# Patient Record
Sex: MALE | Race: WHITE | Employment: OTHER | URBAN - METROPOLITAN AREA
[De-identification: names, ages, dates, MRNs, and addresses within clinical notes are randomized per-mention and may not be internally consistent; named-entity substitution may affect disease eponyms.]

---

## 2019-03-27 ENCOUNTER — TELEPHONE (OUTPATIENT)
Dept: UROLOGY | Facility: AMBULATORY SURGERY CENTER | Age: 79
End: 2019-03-27

## 2019-03-27 NOTE — TELEPHONE ENCOUNTER
Reason for appointment/Complaint/Diagnosis : prostate exam    Insurance: Medicare/BC    History of Cancer? Yes                       If yes, what kind? Skin 30yrs ago    Previous urologist?     None                  Records requested/where? No    Outside testing/where? No    Location Preference for office visit?  [place]

## 2020-07-09 ENCOUNTER — OFFICE VISIT (OUTPATIENT)
Dept: PULMONOLOGY | Facility: CLINIC | Age: 80
End: 2020-07-09
Payer: MEDICARE

## 2020-07-09 VITALS
SYSTOLIC BLOOD PRESSURE: 126 MMHG | OXYGEN SATURATION: 95 % | BODY MASS INDEX: 37.67 KG/M2 | WEIGHT: 240 LBS | RESPIRATION RATE: 14 BRPM | DIASTOLIC BLOOD PRESSURE: 70 MMHG | HEART RATE: 65 BPM | TEMPERATURE: 98 F | HEIGHT: 67 IN

## 2020-07-09 DIAGNOSIS — R06.02 SOB (SHORTNESS OF BREATH): Primary | ICD-10-CM

## 2020-07-09 DIAGNOSIS — I10 ESSENTIAL HYPERTENSION: ICD-10-CM

## 2020-07-09 DIAGNOSIS — G47.33 OSA (OBSTRUCTIVE SLEEP APNEA): ICD-10-CM

## 2020-07-09 PROCEDURE — 99214 OFFICE O/P EST MOD 30 MIN: CPT | Performed by: INTERNAL MEDICINE

## 2020-07-09 RX ORDER — HYDROCHLOROTHIAZIDE 12.5 MG/1
12.5 TABLET ORAL DAILY
COMMUNITY
Start: 2020-05-27

## 2020-07-09 RX ORDER — OMEPRAZOLE 40 MG/1
CAPSULE, DELAYED RELEASE ORAL
COMMUNITY
Start: 2020-07-06

## 2020-07-09 RX ORDER — RANOLAZINE 500 MG/1
500 TABLET, EXTENDED RELEASE ORAL 2 TIMES DAILY
COMMUNITY
Start: 2020-05-27 | End: 2022-08-05 | Stop reason: SDUPTHER

## 2020-07-09 RX ORDER — PRAVASTATIN SODIUM 20 MG
TABLET ORAL
COMMUNITY
Start: 2020-07-06 | End: 2020-11-09

## 2020-07-09 RX ORDER — EMPAGLIFLOZIN 25 MG/1
25 TABLET, FILM COATED ORAL DAILY
COMMUNITY
Start: 2020-06-09

## 2020-07-09 RX ORDER — TAMSULOSIN HYDROCHLORIDE 0.4 MG/1
CAPSULE ORAL
COMMUNITY
Start: 2020-07-06

## 2020-07-09 RX ORDER — CLOPIDOGREL BISULFATE 75 MG/1
75 TABLET ORAL DAILY
COMMUNITY
End: 2022-08-05 | Stop reason: SDUPTHER

## 2020-07-09 RX ORDER — GLIPIZIDE 10 MG/1
TABLET ORAL
COMMUNITY
Start: 2020-07-06

## 2020-07-09 RX ORDER — FAMOTIDINE 40 MG/1
TABLET, FILM COATED ORAL
COMMUNITY
Start: 2020-07-06

## 2020-07-09 RX ORDER — TADALAFIL 20 MG/1
20 TABLET ORAL
COMMUNITY
End: 2020-11-09

## 2020-07-09 RX ORDER — DULAGLUTIDE 0.75 MG/.5ML
INJECTION, SOLUTION SUBCUTANEOUS
COMMUNITY
Start: 2020-07-06 | End: 2020-11-09

## 2020-07-09 RX ORDER — CLONIDINE HYDROCHLORIDE 0.1 MG/1
0.1 TABLET ORAL 3 TIMES DAILY
COMMUNITY

## 2020-07-09 RX ORDER — LISINOPRIL 40 MG/1
TABLET ORAL
COMMUNITY
Start: 2020-07-06

## 2020-07-09 RX ORDER — METOPROLOL SUCCINATE 25 MG/1
TABLET, EXTENDED RELEASE ORAL
COMMUNITY
Start: 2020-07-06 | End: 2022-08-05 | Stop reason: SDUPTHER

## 2020-07-09 NOTE — ASSESSMENT & PLAN NOTE
He has shortness of breath on exertion and his current exercise tolerance is about 2 blocks  His activities are restricted due to ambulatory dysfunction  Denies any significant cough or phlegm or wheeze or chest pain  He admits to being a smoker for about 15 years 2 packs per day  He has no fever or chills  Currently he is not on any oxygen or inhaler  We will do a full PFT and 6 minutes walk test to assess his shortness of breath further  I had a long discussion with him and answered all his questions

## 2020-07-09 NOTE — PROGRESS NOTES
Assessment/Plan:    PAULA (obstructive sleep apnea)  Mr Darshan Vizcarra was diagnosed with sleep apnea of severe degree consisting of both obstructive and central events and associated with significant oxygen desaturation or an diagnosed ago overnight sleep study done in July 2018  During the same night it was found that his sleep apnea can be corrected with CPAP 10 cm of water using nasal pillows  He has been subsequently started on CPAP therapy and has been using the CPAP regularly  He CPAP compliance is overall satisfactory though his 4 hour compliance is borderline, and his residual AHI is only 4 4  I have advised him to use the CPAP regularly and adequately  There long discussion with him and answered all his questions  SOB (shortness of breath)  He has shortness of breath on exertion and his current exercise tolerance is about 2 blocks  His activities are restricted due to ambulatory dysfunction  Denies any significant cough or phlegm or wheeze or chest pain  He admits to being a smoker for about 15 years 2 packs per day  He has no fever or chills  Currently he is not on any oxygen or inhaler  We will do a full PFT and 6 minutes walk test to assess his shortness of breath further  I had a long discussion with him and answered all his questions  Diagnoses and all orders for this visit:    SOB (shortness of breath)  -     Complete PFT with post Bronchodilator and Six Minute walk; Future  -     XR chest pa & lateral; Future    PAULA (obstructive sleep apnea)    Essential hypertension    Other orders  -     ASPIRIN 81 PO; Take 81 mg by mouth daily  -     Multiple Vitamins-Minerals (MULTIVITAMIN ADULT PO); Take by mouth daily  -     tadalafil (CIALIS) 20 MG tablet; Take 20 mg by mouth  -     cloNIDine (CATAPRES) 0 1 mg tablet; Take 0 1 mg by mouth Three times a day  -     clopidogrel (PLAVIX) 75 mg tablet;  Take 75 mg by mouth daily  -     TRULICITY 6 74 HA/2 0MA SOPN  -     JARDIANCE 25 MG TABS; Take 25 mg by mouth daily  -     famotidine (PEPCID) 40 MG tablet  -     glipiZIDE (GLUCOTROL) 10 mg tablet  -     hydrochlorothiazide (HYDRODIURIL) 12 5 mg tablet; Take 12 5 mg by mouth daily  -     lisinopril (ZESTRIL) 40 mg tablet  -     metFORMIN (GLUCOPHAGE) 1000 MG tablet  -     metoprolol succinate (TOPROL-XL) 25 mg 24 hr tablet  -     omeprazole (PriLOSEC) 40 MG capsule  -     pravastatin (PRAVACHOL) 20 mg tablet  -     ranolazine (RANEXA) 500 mg 12 hr tablet; Take 500 mg by mouth 2 (two) times a day  -     tamsulosin (FLOMAX) 0 4 mg          Subjective:      Patient ID: Mihir Hernandez is a 70 y o  male  Mr Ross Lamas was diagnosed with sleep apnea of severe degree consisting of both obstructive and central events and associated with significant oxygen desaturation or an diagnosed ago overnight sleep study done in July 2018  During the same night it was found that his sleep apnea can be corrected with CPAP 10 cm of water using nasal pillows  He has been subsequently started on CPAP therapy and has been using the CPAP regularly  Currently doc is overall compliance is good, his 4 over compliance is only 53%  His residual AHI however is low at 4 4  He is very motivated to continue on CPAP therapy  He does not have any significant daytime sleepiness or morning headache  His sleep is still interrupted  He is comfortable with the mask and pressure  He has some shortness of breath on exertion but is exercise tolerance is at least 2 blocks  He attributed mainly to his knee problems which makes it difficult for ambulation  He denies any significant cough or phlegm or wheeze or chest pain  He was a smoker in the past smoked for about 15 years 2 packs per day          The following portions of the patient's history were reviewed and updated as appropriate: allergies, current medications, past family history, past medical history, past social history, past surgical history and problem list     Review of Systems   Constitutional: Negative for appetite change and fever  HENT: Negative for congestion, rhinorrhea and trouble swallowing  Eyes: Negative for discharge  Respiratory: Positive for shortness of breath  Negative for cough, chest tightness and wheezing  Gastrointestinal: Negative for abdominal distention, constipation, diarrhea and nausea  Genitourinary: Negative for difficulty urinating, dysuria and urgency  Musculoskeletal: Positive for arthralgias  Negative for joint swelling and neck stiffness  Bilateral knee pain; ambulatory dysfunction   Skin: Negative for pallor  Allergic/Immunologic: Negative for environmental allergies  Neurological: Negative for dizziness and syncope  Psychiatric/Behavioral: Positive for sleep disturbance  Negative for agitation  The patient is not nervous/anxious  Objective:      /70 (BP Location: Left arm, Patient Position: Sitting, Cuff Size: Adult)   Pulse 65   Temp 98 °F (36 7 °C) (Tympanic)   Resp 14   Ht 5' 7" (1 702 m)   Wt 109 kg (240 lb)   SpO2 95%   BMI 37 59 kg/m²          Physical Exam   Constitutional: He is oriented to person, place, and time  He appears well-developed and well-nourished  HENT:   Head: Normocephalic  Neck: No JVD present  No thyromegaly present  Cardiovascular: Normal rate and normal heart sounds  Pulmonary/Chest: Breath sounds normal  No respiratory distress  He has no wheezes  He has no rales  He exhibits no tenderness  Abdominal: Soft  Bowel sounds are normal  He exhibits no distension  Musculoskeletal: He exhibits no deformity  Lymphadenopathy:     He has no cervical adenopathy  Neurological: He is alert and oriented to person, place, and time  Skin: Skin is warm  Psychiatric: He has a normal mood and affect

## 2020-07-09 NOTE — ASSESSMENT & PLAN NOTE
Mr Jose Luis Payne was diagnosed with sleep apnea of severe degree consisting of both obstructive and central events and associated with significant oxygen desaturation or an diagnosed ago overnight sleep study done in July 2018  During the same night it was found that his sleep apnea can be corrected with CPAP 10 cm of water using nasal pillows  He has been subsequently started on CPAP therapy and has been using the CPAP regularly  He CPAP compliance is overall satisfactory though his 4 hour compliance is borderline, and his residual AHI is only 4 4  I have advised him to use the CPAP regularly and adequately  There long discussion with him and answered all his questions

## 2020-07-13 ENCOUNTER — TRANSCRIBE ORDERS (OUTPATIENT)
Dept: ADMINISTRATIVE | Facility: HOSPITAL | Age: 80
End: 2020-07-13

## 2020-07-13 ENCOUNTER — APPOINTMENT (OUTPATIENT)
Dept: LAB | Facility: HOSPITAL | Age: 80
End: 2020-07-13
Payer: MEDICARE

## 2020-07-13 DIAGNOSIS — Z79.899 ENCOUNTER FOR LONG-TERM (CURRENT) USE OF OTHER MEDICATIONS: Primary | ICD-10-CM

## 2020-07-13 DIAGNOSIS — Z79.899 ENCOUNTER FOR LONG-TERM (CURRENT) USE OF OTHER MEDICATIONS: ICD-10-CM

## 2020-07-13 LAB
ANION GAP SERPL CALCULATED.3IONS-SCNC: 7 MMOL/L (ref 4–13)
BUN SERPL-MCNC: 25 MG/DL (ref 6–20)
CALCIUM SERPL-MCNC: 9.1 MG/DL (ref 8.4–10.2)
CHLORIDE SERPL-SCNC: 105 MMOL/L (ref 96–108)
CO2 SERPL-SCNC: 30 MMOL/L (ref 22–33)
CREAT SERPL-MCNC: 1.66 MG/DL (ref 0.5–1.2)
GFR SERPL CREATININE-BSD FRML MDRD: 39 ML/MIN/1.73SQ M
GLUCOSE P FAST SERPL-MCNC: 145 MG/DL (ref 65–99)
POTASSIUM SERPL-SCNC: 4 MMOL/L (ref 3.5–5)
SODIUM SERPL-SCNC: 142 MMOL/L (ref 133–145)

## 2020-07-13 PROCEDURE — 80048 BASIC METABOLIC PNL TOTAL CA: CPT

## 2020-07-13 PROCEDURE — 36415 COLL VENOUS BLD VENIPUNCTURE: CPT

## 2020-07-23 ENCOUNTER — HOSPITAL ENCOUNTER (OUTPATIENT)
Dept: RADIOLOGY | Facility: HOSPITAL | Age: 80
Discharge: HOME | End: 2020-07-23
Attending: INTERNAL MEDICINE
Payer: MEDICARE

## 2020-07-23 ENCOUNTER — HOSPITAL ENCOUNTER (OUTPATIENT)
Dept: PULMONOLOGY | Facility: HOSPITAL | Age: 80
Discharge: HOME | End: 2020-07-23
Attending: INTERNAL MEDICINE
Payer: MEDICARE

## 2020-07-23 DIAGNOSIS — R06.02 SOB (SHORTNESS OF BREATH): ICD-10-CM

## 2020-07-23 PROCEDURE — 94618 PULMONARY STRESS TESTING: CPT | Performed by: INTERNAL MEDICINE

## 2020-07-23 PROCEDURE — 94729 DIFFUSING CAPACITY: CPT | Performed by: INTERNAL MEDICINE

## 2020-07-23 PROCEDURE — 94760 N-INVAS EAR/PLS OXIMETRY 1: CPT

## 2020-07-23 PROCEDURE — 94726 PLETHYSMOGRAPHY LUNG VOLUMES: CPT

## 2020-07-23 PROCEDURE — 94761 N-INVAS EAR/PLS OXIMETRY MLT: CPT

## 2020-07-23 PROCEDURE — 94726 PLETHYSMOGRAPHY LUNG VOLUMES: CPT | Performed by: INTERNAL MEDICINE

## 2020-07-23 PROCEDURE — 94010 BREATHING CAPACITY TEST: CPT

## 2020-07-23 PROCEDURE — 94729 DIFFUSING CAPACITY: CPT

## 2020-07-23 PROCEDURE — 71046 X-RAY EXAM CHEST 2 VIEWS: CPT

## 2020-07-23 PROCEDURE — 94010 BREATHING CAPACITY TEST: CPT | Performed by: INTERNAL MEDICINE

## 2020-11-09 ENCOUNTER — OFFICE VISIT (OUTPATIENT)
Dept: PULMONOLOGY | Facility: CLINIC | Age: 80
End: 2020-11-09
Payer: MEDICARE

## 2020-11-09 VITALS
HEART RATE: 69 BPM | WEIGHT: 243 LBS | SYSTOLIC BLOOD PRESSURE: 130 MMHG | HEIGHT: 67 IN | BODY MASS INDEX: 38.14 KG/M2 | OXYGEN SATURATION: 91 % | TEMPERATURE: 97.9 F | DIASTOLIC BLOOD PRESSURE: 78 MMHG

## 2020-11-09 DIAGNOSIS — E66.09 CLASS 2 OBESITY DUE TO EXCESS CALORIES WITHOUT SERIOUS COMORBIDITY WITH BODY MASS INDEX (BMI) OF 37.0 TO 37.9 IN ADULT: ICD-10-CM

## 2020-11-09 DIAGNOSIS — I10 ESSENTIAL HYPERTENSION: ICD-10-CM

## 2020-11-09 DIAGNOSIS — R06.02 SOB (SHORTNESS OF BREATH): ICD-10-CM

## 2020-11-09 DIAGNOSIS — G47.33 OSA (OBSTRUCTIVE SLEEP APNEA): Primary | ICD-10-CM

## 2020-11-09 PROBLEM — E66.9 OBESITY: Status: ACTIVE | Noted: 2020-11-09

## 2020-11-09 PROCEDURE — 99213 OFFICE O/P EST LOW 20 MIN: CPT | Performed by: INTERNAL MEDICINE

## 2020-11-09 RX ORDER — MULTIVITAMIN WITH IRON
100 TABLET ORAL DAILY
COMMUNITY

## 2020-11-09 RX ORDER — TADALAFIL 5 MG/1
5 TABLET ORAL DAILY PRN
COMMUNITY
Start: 2020-11-09 | End: 2020-11-09 | Stop reason: ALTCHOICE

## 2020-11-09 RX ORDER — AMILORIDE HCL 5 MG
10 TABLET ORAL EVERY 4 HOURS PRN
COMMUNITY

## 2020-11-09 RX ORDER — SILDENAFIL 100 MG/1
100 TABLET, FILM COATED ORAL DAILY
COMMUNITY
Start: 2020-10-26 | End: 2022-05-17

## 2020-11-09 RX ORDER — INSULIN DETEMIR 100 [IU]/ML
15 INJECTION, SOLUTION SUBCUTANEOUS
COMMUNITY
Start: 2020-08-27

## 2020-11-09 RX ORDER — DULAGLUTIDE 1.5 MG/.5ML
INJECTION, SOLUTION SUBCUTANEOUS
COMMUNITY
Start: 2020-09-08

## 2020-11-09 RX ORDER — LANCETS 30 GAUGE
EACH MISCELLANEOUS
COMMUNITY
Start: 2020-08-28

## 2020-11-09 RX ORDER — MELATONIN
2000 DAILY
COMMUNITY

## 2020-11-09 RX ORDER — PRAVASTATIN SODIUM 40 MG
40 TABLET ORAL DAILY
COMMUNITY
Start: 2020-08-27 | End: 2022-07-11

## 2020-11-09 RX ORDER — CLOTRIMAZOLE 1 %
CREAM (GRAM) TOPICAL AS NEEDED
COMMUNITY

## 2020-11-09 RX ORDER — CALCIUM CITRATE/VITAMIN D3 200MG-6.25
TABLET ORAL
COMMUNITY
Start: 2020-09-19

## 2020-11-09 RX ORDER — PEN NEEDLE, DIABETIC 32GX 5/32"
NEEDLE, DISPOSABLE MISCELLANEOUS
COMMUNITY
Start: 2020-08-27

## 2020-11-09 RX ORDER — LORATADINE 10 MG/1
TABLET ORAL
COMMUNITY

## 2020-11-09 RX ORDER — GUAIFENESIN 600 MG
1200 TABLET, EXTENDED RELEASE 12 HR ORAL AS NEEDED
COMMUNITY

## 2021-01-18 ENCOUNTER — TELEPHONE (OUTPATIENT)
Dept: PULMONOLOGY | Facility: CLINIC | Age: 81
End: 2021-01-18

## 2021-01-18 NOTE — TELEPHONE ENCOUNTER
Note to chart:  Returned pt's call  Message he left said he was calling regarding an appt but didn't say anything additional regarding the appt      LM for pt TCB

## 2021-01-20 DIAGNOSIS — Z23 ENCOUNTER FOR IMMUNIZATION: ICD-10-CM

## 2021-05-06 ENCOUNTER — OFFICE VISIT (OUTPATIENT)
Dept: PULMONOLOGY | Facility: CLINIC | Age: 81
End: 2021-05-06
Payer: MEDICARE

## 2021-05-06 VITALS
HEIGHT: 67 IN | OXYGEN SATURATION: 95 % | TEMPERATURE: 97.6 F | BODY MASS INDEX: 39.24 KG/M2 | SYSTOLIC BLOOD PRESSURE: 140 MMHG | RESPIRATION RATE: 16 BRPM | HEART RATE: 62 BPM | WEIGHT: 250 LBS | DIASTOLIC BLOOD PRESSURE: 72 MMHG

## 2021-05-06 DIAGNOSIS — I10 ESSENTIAL HYPERTENSION: ICD-10-CM

## 2021-05-06 DIAGNOSIS — E66.09 CLASS 2 OBESITY DUE TO EXCESS CALORIES WITHOUT SERIOUS COMORBIDITY WITH BODY MASS INDEX (BMI) OF 37.0 TO 37.9 IN ADULT: ICD-10-CM

## 2021-05-06 DIAGNOSIS — G47.33 OSA (OBSTRUCTIVE SLEEP APNEA): Primary | ICD-10-CM

## 2021-05-06 PROCEDURE — 99213 OFFICE O/P EST LOW 20 MIN: CPT | Performed by: INTERNAL MEDICINE

## 2021-05-06 NOTE — ASSESSMENT & PLAN NOTE
He has shortness of breath on exertion and his current exercise tolerance is at least 1 block   His activities are restricted due to ambulatory dysfunction   Denies any significant  phlegm or wheeze or chest pain   He admits to being a smoker for about 15 years 2 packs per day    Currently he is not on any oxygen or inhaler    his pulmonary function tests were within normal limits except for moderate reduction in diffusion capacity to about 60% of the predicted  His chest x-ray was unremarkable  On chest auscultation he had occasional crackles at both lung bases  We will observe him at this time   Magalie Arnold had a long discussion with him and answered all his questions

## 2021-05-06 NOTE — PROGRESS NOTES
Assessment/Plan:    PAULA (obstructive sleep apnea)  Mr Ashley Thomas was diagnosed with sleep apnea of severe degree consisting of both obstructive and central events and associated with significant oxygen desaturation or an diagnosed ago overnight sleep study done in July 2018  During the same night it was found that his sleep apnea can be corrected with CPAP 10 cm of water using nasal pillows   He has been subsequently started on CPAP therapy and has been using the CPAP regularly   His  CPAP compliance is now overall satisfactory, and his residual AHI is only 3 9  He is getting clinical benefit from CPAP therapy    There long discussion with him and answered all his questions        SOB (shortness of breath)  He has shortness of breath on exertion and his current exercise tolerance is at least 1 block   His activities are restricted due to ambulatory dysfunction   Denies any significant  phlegm or wheeze or chest pain   He admits to being a smoker for about 15 years 2 packs per day    Currently he is not on any oxygen or inhaler    his pulmonary function tests were within normal limits except for moderate reduction in diffusion capacity to about 60% of the predicted  His chest x-ray was unremarkable  On chest auscultation he had occasional crackles at both lung bases  We will observe him at this time   Giles Balderas had a long discussion with him and answered all his questions  Obesity  He is obese and understands the need for weight reduction  He understands that the weight reduction can improve sleep apnea  HTN (hypertension)  He has history of hypertension and currently his blood pressure is controlled with lisinopril clonidine and hydrochlorothiazide         Diagnoses and all orders for this visit:    PAULA (obstructive sleep apnea)    Essential hypertension    Class 2 obesity due to excess calories without serious comorbidity with body mass index (BMI) of 37 0 to 37 9 in adult          Subjective:      Patient ID: Yaron Coombs is a 68 y o  male  Mr Robina De Paz has obstructive sleep apnea and has been on CPAP therapy  Currently he is using the CPAP regularly and is comfortable with the mask and pressure  Has no significant daytime sleepiness or morning headache  His sleep is not disturbed  I reviewed his CPAP compliance records and they are excellent  His residual AHI is low  He is very motivated to continue on CPAP therapy  He has been getting regular supplies from his DME  He feels that the CPAP therapy is helping him  He is obese and understands the need for weight reduction  He has gained more weight recently  He has shortness of breath on exertion and his exercise tolerance is about a block  He states that his exercise capacity is more restricted by his knee problems  He has no significant cough or phlegm or wheeze or chest pain  He has sinus issues  His previous chest x-ray was unremarkable  His PFT showed decreased diffusion capacity  There was no clear obstruction  He has history of hypertension and is on treatment with lisinopril clonidine and hydrochlorothiazide  The following portions of the patient's history were reviewed and updated as appropriate: allergies, current medications, past family history, past medical history, past social history, past surgical history and problem list     Review of Systems   Constitutional: Negative for activity change, chills, fever and unexpected weight change  HENT: Positive for rhinorrhea and sinus pressure  Negative for hearing loss, sneezing, sore throat, trouble swallowing and voice change  Eyes: Negative for visual disturbance  Respiratory: Positive for shortness of breath  Negative for cough, chest tightness, wheezing and stridor  Gastrointestinal: Negative for abdominal pain, constipation, diarrhea, nausea and vomiting  Endocrine: Negative for polyuria  Genitourinary: Negative for dysuria, frequency and urgency  Musculoskeletal: Positive for arthralgias  Skin: Negative for rash  Allergic/Immunologic: Positive for environmental allergies  Neurological: Negative for dizziness, syncope, light-headedness and headaches  Psychiatric/Behavioral: Negative for agitation and sleep disturbance  The patient is not nervous/anxious  Objective:      /72 (BP Location: Left arm, Patient Position: Sitting, Cuff Size: Adult)   Pulse 62   Temp 97 6 °F (36 4 °C) (Tympanic)   Resp 16   Ht 5' 7" (1 702 m)   Wt 113 kg (250 lb)   SpO2 95%   BMI 39 16 kg/m²          Physical Exam  Vitals signs reviewed  Constitutional:       General: He is not in acute distress  Appearance: He is obese  He is not ill-appearing, toxic-appearing or diaphoretic  HENT:      Head: Normocephalic  Eyes:      General: No scleral icterus  Conjunctiva/sclera: Conjunctivae normal    Neck:      Musculoskeletal: No neck rigidity or muscular tenderness  Cardiovascular:      Rate and Rhythm: Normal rate and regular rhythm  Heart sounds: Normal heart sounds  No murmur (Ejection systolic murmur aortic area)  Pulmonary:      Effort: Pulmonary effort is normal  No respiratory distress  Breath sounds: No stridor  Rales (Occasional crackles at both lung bases) present  No wheezing or rhonchi  Abdominal:      General: There is distension  Tenderness: There is no abdominal tenderness  Musculoskeletal:      Right lower leg: No edema  Left lower leg: No edema  Lymphadenopathy:      Cervical: No cervical adenopathy  Skin:     General: Skin is warm  Coloration: Skin is not jaundiced or pale  Neurological:      Mental Status: He is alert and oriented to person, place, and time  Gait: Gait normal    Psychiatric:         Mood and Affect: Mood normal          Behavior: Behavior normal          Thought Content:  Thought content normal          Judgment: Judgment normal

## 2021-05-06 NOTE — ASSESSMENT & PLAN NOTE
He has history of hypertension and currently his blood pressure is controlled with lisinopril clonidine and hydrochlorothiazide

## 2021-05-06 NOTE — ASSESSMENT & PLAN NOTE
He is obese and understands the need for weight reduction  He understands that the weight reduction can improve sleep apnea

## 2021-05-06 NOTE — ASSESSMENT & PLAN NOTE
Mr Victoria Briones was diagnosed with sleep apnea of severe degree consisting of both obstructive and central events and associated with significant oxygen desaturation or an diagnosed ago overnight sleep study done in July 2018  During the same night it was found that his sleep apnea can be corrected with CPAP 10 cm of water using nasal pillows   He has been subsequently started on CPAP therapy and has been using the CPAP regularly   His  CPAP compliance is now overall satisfactory, and his residual AHI is only 3 9    He is getting clinical benefit from CPAP therapy    There long discussion with him and answered all his questions

## 2021-12-28 ENCOUNTER — TELEMEDICINE (OUTPATIENT)
Dept: PULMONOLOGY | Facility: CLINIC | Age: 81
End: 2021-12-28
Payer: MEDICARE

## 2021-12-28 VITALS — WEIGHT: 250 LBS | BODY MASS INDEX: 39.24 KG/M2 | HEIGHT: 67 IN

## 2021-12-28 DIAGNOSIS — I10 PRIMARY HYPERTENSION: ICD-10-CM

## 2021-12-28 DIAGNOSIS — E66.09 CLASS 2 OBESITY DUE TO EXCESS CALORIES WITHOUT SERIOUS COMORBIDITY WITH BODY MASS INDEX (BMI) OF 39.0 TO 39.9 IN ADULT: ICD-10-CM

## 2021-12-28 DIAGNOSIS — R06.02 SOB (SHORTNESS OF BREATH): ICD-10-CM

## 2021-12-28 DIAGNOSIS — G47.33 OSA (OBSTRUCTIVE SLEEP APNEA): Primary | ICD-10-CM

## 2021-12-28 PROCEDURE — 99442 PR PHYS/QHP TELEPHONE EVALUATION 11-20 MIN: CPT | Performed by: INTERNAL MEDICINE

## 2021-12-28 RX ORDER — PHENOL 1.4 %
600 AEROSOL, SPRAY (ML) MUCOUS MEMBRANE 2 TIMES DAILY WITH MEALS
COMMUNITY

## 2021-12-28 RX ORDER — INSULIN ASPART 100 [IU]/ML
INJECTION, SUSPENSION SUBCUTANEOUS
COMMUNITY

## 2021-12-28 RX ORDER — FLUTICASONE PROPIONATE 50 MCG
SPRAY, SUSPENSION (ML) NASAL
COMMUNITY
Start: 2021-11-30 | End: 2022-08-09 | Stop reason: SDUPTHER

## 2022-01-25 ENCOUNTER — TELEPHONE (OUTPATIENT)
Dept: GASTROENTEROLOGY | Facility: CLINIC | Age: 82
End: 2022-01-25

## 2022-01-25 NOTE — TELEPHONE ENCOUNTER
Recall call went out via [employer] in 2020 with no return calls from pt to schedule  Pt is due for a 1yr f/u appt to GERD with Dr Rosaura West   I called and spoke to pt whom informed that he was driving and he asked me to call him back in 15 mins    Will try to do so per his request

## 2022-01-27 ENCOUNTER — OFFICE VISIT (OUTPATIENT)
Dept: GASTROENTEROLOGY | Facility: CLINIC | Age: 82
End: 2022-01-27
Payer: MEDICARE

## 2022-01-27 VITALS
DIASTOLIC BLOOD PRESSURE: 86 MMHG | HEIGHT: 67 IN | BODY MASS INDEX: 41.62 KG/M2 | SYSTOLIC BLOOD PRESSURE: 155 MMHG | HEART RATE: 71 BPM | WEIGHT: 265.2 LBS

## 2022-01-27 DIAGNOSIS — K22.70 BARRETT'S ESOPHAGUS WITHOUT DYSPLASIA: ICD-10-CM

## 2022-01-27 DIAGNOSIS — K21.9 GASTROESOPHAGEAL REFLUX DISEASE WITHOUT ESOPHAGITIS: Primary | ICD-10-CM

## 2022-01-27 DIAGNOSIS — Z86.010 HISTORY OF COLON POLYPS: ICD-10-CM

## 2022-01-27 PROCEDURE — 99214 OFFICE O/P EST MOD 30 MIN: CPT | Performed by: INTERNAL MEDICINE

## 2022-01-27 NOTE — PROGRESS NOTES
[facility] - Outpatient Follow-up Note  Mihir Hernandez 80 y o  male MRN: [id number]  Encounter: [id number]          ASSESSMENT AND PLAN:      1  Gastroesophageal reflux disease without esophagitis    2  Shane's esophagus without dysplasia    3  History of colon polyps    History of acid reflux possible Shane's GERD hiatal hernia, prior EGD colonoscopy biopsy results noted, anti-reflux measures reviewed diet discussed, continue PPI for now, side effects of long-term PPI use reviewed  Current him to watch his diet and lose some weight  History of colon polyp, last EGD colonoscopy in September of 2020, will be due for a follow-up in September of 2023  He had some hemorrhoidal issues taken care of recently  He will follow-up with me as needed    ______________________________________________________________________    SUBJECTIVE:   [facility] came for follow-up evaluation of his history of heartburn acid reflux indigestion, doing fair on current regimen, takes acid reducer, denies dysphagia, denies any hematemesis melena hematochezia, sometimes has little sore throat or reflux symptoms  Denies any melena hematochezia GI bleeding bowel sometimes irregular  Denies chest pain shortness of breath, has gained more than 30 lb over the last year, staying at home and eating more than usual   Diet medications more than 10 pertinent systems reviewed  REVIEW OF SYSTEMS IS OTHERWISE NEGATIVE        Historical Information   Past Medical History:   Diagnosis Date    Chronic kidney disease (CKD) stage G3a/A1, moderately decreased glomerular filtration rate (GFR) between 45-59 mL/min/1 73 square meter and albuminuria creatinine ratio less than 30 mg/g ([facility])     Diabetes mellitus ([address] )     History of echocardiogram     Hypertension     Sleep apnea, obstructive      Past Surgical History:   Procedure Laterality Date    CARDIAC CATHETERIZATION      JOINT REPLACEMENT  2017    knee    OTHER SURGICAL HISTORY      Stent      Social History   Social History     Substance and Sexual Activity   Alcohol Use Yes    Comment: Occasional      Social History     Substance and Sexual Activity   Drug Use Never     Social History     Tobacco Use   Smoking Status Former Smoker    Packs/day: 1 00    Years: 15 00    Pack years: 15 00    Types: Cigarettes    Quit date: 12    Years since quittin 0   Smokeless Tobacco Never Used     Family History   Problem Relation Age of Onset    Heart attack Mother         46s    Cancer Mother     Coronary artery disease Mother     Cancer Brother        Meds/Allergies       Current Outpatient Medications:     ASPIRIN 81 PO    BD Pen Needle Pascale U/F 32G X 4 MM MISC    calcium carbonate (OS-WAYLON) 600 MG tablet    cholecalciferol (VITAMIN D3) 1,000 units tablet    cloNIDine (CATAPRES) 0 1 mg tablet    clopidogrel (PLAVIX) 75 mg tablet    famotidine (PEPCID) 40 MG tablet    fluticasone (FLONASE) 50 mcg/act nasal spray    glipiZIDE (GLUCOTROL) 10 mg tablet    guaiFENesin (MUCINEX) 600 mg 12 hr tablet    hydrochlorothiazide (HYDRODIURIL) 12 5 mg tablet    insulin aspart protamine-insulin aspart (NovoLOG 70/30) 100 units/mL injection    JARDIANCE 25 MG TABS    Lancets (OneTouch Delica Plus [id number]) MISC    Levemir FlexTouch 100 units/mL injection pen    lisinopril (ZESTRIL) 40 mg tablet    loratadine (CLARITIN) 10 mg tablet    metoprolol succinate (TOPROL-XL) 25 mg 24 hr tablet    Multiple Vitamins-Minerals (MULTIVITAMIN ADULT PO)    omeprazole (PriLOSEC) 40 MG capsule    phenylephrine (SUDAFED PE) 10 MG TABS    pravastatin (PRAVACHOL) 40 mg tablet    Probiotic Product (PROBIOTIC DAILY PO)    pyridoxine (VITAMIN B6) 100 mg tablet    tamsulosin (FLOMAX) 0 4 mg    True Metrix Blood Glucose Test test strip    Trulicity 1 5 DW/2 3HQ SOPN    clotrimazole (LOTRIMIN) 1 % cream    metFORMIN (GLUCOPHAGE) 1000 MG tablet    ranolazine (RANEXA) 500 mg 12 hr tablet    sildenafil (VIAGRA) 100 mg tablet    Allergies   Allergen Reactions    Iodinated Diagnostic Agents     Simvastatin Myalgia           Objective     Blood pressure 155/86, pulse 71, height 5' 7" (1 702 m), weight 120 kg (265 lb 3 2 oz)  Body mass index is 41 54 kg/m²  PHYSICAL EXAM:      General Appearance:   Alert, cooperative, no distress   HEENT:   Normocephalic, atraumatic, anicteric  Neck:  Supple, symmetrical, trachea midline   Lungs:   Clear to auscultation bilaterally; no rales, rhonchi or wheezing; respirations unlabored    Heart[de-identified]   Regular rate and rhythm; no murmur  Abdomen:   Soft, non-tender, non-distended; normal bowel sounds; no masses, no organomegaly    Genitalia:   Deferred    Rectal:   Deferred    Extremities:  No cyanosis, clubbing or edema    Skin:  No jaundice, rashes, or lesions    Lymph nodes:  No palpable cervical lymphadenopathy        Lab Results:   No visits with results within 1 Day(s) from this visit  Latest known visit with results is:   Appointment on 07/13/2020   Component Date Value    Sodium 07/13/2020 142     Potassium 07/13/2020 4 0     Chloride 07/13/2020 105     CO2 07/13/2020 30     ANION GAP 07/13/2020 7     BUN 07/13/2020 25*    Creatinine 07/13/2020 1 66*    Glucose, Fasting 07/13/2020 145*    Calcium 07/13/2020 9 1     eGFR 07/13/2020 39          Radiology Results:   No results found

## 2022-05-17 ENCOUNTER — OFFICE VISIT (OUTPATIENT)
Dept: PULMONOLOGY | Facility: CLINIC | Age: 82
End: 2022-05-17
Payer: MEDICARE

## 2022-05-17 VITALS
DIASTOLIC BLOOD PRESSURE: 77 MMHG | HEART RATE: 59 BPM | BODY MASS INDEX: 43.47 KG/M2 | OXYGEN SATURATION: 98 % | TEMPERATURE: 98 F | WEIGHT: 277 LBS | SYSTOLIC BLOOD PRESSURE: 145 MMHG | HEIGHT: 67 IN

## 2022-05-17 DIAGNOSIS — G47.33 OSA (OBSTRUCTIVE SLEEP APNEA): Primary | ICD-10-CM

## 2022-05-17 PROCEDURE — 99212 OFFICE O/P EST SF 10 MIN: CPT | Performed by: INTERNAL MEDICINE

## 2022-05-17 RX ORDER — DEXTROMETHORPHAN HYDROBROMIDE AND PROMETHAZINE HYDROCHLORIDE 15; 6.25 MG/5ML; MG/5ML
SYRUP ORAL AS NEEDED
COMMUNITY
Start: 2022-04-15

## 2022-05-17 NOTE — ASSESSMENT & PLAN NOTE
On CPAP therapy since 2018  Patient senses the he feels well during the daytime with the constant use of CPAP  Measured compliance is excellent  Patient has never been notified of recall of his CPAP machine  I gave him a literature about this  I am a little concerned that this patient recently had a sinus infection which could be a symptom related to the contamination of the air stream from the foam breakdown  Will continue to monitor this  Call if problems or questions  I would recommend that he continue with the CPAP device as is helping him until he gets a replacement

## 2022-05-17 NOTE — PROGRESS NOTES
Assessment/Plan:    PAULA (obstructive sleep apnea)  On CPAP therapy since 2018  Patient senses the he feels well during the daytime with the constant use of CPAP  Measured compliance is excellent  Patient has never been notified of recall of his CPAP machine  I gave him a literature about this  I am a little concerned that this patient recently had a sinus infection which could be a symptom related to the contamination of the air stream from the foam breakdown  Will continue to monitor this  Call if problems or questions  I would recommend that he continue with the CPAP device as is helping him until he gets a replacement  There are no diagnoses linked to this encounter  Subjective:      Patient ID: Rosendo Duval is a 80 y o  male  This patient returns for obstructive sleep apnea  He was never notified that his the CPAP machine was recalled  I gave him some advice about this but recommend that he continue with this device as he is not sensing any problems  Measured compliance is excellent  Use of the device more than 9 hours per night on the average  Residual AHI 4 2  Patient does not sense daytime sleepiness and nocturia has resolved  No headache  Long discussion about recall of the CPAP machine  Patient is annoyed that he never did receive a notification from the company  Advised that it may take as long as year to replace his machine  In that time frame he may be close to his 5 year anniversary where he can have his machine replaced anyway  15 minute visit all with discussion  The following portions of the patient's history were reviewed and updated as appropriate: allergies, current medications, past family history, past medical history, past social history, past surgical history and problem list     Review of Systems   Constitutional: Negative for activity change and fever  Respiratory: Positive for apnea  Genitourinary: Negative for enuresis     Neurological: Negative for headaches  Objective:      /77 (BP Location: Left arm, Patient Position: Sitting, Cuff Size: Adult)   Pulse 59   Temp 98 °F (36 7 °C) (Tympanic)   Ht 5' 7" (1 702 m)   Wt 126 kg (277 lb)   SpO2 98%   BMI 43 38 kg/m²          Physical Exam  Vitals reviewed  Constitutional:       Appearance: He is obese  He is not ill-appearing  Neurological:      Mental Status: He is alert and oriented to person, place, and time     Psychiatric:         Mood and Affect: Mood normal          Behavior: Behavior normal

## 2022-07-11 RX ORDER — ROSUVASTATIN CALCIUM 20 MG/1
1 TABLET, COATED ORAL DAILY
COMMUNITY
Start: 2022-07-01 | End: 2023-07-01

## 2022-07-12 ENCOUNTER — OFFICE VISIT (OUTPATIENT)
Dept: FAMILY MEDICINE CLINIC | Facility: CLINIC | Age: 82
End: 2022-07-12
Payer: MEDICARE

## 2022-07-12 VITALS
BODY MASS INDEX: 40.39 KG/M2 | OXYGEN SATURATION: 98 % | SYSTOLIC BLOOD PRESSURE: 151 MMHG | HEIGHT: 68 IN | RESPIRATION RATE: 16 BRPM | HEART RATE: 70 BPM | DIASTOLIC BLOOD PRESSURE: 67 MMHG | WEIGHT: 266.5 LBS | TEMPERATURE: 97.3 F

## 2022-07-12 DIAGNOSIS — I10 PRIMARY HYPERTENSION: ICD-10-CM

## 2022-07-12 DIAGNOSIS — E11.22 TYPE 2 DIABETES MELLITUS WITH STAGE 3A CHRONIC KIDNEY DISEASE, WITH LONG-TERM CURRENT USE OF INSULIN (HCC): Primary | ICD-10-CM

## 2022-07-12 DIAGNOSIS — E78.2 MIXED HYPERLIPIDEMIA: ICD-10-CM

## 2022-07-12 DIAGNOSIS — Z79.4 TYPE 2 DIABETES MELLITUS WITH STAGE 3A CHRONIC KIDNEY DISEASE, WITH LONG-TERM CURRENT USE OF INSULIN (HCC): Primary | ICD-10-CM

## 2022-07-12 DIAGNOSIS — Z00.00 MEDICARE ANNUAL WELLNESS VISIT, SUBSEQUENT: ICD-10-CM

## 2022-07-12 DIAGNOSIS — N18.31 TYPE 2 DIABETES MELLITUS WITH STAGE 3A CHRONIC KIDNEY DISEASE, WITH LONG-TERM CURRENT USE OF INSULIN (HCC): Primary | ICD-10-CM

## 2022-07-12 DIAGNOSIS — I25.10 CORONARY ARTERY DISEASE INVOLVING NATIVE CORONARY ARTERY OF NATIVE HEART WITHOUT ANGINA PECTORIS: ICD-10-CM

## 2022-07-12 PROBLEM — N52.9 ED (ERECTILE DYSFUNCTION): Status: ACTIVE | Noted: 2022-07-12

## 2022-07-12 PROBLEM — E11.9 TYPE 2 DIABETES MELLITUS WITHOUT COMPLICATION, WITH LONG-TERM CURRENT USE OF INSULIN (HCC): Status: ACTIVE | Noted: 2022-07-12

## 2022-07-12 PROBLEM — E11.29 TYPE 2 DIABETES MELLITUS WITH KIDNEY COMPLICATION, WITH LONG-TERM CURRENT USE OF INSULIN (HCC): Status: ACTIVE | Noted: 2022-07-12

## 2022-07-12 PROBLEM — E11.9 TYPE 2 DIABETES MELLITUS WITHOUT COMPLICATION, WITH LONG-TERM CURRENT USE OF INSULIN (HCC): Status: RESOLVED | Noted: 2022-07-12 | Resolved: 2022-07-12

## 2022-07-12 PROBLEM — K21.9 GERD WITHOUT ESOPHAGITIS: Status: ACTIVE | Noted: 2022-07-12

## 2022-07-12 PROCEDURE — G0439 PPPS, SUBSEQ VISIT: HCPCS

## 2022-07-12 PROCEDURE — 1123F ACP DISCUSS/DSCN MKR DOCD: CPT

## 2022-07-12 PROCEDURE — 99213 OFFICE O/P EST LOW 20 MIN: CPT

## 2022-07-12 NOTE — PROGRESS NOTES
Assessment/Plan:     Type 2 diabetes mellitus with kidney complication, with long-term current use of insulin ([facility])    Lab Results   Component Value Date    HGBA1C 6 5 (H) 06/30/2022   Under control  Continue current medication      HTN (hypertension)  Under control  Continue current medication      Coronary artery disease involving native coronary artery without angina pectoris  Under control  Continue current medication      Mixed hyperlipidemia  Under control  Continue current medication         Diagnoses and all orders for this visit:    Type 2 diabetes mellitus with stage 3a chronic kidney disease, with long-term current use of insulin ([facility] )    Primary hypertension    Coronary artery disease involving native coronary artery of native heart without angina pectoris    Mixed hyperlipidemia    Medicare annual wellness visit, subsequent    Other orders  -     rosuvastatin (CRESTOR) 20 MG tablet; Take 1 tablet by mouth daily          Subjective:      Patient ID: Dariana Gordon is a 80 y o  male  Hypertension F/U  Reported by patient  Associated Symptoms: no dizziness; no lightheadedness; no chest pain; no shortness of breath; no palpitations; no edema; no calf pain with exertion  Lifestyle: regular exercise; limiting/avoiding salt  Medications: taking medications as directed; no side effects from medication  Coronary Artery Disease F/U  Reported by patient  Severity: symptoms are improving; no chest discomfort with daily activities; has not needed to use Nitroglycerin  Context: non-smoker  Associated Symptoms: no chest pain; no neck pain; no left arm pain; no dyspnea with exertion; no sweating; no nausea; no stress  Hyperlipidemia*  Reported by patient    Control: usually well controlled; improving; at goal  Compliance: compliant; compliant with diet; exercises  Complications: no coronary artery disease; no peripheral artery disease; no cardiovascular disease  Medicare Annual Wellness Visit  Reported by patient    Diet and Nutrition: healthy diet; discussed portion control; discussed diet improvement  Fracture Risk: no history of fractures; no recent explained fracture; no sudden unexplained fractures; no previous musculoskeletal injuries  Physical Activity: exercises on a regular basis; recent increase in physical activity; good physical condition; discussed weightbearing activities; discussed exercise habits  Depression Risk: never feels sad, empty, or tearful; no loss of interest in activities; no significant changes in weight; no sleep disturbances or insomnia; no agitation; no loss of energy; no feelings of worthlessness or guilt; no thoughts of suicide; no history of depression; no history of mood disorders  Orientation: no disorientation to time; no disorientation to date; no disorientation to place  Concentration and Memory: no decreased concentrating ability; no memory lapses or loss; does not forget words  Speech/Motor difficulties: no speech difficulties; no difficulty expressing formulated concepts; no difficulty with fine manipulative tasks; no difficulty writing/copying; no slowed reaction time; does not knock things over when trying to pick them up  Hearing: no loss of hearing  Vision: no vision problems  Activities of Daily Living: able to bathe with limited or no assistance; able to contol urination and bowels; able to dress with limited or no assistance; able to feed self with limited or no assistance; able to get out of chair or bed with limited or no assistance; able to groom with limited or no assistance; able to toilet with limited or no assistance  Instrumental Activities of Daily Living: able to do house work with limited or no assistance; able to grocery shop with limited or no assistance; able to manage medications with limited or no assistance; able to manage money with limited or no assistance; able to prepare meals with limited or no assistance; able to use the phone with limited or no assistance  Falls Risk Assessment: no frequent falls while walking; no fall in the past year; no fall since last visit; no dizziness/vertigo  Home Safety: no unsafe mercedes hazzards; no unsafe stairs; no unsafe gas appliances; working 7-[employer]; wears protective head gear for biking/high velocity; use of seatbelts; practicing 'safer sex'; no vision or hearing loss while driving; no fire arms; has hand bars in the bathroom/shower; good lighting in the home      The following portions of the patient's history were reviewed and updated as appropriate: allergies, current medications, past family history, past medical history, past social history, past surgical history and problem list     Review of Systems   Constitutional: Negative for chills and fever  HENT: Negative for congestion, ear pain and sore throat  Eyes: Negative for pain and visual disturbance  Respiratory: Negative for cough and shortness of breath  Cardiovascular: Negative for chest pain and palpitations  Gastrointestinal: Negative for abdominal pain and vomiting  Genitourinary: Negative for difficulty urinating, dysuria, frequency and hematuria  Musculoskeletal: Negative for arthralgias and back pain  Skin: Negative for color change and rash  Neurological: Negative for dizziness, seizures, syncope, light-headedness and headaches  Psychiatric/Behavioral: Negative for agitation and behavioral problems  All other systems reviewed and are negative  Objective:      /67 (BP Location: Left arm, Patient Position: Sitting)   Pulse 70   Temp (!) 97 3 °F (36 3 °C)   Resp 16   Ht 5' 8 4" (1 737 m)   Wt 121 kg (266 lb 8 oz)   SpO2 98%   BMI 40 05 kg/m²          Physical Exam  Vitals reviewed  Constitutional:       Appearance: Normal appearance  He is obese  HENT:      Head: Normocephalic and atraumatic        Right Ear: Tympanic membrane normal       Left Ear: Tympanic membrane normal       Nose: Nose normal  Mouth/Throat:      Mouth: Mucous membranes are moist    Eyes:      Conjunctiva/sclera: Conjunctivae normal       Pupils: Pupils are equal, round, and reactive to light  Cardiovascular:      Rate and Rhythm: Normal rate and regular rhythm  Heart sounds: Normal heart sounds  Pulmonary:      Effort: Pulmonary effort is normal       Breath sounds: Normal breath sounds  Abdominal:      General: Abdomen is flat  Bowel sounds are normal       Palpations: Abdomen is soft  Musculoskeletal:         General: Normal range of motion  Cervical back: Normal range of motion and neck supple  Skin:     General: Skin is warm and dry  Capillary Refill: Capillary refill takes 2 to 3 seconds  Neurological:      General: No focal deficit present  Mental Status: He is alert and oriented to person, place, and time     Psychiatric:         Mood and Affect: Mood normal

## 2022-07-12 NOTE — PROGRESS NOTES
Assessment and Plan:     Problem List Items Addressed This Visit    None          Preventive health issues were discussed with patient, and age appropriate screening tests were ordered as noted in patient's After Visit Summary  Personalized health advice and appropriate referrals for health education or preventive services given if needed, as noted in patient's After Visit Summary       History of Present Illness:     Patient presents for a Medicare Wellness Visit    HPI   Patient Care Team:  Pia James MD as PCP - General (Internal Medicine)     Review of Systems:     Review of Systems     Problem List:     Patient Active Problem List   Diagnosis    PAULA (obstructive sleep apnea)    HTN (hypertension)    SOB (shortness of breath)    Obesity      Past Medical and Surgical History:     Past Medical History:   Diagnosis Date    Chronic kidney disease (CKD) stage G3a/A1, moderately decreased glomerular filtration rate (GFR) between 45-59 mL/min/1 73 square meter and albuminuria creatinine ratio less than 30 mg/g (HCC)     Diabetes mellitus (HCC)     GERD (gastroesophageal reflux disease)     History of echocardiogram     Hypertension     Sleep apnea, obstructive      Past Surgical History:   Procedure Laterality Date    CARDIAC CATHETERIZATION      COLONOSCOPY  03/09/2018    JOINT REPLACEMENT  2017    knee    OTHER SURGICAL HISTORY      Stent       Family History:     Family History   Problem Relation Age of Onset    Heart disease Mother     Heart attack Mother         46s    Cancer Mother     Coronary artery disease Mother     Cancer Brother         Malignant tumor of lung      Social History:     Social History     Socioeconomic History    Marital status: /Civil Union     Spouse name: Not on file    Number of children: Not on file    Years of education: Not on file    Highest education level: Not on file   Occupational History    Not on file   Tobacco Use    Smoking status: Former Smoker     Packs/day: 1 00     Years: 15 00     Pack years: 15 00     Types: Cigarettes, Cigarettes     Quit date: 1992     Years since quittin 5    Smokeless tobacco: Never Used   Vaping Use    Vaping Use: Never used   Substance and Sexual Activity    Alcohol use:  Yes     Alcohol/week: 5 0 standard drinks     Types: 5 Glasses of wine per week     Comment: Occasional     Drug use: Never    Sexual activity: Yes     Partners: Female   Other Topics Concern    Not on file   Social History Narrative    Pet- dog     Social Determinants of Health     Financial Resource Strain: Not on file   Food Insecurity: Not on file   Transportation Needs: Not on file   Physical Activity: Not on file   Stress: Not on file   Social Connections: Not on file   Intimate Partner Violence: Not on file   Housing Stability: Not on file      Medications and Allergies:     Current Outpatient Medications   Medication Sig Dispense Refill    rosuvastatin (CRESTOR) 20 MG tablet Take 1 tablet by mouth daily      ASPIRIN 81 PO Take 81 mg by mouth daily (Patient not taking: Reported on 2022)      BD Pen Needle Pascale U/F 32G X 4 MM MISC USE AS INSTRUCTED WITH INSULIN PEN      calcium carbonate (OS-WAYLON) 600 MG tablet Take 600 mg by mouth 2 (two) times a day with meals      cholecalciferol (VITAMIN D3) 1,000 units tablet Take 2,000 Units by mouth daily      cloNIDine (CATAPRES) 0 1 mg tablet Take 0 1 mg by mouth Three times a day      clopidogrel (PLAVIX) 75 mg tablet Take 75 mg by mouth daily      clotrimazole (LOTRIMIN) 1 % cream Apply topically as needed   (Patient not taking: No sig reported)      famotidine (PEPCID) 40 MG tablet       fluticasone (FLONASE) 50 mcg/act nasal spray       glipiZIDE (GLUCOTROL) 10 mg tablet       guaiFENesin (MUCINEX) 600 mg 12 hr tablet Take 1,200 mg by mouth as needed        hydrochlorothiazide (HYDRODIURIL) 12 5 mg tablet Take 12 5 mg by mouth daily      insulin aspart protamine-insulin aspart (NovoLOG 70/30) 100 units/mL injection Inject under the skin 3 (three) times a day before meals Units depend on level      JARDIANCE 25 MG TABS Take 25 mg by mouth daily      Lancets (OneTouch Delica Plus [id number]) MISC TEST GLUCOSE 3 4 TIMES/DAY      Levemir FlexTouch 100 units/mL injection pen 15 Units        lisinopril (ZESTRIL) 40 mg tablet       loratadine (CLARITIN) 10 mg tablet       metoprolol succinate (TOPROL-XL) 25 mg 24 hr tablet       Multiple Vitamins-Minerals (MULTIVITAMIN ADULT PO) Take by mouth daily      omeprazole (PriLOSEC) 40 MG capsule       phenylephrine (SUDAFED PE) 10 MG TABS Take 10 mg by mouth every 4 (four) hours as needed for congestion      Probiotic Product (PROBIOTIC DAILY PO) Take by mouth      promethazine-dextromethorphan (PHENERGAN-DM) 6 25-15 mg/5 mL oral syrup if needed      pyridoxine (VITAMIN B6) 100 mg tablet Take 100 mg by mouth daily      ranolazine (RANEXA) 500 mg 12 hr tablet Take 500 mg by mouth 2 (two) times a day      tamsulosin (FLOMAX) 0 4 mg       True Metrix Blood Glucose Test test strip 1 STRIP BY MISCELLANEOUS ROUTE 3 (THREE) TIMES A DAY  RAGHAV METRIX BRAND PER PATIENT      Trulicity 1 5 BT/9 6CL SOPN INJECT 0 5 ML EVERY WEEK BY SUBCUTANEOUS ROUTE  No current facility-administered medications for this visit       Allergies   Allergen Reactions    Januvia [Sitagliptin] Other (See Comments)     pancreatitis    Iodinated Diagnostic Agents     Simvastatin Myalgia      Immunizations:     Immunization History   Administered Date(s) Administered    COVID-19 MODERNA VACC 0 5 ML IM 03/02/2021, 03/30/2021, 01/31/2022    INFLUENZA 11/01/2005, 11/22/2005, 12/05/2006, 10/04/2007, 09/30/2008, 10/07/2009    Pneumococcal 01/01/2004      Health Maintenance:         Topic Date Due    Hepatitis C Screening  Never done         Topic Date Due    Pneumococcal Vaccine: 65+ Years (1 - PCV) 09/02/1946    COVID-19 Vaccine (4 - Booster for Achilles Jana series) 05/31/2022    Influenza Vaccine (1) 09/01/2022      Medicare Screening Tests and Risk Assessments:     Kate Rushing is here for his Subsequent Wellness visit  Health Risk Assessment:   Patient rates overall health as fair  Patient feels that their physical health rating is slightly worse  Patient is satisfied with their life  Eyesight was rated as same  Hearing was rated as slightly worse  Patient feels that their emotional and mental health rating is same  Patients states they are sometimes angry  Patient states they are sometimes unusually tired/fatigued  Pain experienced in the last 7 days has been some  Patient's pain rating has been 2/10  Patient states that he has experienced no weight loss or gain in last 6 months  Fall Risk Screening: In the past year, patient has experienced: no history of falling in past year      Home Safety:  Patient does not have trouble with stairs inside or outside of their home  Patient has working smoke alarms and has working carbon monoxide detector  Home safety hazards include: none  Nutrition:   Current diet is Regular, Low Saturated Fat and No Added Salt  Medications:   Patient is currently taking over-the-counter supplements  OTC medications include: Too many for here  Patient is able to manage medications  Activities of Daily Living (ADLs)/Instrumental Activities of Daily Living (IADLs):   Walk and transfer into and out of bed and chair?: Yes  Dress and groom yourself?: Yes    Bathe or shower yourself?: Yes    Feed yourself? Yes  Do your laundry/housekeeping?: Yes  Manage your money, pay your bills and track your expenses?: Yes  Make your own meals?: Yes    Do your own shopping?: Yes    Durable Medical Equipment Suppliers  Cpap    Previous Hospitalizations:   Any hospitalizations or ED visits within the last 12 months?: No      Advance Care Planning:   Living will: Yes    Durable POA for healthcare:  Yes    Advanced directive: Yes      PREVENTIVE SCREENINGS      Cardiovascular Screening:    General: Screening Current      Diabetes Screening:     General: Screening Current      Prostate Cancer Screening:    General: Screening Not Indicated      Abdominal Aortic Aneurysm (AAA) Screening:    Risk factors include: tobacco use        Lung Cancer Screening:     General: Screening Not Indicated    Screening, Brief Intervention, and Referral to Treatment (SBIRT)    Screening  Typical number of drinks in a day: 1  Typical number of drinks in a week: 5  Interpretation: Low risk drinking behavior  AUDIT-C Screenin) How often did you have a drink containing alcohol in the past year? 4 or more times a week  2) How many drinks did you have on a typical day when you were drinking in the past year? 1 to 2  3) How often did you have 6 or more drinks on one occasion in the past year? never    AUDIT-C Score: 4  Interpretation: Score 4-12 (male): POSITIVE screen for alcohol misuse    AUDIT Screenin) How often during the last year have you found that you were not able to stop drinking once you had started? 0 - never  5) How often during the last year have you failed to do what was normally expected from you because of drinking? 0 - never  6) How often during the last year have you needed a first drink in the morning to get yourself going after a heavy drinking session?  0 - never  7) How often during the last year have you had a feeling of guilt or remorse after drinking? 0 - never  8) How often during the last year have you been unable to remember what happened the night before because you had been drinking? 0 - never  9) Have you or someone else been injured as a result of your drinking? 0 - no  10) Has a relative or friend or a doctor or another health worker been concerned about your drinking or suggested you cut down? 0 - no    AUDIT Score: 4  Interpretation: Low risk alcohol consumption    Single Item Drug Screening:  How often have you used an illegal drug (including marijuana) or a prescription medication for non-medical reasons in the past year? never    Single Item Drug Screen Score: 0  Interpretation: Negative screen for possible drug use disorder    No exam data present     Physical Exam:     There were no vitals taken for this visit      Physical Exam     Deanna Gonzalez MD

## 2022-07-12 NOTE — PATIENT INSTRUCTIONS
Medicare Preventive Visit Patient Instructions  Thank you for completing your Welcome to Medicare Visit or Medicare Annual Wellness Visit today  Your next wellness visit will be due in one year (7/13/2023)  The screening/preventive services that you may require over the next 5-10 years are detailed below  Some tests may not apply to you based off risk factors and/or age  Screening tests ordered at today's visit but not completed yet may show as past due  Also, please note that scanned in results may not display below  Preventive Screenings:  Service Recommendations Previous Testing/Comments   Colorectal Cancer Screening  · Colonoscopy    · Fecal Occult Blood Test (FOBT)/Fecal Immunochemical Test (FIT)  · Fecal DNA/Cologuard Test  · Flexible Sigmoidoscopy Age: 54-65 years old   Colonoscopy: every 10 years (May be performed more frequently if at higher risk)  OR  FOBT/FIT: every 1 year  OR  Cologuard: every 3 years  OR  Sigmoidoscopy: every 5 years  Screening may be recommended earlier than age 48 if at higher risk for colorectal cancer  Also, an individualized decision between you and your healthcare provider will decide whether screening between the ages of 74-80 would be appropriate   Colonoscopy: Not on file  FOBT/FIT: Not on file  Cologuard: Not on file  Sigmoidoscopy: Not on file          Prostate Cancer Screening Individualized decision between patient and health care provider in men between ages of 53-78   Medicare will cover every 12 months beginning on the day after your 50th birthday PSA: No results in last 5 years     Screening Not Indicated     Hepatitis C Screening Once for adults born between [facility]  More frequently in patients at high risk for Hepatitis C Hep C Antibody: Not on file        Diabetes Screening 1-2 times per year if you're at risk for diabetes or have pre-diabetes Fasting glucose: 145 mg/dL   A1C: 6 5 %    Screening Current   Cholesterol Screening Once every 5 years if you don't have a lipid disorder  May order more often based on risk factors  Lipid panel: 06/30/2022    Screening Current      Other Preventive Screenings Covered by Medicare:  1  Abdominal Aortic Aneurysm (AAA) Screening: covered once if your at risk  You're considered to be at risk if you have a family history of AAA or a male between the age of 73-68 who smoking at least 100 cigarettes in your lifetime  2  Lung Cancer Screening: covers low dose CT scan once per year if you meet all of the following conditions: (1) Age 50-69; (2) No signs or symptoms of lung cancer; (3) Current smoker or have quit smoking within the last 15 years; (4) You have a tobacco smoking history of at least 30 pack years (packs per day x number of years you smoked); (5) You get a written order from a healthcare provider  3  Glaucoma Screening: covered annually if you're considered high risk: (1) You have diabetes OR (2) Family history of glaucoma OR (3)  aged 48 and older OR (3)  American aged 72 and older  3  Osteoporosis Screening: covered every 2 years if you meet one of the following conditions: (1) Have a vertebral abnormality; (2) On glucocorticoid therapy for more than 3 months; (3) Have primary hyperparathyroidism; (4) On osteoporosis medications and need to assess response to drug therapy  5  HIV Screening: covered annually if you're between the age of 12-76  Also covered annually if you are younger than 13 and older than 72 with risk factors for HIV infection  For pregnant patients, it is covered up to 3 times per pregnancy      Immunizations:  Immunization Recommendations   Influenza Vaccine Annual influenza vaccination during flu season is recommended for all persons aged >= 6 months who do not have contraindications   Pneumococcal Vaccine (Prevnar and Pneumovax)  * Prevnar = PCV13  * Pneumovax = PPSV23 Adults 25-60 years old: 1-3 doses may be recommended based on certain risk factors  Adults 72 years old: Prevnar (PCV13) vaccine recommended followed by Pneumovax (PPSV23) vaccine  If already received PPSV23 since turning 65, then PCV13 recommended at least one year after PPSV23 dose  Hepatitis B Vaccine 3 dose series if at intermediate or high risk (ex: diabetes, end stage renal disease, liver disease)   Tetanus (Td) Vaccine - COST NOT COVERED BY MEDICARE PART B Following completion of primary series, a booster dose should be given every 10 years to maintain immunity against tetanus  Td may also be given as tetanus wound prophylaxis  Tdap Vaccine - COST NOT COVERED BY MEDICARE PART B Recommended at least once for all adults  For pregnant patients, recommended with each pregnancy  Shingles Vaccine (Shingrix) - COST NOT COVERED BY MEDICARE PART B  2 shot series recommended in those aged 48 and above     Health Maintenance Due:      Topic Date Due    Hepatitis C Screening  Never done     Immunizations Due:      Topic Date Due    Pneumococcal Vaccine: 65+ Years (1 - PCV) 09/02/1946    COVID-19 Vaccine (4 - Booster for Moderna series) 05/31/2022    Influenza Vaccine (1) 09/01/2022     Advance Directives   What are advance directives? Advance directives are legal documents that state your wishes and plans for medical care  These plans are made ahead of time in case you lose your ability to make decisions for yourself  Advance directives can apply to any medical decision, such as the treatments you want, and if you want to donate organs  What are the types of advance directives? There are many types of advance directives, and each state has rules about how to use them  You may choose a combination of any of the following:  · Living will: This is a written record of the treatment you want  You can also choose which treatments you do not want, which to limit, and which to stop at a certain time  This includes surgery, medicine, IV fluid, and tube feedings     · Durable power of  for healthcare [facility]): This is a written record that states who you want to make healthcare choices for you when you are unable to make them for yourself  This person, called a proxy, is usually a family member or a friend  You may choose more than 1 proxy  · Do not resuscitate (DNR) order:  A DNR order is used in case your heart stops beating or you stop breathing  It is a request not to have certain forms of treatment, such as CPR  A DNR order may be included in other types of advance directives  · Medical directive: This covers the care that you want if you are in a coma, near death, or unable to make decisions for yourself  You can list the treatments you want for each condition  Treatment may include pain medicine, surgery, blood transfusions, dialysis, IV or tube feedings, and a ventilator (breathing machine)  · Values history: This document has questions about your views, beliefs, and how you feel and think about life  This information can help others choose the care that you would choose  Why are advance directives important? An advance directive helps you control your care  Although spoken wishes may be used, it is better to have your wishes written down  Spoken wishes can be misunderstood, or not followed  Treatments may be given even if you do not want them  An advance directive may make it easier for your family to make difficult choices about your care  Weight Management   Why it is important to manage your weight:  Being overweight increases your risk of health conditions such as heart disease, high blood pressure, type 2 diabetes, and certain types of cancer  It can also increase your risk for osteoarthritis, sleep apnea, and other respiratory problems  Aim for a slow, steady weight loss  Even a small amount of weight loss can lower your risk of health problems  How to lose weight safely:  A safe and healthy way to lose weight is to eat fewer calories and get regular exercise   You can lose up about 1 pound a week by decreasing the number of calories you eat by 500 calories each day  Healthy meal plan for weight management:  A healthy meal plan includes a variety of foods, contains fewer calories, and helps you stay healthy  A healthy meal plan includes the following:  · Eat whole-grain foods more often  A healthy meal plan should contain fiber  Fiber is the part of grains, fruits, and vegetables that is not broken down by your body  Whole-grain foods are healthy and provide extra fiber in your diet  Some examples of whole-grain foods are whole-wheat breads and pastas, oatmeal, brown rice, and bulgur  · Eat a variety of vegetables every day  Include dark, leafy greens such as spinach, kale, florentino greens, and mustard greens  Eat yellow and orange vegetables such as carrots, sweet potatoes, and winter squash  · Eat a variety of fruits every day  Choose fresh or canned fruit (canned in its own juice or light syrup) instead of juice  Fruit juice has very little or no fiber  · Eat low-fat dairy foods  Drink fat-free (skim) milk or 1% milk  Eat fat-free yogurt and low-fat cottage cheese  Try low-fat cheeses such as mozzarella and other reduced-fat cheeses  · Choose meat and other protein foods that are low in fat  Choose beans or other legumes such as split peas or lentils  Choose fish, skinless poultry (chicken or turkey), or lean cuts of red meat (beef or pork)  Before you cook meat or poultry, cut off any visible fat  · Use less fat and oil  Try baking foods instead of frying them  Add less fat, such as margarine, sour cream, regular salad dressing and mayonnaise to foods  Eat fewer high-fat foods  Some examples of high-fat foods include french fries, doughnuts, ice cream, and cakes  · Eat fewer sweets  Limit foods and drinks that are high in sugar  This includes candy, cookies, regular soda, and sweetened drinks  Exercise:  Exercise at least 30 minutes per day on most days of the week   Some examples of exercise include walking, biking, dancing, and swimming  You can also fit in more physical activity by taking the stairs instead of the elevator or parking farther away from stores  Ask your healthcare provider about the best exercise plan for you  Alcohol Use and Your Health    Drinking too much can harm your health  Excessive alcohol use leads to about 88,000 death in the [place] each year, and shortens the life of those who diet by almost 30 years  Further, excessive drinking cost the economy $249 billion in 2010  Most excessive drinkers are not alcohol dependent  Excessive alcohol use has immediate effects that increase the risk of many harmful health conditions  These are most often the result of binge drinking  Over time, excessive alcohol use can lead to the development of chronic diseases and other series health problems  What is considered a "drink"? Excessive alcohol use includes:  · Binge Drinking: For women, 4 or more drinks consumed on one occasion  For men, 5 or more drinks consumed on one occasion  · Heavy Drinking: For women, 8 or more drinks per week  For men, 15 or more drinks per week  · Any alcohol used by pregnant women  · Any alcohol used by those under the age of 21 years    If you choose to drink, do so in moderation:  · Do not drink at all if you are under the age of 24, or if you are or may be pregnant, or have health problems that could be made worse by drinking    · For women, up to 1 drink per day  · For men, up to 2 drinks a day    No one should begin drinking or drink more frequently based on potential health benefits    Short-Term Health Risks:  · Injuries: motor vehicle crashes, falls, drownings, burns  · Violence: homicide, suicide, sexual assault, intimate partner violence  · Alcohol poisoning  · Reproductive health: risky sexual behaviors, unintended prengnacy, sexually transmitted diseases, miscarriage, stillbirth, fetal alcohol syndrome    Long-Term Health Risks:  · Chronic diseases: high blood pressure, heart disease, stroke, liver disease, digestive problems  · Cancers: breast, mouth and throat, liver, colon  · Learning and memory problems: dementia, poor school performance  · Mental health: depression, anxiety, insomnia  · Social problems: lost productivity, family problems, unemployment  · Alcohol dependence    For support and more information:  · Substance Abuse and [facility] [address] [place]  Web Address: https://GoodChime!/    · Alcoholics Anonymous        Web Address: http://www saez info/    https://www cdc gov/alcohol/fact-sheets/alcohol-use htm     © [address] 2018 Information is for End User's use only and may not be sold, redistributed or otherwise used for commercial purposes   All illustrations and images included in CareNotes® are the copyrighted property of A D A M , Inc  or [address] [place] [address] [employer]

## 2022-07-12 NOTE — ASSESSMENT & PLAN NOTE
Lab Results   Component Value Date    HGBA1C 6 5 (H) 06/30/2022   Under control  Continue current medication

## 2022-08-05 DIAGNOSIS — I25.10 CORONARY ARTERY DISEASE INVOLVING NATIVE CORONARY ARTERY OF NATIVE HEART WITHOUT ANGINA PECTORIS: Primary | ICD-10-CM

## 2022-08-06 RX ORDER — RANOLAZINE 500 MG/1
500 TABLET, EXTENDED RELEASE ORAL 2 TIMES DAILY
Qty: 180 TABLET | Refills: 0 | Status: SHIPPED | OUTPATIENT
Start: 2022-08-06 | End: 2022-09-13

## 2022-08-06 RX ORDER — CLOPIDOGREL BISULFATE 75 MG/1
75 TABLET ORAL DAILY
Qty: 90 TABLET | Refills: 0 | Status: SHIPPED | OUTPATIENT
Start: 2022-08-06 | End: 2022-09-13

## 2022-08-06 RX ORDER — METOPROLOL SUCCINATE 25 MG/1
25 TABLET, EXTENDED RELEASE ORAL DAILY
Qty: 90 TABLET | Refills: 0 | Status: SHIPPED | OUTPATIENT
Start: 2022-08-06

## 2022-08-09 DIAGNOSIS — J30.1 SEASONAL ALLERGIC RHINITIS DUE TO POLLEN: Primary | ICD-10-CM

## 2022-08-09 RX ORDER — FLUTICASONE PROPIONATE 50 MCG
2 SPRAY, SUSPENSION (ML) NASAL
Qty: 1 G | Refills: 3 | Status: SHIPPED | OUTPATIENT
Start: 2022-08-09 | End: 2022-08-16 | Stop reason: SDUPTHER

## 2022-08-10 ENCOUNTER — TELEPHONE (OUTPATIENT)
Dept: PULMONOLOGY | Facility: CLINIC | Age: 82
End: 2022-08-10

## 2022-08-12 DIAGNOSIS — N40.0 BENIGN PROSTATIC HYPERPLASIA WITHOUT LOWER URINARY TRACT SYMPTOMS: ICD-10-CM

## 2022-08-12 DIAGNOSIS — K21.9 GASTRO-ESOPHAGEAL REFLUX DISEASE WITHOUT ESOPHAGITIS: ICD-10-CM

## 2022-08-12 DIAGNOSIS — I10 ESSENTIAL (PRIMARY) HYPERTENSION: ICD-10-CM

## 2022-08-12 RX ORDER — CLONIDINE HYDROCHLORIDE 0.1 MG/1
TABLET ORAL
Qty: 270 TABLET | Refills: 1 | Status: SHIPPED | OUTPATIENT
Start: 2022-08-12 | End: 2022-09-27 | Stop reason: SDUPTHER

## 2022-08-12 RX ORDER — FAMOTIDINE 40 MG/1
TABLET, FILM COATED ORAL
Qty: 90 TABLET | Refills: 1 | Status: SHIPPED | OUTPATIENT
Start: 2022-08-12

## 2022-08-12 RX ORDER — LISINOPRIL 40 MG/1
TABLET ORAL
Qty: 90 TABLET | Refills: 1 | Status: SHIPPED | OUTPATIENT
Start: 2022-08-12 | End: 2022-09-27 | Stop reason: SDUPTHER

## 2022-08-12 RX ORDER — TAMSULOSIN HYDROCHLORIDE 0.4 MG/1
CAPSULE ORAL
Qty: 180 CAPSULE | Refills: 1 | Status: SHIPPED | OUTPATIENT
Start: 2022-08-12 | End: 2022-09-27 | Stop reason: SDUPTHER

## 2022-08-12 RX ORDER — HYDROCHLOROTHIAZIDE 12.5 MG/1
TABLET ORAL
Qty: 90 TABLET | Refills: 1 | Status: SHIPPED | OUTPATIENT
Start: 2022-08-12 | End: 2022-09-27 | Stop reason: SDUPTHER

## 2022-08-16 DIAGNOSIS — J30.1 SEASONAL ALLERGIC RHINITIS DUE TO POLLEN: ICD-10-CM

## 2022-08-16 RX ORDER — FLUTICASONE PROPIONATE 50 MCG
2 SPRAY, SUSPENSION (ML) NASAL
Qty: 1 G | Refills: 5 | Status: SHIPPED | OUTPATIENT
Start: 2022-08-16

## 2022-08-16 NOTE — TELEPHONE ENCOUNTER
Requested medication(s) are due for refill today: Yes  Patient has already received a courtesy refill: No  Other reason request has been forwarded to provider: passed protocol

## 2022-08-26 ENCOUNTER — TELEPHONE (OUTPATIENT)
Dept: FAMILY MEDICINE CLINIC | Facility: CLINIC | Age: 82
End: 2022-08-26

## 2022-08-26 NOTE — TELEPHONE ENCOUNTER
Patient left a msg on our clinical line stating he has a "leaky eye"  We need more information on this  Is he having allergies? Or is this possible conjunctivitis(pink eye)  If his concern is pink eye he will need an appt for evaluation   LMOM for a call back   [place], [place]

## 2022-09-12 DIAGNOSIS — I25.10 CORONARY ARTERY DISEASE INVOLVING NATIVE CORONARY ARTERY OF NATIVE HEART WITHOUT ANGINA PECTORIS: ICD-10-CM

## 2022-09-13 RX ORDER — RANOLAZINE 500 MG/1
TABLET, EXTENDED RELEASE ORAL
Qty: 180 TABLET | Refills: 0 | Status: SHIPPED | OUTPATIENT
Start: 2022-09-13

## 2022-09-13 RX ORDER — CLOPIDOGREL BISULFATE 75 MG/1
TABLET ORAL
Qty: 90 TABLET | Refills: 0 | Status: SHIPPED | OUTPATIENT
Start: 2022-09-13

## 2022-09-15 ENCOUNTER — NEW PATIENT (OUTPATIENT)
Dept: URBAN - METROPOLITAN AREA CLINIC 6 | Facility: CLINIC | Age: 82
End: 2022-09-15

## 2022-09-15 DIAGNOSIS — Z79.4: ICD-10-CM

## 2022-09-15 DIAGNOSIS — H04.123: ICD-10-CM

## 2022-09-15 DIAGNOSIS — H35.363: ICD-10-CM

## 2022-09-15 DIAGNOSIS — H43.811: ICD-10-CM

## 2022-09-15 DIAGNOSIS — E11.3293: ICD-10-CM

## 2022-09-15 DIAGNOSIS — Z96.1: ICD-10-CM

## 2022-09-15 LAB
LEFT EYE DIABETIC RETINOPATHY: NORMAL
RIGHT EYE DIABETIC RETINOPATHY: NORMAL

## 2022-09-15 PROCEDURE — 99204 OFFICE O/P NEW MOD 45 MIN: CPT

## 2022-09-15 ASSESSMENT — VISUAL ACUITY
OD_SC: 20/60
OS_GLARE: 20/60
OS_SC: 20/50-2
OD_PH: 20/40
OD_GLARE: 20/60

## 2022-09-15 ASSESSMENT — TONOMETRY
OD_IOP_MMHG: 10
OS_IOP_MMHG: 10

## 2022-09-27 ENCOUNTER — PATIENT MESSAGE (OUTPATIENT)
Dept: FAMILY MEDICINE CLINIC | Facility: CLINIC | Age: 82
End: 2022-09-27

## 2022-09-27 DIAGNOSIS — N40.0 BENIGN PROSTATIC HYPERPLASIA WITHOUT LOWER URINARY TRACT SYMPTOMS: ICD-10-CM

## 2022-09-27 DIAGNOSIS — I10 ESSENTIAL (PRIMARY) HYPERTENSION: ICD-10-CM

## 2022-09-27 RX ORDER — TAMSULOSIN HYDROCHLORIDE 0.4 MG/1
0.8 CAPSULE ORAL DAILY
Qty: 180 CAPSULE | Refills: 1 | Status: SHIPPED | OUTPATIENT
Start: 2022-09-27 | End: 2022-09-27 | Stop reason: SDUPTHER

## 2022-09-27 RX ORDER — CLONIDINE HYDROCHLORIDE 0.1 MG/1
0.1 TABLET ORAL 3 TIMES DAILY
Qty: 270 TABLET | Refills: 1 | Status: SHIPPED | OUTPATIENT
Start: 2022-09-27 | End: 2022-09-27 | Stop reason: SDUPTHER

## 2022-09-27 RX ORDER — LISINOPRIL 40 MG/1
40 TABLET ORAL DAILY
Qty: 90 TABLET | Refills: 1 | Status: SHIPPED | OUTPATIENT
Start: 2022-09-27 | End: 2022-09-27 | Stop reason: SDUPTHER

## 2022-09-27 RX ORDER — CLONIDINE HYDROCHLORIDE 0.1 MG/1
0.1 TABLET ORAL 3 TIMES DAILY
Qty: 270 TABLET | Refills: 1 | Status: SHIPPED | OUTPATIENT
Start: 2022-09-27 | End: 2023-04-03 | Stop reason: SDUPTHER

## 2022-09-27 RX ORDER — LISINOPRIL 40 MG/1
40 TABLET ORAL DAILY
Qty: 90 TABLET | Refills: 1 | Status: SHIPPED | OUTPATIENT
Start: 2022-09-27 | End: 2023-04-03 | Stop reason: SDUPTHER

## 2022-09-27 RX ORDER — HYDROCHLOROTHIAZIDE 12.5 MG/1
12.5 TABLET ORAL DAILY
Qty: 90 TABLET | Refills: 1 | Status: SHIPPED | OUTPATIENT
Start: 2022-09-27 | End: 2022-09-27 | Stop reason: SDUPTHER

## 2022-09-27 RX ORDER — TAMSULOSIN HYDROCHLORIDE 0.4 MG/1
0.8 CAPSULE ORAL DAILY
Qty: 180 CAPSULE | Refills: 1 | Status: SHIPPED | OUTPATIENT
Start: 2022-09-27 | End: 2023-04-03 | Stop reason: SDUPTHER

## 2022-09-27 RX ORDER — HYDROCHLOROTHIAZIDE 12.5 MG/1
12.5 TABLET ORAL DAILY
Qty: 90 TABLET | Refills: 1 | Status: SHIPPED | OUTPATIENT
Start: 2022-09-27 | End: 2023-04-03 | Stop reason: SDUPTHER

## 2022-09-27 NOTE — TELEPHONE ENCOUNTER
From: Catie Mustafa  To: Komal Leggett MD  Sent: 9/27/2022 1:49 PM EDT  Subject: Rx refills    Please refill these Rx's for 90 days and e few refills    Clonidine 0 1 Mg  Lisinopril 40 Mg  Tamsulosin 0 4 Mg (2/day)  Hydrochlorothiazide 12 5 Mg  Thank you  Marleni Benavides

## 2022-09-28 ENCOUNTER — CONSULT (OUTPATIENT)
Dept: VASCULAR SURGERY | Facility: CLINIC | Age: 82
End: 2022-09-28
Payer: MEDICARE

## 2022-09-28 VITALS
BODY MASS INDEX: 40.92 KG/M2 | DIASTOLIC BLOOD PRESSURE: 74 MMHG | HEIGHT: 68 IN | SYSTOLIC BLOOD PRESSURE: 130 MMHG | HEART RATE: 70 BPM | WEIGHT: 270 LBS

## 2022-09-28 DIAGNOSIS — I87.2 VENOUS STASIS DERMATITIS OF BOTH LOWER EXTREMITIES: ICD-10-CM

## 2022-09-28 DIAGNOSIS — L97.909 VENOUS ULCER (HCC): Primary | ICD-10-CM

## 2022-09-28 DIAGNOSIS — I83.009 VENOUS ULCER (HCC): Primary | ICD-10-CM

## 2022-09-28 DIAGNOSIS — E66.01 OBESITY, MORBID (HCC): ICD-10-CM

## 2022-09-28 PROCEDURE — 99202 OFFICE O/P NEW SF 15 MIN: CPT | Performed by: SURGERY

## 2022-09-28 RX ORDER — TRIAMCINOLONE ACETONIDE 5 MG/G
CREAM TOPICAL 2 TIMES DAILY
Qty: 30 G | Refills: 1 | Status: SHIPPED | OUTPATIENT
Start: 2022-09-28

## 2022-09-28 NOTE — ASSESSMENT & PLAN NOTE
We discussed the importance of weight loss  Set realistic goal of losing  0 5 lb per week for about 2-3 lb per month, 25 lb or so per year  Regular exercise and diet modification is important

## 2022-09-28 NOTE — PATIENT INSTRUCTIONS
Venous Insufficiency   AMBULATORY CARE:   Venous insufficiency  is a condition that prevents blood from flowing out of your legs and back to your heart  Veins contain valves that help blood flow in one direction  Venous insufficiency means the valves do not close correctly or fully  Blood flows back and pools in your leg  This can cause problems such as varicose veins  Venous insufficiency may also be called chronic venous insufficiency or venous stasis  Common signs and symptoms:   Visible veins on your legs that may be small and red or large, thick, and blue         Swelling in your ankles or calves         Changes in skin color, such as dark or purple skin    An ulcer (open sore) on your leg    Leg pain that is worse when you are menstruating (women) or when you stand, and better when you elevate your legs    Burning or itching    Cramps that happen at night    Thick, hard skin on your legs and ankles    Feeling of heaviness in your legs    Seek care immediately if:   You have a wound that does not heal or is infected  You have an injury that has broken your skin and caused your varicose veins to bleed  Your leg is swollen and hard  You have pain in your leg that does not go away or gets worse  Your legs or feet are turning blue or black  Your leg feels warm, tender, and painful  It may look swollen and red  Call your doctor if:   You have a fever  You have varicose veins and they are painful  You have new or worsening leg pain, swelling, or redness  You have new or worsening ulcers or other sores on your leg  You have questions or concerns about your condition or care  Treatment  may include any of the following:  Medicine  may be given to improve blood flow  The medicines may thin your blood or reduce swelling to help blood flow  You may also need medicine to treat a bacterial infection  Ablation  is a procedure used to close varicose veins   A catheter is guided until it is near the vein  A device will then be guided to the area  The device may produce energy through radiofrequency or a laser  The energy creates heat that will close the blood vessel  Sclerotherapy  is a procedure used to fade visible veins  Your healthcare provider will inject a liquid into a spider vein or varicose vein  The liquid causes irritation in the vein  The vein swells and sticks together  Your body will then absorb the vein  Surgery  may be needed if other treatments do not work  Surgery may be used to repair a leg vein valve or to clip or tie off a vein so blood cannot flow through it  You may need to have a veins removed during surgery called stripping  Surgery may be used to bypass (go around) the damaged vein  Blood will flow through a vein transplanted from another part of your body  Manage your symptoms:   Wear pressure stockings as directed  Pressure stockings help keep blood from pooling in your leg veins  Your healthcare provider can prescribe stockings that are right for you  Do not buy over-the-counter pressure stockings unless your healthcare provider says it is okay  They may not fit correctly or may have elastic that cuts off your circulation  Ask your healthcare provider when to start wearing pressure stockings and how long to wear them each day  Do not sit or stand for long periods of time  If you have to sit for a long time, flex and extend your legs, feet, and ankles  Do this about 10 times every 30 minutes to help keep blood flowing  If you have to stand for a long time, take breaks and sit with your legs elevated  Elevate your legs  Elevate your legs above the level of your heart to reduce swelling  Your healthcare provider may recommend that you keep your legs elevated for 30 minutes at a time  You may need to do this 3 to 4 times per day, or more if your healthcare provider recommends  Do not smoke    Nicotine and other chemicals in cigarettes and cigars can cause blood vessel damage  Ask your healthcare provider for information if you currently smoke and need help to quit  E-cigarettes or smokeless tobacco still contain nicotine  Talk to your healthcare provider before you use these products  Reach or maintain a healthy weight  Extra weight can make venous insufficiency worse  Ask your healthcare provider what a healthy weight is for you  He or she can help you create a weight loss plan if you need to lose weight  Exercise as directed  Walking can help increase blood flow in your calves  Ask your healthcare provider how much exercise you need each day and which exercises are best for you  Care for your skin  Keep your skin clean  Do not use any soaps or lotions that may dry your skin  For example, do not use products that contain fragrance or alcohol  If you have a skin ulcer, your healthcare provider may recommend a wet-to-dry bandage  To do this, apply a wet bandage to your wound and allow it to dry  This will help remove drainage from your wound each time you change the bandage  Your healthcare provider will tell you how often to change your bandage and which kind of bandage to use  Check your wound for signs of infection, such as swelling or pus  Go to physical therapy (PT) as directed  A physical therapist can help you increase movement and range of motion in your legs  Follow up with your doctor as directed:  Write down your questions so you remember to ask them during your visits  © Copyright [employer] 2022 Information is for End User's use only and may not be sold, redistributed or otherwise used for commercial purposes  All illustrations and images included in CareNotes® are the copyrighted property of A D A M , Inc  or [facility] Mabel Fox   The above information is an  only  It is not intended as medical advice for individual conditions or treatments   Talk to your doctor, nurse or pharmacist before following any medical regimen to see if it is safe and effective for you

## 2022-09-28 NOTE — PROGRESS NOTES
Assessment/Plan:    Venous stasis dermatitis of both lower extremities  Changes of bilateral lower extremity venous stasis  There are small healed venous ulcer with surrounding dermatitis  I recommend to start triamcinolone cream in these areas  Continue to use a good moisturizer and compression stockings  Will get venous Doppler with reflux to further evaluate  Obesity, morbid ([address] )  We discussed the importance of weight loss  Set realistic goal of losing  0 5 lb per week for about 2-3 lb per month, 25 lb or so per year  Regular exercise and diet modification is important  Diagnoses and all orders for this visit:    Venous ulcer ([address] )  -     triamcinolone (KENALOG) 0 5 % cream; Apply topically 2 (two) times a day  -     VAS reflux lower limb venous duplex study with reflux assessment, complete bilateral; Future    Venous stasis dermatitis of both lower extremities  -     triamcinolone (KENALOG) 0 5 % cream; Apply topically 2 (two) times a day  -     VAS reflux lower limb venous duplex study with reflux assessment, complete bilateral; Future    Obesity, morbid ([facility])        Subjective:      Patient ID: Digna Rodriguez is a [de-identified] y o  male  Pt is new and is a self ref for ulcer on LL  HPI  Patient has small ulcerations on both outer legs that have now healed  There was some mild drainage that has resolved  There is some surrounding redness in the skin  Patient has put on over 50 lb since COVID  He uses compression stockings on a daily basis  The following portions of the patient's history were reviewed and updated as appropriate: allergies, current medications, past family history, past medical history, past social history, past surgical history and problem list S the the     Review of Systems   Constitutional: Negative  HENT: Negative  Eyes: Negative  Respiratory: Negative  Cardiovascular: Negative  Gastrointestinal: Negative  Endocrine: Negative      Genitourinary: Negative  Musculoskeletal: Negative  Skin: Negative  Allergic/Immunologic: Negative  Neurological: Negative  Hematological: Negative  Psychiatric/Behavioral: Negative  I have reviewed the review of systems as entered and made appropriate changes as necessary    Objective:      /74 (BP Location: Right arm, Patient Position: Sitting, Cuff Size: Large)   Pulse 70   Ht 5' 8 4" (1 737 m)   Wt 122 kg (270 lb)   BMI 40 57 kg/m²          Physical Exam  Vitals and nursing note reviewed  Constitutional:       Appearance: Normal appearance  Cardiovascular:      Rate and Rhythm: Normal rate and regular rhythm  Musculoskeletal:      Right lower leg: Edema present  Left lower leg: Edema present  Skin:     General: Skin is warm and dry  Comments: Small areas of irregularity in the bilateral lower extremity outer aspect  Surrounding venous dermatitis mild  Neurological:      General: No focal deficit present  Mental Status: He is alert     Psychiatric:         Mood and Affect: Mood normal          Behavior: Behavior normal

## 2022-09-28 NOTE — ASSESSMENT & PLAN NOTE
Changes of bilateral lower extremity venous stasis  There are small healed venous ulcer with surrounding dermatitis  I recommend to start triamcinolone cream in these areas  Continue to use a good moisturizer and compression stockings  Will get venous Doppler with reflux to further evaluate

## 2022-10-11 ENCOUNTER — OFFICE VISIT (OUTPATIENT)
Dept: FAMILY MEDICINE CLINIC | Facility: CLINIC | Age: 82
End: 2022-10-11
Payer: COMMERCIAL

## 2022-10-11 VITALS
HEART RATE: 60 BPM | OXYGEN SATURATION: 98 % | RESPIRATION RATE: 16 BRPM | WEIGHT: 264 LBS | SYSTOLIC BLOOD PRESSURE: 130 MMHG | TEMPERATURE: 97.8 F | DIASTOLIC BLOOD PRESSURE: 70 MMHG | BODY MASS INDEX: 40.01 KG/M2 | HEIGHT: 68 IN

## 2022-10-11 DIAGNOSIS — Z79.4 TYPE 2 DIABETES MELLITUS WITH STAGE 2 CHRONIC KIDNEY DISEASE, WITH LONG-TERM CURRENT USE OF INSULIN (HCC): ICD-10-CM

## 2022-10-11 DIAGNOSIS — N18.31 STAGE 3A CHRONIC KIDNEY DISEASE (HCC): ICD-10-CM

## 2022-10-11 DIAGNOSIS — I25.10 CORONARY ARTERY DISEASE INVOLVING NATIVE CORONARY ARTERY OF NATIVE HEART WITHOUT ANGINA PECTORIS: ICD-10-CM

## 2022-10-11 DIAGNOSIS — Z23 ENCOUNTER FOR IMMUNIZATION: Primary | ICD-10-CM

## 2022-10-11 DIAGNOSIS — I10 HTN (HYPERTENSION), BENIGN: ICD-10-CM

## 2022-10-11 DIAGNOSIS — E78.2 MIXED HYPERLIPIDEMIA: ICD-10-CM

## 2022-10-11 DIAGNOSIS — N18.2 TYPE 2 DIABETES MELLITUS WITH STAGE 2 CHRONIC KIDNEY DISEASE, WITH LONG-TERM CURRENT USE OF INSULIN (HCC): ICD-10-CM

## 2022-10-11 DIAGNOSIS — E11.22 TYPE 2 DIABETES MELLITUS WITH STAGE 2 CHRONIC KIDNEY DISEASE, WITH LONG-TERM CURRENT USE OF INSULIN (HCC): ICD-10-CM

## 2022-10-11 PROCEDURE — 90471 IMMUNIZATION ADMIN: CPT

## 2022-10-11 PROCEDURE — 90662 IIV NO PRSV INCREASED AG IM: CPT

## 2022-10-11 PROCEDURE — 99213 OFFICE O/P EST LOW 20 MIN: CPT

## 2022-10-11 NOTE — ASSESSMENT & PLAN NOTE
Lab Results   Component Value Date    HGBA1C 6 5 (H) 06/30/2022   Under control  Continue current medication  We will re-evaluate at next office visit

## 2022-10-11 NOTE — ASSESSMENT & PLAN NOTE
Under control  Continue current medication  We will re-evaluate at next office visit    Has been followed by a cardiologist

## 2022-10-11 NOTE — PROGRESS NOTES
Assessment/Plan:         Problem List Items Addressed This Visit        Endocrine    Type 2 diabetes mellitus with kidney complication, with long-term current use of insulin ([facility] )       Lab Results   Component Value Date    HGBA1C 6 5 (H) 06/30/2022   Under control  Continue current medication  We will re-evaluate at next office visit  Cardiovascular and Mediastinum    HTN (hypertension), benign     Under control  Continue current medication  We will re-evaluate at next office visit  Coronary artery disease involving native coronary artery without angina pectoris     Under control  Continue current medication  We will re-evaluate at next office visit  Has been followed by a cardiologist            Genitourinary    Stage 3a chronic kidney disease ([facility] )     Lab Results   Component Value Date    EGFR 39 07/13/2020    CREATININE 1 66 (H) 07/13/2020   creatinine and GFR stable we will reevaluate at next office visit  Has been followed by a nephrologist              Other    Mixed hyperlipidemia     Under control  Continue current medication  We will re-evaluate at next office visit  Other Visit Diagnoses     Encounter for immunization    -  Primary    Relevant Orders    influenza vaccine, high-dose, PF 0 7 mL (FLUZONE HIGH-DOSE) (Completed)    BMI 39 0-39 9,adult                Subjective:      Patient ID: Supriya Ojeda is a [de-identified] y o  male  Patient here for review of chronic medical problems and review of the labs and imaging if it is applicable  Currently has no specific complaints other than mentioned in the review of systems  Denies chest pain, SOB, cough, abdominal pain, nausea, vomiting, fever, chills, lightheadedness, dizziness,headache, tingling or numbness  No bowel or bladder problem          The following portions of the patient's history were reviewed and updated as appropriate:   Past Medical History:  He has a past medical history of Chronic kidney disease (CKD) stage G3a/A1, moderately decreased glomerular filtration rate (GFR) between 45-59 mL/min/1 73 square meter and albuminuria creatinine ratio less than 30 mg/g (HCC), Diabetes mellitus ([address] ), GERD (gastroesophageal reflux disease), History of echocardiogram, Hypertension, and Sleep apnea, obstructive  ,  _______________________________________________________________________  Medical Problems:  does not have any pertinent problems on file ,  _______________________________________________________________________  Past Surgical History:   has a past surgical history that includes Cardiac catheterization; Other surgical history; Joint replacement (2017); and Colonoscopy (03/09/2018)  ,  _______________________________________________________________________  Family History:  family history includes Cancer in his brother and mother; Coronary artery disease in his mother; Heart attack in his mother; Heart disease in his mother ,  _______________________________________________________________________  Social History:   reports that he quit smoking about 30 years ago  His smoking use included cigarettes and cigarettes  He has a 15 00 pack-year smoking history  He has never used smokeless tobacco  He reports current alcohol use of about 5 0 standard drinks of alcohol per week  He reports that he does not use drugs  ,  _______________________________________________________________________  Allergies:  is allergic to [place] [sitagliptin], iodinated diagnostic agents, simvastatin, and wound dressing adhesive     _______________________________________________________________________  Current Outpatient Medications   Medication Sig Dispense Refill   • calcium carbonate (OS-WAYLON) 600 MG tablet Take 600 mg by mouth daily     • cholecalciferol (VITAMIN D3) 1,000 units tablet Take 2,000 Units by mouth daily     • cloNIDine (CATAPRES) 0 1 mg tablet Take 1 tablet (0 1 mg total) by mouth 3 (three) times a day 270 tablet 1   • clopidogrel (PLAVIX) 75 mg tablet TAKE 1 TABLET BY MOUTH EVERY DAY 90 tablet 0   • famotidine (PEPCID) 40 MG tablet TAKE 1 TABLET BY MOUTH EVERYDAY AT BEDTIME 90 tablet 1   • fluticasone (FLONASE) 50 mcg/act nasal spray 2 sprays into each nostril daily in the early morning 1 g 5   • glipiZIDE (GLUCOTROL) 10 mg tablet      • hydrochlorothiazide (HYDRODIURIL) 12 5 mg tablet Take 1 tablet (12 5 mg total) by mouth daily 90 tablet 1   • insulin aspart protamine-insulin aspart (NovoLOG 70/30) 100 units/mL injection Inject under the skin 3 (three) times a day before meals Units depend on level     • JARDIANCE 25 MG TABS Take 25 mg by mouth daily     • Lancets (OneTouch Delica Plus [id number]) MISC TEST GLUCOSE 3 4 TIMES/DAY     • Levemir FlexTouch 100 units/mL injection pen 20 Units daily     • lisinopril (ZESTRIL) 40 mg tablet Take 1 tablet (40 mg total) by mouth daily 90 tablet 1   • metoprolol succinate (TOPROL-XL) 25 mg 24 hr tablet Take 1 tablet (25 mg total) by mouth daily 90 tablet 0   • Multiple Vitamins-Minerals (MULTIVITAMIN ADULT PO) Take by mouth daily     • pyridoxine (VITAMIN B6) 100 mg tablet Take 100 mg by mouth daily     • ranolazine (RANEXA) 500 mg 12 hr tablet TAKE 1 TABLET BY MOUTH TWICE A  tablet 0   • rosuvastatin (CRESTOR) 20 MG tablet Take 1 tablet by mouth daily     • tamsulosin (FLOMAX) 0 4 mg Take 2 capsules (0 8 mg total) by mouth daily 180 capsule 1   • triamcinolone (KENALOG) 0 5 % cream Apply topically 2 (two) times a day 30 g 1   • True Metrix Blood Glucose Test test strip 1 STRIP BY MISCELLANEOUS ROUTE 3 (THREE) TIMES A DAY   RAGHAV METRIX BRAND PER PATIENT     • Trulicity 1 5 QG/3 8OT SOPN INJECT 0 5 ML EVERY WEEK BY SUBCUTANEOUS ROUTE      • BD Pen Needle Pascale U/F 32G X 4 MM MISC USE AS INSTRUCTED WITH INSULIN PEN       No current facility-administered medications for this visit      _______________________________________________________________________  Review of Systems Constitutional: Negative for chills, fatigue and fever  HENT: Negative for congestion, ear pain, rhinorrhea, sneezing and sore throat  Eyes: Negative for pain, discharge, redness, itching and visual disturbance  Respiratory: Negative for cough, chest tightness and shortness of breath  Cardiovascular: Negative for chest pain, palpitations and leg swelling  Gastrointestinal: Negative for abdominal pain, blood in stool, diarrhea, nausea and vomiting  Endocrine: Negative for cold intolerance and heat intolerance  Genitourinary: Negative for dysuria, frequency, hematuria and urgency  Musculoskeletal: Positive for arthralgias  Negative for back pain and myalgias  Skin: Negative for color change and rash  Neurological: Negative for dizziness, weakness, light-headedness, numbness and headaches  Hematological: Does not bruise/bleed easily  Psychiatric/Behavioral: Negative for agitation, behavioral problems and confusion  Objective:  Vitals:    10/11/22 1140   BP: 130/70   BP Location: Left arm   Patient Position: Sitting   Cuff Size: Large   Pulse: 60   Resp: 16   Temp: 97 8 °F (36 6 °C)   TempSrc: Temporal   SpO2: 98%   Weight: 120 kg (264 lb)   Height: 5' 8 4" (1 737 m)     Body mass index is 39 67 kg/m²  Physical Exam  Vitals and nursing note reviewed  Constitutional:       General: He is not in acute distress  Appearance: Normal appearance  He is obese  He is not ill-appearing, toxic-appearing or diaphoretic  HENT:      Head: Normocephalic and atraumatic  Right Ear: Tympanic membrane normal       Left Ear: Tympanic membrane normal       Nose: Nose normal  No congestion  Mouth/Throat:      Mouth: Mucous membranes are moist    Eyes:      General: No scleral icterus  Right eye: No discharge  Left eye: No discharge  Extraocular Movements: Extraocular movements intact        Conjunctiva/sclera: Conjunctivae normal       Pupils: Pupils are equal, round, and reactive to light  Cardiovascular:      Rate and Rhythm: Normal rate and regular rhythm  Pulses: Normal pulses  no weak pulses          Dorsalis pedis pulses are 2+ on the right side and 2+ on the left side  Posterior tibial pulses are 2+ on the right side and 2+ on the left side  Heart sounds: Normal heart sounds  No murmur heard  No gallop  Pulmonary:      Effort: Pulmonary effort is normal  No respiratory distress  Breath sounds: Normal breath sounds  No wheezing, rhonchi or rales  Abdominal:      General: Abdomen is flat  Bowel sounds are normal  There is no distension  Palpations: Abdomen is soft  Tenderness: There is no abdominal tenderness  There is no guarding  Musculoskeletal:         General: No swelling or tenderness  Normal range of motion  Cervical back: Normal range of motion and neck supple  No rigidity  Feet:      Right foot:      Skin integrity: Callus and dry skin present  No ulcer, skin breakdown, erythema or warmth  Left foot:      Skin integrity: Callus and dry skin present  No ulcer, skin breakdown, erythema or warmth  Lymphadenopathy:      Cervical: No cervical adenopathy  Skin:     General: Skin is warm  Capillary Refill: Capillary refill takes 2 to 3 seconds  Coloration: Skin is not jaundiced  Findings: No bruising or rash  Neurological:      General: No focal deficit present  Mental Status: He is alert and oriented to person, place, and time  Mental status is at baseline  Gait: Gait normal    Psychiatric:         Mood and Affect: Mood normal        BMI Counseling: Body mass index is 39 67 kg/m²  The BMI is above normal  Nutrition recommendations include decreasing portion sizes, decreasing fast food intake, limiting drinks that contain sugar, moderation in carbohydrate intake, increasing intake of lean protein and reducing intake of saturated and trans fat   Exercise recommendations include vigorous physical activity 75 minutes/week  No pharmacotherapy was ordered  Rationale for BMI follow-up plan is due to patient being overweight or obese  Diabetic Foot Exam    Patient's shoes and socks removed  Right Foot/Ankle   Right Foot Inspection  Skin Exam: skin normal, skin intact, dry skin, callus and callus  No warmth, no erythema, no maceration, no abnormal color, no pre-ulcer and no ulcer  Toe Exam: ROM and strength within normal limits  No swelling and erythema    Sensory   Vibration: intact  Proprioception: intact  Monofilament testing: intact    Vascular  Capillary refills: < 3 seconds  The right DP pulse is 2+  The right PT pulse is 2+  Left Foot/Ankle  Left Foot Inspection  Skin Exam: skin normal, skin intact, dry skin and callus  No warmth, no erythema, no maceration, normal color, no pre-ulcer and no ulcer  Toe Exam: ROM and strength within normal limits  No swelling and no erythema  Sensory   Vibration: intact  Proprioception: intact  Monofilament testing: intact    Vascular  Capillary refills: < 3 seconds  The left DP pulse is 2+  The left PT pulse is 2+       Assign Risk Category  No deformity present  No loss of protective sensation  No weak pulses  Risk: 0

## 2022-10-11 NOTE — ASSESSMENT & PLAN NOTE
Lab Results   Component Value Date    EGFR 39 07/13/2020    CREATININE 1 66 (H) 07/13/2020   creatinine and GFR stable we will reevaluate at next office visit  Has been followed by a nephrologist

## 2022-10-24 ENCOUNTER — OFFICE VISIT (OUTPATIENT)
Dept: FAMILY MEDICINE CLINIC | Facility: CLINIC | Age: 82
End: 2022-10-24
Payer: MEDICARE

## 2022-10-24 ENCOUNTER — TELEPHONE (OUTPATIENT)
Dept: ADMINISTRATIVE | Facility: OTHER | Age: 82
End: 2022-10-24

## 2022-10-24 VITALS
HEIGHT: 65 IN | TEMPERATURE: 97.9 F | RESPIRATION RATE: 18 BRPM | SYSTOLIC BLOOD PRESSURE: 130 MMHG | OXYGEN SATURATION: 95 % | HEART RATE: 68 BPM | WEIGHT: 261 LBS | BODY MASS INDEX: 43.49 KG/M2 | DIASTOLIC BLOOD PRESSURE: 60 MMHG

## 2022-10-24 DIAGNOSIS — K12.0 APHTHOUS ULCER OF TONGUE: Primary | ICD-10-CM

## 2022-10-24 DIAGNOSIS — Z79.4 TYPE 2 DIABETES MELLITUS WITH STAGE 3 CHRONIC KIDNEY DISEASE, WITH LONG-TERM CURRENT USE OF INSULIN, UNSPECIFIED WHETHER STAGE 3A OR 3B CKD (HCC): ICD-10-CM

## 2022-10-24 DIAGNOSIS — N18.30 TYPE 2 DIABETES MELLITUS WITH STAGE 3 CHRONIC KIDNEY DISEASE, WITH LONG-TERM CURRENT USE OF INSULIN, UNSPECIFIED WHETHER STAGE 3A OR 3B CKD (HCC): ICD-10-CM

## 2022-10-24 DIAGNOSIS — E11.22 TYPE 2 DIABETES MELLITUS WITH STAGE 3 CHRONIC KIDNEY DISEASE, WITH LONG-TERM CURRENT USE OF INSULIN, UNSPECIFIED WHETHER STAGE 3A OR 3B CKD (HCC): ICD-10-CM

## 2022-10-24 DIAGNOSIS — I10 HTN (HYPERTENSION), BENIGN: ICD-10-CM

## 2022-10-24 PROCEDURE — 99213 OFFICE O/P EST LOW 20 MIN: CPT

## 2022-10-24 NOTE — TELEPHONE ENCOUNTER
----- Message from Ny Castillo sent at 10/24/2022  9:56 AM EDT -----  Please Locate Last Diabetic Eye Exam From Dr Penaloza Loge from last month      Thank you!!

## 2022-10-24 NOTE — TELEPHONE ENCOUNTER
Upon review of the In Basket request and the patient's chart, initial outreach has been made via fax to facility  , please see Contacts section for details       Thank you  Ellie Dixon

## 2022-10-24 NOTE — ASSESSMENT & PLAN NOTE
Under control  Continue current medication  We will re-evaluate at next office visit      Lab Results   Component Value Date    HGBA1C 6 5 (H) 06/30/2022

## 2022-10-24 NOTE — PROGRESS NOTES
Assessment/Plan:         Problem List Items Addressed This Visit        Endocrine    Type 2 diabetes mellitus with kidney complication, with long-term current use of insulin ([facility] )     Under control  Continue current medication  We will re-evaluate at next office visit  Lab Results   Component Value Date    HGBA1C 6 5 (H) 06/30/2022               Cardiovascular and Mediastinum    HTN (hypertension), benign     Under control  Continue current medication  We will re-evaluate at next office visit  Other Visit Diagnoses     Aphthous ulcer of tongue    -  Primary            Subjective:      Patient ID: Ke Rodriguez is a [de-identified] y o  male  [facility] for apathous ulcer on the right side of the tongue and right-sided tonsillar lymphadenopathy patient was prescribed amoxicillin which he completed he does not have any symptom of tongue or lymph gland at this time  Wanted to have recheck  No other new problem  The following portions of the patient's history were reviewed and updated as appropriate:   Past Medical History:  He has a past medical history of Chronic kidney disease (CKD) stage G3a/A1, moderately decreased glomerular filtration rate (GFR) between 45-59 mL/min/1 73 square meter and albuminuria creatinine ratio less than 30 mg/g ([facility] ), Diabetes mellitus ([facility] ), GERD (gastroesophageal reflux disease), History of echocardiogram, Hypertension, and Sleep apnea, obstructive  ,  _______________________________________________________________________  Medical Problems:  does not have any pertinent problems on file ,  _______________________________________________________________________  Past Surgical History:   has a past surgical history that includes Cardiac catheterization; Other surgical history; Joint replacement (2017); and Colonoscopy (03/09/2018)  ,  _______________________________________________________________________  [facility] History:  family history includes Cancer in his brother and mother; Coronary artery disease in his mother; Heart attack in his mother; Heart disease in his mother ,  _______________________________________________________________________  Social History:   reports that he quit smoking about 30 years ago  His smoking use included cigarettes and cigarettes  He has a 15 00 pack-year smoking history  He has never used smokeless tobacco  He reports current alcohol use of about 5 0 standard drinks of alcohol per week  He reports that he does not use drugs  ,  _______________________________________________________________________  Allergies:  is allergic to [place] [sitagliptin], iodinated diagnostic agents, simvastatin, and wound dressing adhesive     _______________________________________________________________________  Current Outpatient Medications   Medication Sig Dispense Refill   • BD Pen Needle Pascale U/F 32G X 4 MM MISC USE AS INSTRUCTED WITH INSULIN PEN     • calcium carbonate (OS-WAYLON) 600 MG tablet Take 600 mg by mouth daily     • cholecalciferol (VITAMIN D3) 1,000 units tablet Take 2,000 Units by mouth daily     • cloNIDine (CATAPRES) 0 1 mg tablet Take 1 tablet (0 1 mg total) by mouth 3 (three) times a day 270 tablet 1   • clopidogrel (PLAVIX) 75 mg tablet TAKE 1 TABLET BY MOUTH EVERY DAY 90 tablet 0   • famotidine (PEPCID) 40 MG tablet TAKE 1 TABLET BY MOUTH EVERYDAY AT BEDTIME 90 tablet 1   • fluticasone (FLONASE) 50 mcg/act nasal spray 2 sprays into each nostril daily in the early morning 1 g 5   • glipiZIDE (GLUCOTROL) 10 mg tablet      • hydrochlorothiazide (HYDRODIURIL) 12 5 mg tablet Take 1 tablet (12 5 mg total) by mouth daily 90 tablet 1   • insulin aspart protamine-insulin aspart (NovoLOG 70/30) 100 units/mL injection Inject under the skin 3 (three) times a day before meals Units depend on level     • JARDIANCE 25 MG TABS Take 25 mg by mouth daily     • Lancets (OneTouch Delica Plus [id number]) MISC TEST GLUCOSE 3 4 TIMES/DAY     • Levemir FlexTouch 100 units/mL injection pen 20 Units daily     • lisinopril (ZESTRIL) 40 mg tablet Take 1 tablet (40 mg total) by mouth daily 90 tablet 1   • metoprolol succinate (TOPROL-XL) 25 mg 24 hr tablet Take 1 tablet (25 mg total) by mouth daily 90 tablet 0   • Multiple Vitamins-Minerals (MULTIVITAMIN ADULT PO) Take by mouth daily     • pyridoxine (VITAMIN B6) 100 mg tablet Take 100 mg by mouth daily     • ranolazine (RANEXA) 500 mg 12 hr tablet TAKE 1 TABLET BY MOUTH TWICE A  tablet 0   • rosuvastatin (CRESTOR) 20 MG tablet Take 1 tablet by mouth daily     • tamsulosin (FLOMAX) 0 4 mg Take 2 capsules (0 8 mg total) by mouth daily 180 capsule 1   • triamcinolone (KENALOG) 0 5 % cream Apply topically 2 (two) times a day 30 g 1   • True Metrix Blood Glucose Test test strip 1 STRIP BY MISCELLANEOUS ROUTE 3 (THREE) TIMES A DAY  RAGHAV METRIX BRAND PER PATIENT     • Trulicity 1 5 MS/8 [facility] SOPN INJECT 0 5 ML EVERY WEEK BY SUBCUTANEOUS ROUTE  No current facility-administered medications for this visit      _______________________________________________________________________  Review of Systems   Constitutional: Negative for chills, fatigue and fever  HENT: Negative for congestion, ear pain, rhinorrhea, sneezing and sore throat  Eyes: Negative for redness, itching and visual disturbance  Respiratory: Negative for cough, chest tightness and shortness of breath  Cardiovascular: Negative for chest pain, palpitations and leg swelling  Gastrointestinal: Negative for abdominal pain, blood in stool, diarrhea, nausea and vomiting  Endocrine: Negative for cold intolerance and heat intolerance  Genitourinary: Negative for dysuria, frequency and urgency  Musculoskeletal: Negative for arthralgias, back pain and myalgias  Skin: Negative for color change and rash  Neurological: Negative for dizziness, weakness, light-headedness, numbness and headaches  Hematological: Does not bruise/bleed easily  Psychiatric/Behavioral: Negative for agitation, behavioral problems and confusion  Objective:  Vitals:    10/24/22 0931   BP: 130/60   BP Location: Left arm   Patient Position: Sitting   Cuff Size: Standard   Pulse: 68   Resp: 18   Temp: 97 9 °F (36 6 °C)   TempSrc: Tympanic   SpO2: 95%   Weight: 118 kg (261 lb)   Height: 5' 5" (1 651 m)     Body mass index is 43 43 kg/m²  Physical Exam  Vitals and nursing note reviewed  Constitutional:       General: He is not in acute distress  Appearance: Normal appearance  He is obese  He is not ill-appearing, toxic-appearing or diaphoretic  HENT:      Head: Normocephalic and atraumatic  Right Ear: Tympanic membrane normal       Left Ear: Tympanic membrane normal       Nose: Nose normal  No congestion  Mouth/Throat:      Mouth: Mucous membranes are moist    Eyes:      General: No scleral icterus  Right eye: No discharge  Left eye: No discharge  Extraocular Movements: Extraocular movements intact  Conjunctiva/sclera: Conjunctivae normal       Pupils: Pupils are equal, round, and reactive to light  Cardiovascular:      Rate and Rhythm: Normal rate and regular rhythm  Pulses: Normal pulses  Heart sounds: Normal heart sounds  No murmur heard  No gallop  Pulmonary:      Effort: Pulmonary effort is normal  No respiratory distress  Breath sounds: Normal breath sounds  No wheezing, rhonchi or rales  Abdominal:      General: Abdomen is flat  Bowel sounds are normal  There is no distension  Palpations: Abdomen is soft  Tenderness: There is no abdominal tenderness  There is no guarding  Musculoskeletal:         General: No swelling or tenderness  Normal range of motion  Cervical back: Normal range of motion and neck supple  No rigidity  Lymphadenopathy:      Cervical: No cervical adenopathy  Skin:     General: Skin is warm        Capillary Refill: Capillary refill takes 2 to 3 seconds  Coloration: Skin is not jaundiced  Findings: No bruising or rash  Neurological:      General: No focal deficit present  Mental Status: He is alert and oriented to person, place, and time  Mental status is at baseline        Gait: Gait normal    Psychiatric:         Mood and Affect: Mood normal

## 2022-10-25 NOTE — TELEPHONE ENCOUNTER
Upon review of the In Basket request we were able to locate, review, and update the patient chart as requested for Diabetic Eye Exam     Any additional questions or concerns should be emailed to the Practice Liaisons via the appropriate education email address, please do not reply via In Basket      Thank you  Brenna Doll

## 2022-11-03 ENCOUNTER — HOSPITAL ENCOUNTER (OUTPATIENT)
Dept: NON INVASIVE DIAGNOSTICS | Facility: CLINIC | Age: 82
Discharge: HOME | End: 2022-11-03

## 2022-11-03 DIAGNOSIS — I83.009 VENOUS ULCER (HCC): ICD-10-CM

## 2022-11-03 DIAGNOSIS — I87.2 VENOUS STASIS DERMATITIS OF BOTH LOWER EXTREMITIES: ICD-10-CM

## 2022-11-03 DIAGNOSIS — L97.909 VENOUS ULCER (HCC): ICD-10-CM

## 2022-11-07 ENCOUNTER — TELEPHONE (OUTPATIENT)
Dept: FAMILY MEDICINE CLINIC | Facility: CLINIC | Age: 82
End: 2022-11-07

## 2022-11-08 ENCOUNTER — OFFICE VISIT (OUTPATIENT)
Dept: URGENT CARE | Facility: CLINIC | Age: 82
End: 2022-11-08

## 2022-11-08 ENCOUNTER — TELEPHONE (OUTPATIENT)
Dept: FAMILY MEDICINE CLINIC | Facility: CLINIC | Age: 82
End: 2022-11-08

## 2022-11-08 VITALS
WEIGHT: 261 LBS | HEIGHT: 65 IN | OXYGEN SATURATION: 92 % | BODY MASS INDEX: 43.49 KG/M2 | TEMPERATURE: 97.4 F | HEART RATE: 68 BPM | RESPIRATION RATE: 16 BRPM

## 2022-11-08 DIAGNOSIS — R05.8 OTHER COUGH: ICD-10-CM

## 2022-11-08 DIAGNOSIS — R05.1 ACUTE COUGH: Primary | ICD-10-CM

## 2022-11-08 DIAGNOSIS — J02.9 SORE THROAT: ICD-10-CM

## 2022-11-08 NOTE — TELEPHONE ENCOUNTER
GABRIELLE  Pt called saying he had reached out to DR a few days ago as he didn't feel well  Called this afternoon to say he feels worse-sore throat,headache,body ache   and nasal pressure but no fever  Referred pt to [facility] as they can test him for flu and strep and get him started on an antibiotic if needed

## 2022-11-09 LAB
FLUAV RNA RESP QL NAA+PROBE: NEGATIVE
FLUBV RNA RESP QL NAA+PROBE: NEGATIVE
SARS-COV-2 RNA RESP QL NAA+PROBE: NEGATIVE

## 2022-11-09 NOTE — PROGRESS NOTES
[facility] Now        NAME: Jose Bergeron is a [de-identified] y o  male  : 1940    MRN: [id number]  DATE: 2022  TIME: 8:12 PM    Assessment and Plan   Acute cough [R05 1]  1  Acute cough  Cov/Flu-Collected at [facility] Now   2  Other cough       Probably viral      Patient Instructions     Await flu/COVId results  Fluids, Tylenol as needed  Follow up with PCP in 3-5 days  Proceed to  ER if symptoms worsen  Chief Complaint     Chief Complaint   Patient presents with   • Cold Like Symptoms     Pt is reporting that he has a sore throat but it is not sore  He also reports lethargy, body aches, chills  Felling feverish with no fever  Pt reports that he has been feeling like this for about 3-4 days  History of Present Illness       Patient complains of 3-4 day history of sore throat, clear nasal congestion, slight cough and achiness  No fever or chillls  Taking Tylenol  He was treated with Amoxicillin several weeks ago  Did a COVID test yesterday which was negative  Review of Systems   Review of Systems   Constitutional: Negative for appetite change, chills and fever  HENT: Negative for ear pain  Neurological: Negative for headaches           Current Medications       Current Outpatient Medications:   •  BD Pen Needle Pascale U/F 32G X 4 MM MISC, USE AS INSTRUCTED WITH INSULIN PEN, Disp: , Rfl:   •  calcium carbonate (OS-WAYLON) 600 MG tablet, Take 600 mg by mouth daily, Disp: , Rfl:   •  cholecalciferol (VITAMIN D3) 1,000 units tablet, Take 2,000 Units by mouth daily, Disp: , Rfl:   •  cloNIDine (CATAPRES) 0 1 mg tablet, Take 1 tablet (0 1 mg total) by mouth 3 (three) times a day, Disp: 270 tablet, Rfl: 1  •  clopidogrel (PLAVIX) 75 mg tablet, TAKE 1 TABLET BY MOUTH EVERY DAY, Disp: 90 tablet, Rfl: 0  •  famotidine (PEPCID) 40 MG tablet, TAKE 1 TABLET BY MOUTH EVERYDAY AT BEDTIME, Disp: 90 tablet, Rfl: 1  •  fluticasone (FLONASE) 50 mcg/act nasal spray, 2 sprays into each nostril daily in the early morning, Disp: 1 g, Rfl: 5  •  glipiZIDE (GLUCOTROL) 10 mg tablet, , Disp: , Rfl:   •  hydrochlorothiazide (HYDRODIURIL) 12 5 mg tablet, Take 1 tablet (12 5 mg total) by mouth daily, Disp: 90 tablet, Rfl: 1  •  insulin aspart protamine-insulin aspart (NovoLOG 70/30) 100 units/mL injection, Inject under the skin 3 (three) times a day before meals Units depend on level, Disp: , Rfl:   •  JARDIANCE 25 MG TABS, Take 25 mg by mouth daily, Disp: , Rfl:   •  Lancets (OneTouch Delica Plus [id number]) MISC, TEST GLUCOSE 3 4 TIMES/DAY, Disp: , Rfl:   •  Levemir FlexTouch 100 units/mL injection pen, 20 Units daily, Disp: , Rfl:   •  lisinopril (ZESTRIL) 40 mg tablet, Take 1 tablet (40 mg total) by mouth daily, Disp: 90 tablet, Rfl: 1  •  metoprolol succinate (TOPROL-XL) 25 mg 24 hr tablet, Take 1 tablet (25 mg total) by mouth daily, Disp: 90 tablet, Rfl: 0  •  Multiple Vitamins-Minerals (MULTIVITAMIN ADULT PO), Take by mouth daily, Disp: , Rfl:   •  pyridoxine (VITAMIN B6) 100 mg tablet, Take 100 mg by mouth daily, Disp: , Rfl:   •  ranolazine (RANEXA) 500 mg 12 hr tablet, TAKE 1 TABLET BY MOUTH TWICE A DAY, Disp: 180 tablet, Rfl: 0  •  rosuvastatin (CRESTOR) 20 MG tablet, Take 1 tablet by mouth daily, Disp: , Rfl:   •  tamsulosin (FLOMAX) 0 4 mg, Take 2 capsules (0 8 mg total) by mouth daily, Disp: 180 capsule, Rfl: 1  •  triamcinolone (KENALOG) 0 5 % cream, Apply topically 2 (two) times a day, Disp: 30 g, Rfl: 1  •  True Metrix Blood Glucose Test test strip, 1 STRIP BY MISCELLANEOUS ROUTE 3 (THREE) TIMES A DAY   RAGHAV METRIX BRAND PER PATIENT, Disp: , Rfl:   •  Trulicity 1 5 TK/2 7HS SOPN, INJECT 0 5 ML EVERY WEEK BY SUBCUTANEOUS ROUTE , Disp: , Rfl:     Current Allergies     Allergies as of 11/08/2022 - Reviewed 11/08/2022   Allergen Reaction Noted   • Januvia [sitagliptin] Other (See Comments) 05/17/2022   • Iodinated diagnostic agents  10/23/2019   • Simvastatin Myalgia 10/23/2019   • Wound dressing adhesive Other (See Comments) 07/12/2022            The following portions of the patient's history were reviewed and updated as appropriate: allergies, current medications, past family history, past medical history, past social history, past surgical history and problem list      Past Medical History:   Diagnosis Date   • Chronic kidney disease (CKD) stage G3a/A1, moderately decreased glomerular filtration rate (GFR) between 45-59 mL/min/1 73 square meter and albuminuria creatinine ratio less than 30 mg/g (HCC)    • Diabetes mellitus ([address] )    • GERD (gastroesophageal reflux disease)    • History of echocardiogram    • Hypertension    • Sleep apnea, obstructive        Past Surgical History:   Procedure Laterality Date   • CARDIAC CATHETERIZATION     • COLONOSCOPY  03/09/2018   • JOINT REPLACEMENT  2017    knee   • OTHER SURGICAL HISTORY      Stent        Family History   Problem Relation Age of Onset   • Heart disease Mother    • Heart attack Mother         46s   • Cancer Mother    • Coronary artery disease Mother    • Cancer Brother         Malignant tumor of lung         Medications have been verified  Objective   Pulse 68   Temp (!) 97 4 °F (36 3 °C)   Resp 16   Ht 5' 5" (1 651 m)   Wt 118 kg (261 lb)   SpO2 92%   BMI 43 43 kg/m²        Physical Exam     Physical Exam  Constitutional:       Appearance: Normal appearance  He is not ill-appearing  HENT:      Right Ear: Tympanic membrane and ear canal normal       Left Ear: Tympanic membrane and ear canal normal       Nose: Congestion present  Mouth/Throat:      Pharynx: Posterior oropharyngeal erythema present  No oropharyngeal exudate  Eyes:      Conjunctiva/sclera: Conjunctivae normal    Cardiovascular:      Heart sounds: Normal heart sounds  Pulmonary:      Effort: Pulmonary effort is normal       Breath sounds: Normal breath sounds  Lymphadenopathy:      Cervical: Cervical adenopathy present     Neurological:      Mental Status: He is alert         Rapid Strep negative

## 2022-11-16 ENCOUNTER — OFFICE VISIT (OUTPATIENT)
Dept: VASCULAR SURGERY | Facility: CLINIC | Age: 82
End: 2022-11-16

## 2022-11-16 VITALS
WEIGHT: 260 LBS | HEART RATE: 60 BPM | HEIGHT: 65 IN | BODY MASS INDEX: 43.32 KG/M2 | DIASTOLIC BLOOD PRESSURE: 72 MMHG | SYSTOLIC BLOOD PRESSURE: 126 MMHG

## 2022-11-16 DIAGNOSIS — I87.2 VENOUS STASIS DERMATITIS OF BOTH LOWER EXTREMITIES: Primary | ICD-10-CM

## 2022-11-16 DIAGNOSIS — E66.01 OBESITY, MORBID (HCC): ICD-10-CM

## 2022-11-16 NOTE — PATIENT INSTRUCTIONS
Venous stasis dermatitis of both lower extremities  Improved after kenalog cream   Reflux studies don't show any signifcant reflux  He is using compression stockings regularly  Obesity, morbid (HCC)  Lost 15 lbs over past 1 5 months  He is ongoing with efforts to loose weight

## 2022-11-16 NOTE — ASSESSMENT & PLAN NOTE
Improved after kenalog cream   Reflux studies don't show any signifcant reflux  He is using compression stockings regularly

## 2022-11-16 NOTE — PROGRESS NOTES
Assessment/Plan:    Venous stasis dermatitis of both lower extremities  Improved after kenalog cream   Reflux studies don't show any signifcant reflux  He is using compression stockings regularly  Obesity, morbid (HCC)  Lost 15 lbs over past 1 5 months  He is ongoing with efforts to loose weight  Diagnoses and all orders for this visit:    Venous stasis dermatitis of both lower extremities    Obesity, morbid (HCC)        Subjective:      Patient ID: Telma Hines is a [de-identified] y o  male  Pt is here to rev LEVDR 11/3/22  Pt denies claudication symptoms  Pt is taking Rosuvastatin and Plavix  Pt is a former smoker  HPI  Pt is losing weight with diet control, has lost 15 lbs  Feels better  The following portions of the patient's history were reviewed and updated as appropriate: allergies, current medications, past family history, past medical history, past social history, past surgical history and problem list     Review of Systems   Constitutional: Negative  HENT: Negative  Eyes: Negative  Respiratory: Negative  Cardiovascular: Negative  Gastrointestinal: Negative  Endocrine: Negative  Genitourinary: Negative  Musculoskeletal: Negative  Skin: Negative  Allergic/Immunologic: Negative  Neurological: Negative  Hematological: Negative  Psychiatric/Behavioral: Negative  I have reviewed the ROS as entered and made changes as necessary  Objective:      /72 (BP Location: Right arm, Patient Position: Sitting, Cuff Size: Large)   Pulse 60   Ht 5' 5" (1 651 m)   Wt 118 kg (260 lb)   BMI 43 27 kg/m²          Physical Exam  Vitals and nursing note reviewed  Constitutional:       Appearance: He is obese  Cardiovascular:      Rate and Rhythm: Normal rate and regular rhythm  Musculoskeletal:      Right lower leg: Edema present  Left lower leg: Edema present  Skin:     Capillary Refill: Capillary refill takes less than 2 seconds

## 2022-11-22 DIAGNOSIS — I25.10 CORONARY ARTERY DISEASE INVOLVING NATIVE CORONARY ARTERY OF NATIVE HEART WITHOUT ANGINA PECTORIS: ICD-10-CM

## 2022-11-22 RX ORDER — METOPROLOL SUCCINATE 25 MG/1
25 TABLET, EXTENDED RELEASE ORAL DAILY
Qty: 90 TABLET | Refills: 0 | Status: SHIPPED | OUTPATIENT
Start: 2022-11-22

## 2022-12-03 DIAGNOSIS — J30.1 SEASONAL ALLERGIC RHINITIS DUE TO POLLEN: ICD-10-CM

## 2022-12-05 RX ORDER — FLUTICASONE PROPIONATE 50 MCG
SPRAY, SUSPENSION (ML) NASAL
Qty: 48 ML | Refills: 0 | Status: SHIPPED | OUTPATIENT
Start: 2022-12-05

## 2022-12-09 DIAGNOSIS — I25.10 CORONARY ARTERY DISEASE INVOLVING NATIVE CORONARY ARTERY OF NATIVE HEART WITHOUT ANGINA PECTORIS: ICD-10-CM

## 2022-12-09 DIAGNOSIS — K21.9 GASTRO-ESOPHAGEAL REFLUX DISEASE WITHOUT ESOPHAGITIS: ICD-10-CM

## 2022-12-09 RX ORDER — RANOLAZINE 500 MG/1
500 TABLET, EXTENDED RELEASE ORAL 2 TIMES DAILY
Qty: 180 TABLET | Refills: 0 | Status: SHIPPED | OUTPATIENT
Start: 2022-12-09

## 2022-12-09 RX ORDER — CLOPIDOGREL BISULFATE 75 MG/1
75 TABLET ORAL DAILY
Qty: 90 TABLET | Refills: 0 | Status: SHIPPED | OUTPATIENT
Start: 2022-12-09

## 2022-12-09 RX ORDER — FAMOTIDINE 40 MG/1
40 TABLET, FILM COATED ORAL
Qty: 90 TABLET | Refills: 1 | Status: SHIPPED | OUTPATIENT
Start: 2022-12-09

## 2022-12-09 NOTE — TELEPHONE ENCOUNTER
Refilled scripts for clopidogrel, ranolazine and famotidine 12/9  Pt  Requested refill on  Omeprazole which is not on MAR and pt  Currently taking famotidine 40 mg  Was not filled    Scripts sent to Leydi Paulino RN

## 2023-01-03 ENCOUNTER — TELEMEDICINE (OUTPATIENT)
Dept: FAMILY MEDICINE CLINIC | Facility: CLINIC | Age: 83
End: 2023-01-03

## 2023-01-03 DIAGNOSIS — J01.00 ACUTE NON-RECURRENT MAXILLARY SINUSITIS: Primary | ICD-10-CM

## 2023-01-03 RX ORDER — CEFUROXIME AXETIL 250 MG/1
250 TABLET ORAL EVERY 12 HOURS SCHEDULED
Qty: 20 TABLET | Refills: 0 | Status: SHIPPED | OUTPATIENT
Start: 2023-01-03 | End: 2023-01-13

## 2023-01-03 RX ORDER — DEXTROMETHORPHAN HYDROBROMIDE AND PROMETHAZINE HYDROCHLORIDE 15; 6.25 MG/5ML; MG/5ML
5 SOLUTION ORAL 4 TIMES DAILY PRN
Qty: 120 ML | Refills: 0 | Status: SHIPPED | OUTPATIENT
Start: 2023-01-03

## 2023-01-03 NOTE — PROGRESS NOTES
Virtual Regular Visit    Verification of patient location:    Patient is located in the following state in which I hold an active license [place]      Assessment/Plan:    Problem List Items Addressed This Visit    None  Visit Diagnoses     Acute non-recurrent maxillary sinusitis    -  Primary    Relevant Medications    cefuroxime (CEFTIN) 250 mg tablet    Promethazine-DM (PHENERGAN-DM) 6 25-15 mg/5 mL oral syrup               Reason for visit is   Chief Complaint   Patient presents with   • Virtual Regular Visit        Encounter provider Ca Gusman MD    Provider located at [address] [place] [address]  [address] [facility], [address]  [facility] [address] [facility] [place] [address]  [phone]      Recent Visits  No visits were found meeting these conditions  Showing recent visits within past 7 days and meeting all other requirements  Today's Visits  Date Type Provider Dept   01/03/23 Telemedicine [facility]   Showing today's visits and meeting all other requirements  Future Appointments  No visits were found meeting these conditions  Showing future appointments within next 150 days and meeting all other requirements       The patient was identified by name and date of birth  Isai Camargo was informed that this is a telemedicine visit and that the visit is being conducted through the [address] [place] [address] Now platform  He agrees to proceed     My office door was closed  No one else was in the room  He acknowledged consent and understanding of privacy and security of the video platform  The patient has agreed to participate and understands they can discontinue the visit at any time  Patient is aware this is a billable service  Subjective  Isai Camargo is a [de-identified] y o  male          Patient requested virtual visit as he is feeling sick has symptoms as described in review of system for last 3 days he took a COVID test which was negative       Past Medical History: Diagnosis Date   • Chronic kidney disease (CKD) stage G3a/A1, moderately decreased glomerular filtration rate (GFR) between 45-59 mL/min/1 73 square meter and albuminuria creatinine ratio less than 30 mg/g (HCC)    • Diabetes mellitus (HCC)    • GERD (gastroesophageal reflux disease)    • History of echocardiogram    • Hypertension    • Sleep apnea, obstructive        Past Surgical History:   Procedure Laterality Date   • CARDIAC CATHETERIZATION     • COLONOSCOPY  03/09/2018   • JOINT REPLACEMENT  2017    knee   • OTHER SURGICAL HISTORY      Stent        Current Outpatient Medications   Medication Sig Dispense Refill   • cefuroxime (CEFTIN) 250 mg tablet Take 1 tablet (250 mg total) by mouth every 12 (twelve) hours for 10 days Please deliver 20 tablet 0   • Promethazine-DM (PHENERGAN-DM) 6 25-15 mg/5 mL oral syrup Take 5 mL by mouth 4 (four) times a day as needed for cough Please deliver 120 mL 0   • BD Pen Needle Pascale U/F 32G X 4 MM MISC USE AS INSTRUCTED WITH INSULIN PEN     • calcium carbonate (OS-WAYLON) 600 MG tablet Take 600 mg by mouth daily     • cholecalciferol (VITAMIN D3) 1,000 units tablet Take 2,000 Units by mouth daily     • cloNIDine (CATAPRES) 0 1 mg tablet Take 1 tablet (0 1 mg total) by mouth 3 (three) times a day 270 tablet 1   • clopidogrel (PLAVIX) 75 mg tablet Take 1 tablet (75 mg total) by mouth daily 90 tablet 0   • famotidine (PEPCID) 40 MG tablet Take 1 tablet (40 mg total) by mouth daily at bedtime 90 tablet 1   • fluticasone (FLONASE) 50 mcg/act nasal spray USE 2 SPRAYS IN THE MORNING DAILY 48 mL 0   • glipiZIDE (GLUCOTROL) 10 mg tablet      • hydrochlorothiazide (HYDRODIURIL) 12 5 mg tablet Take 1 tablet (12 5 mg total) by mouth daily 90 tablet 1   • insulin aspart protamine-insulin aspart (NovoLOG 70/30) 100 units/mL injection Inject under the skin 3 (three) times a day before meals Units depend on level     • JARDIANCE 25 MG TABS Take 25 mg by mouth daily     • Lancets (OneTouch [employer] Plus [id number]) MISC TEST GLUCOSE 3 4 TIMES/DAY     • Levemir FlexTouch 100 units/mL injection pen 20 Units daily     • lisinopril (ZESTRIL) 40 mg tablet Take 1 tablet (40 mg total) by mouth daily 90 tablet 1   • metoprolol succinate (TOPROL-XL) 25 mg 24 hr tablet TAKE 1 TABLET (25 MG TOTAL) BY MOUTH DAILY  90 tablet 0   • Multiple Vitamins-Minerals (MULTIVITAMIN ADULT PO) Take by mouth daily     • pyridoxine (VITAMIN B6) 100 mg tablet Take 100 mg by mouth daily     • ranolazine (RANEXA) 500 mg 12 hr tablet Take 1 tablet (500 mg total) by mouth 2 (two) times a day 180 tablet 0   • rosuvastatin (CRESTOR) 20 MG tablet Take 1 tablet by mouth daily     • tamsulosin (FLOMAX) 0 4 mg Take 2 capsules (0 8 mg total) by mouth daily 180 capsule 1   • True Metrix Blood Glucose Test test strip 1 STRIP BY MISCELLANEOUS ROUTE 3 (THREE) TIMES A DAY  RAGHAV METRIX BRAND PER PATIENT     • Trulicity 1 5 ZQ/7 9DA SOPN INJECT 0 5 ML EVERY WEEK BY SUBCUTANEOUS ROUTE  No current facility-administered medications for this visit  Allergies   Allergen Reactions   • Januvia [Sitagliptin] Other (See Comments)     pancreatitis   • Iodinated Contrast Media    • Simvastatin Myalgia   • Wound Dressing Adhesive Other (See Comments)     Burning sensation       Review of Systems   Constitutional: Positive for chills and fever  HENT: Positive for congestion, sinus pressure and sore throat  Negative for ear pain  Eyes: Negative for pain and visual disturbance  Respiratory: Positive for cough (With yellow expectoration) and wheezing  Negative for shortness of breath  Cardiovascular: Negative for chest pain and palpitations  Gastrointestinal: Negative for abdominal pain and vomiting  Genitourinary: Negative for difficulty urinating, dysuria, frequency and hematuria  Musculoskeletal: Negative for arthralgias and back pain  Skin: Negative for color change and rash     Neurological: Negative for dizziness, seizures, syncope, light-headedness and headaches  Psychiatric/Behavioral: Negative for agitation and behavioral problems  All other systems reviewed and are negative  Video Exam    There were no vitals filed for this visit  Physical Exam  Constitutional:       General: He is not in acute distress  Appearance: Normal appearance  He is not ill-appearing, toxic-appearing or diaphoretic  HENT:      Head: Normocephalic and atraumatic  Pulmonary:      Effort: Pulmonary effort is normal    Neurological:      Mental Status: He is alert     Psychiatric:         Mood and Affect: Mood normal          Behavior: Behavior normal           I spent 20 minutes directly with the patient during this visit

## 2023-01-11 ENCOUNTER — HOSPITAL ENCOUNTER (OUTPATIENT)
Dept: RADIOLOGY | Facility: HOSPITAL | Age: 83
Discharge: HOME | End: 2023-01-11

## 2023-01-11 ENCOUNTER — OFFICE VISIT (OUTPATIENT)
Dept: FAMILY MEDICINE CLINIC | Facility: CLINIC | Age: 83
End: 2023-01-11

## 2023-01-11 VITALS
BODY MASS INDEX: 40.82 KG/M2 | HEIGHT: 65 IN | WEIGHT: 245 LBS | TEMPERATURE: 97.5 F | SYSTOLIC BLOOD PRESSURE: 134 MMHG | DIASTOLIC BLOOD PRESSURE: 66 MMHG | OXYGEN SATURATION: 94 % | RESPIRATION RATE: 18 BRPM | HEART RATE: 72 BPM

## 2023-01-11 DIAGNOSIS — E78.2 MIXED HYPERLIPIDEMIA: ICD-10-CM

## 2023-01-11 DIAGNOSIS — R05.1 ACUTE COUGH: ICD-10-CM

## 2023-01-11 DIAGNOSIS — E66.01 OBESITY, MORBID (HCC): ICD-10-CM

## 2023-01-11 DIAGNOSIS — E11.22 TYPE 2 DIABETES MELLITUS WITH STAGE 3B CHRONIC KIDNEY DISEASE, WITH LONG-TERM CURRENT USE OF INSULIN (HCC): Primary | ICD-10-CM

## 2023-01-11 DIAGNOSIS — R06.2 WHEEZING: ICD-10-CM

## 2023-01-11 DIAGNOSIS — Z79.4 TYPE 2 DIABETES MELLITUS WITH STAGE 3B CHRONIC KIDNEY DISEASE, WITH LONG-TERM CURRENT USE OF INSULIN (HCC): Primary | ICD-10-CM

## 2023-01-11 DIAGNOSIS — N18.32 TYPE 2 DIABETES MELLITUS WITH STAGE 3B CHRONIC KIDNEY DISEASE, WITH LONG-TERM CURRENT USE OF INSULIN (HCC): Primary | ICD-10-CM

## 2023-01-11 DIAGNOSIS — N18.32 STAGE 3B CHRONIC KIDNEY DISEASE (CKD) (HCC): ICD-10-CM

## 2023-01-11 DIAGNOSIS — G47.33 OSA (OBSTRUCTIVE SLEEP APNEA): ICD-10-CM

## 2023-01-11 DIAGNOSIS — I10 HTN (HYPERTENSION), BENIGN: ICD-10-CM

## 2023-01-11 PROBLEM — N18.9 CKD (CHRONIC KIDNEY DISEASE): Status: ACTIVE | Noted: 2023-01-11

## 2023-01-11 PROBLEM — N18.31 STAGE 3A CHRONIC KIDNEY DISEASE (HCC): Status: RESOLVED | Noted: 2022-07-12 | Resolved: 2023-01-11

## 2023-01-11 PROBLEM — R06.02 SOB (SHORTNESS OF BREATH): Status: RESOLVED | Noted: 2020-07-09 | Resolved: 2023-01-11

## 2023-01-11 PROBLEM — I83.009 VENOUS ULCER (HCC): Status: RESOLVED | Noted: 2022-09-28 | Resolved: 2023-01-11

## 2023-01-11 PROBLEM — N18.9 CKD (CHRONIC KIDNEY DISEASE): Status: RESOLVED | Noted: 2023-01-11 | Resolved: 2023-01-11

## 2023-01-11 PROBLEM — L97.909 VENOUS ULCER (HCC): Status: RESOLVED | Noted: 2022-09-28 | Resolved: 2023-01-11

## 2023-01-11 NOTE — ASSESSMENT & PLAN NOTE
Lab Results   Component Value Date    EGFR 39 07/13/2020    CREATININE 1 66 (H) 07/13/2020   creatinine and GFR stable we will reevaluate at next office visit

## 2023-01-11 NOTE — ASSESSMENT & PLAN NOTE
Lab Results   Component Value Date    HGBA1C 6 2 (H) 10/14/2022   Under control  Continue current medication  We will re-evaluate at next office visit

## 2023-01-11 NOTE — PROGRESS NOTES
Assessment/Plan:         Problem List Items Addressed This Visit        Endocrine    Type 2 diabetes mellitus with kidney complication, with long-term current use of insulin ([facility] ) - Primary       Lab Results   Component Value Date    HGBA1C 6 2 (H) 10/14/2022   Under control  Continue current medication  We will re-evaluate at next office visit  Respiratory    PAULA (obstructive sleep apnea)     Under control with CPAP which patient uses every night and patient wakes up refreshed  Patient does not feel daytime sleepiness or fatigue  He will continue evaluate every office visit  Cardiovascular and Mediastinum    HTN (hypertension), benign     Under control  Continue current medication  We will re-evaluate at next office visit  Genitourinary    Stage 3b chronic kidney disease (CKD) ([facility] )     Lab Results   Component Value Date    EGFR 39 07/13/2020    CREATININE 1 66 (H) 07/13/2020   creatinine and GFR stable we will reevaluate at next office visit              Other    Obesity, morbid ([facility] )    Mixed hyperlipidemia     Under control  Continue current medication  We will re-evaluate at next office visit  Other Visit Diagnoses     Acute cough        Relevant Orders    XR chest pa & lateral    Wheezing        Relevant Orders    XR chest pa & lateral            Subjective:      Patient ID: Svitlana Hearn is a [de-identified] y o  male  Patient here for review of chronic medical problems and review of the labs and imaging if it is applicable  Currently has no specific complaints other than mentioned in the review of systems  Patient had a telemedicine visit on January 3, 2023 and was prescribed antibiotic and a cough medicine but patient still have some upper respiratory symptoms    Home COVID test was negative        The following portions of the patient's history were reviewed and updated as appropriate:   Past Medical History:  He has a past medical history of Chronic kidney disease (CKD) stage G3a/A1, moderately decreased glomerular filtration rate (GFR) between 45-59 mL/min/1 73 square meter and albuminuria creatinine ratio less than 30 mg/g (HCC), Diabetes mellitus ([address] ), GERD (gastroesophageal reflux disease), History of echocardiogram, Hypertension, and Sleep apnea, obstructive  ,  _______________________________________________________________________  Medical Problems:  does not have any pertinent problems on file ,  _______________________________________________________________________  Past Surgical History:   has a past surgical history that includes Cardiac catheterization; Other surgical history; Joint replacement (2017); and Colonoscopy (03/09/2018)  ,  _______________________________________________________________________  Family History:  family history includes Cancer in his brother and mother; Coronary artery disease in his mother; Heart attack in his mother; Heart disease in his mother ,  _______________________________________________________________________  Social History:   reports that he quit smoking about 31 years ago  His smoking use included cigarettes  He has a 15 00 pack-year smoking history  He has been exposed to tobacco smoke  He has never used smokeless tobacco  He reports current alcohol use of about 5 0 standard drinks per week  He reports that he does not use drugs  ,  _______________________________________________________________________  Allergies:  is allergic to [place] [employer] [place] [sitagliptin], iodinated contrast media, simvastatin, and wound dressing adhesive     _______________________________________________________________________  Current Outpatient Medications   Medication Sig Dispense Refill   • BD Pen Needle Pascale U/F 32G X 4 MM MISC USE AS INSTRUCTED WITH INSULIN PEN     • calcium carbonate (OS-WAYLON) 600 MG tablet Take 600 mg by mouth daily     • cefuroxime (CEFTIN) 250 mg tablet Take 1 tablet (250 mg total) by mouth every 12 (twelve) hours for 10 days Please deliver 20 tablet 0   • cholecalciferol (VITAMIN D3) 1,000 units tablet Take 2,000 Units by mouth daily     • cloNIDine (CATAPRES) 0 1 mg tablet Take 1 tablet (0 1 mg total) by mouth 3 (three) times a day 270 tablet 1   • clopidogrel (PLAVIX) 75 mg tablet Take 1 tablet (75 mg total) by mouth daily 90 tablet 0   • famotidine (PEPCID) 40 MG tablet Take 1 tablet (40 mg total) by mouth daily at bedtime 90 tablet 1   • fluticasone (FLONASE) 50 mcg/act nasal spray USE 2 SPRAYS IN THE MORNING DAILY 48 mL 0   • glipiZIDE (GLUCOTROL) 10 mg tablet      • hydrochlorothiazide (HYDRODIURIL) 12 5 mg tablet Take 1 tablet (12 5 mg total) by mouth daily 90 tablet 1   • insulin aspart protamine-insulin aspart (NovoLOG 70/30) 100 units/mL injection Inject under the skin 3 (three) times a day before meals Units depend on level     • JARDIANCE 25 MG TABS Take 25 mg by mouth daily     • Lancets (OneTouch Delica Plus [id number]) MISC TEST GLUCOSE 3 4 TIMES/DAY     • Levemir FlexTouch 100 units/mL injection pen 20 Units daily     • lisinopril (ZESTRIL) 40 mg tablet Take 1 tablet (40 mg total) by mouth daily 90 tablet 1   • metoprolol succinate (TOPROL-XL) 25 mg 24 hr tablet TAKE 1 TABLET (25 MG TOTAL) BY MOUTH DAILY  90 tablet 0   • Multiple Vitamins-Minerals (MULTIVITAMIN ADULT PO) Take by mouth daily     • Promethazine-DM (PHENERGAN-DM) 6 25-15 mg/5 mL oral syrup Take 5 mL by mouth 4 (four) times a day as needed for cough Please deliver 120 mL 0   • pyridoxine (VITAMIN B6) 100 mg tablet Take 100 mg by mouth daily     • ranolazine (RANEXA) 500 mg 12 hr tablet Take 1 tablet (500 mg total) by mouth 2 (two) times a day 180 tablet 0   • rosuvastatin (CRESTOR) 20 MG tablet Take 1 tablet by mouth daily     • tamsulosin (FLOMAX) 0 4 mg Take 2 capsules (0 8 mg total) by mouth daily 180 capsule 1   • True Metrix Blood Glucose Test test strip 1 STRIP BY MISCELLANEOUS ROUTE 3 (THREE) TIMES A DAY   RAGHAV METRIX BRAND PER PATIENT • Trulicity 1 5 [place]/2 5AO SOPN INJECT 0 5 ML EVERY WEEK BY SUBCUTANEOUS ROUTE  No current facility-administered medications for this visit      _______________________________________________________________________  Review of Systems   Constitutional: Negative for chills and fever  HENT: Positive for congestion, postnasal drip and sinus pressure  Negative for ear pain and sore throat  Eyes: Negative for pain and visual disturbance  Respiratory: Positive for cough and wheezing  Negative for shortness of breath  Cardiovascular: Negative for chest pain and palpitations  Gastrointestinal: Negative for abdominal pain and vomiting  Genitourinary: Negative for difficulty urinating, dysuria, frequency and hematuria  Musculoskeletal: Negative for arthralgias and back pain  Skin: Negative for color change and rash  Neurological: Negative for dizziness, seizures, syncope, light-headedness and headaches  Psychiatric/Behavioral: Negative for agitation and behavioral problems  All other systems reviewed and are negative  Objective:  Vitals:    01/11/23 1133   BP: 134/66   BP Location: Right arm   Patient Position: Sitting   Cuff Size: Standard   Pulse: 72   Resp: 18   Temp: 97 5 °F (36 4 °C)   TempSrc: Tympanic   SpO2: 94%   Weight: 111 kg (245 lb)   Height: 5' 5" (1 651 m)     Body mass index is 40 77 kg/m²  Physical Exam  Vitals reviewed  Constitutional:       Appearance: Normal appearance  He is obese  HENT:      Head: Normocephalic and atraumatic  Right Ear: Tympanic membrane normal       Left Ear: Tympanic membrane normal       Nose: Nose normal       Mouth/Throat:      Mouth: Mucous membranes are moist    Eyes:      Conjunctiva/sclera: Conjunctivae normal       Pupils: Pupils are equal, round, and reactive to light  Cardiovascular:      Rate and Rhythm: Normal rate and regular rhythm  Heart sounds: Normal heart sounds     Pulmonary:      Effort: Pulmonary effort is normal       Breath sounds: Normal breath sounds  Abdominal:      General: Abdomen is flat  Bowel sounds are normal       Palpations: Abdomen is soft  Musculoskeletal:         General: Normal range of motion  Cervical back: Normal range of motion and neck supple  Skin:     General: Skin is warm and dry  Capillary Refill: Capillary refill takes 2 to 3 seconds  Neurological:      General: No focal deficit present  Mental Status: He is alert and oriented to person, place, and time     Psychiatric:         Mood and Affect: Mood normal

## 2023-01-11 NOTE — PATIENT INSTRUCTIONS
Frequent home monitor of blood pressure  Diet high in fruit and vegetables and low in salt and cholesterol  Regular cardiovascular exercise  Take all medications regularly as prescribed  Loose weight   Potential consequences of obesity explained to patient  Plenty of fluid orally  Tylenol 650 mg po q 6 hours as needed for pain or fever  If not improving or get worse then get back to us or go to ER

## 2023-01-16 ENCOUNTER — TELEPHONE (OUTPATIENT)
Dept: FAMILY MEDICINE CLINIC | Facility: CLINIC | Age: 83
End: 2023-01-16

## 2023-01-25 DIAGNOSIS — K21.9 GERD WITHOUT ESOPHAGITIS: Primary | ICD-10-CM

## 2023-01-25 RX ORDER — OMEPRAZOLE 40 MG/1
40 CAPSULE, DELAYED RELEASE ORAL DAILY
Qty: 90 CAPSULE | Refills: 0 | Status: SHIPPED | OUTPATIENT
Start: 2023-01-25

## 2023-01-25 RX ORDER — OMEPRAZOLE 40 MG/1
40 CAPSULE, DELAYED RELEASE ORAL DAILY
COMMUNITY
End: 2023-01-25 | Stop reason: SDUPTHER

## 2023-01-30 ENCOUNTER — OFFICE VISIT (OUTPATIENT)
Dept: PULMONOLOGY | Facility: CLINIC | Age: 83
End: 2023-01-30

## 2023-01-30 VITALS
TEMPERATURE: 97.3 F | WEIGHT: 246 LBS | HEART RATE: 63 BPM | DIASTOLIC BLOOD PRESSURE: 70 MMHG | HEIGHT: 65 IN | OXYGEN SATURATION: 94 % | BODY MASS INDEX: 40.98 KG/M2 | SYSTOLIC BLOOD PRESSURE: 142 MMHG

## 2023-01-30 DIAGNOSIS — J01.01 ACUTE RECURRENT MAXILLARY SINUSITIS: Primary | ICD-10-CM

## 2023-01-30 RX ORDER — AMOXICILLIN AND CLAVULANATE POTASSIUM 875; 125 MG/1; MG/1
1 TABLET, FILM COATED ORAL EVERY 12 HOURS SCHEDULED
Qty: 14 TABLET | Refills: 0 | Status: SHIPPED | OUTPATIENT
Start: 2023-01-30 | End: 2023-02-06

## 2023-01-30 NOTE — ASSESSMENT & PLAN NOTE
Current symptoms involving nose and sinuses as well as chest   Patient did not clearly improve taking cefuroxime  On examination he has purulent secretion in both nasal cavities as well as in his pharynx rating from his nasopharynx  Trial of therapy with Augmentin  Continue with nasal fluticasone and consider saline irrigation  If not clearly better or recurs he will need ENT evaluation  I do not sense that there is any element of acute bronchitis here today

## 2023-01-30 NOTE — PROGRESS NOTES
Answers for HPI/ROS submitted by the patient on 1/26/2023  Do you have chest tightness?: Yes  Do you experience frequent throat clearing?: Yes  When did you first notice your symptoms?: 1 to 4 weeks ago  How often do your symptoms occur?: daily  Since you first noticed this problem, how has it changed?: unchanged  Have you had a change in appetite?: No  Do you have chest pain?: No  Do you have shortness of breath that occurs with effort or exertion?: Yes  Do you have ear congestion?: Yes  Do you have ear pain?: No  Do you have a fever?: No  Do you have headaches?: No  Do you have heartburn?: No  Do you have fatigue?: No  Do you have muscle pain?: No  Do you have nasal congestion?: Yes  Do you have shortness of breath when lying flat?: No  Do you have shortness of breath when you wake up?: No  Do you have post-nasal drip?: Yes  Do you have a runny nose?: Yes  Do you have sneezing?: Yes  Do you have a sore throat?: No  Do you have sweats?: No  Do you have trouble swallowing?: No  Have you experienced weight loss?: No  Which of the following makes your symptoms worse?: change in weather, climbing stairs, exercise, URI  Which of the following makes your symptoms better?: prescription cough suppressant    Assessment/Plan:    Acute recurrent maxillary sinusitis  Current symptoms involving nose and sinuses as well as chest   Patient did not clearly improve taking cefuroxime  On examination he has purulent secretion in both nasal cavities as well as in his pharynx rating from his nasopharynx  Trial of therapy with Augmentin  Continue with nasal fluticasone and consider saline irrigation  If not clearly better or recurs he will need ENT evaluation  I do not sense that there is any element of acute bronchitis here today  Diagnoses and all orders for this visit:    Acute recurrent maxillary sinusitis  -     amoxicillin-clavulanate (AUGMENTIN) 875-125 mg per tablet;  Take 1 tablet by mouth every 12 (twelve) hours for 7 days          Subjective:      Patient ID: Gilberto Lucas is a [de-identified] y o  male  This patient is seen for an acute illness  He has been ill off and on since the first week of January  At first he seemed to have an upper respiratory infection as well as chest congestion and sputum production  He still has some purulent sputum  Blows some purulent mucus out of his nose  Was given antibiotic when he spoke to his physician over the Internet but did not sense that he improved much with this  See review of systems regarding other symptoms  primary symptoms  Associated symptoms include coughing  Pertinent negatives include no abdominal pain, arthralgias, chest pain, fever, headaches, myalgias, nausea or sore throat  The following portions of the patient's history were reviewed and updated as appropriate: allergies, current medications, past family history, past medical history, past social history, past surgical history and problem list     Review of Systems   Constitutional: Negative for activity change, appetite change, fever and unexpected weight change  HENT: Positive for postnasal drip, rhinorrhea and sneezing  Negative for ear pain, sore throat, trouble swallowing and voice change  Eyes: Negative for pain and redness  Respiratory: Positive for cough and shortness of breath  Negative for wheezing  Cardiovascular: Negative for chest pain, palpitations and leg swelling  Gastrointestinal: Negative for abdominal pain and nausea  Genitourinary: Negative for enuresis  Musculoskeletal: Negative for arthralgias and myalgias  Skin: Negative  Neurological: Negative for headaches  Hematological: Negative for adenopathy  Objective:      /70   Pulse 63   Temp (!) 97 3 °F (36 3 °C)   Ht 5' 5" (1 651 m)   Wt 112 kg (246 lb)   SpO2 94%   BMI 40 94 kg/m²          Physical Exam  Vitals reviewed  Constitutional:       Appearance: He is obese  He is not ill-appearing     HENT: Nose:      Comments: Purulent mucus under both middle turbinates     Mouth/Throat:      Mouth: Mucous membranes are moist       Pharynx: Oropharynx is clear  Comments: Purulent mucus along the posterior pharyngeal wall  Eyes:      General: No scleral icterus  Conjunctiva/sclera: Conjunctivae normal    Cardiovascular:      Rate and Rhythm: Normal rate and regular rhythm  Pulses:           Radial pulses are 3+ on the right side and 3+ on the left side  Heart sounds: Normal heart sounds  Pulmonary:      Effort: Pulmonary effort is normal       Breath sounds: Normal breath sounds  No wheezing or rhonchi  Musculoskeletal:         General: No swelling  Cervical back: No rigidity or tenderness  Right lower leg: No edema  Left lower leg: No edema  Lymphadenopathy:      Cervical: No cervical adenopathy  Skin:     General: Skin is warm and dry  Neurological:      Mental Status: He is alert and oriented to person, place, and time     Psychiatric:         Mood and Affect: Mood normal          Behavior: Behavior normal

## 2023-02-28 DIAGNOSIS — K21.9 GERD WITHOUT ESOPHAGITIS: ICD-10-CM

## 2023-02-28 RX ORDER — OMEPRAZOLE 40 MG/1
40 CAPSULE, DELAYED RELEASE ORAL DAILY
Qty: 90 CAPSULE | Refills: 0 | Status: SHIPPED | OUTPATIENT
Start: 2023-02-28

## 2023-03-01 DIAGNOSIS — I25.10 CORONARY ARTERY DISEASE INVOLVING NATIVE CORONARY ARTERY OF NATIVE HEART WITHOUT ANGINA PECTORIS: ICD-10-CM

## 2023-03-01 DIAGNOSIS — J30.1 SEASONAL ALLERGIC RHINITIS DUE TO POLLEN: ICD-10-CM

## 2023-03-01 RX ORDER — METOPROLOL SUCCINATE 25 MG/1
25 TABLET, EXTENDED RELEASE ORAL DAILY
Qty: 90 TABLET | Refills: 0 | Status: SHIPPED | OUTPATIENT
Start: 2023-03-01

## 2023-03-01 RX ORDER — FLUTICASONE PROPIONATE 50 MCG
2 SPRAY, SUSPENSION (ML) NASAL DAILY
Qty: 48 ML | Refills: 0 | Status: SHIPPED | OUTPATIENT
Start: 2023-03-01

## 2023-03-31 DIAGNOSIS — I25.10 CORONARY ARTERY DISEASE INVOLVING NATIVE CORONARY ARTERY OF NATIVE HEART WITHOUT ANGINA PECTORIS: ICD-10-CM

## 2023-03-31 PROBLEM — J01.01 ACUTE RECURRENT MAXILLARY SINUSITIS: Status: RESOLVED | Noted: 2023-01-30 | Resolved: 2023-03-31

## 2023-04-03 DIAGNOSIS — I10 ESSENTIAL (PRIMARY) HYPERTENSION: ICD-10-CM

## 2023-04-03 DIAGNOSIS — N40.0 BENIGN PROSTATIC HYPERPLASIA WITHOUT LOWER URINARY TRACT SYMPTOMS: ICD-10-CM

## 2023-04-03 RX ORDER — LISINOPRIL 40 MG/1
40 TABLET ORAL DAILY
Qty: 90 TABLET | Refills: 1 | Status: SHIPPED | OUTPATIENT
Start: 2023-04-03

## 2023-04-03 RX ORDER — CLONIDINE HYDROCHLORIDE 0.1 MG/1
0.1 TABLET ORAL 3 TIMES DAILY
Qty: 270 TABLET | Refills: 1 | Status: SHIPPED | OUTPATIENT
Start: 2023-04-03

## 2023-04-03 RX ORDER — TAMSULOSIN HYDROCHLORIDE 0.4 MG/1
0.8 CAPSULE ORAL DAILY
Qty: 180 CAPSULE | Refills: 1 | Status: SHIPPED | OUTPATIENT
Start: 2023-04-03

## 2023-04-03 RX ORDER — HYDROCHLOROTHIAZIDE 12.5 MG/1
12.5 TABLET ORAL DAILY
Qty: 90 TABLET | Refills: 1 | Status: SHIPPED | OUTPATIENT
Start: 2023-04-03

## 2023-04-03 RX ORDER — RANOLAZINE 500 MG/1
TABLET, EXTENDED RELEASE ORAL
Qty: 180 TABLET | Refills: 0 | Status: SHIPPED | OUTPATIENT
Start: 2023-04-03

## 2023-04-05 ENCOUNTER — RA CDI HCC (OUTPATIENT)
Dept: OTHER | Facility: HOSPITAL | Age: 83
End: 2023-04-05

## 2023-04-05 NOTE — PROGRESS NOTES
[address]  coding opportunities       Chart reviewed, no opportunity found: CHART REVIEWED, NO OPPORTUNITY FOUND        Patients Insurance     Medicare Insurance: Medicare

## 2023-05-15 ENCOUNTER — OFFICE VISIT (OUTPATIENT)
Dept: FAMILY MEDICINE CLINIC | Facility: CLINIC | Age: 83
End: 2023-05-15

## 2023-05-15 VITALS
TEMPERATURE: 98.7 F | WEIGHT: 249 LBS | HEART RATE: 67 BPM | OXYGEN SATURATION: 94 % | DIASTOLIC BLOOD PRESSURE: 60 MMHG | SYSTOLIC BLOOD PRESSURE: 134 MMHG | BODY MASS INDEX: 41.48 KG/M2 | HEIGHT: 65 IN

## 2023-05-15 DIAGNOSIS — R10.13 EPIGASTRIC PAIN: Primary | ICD-10-CM

## 2023-05-15 DIAGNOSIS — K21.9 GERD WITHOUT ESOPHAGITIS: ICD-10-CM

## 2023-05-15 RX ORDER — OMEPRAZOLE 40 MG/1
40 CAPSULE, DELAYED RELEASE ORAL DAILY
Qty: 180 CAPSULE | Refills: 0 | Status: SHIPPED | OUTPATIENT
Start: 2023-05-15

## 2023-05-15 NOTE — PROGRESS NOTES
Assessment/Plan:       Problem List Items Addressed This Visit        Digestive    GERD without esophagitis    Relevant Medications    omeprazole (PriLOSEC) 40 MG capsule       Other    Epigastric pain - Primary     No red flags at this time  Reproducible  No cardiac signs or symptoms  Could be GERD vs gastritis vs PUD vs MSK  We can check a lipase since on Trulicity  Will have him stop Pepcid in AM and take Prilosec 40 mg BID instead (currently taking 40 mg Pepcid in Am and 40 mg omeprazole at night)  Encouraged to call his GI doc for follow up if symptoms persist  Discussed return/Er precautions          Relevant Orders    Lipase    Comprehensive metabolic panel         Subjective:     Chief Complaint   Patient presents with   • Abdominal Pain          Patient ID: Anibal Guaman is a [de-identified] y o  male who presents with epigastric pain  It has been going on for about a week now  It is a dull pain that slightly fluctuates in severity  Not necessarily related to foods that he has noticed  No fever, chills, weight loss, nausea, vomiting, appetite changes  Not related to activity  No associated chest pain or shortness of breath  He has not tried anything for the pain  It is not waking him up from sleep  He feels it right under his sternum when he pushes in the area  Denies gas/excessive belching  No changes in bowel movements  Patient's past medical history, surgical history, family history, medications, allergies and social history reviewed and updated    Review of Systems   Constitutional: Negative for chills and fever  HENT: Negative for congestion and sore throat  Respiratory: Negative for cough and shortness of breath  Cardiovascular: Negative for chest pain and leg swelling  Gastrointestinal: Positive for abdominal pain  Negative for blood in stool, constipation, diarrhea, nausea and vomiting  Genitourinary: Negative for dysuria and frequency  Neurological: Negative for headaches           All other ROS negative  Objective:    Vitals:    05/15/23 1505   BP: 134/60   Pulse: 67   Temp: 98 7 °F (37 1 °C)   SpO2: 94%          Physical Exam  Vitals reviewed  Constitutional:       General: He is not in acute distress  HENT:      Right Ear: External ear normal       Left Ear: External ear normal       Nose: Nose normal       Mouth/Throat:      Mouth: Mucous membranes are moist    Eyes:      Conjunctiva/sclera: Conjunctivae normal    Cardiovascular:      Rate and Rhythm: Normal rate and regular rhythm  Heart sounds: No murmur heard  Pulmonary:      Effort: Pulmonary effort is normal  No respiratory distress  Breath sounds: No wheezing  Abdominal:      General: There is no distension  Palpations: Abdomen is soft  Tenderness: There is abdominal tenderness ( mild, epigastric)  Musculoskeletal:      Right lower leg: No edema  Left lower leg: No edema  Lymphadenopathy:      Cervical: No cervical adenopathy  Neurological:      Mental Status: He is alert and oriented to person, place, and time     Psychiatric:         Mood and Affect: Mood normal              Israel Loco MD  Internal Medicine and Pediatrics

## 2023-05-16 ENCOUNTER — APPOINTMENT (EMERGENCY)
Dept: ULTRASOUND IMAGING | Facility: HOSPITAL | Age: 83
End: 2023-05-16

## 2023-05-16 ENCOUNTER — HOSPITAL ENCOUNTER (EMERGENCY)
Facility: HOSPITAL | Age: 83
Discharge: HOME | End: 2023-05-16
Attending: EMERGENCY MEDICINE

## 2023-05-16 VITALS
BODY MASS INDEX: 38.89 KG/M2 | RESPIRATION RATE: 20 BRPM | SYSTOLIC BLOOD PRESSURE: 139 MMHG | TEMPERATURE: 98 F | HEART RATE: 72 BPM | WEIGHT: 242 LBS | HEIGHT: 66 IN | OXYGEN SATURATION: 96 % | DIASTOLIC BLOOD PRESSURE: 56 MMHG

## 2023-05-16 DIAGNOSIS — K85.90 PANCREATITIS: Primary | ICD-10-CM

## 2023-05-16 DIAGNOSIS — I25.10 CORONARY ARTERY DISEASE INVOLVING NATIVE CORONARY ARTERY OF NATIVE HEART WITHOUT ANGINA PECTORIS: ICD-10-CM

## 2023-05-16 PROBLEM — R10.13 EPIGASTRIC PAIN: Status: ACTIVE | Noted: 2023-05-16

## 2023-05-16 LAB
ALBUMIN SERPL BCP-MCNC: 3.7 G/DL (ref 3.5–5)
ALP SERPL-CCNC: 50 U/L (ref 34–104)
ALT SERPL W P-5'-P-CCNC: 12 U/L (ref 7–52)
ANION GAP SERPL CALCULATED.3IONS-SCNC: 7 MMOL/L (ref 4–13)
AST SERPL W P-5'-P-CCNC: 15 U/L (ref 13–39)
BASOPHILS # BLD AUTO: 0.02 THOUSANDS/ÂΜL (ref 0–0.1)
BASOPHILS NFR BLD AUTO: 0 % (ref 0–1)
BILIRUB DIRECT SERPL-MCNC: 0.07 MG/DL (ref 0–0.2)
BILIRUB SERPL-MCNC: 0.52 MG/DL (ref 0.2–1)
BUN SERPL-MCNC: 31 MG/DL (ref 5–25)
CALCIUM SERPL-MCNC: 9.2 MG/DL (ref 8.4–10.2)
CHLORIDE SERPL-SCNC: 101 MMOL/L (ref 96–108)
CO2 SERPL-SCNC: 32 MMOL/L (ref 21–32)
CREAT SERPL-MCNC: 1.84 MG/DL (ref 0.6–1.3)
EOSINOPHIL # BLD AUTO: 0.1 THOUSAND/ÂΜL (ref 0–0.61)
EOSINOPHIL NFR BLD AUTO: 1 % (ref 0–6)
ERYTHROCYTE [DISTWIDTH] IN BLOOD BY AUTOMATED COUNT: 13.3 % (ref 11.6–15.1)
GFR SERPL CREATININE-BSD FRML MDRD: 33 ML/MIN/1.73SQ M
GLUCOSE SERPL-MCNC: 159 MG/DL (ref 65–140)
HCT VFR BLD AUTO: 42.1 % (ref 36.5–49.3)
HGB BLD-MCNC: 14.1 G/DL (ref 12–17)
IMM GRANULOCYTES # BLD AUTO: 0.02 THOUSAND/UL (ref 0–0.2)
IMM GRANULOCYTES NFR BLD AUTO: 0 % (ref 0–2)
LIPASE SERPL-CCNC: 177 U/L (ref 11–82)
LYMPHOCYTES # BLD AUTO: 1.69 THOUSANDS/ÂΜL (ref 0.6–4.47)
LYMPHOCYTES NFR BLD AUTO: 18 % (ref 14–44)
MCH RBC QN AUTO: 32.2 PG (ref 26.8–34.3)
MCHC RBC AUTO-ENTMCNC: 33.5 G/DL (ref 31.4–37.4)
MCV RBC AUTO: 96 FL (ref 82–98)
MONOCYTES # BLD AUTO: 0.97 THOUSAND/ÂΜL (ref 0.17–1.22)
MONOCYTES NFR BLD AUTO: 11 % (ref 4–12)
NEUTROPHILS # BLD AUTO: 6.45 THOUSANDS/ÂΜL (ref 1.85–7.62)
NEUTS SEG NFR BLD AUTO: 70 % (ref 43–75)
NRBC BLD AUTO-RTO: 0 /100 WBCS
PLATELET # BLD AUTO: 188 THOUSANDS/UL (ref 149–390)
PMV BLD AUTO: 9.8 FL (ref 8.9–12.7)
POTASSIUM SERPL-SCNC: 3.9 MMOL/L (ref 3.5–5.3)
PROT SERPL-MCNC: 6.9 G/DL (ref 6.4–8.4)
RBC # BLD AUTO: 4.38 MILLION/UL (ref 3.88–5.62)
SODIUM SERPL-SCNC: 140 MMOL/L (ref 135–147)
WBC # BLD AUTO: 9.25 THOUSAND/UL (ref 4.31–10.16)

## 2023-05-16 RX ORDER — METOPROLOL SUCCINATE 25 MG/1
25 TABLET, EXTENDED RELEASE ORAL DAILY
Qty: 90 TABLET | Refills: 0 | Status: SHIPPED | OUTPATIENT
Start: 2023-05-16

## 2023-05-16 NOTE — ASSESSMENT & PLAN NOTE
No red flags at this time  Reproducible  No cardiac signs or symptoms  Could be GERD vs gastritis vs PUD vs MSK  We can check a lipase since on Trulicity  Will have him stop Pepcid in AM and take Prilosec 40 mg BID instead (currently taking 40 mg Pepcid in Am and 40 mg omeprazole at night)  Encouraged to call his GI doc for follow up if symptoms persist  Discussed return/Er precautions

## 2023-05-16 NOTE — ED PROVIDER NOTES
History  Chief Complaint   Patient presents with   • Fever - 9 weeks to 74 years     Pt reports new epigastric pain and fever that started yesterday;      Patient is an [de-identified]year old male with past medical history of CKD, DM type 2, HTN, CAD, hx of pancreatitis presenting today with epigastric abdominal pain, subjective fever, malaise, and abnormal lipase of 433  Patient was seen by his PCP yesterday and received lab work given his abdominal pain and subjective fever  It showed the elevated lipase and patient was told to come to the ED for evaluation  Previous episode of pancreatitis appears to be attributed to Januvia use  He denies nausea or vomiting and has been able to eat and drink normally  He reports minimal alcohol use, approx 1 small glass of wine every 3 days  He did recently have increase in his semaglutide dosage last week  History provided by:  Patient   used: No    Fever - 9 weeks to 74 years  Temp source:  Subjective  Associated symptoms: no chest pain, no chills, no congestion, no cough, no headaches and no sore throat        Prior to Admission Medications   Prescriptions Last Dose Informant Patient Reported? Taking? BD Pen Needle Pascale U/F 32G X 4 MM MISC   Yes No   Sig: USE AS INSTRUCTED WITH INSULIN PEN   JARDIANCE 25 MG TABS   Yes No   Sig: Take 25 mg by mouth daily   Lancets (OneTouch Delica Plus [id number]) MISC   Yes No   Sig: TEST GLUCOSE 3 4 TIMES/DAY   Levemir FlexTouch 100 units/mL injection pen   Yes No   Si Units daily   Multiple Vitamins-Minerals (MULTIVITAMIN ADULT PO)   Yes No   Sig: Take by mouth daily   True Metrix Blood Glucose Test test strip   Yes No   Si STRIP BY MISCELLANEOUS ROUTE 3 (THREE) TIMES A DAY   RAGHAV METRIX BRAND PER PATIENT   Trulicity 1 5 FB/2 6KP SOPN   Yes No   Sig: INJECT 0 5 ML EVERY WEEK BY SUBCUTANEOUS ROUTE    calcium carbonate (OS-WAYLON) 600 MG tablet   Yes No   Sig: Take 600 mg by mouth daily   cholecalciferol (VITAMIN D3) 1,000 units tablet   Yes No   Sig: Take 2,000 Units by mouth daily   cloNIDine (CATAPRES) 0 1 mg tablet   No No   Sig: Take 1 tablet (0 1 mg total) by mouth 3 (three) times a day   clopidogrel (PLAVIX) 75 mg tablet   No No   Sig: Take 1 tablet (75 mg total) by mouth daily   fluticasone (FLONASE) 50 mcg/act nasal spray   No No   Si sprays into each nostril daily   glipiZIDE (GLUCOTROL) 10 mg tablet   Yes No   hydrochlorothiazide (HYDRODIURIL) 12 5 mg tablet   No No   Sig: Take 1 tablet (12 5 mg total) by mouth daily   insulin aspart protamine-insulin aspart (NovoLOG 70/30) 100 units/mL injection   Yes No   Sig: Inject under the skin 3 (three) times a day before meals Units depend on level   lisinopril (ZESTRIL) 40 mg tablet   No No   Sig: Take 1 tablet (40 mg total) by mouth daily   metoprolol succinate (TOPROL-XL) 25 mg 24 hr tablet   No No   Sig: Take 1 tablet (25 mg total) by mouth daily   omeprazole (PriLOSEC) 40 MG capsule   No No   Sig: Take 1 capsule (40 mg total) by mouth daily   pyridoxine (VITAMIN B6) 100 mg tablet   Yes No   Sig: Take 100 mg by mouth daily   ranolazine (RANEXA) 500 mg 12 hr tablet   No No   Sig: TAKE 1 TABLET BY MOUTH TWICE A DAY   rosuvastatin (CRESTOR) 20 MG tablet   Yes No   Sig: Take 1 tablet by mouth daily   tamsulosin (FLOMAX) 0 4 mg   No No   Sig: Take 2 capsules (0 8 mg total) by mouth daily      Facility-Administered Medications: None       Past Medical History:   Diagnosis Date   • Allergic    • Arthritis    • Chronic kidney disease    • Chronic kidney disease (CKD) stage G3a/A1, moderately decreased glomerular filtration rate (GFR) between 45-59 mL/min/1 73 square meter and albuminuria creatinine ratio less than 30 mg/g (HCC)    • Diabetes mellitus (HCC)    • GERD (gastroesophageal reflux disease)    • Heart murmur    • History of echocardiogram    • HL (hearing loss)    • Hypertension    • Obesity    • Sleep apnea, obstructive        Past Surgical History:   Procedure Laterality Date   • CARDIAC CATHETERIZATION     • COLONOSCOPY  2018   • EYE SURGERY     • JOINT REPLACEMENT  2017    knee   • OTHER SURGICAL HISTORY      Stent        Family History   Problem Relation Age of Onset   • Heart disease Mother    • Heart attack Mother         46s   • Cancer Mother    • Coronary artery disease Mother    • Cancer Brother         Malignant tumor of lung     I have reviewed and agree with the history as documented  E-Cigarette/Vaping   • E-Cigarette Use Never User      E-Cigarette/Vaping Substances   • Nicotine No    • THC No    • CBD No    • Flavoring No    • Other No    • Unknown No      Social History     Tobacco Use   • Smoking status: Former     Packs/day:      Years: 15 00     Pack years: 15 00     Types: Cigarettes     Quit date: 1972     Years since quittin 5     Passive exposure: Past   • Smokeless tobacco: Never   Vaping Use   • Vaping Use: Never used   Substance Use Topics   • Alcohol use: Yes     Alcohol/week: 5 0 standard drinks     Types: 5 Glasses of wine per week     Comment: Occasional    • Drug use: Never        Review of Systems   Constitutional: Positive for fatigue and fever  Negative for appetite change and chills  HENT: Negative for congestion and sore throat  Eyes: Negative for visual disturbance  Respiratory: Negative for cough  Cardiovascular: Negative for chest pain  Gastrointestinal: Positive for abdominal pain (epigastric)  Endocrine: Negative  Genitourinary: Negative  Musculoskeletal: Negative  Skin: Negative  Neurological: Negative for headaches  Hematological: Negative  Psychiatric/Behavioral: Negative          Physical Exam  ED Triage Vitals   Temperature Pulse Respirations Blood Pressure SpO2   23 1434 23 1433 23 1433 23 1433 23 1433   98 °F (36 7 °C) 72 20 139/56 96 %      Temp Source Heart Rate Source Patient Position - Orthostatic VS BP Location FiO2 (%)   23 1434 05/16/23 1433 05/16/23 1433 05/16/23 1433 --   Oral Monitor Sitting Right arm       Pain Score       --                    Orthostatic Vital Signs  Vitals:    05/16/23 1433   BP: 139/56   Pulse: 72   Patient Position - Orthostatic VS: Sitting       Physical Exam  Vitals reviewed  Constitutional:       Appearance: Normal appearance  HENT:      Head: Normocephalic and atraumatic  Nose: Nose normal       Mouth/Throat:      Mouth: Mucous membranes are moist    Eyes:      General: No scleral icterus  Right eye: No discharge  Left eye: No discharge  Cardiovascular:      Rate and Rhythm: Normal rate and regular rhythm  Pulses: Normal pulses  Heart sounds: Murmur heard  Pulmonary:      Effort: Pulmonary effort is normal       Breath sounds: Normal breath sounds  No wheezing, rhonchi or rales  Abdominal:      Palpations: Abdomen is soft  Tenderness: There is no abdominal tenderness  Musculoskeletal:         General: Normal range of motion  Cervical back: Normal range of motion  Skin:     General: Skin is warm and dry  Capillary Refill: Capillary refill takes less than 2 seconds  Neurological:      General: No focal deficit present  Mental Status: He is alert     Psychiatric:         Mood and Affect: Mood normal          Behavior: Behavior normal          ED Medications  Medications - No data to display    Diagnostic Studies  Results Reviewed     Procedure Component Value Units Date/Time    Basic metabolic panel [[id number]]  (Abnormal) Collected: 05/16/23 1516    Lab Status: Final result Specimen: Blood from Arm, Right Updated: 05/16/23 1542     Sodium 140 mmol/L      Potassium 3 9 mmol/L      Chloride 101 mmol/L      CO2 32 mmol/L      ANION GAP 7 mmol/L      BUN 31 mg/dL      Creatinine 1 84 mg/dL      Glucose 159 mg/dL      Calcium 9 2 mg/dL      eGFR 33 ml/min/1 73sq m     Narrative:      [place] guidelines for Chronic Kidney Disease (CKD):   •  Stage 1 with normal or high GFR (GFR > 90 mL/min/1 73 square meters)  •  Stage 2 Mild CKD (GFR = 60-89 mL/min/1 73 square meters)  •  Stage 3A Moderate CKD (GFR = 45-59 mL/min/1 73 square meters)  •  Stage 3B Moderate CKD (GFR = 30-44 mL/min/1 73 square meters)  •  Stage 4 Severe CKD (GFR = 15-29 mL/min/1 73 square meters)  •  Stage 5 End Stage CKD (GFR <15 mL/min/1 73 square meters)  Note: GFR calculation is accurate only with a steady state creatinine    Hepatic function panel [[id number]]  (Normal) Collected: 05/16/23 1516    Lab Status: Final result Specimen: Blood from Arm, Right Updated: 05/16/23 1542     Total Bilirubin 0 52 mg/dL      Bilirubin, Direct 0 07 mg/dL      Alkaline Phosphatase 50 U/L      AST 15 U/L      ALT 12 U/L      Total Protein 6 9 g/dL      Albumin 3 7 g/dL     Lipase [[id number]]  (Abnormal) Collected: 05/16/23 1516    Lab Status: Final result Specimen: Blood from Arm, Right Updated: 05/16/23 1542     Lipase 177 u/L     CBC and differential [[id number]] Collected: 05/16/23 1516    Lab Status: Final result Specimen: Blood from Arm, Right Updated: 05/16/23 1524     WBC 9 25 Thousand/uL      RBC 4 38 Million/uL      Hemoglobin 14 1 g/dL      Hematocrit 42 1 %      MCV 96 fL      MCH 32 2 pg      MCHC 33 5 g/dL      RDW 13 3 %      MPV 9 8 fL      Platelets 635 Thousands/uL      nRBC 0 /100 WBCs      Neutrophils Relative 70 %      Immat GRANS % 0 %      Lymphocytes Relative 18 %      Monocytes Relative 11 %      Eosinophils Relative 1 %      Basophils Relative 0 %      Neutrophils Absolute 6 45 Thousands/µL      Immature Grans Absolute 0 02 Thousand/uL      Lymphocytes Absolute 1 69 Thousands/µL      Monocytes Absolute 0 97 Thousand/µL      Eosinophils Absolute 0 10 Thousand/µL      Basophils Absolute 0 02 Thousands/µL                   right upper quadrant   Final Result by Tiana Moreira MD (05/16 5921)      Significantly limited acoustic windows        1   Wall thickening though likely related to partial gallbladder contraction as there is no pericholecystic fluid or Hinds sign  Questionable echogenic foci in the region of the gallbladder are not mobile and possibly stones though not visualized on all    cine series and may be artifactual  Consider follow-up following longer period of fasting for better evaluation of the gallbladder and/or HIDA scan to exclude cholecystitis  2   Linear hyperechogenicity in the right lobe of uncertain etiology seen on cine imaging and likely artifactual  Intrahepatic gas is, however not excluded, and further evaluation with CT is recommended  The study was marked in [facility] for immediate notification  Workstation performed: [id number]               Procedures  Procedures      ED Course                                       Medical Decision Making  Patient is an [de-identified]year old male with previous history of pancreatitis presenting today with abnormal outpatient lab work and epigastric pain  Outpatient lab work showed lipase of 433 from 5/15/2023  Patient's only other complaint generalized fatigue, subjective fever, and malaise but denied any nausea, vomiting, or chills  Patient has been tolerating p o  intake and was able to tolerate while here in the ED  Per chart review, previous episode of pancreatitis was thought to be medication induced  Patient had no abdominal tenderness on exam  CBC, BMP, hepatic function panel, lipase were obtained  Lipase has decreased from 433 yesterday to 177 today  Patient has had minimal alcohol intake, denies any abdominal trauma  He recently had increase in his semaglutide dosage but otherwise has no risk factors for pancreatitis  Obtained right upper quadrant ultrasound to assess for any obstructing biliary/pancreatic duct stones  This showed wall thickening likely related to gallbladder wall contraction as there was no pericholecystic fluid or sonographic Hinds sign  No stone noted  Linear hyperechogenicity in the right lobe of uncertain etiology seen on imaging and likely artifactual  CT follow up was recommended  Patient was informed of these finding and told to follow up with his PCP  Patient likely had pancreatitis given his abdominal pain and elevated lipase  He was discharged home as he was able to tolerate PO intake and he had no infectious process  He was also told to return for evaluation if he starts to experience worsening abdominal pain, nausea, vomiting, fever, chills, or if he is unable to take food/drink PO  Amount and/or Complexity of Data Reviewed  Labs: ordered  Radiology: ordered  Disposition  Final diagnoses:   Pancreatitis     Time reflects when diagnosis was documented in both MDM as applicable and the Disposition within this note     Time User Action Codes Description Comment    5/16/2023  5:01 PM Luis Welsh Add [N32 [id number]] Pancreatitis       ED Disposition     ED Disposition   Discharge    Condition   Stable    Date/Time   Tue May 16, 2023  5:01 PM    [address] [facility]/self care                 Follow-up Information     Follow up With Specialties Details Why Contact Info    Rj Hess MD Internal Medicine   [address] [place], [place] [phone]            Discharge Medication List as of 5/16/2023  5:05 PM      CONTINUE these medications which have NOT CHANGED    Details   BD Pen Needle Pascale U/F 32G X 4 MM MISC USE AS INSTRUCTED WITH INSULIN PEN, Historical Med      calcium carbonate (OS-WAYLON) 600 MG tablet Take 600 mg by mouth daily, Historical Med      cholecalciferol (VITAMIN D3) 1,000 units tablet Take 2,000 Units by mouth daily, Historical Med      cloNIDine (CATAPRES) 0 1 mg tablet Take 1 tablet (0 1 mg total) by mouth 3 (three) times a day, Starting Mon 4/3/2023, Normal      clopidogrel (PLAVIX) 75 mg tablet Take 1 tablet (75 mg total) by mouth daily, Starting Fri 12/9/2022, Normal fluticasone (FLONASE) 50 mcg/act nasal spray 2 sprays into each nostril daily, Starting Wed 3/1/2023, Normal      glipiZIDE (GLUCOTROL) 10 mg tablet Starting Mon 7/6/2020, Historical Med      hydrochlorothiazide (HYDRODIURIL) 12 5 mg tablet Take 1 tablet (12 5 mg total) by mouth daily, Starting Mon 4/3/2023, Normal      insulin aspart protamine-insulin aspart (NovoLOG 70/30) 100 units/mL injection Inject under the skin 3 (three) times a day before meals Units depend on level, Historical Med      JARDIANCE 25 MG TABS Take 25 mg by mouth daily, Starting Tue 6/9/2020, Historical Med      Lancets (OneTouch Delica Plus [id number]) MISC TEST GLUCOSE 3 4 TIMES/DAY, Historical Med      Levemir FlexTouch 100 units/mL injection pen 20 Units daily, Starting Thu 8/27/2020, Historical Med      lisinopril (ZESTRIL) 40 mg tablet Take 1 tablet (40 mg total) by mouth daily, Starting Mon 4/3/2023, Normal      metoprolol succinate (TOPROL-XL) 25 mg 24 hr tablet Take 1 tablet (25 mg total) by mouth daily, Starting Tue 5/16/2023, Normal      Multiple Vitamins-Minerals (MULTIVITAMIN ADULT PO) Take by mouth daily, Historical Med      omeprazole (PriLOSEC) 40 MG capsule Take 1 capsule (40 mg total) by mouth daily, Starting Mon 5/15/2023, Normal      pyridoxine (VITAMIN B6) 100 mg tablet Take 100 mg by mouth daily, Historical Med      ranolazine (RANEXA) 500 mg 12 hr tablet TAKE 1 TABLET BY MOUTH TWICE A DAY, Normal      rosuvastatin (CRESTOR) 20 MG tablet Take 1 tablet by mouth daily, Starting Fri 7/1/2022, Until Sat 7/1/2023, Historical Med      tamsulosin (FLOMAX) 0 4 mg Take 2 capsules (0 8 mg total) by mouth daily, Starting Mon 4/3/2023, Normal      True Metrix Blood Glucose Test test strip 1 STRIP BY MISCELLANEOUS ROUTE 3 (THREE) TIMES A DAY  RAGHAV METRIX BRAND PER PATIENT, Historical Med      Trulicity 1 5 LI/8 5RP SOPN INJECT 0 5 ML EVERY WEEK BY SUBCUTANEOUS ROUTE , Historical Med           No discharge procedures on file      PDMP Review     None           ED Provider  Attending physically available and evaluated Kusum Rivas  JA managed the patient along with the ED Attending      Electronically Signed by Laurent Larsen MD  05/16/23 [id number]

## 2023-05-16 NOTE — DISCHARGE INSTRUCTIONS
You likely have pancreatitis given your abdominal pain and elevated lipase  Please continue to eat and drink as tolerated  If you experience worsening abdominal pain, nausea, vomiting, fever, or chills please return to for evaluation  Abstain from alcohol  Please follow-up with your PCP regarding this and ultrasound findings below  US right upper quadrant: Significantly limited acoustic windows , 1   Wall thickening though likely related to partial gallbladder contraction as there is no pericholecystic fluid or Hinds sign  Questionable echogenic foci in the region of the gallbladder are not mobile and possibly stones though not visualized on all,  cine series and may be artifactual  Consider follow-up following longer period of fasting for better evaluation of the gallbladder and/or HIDA scan to exclude cholecystitis  2   Linear hyperechogenicity in the right lobe of uncertain etiology seen on cine imaging and likely artifactual  Intrahepatic gas is, however not excluded, and further evaluation with CT is recommended

## 2023-05-23 ENCOUNTER — TELEPHONE (OUTPATIENT)
Dept: OTHER | Facility: OTHER | Age: 83
End: 2023-05-23

## 2023-05-23 DIAGNOSIS — J30.1 SEASONAL ALLERGIC RHINITIS DUE TO POLLEN: ICD-10-CM

## 2023-05-23 RX ORDER — FLUTICASONE PROPIONATE 50 MCG
2 SPRAY, SUSPENSION (ML) NASAL DAILY
Qty: 48 ML | Refills: 0 | Status: SHIPPED | OUTPATIENT
Start: 2023-05-23

## 2023-05-23 NOTE — TELEPHONE ENCOUNTER
Patient seen in [facility] ER on 5/16/23 and would like provider [facility]) to follow up with him for his diagnosis of pancreatitis and imaging results  Please follow up with patient for fpossile OV to discuss and/or additional tests  Last OV 1/27/22  Patient reports he is feeling better today

## 2023-05-24 ENCOUNTER — OFFICE VISIT (OUTPATIENT)
Dept: GASTROENTEROLOGY | Facility: CLINIC | Age: 83
End: 2023-05-24

## 2023-05-24 VITALS
HEIGHT: 66 IN | WEIGHT: 244 LBS | DIASTOLIC BLOOD PRESSURE: 68 MMHG | BODY MASS INDEX: 39.21 KG/M2 | HEART RATE: 66 BPM | SYSTOLIC BLOOD PRESSURE: 124 MMHG

## 2023-05-24 DIAGNOSIS — Z86.010 HISTORY OF COLON POLYPS: ICD-10-CM

## 2023-05-24 DIAGNOSIS — K80.20 CALCULUS OF GALLBLADDER WITHOUT CHOLECYSTITIS WITHOUT OBSTRUCTION: ICD-10-CM

## 2023-05-24 DIAGNOSIS — K21.9 GERD WITHOUT ESOPHAGITIS: ICD-10-CM

## 2023-05-24 DIAGNOSIS — Z86.010 PERSONAL HISTORY OF COLONIC POLYPS: ICD-10-CM

## 2023-05-24 DIAGNOSIS — K22.70 BARRETT'S ESOPHAGUS WITHOUT DYSPLASIA: ICD-10-CM

## 2023-05-24 DIAGNOSIS — K85.90 ACUTE PANCREATITIS WITHOUT INFECTION OR NECROSIS, UNSPECIFIED PANCREATITIS TYPE: Primary | ICD-10-CM

## 2023-05-24 DIAGNOSIS — K57.90 DIVERTICULAR DISEASE: ICD-10-CM

## 2023-05-24 RX ORDER — ALLOPURINOL 100 MG/1
100 TABLET ORAL DAILY
COMMUNITY
Start: 2023-05-20

## 2023-05-24 NOTE — PROGRESS NOTES
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    5  Diverticular disease    6  Personal history of colonic polyps    7  Calculus of gallbladder without cholecystitis without obstruction  -     US abdomen limited; Future; Expected date: 05/24/2023      Recent episode of abdominal pain, pancreatitis, elevated lipase, still somewhat elevated, LFTs were normal   Ultrasound not sure of gallstones  Most likely pancreatitis from Rybelsus  Clinically no evidence of acute abdomen is obstruction  Discussed with patient, repeat lipase next week  He is asymptomatic at this time  We will check ultrasound for better evaluation of the gallbladder  If need be further imaging of the gallbladder and pancreas  He will call us if any recurrent symptoms  ______________________________________________________________________    SUBJECTIVE:     Patient came in for evaluation of his recent episode of pancreatitis, he developed upper abdominal pain went to the ED elevated lipase, LFTs normal, ultrasound suboptimal study not sure of the gallstones  She was taking Rybelsus, switch to Trulicity  Denies any excessive alcohol, had a history of pancreatitis many years ago at the time was thought to be from [place]  Patient denies any nausea vomiting dysphagia chest pain shortness of breath bowels regular, no apparent blood  No dark urine light stools  Diet medications more than 10 pertinent systems reviewed  REVIEW OF SYSTEMS IS OTHERWISE NEGATIVE        Historical Information   Past Medical History:   Diagnosis Date   • Allergic    • Arthritis    • Chronic kidney disease    • Chronic kidney disease (CKD) stage G3a/A1, moderately decreased glomerular filtration rate (GFR) between 45-59 mL/min/1 73 square meter and albuminuria creatinine ratio less than 30 mg/g (HCC)    • Colon polyp    • Diabetes mellitus (HCC)    • GERD (gastroesophageal reflux disease)    • Heart murmur    • History of echocardiogram    • HL (hearing loss)    • Hyperlipidemia    • Hypertension    • Obesity    • Sleep apnea, obstructive      Past Surgical History:   Procedure Laterality Date   • CARDIAC CATHETERIZATION     • COLONOSCOPY  2018   • COLONOSCOPY     • EYE SURGERY     • HEMORRHOID SURGERY     • JOINT REPLACEMENT  2017    knee   • OTHER SURGICAL HISTORY      Stent    • UPPER GASTROINTESTINAL ENDOSCOPY       Social History   Social History     Substance and Sexual Activity   Alcohol Use Yes   • Alcohol/week: 5 0 standard drinks of alcohol   • Types: 5 Glasses of wine per week    Comment: Occasional      Social History     Substance and Sexual Activity   Drug Use Never     Social History     Tobacco Use   Smoking Status Former   • Packs/day: 1 00   • Years: 15 00   • Total pack years: 15 00   • Types: Cigarettes   • Quit date: 1972   • Years since quittin 5   • Passive exposure: Past   Smokeless Tobacco Never     Family History   Problem Relation Age of Onset   • Heart disease Mother    • Heart attack Mother         46s   • Cancer Mother    • Coronary artery disease Mother    • Cancer Brother         Malignant tumor of lung       Meds/Allergies       Current Outpatient Medications:   •  allopurinol (ZYLOPRIM) 100 mg tablet  •  BD Pen Needle Pascale U/F 32G X 4 MM MISC  •  calcium carbonate (OS-WAYLON) 600 MG tablet  •  cholecalciferol (VITAMIN D3) 1,000 units tablet  •  cloNIDine (CATAPRES) 0 1 mg tablet  •  clopidogrel (PLAVIX) 75 mg tablet  •  fluticasone (FLONASE) 50 mcg/act nasal spray  •  glipiZIDE (GLUCOTROL) 10 mg tablet  •  hydrochlorothiazide "(HYDRODIURIL) 12 5 mg tablet  •  insulin aspart protamine-insulin aspart (NovoLOG 70/30) 100 units/mL injection  •  JARDIANCE 25 MG TABS  •  Lancets (OneTouch Delica Plus [id number]) MISC  •  Levemir FlexTouch 100 units/mL injection pen  •  lisinopril (ZESTRIL) 40 mg tablet  •  metoprolol succinate (TOPROL-XL) 25 mg 24 hr tablet  •  Multiple Vitamins-Minerals (MULTIVITAMIN ADULT PO)  •  omeprazole (PriLOSEC) 40 MG capsule  •  pyridoxine (VITAMIN B6) 100 mg tablet  •  ranolazine (RANEXA) 500 mg 12 hr tablet  •  rosuvastatin (CRESTOR) 20 MG tablet  •  tamsulosin (FLOMAX) 0 4 mg  •  True Metrix Blood Glucose Test test strip  •  Trulicity 1 5 KA/0 4KM SOPN    Allergies   Allergen Reactions   • Januvia [Sitagliptin] Other (See Comments)     pancreatitis   • Iodinated Contrast Media Chest Pain   • Simvastatin Myalgia   • Wound Dressing Adhesive Other (See Comments)     Burning sensation           Objective     Blood pressure 124/68, pulse 66, height 5' 6\" (1 676 m), weight 111 kg (244 lb)  Body mass index is 39 38 kg/m²  PHYSICAL EXAM:      General Appearance:   Alert, cooperative, no distress   HEENT:   Normocephalic, atraumatic, anicteric  Neck:  Supple, symmetrical, trachea midline   Lungs:   Clear to auscultation bilaterally; no rales, rhonchi or wheezing; respirations unlabored    Heart[de-identified]   Regular rate and rhythm; no murmur  Abdomen:   Soft, non-tender, non-distended; normal bowel sounds; no masses, no organomegaly    Genitalia:   Deferred    Rectal:   Deferred    Extremities:  No cyanosis, clubbing or edema    Skin:  No jaundice, rashes, or lesions    Lymph nodes:  No palpable cervical lymphadenopathy        Lab Results:   No visits with results within 1 Day(s) from this visit     Latest known visit with results is:   Admission on 05/16/2023, Discharged on 05/16/2023   Component Date Value   • WBC 05/16/2023 9 25    • RBC 05/16/2023 4 38    • Hemoglobin 05/16/2023 14 1    • Hematocrit 05/16/2023 42 1    • " MCV 05/16/2023 96    • MCH 05/16/2023 32 2    • MCHC 05/16/2023 33 5    • RDW 05/16/2023 13 3    • MPV 05/16/2023 9 8    • Platelets 87/77/1193 188    • nRBC 05/16/2023 0    • Neutrophils Relative 05/16/2023 70    • Immat GRANS % 05/16/2023 0    • Lymphocytes Relative 05/16/2023 18    • Monocytes Relative 05/16/2023 11    • Eosinophils Relative 05/16/2023 1    • Basophils Relative 05/16/2023 0    • Neutrophils Absolute 05/16/2023 6 45    • Immature Grans Absolute 05/16/2023 0 02    • Lymphocytes Absolute 05/16/2023 1 69    • Monocytes Absolute 05/16/2023 0 97    • Eosinophils Absolute 05/16/2023 0 10    • Basophils Absolute 05/16/2023 0 02    • Sodium 05/16/2023 140    • Potassium 05/16/2023 3 9    • Chloride 05/16/2023 101    • CO2 05/16/2023 32    • ANION GAP 05/16/2023 7    • BUN 05/16/2023 31 (H)    • Creatinine 05/16/2023 1 84 (H)    • Glucose 05/16/2023 159 (H)    • Calcium 05/16/2023 9 2    • eGFR 05/16/2023 33    • Total Bilirubin 05/16/2023 0 52    • Bilirubin, Direct 05/16/2023 0 07    • Alkaline Phosphatase 05/16/2023 50    • AST 05/16/2023 15    • ALT 05/16/2023 12    • Total Protein 05/16/2023 6 9    • Albumin 05/16/2023 3 7    • Lipase 05/16/2023 177 (H)          Radiology Results:   US right upper quadrant    Result Date: 5/16/2023  Narrative: RIGHT UPPER QUADRANT ULTRASOUND INDICATION:     epigastic abdominal pain, elevated lipase of 433, and subjective fever  Prior history of pancreatitis    COMPARISON: 8/20/2018 TECHNIQUE:   Real-time ultrasound of the right upper quadrant was performed with a curvilinear transducer with both volumetric sweeps and still imaging techniques  FINDINGS: Significantly limited acoustic windows  PANCREAS: Portions of the pancreas are obscured by bowel gas  Visualized portions of the pancreas are unremarkable  AORTA AND IVC:  Visualized portions are normal for patient age  LIVER: Size:  Within normal range  The liver measures 15 2 cm in the midclavicular line   Contour: Surface contour is smooth  Parenchyma: There appears to be linear echogenicity in the right lobe on cine series though likely artifactual  As it appears mobile, gas is not excluded  No liver mass identified  Limited imaging of the main portal vein shows it to be patent and hepatopetal  BILIARY: The gallbladder is partially contracted  Mild wall thickening without pericholecystic fluid  There is echogenicity in the region of the contracted gallbladder which may represent stones though these were nonmobile and not visualized on every cine series  No sonographic Hinds sign  No intrahepatic biliary dilatation  CBD measures 5 0 mm  No choledocholithiasis  KIDNEY: Right kidney measures 12 1 x 7 3 x 7 5 cm  Volume 345 8 mL Kidney within normal limits  ASCITES:   None  Impression: Significantly limited acoustic windows  1   Wall thickening though likely related to partial gallbladder contraction as there is no pericholecystic fluid or Hinds sign  Questionable echogenic foci in the region of the gallbladder are not mobile and possibly stones though not visualized on all  cine series and may be artifactual  Consider follow-up following longer period of fasting for better evaluation of the gallbladder and/or HIDA scan to exclude cholecystitis  2   Linear hyperechogenicity in the right lobe of uncertain etiology seen on cine imaging and likely artifactual  Intrahepatic gas is, however not excluded, and further evaluation with CT is recommended  The study was marked in [facility] for immediate notification   Workstation performed: [id number]

## 2023-06-04 ENCOUNTER — HOSPITAL ENCOUNTER (OUTPATIENT)
Dept: ULTRASOUND IMAGING | Facility: HOSPITAL | Age: 83
Discharge: HOME | End: 2023-06-04
Attending: INTERNAL MEDICINE
Payer: MEDICARE

## 2023-06-04 DIAGNOSIS — K85.90 ACUTE PANCREATITIS WITHOUT INFECTION OR NECROSIS, UNSPECIFIED PANCREATITIS TYPE: ICD-10-CM

## 2023-06-04 DIAGNOSIS — K80.20 CALCULUS OF GALLBLADDER WITHOUT CHOLECYSTITIS WITHOUT OBSTRUCTION: ICD-10-CM

## 2023-06-04 PROCEDURE — 76705 ECHO EXAM OF ABDOMEN: CPT

## 2023-06-06 ENCOUNTER — TELEPHONE (OUTPATIENT)
Dept: FAMILY MEDICINE CLINIC | Facility: CLINIC | Age: 83
End: 2023-06-06

## 2023-06-06 ENCOUNTER — NURSE TRIAGE (OUTPATIENT)
Dept: OTHER | Facility: OTHER | Age: 83
End: 2023-06-06

## 2023-06-06 DIAGNOSIS — I25.10 CORONARY ARTERY DISEASE INVOLVING NATIVE CORONARY ARTERY OF NATIVE HEART WITHOUT ANGINA PECTORIS: ICD-10-CM

## 2023-06-06 RX ORDER — RANOLAZINE 500 MG/1
500 TABLET, EXTENDED RELEASE ORAL 2 TIMES DAILY
Qty: 180 TABLET | Refills: 0 | Status: SHIPPED | OUTPATIENT
Start: 2023-06-06

## 2023-06-06 NOTE — TELEPHONE ENCOUNTER
"  Reason for Disposition  • [1] Caller requesting NON-URGENT health information AND [2] PCP's office is the best resource    Answer Assessment - Initial Assessment Questions  1  REASON FOR CALL or QUESTION: \"What is your reason for calling today? \" or \"How can I best help you? \" or \"What question do you have that I can help answer? \"      Looking for results of ultrasound from 6/4    Protocols used: INFORMATION ONLY CALL - NO TRIAGE-ADULT-    Pt is calling regarding ultrasound results from 6/4  Please follow up with him tomorrow regarding this     "

## 2023-06-06 NOTE — TELEPHONE ENCOUNTER
Fax received from pharmacy for refill on Ranolazine  mg #180 to [facility] on [facility] [facility], sent and done

## 2023-06-06 NOTE — TELEPHONE ENCOUNTER
Regarding: Test results  ----- Message from Merline Briar, RN sent at 6/6/2023  3:54 PM EDT -----  Test results

## 2023-06-07 DIAGNOSIS — K85.90 ACUTE PANCREATITIS WITHOUT INFECTION OR NECROSIS, UNSPECIFIED PANCREATITIS TYPE: Primary | ICD-10-CM

## 2023-06-08 NOTE — TELEPHONE ENCOUNTER
Patient called that his results for the US right upper quadrant and the Lipase are back  Please review and give patient a call back to advise

## 2023-06-08 NOTE — TELEPHONE ENCOUNTER
I called and spoke to patient regarding bw results  He verbalized understanding  Please see US results and advise   Thank you

## 2023-06-09 ENCOUNTER — HOSPITAL ENCOUNTER (INPATIENT)
Facility: HOSPITAL | Age: 83
LOS: 2 days | Discharge: HOME | DRG: 439 | End: 2023-06-12
Attending: EMERGENCY MEDICINE | Admitting: INTERNAL MEDICINE
Payer: MEDICARE

## 2023-06-09 ENCOUNTER — APPOINTMENT (EMERGENCY)
Dept: CT IMAGING | Facility: HOSPITAL | Age: 83
DRG: 439 | End: 2023-06-09
Payer: MEDICARE

## 2023-06-09 DIAGNOSIS — K82.9 GALLBLADDER DISEASE: ICD-10-CM

## 2023-06-09 DIAGNOSIS — K85.90 ACUTE PANCREATITIS WITHOUT INFECTION OR NECROSIS, UNSPECIFIED PANCREATITIS TYPE: ICD-10-CM

## 2023-06-09 DIAGNOSIS — N18.30 TYPE 2 DIABETES MELLITUS WITH STAGE 3 CHRONIC KIDNEY DISEASE, WITH LONG-TERM CURRENT USE OF INSULIN, UNSPECIFIED WHETHER STAGE 3A OR 3B CKD (HCC): ICD-10-CM

## 2023-06-09 DIAGNOSIS — K85.30 DRUG-INDUCED ACUTE PANCREATITIS WITHOUT INFECTION OR NECROSIS: Primary | ICD-10-CM

## 2023-06-09 DIAGNOSIS — E11.22 TYPE 2 DIABETES MELLITUS WITH STAGE 3 CHRONIC KIDNEY DISEASE, WITH LONG-TERM CURRENT USE OF INSULIN, UNSPECIFIED WHETHER STAGE 3A OR 3B CKD (HCC): ICD-10-CM

## 2023-06-09 DIAGNOSIS — Z79.4 TYPE 2 DIABETES MELLITUS WITH STAGE 3 CHRONIC KIDNEY DISEASE, WITH LONG-TERM CURRENT USE OF INSULIN, UNSPECIFIED WHETHER STAGE 3A OR 3B CKD (HCC): ICD-10-CM

## 2023-06-09 LAB
2HR DELTA HS TROPONIN: -2 NG/L
4HR DELTA HS TROPONIN: -4 NG/L
ALBUMIN SERPL BCP-MCNC: 4 G/DL (ref 3.5–5)
ALP SERPL-CCNC: 53 U/L (ref 34–104)
ALT SERPL W P-5'-P-CCNC: 19 U/L (ref 7–52)
ANION GAP SERPL CALCULATED.3IONS-SCNC: 8 MMOL/L (ref 4–13)
AST SERPL W P-5'-P-CCNC: 22 U/L (ref 13–39)
BASOPHILS # BLD AUTO: 0.02 THOUSANDS/ÂΜL (ref 0–0.1)
BASOPHILS NFR BLD AUTO: 0 % (ref 0–1)
BILIRUB SERPL-MCNC: 0.38 MG/DL (ref 0.2–1)
BUN SERPL-MCNC: 27 MG/DL (ref 5–25)
CALCIUM SERPL-MCNC: 9.2 MG/DL (ref 8.4–10.2)
CARDIAC TROPONIN I PNL SERPL HS: 27 NG/L
CARDIAC TROPONIN I PNL SERPL HS: 29 NG/L
CARDIAC TROPONIN I PNL SERPL HS: 31 NG/L
CHLORIDE SERPL-SCNC: 104 MMOL/L (ref 96–108)
CO2 SERPL-SCNC: 32 MMOL/L (ref 21–32)
CREAT SERPL-MCNC: 1.91 MG/DL (ref 0.6–1.3)
EOSINOPHIL # BLD AUTO: 0.16 THOUSAND/ÂΜL (ref 0–0.61)
EOSINOPHIL NFR BLD AUTO: 2 % (ref 0–6)
ERYTHROCYTE [DISTWIDTH] IN BLOOD BY AUTOMATED COUNT: 13.6 % (ref 11.6–15.1)
GFR SERPL CREATININE-BSD FRML MDRD: 32 ML/MIN/1.73SQ M
GLUCOSE SERPL-MCNC: 107 MG/DL (ref 65–140)
GLUCOSE SERPL-MCNC: 86 MG/DL (ref 65–140)
HCT VFR BLD AUTO: 42.7 % (ref 36.5–49.3)
HGB BLD-MCNC: 14.2 G/DL (ref 12–17)
IMM GRANULOCYTES # BLD AUTO: 0.05 THOUSAND/UL (ref 0–0.2)
IMM GRANULOCYTES NFR BLD AUTO: 1 % (ref 0–2)
LIPASE SERPL-CCNC: ABNORMAL U/L (ref 11–82)
LYMPHOCYTES # BLD AUTO: 1.38 THOUSANDS/ÂΜL (ref 0.6–4.47)
LYMPHOCYTES NFR BLD AUTO: 15 % (ref 14–44)
MCH RBC QN AUTO: 32.1 PG (ref 26.8–34.3)
MCHC RBC AUTO-ENTMCNC: 33.3 G/DL (ref 31.4–37.4)
MCV RBC AUTO: 96 FL (ref 82–98)
MONOCYTES # BLD AUTO: 0.41 THOUSAND/ÂΜL (ref 0.17–1.22)
MONOCYTES NFR BLD AUTO: 4 % (ref 4–12)
NEUTROPHILS # BLD AUTO: 7.23 THOUSANDS/ÂΜL (ref 1.85–7.62)
NEUTS SEG NFR BLD AUTO: 78 % (ref 43–75)
NRBC BLD AUTO-RTO: 0 /100 WBCS
PLATELET # BLD AUTO: 160 THOUSANDS/UL (ref 149–390)
PLATELET # BLD AUTO: 196 THOUSANDS/UL (ref 149–390)
PMV BLD AUTO: 10 FL (ref 8.9–12.7)
PMV BLD AUTO: 10.3 FL (ref 8.9–12.7)
POTASSIUM SERPL-SCNC: 3.9 MMOL/L (ref 3.5–5.3)
PROT SERPL-MCNC: 7.1 G/DL (ref 6.4–8.4)
RBC # BLD AUTO: 4.43 MILLION/UL (ref 3.88–5.62)
SODIUM SERPL-SCNC: 144 MMOL/L (ref 135–147)
WBC # BLD AUTO: 9.25 THOUSAND/UL (ref 4.31–10.16)

## 2023-06-09 PROCEDURE — 99285 EMERGENCY DEPT VISIT HI MDM: CPT

## 2023-06-09 PROCEDURE — 85025 COMPLETE CBC W/AUTO DIFF WBC: CPT | Performed by: PHYSICIAN ASSISTANT

## 2023-06-09 PROCEDURE — 80053 COMPREHEN METABOLIC PANEL: CPT | Performed by: PHYSICIAN ASSISTANT

## 2023-06-09 PROCEDURE — 74176 CT ABD & PELVIS W/O CONTRAST: CPT

## 2023-06-09 PROCEDURE — 84484 ASSAY OF TROPONIN QUANT: CPT | Performed by: PHYSICIAN ASSISTANT

## 2023-06-09 PROCEDURE — 96374 THER/PROPH/DIAG INJ IV PUSH: CPT

## 2023-06-09 PROCEDURE — 85049 AUTOMATED PLATELET COUNT: CPT | Performed by: INTERNAL MEDICINE

## 2023-06-09 PROCEDURE — G1004 CDSM NDSC: HCPCS

## 2023-06-09 PROCEDURE — 83690 ASSAY OF LIPASE: CPT | Performed by: PHYSICIAN ASSISTANT

## 2023-06-09 PROCEDURE — 99223 1ST HOSP IP/OBS HIGH 75: CPT | Performed by: INTERNAL MEDICINE

## 2023-06-09 PROCEDURE — 82948 REAGENT STRIP/BLOOD GLUCOSE: CPT

## 2023-06-09 PROCEDURE — 84484 ASSAY OF TROPONIN QUANT: CPT | Performed by: INTERNAL MEDICINE

## 2023-06-09 PROCEDURE — 36415 COLL VENOUS BLD VENIPUNCTURE: CPT | Performed by: PHYSICIAN ASSISTANT

## 2023-06-09 PROCEDURE — 93005 ELECTROCARDIOGRAM TRACING: CPT

## 2023-06-09 PROCEDURE — 94660 CPAP INITIATION&MGMT: CPT

## 2023-06-09 RX ORDER — FLUTICASONE PROPIONATE 50 MCG
2 SPRAY, SUSPENSION (ML) NASAL DAILY
Status: DISCONTINUED | OUTPATIENT
Start: 2023-06-10 | End: 2023-06-12 | Stop reason: HOSPADM

## 2023-06-09 RX ORDER — TAMSULOSIN HYDROCHLORIDE 0.4 MG/1
0.8 CAPSULE ORAL DAILY
Status: DISCONTINUED | OUTPATIENT
Start: 2023-06-10 | End: 2023-06-12 | Stop reason: HOSPADM

## 2023-06-09 RX ORDER — ATORVASTATIN CALCIUM 40 MG/1
40 TABLET, FILM COATED ORAL
Status: DISCONTINUED | OUTPATIENT
Start: 2023-06-09 | End: 2023-06-12 | Stop reason: HOSPADM

## 2023-06-09 RX ORDER — OXYCODONE HYDROCHLORIDE 5 MG/1
5 TABLET ORAL EVERY 6 HOURS PRN
Status: DISCONTINUED | OUTPATIENT
Start: 2023-06-09 | End: 2023-06-12 | Stop reason: HOSPADM

## 2023-06-09 RX ORDER — CLOPIDOGREL BISULFATE 75 MG/1
75 TABLET ORAL DAILY
Status: DISCONTINUED | OUTPATIENT
Start: 2023-06-10 | End: 2023-06-12 | Stop reason: HOSPADM

## 2023-06-09 RX ORDER — ACETAMINOPHEN 325 MG/1
650 TABLET ORAL EVERY 4 HOURS PRN
Status: DISCONTINUED | OUTPATIENT
Start: 2023-06-09 | End: 2023-06-12 | Stop reason: HOSPADM

## 2023-06-09 RX ORDER — PANTOPRAZOLE SODIUM 40 MG/1
40 TABLET, DELAYED RELEASE ORAL
Status: DISCONTINUED | OUTPATIENT
Start: 2023-06-10 | End: 2023-06-12 | Stop reason: HOSPADM

## 2023-06-09 RX ORDER — INSULIN LISPRO 100 [IU]/ML
2-12 INJECTION, SOLUTION INTRAVENOUS; SUBCUTANEOUS
Status: DISCONTINUED | OUTPATIENT
Start: 2023-06-09 | End: 2023-06-12 | Stop reason: HOSPADM

## 2023-06-09 RX ORDER — HYDROMORPHONE HCL/PF 1 MG/ML
0.5 SYRINGE (ML) INJECTION
Status: DISCONTINUED | OUTPATIENT
Start: 2023-06-09 | End: 2023-06-12 | Stop reason: HOSPADM

## 2023-06-09 RX ORDER — RANOLAZINE 500 MG/1
500 TABLET, EXTENDED RELEASE ORAL 2 TIMES DAILY
Status: DISCONTINUED | OUTPATIENT
Start: 2023-06-09 | End: 2023-06-12 | Stop reason: HOSPADM

## 2023-06-09 RX ORDER — METOPROLOL SUCCINATE 25 MG/1
25 TABLET, EXTENDED RELEASE ORAL DAILY
Status: DISCONTINUED | OUTPATIENT
Start: 2023-06-10 | End: 2023-06-12 | Stop reason: HOSPADM

## 2023-06-09 RX ORDER — HEPARIN SODIUM 5000 [USP'U]/ML
5000 INJECTION, SOLUTION INTRAVENOUS; SUBCUTANEOUS EVERY 8 HOURS SCHEDULED
Status: DISCONTINUED | OUTPATIENT
Start: 2023-06-09 | End: 2023-06-12 | Stop reason: HOSPADM

## 2023-06-09 RX ORDER — INSULIN GLARGINE 100 [IU]/ML
25 INJECTION, SOLUTION SUBCUTANEOUS
Status: DISCONTINUED | OUTPATIENT
Start: 2023-06-09 | End: 2023-06-10

## 2023-06-09 RX ORDER — ALLOPURINOL 100 MG/1
100 TABLET ORAL DAILY
Status: DISCONTINUED | OUTPATIENT
Start: 2023-06-10 | End: 2023-06-12 | Stop reason: HOSPADM

## 2023-06-09 RX ORDER — INSULIN LISPRO 100 [IU]/ML
2-12 INJECTION, SOLUTION INTRAVENOUS; SUBCUTANEOUS
Status: DISCONTINUED | OUTPATIENT
Start: 2023-06-10 | End: 2023-06-12 | Stop reason: HOSPADM

## 2023-06-09 RX ORDER — HYDROMORPHONE HCL/PF 1 MG/ML
1 SYRINGE (ML) INJECTION
Status: DISCONTINUED | OUTPATIENT
Start: 2023-06-09 | End: 2023-06-12 | Stop reason: HOSPADM

## 2023-06-09 RX ORDER — CLONIDINE HYDROCHLORIDE 0.1 MG/1
0.1 TABLET ORAL 3 TIMES DAILY
Status: DISCONTINUED | OUTPATIENT
Start: 2023-06-09 | End: 2023-06-12 | Stop reason: HOSPADM

## 2023-06-09 RX ORDER — SODIUM CHLORIDE 9 MG/ML
100 INJECTION, SOLUTION INTRAVENOUS CONTINUOUS
Status: DISCONTINUED | OUTPATIENT
Start: 2023-06-09 | End: 2023-06-11

## 2023-06-09 RX ORDER — MORPHINE SULFATE 4 MG/ML
4 INJECTION, SOLUTION INTRAMUSCULAR; INTRAVENOUS ONCE
Status: COMPLETED | OUTPATIENT
Start: 2023-06-09 | End: 2023-06-09

## 2023-06-09 RX ORDER — ONDANSETRON 2 MG/ML
4 INJECTION INTRAMUSCULAR; INTRAVENOUS EVERY 6 HOURS PRN
Status: DISCONTINUED | OUTPATIENT
Start: 2023-06-09 | End: 2023-06-12 | Stop reason: HOSPADM

## 2023-06-09 RX ADMIN — INSULIN GLARGINE 25 UNITS: 100 INJECTION, SOLUTION SUBCUTANEOUS at 22:13

## 2023-06-09 RX ADMIN — SODIUM CHLORIDE 125 ML/HR: 0.9 INJECTION, SOLUTION INTRAVENOUS at 21:08

## 2023-06-09 RX ADMIN — RANOLAZINE 500 MG: 500 TABLET, FILM COATED, EXTENDED RELEASE ORAL at 21:07

## 2023-06-09 RX ADMIN — MORPHINE SULFATE 4 MG: 4 INJECTION INTRAVENOUS at 18:58

## 2023-06-09 RX ADMIN — CLONIDINE HYDROCHLORIDE 0.1 MG: 0.1 TABLET ORAL at 21:07

## 2023-06-09 RX ADMIN — SODIUM CHLORIDE 1000 ML: 0.9 INJECTION, SOLUTION INTRAVENOUS at 18:48

## 2023-06-09 RX ADMIN — ATORVASTATIN CALCIUM 40 MG: 40 TABLET, FILM COATED ORAL at 21:07

## 2023-06-09 RX ADMIN — OXYCODONE HYDROCHLORIDE 5 MG: 5 TABLET ORAL at 21:07

## 2023-06-09 NOTE — ASSESSMENT & PLAN NOTE
· Blood pressure stable  · Continue home clonidine and metoprolol  · Hold home HCTZ and lisinopril in the setting of acute kidney injury

## 2023-06-09 NOTE — ASSESSMENT & PLAN NOTE
Lab Results   Component Value Date    CREATININE 1 91 (H) 06/09/2023    CREATININE 1 84 (H) 05/16/2023    CREATININE 1 66 (H) 07/13/2020    EGFR 32 06/09/2023    EGFR 33 05/16/2023    EGFR 39 07/13/2020   · Slight acute on chronic kidney injury likely secondary to decreased oral intake  · IV fluid hydration with normal saline solution at 125 cc/h  · Trend BMP  · Avoid nephrotoxic agents  · Strict intake and output  · Daily standing weights

## 2023-06-09 NOTE — ASSESSMENT & PLAN NOTE
"Lab Results   Component Value Date    HGBA1C 6 7 (H) 05/10/2023       No results for input(s): \"POCGLU\" in the last 72 hours      Blood Sugar Average: Last 72 hrs:     · Sliding scale insulin while inpatient with Accu-Cheks  · NPO for now in the setting of pancreatitis  "

## 2023-06-09 NOTE — ED PROVIDER NOTES
History  Chief Complaint   Patient presents with   • Abdominal Pain     Epigastric pain, history of pancreatitis, worsening today     This is an 82-year-old male with past medical history significant for chronic kidney disease, type 2 diabetes mellitus, hypertension, and hyperlipidemia presenting to the emergency department today for epigastric pain  The patient notes he has a history of pancreatitis which they believe is from use of Januvia  He notes epigastric pain that is nonradiating  It feels the same as his prior episodes of pancreatitis  He has no chest pain or shortness of breath  He has no dizziness, lightheadedness, or visual disturbances  He has no nausea, vomiting, diarrhea, or constipation  He has no flank pain or back pain  He has no fevers or chills  He has no urinary symptoms  He notes alcohol use once every three or so days  He has no other complaints at this time  History provided by:  Patient   used: No    Abdominal Pain  Pain location:  Epigastric  Pain quality: stabbing    Pain radiates to:  Does not radiate  Pain severity:  Moderate  Onset quality:  Gradual  Timing:  Constant  Progression:  Worsening  Chronicity:  New  Context: not alcohol use    Relieved by:  Nothing  Worsened by:  Nothing  Ineffective treatments:  None tried  Associated symptoms: no anorexia, no belching, no chest pain, no chills, no constipation, no cough, no diarrhea, no dysuria, no fatigue, no fever, no flatus, no hematuria, no melena, no nausea, no shortness of breath, no sore throat and no vomiting        Prior to Admission Medications   Prescriptions Last Dose Informant Patient Reported? Taking?    BD Pen Needle Pascale U/F 32G X 4 MM MISC  Self Yes No   Sig: USE AS INSTRUCTED WITH INSULIN PEN   JARDIANCE 25 MG TABS  Self Yes No   Sig: Take 25 mg by mouth daily   Lancets (OneTouch Delica Plus [id number]) MISC  Self Yes No   Sig: TEST GLUCOSE 3 4 TIMES/DAY   Levemir FlexTouch 100 units/mL injection pen  Self Yes No   Si Units daily   Multiple Vitamins-Minerals (MULTIVITAMIN ADULT PO)  Self Yes No   Sig: Take by mouth daily   True Metrix Blood Glucose Test test strip  Self Yes No   Si STRIP BY MISCELLANEOUS ROUTE 3 (THREE) TIMES A DAY  RAGHAV METRIX BRAND PER PATIENT   Trulicity 1 5 CH/6 8ML SOPN  Self Yes No   Sig: INJECT 0 5 ML EVERY WEEK BY SUBCUTANEOUS ROUTE     Patient not taking: Reported on 2023   allopurinol (ZYLOPRIM) 100 mg tablet  Self Yes No   Sig: Take 100 mg by mouth daily   calcium carbonate (OS-WAYLON) 600 MG tablet  Self Yes No   Sig: Take 600 mg by mouth daily   cholecalciferol (VITAMIN D3) 1,000 units tablet  Self Yes No   Sig: Take 2,000 Units by mouth daily   cloNIDine (CATAPRES) 0 1 mg tablet  Self No No   Sig: Take 1 tablet (0 1 mg total) by mouth 3 (three) times a day   clopidogrel (PLAVIX) 75 mg tablet  Self No No   Sig: Take 1 tablet (75 mg total) by mouth daily   fluticasone (FLONASE) 50 mcg/act nasal spray  Self No No   Si sprays into each nostril daily   glipiZIDE (GLUCOTROL) 10 mg tablet  Self Yes No   hydrochlorothiazide (HYDRODIURIL) 12 5 mg tablet  Self No No   Sig: Take 1 tablet (12 5 mg total) by mouth daily   insulin aspart protamine-insulin aspart (NovoLOG 70/30) 100 units/mL injection  Self Yes No   Sig: Inject under the skin 3 (three) times a day before meals Units depend on level   lisinopril (ZESTRIL) 40 mg tablet  Self No No   Sig: Take 1 tablet (40 mg total) by mouth daily   metoprolol succinate (TOPROL-XL) 25 mg 24 hr tablet  Self No No   Sig: Take 1 tablet (25 mg total) by mouth daily   omeprazole (PriLOSEC) 40 MG capsule  Self No No   Sig: Take 1 capsule (40 mg total) by mouth daily   Patient taking differently: Take 40 mg by mouth daily before breakfast   pyridoxine (VITAMIN B6) 100 mg tablet  Self Yes No   Sig: Take 100 mg by mouth daily   ranolazine (RANEXA) 500 mg 12 hr tablet   No No   Sig: Take 1 tablet (500 mg total) by mouth 2 (two) times a day   rosuvastatin (CRESTOR) 20 MG tablet  Self Yes No   Sig: Take 1 tablet by mouth daily   tamsulosin (FLOMAX) 0 4 mg  Self No No   Sig: Take 2 capsules (0 8 mg total) by mouth daily      Facility-Administered Medications: None       Past Medical History:   Diagnosis Date   • Allergic    • Arthritis    • Chronic kidney disease    • Chronic kidney disease (CKD) stage G3a/A1, moderately decreased glomerular filtration rate (GFR) between 45-59 mL/min/1 73 square meter and albuminuria creatinine ratio less than 30 mg/g (HCC)    • Colon polyp    • Diabetes mellitus (HCC)    • GERD (gastroesophageal reflux disease)    • Heart murmur    • History of echocardiogram    • HL (hearing loss)    • Hyperlipidemia    • Hypertension    • Obesity    • Sleep apnea, obstructive        Past Surgical History:   Procedure Laterality Date   • CARDIAC CATHETERIZATION     • COLONOSCOPY  2018   • COLONOSCOPY     • EYE SURGERY     • HEMORRHOID SURGERY     • JOINT REPLACEMENT      knee   • OTHER SURGICAL HISTORY      Stent    • UPPER GASTROINTESTINAL ENDOSCOPY         Family History   Problem Relation Age of Onset   • Heart disease Mother    • Heart attack Mother         46s   • Cancer Mother    • Coronary artery disease Mother    • Cancer Brother         Malignant tumor of lung     I have reviewed and agree with the history as documented  E-Cigarette/Vaping   • E-Cigarette Use Never User      E-Cigarette/Vaping Substances   • Nicotine No    • THC No    • CBD No    • Flavoring No    • Other No    • Unknown No      Social History     Tobacco Use   • Smoking status: Former     Packs/day: 1 00     Years: 15 00     Total pack years: 15      Types: Cigarettes     Quit date: 1972     Years since quittin 6     Passive exposure: Past   • Smokeless tobacco: Never   Vaping Use   • Vaping Use: Never used   Substance Use Topics   • Alcohol use:  Yes     Alcohol/week: 5 0 standard drinks of alcohol     Types: 5 Glasses of wine per week     Comment: Occasional    • Drug use: Never       Review of Systems   Constitutional: Negative for appetite change, chills, diaphoresis, fatigue and fever  HENT: Negative for sore throat  Eyes: Negative for visual disturbance  Respiratory: Negative for cough, chest tightness, shortness of breath and wheezing  Cardiovascular: Negative for chest pain  Gastrointestinal: Positive for abdominal pain  Negative for anorexia, constipation, diarrhea, flatus, melena, nausea and vomiting  Genitourinary: Negative for dysuria and hematuria  Musculoskeletal: Negative for neck pain and neck stiffness  Skin: Negative for rash and wound  Neurological: Negative for dizziness, seizures, syncope, weakness, light-headedness, numbness and headaches  Psychiatric/Behavioral: Negative for confusion  All other systems reviewed and are negative  Physical Exam  Physical Exam  Vitals and nursing note reviewed  Constitutional:       General: He is not in acute distress  Appearance: Normal appearance  He is obese  He is not ill-appearing, toxic-appearing or diaphoretic  HENT:      Head: Normocephalic and atraumatic  Nose: Nose normal  No congestion or rhinorrhea  Mouth/Throat:      Mouth: Mucous membranes are moist       Pharynx: No oropharyngeal exudate or posterior oropharyngeal erythema  Eyes:      General: No scleral icterus  Right eye: No discharge  Left eye: No discharge  Conjunctiva/sclera: Conjunctivae normal    Cardiovascular:      Rate and Rhythm: Normal rate and regular rhythm  Pulses: Normal pulses  Heart sounds: Normal heart sounds  No murmur heard  No friction rub  No gallop  Pulmonary:      Effort: Pulmonary effort is normal  No respiratory distress  Breath sounds: Normal breath sounds  No stridor  No wheezing, rhonchi or rales  Chest:      Chest wall: No tenderness  Abdominal:      General: Abdomen is flat   There is no distension  Palpations: Abdomen is soft  Tenderness: There is abdominal tenderness  There is no right CVA tenderness, left CVA tenderness, guarding or rebound  Comments: The patient has tenderness to palpation throughout his entire epigastrium but has a negative Hinds sign; the patient is nonperitoneal   Musculoskeletal:         General: Normal range of motion  Cervical back: Normal range of motion  No rigidity  Right lower leg: No edema  Left lower leg: No edema  Skin:     General: Skin is warm and dry  Capillary Refill: Capillary refill takes less than 2 seconds  Coloration: Skin is not jaundiced or pale  Neurological:      General: No focal deficit present  Mental Status: He is alert and oriented to person, place, and time  Mental status is at baseline     Psychiatric:         Mood and Affect: Mood normal          Behavior: Behavior normal          Vital Signs  ED Triage Vitals   Temperature Pulse Respirations Blood Pressure SpO2   06/09/23 1716 06/09/23 1716 06/09/23 1716 06/09/23 1734 06/09/23 1716   97 8 °F (36 6 °C) (!) 121 20 152/81 98 %      Temp Source Heart Rate Source Patient Position - Orthostatic VS BP Location FiO2 (%)   06/09/23 1716 06/09/23 1716 06/09/23 1906 06/09/23 1906 --   Oral Monitor Lying Left arm       Pain Score       06/09/23 1717       6           Vitals:    06/09/23 1716 06/09/23 1734 06/09/23 1845 06/09/23 1906   BP:  152/81 (!) 155/48 141/66   Pulse: (!) 121  67 74   Patient Position - Orthostatic VS:    Lying         Visual Acuity      ED Medications  Medications   allopurinol (ZYLOPRIM) tablet 100 mg (has no administration in time range)   cloNIDine (CATAPRES) tablet 0 1 mg (has no administration in time range)   clopidogrel (PLAVIX) tablet 75 mg (has no administration in time range)   fluticasone (FLONASE) 50 mcg/act nasal spray 2 spray (has no administration in time range)   metoprolol succinate (TOPROL-XL) 24 hr tablet 25 mg (has no administration in time range)   pantoprazole (PROTONIX) EC tablet 40 mg (has no administration in time range)   ranolazine (RANEXA) 12 hr tablet 500 mg (has no administration in time range)   atorvastatin (LIPITOR) tablet 40 mg (has no administration in time range)   tamsulosin (FLOMAX) capsule 0 8 mg (has no administration in time range)   acetaminophen (TYLENOL) tablet 650 mg (has no administration in time range)   ondansetron (ZOFRAN) injection 4 mg (has no administration in time range)   sodium chloride 0 9 % infusion (has no administration in time range)   heparin (porcine) subcutaneous injection 5,000 Units (has no administration in time range)   oxyCODONE (ROXICODONE) IR tablet 5 mg (has no administration in time range)   HYDROmorphone (DILAUDID) injection 0 5 mg (has no administration in time range)   HYDROmorphone (DILAUDID) injection 1 mg (has no administration in time range)   insulin lispro (HumaLOG) 100 units/mL subcutaneous injection 2-12 Units (has no administration in time range)   insulin lispro (HumaLOG) 100 units/mL subcutaneous injection 2-12 Units (has no administration in time range)   insulin glargine (LANTUS) subcutaneous injection 25 Units 0 25 mL (has no administration in time range)   morphine injection 4 mg (4 mg Intravenous Given 6/9/23 1858)   sodium chloride 0 9 % bolus 1,000 mL (1,000 mL Intravenous New Bag 6/9/23 1848)       Diagnostic Studies  Results Reviewed     Procedure Component Value Units Date/Time    HS Troponin I 4hr [[id number]]     Lab Status: No result Specimen: Blood     Urinalysis with microscopic [[id number]]     Lab Status: No result Specimen: Urine, Clean Catch     Platelet count [[id number]]     Lab Status: No result Specimen: Blood     Comprehensive metabolic panel [[id number]]  (Abnormal) Collected: 06/09/23 1737    Lab Status: Final result Specimen: Blood from Arm, Right Updated: 06/09/23 1905     Sodium 144 mmol/L      Potassium 3 9 mmol/L      Chloride 104 mmol/L      CO2 32 mmol/L      ANION GAP 8 mmol/L      BUN 27 mg/dL      Creatinine 1 91 mg/dL      Glucose 86 mg/dL      Calcium 9 2 mg/dL      AST 22 U/L      ALT 19 U/L      Alkaline Phosphatase 53 U/L      Total Protein 7 1 g/dL      Albumin 4 0 g/dL      Total Bilirubin 0 38 mg/dL      eGFR 32 ml/min/1 73sq m     Narrative:      [employer] guidelines for Chronic Kidney Disease (CKD):   •  Stage 1 with normal or high GFR (GFR > 90 mL/min/1 73 square meters)  •  Stage 2 Mild CKD (GFR = 60-89 mL/min/1 73 square meters)  •  Stage 3A Moderate CKD (GFR = 45-59 mL/min/1 73 square meters)  •  Stage 3B Moderate CKD (GFR = 30-44 mL/min/1 73 square meters)  •  Stage 4 Severe CKD (GFR = 15-29 mL/min/1 73 square meters)  •  Stage 5 End Stage CKD (GFR <15 mL/min/1 73 square meters)  Note: GFR calculation is accurate only with a steady state creatinine    Lipase [[id number]]  (Abnormal) Collected: 06/09/23 1737    Lab Status: Final result Specimen: Blood from Arm, Right Updated: 06/09/23 1823     Lipase >12,000 u/L     HS Troponin I 2hr [[id number]]     Lab Status: No result Specimen: Blood     HS Troponin 0hr (reflex protocol) [[id number]]  (Normal) Collected: 06/09/23 1737    Lab Status: Final result Specimen: Blood from Arm, Right Updated: 06/09/23 1813     hs TnI 0hr 31 ng/L     CBC and differential [[id number]]  (Abnormal) Collected: 06/09/23 1737    Lab Status: Final result Specimen: Blood from Arm, Right Updated: 06/09/23 1749     WBC 9 25 Thousand/uL      RBC 4 43 Million/uL      Hemoglobin 14 2 g/dL      Hematocrit 42 7 %      MCV 96 fL      MCH 32 1 pg      MCHC 33 3 g/dL      RDW 13 6 %      MPV 10 0 fL      Platelets 036 Thousands/uL      nRBC 0 /100 WBCs      Neutrophils Relative 78 %      Immat GRANS % 1 %      Lymphocytes Relative 15 %      Monocytes Relative 4 %      Eosinophils Relative 2 %      Basophils Relative 0 %      Neutrophils Absolute 7 23 Thousands/µL      Immature Grans Absolute 0 05 Thousand/uL      Lymphocytes Absolute 1 38 Thousands/µL      Monocytes Absolute 0 41 Thousand/µL      Eosinophils Absolute 0 16 Thousand/µL      Basophils Absolute 0 02 Thousands/µL                  CT abdomen pelvis wo contrast   Final Result by Lisa Avendano MD (06/09 1900)   Peripancreatic inflammatory change consistent with acute uncomplicated pancreatitis in this unenhanced study  Workstation performed: [id number]         MRI inpatient order    (Results Pending)              Procedures  ECG 12 Lead Documentation Only    Date/Time: 6/9/2023 5:23 PM    Performed by: Kera Meraz PA-C  Authorized by: Kera Meraz PA-C    Indications / Diagnosis:  Epigastric Pain  Previous ECG:     Previous ECG:  Unavailable  Interpretation:     Interpretation: non-specific    Rate:     ECG rate:  75    ECG rate assessment: normal    Rhythm:     Rhythm: sinus rhythm    Ectopy:     Ectopy: PAC    QRS:     QRS axis:  Left  Conduction:     Conduction: normal    ST segments:     ST segments:  Normal  T waves:     T waves: normal    Comments:      Normal sinus rhythm with a rate of 75 without any ST segment changes or signs of ischemia  Branch block  Left anterior fascicular block  PACs noted  No STEMI  ED Course  ED Course as of 06/09/23 1949   Dennis Joe Jun 09, 2023 1827 Lipase(!): >12,000                                             Medical Decision Making  This is an 82-year-old male presenting to the emergency department today for epigastric pain  He has a history of pancreatitis and notes this feels similar  Vital signs are stable  On physical examination, the patient has epigastric tenderness to palpation with a negative Hinds sign  Lipase is greater than 12,000  Patient's creatinine is greater than baseline  EKG shows normal sinus rhythm with a rate of 75 with a right bundle branch block, left anterior fascicular block, and premature atrial contraction  "The patient was dosed with intravenous fluids and morphine while here in the emergency department  CT abdomen and pelvis shows acute uncomplicated pancreatitis  Based upon pancreatitis, will plan for admission  The patient was accepted by Dr Dagmar Dean for observation  Strict return precautions were given  Recommend PCP follow-up as soon as possible  The patient and/or patient's proxy verify their understanding and agree to the plan at this time  All questions answered to the patient and/or their proxy's satisfaction  All labs reviewed and utilized in the medical decision making process (if labs were ordered)  Portions of the record may have been created with voice recognition software   Occasional wrong word or \"sound a like\" substitutions may have occurred due to the inherent limitations of voice recognition software   Read the chart carefully and recognize, using context, where substitutions have occurred  Acute pancreatitis without infection or necrosis, unspecified pancreatitis type: complicated acute illness or injury  Amount and/or Complexity of Data Reviewed  Labs: ordered  Decision-making details documented in ED Course  Radiology: ordered  Decision-making details documented in ED Course  ECG/medicine tests: ordered  Decision-making details documented in ED Course  Discussion of management or test interpretation with external provider(s): Dr John No drug management  Decision regarding hospitalization            Disposition  Final diagnoses:   Acute pancreatitis without infection or necrosis, unspecified pancreatitis type     Time reflects when diagnosis was documented in both MDM as applicable and the Disposition within this note     Time User Action Codes Description Comment    6/9/2023  7:12 PM Zara Moss [[id number]] Acute pancreatitis without infection or necrosis, unspecified pancreatitis type     6/9/2023  7:17 PM Citlaly Luke [[id number]] " Drug-induced acute pancreatitis without infection or necrosis       ED Disposition     ED Disposition   Admit    Condition   Stable    Date/Time   Fri Jun 9, 2023  7:11 PM    Comment   Case was discussed with Dr Jenn Harris and the patient's admission status was agreed to be Admission Status: observation status to the service of Dr Jenn Harris  Follow-up Information    None         Patient's Medications   Discharge Prescriptions    No medications on file       No discharge procedures on file      PDMP Review     None          ED Provider  Electronically Signed by           Jocelyn Nova PA-C  06/09/23 0422

## 2023-06-09 NOTE — H&P
"[facility]  68   H&P  Name: Alta Bernstein [de-identified] y o  male I MRN: [id number]  Unit/Bed#: MS Colin I Date of Admission: 6/9/2023   Date of Service: 6/9/2023 I Hospital Day: 0      Assessment/Plan   * Pancreatitis  Assessment & Plan  · Presents with severe abdominal pain  · Chronic medication induced pancreatitis however patient has 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 medications for quite some time  · Right upper quadrant abdominal ultrasound did not show sludge and possible stones  · Lipase significantly elevated greater than 12,000  · Obtain MRCP to rule out choledocholithiasis however liver enzymes within normal limits  · IV fluid hydration  · Pain regimen as ordered  · Trend lipase  · NPO for now ; advance diet as tolerated    HTN (hypertension), benign  Assessment & Plan  · Blood pressure stable  · Continue home clonidine and metoprolol  · Hold home HCTZ and lisinopril in the setting of acute kidney injury    Type 2 diabetes mellitus with kidney complication, with long-term current use of insulin ([facility])  Assessment & Plan  Lab Results   Component Value Date    HGBA1C 6 7 (H) 05/10/2023       No results for input(s): \"POCGLU\" in the last 72 hours      Blood Sugar Average: Last 72 hrs:     · Decrease home Lantus to 25 units daily at bedtime while NPO  · Sliding scale insulin while inpatient with Accu-Cheks  · NPO for now in the setting of pancreatitis ; advance to diabetic diet when tolerating    GERD without esophagitis  Assessment & Plan  · Continue home PPI    Mixed hyperlipidemia  Assessment & Plan  · Continue home statin therapy    PAULA (obstructive sleep apnea)  Assessment & Plan  · Continue home CPAP  · Respiratory protocol    Stage 3b chronic [facility] disease ([facility]  Assessment & Plan  Lab Results   Component Value Date    CREATININE 1 91 (H) 06/09/2023    CREATININE 1 84 (H) 05/16/2023    CREATININE 1 66 (H) 07/13/2020    EGFR 32 06/09/2023    EGFR 33 05/16/2023    EGFR 39 07/13/2020   · Slight acute " on chronic kidney injury likely secondary to decreased oral intake  · IV fluid hydration with normal saline solution at 125 cc/h  · Trend BMP  · Avoid nephrotoxic agents  · Strict intake and output  · Daily standing weights    Venous stasis dermatitis of both lower extremities  Assessment & Plan  · With peripheral artery disease  · Continue home Plavix and Ranexa       VTE Prophylaxis: Heparin  Code Status: Level 1 full code    Anticipated Length of Stay:  Patient will be admitted on an Observation basis with an anticipated length of stay of less than 2 midnights  Justification for Hospital Stay: Pancreatitis    Total Time for Visit, including Counseling / Coordination of Care: 60 minutes  Greater than 50% of this total time spent on direct patient counseling and coordination of care  Chief Complaint:   Abdominal pain    History of Present Illness:    Brian Fitzpatrick is a [de-identified] y o  male with a past medical history significant for chronic pancreatitis, hypertension, type 2 diabetes mellitus, GERD, peripheral artery disease, PAULA on CPAP, stage IIIb CKD who presents with complaints of abdominal pain  The patient has a well-known history of what appears to be medication induced pancreatitis  The patient did just have a right upper quadrant abdominal ultrasound which showed gallbladder sludge and possible stones  On presentation, lipase elevated to greater than 12,000  Hemodynamically stable and saturating well on room air  Slight acute kidney injury likely secondary to decreased oral intake  The patient was assigned observation in the setting of acute on chronic pancreatitis and to rule out choledocholithiasis  Review of Systems:    Review of Systems   Constitutional: Negative for chills and fever  HENT: Negative for ear pain and sore throat  Eyes: Negative for pain and visual disturbance  Respiratory: Negative for cough and shortness of breath      Cardiovascular: Negative for chest pain and palpitations  Gastrointestinal: Positive for abdominal pain  Negative for vomiting  Genitourinary: Negative for dysuria and hematuria  Musculoskeletal: Negative for arthralgias and back pain  Skin: Negative for color change and rash  Neurological: Negative for seizures and syncope  All other systems reviewed and are negative  Past Medical and Surgical History:     Past Medical History:   Diagnosis Date   • Allergic    • Arthritis    • Chronic kidney disease 2021   • Chronic kidney disease (CKD) stage G3a/A1, moderately decreased glomerular filtration rate (GFR) between 45-59 mL/min/1 73 square meter and albuminuria creatinine ratio less than 30 mg/g (HCC)    • Colon polyp    • Diabetes mellitus (HCC)    • GERD (gastroesophageal reflux disease)    • Heart murmur    • History of echocardiogram    • HL (hearing loss)    • Hyperlipidemia    • Hypertension    • Obesity    • Sleep apnea, obstructive        Past Surgical History:   Procedure Laterality Date   • CARDIAC CATHETERIZATION     • COLONOSCOPY  03/09/2018   • COLONOSCOPY     • EYE SURGERY     • HEMORRHOID SURGERY     • JOINT REPLACEMENT  2017    knee   • OTHER SURGICAL HISTORY      Stent    • UPPER GASTROINTESTINAL ENDOSCOPY         Meds/Allergies:    Prior to Admission medications    Medication Sig Start Date End Date Taking?  Authorizing Provider   allopurinol (ZYLOPRIM) 100 mg tablet Take 100 mg by mouth daily 5/20/23   Historical Provider, MD   BD Pen Needle Pascale U/F 32G X 4 MM MISC USE AS INSTRUCTED WITH INSULIN PEN 8/27/20   Historical Provider, MD   calcium carbonate (OS-WAYLON) 600 MG tablet Take 600 mg by mouth daily    Historical Provider, MD   cholecalciferol (VITAMIN D3) 1,000 units tablet Take 2,000 Units by mouth daily    Historical Provider, MD   cloNIDine (CATAPRES) 0 1 mg tablet Take 1 tablet (0 1 mg total) by mouth 3 (three) times a day 4/3/23   Chriss Diez MD   clopidogrel (PLAVIX) 75 mg tablet Take 1 tablet (75 mg total) by mouth daily 12/9/22   Leopold Moss, MD   fluticasone [facility]) 50 mcg/act nasal spray 2 sprays into each nostril daily 5/23/23   Leopold Moss, MD   glipiZIDE (GLUCOTROL) 10 mg tablet  7/6/20   Historical Provider, MD   hydrochlorothiazide (HYDRODIURIL) 12 5 mg tablet Take 1 tablet (12 5 mg total) by mouth daily 4/3/23   Leopold Moss, MD   insulin aspart protamine-insulin aspart (NovoLOG 70/30) 100 units/mL injection Inject under the skin 3 (three) times a day before meals Units depend on level    Historical Provider, MD   JARDIANCE 25 MG TABS Take 25 mg by mouth daily 6/9/20   Historical Provider, MD   Lancets (OneTouch Delica Plus [id number]) MISC TEST GLUCOSE 3 4 TIMES/DAY 8/28/20   Historical Provider, MD   Levemir FlexTouch 100 units/mL injection pen 20 Units daily 8/27/20   Historical Provider, MD   lisinopril (ZESTRIL) 40 mg tablet Take 1 tablet (40 mg total) by mouth daily 4/3/23   Leopold Moss, MD   metoprolol succinate (TOPROL-XL) 25 mg 24 hr tablet Take 1 tablet (25 mg total) by mouth daily 5/16/23   Leopold Moss, MD   Multiple Vitamins-Minerals (MULTIVITAMIN ADULT PO) Take by mouth daily    Historical Provider, MD   omeprazole (PriLOSEC) 40 MG capsule Take 1 capsule (40 mg total) by mouth daily  Patient taking differently: Take 40 mg by mouth daily before breakfast 5/15/23   Gelacio Benites MD   pyridoxine (VITAMIN B6) 100 mg tablet Take 100 mg by mouth daily    Historical Provider, MD   ranolazine (RANEXA) 500 mg 12 hr tablet Take 1 tablet (500 mg total) by mouth 2 (two) times a day 6/6/23   Leopold Moss, MD   rosuvastatin (CRESTOR) 20 MG tablet Take 1 tablet by mouth daily 7/1/22 7/1/23  Historical Provider, MD   tamsulosin (FLOMAX) 0 4 mg Take 2 capsules (0 8 mg total) by mouth daily 4/3/23   Leopold Moss, MD   True Metrix Blood Glucose Test test strip 1 STRIP BY MISCELLANEOUS ROUTE 3 (THREE) TIMES A DAY   RAGHAV METRIX BRAND PER PATIENT 9/19/20   Historical Provider, MD   Trulicity 1 5 VN/7 0NE "SOPN INJECT 0 5 ML EVERY WEEK BY SUBCUTANEOUS ROUTE  Patient not taking: Reported on 2023   Historical Provider, MD       Allergies: Allergies   Allergen Reactions   • Januvia [Sitagliptin] Other (See Comments)     pancreatitis   • Iodinated Contrast Media Chest Pain   • Simvastatin Myalgia   • Wound Dressing Adhesive Other (See Comments)     Burning sensation       Social History:     Marital Status: /Civil Union   Substance Use History:   Social History     Substance and Sexual Activity   Alcohol Use Yes   • Alcohol/week: 5 0 standard drinks of alcohol   • Types: 5 Glasses of wine per week    Comment: Occasional      Social History     Tobacco Use   Smoking Status Former   • Packs/day: 1 00   • Years: 15    • Total pack years: 15    • Types: Cigarettes   • Quit date: 1972   • Years since quittin 6   • Passive exposure: Past   Smokeless Tobacco Never     Social History     Substance and Sexual Activity   Drug Use Never       Family History:    Pertinent family history reviewed    Physical Exam:     Vitals:   Blood Pressure: 141/66 (23 190)  Pulse: 74 (23)  Temperature: 97 8 °F (36 6 °C) (23)  Temp Source: Oral (23)  Respirations: 15 (23)  Height: 5' 6\" (167 6 cm) (23)  Weight - Scale: 114 kg (250 lb 7 1 oz) (23)  SpO2: 99 % (23)    Physical Exam  Vitals and nursing note reviewed  Constitutional:       General: He is in acute distress  Appearance: He is well-developed  HENT:      Head: Normocephalic and atraumatic  Eyes:      Conjunctiva/sclera: Conjunctivae normal    Cardiovascular:      Rate and Rhythm: Normal rate and regular rhythm  Heart sounds: No murmur heard  Pulmonary:      Effort: Pulmonary effort is normal  No respiratory distress  Breath sounds: Normal breath sounds  Abdominal:      Palpations: Abdomen is soft  Tenderness: There is no abdominal tenderness   " Musculoskeletal:         General: No swelling  Cervical back: Neck supple  Skin:     General: Skin is warm and dry  Capillary Refill: Capillary refill takes less than 2 seconds  Neurological:      Mental Status: He is alert  Psychiatric:         Mood and Affect: Mood normal           Additional Data:     Lab Results: I have reviewed pertinent results     Results from last 7 days   Lab Units 06/09/23  1737   EOS PCT % 2   HEMATOCRIT % 42 7   HEMOGLOBIN g/dL 14 2   LYMPHS PCT % 15   MONOS PCT % 4   NEUTROS PCT % 78*   PLATELETS Thousands/uL 196   WBC Thousand/uL 9 25     Results from last 7 days   Lab Units 06/09/23  1737   ANION GAP mmol/L 8   ALBUMIN g/dL 4 0   ALK PHOS U/L 53   ALT U/L 19   AST U/L 22   BUN mg/dL 27*   CALCIUM mg/dL 9 2   CHLORIDE mmol/L 104   CO2 mmol/L 32   CREATININE mg/dL 1 91*   GLUCOSE RANDOM mg/dL 86   POTASSIUM mmol/L 3 9   SODIUM mmol/L 144   TOTAL BILIRUBIN mg/dL 0 38                       Imaging: I have reviewed pertinent imaging     CT abdomen pelvis wo contrast   Final Result by Sherice Hudson MD (06/09 1900)   Peripancreatic inflammatory change consistent with acute uncomplicated pancreatitis in this unenhanced study  Workstation performed: [id number]         MRI inpatient order    (Results Pending)       EKG, Pathology, and Other Studies Reviewed on Admission:   · EKG: Reviewed     Allscripts / Epic Records Reviewed    ** Please Note: This note has been constructed using a voice recognition system   **

## 2023-06-09 NOTE — ASSESSMENT & PLAN NOTE
· Presents with severe abdominal pain  · Chronic medication induced pancreatitis  · Right upper quadrant abdominal ultrasound did not show sludge and possible stones  · Lipase significantly elevated greater than 12,000  · Obtain MRCP to rule out choledocholithiasis however liver enzymes within normal limits  · IV fluid hydration  · Pain regimen as ordered  · Trend lipase  · NPO for now ; advance diet as tolerated

## 2023-06-10 ENCOUNTER — APPOINTMENT (OUTPATIENT)
Dept: MRI IMAGING | Facility: HOSPITAL | Age: 83
DRG: 439 | End: 2023-06-10
Payer: MEDICARE

## 2023-06-10 LAB
ALBUMIN SERPL BCP-MCNC: 3.1 G/DL (ref 3.5–5)
ALP SERPL-CCNC: 38 U/L (ref 34–104)
ALT SERPL W P-5'-P-CCNC: 12 U/L (ref 7–52)
ANION GAP SERPL CALCULATED.3IONS-SCNC: 7 MMOL/L (ref 4–13)
AST SERPL W P-5'-P-CCNC: 14 U/L (ref 13–39)
BASOPHILS # BLD AUTO: 0.03 THOUSANDS/ÂΜL (ref 0–0.1)
BASOPHILS NFR BLD AUTO: 0 % (ref 0–1)
BILIRUB SERPL-MCNC: 0.46 MG/DL (ref 0.2–1)
BUN SERPL-MCNC: 25 MG/DL (ref 5–25)
CALCIUM ALBUM COR SERPL-MCNC: 8.7 MG/DL (ref 8.3–10.1)
CALCIUM SERPL-MCNC: 8 MG/DL (ref 8.4–10.2)
CHLORIDE SERPL-SCNC: 108 MMOL/L (ref 96–108)
CO2 SERPL-SCNC: 29 MMOL/L (ref 21–32)
CREAT SERPL-MCNC: 1.73 MG/DL (ref 0.6–1.3)
EOSINOPHIL # BLD AUTO: 0.12 THOUSAND/ÂΜL (ref 0–0.61)
EOSINOPHIL NFR BLD AUTO: 2 % (ref 0–6)
ERYTHROCYTE [DISTWIDTH] IN BLOOD BY AUTOMATED COUNT: 13.9 % (ref 11.6–15.1)
GFR SERPL CREATININE-BSD FRML MDRD: 36 ML/MIN/1.73SQ M
GLUCOSE P FAST SERPL-MCNC: 111 MG/DL (ref 65–99)
GLUCOSE SERPL-MCNC: 111 MG/DL (ref 65–140)
GLUCOSE SERPL-MCNC: 118 MG/DL (ref 65–140)
GLUCOSE SERPL-MCNC: 137 MG/DL (ref 65–140)
GLUCOSE SERPL-MCNC: 157 MG/DL (ref 65–140)
GLUCOSE SERPL-MCNC: 194 MG/DL (ref 65–140)
HCT VFR BLD AUTO: 35.2 % (ref 36.5–49.3)
HGB BLD-MCNC: 11.2 G/DL (ref 12–17)
IMM GRANULOCYTES # BLD AUTO: 0.03 THOUSAND/UL (ref 0–0.2)
IMM GRANULOCYTES NFR BLD AUTO: 0 % (ref 0–2)
LIPASE SERPL-CCNC: 1943 U/L (ref 11–82)
LYMPHOCYTES # BLD AUTO: 1.8 THOUSANDS/ÂΜL (ref 0.6–4.47)
LYMPHOCYTES NFR BLD AUTO: 25 % (ref 14–44)
MCH RBC QN AUTO: 31.7 PG (ref 26.8–34.3)
MCHC RBC AUTO-ENTMCNC: 31.8 G/DL (ref 31.4–37.4)
MCV RBC AUTO: 100 FL (ref 82–98)
MONOCYTES # BLD AUTO: 0.85 THOUSAND/ÂΜL (ref 0.17–1.22)
MONOCYTES NFR BLD AUTO: 12 % (ref 4–12)
NEUTROPHILS # BLD AUTO: 4.33 THOUSANDS/ÂΜL (ref 1.85–7.62)
NEUTS SEG NFR BLD AUTO: 61 % (ref 43–75)
NRBC BLD AUTO-RTO: 0 /100 WBCS
PLATELET # BLD AUTO: 149 THOUSANDS/UL (ref 149–390)
PMV BLD AUTO: 10.3 FL (ref 8.9–12.7)
POTASSIUM SERPL-SCNC: 3.8 MMOL/L (ref 3.5–5.3)
PROT SERPL-MCNC: 5.5 G/DL (ref 6.4–8.4)
RBC # BLD AUTO: 3.53 MILLION/UL (ref 3.88–5.62)
SODIUM SERPL-SCNC: 144 MMOL/L (ref 135–147)
WBC # BLD AUTO: 7.16 THOUSAND/UL (ref 4.31–10.16)

## 2023-06-10 PROCEDURE — 83690 ASSAY OF LIPASE: CPT | Performed by: INTERNAL MEDICINE

## 2023-06-10 PROCEDURE — 74183 MRI ABD W/O CNTR FLWD CNTR: CPT

## 2023-06-10 PROCEDURE — A9585 GADOBUTROL INJECTION: HCPCS | Performed by: INTERNAL MEDICINE

## 2023-06-10 PROCEDURE — 80053 COMPREHEN METABOLIC PANEL: CPT | Performed by: INTERNAL MEDICINE

## 2023-06-10 PROCEDURE — 99233 SBSQ HOSP IP/OBS HIGH 50: CPT | Performed by: INTERNAL MEDICINE

## 2023-06-10 PROCEDURE — 85025 COMPLETE CBC W/AUTO DIFF WBC: CPT | Performed by: INTERNAL MEDICINE

## 2023-06-10 PROCEDURE — 82948 REAGENT STRIP/BLOOD GLUCOSE: CPT

## 2023-06-10 RX ORDER — GUAIFENESIN 600 MG/1
600 TABLET, EXTENDED RELEASE ORAL EVERY 12 HOURS SCHEDULED
Status: DISCONTINUED | OUTPATIENT
Start: 2023-06-10 | End: 2023-06-12 | Stop reason: HOSPADM

## 2023-06-10 RX ORDER — INSULIN GLARGINE 100 [IU]/ML
20 INJECTION, SOLUTION SUBCUTANEOUS
Status: DISCONTINUED | OUTPATIENT
Start: 2023-06-10 | End: 2023-06-12 | Stop reason: HOSPADM

## 2023-06-10 RX ORDER — INSULIN GLARGINE 100 [IU]/ML
20 INJECTION, SOLUTION SUBCUTANEOUS
Status: DISCONTINUED | OUTPATIENT
Start: 2023-06-11 | End: 2023-06-10

## 2023-06-10 RX ADMIN — INSULIN GLARGINE 20 UNITS: 100 INJECTION, SOLUTION SUBCUTANEOUS at 22:53

## 2023-06-10 RX ADMIN — HEPARIN SODIUM 5000 UNITS: 5000 INJECTION INTRAVENOUS; SUBCUTANEOUS at 21:53

## 2023-06-10 RX ADMIN — GUAIFENESIN 600 MG: 600 TABLET ORAL at 21:53

## 2023-06-10 RX ADMIN — PANTOPRAZOLE SODIUM 40 MG: 40 TABLET, DELAYED RELEASE ORAL at 05:29

## 2023-06-10 RX ADMIN — CLONIDINE HYDROCHLORIDE 0.1 MG: 0.1 TABLET ORAL at 15:13

## 2023-06-10 RX ADMIN — OXYCODONE HYDROCHLORIDE 5 MG: 5 TABLET ORAL at 09:32

## 2023-06-10 RX ADMIN — CLONIDINE HYDROCHLORIDE 0.1 MG: 0.1 TABLET ORAL at 21:53

## 2023-06-10 RX ADMIN — METOPROLOL SUCCINATE 25 MG: 25 TABLET, FILM COATED, EXTENDED RELEASE ORAL at 09:23

## 2023-06-10 RX ADMIN — CLOPIDOGREL BISULFATE 75 MG: 75 TABLET ORAL at 09:22

## 2023-06-10 RX ADMIN — INSULIN LISPRO 2 UNITS: 100 INJECTION, SOLUTION INTRAVENOUS; SUBCUTANEOUS at 18:05

## 2023-06-10 RX ADMIN — RANOLAZINE 500 MG: 500 TABLET, FILM COATED, EXTENDED RELEASE ORAL at 09:22

## 2023-06-10 RX ADMIN — HYDROMORPHONE HYDROCHLORIDE 0.5 MG: 1 INJECTION, SOLUTION INTRAMUSCULAR; INTRAVENOUS; SUBCUTANEOUS at 15:13

## 2023-06-10 RX ADMIN — CLONIDINE HYDROCHLORIDE 0.1 MG: 0.1 TABLET ORAL at 09:23

## 2023-06-10 RX ADMIN — GUAIFENESIN 600 MG: 600 TABLET ORAL at 10:59

## 2023-06-10 RX ADMIN — RANOLAZINE 500 MG: 500 TABLET, FILM COATED, EXTENDED RELEASE ORAL at 18:03

## 2023-06-10 RX ADMIN — HYDROMORPHONE HYDROCHLORIDE 0.5 MG: 1 INJECTION, SOLUTION INTRAMUSCULAR; INTRAVENOUS; SUBCUTANEOUS at 18:13

## 2023-06-10 RX ADMIN — SODIUM CHLORIDE 125 ML/HR: 0.9 INJECTION, SOLUTION INTRAVENOUS at 13:33

## 2023-06-10 RX ADMIN — FLUTICASONE PROPIONATE 2 SPRAY: 50 SPRAY, METERED NASAL at 09:23

## 2023-06-10 RX ADMIN — TAMSULOSIN HYDROCHLORIDE 0.8 MG: 0.4 CAPSULE ORAL at 09:22

## 2023-06-10 RX ADMIN — HYDROMORPHONE HYDROCHLORIDE 0.5 MG: 1 INJECTION, SOLUTION INTRAMUSCULAR; INTRAVENOUS; SUBCUTANEOUS at 22:10

## 2023-06-10 RX ADMIN — GADOBUTROL 11 ML: 604.72 INJECTION INTRAVENOUS at 12:03

## 2023-06-10 RX ADMIN — HEPARIN SODIUM 5000 UNITS: 5000 INJECTION INTRAVENOUS; SUBCUTANEOUS at 13:33

## 2023-06-10 RX ADMIN — SODIUM CHLORIDE 125 ML/HR: 0.9 INJECTION, SOLUTION INTRAVENOUS at 05:31

## 2023-06-10 RX ADMIN — ALLOPURINOL 100 MG: 100 TABLET ORAL at 09:23

## 2023-06-10 RX ADMIN — ATORVASTATIN CALCIUM 40 MG: 40 TABLET, FILM COATED ORAL at 18:03

## 2023-06-10 NOTE — ASSESSMENT & PLAN NOTE
"Lab Results   Component Value Date    HGBA1C 6 7 (H) 05/10/2023       No results for input(s): \"POCGLU\" in the last 72 hours      Blood Sugar Average: Last 72 hrs:     · Decrease home Lantus to 25 units daily at bedtime while NPO  · Sliding scale insulin while inpatient with Accu-Cheks  · NPO for now in the setting of pancreatitis ; advance to diabetic diet when tolerating  · Blood sugar fairly controlled  "

## 2023-06-10 NOTE — CONSULTS
Consultation - [facility]  Clair Archuleta [de-identified] y o  male MRN: [id number]  Unit/Bed#: -01 Encounter: [id number]        Inpatient consult to gastroenterology  Consult performed by: Jean Carter MD  Consult ordered by: Jocelyn Enriquez MD          Reason for Consult / Principal Problem:     Principal Problem:    Pancreatitis  Active Problems:    PAULA (obstructive sleep apnea)    HTN (hypertension), benign    Type 2 diabetes mellitus with kidney complication, with long-term current use of insulin (HCC)    GERD without esophagitis    Mixed hyperlipidemia    Venous stasis dermatitis of both lower extremities    Stage 3b chronic kidney disease (CKD) ([facility] )          ASSESSMENT AND PLAN:      Acute interstitial pancreatitis, uncomplicated  Epigastric abdominal pain  Epigastric pain, elevated lipase, CT diagnostic imaging for pancreatitis  Unclear etiology, although concern for medication induced vs microlithiasis vs alcohol  Liver enzymes are normal  Does have gallbladder sludge on RUQ us, would consider surgical evaluation for cholecystectomy to prevent microlithiasis given recurrent pancreatitis  Counseled on alcohol cessation   Both Januvia and hydrochlorothiazide are possible etiologic agents - would recommend hold and consider alternatives of these agents  Given his age, recommend MRI/MRCP to r/o malignancy and evaluate pancreatic parenchyma, r/o choledocholithiasis- f/u read  - continue supportive care- would continue clear liquids and advance diet following pain improvement, possibly tomorrow pending clinical course  - continue gentle IV fluids while monitoring respiratory status  - pain control, add bowel regimen    Anemia  -Hemoglobin was 14 yesterday (baseline), and is 11 2 today  -Given lack of GI bleeding, suspect this is dilutional  -Continue to monitor hemoglobin, stool output  -Last colonoscopy in 2022    ______________________________________________________________________    HPI:      Patient is an 63-year-old male with HTN on hydrochlorothiazide, T2DM with last HbA1c 6 7% on insulin (previously on januvia), GERD on PPI, HLD, PAULA on home CPAP, CKD stage III, bilateral lower extremity venous stasis with peripheral arterial disease on Plavix admitted through the ER with acute onset of epigastric pain  He is noted to have elevated lipase, with CT imaging with peripancreatic inflammation change consistent with acute uncomplicated pancreatitis, although the CT imaging was limited by lack of enteric contrast  Pain is similar to what he experienced 2 weeks ago and also 6 months prior to that  He drinks 3-5 glasses of wine/week  Denies recent tobacco use (does have remote history > 30 years ago)  Denies unintentional weight loss, steatorrhea, jaundice  He continues to have 8/10 pain, this is only mildly improved since admission  He denies nausea/vomiting  His last colonoscopy was in May 2022 at [facility] in [place] was noted to have diverticular disease, anal scar, 2 subcentimeter transverse colonic polyps  REVIEW OF SYSTEMS:    CONSTITUTIONAL: Denies any fever, chills, rigors, and weight loss  HEENT: No earache or tinnitus  Denies hearing loss or visual disturbances  CARDIOVASCULAR: No chest pain or palpitations  RESPIRATORY: Denies any cough, hemoptysis, shortness of breath or dyspnea on exertion  GASTROINTESTINAL: As noted in the History of Present Illness  GENITOURINARY: No problems with urination  Denies any hematuria or dysuria  NEUROLOGIC: No dizziness or vertigo, denies headaches  MUSCULOSKELETAL: Denies any muscle or joint pain  SKIN: Denies skin rashes or itching  ENDOCRINE: Denies excessive thirst  Denies intolerance to heat or cold  PSYCHOSOCIAL: Denies depression or anxiety  Denies any recent memory loss         Historical Information   Past Medical History: Diagnosis Date   • Allergic    • Arthritis    • Chronic kidney disease    • Chronic kidney disease (CKD) stage G3a/A1, moderately decreased glomerular filtration rate (GFR) between 45-59 mL/min/1 73 square meter and albuminuria creatinine ratio less than 30 mg/g (HCC)    • Colon polyp    • Diabetes mellitus (HCC)    • GERD (gastroesophageal reflux disease)    • Heart murmur    • History of echocardiogram    • HL (hearing loss)    • Hyperlipidemia    • Hypertension    • Obesity    • Sleep apnea, obstructive      Past Surgical History:   Procedure Laterality Date   • CARDIAC CATHETERIZATION     • COLONOSCOPY  2018   • COLONOSCOPY     • EYE SURGERY     • HEMORRHOID SURGERY     • JOINT REPLACEMENT  2017    knee   • OTHER SURGICAL HISTORY      Stent    • UPPER GASTROINTESTINAL ENDOSCOPY       Social History   Social History     Substance and Sexual Activity   Alcohol Use Yes   • Alcohol/week: 5 0 standard drinks of alcohol   • Types: 5 Glasses of wine per week    Comment: Occasional      Social History     Substance and Sexual Activity   Drug Use Never     Social History     Tobacco Use   Smoking Status Former   • Packs/day:  00   • Years: 15    • Total pack years: 15    • Types: Cigarettes   • Quit date: 1972   • Years since quittin 6   • Passive exposure: Past   Smokeless Tobacco Never     Family History   Problem Relation Age of Onset   • Heart disease Mother    • Heart attack Mother         46s   • Cancer Mother    • Coronary artery disease Mother    • Cancer Brother         Malignant tumor of lung       Meds/Allergies     Medications Prior to Admission   Medication   • allopurinol (ZYLOPRIM) 100 mg tablet   • BD Pen Needle Pascale U/F 32G X 4 MM MISC   • calcium carbonate (OS-WAYLON) 600 MG tablet   • cholecalciferol (VITAMIN D3) 1,000 units tablet   • cloNIDine (CATAPRES) 0 1 mg tablet   • clopidogrel (PLAVIX) 75 mg tablet   • fluticasone (FLONASE) 50 mcg/act nasal spray   • glipiZIDE (GLUCOTROL) 10 mg tablet   • hydrochlorothiazide (HYDRODIURIL) 12 5 mg tablet   • insulin aspart protamine-insulin aspart (NovoLOG 70/30) 100 units/mL injection   • JARDIANCE 25 MG TABS   • Lancets (OneTouch Delica Plus [id number]) MISC   • Levemir FlexTouch 100 units/mL injection pen   • lisinopril (ZESTRIL) 40 mg tablet   • metoprolol succinate (TOPROL-XL) 25 mg 24 hr tablet   • Multiple Vitamins-Minerals (MULTIVITAMIN ADULT PO)   • omeprazole (PriLOSEC) 40 MG capsule   • pyridoxine (VITAMIN B6) 100 mg tablet   • ranolazine (RANEXA) 500 mg 12 hr tablet   • rosuvastatin (CRESTOR) 20 MG tablet   • tamsulosin (FLOMAX) 0 4 mg   • True Metrix Blood Glucose Test test strip   • Trulicity 1 5 PS/4 2WE SOPN     Current Facility-Administered Medications   Medication Dose Route Frequency   • acetaminophen (TYLENOL) tablet 650 mg  650 mg Oral Q4H PRN   • allopurinol (ZYLOPRIM) tablet 100 mg  100 mg Oral Daily   • atorvastatin (LIPITOR) tablet 40 mg  40 mg Oral Daily With Dinner   • cloNIDine (CATAPRES) tablet 0 1 mg  0 1 mg Oral TID   • clopidogrel (PLAVIX) tablet 75 mg  75 mg Oral Daily   • fluticasone (FLONASE) 50 mcg/act nasal spray 2 spray  2 spray Nasal Daily   • guaiFENesin (MUCINEX) 12 hr tablet 600 mg  600 mg Oral Q12H LESLIE   • heparin (porcine) subcutaneous injection 5,000 Units  5,000 Units Subcutaneous Q8H [facility]   • HYDROmorphone (DILAUDID) injection 0 5 mg  0 5 mg Intravenous Q3H PRN   • HYDROmorphone (DILAUDID) injection 1 mg  1 mg Intravenous Q3H PRN   • insulin glargine (LANTUS) subcutaneous injection 25 Units 0 25 mL  25 Units Subcutaneous HS   • insulin lispro (HumaLOG) 100 units/mL subcutaneous injection 2-12 Units  2-12 Units Subcutaneous TID AC   • insulin lispro (HumaLOG) 100 units/mL subcutaneous injection 2-12 Units  2-12 Units Subcutaneous HS   • metoprolol succinate (TOPROL-XL) 24 hr tablet 25 mg  25 mg Oral Daily   • ondansetron (ZOFRAN) injection 4 mg  4 mg Intravenous Q6H PRN   • oxyCODONE (ROXICODONE) "IR tablet 5 mg  5 mg Oral Q6H PRN   • pantoprazole (PROTONIX) EC tablet 40 mg  40 mg Oral Early Morning   • ranolazine (RANEXA) 12 hr tablet 500 mg  500 mg Oral BID   • sodium chloride 0 9 % infusion  125 mL/hr Intravenous Continuous   • tamsulosin (FLOMAX) capsule 0 8 mg  0 8 mg Oral Daily       Allergies   Allergen Reactions   • Januvia [Sitagliptin] Other (See Comments)     pancreatitis   • Iodinated Contrast Media Chest Pain   • Simvastatin Myalgia   • Wound Dressing Adhesive Other (See Comments)     Burning sensation           Objective     Blood pressure 130/59, pulse 56, temperature 98 °F (36 7 °C), temperature source Oral, resp  rate 20, height 5' 6\" (1 676 m), weight 115 kg (252 lb 10 4 oz), SpO2 96 %  Body mass index is 40 78 kg/m²  Intake/Output Summary (Last 24 hours) at 6/10/2023 1441  Last data filed at 6/10/2023 0844  Gross per 24 hour   Intake --   Output 950 ml   Net -950 ml         PHYSICAL EXAM:      General Appearance:   Alert, cooperative, no distress   HEENT:   Normocephalic, atraumatic, anicteric      Neck:  Supple, symmetrical, trachea midline   Lungs:   Clear to auscultation bilaterally; no rales, rhonchi or wheezing; respirations unlabored    Heart[de-identified]   Regular rate and rhythm; no murmur, rub, or gallop     Abdomen:   Soft, +epigastric pain to palpation, non-distended; normal bowel sounds; no masses, no organomegaly    Genitalia:   Deferred    Rectal:   Deferred    Extremities:  No cyanosis, clubbing or edema    Pulses:  2+ and symmetric all extremities    Skin:  No jaundice, rashes, or lesions    Lymph nodes:  No palpable cervical lymphadenopathy        Lab Results:     Results from last 7 days   Lab Units 06/10/23  0528 06/09/23  2218 06/09/23  1737   HEMATOCRIT % 35 2*  --  42 7   HEMOGLOBIN g/dL 11 2*  --  14 2   PLATELETS Thousands/uL 149 160 196   WBC Thousand/uL 7 16  --  9 25     Results from last 7 days   Lab Units 06/10/23  0528 06/09/23  1737   ALK PHOS U/L 38 53   ALT U/L 12 " 19   AST U/L 14 22   BUN mg/dL 25 27*   CALCIUM mg/dL 8 0* 9 2   CHLORIDE mmol/L 108 104   CO2 mmol/L 29 32   CREATININE mg/dL 1 73* 1 91*   POTASSIUM mmol/L 3 8 3 9   SODIUM mmol/L 144 144         Imaging Studies: I have personally reviewed pertinent imaging studies

## 2023-06-10 NOTE — ASSESSMENT & PLAN NOTE
· Presents with severe abdominal pain  · Chronic medication induced pancreatitis however patient has 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 medications for quite some time  · Right upper quadrant abdominal ultrasound did not show sludge and possible stones  · Lipase significantly elevated greater than 12,000, currently lipase level of 1900, will trend lipase level  · Consider starting clear liquids after MRI of the abdomen  · We will follow-up MRI/MRCP which will be done today    · IV fluid hydration  · Pain regimen as ordered  · Trend lipase  · NPO for now ; advance diet as tolerated  · Consult gastroenterology

## 2023-06-10 NOTE — PLAN OF CARE
Problem: PAIN - ADULT  Goal: Verbalizes/displays adequate comfort level or baseline comfort level  Description: Interventions:  - Encourage patient to monitor pain and request assistance  - Assess pain using appropriate pain scale  - Administer analgesics based on type and severity of pain and evaluate response  - Implement non-pharmacological measures as appropriate and evaluate response  - Consider cultural and social influences on pain and pain management  - Notify physician/advanced practitioner if interventions unsuccessful or patient reports new pain  Outcome: Progressing     Problem: INFECTION - ADULT  Goal: Absence or prevention of progression during hospitalization  Description: INTERVENTIONS:  - Assess and monitor for signs and symptoms of infection  - Monitor lab/diagnostic results  - Administer medications as ordered  - Instruct and encourage patient and family to use good hand hygiene technique  Outcome: Progressing  Goal: Absence of fever/infection during neutropenic period  Description: INTERVENTIONS:  - Monitor WBC    Outcome: Progressing     Problem: SAFETY ADULT  Goal: Patient will remain free of falls  Description: INTERVENTIONS:  - Educate patient/family on patient safety including physical limitations  - Instruct patient to call for assistance with activity   - Consult OT/PT to assist with strengthening/mobility   - Keep Call bell within reach  - Keep bed low and locked with side rails adjusted as appropriate  - Keep care items and personal belongings within reach  - Initiate and maintain comfort rounds  - Make Fall Risk Sign visible to staff  - Offer Toileting every 2  Hours, in advance of need  - Initiate/Maintain  bed and chair alarm  - Apply yellow socks and bracelet for high fall risk patients  - Consider moving patient to room near nurses station  Outcome: Progressing  Goal: Maintain or return to baseline ADL function  Description: INTERVENTIONS:  -  Assess patient's ability to carry out ADLs; assess patient's baseline for ADL function and identify physical deficits which impact ability to perform ADLs (bathing, care of mouth/teeth, toileting, grooming, dressing, etc )  - Assess/evaluate cause of self-care deficits   - Assess range of motion  - Assess patient's mobility; develop plan if impaired  - Assess patient's need for assistive devices and provide as appropriate  - Encourage maximum independence but intervene and supervise when necessary  - Involve family in performance of ADLs  - Assess for home care needs following discharge   - Consider OT consult to assist with ADL evaluation and planning for discharge  - Provide patient education as appropriate  Outcome: Progressing  Goal: Maintains/Returns to pre admission functional level  Description: INTERVENTIONS:  - Perform BMAT or MOVE assessment daily    - Set and communicate daily mobility goal to care team and patient/family/caregiver  - Collaborate with rehabilitation services on mobility goals if consulted  - Perform Range of Motion  3 times a day    - Dangle patient 3  times a day  - Stand patient  3 times a day  - Ambulate patient 3 times a day  - Out of bed to chair  3 times a day   - Out of bed for meals 3  times a day  - Out of bed for toileting  - Record patient progress and toleration of activity level   Outcome: Progressing     Problem: DISCHARGE PLANNING  Goal: Discharge to home or other facility with appropriate resources  Description: INTERVENTIONS:  - Identify barriers to discharge w/patient and caregiver  - Arrange for needed discharge resources and transportation as appropriate  - Identify discharge learning needs (meds, wound care, etc )  - Arrange for interpretive services to assist at discharge as needed  - Refer to Case Management Department for coordinating discharge planning if the patient needs post-hospital services based on physician/advanced practitioner order or complex needs related to functional status, cognitive ability, or social support system  Outcome: Progressing     Problem: Knowledge Deficit  Goal: Patient/family/caregiver demonstrates understanding of disease process, treatment plan, medications, and discharge instructions  Description: Complete learning assessment and assess knowledge base    Interventions:  - Provide teaching at level of understanding  - Provide teaching via preferred learning methods  Outcome: Progressing

## 2023-06-10 NOTE — PROGRESS NOTES
"[facility]  68   Progress Note  Name: Jasvir Benz  MRN: [id number]  Unit/Bed#: -01 I Date of Admission: 6/9/2023   Date of Service: 6/10/2023 I Hospital Day: 0    Assessment/Plan   Stage 3b chronic kidney disease (CKD) [facility])  Assessment & Plan  Lab Results   Component Value Date    CREATININE 1 91 (H) 06/09/2023    CREATININE 1 84 (H) 05/16/2023    CREATININE 1 66 (H) 07/13/2020    EGFR 32 06/09/2023    EGFR 33 05/16/2023    EGFR 39 07/13/2020   · Slight acute on chronic kidney injury likely secondary to decreased oral intake  · IV fluid hydration with normal saline solution at 125 cc/h  · Trend BMP  · Avoid nephrotoxic agents  · Strict intake and output  · Daily standing weights  · Monitor BUNs/creatinine    Venous stasis dermatitis of both lower extremities  Assessment & Plan  · With peripheral artery disease  · Continue home Plavix and Ranexa    Mixed hyperlipidemia  Assessment & Plan  · Continue statin at Lipitor 40 daily    GERD without esophagitis  Assessment & Plan  · Continue home PPI    Type 2 diabetes mellitus with kidney complication, with long-term current use of insulin ([facility])  Assessment & Plan  Lab Results   Component Value Date    HGBA1C 6 7 (H) 05/10/2023       No results for input(s): \"POCGLU\" in the last 72 hours      Blood Sugar Average: Last 72 hrs:     · Decrease home Lantus to 25 units daily at bedtime while NPO  · Sliding scale insulin while inpatient with Accu-Cheks  · NPO for now in the setting of pancreatitis ; advance to diabetic diet when tolerating  · Blood sugar fairly controlled    HTN (hypertension), benign  Assessment & Plan  · Blood pressure stable  · Continue home clonidine and metoprolol  · We will continue to hold hydrochlorothiazide and lisinopril due to mild acute kidney injury,    PAULA (obstructive sleep apnea)  Assessment & Plan  · Continue CPAP at nighttime    * Pancreatitis  Assessment & Plan  · Presents with severe abdominal pain  · Chronic " medication induced pancreatitis however patient has 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 medications for quite some time  · Right upper quadrant abdominal ultrasound did not show sludge and possible stones  · Lipase significantly elevated greater than 12,000, currently lipase level of 1900, will trend lipase level  · Consider starting clear liquids after MRI of the abdomen  · We will follow-up MRI/MRCP which will be done today  · IV fluid hydration  · Pain regimen as ordered  · Trend lipase  · NPO for now ; advance diet as tolerated  · Consult gastroenterology           VTE Pharmacologic Prophylaxis: VTE Score: 5 Moderate Risk (Score 3-4) - Pharmacological DVT Prophylaxis Ordered: heparin  Patient Centered Rounds: I performed bedside rounds with nursing staff today  Discussions with Specialists or Other Care Team Provider: Plan of care was discussed with GI team,    Education and Discussions with Family / Patient: Updated  (wife) via phone  Total Time Spent on Date of Encounter in care of patient: 55 minutes This time was spent on one or more of the following: performing physical exam; counseling and coordination of care; obtaining or reviewing history; documenting in the medical record; reviewing/ordering tests, medications or procedures; communicating with other healthcare professionals and discussing with patient's family/caregivers  Current Length of Stay: 0 day(s)  Current Patient Status: Observation   Certification Statement: The patient will continue to require additional inpatient hospital stay due to Acute pancreatitis   Discharge Plan: Anticipate discharge in 48 hrs to home  Code Status: Level 1 - Full Code    Subjective:   Pt feels better , has abdominal pain , has no nausea or vomiting ,pt has no fever or chills       Objective:     Vitals:   Temp (24hrs), Av 7 °F (37 1 °C), Min:97 8 °F (36 6 °C), Max:99 5 °F (37 5 °C)    Temp:  [97 8 °F (36 6 °C)-99 5 °F (37 5 °C)] 98 8 °F (37 1 °C)  HR: [] 70  Resp:  [14-20] 18  BP: (114-155)/(48-81) 141/70  SpO2:  [94 %-99 %] 94 %  Body mass index is 40 78 kg/m²  Input and Output Summary (last 24 hours): Intake/Output Summary (Last 24 hours) at 6/10/2023 1209  Last data filed at 6/10/2023 0844  Gross per 24 hour   Intake --   Output 950 ml   Net -950 ml       Physical Exam:   Physical Exam   HEENT-PERRLA, moist oral mucosa  Neck-supple, no JVD elevation   Respiratory-equal air entry bilaterally, no rales or rhonchi  Cardiovascular system-S1, S2 heard, systolic murmur heard   Abdomen-soft, epigastric tenderness, no guarding or rigidity, bowel sounds heard  Extremities-no pedal edema  Peripheral pulses palpable  Musculoskeletal-no contractures  Central nervous system-no acute focal neurological deficit ,no sensory or motor deficit noted    Skin-no rash noted        Additional Data:     Labs:  Results from last 7 days   Lab Units 06/10/23  0528   EOS PCT % 2   HEMATOCRIT % 35 2*   HEMOGLOBIN g/dL 11 2*   LYMPHS PCT % 25   MONOS PCT % 12   NEUTROS PCT % 61   PLATELETS Thousands/uL 149   WBC Thousand/uL 7 16     Results from last 7 days   Lab Units 06/10/23  0528   ANION GAP mmol/L 7   ALBUMIN g/dL 3 1*   ALK PHOS U/L 38   ALT U/L 12   AST U/L 14   BUN mg/dL 25   CALCIUM mg/dL 8 0*   CHLORIDE mmol/L 108   CO2 mmol/L 29   CREATININE mg/dL 1 73*   GLUCOSE RANDOM mg/dL 111   POTASSIUM mmol/L 3 8   SODIUM mmol/L 144   TOTAL BILIRUBIN mg/dL 0 46         Results from last 7 days   Lab Units 06/10/23  1040 06/10/23  0706 06/09/23  2205   POC GLUCOSE mg/dl 157* 137 107               Lines/Drains:  Invasive Devices     Peripheral Intravenous Line  Duration           Peripheral IV 05/16/23 Proximal;Right;Ventral (anterior) Forearm 25 days    Peripheral IV 06/09/23 Right;Upper;Ventral (anterior) Arm <1 day                      Imaging: Personally reviewed the following imaging: abdominal/pelvic CT    Recent Cultures (last 7 days):         Last 24 Hours Medication List:   Current Facility-Administered Medications   Medication Dose Route Frequency Provider Last Rate   • acetaminophen  650 mg Oral Q4H PRN Randol Feeler, DO     • allopurinol  100 mg Oral Daily Randol Feeler, DO     • atorvastatin  40 mg Oral Daily With Dinner Randol Feeler, DO     • cloNIDine  0 1 mg Oral TID Randol Feeler, DO     • clopidogrel  75 mg Oral Daily Randol Feeler, DO     • fluticasone  2 spray Nasal Daily Randol Feeler, DO     • guaiFENesin  600 mg Oral Q12H [address] Rosibel Whitley MD     • heparin (porcine)  5,000 Units Subcutaneous [facility] Randol Feeler, DO     • HYDROmorphone  0 5 mg Intravenous Q3H PRN Randol Feeler, DO     • HYDROmorphone  1 mg Intravenous Q3H PRN Randol Feeler, DO     • insulin glargine  25 Units Subcutaneous HS Randol Feeler, DO     • insulin lispro  2-12 Units Subcutaneous TID AC Randol Feeler, DO     • insulin lispro  2-12 Units Subcutaneous HS Randol Feeler, DO     • metoprolol succinate  25 mg Oral Daily Randol Feeler, DO     • ondansetron  4 mg Intravenous Q6H PRN Randol Feeler, DO     • oxyCODONE  5 mg Oral Q6H PRN Randol Feeler, DO     • pantoprazole  40 mg Oral Early Morning Randol Feeler, DO     • ranolazine  500 mg Oral BID Randol Feeler, DO     • sodium chloride  125 mL/hr Intravenous Continuous Randol Feeler,  mL/hr (06/10/23 0531)   • tamsulosin  0 8 mg Oral Daily Randol Feeler, DO          Today, Patient Was Seen By: Ashia Orantes MD    **Please Note: This note may have been constructed using a voice recognition system  **

## 2023-06-10 NOTE — ASSESSMENT & PLAN NOTE
· Blood pressure stable  · Continue home clonidine and metoprolol  · We will continue to hold hydrochlorothiazide and lisinopril due to mild acute kidney injury,

## 2023-06-10 NOTE — ASSESSMENT & PLAN NOTE
Lab Results   Component Value Date    CREATININE 1 91 (H) 06/09/2023    CREATININE 1 84 (H) 05/16/2023    CREATININE 1 66 (H) 07/13/2020    EGFR 32 06/09/2023    EGFR 33 05/16/2023    EGFR 39 07/13/2020   · Slight acute on chronic kidney injury likely secondary to decreased oral intake  · IV fluid hydration with normal saline solution at 125 cc/h  · Trend BMP  · Avoid nephrotoxic agents  · Strict intake and output  · Daily standing weights  · Monitor BUNs/creatinine

## 2023-06-11 PROBLEM — M79.89 LEFT ARM SWELLING: Status: ACTIVE | Noted: 2023-06-11

## 2023-06-11 LAB
ANION GAP SERPL CALCULATED.3IONS-SCNC: 10 MMOL/L (ref 4–13)
BUN SERPL-MCNC: 19 MG/DL (ref 5–25)
CALCIUM SERPL-MCNC: 8.4 MG/DL (ref 8.4–10.2)
CHLORIDE SERPL-SCNC: 106 MMOL/L (ref 96–108)
CHOLEST SERPL-MCNC: 113 MG/DL
CO2 SERPL-SCNC: 25 MMOL/L (ref 21–32)
CREAT SERPL-MCNC: 1.53 MG/DL (ref 0.6–1.3)
GFR SERPL CREATININE-BSD FRML MDRD: 42 ML/MIN/1.73SQ M
GLUCOSE SERPL-MCNC: 111 MG/DL (ref 65–140)
GLUCOSE SERPL-MCNC: 118 MG/DL (ref 65–140)
GLUCOSE SERPL-MCNC: 126 MG/DL (ref 65–140)
GLUCOSE SERPL-MCNC: 130 MG/DL (ref 65–140)
GLUCOSE SERPL-MCNC: 172 MG/DL (ref 65–140)
HDLC SERPL-MCNC: 32 MG/DL
LDLC SERPL CALC-MCNC: 52 MG/DL (ref 0–100)
LIPASE SERPL-CCNC: 923 U/L (ref 11–82)
NONHDLC SERPL-MCNC: 81 MG/DL
POTASSIUM SERPL-SCNC: 4.4 MMOL/L (ref 3.5–5.3)
SODIUM SERPL-SCNC: 141 MMOL/L (ref 135–147)
TRIGL SERPL-MCNC: 143 MG/DL

## 2023-06-11 PROCEDURE — 82948 REAGENT STRIP/BLOOD GLUCOSE: CPT

## 2023-06-11 PROCEDURE — 99222 1ST HOSP IP/OBS MODERATE 55: CPT | Performed by: PHYSICIAN ASSISTANT

## 2023-06-11 PROCEDURE — 80048 BASIC METABOLIC PNL TOTAL CA: CPT | Performed by: INTERNAL MEDICINE

## 2023-06-11 PROCEDURE — 99233 SBSQ HOSP IP/OBS HIGH 50: CPT | Performed by: INTERNAL MEDICINE

## 2023-06-11 PROCEDURE — 80061 LIPID PANEL: CPT | Performed by: INTERNAL MEDICINE

## 2023-06-11 PROCEDURE — 83690 ASSAY OF LIPASE: CPT | Performed by: INTERNAL MEDICINE

## 2023-06-11 PROCEDURE — 94660 CPAP INITIATION&MGMT: CPT

## 2023-06-11 RX ADMIN — HEPARIN SODIUM 5000 UNITS: 5000 INJECTION INTRAVENOUS; SUBCUTANEOUS at 21:09

## 2023-06-11 RX ADMIN — CLOPIDOGREL BISULFATE 75 MG: 75 TABLET ORAL at 09:39

## 2023-06-11 RX ADMIN — GUAIFENESIN 600 MG: 600 TABLET ORAL at 09:39

## 2023-06-11 RX ADMIN — RANOLAZINE 500 MG: 500 TABLET, FILM COATED, EXTENDED RELEASE ORAL at 09:39

## 2023-06-11 RX ADMIN — ATORVASTATIN CALCIUM 40 MG: 40 TABLET, FILM COATED ORAL at 17:34

## 2023-06-11 RX ADMIN — RANOLAZINE 500 MG: 500 TABLET, FILM COATED, EXTENDED RELEASE ORAL at 17:34

## 2023-06-11 RX ADMIN — PANTOPRAZOLE SODIUM 40 MG: 40 TABLET, DELAYED RELEASE ORAL at 05:46

## 2023-06-11 RX ADMIN — CLONIDINE HYDROCHLORIDE 0.1 MG: 0.1 TABLET ORAL at 09:39

## 2023-06-11 RX ADMIN — INSULIN GLARGINE 20 UNITS: 100 INJECTION, SOLUTION SUBCUTANEOUS at 21:09

## 2023-06-11 RX ADMIN — CLONIDINE HYDROCHLORIDE 0.1 MG: 0.1 TABLET ORAL at 21:09

## 2023-06-11 RX ADMIN — HEPARIN SODIUM 5000 UNITS: 5000 INJECTION INTRAVENOUS; SUBCUTANEOUS at 05:46

## 2023-06-11 RX ADMIN — GUAIFENESIN 600 MG: 600 TABLET ORAL at 21:09

## 2023-06-11 RX ADMIN — HEPARIN SODIUM 5000 UNITS: 5000 INJECTION INTRAVENOUS; SUBCUTANEOUS at 13:39

## 2023-06-11 RX ADMIN — METOPROLOL SUCCINATE 25 MG: 25 TABLET, FILM COATED, EXTENDED RELEASE ORAL at 09:40

## 2023-06-11 RX ADMIN — FLUTICASONE PROPIONATE 2 SPRAY: 50 SPRAY, METERED NASAL at 09:43

## 2023-06-11 RX ADMIN — TAMSULOSIN HYDROCHLORIDE 0.8 MG: 0.4 CAPSULE ORAL at 09:39

## 2023-06-11 RX ADMIN — CLONIDINE HYDROCHLORIDE 0.1 MG: 0.1 TABLET ORAL at 17:38

## 2023-06-11 RX ADMIN — INSULIN LISPRO 2 UNITS: 100 INJECTION, SOLUTION INTRAVENOUS; SUBCUTANEOUS at 17:34

## 2023-06-11 RX ADMIN — SODIUM CHLORIDE 125 ML/HR: 0.9 INJECTION, SOLUTION INTRAVENOUS at 01:08

## 2023-06-11 RX ADMIN — ALLOPURINOL 100 MG: 100 TABLET ORAL at 09:39

## 2023-06-11 NOTE — ASSESSMENT & PLAN NOTE
Lab Results   Component Value Date    HGBA1C 6 7 (H) 05/10/2023       Recent Labs     06/10/23  1040 06/10/23  1558 06/10/23  2132 06/11/23  0703   POCGLU 157* 194* 118 130       Blood Sugar Average: Last 72 hrs:     · Decrease home Lantus to 25 units daily at bedtime    · Sliding scale insulin while inpatient with Accu-Cheks  · NPO for now in the setting of pancreatitis ; advance to diabetic diet when tolerating  · Blood sugar fairly controlled currently on clear liquid diet,

## 2023-06-11 NOTE — ASSESSMENT & PLAN NOTE
Left arm swelling noted, with tight ring finger around the finger however no evidence of neurovascular compromise noted, vital stop IV fluid elevate the upper extremity,

## 2023-06-11 NOTE — ASSESSMENT & PLAN NOTE
· Presents with severe abdominal pain  · Chronic medication induced pancreatitis however patient has 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 medications for quite some time  · Right upper quadrant abdominal ultrasound did not show sludge and possible stones  · Lipase significantly elevated greater than 12,000, currently lipase level of 1900, will trend lipase level  · IV fluid hydration  · Pain regimen as ordered  · Consult gastroenterology-GI input highly appreciated, currently on clear liquid diet, clinically improving, awaiting MRI report, will advance diet as tolerated, will discontinue hydrochlorothiazide and Januvia which could be contributing to pancreatitis, will get surgical input for recurrent pancreatitis for Possible gallbladder surgery

## 2023-06-11 NOTE — PLAN OF CARE
Problem: PAIN - ADULT  Goal: Verbalizes/displays adequate comfort level or baseline comfort level  Description: Interventions:  - Encourage patient to monitor pain and request assistance  - Assess pain using appropriate pain scale  - Administer analgesics based on type and severity of pain and evaluate response  - Implement non-pharmacological measures as appropriate and evaluate response  - Consider cultural and social influences on pain and pain management  - Notify physician/advanced practitioner if interventions unsuccessful or patient reports new pain  Outcome: Progressing     Problem: INFECTION - ADULT  Goal: Absence or prevention of progression during hospitalization  Description: INTERVENTIONS:  - Assess and monitor for signs and symptoms of infection  - Monitor lab/diagnostic results  - Monitor all insertion sites, i e  indwelling lines, tubes, and drains  - Monitor endotracheal if appropriate and nasal secretions for changes in amount and color  - [place] appropriate cooling/warming therapies per order  - Administer medications as ordered  - Instruct and encourage patient and family to use good hand hygiene technique  - Identify and instruct in appropriate isolation precautions for identified infection/condition  Outcome: Progressing  Goal: Absence of fever/infection during neutropenic period  Description: INTERVENTIONS:  - Monitor WBC    Outcome: Progressing

## 2023-06-11 NOTE — PROGRESS NOTES
[facility]  68   Progress Note  Name: Jovanny Murphy  MRN: [id number]  Unit/Bed#: -01 I Date of Admission: 6/9/2023   Date of Service: 6/11/2023 I Hospital Day: 1    Assessment/Plan   Left arm swelling  Assessment & Plan  Left arm swelling noted, with tight ring finger around the finger however no evidence of neurovascular compromise noted, vital stop IV fluid elevate the upper extremity,    Stage 3b chronic kidney disease (CKD) [facility])  Assessment & Plan  Lab Results   Component Value Date    CREATININE 1 73 (H) 06/10/2023    CREATININE 1 91 (H) 06/09/2023    CREATININE 1 84 (H) 05/16/2023    EGFR 36 06/10/2023    EGFR 32 06/09/2023    EGFR 33 05/16/2023   · Slight acute on chronic kidney injury likely secondary to decreased oral intake  · IV fluid hydration with normal saline solution at 125 cc/h  · Trend BMP  · Avoid nephrotoxic agents  · Strict intake and output  · Daily standing weights  · Monitor BUNs/creatinine    Venous stasis dermatitis of both lower extremities  Assessment & Plan  · With peripheral artery disease  · Continue home Plavix and Ranexa    Mixed hyperlipidemia  Assessment & Plan  · Lipitor 40mg daily    GERD without esophagitis  Assessment & Plan  · Continue home PPI    Type 2 diabetes mellitus with kidney complication, with long-term current use of insulin [facility])  Assessment & Plan  Lab Results   Component Value Date    HGBA1C 6 7 (H) 05/10/2023       Recent Labs     06/10/23  1040 06/10/23  1558 06/10/23  2132 06/11/23  0703   POCGLU 157* 194* 118 130       Blood Sugar Average: Last 72 hrs:     · Decrease home Lantus to 25 units daily at bedtime    · Sliding scale insulin while inpatient with Accu-Cheks  · NPO for now in the setting of pancreatitis ; advance to diabetic diet when tolerating  · Blood sugar fairly controlled currently on clear liquid diet,    HTN (hypertension), benign  Assessment & Plan  · Blood pressure stable  · Continue home clonidine and metoprolol  · We will continue to hold hydrochlorothiazide and lisinopril due to mild acute kidney injury,  · Discontinue hydrochlorothiazide due to pancreatitis  PAULA (obstructive sleep apnea)  Assessment & Plan  · Continue CPAP nightly    * Pancreatitis  Assessment & Plan  · Presents with severe abdominal pain  · Chronic medication induced pancreatitis however patient has 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 medications for quite some time  · Right upper quadrant abdominal ultrasound did not show sludge and possible stones  · Lipase significantly elevated greater than 12,000, currently lipase level of 1900, will trend lipase level  · IV fluid hydration  · Pain regimen as ordered  · Consult gastroenterology-GI input highly appreciated, currently on clear liquid diet, clinically improving, awaiting MRI report, will advance diet as tolerated, will discontinue hydrochlorothiazide and Januvia which could be contributing to pancreatitis, will get surgical input for recurrent pancreatitis for Possible gallbladder surgery  VTE Pharmacologic Prophylaxis: VTE Score: 5 Moderate Risk (Score 3-4) - Pharmacological DVT Prophylaxis Ordered: heparin  Patient Centered Rounds: I performed bedside rounds with nursing staff today  Discussions with Specialists or Other Care Team Provider: Discussed with GI staff, will follow-up on MRI of the abdomen, discussed with surgical staff and will need gallbladder surgery but will need to time it according to GI input    Education and Discussions with Family / Patient: Updated  (wife) at bedside      Total Time Spent on Date of Encounter in care of patient: 45 minutes This time was spent on one or more of the following: performing physical exam; counseling and coordination of care; obtaining or reviewing history; documenting in the medical record; reviewing/ordering tests, medications or procedures; communicating with other healthcare professionals and discussing with patient's family/caregivers  Current Length of Stay: 1 day(s)  Current Patient Status: Inpatient   Certification Statement: The patient will continue to require additional inpatient hospital stay due to Acute pancreatitis,  Discharge Plan: Anticipate discharge in 48 hrs to home  Code Status: Level 1 - Full Code    Subjective:   Patient is feeling better  Complains of epigastric pain which is improving, tolerating clear liquid diet, no acute overnight events noted,    Objective:     Vitals:   Temp (24hrs), Av 7 °F (37 1 °C), Min:98 °F (36 7 °C), Max:99 3 °F (37 4 °C)    Temp:  [98 °F (36 7 °C)-99 3 °F (37 4 °C)] 98 9 °F (37 2 °C)  HR:  [56-62] 62  Resp:  [18-20] 18  BP: (125-133)/(59-66) 133/65  SpO2:  [94 %-97 %] 97 %  Body mass index is 41 06 kg/m²  Input and Output Summary (last 24 hours): Intake/Output Summary (Last 24 hours) at 2023 1227  Last data filed at 2023 1130  Gross per 24 hour   Intake 520 ml   Output 1200 ml   Net -680 ml       Physical Exam:   Physical Exam   HEENT-PERRLA, moist oral mucosa  Neck-supple, no JVD elevation   Respiratory-equal air entry bilaterally, no rales or rhonchi  Cardiovascular system-S1, S2 heard, systolic murmur heard   abdomen-soft, epigastric tenderness present no guarding or rigidity, bowel sounds heard  Extremities-no pedal edema  Peripheral pulses palpable  Musculoskeletal-no contractures  Central nervous system-no acute focal neurological deficit ,no sensory or motor deficit noted    Skin-no rash noted        Additional Data:     Labs:  Results from last 7 days   Lab Units 06/10/23  0528   EOS PCT % 2   HEMATOCRIT % 35 2*   HEMOGLOBIN g/dL 11 2*   LYMPHS PCT % 25   MONOS PCT % 12   NEUTROS PCT % 61   PLATELETS Thousands/uL 149   WBC Thousand/uL 7 16     Results from last 7 days   Lab Units 23  0547 06/10/23  0528   ANION GAP mmol/L 10 7   ALBUMIN g/dL  --  3 1*   ALK PHOS U/L  --  38   ALT U/L  --  12   AST U/L  --  14   BUN mg/dL 19 25   CALCIUM mg/dL 8 4 8 0*   CHLORIDE mmol/L 106 108   CO2 mmol/L 25 29   CREATININE mg/dL 1 53* 1 73*   GLUCOSE RANDOM mg/dL 111 111   POTASSIUM mmol/L 4 4 3 8   SODIUM mmol/L 141 144   TOTAL BILIRUBIN mg/dL  --  0 46         Results from last 7 days   Lab Units 06/11/23  1111 06/11/23  0703 06/10/23  2132 06/10/23  1558 06/10/23  1040 06/10/23  0706 06/09/23  2205   POC GLUCOSE mg/dl 118 130 118 194* 157* 137 107               Lines/Drains:  Invasive Devices     Peripheral Intravenous Line  Duration           Peripheral IV 06/09/23 Right;Upper;Ventral (anterior) Arm 1 day                      Imaging: Personally reviewed the following imaging: abdominal/pelvic CT    Recent Cultures (last 7 days):         Last 24 Hours Medication List:   Current Facility-Administered Medications   Medication Dose Route Frequency Provider Last Rate   • acetaminophen  650 mg Oral Q4H PRN Virlinda Rosalba, DO     • allopurinol  100 mg Oral Daily Virmarisada Rosalba, DO     • atorvastatin  40 mg Oral Daily With Dinner Virmarisada Rosalba, DO     • cloNIDine  0 1 mg Oral TID Virlinda Rosalba, DO     • clopidogrel  75 mg Oral Daily Virlinda Rosalba, DO     • fluticasone  2 spray Nasal Daily Virmarisada Rosalba, DO     • guaiFENesin  600 mg Oral Q12H [address] Rosibel Holman MD     • heparin (porcine)  5,000 Units Subcutaneous [facility] Virlinda Rosalba, DO     • HYDROmorphone  0 5 mg Intravenous Q3H PRN Virmarisada Rosalba, DO     • HYDROmorphone  1 mg Intravenous Q3H PRN Virlinda Rosalba, DO     • insulin glargine  20 Units Subcutaneous HS Keaton Jensen, DO     • insulin lispro  2-12 Units Subcutaneous TID AC Zackda Rosalba, DO     • insulin lispro  2-12 Units Subcutaneous HS Virmarisada Rosalba, DO     • metoprolol succinate  25 mg Oral Daily Virlinda Rosalba, DO     • ondansetron  4 mg Intravenous Q6H PRN Virlinda Rosalba, DO     • oxyCODONE  5 mg Oral Q6H PRN Virmarisada Rosalba, DO     • pantoprazole  40 mg Oral Early Morning Virmarisada Rosalba, DO     • ranolazine  500 mg Oral BID Jeremías Holly DO     • tamsulosin  0 8 mg Oral Daily Jeremías Holly DO          Today, Patient Was Seen By: Laurent Malin MD    **Please Note: This note may have been constructed using a voice recognition system  **

## 2023-06-11 NOTE — CONSULTS
Consultation - General Surgery  Kate Leary [de-identified] y o  male MRN: [id number]  Unit/Bed#: -01 Encounter: [id number]    Reason for Consult: pancreatitis, gallstones      Assessment/Plan:  [de-identified] y/o male with chronic pancreatitis, HTN, DM2, GERD, PAD, PAULA on CPAP, CKD3b, p/w epigastric abdominal pain on 6/9 and found to have acute pancreatitis  General surgery consulted d/t gallbladder sludge and possible gallstones on RUQ US  Abdominal pain now resolved  On exam, abdomen protuberant but not abnormally distended per pt, non-tender  Pt has had imaging studies with possible gallstones in the past, but has never been offered/recommended cholecystectomy  AFVSS on RA  No leukocytosis  Lipase was elevated >12,000, now improved to 923  Pt was initially NPO, diet is being advanced per medicine team now FLD  MRCP read pending    Pt may benefit from non-urgent cholecystectomy - could be done this hospitalization if OR schedule allows, otherwise if pt is to be discharged soon he can follow up with general surgery as an outpatient to schedule elective cholecystectomy  F/U MRCP result  Continue dietary advancement per primary team for now  Remainder of care per primary team      HPI:    Kate Leary is a [de-identified] y o  male with chronic pancreatitis, HTN, DM2, GERD, PAD, PAULA on CPAP, CKD3b, p/w epigastric abdominal pain on 6/9 and found to have acute pancreatitis  General surgery consulted d/t gallbladder sludge and possible gallstones on RUQ US  Pt is currently asymptomatic and denies history of biliary colic or RUQ pain (his pain is always more epigastric)  He has never had abdominal surgery  Review of Systems:  Review of Systems   Constitutional: Negative for appetite change, chills and fever  Respiratory: Negative for chest tightness and shortness of breath  Cardiovascular: Negative for chest pain  Gastrointestinal: Negative for abdominal distention, abdominal pain, nausea and vomiting     Genitourinary: Negative for difficulty urinating and dysuria          Historical Information   Past Medical History:   Diagnosis Date   • Allergic    • Arthritis    • Chronic kidney disease    • Chronic kidney disease (CKD) stage G3a/A1, moderately decreased glomerular filtration rate (GFR) between 45-59 mL/min/1 73 square meter and albuminuria creatinine ratio less than 30 mg/g (HCC)    • Colon polyp    • Diabetes mellitus (HCC)    • GERD (gastroesophageal reflux disease)    • Heart murmur    • History of echocardiogram    • HL (hearing loss)    • Hyperlipidemia    • Hypertension    • Obesity    • Sleep apnea, obstructive      Past Surgical History:   Procedure Laterality Date   • CARDIAC CATHETERIZATION     • COLONOSCOPY  2018   • COLONOSCOPY     • EYE SURGERY     • HEMORRHOID SURGERY     • JOINT REPLACEMENT  2017    knee   • OTHER SURGICAL HISTORY      Stent    • UPPER GASTROINTESTINAL ENDOSCOPY       Social History   Social History     Substance and Sexual Activity   Alcohol Use Yes   • Alcohol/week: 5 0 standard drinks of alcohol   • Types: 5 Glasses of wine per week    Comment: Occasional      Social History     Substance and Sexual Activity   Drug Use Never     Social History     Tobacco Use   Smoking Status Former   • Packs/day: 1 00   • Years: 15 00   • Total pack years: 15    • Types: Cigarettes   • Quit date: 1972   • Years since quittin 6   • Passive exposure: Past   Smokeless Tobacco Never     Family History   Problem Relation Age of Onset   • Heart disease Mother    • Heart attack Mother         46s   • Cancer Mother    • Coronary artery disease Mother    • Cancer Brother         Malignant tumor of lung       Medications:  Current Facility-Administered Medications   Medication Dose Route Frequency   • acetaminophen (TYLENOL) tablet 650 mg  650 mg Oral Q4H PRN   • allopurinol (ZYLOPRIM) tablet 100 mg  100 mg Oral Daily   • atorvastatin (LIPITOR) tablet 40 mg  40 mg Oral Daily With Dinner   • cloNIDine (CATAPRES) tablet 0 1 mg  0 1 mg Oral TID   • clopidogrel (PLAVIX) tablet 75 mg  75 mg Oral Daily   • fluticasone (FLONASE) 50 mcg/act nasal spray 2 spray  2 spray Nasal Daily   • guaiFENesin (MUCINEX) 12 hr tablet 600 mg  600 mg Oral Q12H [address]   • heparin (porcine) subcutaneous injection 5,000 Units  5,000 Units Subcutaneous Q8H [address]   • HYDROmorphone (DILAUDID) injection 0 5 mg  0 5 mg Intravenous Q3H PRN   • HYDROmorphone (DILAUDID) injection 1 mg  1 mg Intravenous Q3H PRN   • insulin glargine (LANTUS) subcutaneous injection 20 Units 0 2 mL  20 Units Subcutaneous HS   • insulin lispro (HumaLOG) 100 units/mL subcutaneous injection 2-12 Units  2-12 Units Subcutaneous TID AC   • insulin lispro (HumaLOG) 100 units/mL subcutaneous injection 2-12 Units  2-12 Units Subcutaneous HS   • metoprolol succinate (TOPROL-XL) 24 hr tablet 25 mg  25 mg Oral Daily   • ondansetron (ZOFRAN) injection 4 mg  4 mg Intravenous Q6H PRN   • oxyCODONE (ROXICODONE) IR tablet 5 mg  5 mg Oral Q6H PRN   • pantoprazole (PROTONIX) EC tablet 40 mg  40 mg Oral Early Morning   • ranolazine (RANEXA) 12 hr tablet 500 mg  500 mg Oral BID   • tamsulosin (FLOMAX) capsule 0 8 mg  0 8 mg Oral Daily       Allergies   Allergen Reactions   • Januvia [Sitagliptin] Other (See Comments)     pancreatitis   • Iodinated Contrast Media Chest Pain   • Simvastatin Myalgia   • Wound Dressing Adhesive Other (See Comments)     Burning sensation       Physical Examination:  Vitals:   Vitals:    06/11/23 0400 06/11/23 0549 06/11/23 0724 06/11/23 0742   BP:   133/65    BP Location:   Left arm    Pulse:   62    Resp:   18    Temp:   98 9 °F (37 2 °C)    TempSrc:   Oral    SpO2: 97%  97% 97%   Weight:  115 kg (254 lb 6 6 oz)     Height:         Temp  Min: 97 8 °F (36 6 °C)  Max: 99 5 °F (37 5 °C)  IBW (Ideal Body Weight): 63 8 kg    Physical Exam  Vitals and nursing note reviewed  Constitutional:       General: He is not in acute distress  Appearance: He is obese   He is "not toxic-appearing  HENT:      Head: Normocephalic and atraumatic  Eyes:      Extraocular Movements: Extraocular movements intact  Cardiovascular:      Rate and Rhythm: Normal rate and regular rhythm  Pulmonary:      Effort: Pulmonary effort is normal  No respiratory distress  Abdominal:      General: Abdomen is protuberant  Bowel sounds are normal       Palpations: Abdomen is soft  Tenderness: There is no abdominal tenderness  There is no guarding  Musculoskeletal:         General: Normal range of motion  Cervical back: Normal range of motion  Skin:     General: Skin is warm and dry  Neurological:      Mental Status: He is alert and oriented to person, place, and time  Psychiatric:         Mood and Affect: Mood normal          Behavior: Behavior normal          Thought Content: Thought content normal           Diagnostic Data:  Lab:   I have personally reviewed pertinent lab results  , CBC: No results found for: \"ADJUSTEDWBC\", \"HCT\", \"HGB\", \"MCH\", \"MCHC\", \"MCV\", \"MPV\", \"NRBC\", \"PLT\", \"RBC\", \"RDW\", \"WBC\", CMP:   Lab Results   Component Value Date    BUN 19 06/11/2023    CALCIUM 8 4 06/11/2023     06/11/2023    CO2 25 06/11/2023    CREATININE 1 53 (H) 06/11/2023    EGFR 42 06/11/2023    K 4 4 06/11/2023    SODIUM 141 06/11/2023     Results from last 7 days   Lab Units 06/10/23  0528   HEMATOCRIT % 35 2*   HEMOGLOBIN g/dL 11 2*   PLATELETS Thousands/uL 149   WBC Thousand/uL 7 16     Results from last 7 days   Lab Units 06/11/23  0547 06/10/23  0528   ALK PHOS U/L  --  38   ALT U/L  --  12   AST U/L  --  14   BUN mg/dL 19 25   CALCIUM mg/dL 8 4 8 0*   CHLORIDE mmol/L 106 108   CO2 mmol/L 25 29   CREATININE mg/dL 1 53* 1 73*   POTASSIUM mmol/L 4 4 3 8       Imaging: I have personally reviewed the pertinent imaging studies on the PACS system  Procedure: CT abdomen pelvis wo contrast    Result Date: 6/9/2023  Narrative: CT ABDOMEN AND PELVIS WITHOUT IV CONTRAST INDICATION:   Epigastric " pain Epigastric Pain  COMPARISON: 8/20/2018 TECHNIQUE:  CT examination of the abdomen and pelvis was performed without intravenous contrast  Multiplanar 2D reformatted images were created from the source data  This examination, like all CT scans performed in the [facility], was performed utilizing techniques to minimize radiation dose exposure, including the use of iterative reconstruction and automated exposure control  Radiation dose length product (DLP) for this visit:  1349 mGy-cm Enteric contrast was administered  FINDINGS: ABDOMEN LOWER CHEST:  No clinically significant abnormality identified in the visualized lower chest  LIVER/BILIARY TREE:  Unremarkable  GALLBLADDER: There are gallstone(s) within the gallbladder, without pericholecystic inflammatory changes  SPLEEN:  Unremarkable  PANCREAS: Extensive peripancreatic inflammatory change consistent with acute pancreatitis  No abscess or other complication in the [place] enhanced study  ADRENAL GLANDS:  Unremarkable  KIDNEYS/URETERS: Left greater than right renal cysts as before  STOMACH AND BOWEL: Small sliding-type hiatal hernia noted  There is also diverticular disease without diverticulitis  APPENDIX: A normal appendix was visualized  ABDOMINOPELVIC CAVITY:  No ascites  No pneumoperitoneum  No lymphadenopathy  VESSELS:  Unremarkable for patient's age  PELVIS REPRODUCTIVE ORGANS: The prostate is enlarged  URINARY BLADDER:  Unremarkable  ABDOMINAL WALL/INGUINAL REGIONS: Bilateral fat-containing inguinal hernias  OSSEOUS STRUCTURES:  No acute fracture or destructive osseous lesion  Impression: Peripancreatic inflammatory change consistent with acute uncomplicated pancreatitis in this unenhanced study  Workstation performed: [id number]       Active medications:   The patients active medications were reviewed and modified as appropriate  VTE Prophylaxis: Sequential compression device (Venodyne)  and Heparin      Code Status: Level 1 - Full Code    Bonifacio Sue PA-C  6/11/2023

## 2023-06-11 NOTE — PLAN OF CARE
Problem: PAIN - ADULT  Goal: Verbalizes/displays adequate comfort level or baseline comfort level  Description: Interventions:  - Encourage patient to monitor pain and request assistance  - Assess pain using appropriate pain scale  - Administer analgesics based on type and severity of pain and evaluate response  - Implement non-pharmacological measures as appropriate and evaluate response  - Consider cultural and social influences on pain and pain management  - Notify physician/advanced practitioner if interventions unsuccessful or patient reports new pain  Outcome: Progressing     Problem: INFECTION - ADULT  Goal: Absence or prevention of progression during hospitalization  Description: INTERVENTIONS:  - Assess and monitor for signs and symptoms of infection  - Monitor lab/diagnostic results  - Monitor all insertion sites, i e  indwelling lines, tubes, and drains  - Monitor endotracheal if appropriate and nasal secretions for changes in amount and color  - [place] appropriate cooling/warming therapies per order  - Administer medications as ordered  - Instruct and encourage patient and family to use good hand hygiene technique  - Identify and instruct in appropriate isolation precautions for identified infection/condition  Outcome: Progressing  Goal: Absence of fever/infection during neutropenic period  Description: INTERVENTIONS:  - Monitor WBC    Outcome: Progressing     Problem: SAFETY ADULT  Goal: Patient will remain free of falls  Description: INTERVENTIONS:  - Educate patient/family on patient safety including physical limitations  - Instruct patient to call for assistance with activity   - Consult OT/PT to assist with strengthening/mobility   - Keep Call bell within reach  - Keep bed low and locked with side rails adjusted as appropriate  - Keep care items and personal belongings within reach  - Initiate and maintain comfort rounds  - Make Fall Risk Sign visible to staff  - Offer Toileting every 2 Hours, in advance of need  - Initiate/Maintain bed alarm  - Obtain necessary fall risk management equipment:   - Apply yellow socks and bracelet for high fall risk patients  - Consider moving patient to room near nurses station  Outcome: Progressing  Goal: Maintain or return to baseline ADL function  Description: INTERVENTIONS:  -  Assess patient's ability to carry out ADLs; assess patient's baseline for ADL function and identify physical deficits which impact ability to perform ADLs (bathing, care of mouth/teeth, toileting, grooming, dressing, etc )  - Assess/evaluate cause of self-care deficits   - Assess range of motion  - Assess patient's mobility; develop plan if impaired  - Assess patient's need for assistive devices and provide as appropriate  - Encourage maximum independence but intervene and supervise when necessary  - Involve family in performance of ADLs  - Assess for home care needs following discharge   - Consider OT consult to assist with ADL evaluation and planning for discharge  - Provide patient education as appropriate  Outcome: Progressing  Goal: Maintains/Returns to pre admission functional level  Description: INTERVENTIONS:  - Perform BMAT or MOVE assessment daily    - Set and communicate daily mobility goal to care team and patient/family/caregiver  - Collaborate with rehabilitation services on mobility goals if consulted  - Perform Range of Motion 3 times a day  - Reposition patient every 2 hours    - Dangle patient 3 times a day  - Stand patient 3 times a day  - Ambulate patient 3 times a day  - Out of bed to chair 3 times a day   - Out of bed for meals 3 times a day  - Out of bed for toileting  - Record patient progress and toleration of activity level   Outcome: Progressing     Problem: DISCHARGE PLANNING  Goal: Discharge to home or other facility with appropriate resources  Description: INTERVENTIONS:  - Identify barriers to discharge w/patient and caregiver  - Arrange for needed discharge resources and transportation as appropriate  - Identify discharge learning needs (meds, wound care, etc )  - Arrange for interpretive services to assist at discharge as needed  - Refer to Case Management Department for coordinating discharge planning if the patient needs post-hospital services based on physician/advanced practitioner order or complex needs related to functional status, cognitive ability, or social support system  Outcome: Progressing     Problem: Knowledge Deficit  Goal: Patient/family/caregiver demonstrates understanding of disease process, treatment plan, medications, and discharge instructions  Description: Complete learning assessment and assess knowledge base    Interventions:  - Provide teaching at level of understanding  - Provide teaching via preferred learning methods  Outcome: Progressing

## 2023-06-11 NOTE — ASSESSMENT & PLAN NOTE
Lab Results   Component Value Date    CREATININE 1 73 (H) 06/10/2023    CREATININE 1 91 (H) 06/09/2023    CREATININE 1 84 (H) 05/16/2023    EGFR 36 06/10/2023    EGFR 32 06/09/2023    EGFR 33 05/16/2023   · Slight acute on chronic kidney injury likely secondary to decreased oral intake  · IV fluid hydration with normal saline solution at 125 cc/h  · Trend BMP  · Avoid nephrotoxic agents  · Strict intake and output  · Daily standing weights  · Monitor BUNs/creatinine

## 2023-06-12 VITALS
WEIGHT: 247.11 LBS | TEMPERATURE: 98.1 F | HEIGHT: 66 IN | SYSTOLIC BLOOD PRESSURE: 157 MMHG | RESPIRATION RATE: 17 BRPM | BODY MASS INDEX: 39.71 KG/M2 | DIASTOLIC BLOOD PRESSURE: 65 MMHG | HEART RATE: 61 BPM | OXYGEN SATURATION: 96 %

## 2023-06-12 DIAGNOSIS — K86.89 PANCREATIC DUCT STRICTURE: Primary | ICD-10-CM

## 2023-06-12 LAB
ALBUMIN SERPL BCP-MCNC: 3.4 G/DL (ref 3.5–5)
ALP SERPL-CCNC: 40 U/L (ref 34–104)
ALT SERPL W P-5'-P-CCNC: 13 U/L (ref 7–52)
ANION GAP SERPL CALCULATED.3IONS-SCNC: 10 MMOL/L (ref 4–13)
AST SERPL W P-5'-P-CCNC: 14 U/L (ref 13–39)
BILIRUB SERPL-MCNC: 0.62 MG/DL (ref 0.2–1)
BUN SERPL-MCNC: 17 MG/DL (ref 5–25)
CALCIUM ALBUM COR SERPL-MCNC: 9.4 MG/DL (ref 8.3–10.1)
CALCIUM SERPL-MCNC: 8.9 MG/DL (ref 8.4–10.2)
CHLORIDE SERPL-SCNC: 106 MMOL/L (ref 96–108)
CO2 SERPL-SCNC: 23 MMOL/L (ref 21–32)
CREAT SERPL-MCNC: 1.41 MG/DL (ref 0.6–1.3)
ERYTHROCYTE [DISTWIDTH] IN BLOOD BY AUTOMATED COUNT: 13.4 % (ref 11.6–15.1)
GFR SERPL CREATININE-BSD FRML MDRD: 46 ML/MIN/1.73SQ M
GLUCOSE SERPL-MCNC: 153 MG/DL (ref 65–140)
GLUCOSE SERPL-MCNC: 187 MG/DL (ref 65–140)
GLUCOSE SERPL-MCNC: 78 MG/DL (ref 65–140)
GLUCOSE SERPL-MCNC: 91 MG/DL (ref 65–140)
HCT VFR BLD AUTO: 38.2 % (ref 36.5–49.3)
HGB BLD-MCNC: 13.1 G/DL (ref 12–17)
LIPASE SERPL-CCNC: 907 U/L (ref 11–82)
MCH RBC QN AUTO: 32.6 PG (ref 26.8–34.3)
MCHC RBC AUTO-ENTMCNC: 34.3 G/DL (ref 31.4–37.4)
MCV RBC AUTO: 95 FL (ref 82–98)
PLATELET # BLD AUTO: 147 THOUSANDS/UL (ref 149–390)
PMV BLD AUTO: 10.4 FL (ref 8.9–12.7)
POTASSIUM SERPL-SCNC: 4.1 MMOL/L (ref 3.5–5.3)
PROT SERPL-MCNC: 6.3 G/DL (ref 6.4–8.4)
RBC # BLD AUTO: 4.02 MILLION/UL (ref 3.88–5.62)
SODIUM SERPL-SCNC: 139 MMOL/L (ref 135–147)
WBC # BLD AUTO: 6.07 THOUSAND/UL (ref 4.31–10.16)

## 2023-06-12 PROCEDURE — 80053 COMPREHEN METABOLIC PANEL: CPT | Performed by: INTERNAL MEDICINE

## 2023-06-12 PROCEDURE — 83690 ASSAY OF LIPASE: CPT | Performed by: INTERNAL MEDICINE

## 2023-06-12 PROCEDURE — 99231 SBSQ HOSP IP/OBS SF/LOW 25: CPT | Performed by: PHYSICIAN ASSISTANT

## 2023-06-12 PROCEDURE — 99239 HOSP IP/OBS DSCHRG MGMT >30: CPT | Performed by: INTERNAL MEDICINE

## 2023-06-12 PROCEDURE — 99232 SBSQ HOSP IP/OBS MODERATE 35: CPT | Performed by: INTERNAL MEDICINE

## 2023-06-12 PROCEDURE — 82948 REAGENT STRIP/BLOOD GLUCOSE: CPT

## 2023-06-12 PROCEDURE — 94760 N-INVAS EAR/PLS OXIMETRY 1: CPT

## 2023-06-12 PROCEDURE — 85027 COMPLETE CBC AUTOMATED: CPT | Performed by: INTERNAL MEDICINE

## 2023-06-12 RX ORDER — OXYCODONE HYDROCHLORIDE 5 MG/1
5 TABLET ORAL EVERY 8 HOURS PRN
Qty: 12 TABLET | Refills: 0 | Status: SHIPPED | OUTPATIENT
Start: 2023-06-12 | End: 2023-06-22

## 2023-06-12 RX ADMIN — INSULIN LISPRO 2 UNITS: 100 INJECTION, SOLUTION INTRAVENOUS; SUBCUTANEOUS at 12:29

## 2023-06-12 RX ADMIN — GUAIFENESIN 600 MG: 600 TABLET ORAL at 08:53

## 2023-06-12 RX ADMIN — METOPROLOL SUCCINATE 25 MG: 25 TABLET, FILM COATED, EXTENDED RELEASE ORAL at 08:53

## 2023-06-12 RX ADMIN — RANOLAZINE 500 MG: 500 TABLET, FILM COATED, EXTENDED RELEASE ORAL at 08:53

## 2023-06-12 RX ADMIN — CLONIDINE HYDROCHLORIDE 0.1 MG: 0.1 TABLET ORAL at 08:53

## 2023-06-12 RX ADMIN — HEPARIN SODIUM 5000 UNITS: 5000 INJECTION INTRAVENOUS; SUBCUTANEOUS at 06:23

## 2023-06-12 RX ADMIN — PANTOPRAZOLE SODIUM 40 MG: 40 TABLET, DELAYED RELEASE ORAL at 06:23

## 2023-06-12 RX ADMIN — FLUTICASONE PROPIONATE 2 SPRAY: 50 SPRAY, METERED NASAL at 09:01

## 2023-06-12 RX ADMIN — ALLOPURINOL 100 MG: 100 TABLET ORAL at 08:53

## 2023-06-12 RX ADMIN — TAMSULOSIN HYDROCHLORIDE 0.8 MG: 0.4 CAPSULE ORAL at 08:53

## 2023-06-12 RX ADMIN — CLOPIDOGREL BISULFATE 75 MG: 75 TABLET ORAL at 08:53

## 2023-06-12 NOTE — ASSESSMENT & PLAN NOTE
Lab Results   Component Value Date    CREATININE 1 41 (H) 06/12/2023    CREATININE 1 53 (H) 06/11/2023    CREATININE 1 73 (H) 06/10/2023    EGFR 46 06/12/2023    EGFR 42 06/11/2023    EGFR 36 06/10/2023   · Slight acute on chronic kidney injury likely secondary to decreased oral intake  · IV fluid hydration with normal saline solution at 125 cc/h  · Trend BMP  · Avoid nephrotoxic agents  · Strict intake and output  · Daily standing weights  · Monitor BUNs/creatinine

## 2023-06-12 NOTE — PLAN OF CARE
Problem: PAIN - ADULT  Goal: Verbalizes/displays adequate comfort level or baseline comfort level  Description: Interventions:  - Encourage patient to monitor pain and request assistance  - Assess pain using appropriate pain scale  - Administer analgesics based on type and severity of pain and evaluate response  - Implement non-pharmacological measures as appropriate and evaluate response  - Consider cultural and social influences on pain and pain management  - Notify physician/advanced practitioner if interventions unsuccessful or patient reports new pain  Outcome: Progressing     Problem: INFECTION - ADULT  Goal: Absence or prevention of progression during hospitalization  Description: INTERVENTIONS:  - Assess and monitor for signs and symptoms of infection  - Monitor lab/diagnostic results  - Monitor all insertion sites, i e  indwelling lines, tubes, and drains  - Monitor endotracheal if appropriate and nasal secretions for changes in amount and color  - [place] appropriate cooling/warming therapies per order  - Administer medications as ordered  - Instruct and encourage patient and family to use good hand hygiene technique  - Identify and instruct in appropriate isolation precautions for identified infection/condition  Outcome: Progressing  Goal: Absence of fever/infection during neutropenic period  Description: INTERVENTIONS:  - Monitor WBC    Outcome: Progressing     Problem: SAFETY ADULT  Goal: Patient will remain free of falls  Description: INTERVENTIONS:  - Educate patient/family on patient safety including physical limitations  - Instruct patient to call for assistance with activity   - Consult OT/PT to assist with strengthening/mobility   - Keep Call bell within reach  - Keep bed low and locked with side rails adjusted as appropriate  - Keep care items and personal belongings within reach  - Initiate and maintain comfort rounds  - Make Fall Risk Sign visible to staff  - Offer Toileting every 2 Hours, in advance of need  - Initiate/Maintain bed alarm  - Obtain necessary fall risk management equipment:   - Apply yellow socks and bracelet for high fall risk patients  - Consider moving patient to room near nurses station  Outcome: Progressing  Goal: Maintain or return to baseline ADL function  Description: INTERVENTIONS:  -  Assess patient's ability to carry out ADLs; assess patient's baseline for ADL function and identify physical deficits which impact ability to perform ADLs (bathing, care of mouth/teeth, toileting, grooming, dressing, etc )  - Assess/evaluate cause of self-care deficits   - Assess range of motion  - Assess patient's mobility; develop plan if impaired  - Assess patient's need for assistive devices and provide as appropriate  - Encourage maximum independence but intervene and supervise when necessary  - Involve family in performance of ADLs  - Assess for home care needs following discharge   - Consider OT consult to assist with ADL evaluation and planning for discharge  - Provide patient education as appropriate  Outcome: Progressing  Goal: Maintains/Returns to pre admission functional level  Description: INTERVENTIONS:  - Perform BMAT or MOVE assessment daily    - Set and communicate daily mobility goal to care team and patient/family/caregiver  - Collaborate with rehabilitation services on mobility goals if consulted  - Perform Range of Motion 3 times a day  - Reposition patient every 2 hours    - Dangle patient 3 times a day  - Stand patient 3 times a day  - Ambulate patient 3 times a day  - Out of bed to chair 3 times a day   - Out of bed for meals 3 times a day  - Out of bed for toileting  - Record patient progress and toleration of activity level   Outcome: Progressing     Problem: DISCHARGE PLANNING  Goal: Discharge to home or other facility with appropriate resources  Description: INTERVENTIONS:  - Identify barriers to discharge w/patient and caregiver  - Arrange for needed discharge resources and transportation as appropriate  - Identify discharge learning needs (meds, wound care, etc )  - Arrange for interpretive services to assist at discharge as needed  - Refer to Case Management Department for coordinating discharge planning if the patient needs post-hospital services based on physician/advanced practitioner order or complex needs related to functional status, cognitive ability, or social support system  Outcome: Progressing     Problem: Knowledge Deficit  Goal: Patient/family/caregiver demonstrates understanding of disease process, treatment plan, medications, and discharge instructions  Description: Complete learning assessment and assess knowledge base    Interventions:  - Provide teaching at level of understanding  - Provide teaching via preferred learning methods  Outcome: Progressing

## 2023-06-12 NOTE — PROGRESS NOTES
"Progress Note - General Surgery   Ralph Like [de-identified] y o  male MRN: [id number]  Unit/Bed#: -01 Encounter: [id number]    Assessment/Plan    [de-identified] y/o male with chronic pancreatitis, HTN, DM2, GERD, PAD, PAULA on CPAP, CKD3b, p/w epigastric abdominal pain on 6/9 and found to have acute pancreatitis  General surgery consulted d/t gallbladder sludge and possible gallstones on RUQ US  Pt feeling well this morning, no abdominal pain, N/V  Abdomen soft, non-tender, normal BS  AFVSS  No new labs today  Tolerating diabetic diet w/o N/V (had eggs, cereal, muffin for breakfast)  MRCP result pending    Pt appropriate for discharge from surgical perspective  Recommend outpatient f/u with general surgery to discuss elective cholecystectomy  Gen surg will sign off, re-consult as needed      /65 (BP Location: Left arm)   Pulse 61   Temp 97 8 °F (36 6 °C) (Oral)   Resp 17   Ht 5' 6\" (1 676 m)   Wt 112 kg (247 lb 1 8 oz)   SpO2 96%   BMI 39 89 kg/m²     Labs in chart were reviewed  Intake/Output Summary (Last 24 hours) at 6/12/2023 0838  Last data filed at 6/12/2023 0513  Gross per 24 hour   Intake 1480 ml   Output 1350 ml   Net 130 ml           Subjective/Objective     Subjective: Pt seen and examined  No acute overnight events  Pt is feeling well with no complaints  He had eggs, a muffin, and cereal for breakfast this morning  No return of abdominal pain  Review of Systems   Constitutional: Negative for activity change, appetite change, chills and fever  Respiratory: Negative for shortness of breath  Cardiovascular: Negative for chest pain  Gastrointestinal: Negative for abdominal pain, constipation, diarrhea, nausea and vomiting  Genitourinary: Negative for difficulty urinating and dysuria  Objective:     Physical Exam  Vitals and nursing note reviewed  Constitutional:       General: He is not in acute distress  Appearance: Normal appearance  He is not toxic-appearing     HENT:      " Head: Normocephalic and atraumatic  Eyes:      Extraocular Movements: Extraocular movements intact  Pulmonary:      Effort: Pulmonary effort is normal  No respiratory distress  Abdominal:      General: Bowel sounds are normal  There is no distension  Palpations: Abdomen is soft  Tenderness: There is no abdominal tenderness  There is no guarding  Musculoskeletal:         General: Normal range of motion  Cervical back: Normal range of motion  Skin:     General: Skin is warm and dry  Neurological:      Mental Status: He is alert and oriented to person, place, and time  Psychiatric:         Mood and Affect: Mood normal          Behavior: Behavior normal          Thought Content:  Thought content normal             Maddy Gamble PA-C  6/12/2023

## 2023-06-12 NOTE — ASSESSMENT & PLAN NOTE
· Blood pressure stable  · Continue home clonidine and metoprolol  · We will continue to hold hydrochlorothiazide and lisinopril due to mild acute kidney injury,  · Discontinue hydrochlorothiazide due to pancreatitis

## 2023-06-12 NOTE — ASSESSMENT & PLAN NOTE
Lab Results   Component Value Date    HGBA1C 6 7 (H) 05/10/2023       Recent Labs     06/11/23 2031 06/12/23  0512 06/12/23  0738 06/12/23  1110   POCGLU 126 78 91 187*       Blood Sugar Average: Last 72 hrs:     · Decrease home Lantus to 25 units daily at bedtime    · Sliding scale insulin while inpatient with Accu-Cheks  · NPO for now in the setting of pancreatitis ; advance to diabetic diet when tolerating  · Blood sugar fairly controlled currently on clear liquid diet,

## 2023-06-12 NOTE — TELEPHONE ENCOUNTER
Patient had CT scan and MRI done  Do you still want CT or MRI in 2 months? Please advise   Thank you

## 2023-06-12 NOTE — PROGRESS NOTES
Patient discharged to home wit spouse  VSS  Seen by [facility]IM prior  Discharge medications reviewed by [facility]   Patient and spouse verbalized understanding of discharge instructions including medications, diagnosis and follow-up

## 2023-06-12 NOTE — PROGRESS NOTES
Progress Note - Marcie Castro [de-identified] y o  male MRN: [id number]    Unit/Bed#: -01 Encounter: [id number]        Assessment/Plan:  Acute interstitial pancreatitis, uncomplicated  Epigastric abdominal pain  Epigastric pain, elevated lipase, CT diagnostic imaging for pancreatitis  Unclear etiology, although concern for medication induced vs microlithiasis vs alcohol (although patient admits to only minimal alcohol consumption)  Liver enzymes remain normal  Does have gallbladder sludge/possible nonshadowing stones on RUQ us, surgery is on case and is cleared from their standpoint from surgical perspective and plan for outpatient follow-up to discuss elective cholecystectomy  Both Januvia and hydrochlorothiazide are possible etiologic agents - would recommend hold and consider alternatives of these agents  Given his age, recommended MRI/MRCP to r/o malignancy and evaluate pancreatic parenchyma  -MRI MRCP is as follows-Probable gallstone/sludge without biliary ductal dilatation or evidence of choledocholithiasis  Cystic areas with reticular bands of lower signal near the gallbladder fundus suggests adenomyomatosis correlating with the ultrasound  No obvious polyp is identified  Continued follow-up is advised    Evidence of peripancreatic edema suggests pancreatitis  There is normal pancreatic enhancement and no evidence of collection    The pancreatic duct demonstrates some focal narrowing at the junction of the head and neck region which may be related to artifact and/or patient's pancreatitis  There is no evidence of dilatation proximally or obvious lesion  A small stricture or ductal   nodule is difficult to exclude however   Follow-up MRI/MRCP is advised in 4 to 6 weeks time for reevaluation of the pancreas/pancreatic duct  -We will place order for MRI MRCP follow-up in 6 weeks and if persistent abnormality may need endoscopic ultrasound  -diet was advanced to carb consistent   -IgG4 was ordered for evaluation of "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       Subjective:   Patient is lying in bed  Reports that he tolerated diet  States that pain is a 0-1 out of 10  Patient went for MRI on Saturday and currently this is being read when I talked to the radiology reading room  Reports last BM on Friday which is a typical bowel pattern for him      Objective:     Vitals: /65 (BP Location: Left arm)   Pulse 61   Temp 97 8 °F (36 6 °C) (Oral)   Resp 17   Ht 5' 6\" (1 676 m)   Wt 112 kg (247 lb 1 8 oz)   SpO2 96%   BMI 39 89 kg/m²       Physical Exam:  Gen-alert, nad   Abd-+bs, protuberant, minimal epigastric tenderness to palpation, nondistended, no rebound rigidity guarding       Lab, Imaging and other studies:   Recent Results (from the past 72 hour(s))   CBC and differential    Collection Time: 06/09/23  5:37 PM   Result Value Ref Range    WBC 9 25 4 31 - 10 16 Thousand/uL    RBC 4 43 3 88 - 5 62 Million/uL    Hemoglobin 14 2 12 0 - 17 0 g/dL    Hematocrit 42 7 36 5 - 49 3 %    MCV 96 82 - 98 fL    MCH 32 1 26 8 - 34 3 pg    MCHC 33 3 31 4 - 37 4 g/dL    RDW 13 6 11 6 - 15 1 %    MPV 10 0 8 9 - 12 7 fL    Platelets 655 207 - 027 Thousands/uL    nRBC 0 /100 WBCs    Neutrophils Relative 78 (H) 43 - 75 %    Immat GRANS % 1 0 - 2 %    Lymphocytes Relative 15 14 - 44 %    Monocytes Relative 4 4 - 12 %    Eosinophils Relative 2 0 - 6 %    Basophils Relative 0 0 - 1 %    Neutrophils Absolute 7 23 1 85 - 7 62 Thousands/µL    Immature Grans Absolute 0 05 0 00 - 0 20 Thousand/uL    Lymphocytes Absolute 1 38 0 60 - 4 47 Thousands/µL    Monocytes Absolute 0 41 0 17 - 1 22 Thousand/µL    Eosinophils Absolute 0 16 0 00 - 0 61 Thousand/µL    Basophils Absolute 0 02 0 00 - 0 10 Thousands/µL   Comprehensive metabolic panel    Collection Time: 06/09/23  5:37 PM   Result Value Ref Range    Sodium 144 135 - 147 mmol/L    Potassium 3 9 3 5 - 5 3 mmol/L    Chloride 104 96 - 108 mmol/L    CO2 32 21 - 32 mmol/L    ANION GAP 8 4 " "- 13 mmol/L    BUN 27 (H) 5 - 25 mg/dL    Creatinine 1 91 (H) 0 60 - 1 30 mg/dL    Glucose 86 65 - 140 mg/dL    Calcium 9 2 8 4 - 10 2 mg/dL    AST 22 13 - 39 U/L    ALT 19 7 - 52 U/L    Alkaline Phosphatase 53 34 - 104 U/L    Total Protein 7 1 6 4 - 8 4 g/dL    Albumin 4 0 3 5 - 5 0 g/dL    Total Bilirubin 0 38 0 20 - 1 00 mg/dL    eGFR 32 ml/min/1 73sq m   Lipase    Collection Time: 06/09/23  5:37 PM   Result Value Ref Range    Lipase >12,000 (H) 11 - 82 u/L   HS Troponin 0hr (reflex protocol)    Collection Time: 06/09/23  5:37 PM   Result Value Ref Range    hs TnI 0hr 31 \"Refer to ACS Flowchart\"- see link ng/L   HS Troponin I 2hr    Collection Time: 06/09/23  8:25 PM   Result Value Ref Range    hs TnI 2hr 29 \"Refer to ACS Flowchart\"- see link ng/L    Delta 2hr hsTnI -2 <20 ng/L   Fingerstick Glucose (POCT)    Collection Time: 06/09/23 10:05 PM   Result Value Ref Range    POC Glucose 107 65 - 140 mg/dl   Platelet count    Collection Time: 06/09/23 10:18 PM   Result Value Ref Range    Platelets 144 445 - 147 Thousands/uL    MPV 10 3 8 9 - 12 7 fL   HS Troponin I 4hr    Collection Time: 06/09/23 10:18 PM   Result Value Ref Range    hs TnI 4hr 27 \"Refer to ACS Flowchart\"- see link ng/L    Delta 4hr hsTnI -4 <20 ng/L   CBC and differential    Collection Time: 06/10/23  5:28 AM   Result Value Ref Range    WBC 7 16 4 31 - 10 16 Thousand/uL    RBC 3 53 (L) 3 88 - 5 62 Million/uL    Hemoglobin 11 2 (L) 12 0 - 17 0 g/dL    Hematocrit 35 2 (L) 36 5 - 49 3 %     (H) 82 - 98 fL    MCH 31 7 26 8 - 34 3 pg    MCHC 31 8 31 4 - 37 4 g/dL    RDW 13 9 11 6 - 15 1 %    MPV 10 3 8 9 - 12 7 fL    Platelets 922 571 - 052 Thousands/uL    nRBC 0 /100 WBCs    Neutrophils Relative 61 43 - 75 %    Immat GRANS % 0 0 - 2 %    Lymphocytes Relative 25 14 - 44 %    Monocytes Relative 12 4 - 12 %    Eosinophils Relative 2 0 - 6 %    Basophils Relative 0 0 - 1 %    Neutrophils Absolute 4 33 1 85 - 7 62 Thousands/µL    Immature Grans " Absolute 0 03 0 00 - 0 20 Thousand/uL    Lymphocytes Absolute 1 80 0 60 - 4 47 Thousands/µL    Monocytes Absolute 0 85 0 17 - 1 22 Thousand/µL    Eosinophils Absolute 0 12 0 00 - 0 61 Thousand/µL    Basophils Absolute 0 03 0 00 - 0 10 Thousands/µL   Comprehensive metabolic panel    Collection Time: 06/10/23  5:28 AM   Result Value Ref Range    Sodium 144 135 - 147 mmol/L    Potassium 3 8 3 5 - 5 3 mmol/L    Chloride 108 96 - 108 mmol/L    CO2 29 21 - 32 mmol/L    ANION GAP 7 4 - 13 mmol/L    BUN 25 5 - 25 mg/dL    Creatinine 1 73 (H) 0 60 - 1 30 mg/dL    Glucose 111 65 - 140 mg/dL    Glucose, Fasting 111 (H) 65 - 99 mg/dL    Calcium 8 0 (L) 8 4 - 10 2 mg/dL    Corrected Calcium 8 7 8 3 - 10 1 mg/dL    AST 14 13 - 39 U/L    ALT 12 7 - 52 U/L    Alkaline Phosphatase 38 34 - 104 U/L    Total Protein 5 5 (L) 6 4 - 8 4 g/dL    Albumin 3 1 (L) 3 5 - 5 0 g/dL    Total Bilirubin 0 46 0 20 - 1 00 mg/dL    eGFR 36 ml/min/1 73sq m   Lipase    Collection Time: 06/10/23  5:28 AM   Result Value Ref Range    Lipase 1,943 (H) 11 - 82 u/L   Fingerstick Glucose (POCT)    Collection Time: 06/10/23  7:06 AM   Result Value Ref Range    POC Glucose 137 65 - 140 mg/dl   Fingerstick Glucose (POCT)    Collection Time: 06/10/23 10:40 AM   Result Value Ref Range    POC Glucose 157 (H) 65 - 140 mg/dl   Fingerstick Glucose (POCT)    Collection Time: 06/10/23  3:58 PM   Result Value Ref Range    POC Glucose 194 (H) 65 - 140 mg/dl   Fingerstick Glucose (POCT)    Collection Time: 06/10/23  9:32 PM   Result Value Ref Range    POC Glucose 118 65 - 140 mg/dl   Lipid panel    Collection Time: 06/11/23  5:47 AM   Result Value Ref Range    Cholesterol 113 See Comment mg/dL    Triglycerides 143 See Comment mg/dL    HDL, Direct 32 (L) >=40 mg/dL    LDL Calculated 52 0 - 100 mg/dL    Non-HDL-Chol (CHOL-HDL) 81 mg/dl   Basic metabolic panel    Collection Time: 06/11/23  5:47 AM   Result Value Ref Range    Sodium 141 135 - 147 mmol/L    Potassium 4 4 3 5 - 5 3 mmol/L    Chloride 106 96 - 108 mmol/L    CO2 25 21 - 32 mmol/L    ANION GAP 10 4 - 13 mmol/L    BUN 19 5 - 25 mg/dL    Creatinine 1 53 (H) 0 60 - 1 30 mg/dL    Glucose 111 65 - 140 mg/dL    Calcium 8 4 8 4 - 10 2 mg/dL    eGFR 42 ml/min/1 73sq m   Lipase    Collection Time: 06/11/23  5:47 AM   Result Value Ref Range    Lipase 923 (H) 11 - 82 u/L   Fingerstick Glucose (POCT)    Collection Time: 06/11/23  7:03 AM   Result Value Ref Range    POC Glucose 130 65 - 140 mg/dl   Fingerstick Glucose (POCT)    Collection Time: 06/11/23 11:11 AM   Result Value Ref Range    POC Glucose 118 65 - 140 mg/dl   Fingerstick Glucose (POCT)    Collection Time: 06/11/23  4:00 PM   Result Value Ref Range    POC Glucose 172 (H) 65 - 140 mg/dl   Fingerstick Glucose (POCT)    Collection Time: 06/11/23  8:31 PM   Result Value Ref Range    POC Glucose 126 65 - 140 mg/dl   Fingerstick Glucose (POCT)    Collection Time: 06/12/23  5:12 AM   Result Value Ref Range    POC Glucose 78 65 - 140 mg/dl   Fingerstick Glucose (POCT)    Collection Time: 06/12/23  7:38 AM   Result Value Ref Range    POC Glucose 91 65 - 140 mg/dl

## 2023-06-12 NOTE — DISCHARGE INSTR - AVS FIRST PAGE
Advised to stop Jardiance and hydrochlorothiazide  Follow-up with general surgery Dr Trina Sevilla for gallbladder surgery,

## 2023-06-12 NOTE — PLAN OF CARE
Problem: PAIN - ADULT  Goal: Verbalizes/displays adequate comfort level or baseline comfort level  Description: Interventions:  - Encourage patient to monitor pain and request assistance  - Assess pain using appropriate pain scale  - Administer analgesics based on type and severity of pain and evaluate response  - Implement non-pharmacological measures as appropriate and evaluate response  - Consider cultural and social influences on pain and pain management  - Notify physician/advanced practitioner if interventions unsuccessful or patient reports new pain  Outcome: Progressing     Problem: INFECTION - ADULT  Goal: Absence or prevention of progression during hospitalization  Description: INTERVENTIONS:  - Assess and monitor for signs and symptoms of infection  - Monitor lab/diagnostic results  - Monitor all insertion sites, i e  indwelling lines, tubes, and drains  - Monitor endotracheal if appropriate and nasal secretions for changes in amount and color  - [place] appropriate cooling/warming therapies per order  - Administer medications as ordered  - Instruct and encourage patient and family to use good hand hygiene technique  - Identify and instruct in appropriate isolation precautions for identified infection/condition  Outcome: Progressing  Goal: Absence of fever/infection during neutropenic period  Description: INTERVENTIONS:  - Monitor WBC    Outcome: Progressing     Problem: DISCHARGE PLANNING  Goal: Discharge to home or other facility with appropriate resources  Description: INTERVENTIONS:  - Identify barriers to discharge w/patient and caregiver  - Arrange for needed discharge resources and transportation as appropriate  - Identify discharge learning needs (meds, wound care, etc )  - Arrange for interpretive services to assist at discharge as needed  - Refer to Case Management Department for coordinating discharge planning if the patient needs post-hospital services based on physician/advanced practitioner order or complex needs related to functional status, cognitive ability, or social support system  Outcome: Progressing     Problem: Knowledge Deficit  Goal: Patient/family/caregiver demonstrates understanding of disease process, treatment plan, medications, and discharge instructions  Description: Complete learning assessment and assess knowledge base    Interventions:  - Provide teaching at level of understanding  - Provide teaching via preferred learning methods  Outcome: Progressing

## 2023-06-13 ENCOUNTER — TRANSITIONAL CARE MANAGEMENT (OUTPATIENT)
Dept: FAMILY MEDICINE CLINIC | Facility: CLINIC | Age: 83
End: 2023-06-13

## 2023-06-13 ENCOUNTER — PATIENT MESSAGE (OUTPATIENT)
Dept: FAMILY MEDICINE CLINIC | Facility: CLINIC | Age: 83
End: 2023-06-13

## 2023-06-13 NOTE — TELEPHONE ENCOUNTER
I called and spoke to patient  He is aware to get MRI done in 2 months  Per patients request I sent my chart message with the number for central scheduling

## 2023-06-13 NOTE — DISCHARGE SUMMARY
[address]  Discharge- Dana Soliman 1940, [de-identified] y o  male MRN: [id number]  Unit/Bed#: -Marissa Encounter: [id number]  Primary Care Provider: Abdirahman Levine MD   Date and time admitted to hospital: 6/9/2023  5:10 PM    Left arm swelling  Assessment & Plan  Left arm swelling noted, with tight ring finger around the finger however no evidence of neurovascular compromise noted, vital stop IV fluid elevate the upper extremity,    Stage 3b chronic kidney disease (CKD) [facility])  Assessment & Plan  Lab Results   Component Value Date    CREATININE 1 41 (H) 06/12/2023    CREATININE 1 53 (H) 06/11/2023    CREATININE 1 73 (H) 06/10/2023    EGFR 46 06/12/2023    EGFR 42 06/11/2023    EGFR 36 06/10/2023   · Slight acute on chronic kidney injury likely secondary to decreased oral intake  · IV fluid hydration with normal saline solution at 125 cc/h  · Trend BMP  · Avoid nephrotoxic agents  · Strict intake and output  · Daily standing weights  · Monitor BUNs/creatinine    Venous stasis dermatitis of both lower extremities  Assessment & Plan  · With peripheral artery disease  · Continue home Plavix and Ranexa    Mixed hyperlipidemia  Assessment & Plan  · Lipitor 40mg daily    GERD without esophagitis  Assessment & Plan  · Continue home PPI    Type 2 diabetes mellitus with kidney complication, with long-term current use of insulin [facility])  Assessment & Plan  Lab Results   Component Value Date    HGBA1C 6 7 (H) 05/10/2023       Recent Labs     06/11/23  2031 06/12/23  0512 06/12/23  0738 06/12/23  1110   POCGLU 126 78 91 187*       Blood Sugar Average: Last 72 hrs:     · Decrease home Lantus to 25 units daily at bedtime    · Sliding scale insulin while inpatient with Accu-Cheks  · NPO for now in the setting of pancreatitis ; advance to diabetic diet when tolerating  · Blood sugar fairly controlled currently on clear liquid diet,    HTN (hypertension), benign  Assessment & Plan  · Blood pressure stable  · Continue home clonidine and metoprolol  · We will continue to hold hydrochlorothiazide and lisinopril due to mild acute kidney injury,  · Discontinue hydrochlorothiazide due to pancreatitis  PAULA (obstructive sleep apnea)  Assessment & Plan  · Continue CPAP nightly    * Pancreatitis  Assessment & Plan  · Presents with severe abdominal pain  · Chronic medication induced pancreatitis however patient has 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 medications for quite some time  · Right upper quadrant abdominal ultrasound did not show sludge and possible stones  · Lipase significantly elevated greater than 12,000, currently lipase level of 1900, will trend lipase level  · IV fluid hydration  · Pain regimen as ordered  · Consult gastroenterology-GI input highly appreciated, currently on clear liquid diet, clinically improving, awaiting MRI report, will advance diet as tolerated, will discontinue hydrochlorothiazide and Januvia which could be contributing to pancreatitis, will get surgical input for recurrent pancreatitis for Possible gallbladder surgery  Medical Problems     Resolved Problems  Date Reviewed: 6/12/2023   None         Admission Date:   Admission Orders (From admission, onward)     Ordered        06/10/23 1250  Inpatient Admission  Once            06/09/23 1912  Place in Observation  Once                        Admitting Diagnosis: Abdominal pain [R10 9]  Drug-induced acute pancreatitis without infection or necrosis [K85 30]  Acute pancreatitis without infection or necrosis, unspecified pancreatitis type [K85 90]    HPI:   Thaddeus Hughes is a [de-identified] y o  male with a past medical history significant for chronic pancreatitis, hypertension, type 2 diabetes mellitus, GERD, peripheral artery disease, PAULA on CPAP, stage IIIb CKD who presents with complaints of abdominal pain  The patient has a well-known history of what appears to be medication induced pancreatitis    The patient did just have a right upper quadrant abdominal ultrasound which showed gallbladder sludge and possible stones  On presentation, lipase elevated to greater than 12,000  Hemodynamically stable and saturating well on room air  Slight acute kidney injury likely secondary to decreased oral intake  The patient was assigned observation in the setting of acute on chronic pancreatitis and to rule out choledocholithiasis  Procedures Performed:   Orders Placed This Encounter   Procedures   • ED ECG Documentation Only       Summary of Hospital Course:     Patient was admitted with severe abdominal pain noted to have acute pancreatitis with elevated lipase level of more than 12,000  CT of the abdomen was reviewed which showed peripancreatic inflammatory change consistent with acute uncomplicated pancreatitis  Patient was kept n p o  and given IV fluid hydration aggressive, and also patient is on hydrochlorothiazide and was on Jardiance which was stopped 2 and half weeks ago as per the PCP  Patient does happen to have gallbladder sludge or possible nonshadowing stones from right upper quadrant ultrasound  Surgery was consulted and plan was to get cholecystectomy as an outpatient  MRI/MRCP was also done shows probable gallstone and sludge without biliary ductal dilation or evidence of choledocholithiasis  Evidence of peripancreatic edema suggesting pancreatitis noted, lipase level has been trending down clinically patient states he is feeling better abdominal pain has resolved tolerating p o  diet  Plan of care was discussed with GI team and recommended to stop trending the lipase level, and stable for discharge from GI standpoint  Patient needs follow-up with GI and general surgery for gallbladder surgery as an outpatient  Advised to be consistent with low-carb diet and continue to hold hydrochlorothiazide and Jardiance  Patient is not on Januvia anymore    Education reconciliation was discussed in length with the patient and the family at bedside  HEENT-PERRLA, moist oral mucosa  Neck-supple, no JVD elevation   Respiratory-equal air entry bilaterally, no rales or rhonchi  Cardiovascular system-S1, S2 heard, no murmur or gallops or rubs  Abdomen-soft, nontender, no guarding or rigidity, bowel sounds heard  Extremities-no pedal edema  Peripheral pulses palpable  Musculoskeletal-no contractures  Central nervous system-no acute focal neurological deficit ,no sensory or motor deficit noted  Skin-no rash noted        Significant Findings, Care, Treatment and Services Provided: GI input, MRI/MRCP of abdomen,    Complications: nil    Condition at Discharge: good         Discharge instructions/Information to patient and family:   See after visit summary for information provided to patient and family  Provisions for Follow-Up Care:  See after visit summary for information related to follow-up care and any pertinent home health orders  PCP: Bernabe Ceja MD    Disposition: Home    Planned Readmission: No    Discharge Statement   I spent 50 minutes discharging the patient  This time was spent on the day of discharge  I had direct contact with the patient on the day of discharge  Additional documentation is required if more than 30 minutes were spent on discharge  Discharge Medications:  See after visit summary for reconciled discharge medications provided to patient and family

## 2023-06-14 LAB
ATRIAL RATE: 72 BPM
ATRIAL RATE: 75 BPM
P AXIS: 48 DEGREES
P AXIS: 51 DEGREES
PR INTERVAL: 234 MS
PR INTERVAL: 236 MS
QRS AXIS: -68 DEGREES
QRS AXIS: -69 DEGREES
QRSD INTERVAL: 150 MS
QRSD INTERVAL: 154 MS
QT INTERVAL: 426 MS
QT INTERVAL: 432 MS
QTC INTERVAL: 466 MS
QTC INTERVAL: 482 MS
T WAVE AXIS: 65 DEGREES
T WAVE AXIS: 67 DEGREES
VENTRICULAR RATE: 72 BPM
VENTRICULAR RATE: 75 BPM

## 2023-06-14 PROCEDURE — 93010 ELECTROCARDIOGRAM REPORT: CPT | Performed by: INTERNAL MEDICINE

## 2023-06-16 ENCOUNTER — TRANSITIONAL CARE MANAGEMENT (OUTPATIENT)
Dept: FAMILY MEDICINE CLINIC | Facility: CLINIC | Age: 83
End: 2023-06-16

## 2023-06-16 ENCOUNTER — OFFICE VISIT (OUTPATIENT)
Dept: FAMILY MEDICINE CLINIC | Facility: CLINIC | Age: 83
End: 2023-06-16
Payer: MEDICARE

## 2023-06-16 VITALS
WEIGHT: 248.6 LBS | BODY MASS INDEX: 39.95 KG/M2 | SYSTOLIC BLOOD PRESSURE: 148 MMHG | HEART RATE: 54 BPM | HEIGHT: 66 IN | OXYGEN SATURATION: 94 % | DIASTOLIC BLOOD PRESSURE: 72 MMHG | RESPIRATION RATE: 16 BRPM | TEMPERATURE: 97.6 F

## 2023-06-16 DIAGNOSIS — D69.6 PLATELETS DECREASED (HCC): ICD-10-CM

## 2023-06-16 DIAGNOSIS — E11.21 TYPE 2 DIABETES MELLITUS WITH DIABETIC NEPHROPATHY, WITH LONG-TERM CURRENT USE OF INSULIN (HCC): Primary | ICD-10-CM

## 2023-06-16 DIAGNOSIS — N18.32 STAGE 3B CHRONIC KIDNEY DISEASE (CKD) (HCC): ICD-10-CM

## 2023-06-16 DIAGNOSIS — E78.2 MIXED HYPERLIPIDEMIA: ICD-10-CM

## 2023-06-16 DIAGNOSIS — Z79.4 TYPE 2 DIABETES MELLITUS WITH DIABETIC NEPHROPATHY, WITH LONG-TERM CURRENT USE OF INSULIN (HCC): Primary | ICD-10-CM

## 2023-06-16 DIAGNOSIS — I25.10 CORONARY ARTERY DISEASE INVOLVING NATIVE CORONARY ARTERY OF NATIVE HEART WITHOUT ANGINA PECTORIS: ICD-10-CM

## 2023-06-16 DIAGNOSIS — I10 HTN (HYPERTENSION), BENIGN: ICD-10-CM

## 2023-06-16 DIAGNOSIS — K85.10 ACUTE BILIARY PANCREATITIS WITHOUT INFECTION OR NECROSIS: ICD-10-CM

## 2023-06-16 DIAGNOSIS — E66.01 OBESITY, MORBID (HCC): ICD-10-CM

## 2023-06-16 PROCEDURE — 99496 TRANSJ CARE MGMT HIGH F2F 7D: CPT

## 2023-06-16 NOTE — ASSESSMENT & PLAN NOTE
Lab Results   Component Value Date    HGBA1C 6 7 (H) 05/10/2023   Probably not a good control at present time but endocrinologist adjusting the insulin

## 2023-06-16 NOTE — PROGRESS NOTES
Assessment & Plan     1  Type 2 diabetes mellitus with diabetic nephropathy, with long-term current use of insulin (HCC)  Assessment & Plan:    Lab Results   Component Value Date    HGBA1C 6 7 (H) 05/10/2023   Probably not a good control at present time but endocrinologist adjusting the insulin  2  Stage 3b chronic kidney disease (CKD) [facility])  Assessment & Plan:  Lab Results   Component Value Date    EGFR 46 06/12/2023    EGFR 42 06/11/2023    EGFR 36 06/10/2023    CREATININE 1 41 (H) 06/12/2023    CREATININE 1 53 (H) 06/11/2023    CREATININE 1 73 (H) 06/10/2023   creatinine and GFR stable   Will need periodic BMP  Avoid NSAIDs like ibuprofen Aleve Advil etc   Avoid high potassium diet  We will continue to monitor           3  Acute biliary pancreatitis without infection or necrosis  Assessment & Plan:  Pancreatitis resolved  Patient has follow-up appointment with gastroenterologist and a surgeon for possible cholecystectomy      4  HTN (hypertension), benign  Assessment & Plan:  Under control  Continue current medication  We will re-evaluate at next office visit  5  Coronary artery disease involving native coronary artery of native heart without angina pectoris  Assessment & Plan:  Under control  Continue current medication  We will re-evaluate at next office visit  6  Mixed hyperlipidemia  Assessment & Plan:  Under control  Continue current medication  We will re-evaluate at next office visit  7  Obesity, morbid ([address] )  Assessment & Plan:  Patient was advised to lose weight  Potential consequences of obesity discussed with patient  Advised to have portion control no more than 60% of meal or may consider intermittent fasting  We will continue to monitor        8  Platelets decreased [facility])         Subjective     Transitional Care Management Review:   Maxwell Larry is a [de-identified] y o  male here for TCM follow up       During the TCM phone call patient stated:  TCM Call "Date and time call was made  6/13/2023  8:47 AM    Hospital care reviewed  Records reviewed    Patient was hospitialized at  [address]    Date of Admission  06/09/23    Date of discharge  06/12/23    Diagnosis  Pancreatitis    Disposition  Home      TCM Call     Scheduled for follow up?   Yes    I have advised the patient to call PCP with any new or worsening symptoms  Ananda Yin MA        HPI  Review of Systems    Objective     /72 (BP Location: Right arm, Patient Position: Sitting, Cuff Size: Standard)   Pulse (!) 54   Temp 97 6 °F (36 4 °C) (Tympanic)   Resp 16   Ht 5' 6\" (1 676 m)   Wt 113 kg (248 lb 9 6 oz)   SpO2 94%   BMI 40 13 kg/m²      Physical Exam  Medications have been reviewed by provider in current encounter    Gauri Calix MD         "

## 2023-06-16 NOTE — ASSESSMENT & PLAN NOTE
Lab Results   Component Value Date    EGFR 46 06/12/2023    EGFR 42 06/11/2023    EGFR 36 06/10/2023    CREATININE 1 41 (H) 06/12/2023    CREATININE 1 53 (H) 06/11/2023    CREATININE 1 73 (H) 06/10/2023   creatinine and GFR stable   Will need periodic BMP  Avoid NSAIDs like ibuprofen Aleve Advil etc   Avoid high potassium diet    We will continue to monitor

## 2023-06-16 NOTE — PROGRESS NOTES
Assessment/Plan:         Problem List Items Addressed This Visit        Digestive    Pancreatitis     Pancreatitis resolved  Patient has follow-up appointment with gastroenterologist and a surgeon for possible cholecystectomy            Endocrine    Type 2 diabetes mellitus with kidney complication, with long-term current use of insulin ([facility] ) - Primary       Lab Results   Component Value Date    HGBA1C 6 7 (H) 05/10/2023   Probably not a good control at present time but endocrinologist adjusting the insulin  Cardiovascular and Mediastinum    HTN (hypertension), benign     Under control  Continue current medication  We will re-evaluate at next office visit  Coronary artery disease involving native coronary artery without angina pectoris     Under control  Continue current medication  We will re-evaluate at next office visit  Genitourinary    Stage 3b chronic kidney disease (CKD) ([place] [id number] )     Lab Results   Component Value Date    EGFR 46 06/12/2023    EGFR 42 06/11/2023    EGFR 36 06/10/2023    CREATININE 1 41 (H) 06/12/2023    CREATININE 1 53 (H) 06/11/2023    CREATININE 1 73 (H) 06/10/2023   creatinine and GFR stable   Will need periodic BMP  Avoid NSAIDs like ibuprofen Aleve Advil etc   Avoid high potassium diet  We will continue to monitor                 Other    Obesity, morbid ([facility] )     Patient was advised to lose weight  Potential consequences of obesity discussed with patient  Advised to have portion control no more than 60% of meal or may consider intermittent fasting  We will continue to monitor           Mixed hyperlipidemia     Under control  Continue current medication  We will re-evaluate at next office visit  Platelets decreased (HCC)         Subjective:      Patient ID: Elie Lea is a [de-identified] y o  male  Patient is here for transitional care management    Patient was hospitalized with acute pancreatitis which was thought to be secondary to GLP-1 and or hydrochlorothiazide  Those medication has been discontinued also Glucotrol has been discontinued patient had a follow-up with his endocrinologist since he got discharged and they are trying to adjust his insulin so the sugar remains under control  Currently otherwise feeling okay  Patient denies any abdominal pain nausea or vomiting  No diarrhea or constipation  No bladder problem  Denies any chest pain or shortness of breath  Denies headache, tingling numbness or weakness  No other specific complaint  The following portions of the patient's history were reviewed and updated as appropriate:   Past Medical History:  He has a past medical history of Allergic, Arthritis, Chronic kidney disease (2021), Chronic kidney disease (CKD) stage G3a/A1, moderately decreased glomerular filtration rate (GFR) between 45-59 mL/min/1 73 square meter and albuminuria creatinine ratio less than 30 mg/g ([facility] ), Colon polyp, Diabetes mellitus ([facility] ), GERD (gastroesophageal reflux disease), Heart murmur, History of echocardiogram, HL (hearing loss), Hyperlipidemia, Hypertension, Obesity, and Sleep apnea, obstructive  ,  _______________________________________________________________________  Medical Problems:  does not have any pertinent problems on file ,  _______________________________________________________________________  Past Surgical History:   has a past surgical history that includes Cardiac catheterization; Other surgical history; Joint replacement (2017); Colonoscopy (03/09/2018); Eye surgery; Colonoscopy; Upper gastrointestinal endoscopy; and Hemorrhoid surgery  ,  _______________________________________________________________________  Family History:  family history includes Cancer in his brother and mother; Coronary artery disease in his mother; Heart attack in his mother; Heart disease in his mother ,  _______________________________________________________________________  Social History: reports that he quit smoking about 50 years ago  His smoking use included cigarettes  He has a 15 00 pack-year smoking history  He has been exposed to tobacco smoke  He has never used smokeless tobacco  He reports current alcohol use of about 5 0 standard drinks of alcohol per week  He reports that he does not use drugs  ,  _______________________________________________________________________  Allergies:  is allergic to [place] and [place] [sitagliptin], iodinated contrast media, simvastatin, and wound dressing adhesive     _______________________________________________________________________  Current Outpatient Medications   Medication Sig Dispense Refill   • allopurinol (ZYLOPRIM) 100 mg tablet Take 100 mg by mouth daily     • BD Pen Needle Pascale U/F 32G X 4 MM MISC USE AS INSTRUCTED WITH INSULIN PEN     • calcium carbonate (OS-WAYLON) 600 MG tablet Take 600 mg by mouth daily     • cholecalciferol (VITAMIN D3) 1,000 units tablet Take 2,000 Units by mouth daily     • cloNIDine (CATAPRES) 0 1 mg tablet Take 1 tablet (0 1 mg total) by mouth 3 (three) times a day (Patient taking differently: Take 0 1 mg by mouth every 12 (twelve) hours) 270 tablet 1   • clopidogrel (PLAVIX) 75 mg tablet Take 1 tablet (75 mg total) by mouth daily 90 tablet 0   • fluticasone (FLONASE) 50 mcg/act nasal spray 2 sprays into each nostril daily 48 mL 0   • insulin aspart protamine-insulin aspart (NovoLOG 70/30) 100 units/mL injection Inject under the skin 3 (three) times a day before meals Units depend on level     • JARDIANCE 25 MG TABS Take 25 mg by mouth daily     • Lancets (OneTouch Delica Plus [id number]) MISC TEST GLUCOSE 3 4 TIMES/DAY     • Levemir FlexTouch 100 units/mL injection pen 20 Units daily     • lisinopril (ZESTRIL) 40 mg tablet Take 1 tablet (40 mg total) by mouth daily 90 tablet 1   • metoprolol succinate (TOPROL-XL) 25 mg 24 hr tablet Take 1 tablet (25 mg total) by mouth daily 90 tablet 0   • Multiple Vitamins-Minerals (MULTIVITAMIN ADULT PO) Take by mouth daily     • omeprazole (PriLOSEC) 40 MG capsule Take 1 capsule (40 mg total) by mouth daily (Patient taking differently: Take 40 mg by mouth daily before breakfast) 180 capsule 0   • oxyCODONE (ROXICODONE) 5 immediate release tablet Take 1 tablet (5 mg total) by mouth every 8 (eight) hours as needed for moderate pain for up to 10 days Max Daily Amount: 15 mg 12 tablet 0   • pyridoxine (VITAMIN B6) 100 mg tablet Take 100 mg by mouth daily     • ranolazine (RANEXA) 500 mg 12 hr tablet Take 1 tablet (500 mg total) by mouth 2 (two) times a day 180 tablet 0   • rosuvastatin (CRESTOR) 20 MG tablet Take 1 tablet by mouth daily     • tamsulosin (FLOMAX) 0 4 mg Take 2 capsules (0 8 mg total) by mouth daily 180 capsule 1     No current facility-administered medications for this visit      _______________________________________________________________________  Review of Systems   Constitutional: Negative for chills and fever  HENT: Negative for congestion, ear pain and sore throat  Eyes: Negative for pain and visual disturbance  Respiratory: Negative for cough and shortness of breath  Cardiovascular: Negative for chest pain and palpitations  Gastrointestinal: Negative for abdominal pain and vomiting  Endocrine: Negative for cold intolerance, heat intolerance, polydipsia, polyphagia and polyuria  Genitourinary: Negative for difficulty urinating, dysuria, frequency and hematuria  Musculoskeletal: Negative for arthralgias and back pain  Skin: Negative for color change and rash  Neurological: Negative for dizziness, seizures, syncope, light-headedness and headaches  Psychiatric/Behavioral: Negative for agitation and behavioral problems  All other systems reviewed and are negative          Objective:  Vitals:    06/16/23 1020   BP: 148/72   BP Location: Right arm   Patient Position: Sitting   Cuff Size: Standard   Pulse: (!) 54   Resp: 16   Temp: 97 6 °F (36 4 °C)   TempSrc: "Tympanic   SpO2: 94%   Weight: 113 kg (248 lb 9 6 oz)   Height: 5' 6\" (1 676 m)     Body mass index is 40 13 kg/m²  Physical Exam  Vitals and nursing note reviewed  Constitutional:       General: He is not in acute distress  Appearance: Normal appearance  He is obese  He is not ill-appearing, toxic-appearing or diaphoretic  HENT:      Head: Normocephalic and atraumatic  Right Ear: Tympanic membrane and ear canal normal       Left Ear: Tympanic membrane and ear canal normal       Nose: Nose normal  No congestion  Mouth/Throat:      Mouth: Mucous membranes are moist    Eyes:      General: No scleral icterus  Right eye: No discharge  Left eye: No discharge  Extraocular Movements: Extraocular movements intact  Conjunctiva/sclera: Conjunctivae normal       Pupils: Pupils are equal, round, and reactive to light  Cardiovascular:      Rate and Rhythm: Normal rate and regular rhythm  Pulses: Normal pulses  Heart sounds: Normal heart sounds  No murmur heard  No gallop  Pulmonary:      Effort: Pulmonary effort is normal  No respiratory distress  Breath sounds: Normal breath sounds  No wheezing, rhonchi or rales  Abdominal:      General: Abdomen is flat  Bowel sounds are normal  There is no distension  Palpations: Abdomen is soft  Tenderness: There is no abdominal tenderness  There is no right CVA tenderness, left CVA tenderness or guarding  Musculoskeletal:         General: No swelling or tenderness  Normal range of motion  Cervical back: Normal range of motion and neck supple  No rigidity  Right lower leg: No edema  Left lower leg: No edema  Comments: Small hematoma left lateral thigh   Lymphadenopathy:      Cervical: No cervical adenopathy  Skin:     General: Skin is warm and dry  Capillary Refill: Capillary refill takes 2 to 3 seconds  Coloration: Skin is not jaundiced  Findings: No bruising or rash   " Neurological:      General: No focal deficit present  Mental Status: He is alert and oriented to person, place, and time  Mental status is at baseline  Motor: No weakness  Gait: Gait normal    Psychiatric:         Mood and Affect: Mood normal          Behavior: Behavior normal          TCM Call     Date and time call was made  6/13/2023  8:47 AM    Hospital care reviewed  Records reviewed    Patient was hospitialized at  [address]    Date of Admission  06/09/23    Date of discharge  06/12/23    Diagnosis  Pancreatitis    Disposition  Home      TCM Call     Scheduled for follow up?   Yes    I have advised the patient to call PCP with any new or worsening symptoms  Francesco Barber MA

## 2023-06-16 NOTE — ASSESSMENT & PLAN NOTE
Pancreatitis resolved    Patient has follow-up appointment with gastroenterologist and a surgeon for possible cholecystectomy

## 2023-06-21 ENCOUNTER — APPOINTMENT (OUTPATIENT)
Dept: LAB | Facility: HOSPITAL | Age: 83
DRG: 444 | End: 2023-06-21
Payer: MEDICARE

## 2023-06-21 ENCOUNTER — HOSPITAL ENCOUNTER (INPATIENT)
Facility: HOSPITAL | Age: 83
LOS: 1 days | Discharge: HOME | DRG: 444 | End: 2023-06-22
Attending: EMERGENCY MEDICINE | Admitting: INTERNAL MEDICINE
Payer: MEDICARE

## 2023-06-21 DIAGNOSIS — K85.90 ACUTE RECURRENT PANCREATITIS: Primary | ICD-10-CM

## 2023-06-21 DIAGNOSIS — K85.90 PANCREATITIS: Primary | ICD-10-CM

## 2023-06-21 DIAGNOSIS — I10 ESSENTIAL (PRIMARY) HYPERTENSION: ICD-10-CM

## 2023-06-21 DIAGNOSIS — K21.9 GERD WITHOUT ESOPHAGITIS: ICD-10-CM

## 2023-06-21 DIAGNOSIS — R10.13 EPIGASTRIC PAIN: ICD-10-CM

## 2023-06-21 LAB
ALBUMIN SERPL BCP-MCNC: 3.8 G/DL (ref 3.5–5)
ALBUMIN SERPL BCP-MCNC: 4 G/DL (ref 3.5–5)
ALP SERPL-CCNC: 51 U/L (ref 34–104)
ALP SERPL-CCNC: 51 U/L (ref 34–104)
ALT SERPL W P-5'-P-CCNC: 16 U/L (ref 7–52)
ALT SERPL W P-5'-P-CCNC: 17 U/L (ref 7–52)
ANION GAP SERPL CALCULATED.3IONS-SCNC: 7 MMOL/L
ANION GAP SERPL CALCULATED.3IONS-SCNC: 9 MMOL/L
AST SERPL W P-5'-P-CCNC: 18 U/L (ref 13–39)
AST SERPL W P-5'-P-CCNC: 20 U/L (ref 13–39)
BASOPHILS # BLD AUTO: 0.03 THOUSANDS/ÂΜL (ref 0–0.1)
BASOPHILS NFR BLD AUTO: 0 % (ref 0–1)
BILIRUB SERPL-MCNC: 0.37 MG/DL (ref 0.2–1)
BILIRUB SERPL-MCNC: 0.41 MG/DL (ref 0.2–1)
BUN SERPL-MCNC: 26 MG/DL (ref 5–25)
BUN SERPL-MCNC: 26 MG/DL (ref 5–25)
CALCIUM SERPL-MCNC: 8.7 MG/DL (ref 8.4–10.2)
CALCIUM SERPL-MCNC: 9 MG/DL (ref 8.4–10.2)
CHLORIDE SERPL-SCNC: 102 MMOL/L (ref 96–108)
CHLORIDE SERPL-SCNC: 103 MMOL/L (ref 96–108)
CO2 SERPL-SCNC: 28 MMOL/L (ref 21–32)
CO2 SERPL-SCNC: 29 MMOL/L (ref 21–32)
CREAT SERPL-MCNC: 1.68 MG/DL (ref 0.6–1.3)
CREAT SERPL-MCNC: 1.73 MG/DL (ref 0.6–1.3)
EOSINOPHIL # BLD AUTO: 0.17 THOUSAND/ÂΜL (ref 0–0.61)
EOSINOPHIL NFR BLD AUTO: 2 % (ref 0–6)
ERYTHROCYTE [DISTWIDTH] IN BLOOD BY AUTOMATED COUNT: 13.9 % (ref 11.6–15.1)
ERYTHROCYTE [DISTWIDTH] IN BLOOD BY AUTOMATED COUNT: 14 % (ref 11.6–15.1)
GFR SERPL CREATININE-BSD FRML MDRD: 36 ML/MIN/1.73SQ M
GFR SERPL CREATININE-BSD FRML MDRD: 37 ML/MIN/1.73SQ M
GLUCOSE SERPL-MCNC: 107 MG/DL (ref 65–140)
GLUCOSE SERPL-MCNC: 134 MG/DL (ref 65–140)
GLUCOSE SERPL-MCNC: 152 MG/DL (ref 65–140)
GLUCOSE SERPL-MCNC: 97 MG/DL (ref 65–140)
HCT VFR BLD AUTO: 39.7 % (ref 36.5–49.3)
HCT VFR BLD AUTO: 40.2 % (ref 36.5–49.3)
HGB BLD-MCNC: 13.2 G/DL (ref 12–17)
HGB BLD-MCNC: 13.3 G/DL (ref 12–17)
IMM GRANULOCYTES # BLD AUTO: 0.02 THOUSAND/UL (ref 0–0.2)
IMM GRANULOCYTES NFR BLD AUTO: 0 % (ref 0–2)
LIPASE SERPL-CCNC: 3787 U/L (ref 11–82)
LIPASE SERPL-CCNC: 6378 U/L (ref 11–82)
LYMPHOCYTES # BLD AUTO: 1.53 THOUSANDS/ÂΜL (ref 0.6–4.47)
LYMPHOCYTES NFR BLD AUTO: 20 % (ref 14–44)
MCH RBC QN AUTO: 31.8 PG (ref 26.8–34.3)
MCH RBC QN AUTO: 32 PG (ref 26.8–34.3)
MCHC RBC AUTO-ENTMCNC: 33.1 G/DL (ref 31.4–37.4)
MCHC RBC AUTO-ENTMCNC: 33.2 G/DL (ref 31.4–37.4)
MCV RBC AUTO: 96 FL (ref 82–98)
MCV RBC AUTO: 97 FL (ref 82–98)
MONOCYTES # BLD AUTO: 0.69 THOUSAND/ÂΜL (ref 0.17–1.22)
MONOCYTES NFR BLD AUTO: 9 % (ref 4–12)
NEUTROPHILS # BLD AUTO: 5.37 THOUSANDS/ÂΜL (ref 1.85–7.62)
NEUTS SEG NFR BLD AUTO: 69 % (ref 43–75)
NRBC BLD AUTO-RTO: 0 /100 WBCS
PLATELET # BLD AUTO: 199 THOUSANDS/UL (ref 149–390)
PLATELET # BLD AUTO: 207 THOUSANDS/UL (ref 149–390)
PMV BLD AUTO: 10 FL (ref 8.9–12.7)
PMV BLD AUTO: 10.2 FL (ref 8.9–12.7)
POTASSIUM SERPL-SCNC: 4.1 MMOL/L (ref 3.5–5.3)
POTASSIUM SERPL-SCNC: 4.2 MMOL/L (ref 3.5–5.3)
PROT SERPL-MCNC: 6.8 G/DL (ref 6.4–8.4)
PROT SERPL-MCNC: 7.1 G/DL (ref 6.4–8.4)
RBC # BLD AUTO: 4.15 MILLION/UL (ref 3.88–5.62)
RBC # BLD AUTO: 4.16 MILLION/UL (ref 3.88–5.62)
SODIUM SERPL-SCNC: 139 MMOL/L (ref 135–147)
SODIUM SERPL-SCNC: 139 MMOL/L (ref 135–147)
WBC # BLD AUTO: 7.81 THOUSAND/UL (ref 4.31–10.16)
WBC # BLD AUTO: 8.33 THOUSAND/UL (ref 4.31–10.16)

## 2023-06-21 PROCEDURE — 85025 COMPLETE CBC W/AUTO DIFF WBC: CPT | Performed by: PHYSICIAN ASSISTANT

## 2023-06-21 PROCEDURE — 82948 REAGENT STRIP/BLOOD GLUCOSE: CPT

## 2023-06-21 PROCEDURE — 36415 COLL VENOUS BLD VENIPUNCTURE: CPT

## 2023-06-21 PROCEDURE — 99285 EMERGENCY DEPT VISIT HI MDM: CPT

## 2023-06-21 PROCEDURE — 99223 1ST HOSP IP/OBS HIGH 75: CPT | Performed by: INTERNAL MEDICINE

## 2023-06-21 PROCEDURE — 83690 ASSAY OF LIPASE: CPT

## 2023-06-21 PROCEDURE — 80053 COMPREHEN METABOLIC PANEL: CPT | Performed by: PHYSICIAN ASSISTANT

## 2023-06-21 PROCEDURE — 99285 EMERGENCY DEPT VISIT HI MDM: CPT | Performed by: PHYSICIAN ASSISTANT

## 2023-06-21 PROCEDURE — 80053 COMPREHEN METABOLIC PANEL: CPT

## 2023-06-21 PROCEDURE — 99222 1ST HOSP IP/OBS MODERATE 55: CPT | Performed by: SURGERY

## 2023-06-21 PROCEDURE — 96360 HYDRATION IV INFUSION INIT: CPT

## 2023-06-21 PROCEDURE — 85027 COMPLETE CBC AUTOMATED: CPT

## 2023-06-21 PROCEDURE — 83690 ASSAY OF LIPASE: CPT | Performed by: PHYSICIAN ASSISTANT

## 2023-06-21 RX ORDER — ATORVASTATIN CALCIUM 40 MG/1
40 TABLET, FILM COATED ORAL
Status: DISCONTINUED | OUTPATIENT
Start: 2023-06-21 | End: 2023-06-22 | Stop reason: HOSPADM

## 2023-06-21 RX ORDER — CLONIDINE HYDROCHLORIDE 0.1 MG/1
0.1 TABLET ORAL EVERY 12 HOURS SCHEDULED
Status: DISCONTINUED | OUTPATIENT
Start: 2023-06-21 | End: 2023-06-22 | Stop reason: HOSPADM

## 2023-06-21 RX ORDER — MAGNESIUM HYDROXIDE/ALUMINUM HYDROXICE/SIMETHICONE 120; 1200; 1200 MG/30ML; MG/30ML; MG/30ML
30 SUSPENSION ORAL EVERY 6 HOURS PRN
Status: DISCONTINUED | OUTPATIENT
Start: 2023-06-21 | End: 2023-06-22 | Stop reason: HOSPADM

## 2023-06-21 RX ORDER — FLUTICASONE PROPIONATE 50 MCG
2 SPRAY, SUSPENSION (ML) NASAL DAILY
Status: DISCONTINUED | OUTPATIENT
Start: 2023-06-22 | End: 2023-06-22 | Stop reason: HOSPADM

## 2023-06-21 RX ORDER — INSULIN LISPRO 100 [IU]/ML
1-5 INJECTION, SOLUTION INTRAVENOUS; SUBCUTANEOUS
Status: DISCONTINUED | OUTPATIENT
Start: 2023-06-21 | End: 2023-06-22 | Stop reason: HOSPADM

## 2023-06-21 RX ORDER — INSULIN GLARGINE 100 [IU]/ML
25 INJECTION, SOLUTION SUBCUTANEOUS
Status: DISCONTINUED | OUTPATIENT
Start: 2023-06-21 | End: 2023-06-22 | Stop reason: HOSPADM

## 2023-06-21 RX ORDER — TAMSULOSIN HYDROCHLORIDE 0.4 MG/1
0.8 CAPSULE ORAL DAILY
Status: DISCONTINUED | OUTPATIENT
Start: 2023-06-22 | End: 2023-06-22 | Stop reason: HOSPADM

## 2023-06-21 RX ORDER — LISINOPRIL 20 MG/1
40 TABLET ORAL DAILY
Status: DISCONTINUED | OUTPATIENT
Start: 2023-06-22 | End: 2023-06-22 | Stop reason: HOSPADM

## 2023-06-21 RX ORDER — ALLOPURINOL 100 MG/1
100 TABLET ORAL DAILY
Status: DISCONTINUED | OUTPATIENT
Start: 2023-06-22 | End: 2023-06-22 | Stop reason: HOSPADM

## 2023-06-21 RX ORDER — RANOLAZINE 500 MG/1
500 TABLET, EXTENDED RELEASE ORAL 2 TIMES DAILY
Status: DISCONTINUED | OUTPATIENT
Start: 2023-06-21 | End: 2023-06-22 | Stop reason: HOSPADM

## 2023-06-21 RX ORDER — ONDANSETRON 2 MG/ML
4 INJECTION INTRAMUSCULAR; INTRAVENOUS EVERY 6 HOURS PRN
Status: DISCONTINUED | OUTPATIENT
Start: 2023-06-21 | End: 2023-06-22 | Stop reason: HOSPADM

## 2023-06-21 RX ORDER — OXYCODONE HYDROCHLORIDE 5 MG/1
5 TABLET ORAL EVERY 6 HOURS PRN
Status: DISCONTINUED | OUTPATIENT
Start: 2023-06-21 | End: 2023-06-22 | Stop reason: HOSPADM

## 2023-06-21 RX ORDER — HEPARIN SODIUM 5000 [USP'U]/ML
5000 INJECTION, SOLUTION INTRAVENOUS; SUBCUTANEOUS EVERY 8 HOURS SCHEDULED
Status: DISCONTINUED | OUTPATIENT
Start: 2023-06-21 | End: 2023-06-22 | Stop reason: HOSPADM

## 2023-06-21 RX ORDER — SODIUM CHLORIDE, SODIUM LACTATE, POTASSIUM CHLORIDE, CALCIUM CHLORIDE 600; 310; 30; 20 MG/100ML; MG/100ML; MG/100ML; MG/100ML
150 INJECTION, SOLUTION INTRAVENOUS CONTINUOUS
Status: DISCONTINUED | OUTPATIENT
Start: 2023-06-21 | End: 2023-06-22

## 2023-06-21 RX ORDER — PANTOPRAZOLE SODIUM 40 MG/10ML
40 INJECTION, POWDER, LYOPHILIZED, FOR SOLUTION INTRAVENOUS EVERY 12 HOURS SCHEDULED
Status: DISCONTINUED | OUTPATIENT
Start: 2023-06-21 | End: 2023-06-22 | Stop reason: HOSPADM

## 2023-06-21 RX ORDER — PANTOPRAZOLE SODIUM 40 MG/1
40 TABLET, DELAYED RELEASE ORAL
Status: DISCONTINUED | OUTPATIENT
Start: 2023-06-22 | End: 2023-06-21

## 2023-06-21 RX ORDER — METOPROLOL SUCCINATE 25 MG/1
25 TABLET, EXTENDED RELEASE ORAL DAILY
Status: DISCONTINUED | OUTPATIENT
Start: 2023-06-22 | End: 2023-06-22 | Stop reason: HOSPADM

## 2023-06-21 RX ORDER — ACETAMINOPHEN 325 MG/1
650 TABLET ORAL EVERY 6 HOURS PRN
Status: DISCONTINUED | OUTPATIENT
Start: 2023-06-21 | End: 2023-06-22 | Stop reason: HOSPADM

## 2023-06-21 RX ADMIN — MORPHINE SULFATE 2 MG: 2 INJECTION, SOLUTION INTRAMUSCULAR; INTRAVENOUS at 21:31

## 2023-06-21 RX ADMIN — SODIUM CHLORIDE, SODIUM LACTATE, POTASSIUM CHLORIDE, AND CALCIUM CHLORIDE 125 ML/HR: .6; .31; .03; .02 INJECTION, SOLUTION INTRAVENOUS at 20:01

## 2023-06-21 RX ADMIN — INSULIN LISPRO 1 UNITS: 100 INJECTION, SOLUTION INTRAVENOUS; SUBCUTANEOUS at 21:37

## 2023-06-21 RX ADMIN — INSULIN GLARGINE 25 UNITS: 100 INJECTION, SOLUTION SUBCUTANEOUS at 21:33

## 2023-06-21 RX ADMIN — SODIUM CHLORIDE 1000 ML: 0.9 INJECTION, SOLUTION INTRAVENOUS at 16:37

## 2023-06-21 RX ADMIN — CLONIDINE HYDROCHLORIDE 0.1 MG: 0.1 TABLET ORAL at 21:32

## 2023-06-21 RX ADMIN — ALUMINUM HYDROXIDE, MAGNESIUM HYDROXIDE, AND DIMETHICONE 30 ML: 200; 20; 200 SUSPENSION ORAL at 23:20

## 2023-06-21 RX ADMIN — RANOLAZINE 500 MG: 500 TABLET, FILM COATED, EXTENDED RELEASE ORAL at 20:00

## 2023-06-21 RX ADMIN — HEPARIN SODIUM 5000 UNITS: 5000 INJECTION INTRAVENOUS; SUBCUTANEOUS at 21:36

## 2023-06-21 NOTE — ED PROVIDER NOTES
History  Chief Complaint   Patient presents with   • Abnormal Lab     Abnormal labs seen by GI      Patient is an 27-year-old white male with history of CKD, diabetes, recent pancreatitis, hypertension, hyperlipidemia who reports onset of epigastric pain 1 hour after eating egg breakfast this morning  No fever or chills  No nausea or vomiting  No radiation of pain to his back  States he drinks 3 glasses of wine weekly  Normal bowel movement earlier today  No diarrhea  No melena or hematochezia  No urinary symptoms  Patient had outpatient blood work today with an elevated lipase over 6000  Patient referred to the hospital for admission  Patient was recently admitted to this hospital for pancreatitis  He has had ultrasound, CT scan, MRI earlier this month  Results of this showed gallstones and sludge  He also had his hydrochlorothiazide and Januvia discontinued  Prior to Admission Medications   Prescriptions Last Dose Informant Patient Reported? Taking?    BD Pen Needle Pascale U/F 32G X 4 MM MISC  Self Yes No   Sig: USE AS INSTRUCTED WITH INSULIN PEN   JARDIANCE 25 MG TABS  Self Yes No   Sig: Take 25 mg by mouth daily   Lancets (OneTouch Delica Plus [id number]) MISC  Self Yes No   Sig: TEST GLUCOSE 3 4 TIMES/DAY   Levemir FlexTouch 100 units/mL injection pen  Self Yes No   Si Units daily   Multiple Vitamins-Minerals (MULTIVITAMIN ADULT PO)  Self Yes No   Sig: Take by mouth daily   allopurinol (ZYLOPRIM) 100 mg tablet  Self Yes No   Sig: Take 100 mg by mouth daily   calcium carbonate (OS-WAYLON) 600 MG tablet  Self Yes No   Sig: Take 600 mg by mouth daily   cholecalciferol (VITAMIN D3) 1,000 units tablet  Self Yes No   Sig: Take 2,000 Units by mouth daily   cloNIDine (CATAPRES) 0 1 mg tablet  Self No No   Sig: Take 1 tablet (0 1 mg total) by mouth 3 (three) times a day   Patient taking differently: Take 0 1 mg by mouth every 12 (twelve) hours   clopidogrel (PLAVIX) 75 mg tablet  Self No No   Sig: Take 1 tablet (75 mg total) by mouth daily   fluticasone (FLONASE) 50 mcg/act nasal spray  Self No No   Si sprays into each nostril daily   insulin aspart protamine-insulin aspart (NovoLOG 70/30) 100 units/mL injection  Self Yes No   Sig: Inject under the skin 3 (three) times a day before meals Units depend on level   lisinopril (ZESTRIL) 40 mg tablet  Self No No   Sig: Take 1 tablet (40 mg total) by mouth daily   metoprolol succinate (TOPROL-XL) 25 mg 24 hr tablet  Self No No   Sig: Take 1 tablet (25 mg total) by mouth daily   omeprazole (PriLOSEC) 40 MG capsule  Self No No   Sig: Take 1 capsule (40 mg total) by mouth daily   Patient taking differently: Take 40 mg by mouth daily before breakfast   oxyCODONE (ROXICODONE) 5 immediate release tablet   No No   Sig: Take 1 tablet (5 mg total) by mouth every 8 (eight) hours as needed for moderate pain for up to 10 days Max Daily Amount: 15 mg   pyridoxine (VITAMIN B6) 100 mg tablet  Self Yes No   Sig: Take 100 mg by mouth daily   ranolazine (RANEXA) 500 mg 12 hr tablet   No No   Sig: Take 1 tablet (500 mg total) by mouth 2 (two) times a day   rosuvastatin (CRESTOR) 20 MG tablet  Self Yes No   Sig: Take 1 tablet by mouth daily   tamsulosin (FLOMAX) 0 4 mg  Self No No   Sig: Take 2 capsules (0 8 mg total) by mouth daily      Facility-Administered Medications: None       Past Medical History:   Diagnosis Date   • Allergic    • Arthritis    • Chronic kidney disease    • Chronic kidney disease (CKD) stage G3a/A1, moderately decreased glomerular filtration rate (GFR) between 45-59 mL/min/1 73 square meter and albuminuria creatinine ratio less than 30 mg/g (HCC)    • Colon polyp    • Diabetes mellitus (HCC)    • GERD (gastroesophageal reflux disease)    • Heart murmur    • History of echocardiogram    • HL (hearing loss)    • Hyperlipidemia    • Hypertension    • Obesity    • Sleep apnea, obstructive        Past Surgical History:   Procedure Laterality Date   • CARDIAC CATHETERIZATION     • COLONOSCOPY  2018   • COLONOSCOPY     • EYE SURGERY     • HEMORRHOID SURGERY     • JOINT REPLACEMENT  2017    knee   • OTHER SURGICAL HISTORY      Stent    • UPPER GASTROINTESTINAL ENDOSCOPY         Family History   Problem Relation Age of Onset   • Heart disease Mother    • Heart attack Mother         46s   • Cancer Mother    • Coronary artery disease Mother    • Cancer Brother         Malignant tumor of lung     I have reviewed and agree with the history as documented  E-Cigarette/Vaping   • E-Cigarette Use Never User      E-Cigarette/Vaping Substances   • Nicotine No    • THC No    • CBD No    • Flavoring No    • Other No    • Unknown No      Social History     Tobacco Use   • Smoking status: Former     Packs/day:      Years: 15 00     Total pack years: 15      Types: Cigarettes     Quit date: 1972     Years since quittin 6     Passive exposure: Past   • Smokeless tobacco: Never   Vaping Use   • Vaping Use: Never used   Substance Use Topics   • Alcohol use: Not Currently     Alcohol/week: 5 0 standard drinks of alcohol     Types: 5 Glasses of wine per week     Comment: Occasional    • Drug use: Never       Review of Systems   Constitutional: Negative for chills and fever  HENT: Negative for ear pain and sore throat  Respiratory: Negative for cough and shortness of breath  Cardiovascular: Negative for chest pain and palpitations  Gastrointestinal: Positive for abdominal pain  Negative for nausea and vomiting  Genitourinary: Negative for dysuria and hematuria  Musculoskeletal: Negative for back pain  Skin: Negative for color change and rash  Neurological: Negative for syncope  All other systems reviewed and are negative  Physical Exam  Physical Exam  Vitals and nursing note reviewed  Constitutional:       General: He is not in acute distress  Appearance: Normal appearance  He is not ill-appearing, toxic-appearing or diaphoretic     HENT: Head: Normocephalic and atraumatic  Nose: Nose normal       Mouth/Throat:      Mouth: Mucous membranes are moist       Pharynx: Oropharynx is clear  Eyes:      Extraocular Movements: Extraocular movements intact  Conjunctiva/sclera: Conjunctivae normal       Pupils: Pupils are equal, round, and reactive to light  Cardiovascular:      Rate and Rhythm: Normal rate and regular rhythm  Pulses: Normal pulses  Heart sounds: Normal heart sounds  Pulmonary:      Effort: Pulmonary effort is normal       Breath sounds: Normal breath sounds  Abdominal:      General: Abdomen is flat  Palpations: Abdomen is soft  Tenderness: There is no right CVA tenderness, left CVA tenderness, guarding or rebound  Comments: No right upper quadrant tenderness  There is tenderness to deep palpation epigastrium to left upper quadrant  Musculoskeletal:         General: Normal range of motion  Skin:     General: Skin is warm and dry  Capillary Refill: Capillary refill takes less than 2 seconds  Neurological:      General: No focal deficit present  Mental Status: He is alert and oriented to person, place, and time  Mental status is at baseline           Vital Signs  ED Triage Vitals   Temperature Pulse Respirations Blood Pressure SpO2   06/21/23 1616 06/21/23 1616 06/21/23 1616 06/21/23 1616 06/21/23 1616   98 °F (36 7 °C) 68 16 (!) 186/74 100 %      Temp Source Heart Rate Source Patient Position - Orthostatic VS BP Location FiO2 (%)   06/21/23 1616 -- 06/21/23 1616 06/21/23 1616 --   Oral  Sitting Left arm       Pain Score       06/21/23 1819       2           Vitals:    06/21/23 1616 06/21/23 1759   BP: (!) 186/74 154/69   Pulse: 68 (!) 53   Patient Position - Orthostatic VS: Sitting Lying         Visual Acuity      ED Medications  Medications   allopurinol (ZYLOPRIM) tablet 100 mg (has no administration in time range)   cloNIDine (CATAPRES) tablet 0 1 mg (has no administration in time range)   fluticasone (FLONASE) 50 mcg/act nasal spray 2 spray (has no administration in time range)   lisinopril (ZESTRIL) tablet 40 mg (has no administration in time range)   metoprolol succinate (TOPROL-XL) 24 hr tablet 25 mg (has no administration in time range)   oxyCODONE (ROXICODONE) IR tablet 5 mg (has no administration in time range)   ranolazine (RANEXA) 12 hr tablet 500 mg (has no administration in time range)   atorvastatin (LIPITOR) tablet 40 mg (has no administration in time range)   tamsulosin (FLOMAX) capsule 0 8 mg (has no administration in time range)   lactated ringers infusion (has no administration in time range)   acetaminophen (TYLENOL) tablet 650 mg (has no administration in time range)   magnesium hydroxide (MILK OF MAGNESIA) oral suspension 30 mL (has no administration in time range)   ondansetron (ZOFRAN) injection 4 mg (has no administration in time range)   aluminum-magnesium hydroxide-simethicone (MYLANTA) oral suspension 30 mL (has no administration in time range)   heparin (porcine) subcutaneous injection 5,000 Units (has no administration in time range)   pantoprazole (PROTONIX) injection 40 mg (has no administration in time range)   morphine injection 2 mg (has no administration in time range)   insulin lispro (HumaLOG) 100 units/mL subcutaneous injection 1-5 Units (has no administration in time range)   insulin lispro (HumaLOG) 100 units/mL subcutaneous injection 1-5 Units (has no administration in time range)   insulin glargine (LANTUS) subcutaneous injection 25 Units 0 25 mL (has no administration in time range)   sodium chloride 0 9 % bolus 1,000 mL (1,000 mL Intravenous New Bag 6/21/23 1637)       Diagnostic Studies  Results Reviewed     Procedure Component Value Units Date/Time    Comprehensive metabolic panel [[id number]]  (Abnormal) Collected: 06/21/23 1636    Lab Status: Final result Specimen: Blood from Arm, Left Updated: 06/21/23 7319     Sodium 139 mmol/L Potassium 4 1 mmol/L      Chloride 102 mmol/L      CO2 28 mmol/L      ANION GAP 9 mmol/L      BUN 26 mg/dL      Creatinine 1 68 mg/dL      Glucose 107 mg/dL      Calcium 9 0 mg/dL      AST 18 U/L      ALT 17 U/L      Alkaline Phosphatase 51 U/L      Total Protein 7 1 g/dL      Albumin 4 0 g/dL      Total Bilirubin 0 41 mg/dL      eGFR 37 ml/min/1 73sq m     Narrative:      [employer] guidelines for Chronic Kidney Disease (CKD):   •  Stage 1 with normal or high GFR (GFR > 90 mL/min/1 73 square meters)  •  Stage 2 Mild CKD (GFR = 60-89 mL/min/1 73 square meters)  •  Stage 3A Moderate CKD (GFR = 45-59 mL/min/1 73 square meters)  •  Stage 3B Moderate CKD (GFR = 30-44 mL/min/1 73 square meters)  •  Stage 4 Severe CKD (GFR = 15-29 mL/min/1 73 square meters)  •  Stage 5 End Stage CKD (GFR <15 mL/min/1 73 square meters)  Note: GFR calculation is accurate only with a steady state creatinine    Lipase [[id number]]  (Abnormal) Collected: 06/21/23 1636    Lab Status: Final result Specimen: Blood from Arm, Left Updated: 06/21/23 1739     Lipase 3,787 u/L     Platelet count [[id number]]     Lab Status: No result Specimen: Blood     CBC and differential [[id number]] Collected: 06/21/23 1636    Lab Status: Final result Specimen: Blood from Arm, Left Updated: 06/21/23 1643     WBC 7 81 Thousand/uL      RBC 4 15 Million/uL      Hemoglobin 13 2 g/dL      Hematocrit 39 7 %      MCV 96 fL      MCH 31 8 pg      MCHC 33 2 g/dL      RDW 13 9 %      MPV 10 0 fL      Platelets 003 Thousands/uL      nRBC 0 /100 WBCs      Neutrophils Relative 69 %      Immat GRANS % 0 %      Lymphocytes Relative 20 %      Monocytes Relative 9 %      Eosinophils Relative 2 %      Basophils Relative 0 %      Neutrophils Absolute 5 37 Thousands/µL      Immature Grans Absolute 0 02 Thousand/uL      Lymphocytes Absolute 1 53 Thousands/µL      Monocytes Absolute 0 69 Thousand/µL      Eosinophils Absolute 0 17 Thousand/µL      Basophils Absolute 0 03 Thousands/µL                  No orders to display              Procedures  Procedures         ED Course                               SBIRT 22yo+    Flowsheet Row Most Recent Value   Initial Alcohol Screen: US AUDIT-C     1  How often do you have a drink containing alcohol? 0 Filed at: 06/21/2023 1616   2  How many drinks containing alcohol do you have on a typical day you are drinking? 0 Filed at: 06/21/2023 1616   3a  Male UNDER 65: How often do you have five or more drinks on one occasion? 0 Filed at: 06/21/2023 1616   3b  FEMALE Any Age, or MALE 65+: How often do you have 4 or more drinks on one occassion? 0 Filed at: 06/21/2023 1616   Audit-C Score 0 Filed at: 06/21/2023 1616   ALEA: How many times in the past year have you    Used an illegal drug or used a prescription medication for non-medical reasons? Never Filed at: 06/21/2023 1616                    Medical Decision Making  Labs reviewed  Normal white count  Lipase elevated at 3787  Given recent imaging including ultrasound, CT scan, MRI in recent weeks, will hold on imaging at this time  Tiger texted hospitalist Dr Cat Novoa for admission who requested GI and surgery be consulted  Alejandro Gomez and general surgeon Dr Stevens [place] via tiger text  Patient declined offer of analgesia  IV fluid hydration given  Amount and/or Complexity of Data Reviewed  Labs: ordered  Risk  Decision regarding hospitalization            Disposition  Final diagnoses:   Pancreatitis     Time reflects when diagnosis was documented in both MDM as applicable and the Disposition within this note     Time User Action Codes Description Comment    6/21/2023  5:13 PM Therisa Bodily Add [K85 90] Pancreatitis     6/21/2023  5:21 PM [facility] [place] Add [K21 9] GERD without esophagitis       ED Disposition     ED Disposition   Admit    Condition   Stable    Date/Time   Wed Jun 21, 2023  5:13 PM    Comment   Case was discussed with  Lynn Gama and the patient's admission status was agreed to be Admission Status: inpatient status to the service of Dr Lynn Gama              Follow-up Information    None         Current Discharge Medication List      CONTINUE these medications which have NOT CHANGED    Details   allopurinol (ZYLOPRIM) 100 mg tablet Take 100 mg by mouth daily      BD Pen Needle Pascale U/F 32G X 4 MM MISC USE AS INSTRUCTED WITH INSULIN PEN      calcium carbonate (OS-WAYLON) 600 MG tablet Take 600 mg by mouth daily      cholecalciferol (VITAMIN D3) 1,000 units tablet Take 2,000 Units by mouth daily      cloNIDine (CATAPRES) 0 1 mg tablet Take 1 tablet (0 1 mg total) by mouth 3 (three) times a day  Qty: 270 tablet, Refills: 1    Associated Diagnoses: Essential (primary) hypertension      clopidogrel (PLAVIX) 75 mg tablet Take 1 tablet (75 mg total) by mouth daily  Qty: 90 tablet, Refills: 0    Associated Diagnoses: Coronary artery disease involving native coronary artery of native heart without angina pectoris      fluticasone (FLONASE) 50 mcg/act nasal spray 2 sprays into each nostril daily  Qty: 48 mL, Refills: 0    Associated Diagnoses: Seasonal allergic rhinitis due to pollen      insulin aspart protamine-insulin aspart (NovoLOG 70/30) 100 units/mL injection Inject under the skin 3 (three) times a day before meals Units depend on level      JARDIANCE 25 MG TABS Take 25 mg by mouth daily      Lancets (OneTouch Delica Plus [id number]) MISC TEST GLUCOSE 3 4 TIMES/DAY      Levemir FlexTouch 100 units/mL injection pen 20 Units daily      lisinopril (ZESTRIL) 40 mg tablet Take 1 tablet (40 mg total) by mouth daily  Qty: 90 tablet, Refills: 1    Associated Diagnoses: Essential (primary) hypertension      metoprolol succinate (TOPROL-XL) 25 mg 24 hr tablet Take 1 tablet (25 mg total) by mouth daily  Qty: 90 tablet, Refills: 0    Associated Diagnoses: Coronary artery disease involving native coronary artery of native heart without angina pectoris      Multiple Vitamins-Minerals (MULTIVITAMIN ADULT PO) Take by mouth daily      omeprazole (PriLOSEC) 40 MG capsule Take 1 capsule (40 mg total) by mouth daily  Qty: 180 capsule, Refills: 0    Associated Diagnoses: GERD without esophagitis      oxyCODONE (ROXICODONE) 5 immediate release tablet Take 1 tablet (5 mg total) by mouth every 8 (eight) hours as needed for moderate pain for up to 10 days Max Daily Amount: 15 mg  Qty: 12 tablet, Refills: 0    Associated Diagnoses: Drug-induced acute pancreatitis without infection or necrosis      pyridoxine (VITAMIN B6) 100 mg tablet Take 100 mg by mouth daily      ranolazine (RANEXA) 500 mg 12 hr tablet Take 1 tablet (500 mg total) by mouth 2 (two) times a day  Qty: 180 tablet, Refills: 0    Associated Diagnoses: Coronary artery disease involving native coronary artery of native heart without angina pectoris      rosuvastatin (CRESTOR) 20 MG tablet Take 1 tablet by mouth daily      tamsulosin (FLOMAX) 0 4 mg Take 2 capsules (0 8 mg total) by mouth daily  Qty: 180 capsule, Refills: 1    Associated Diagnoses: Benign prostatic hyperplasia without lower urinary tract symptoms             No discharge procedures on file      PDMP Review       Value Time User    PDMP Reviewed  Yes 6/21/2023  5:20 PM Rizwana Restrepo MD          ED Provider  Electronically Signed by           Blanquita Armando PA-C  06/21/23 [address], PA-C  06/21/23 2869

## 2023-06-21 NOTE — H&P
Ramandeep U  68   H&P  Name: Monisha Cedillo [de-identified] y o  male I MRN: [id number]  Unit/Bed#: MS 330[facility]le I Date of Admission: 6/21/2023   Date of Service: 6/21/2023 I Hospital Day: 0      Assessment/Plan   * Acute pancreatitis  Assessment & Plan  • Presents with severe abdominal pain  • Patient has recurrent pancreatitis  • Previously it was thought that the patient might have chronic medication induced pancreatitis however patient has 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 medications for quite some time  • Concern for gallstone pancreatitis  • Lipase significantly elevated  • Will give IV fluid hydration  • Pain regimen as ordered    Stage 3b chronic kidney disease (CKD) [facility])  Assessment & Plan  Lab Results   Component Value Date    EGFR 37 06/21/2023    EGFR 36 06/21/2023    EGFR 46 06/12/2023    CREATININE 1 68 (H) 06/21/2023    CREATININE 1 73 (H) 06/21/2023    CREATININE 1 41 (H) 06/12/2023     • Mild increase in creatinine compared to baseline of around 1 4   • IV fluid hydration  • Trend BMP  • Avoid nephrotoxic agents  • Strict intake and output  • Daily standing weights  • Monitor BUNs/creatinine    Mixed hyperlipidemia  Assessment & Plan  · Atorvastatin 40 mg    GERD without esophagitis  Assessment & Plan  · Will give Protonix 40 mg IV twice daily  · Zofran as needed    Type 2 diabetes mellitus with kidney complication, with long-term current use of insulin ([facility])  Assessment & Plan  Lab Results   Component Value Date    HGBA1C 6 7 (H) 05/10/2023       Recent Labs     06/21/23  1804   POCGLU 97       Blood Sugar Average: Last 72 hrs:  (P) 97     • Decrease home Lantus to 25 units daily at bedtime     • Sliding scale insulin while inpatient with Accu-Cheks  • Clear liquid diet for now in the setting of pancreatitis ; advance to diabetic diet when tolerating  • Blood sugar fairly controlled currently on clear liquid diet,    HTN (hypertension), benign  Assessment & Plan  · Continue metoprolol, lisinopril and clonidine with holding parameters    PAULA (obstructive sleep apnea)  Assessment & Plan  CPAP nightly       I personally discussed the case with both gastroenterology and surgery on-call    VTE Prophylaxis: Heparin  Code Status: Level 1 - Full Code  Anticipated Length of Stay:  Patient will be admitted on an Inpatient basis with an anticipated length of stay of more than 2 midnights  Justification for Hospital Stay: Acute pancreatitis    Chief Complaint:     Chief Complaint   Patient presents with   • Abnormal Lab     Abnormal labs seen by GI      History of Present Illness:    Gena Villa is a [de-identified] y o  male who presents with significant abdominal pain  Patient has past medical history of chronic pancreatitis which is thought to be medication induced pancreatitis  Patient also has hypertension, type 2 diabetes, GERD, peripheral artery disease and PAULA on CPAP  Patient also has CKD  Patient had a right upper quadrant ultrasound during the last admission which showed gallbladder sludge and possible stones  Lipase was significantly elevated, however patient was hemodynamically stable  Patient did have slight acute kidney injury  At that point of time it was thought not to do a cholecystectomy  However the patient did have abdominal pain and seems to have pancreatitis again  Patient states that he would like to have a cholecystectomy done during this hospitalization    No fever or chills at this point of time    Review of Systems:  Negative except as above  History obtained from the patient  General ROS: positive for  - fatigue  Psychological ROS: negative  Ophthalmic ROS: negative  Respiratory ROS: no cough, shortness of breath, or wheezing  Cardiovascular ROS: no chest pain or dyspnea on exertion  Gastrointestinal ROS: positive for - abdominal pain  Genito-Urinary ROS: no dysuria, trouble voiding, or hematuria  Musculoskeletal ROS: negative  Neurological ROS: no TIA or stroke symptoms  Hematological ROS- No active bleeding  Otherwise, all other 12 point review of systems normal         Past Medical and Surgical History:   Past Medical History:   Diagnosis Date   • Allergic    • Arthritis    • Chronic kidney disease    • Chronic kidney disease (CKD) stage G3a/A1, moderately decreased glomerular filtration rate (GFR) between 45-59 mL/min/1 73 square meter and albuminuria creatinine ratio less than 30 mg/g (HCC)    • Colon polyp    • Diabetes mellitus (HCC)    • GERD (gastroesophageal reflux disease)    • Heart murmur    • History of echocardiogram    • HL (hearing loss)    • Hyperlipidemia    • Hypertension    • Obesity    • Sleep apnea, obstructive      Past Surgical History:   Procedure Laterality Date   • CARDIAC CATHETERIZATION     • COLONOSCOPY  2018   • COLONOSCOPY     • EYE SURGERY     • HEMORRHOID SURGERY     • JOINT REPLACEMENT      knee   • OTHER SURGICAL HISTORY      Stent    • UPPER GASTROINTESTINAL ENDOSCOPY       Meds/Allergies: Allergies: Allergies   Allergen Reactions   • Januvia [Sitagliptin] Other (See Comments)     pancreatitis   • Iodinated Contrast Media Chest Pain   • Simvastatin Myalgia   • Trulicity [Dulaglutide] Other (See Comments)     Pancreatitis   • Wound Dressing Adhesive Other (See Comments)     Burning sensation     Prior to Admission Medications   Prescriptions Last Dose Informant Patient Reported? Taking?    BD Pen Needle Pascale U/F 32G X 4 MM MISC  Self Yes No   Sig: USE AS INSTRUCTED WITH INSULIN PEN   JARDIANCE 25 MG TABS  Self Yes No   Sig: Take 25 mg by mouth daily   Lancets (OneTouch Delica Plus JTBHDI22T) MISC  Self Yes No   Sig: TEST GLUCOSE 3 4 TIMES/DAY   Levemir FlexTouch 100 units/mL injection pen  Self Yes No   Si Units daily   Multiple Vitamins-Minerals (MULTIVITAMIN ADULT PO)  Self Yes No   Sig: Take by mouth daily   allopurinol (ZYLOPRIM) 100 mg tablet  Self Yes No   Sig: Take 100 mg by mouth daily   calcium carbonate (OS-WAYLON) 600 MG tablet  Self Yes No   Sig: Take 600 mg by mouth daily   cholecalciferol (VITAMIN D3) 1,000 units tablet  Self Yes No   Sig: Take 2,000 Units by mouth daily   cloNIDine (CATAPRES) 0 1 mg tablet  Self No No   Sig: Take 1 tablet (0 1 mg total) by mouth 3 (three) times a day   Patient taking differently: Take 0 1 mg by mouth every 12 (twelve) hours   clopidogrel (PLAVIX) 75 mg tablet  Self No No   Sig: Take 1 tablet (75 mg total) by mouth daily   fluticasone (FLONASE) 50 mcg/act nasal spray  Self No No   Si sprays into each nostril daily   insulin aspart protamine-insulin aspart (NovoLOG 70/30) 100 units/mL injection  Self Yes No   Sig: Inject under the skin 3 (three) times a day before meals Units depend on level   lisinopril (ZESTRIL) 40 mg tablet  Self No No   Sig: Take 1 tablet (40 mg total) by mouth daily   metoprolol succinate (TOPROL-XL) 25 mg 24 hr tablet  Self No No   Sig: Take 1 tablet (25 mg total) by mouth daily   omeprazole (PriLOSEC) 40 MG capsule  Self No No   Sig: Take 1 capsule (40 mg total) by mouth daily   Patient taking differently: Take 40 mg by mouth daily before breakfast   oxyCODONE (ROXICODONE) 5 immediate release tablet   No No   Sig: Take 1 tablet (5 mg total) by mouth every 8 (eight) hours as needed for moderate pain for up to 10 days Max Daily Amount: 15 mg   pyridoxine (VITAMIN B6) 100 mg tablet  Self Yes No   Sig: Take 100 mg by mouth daily   ranolazine (RANEXA) 500 mg 12 hr tablet   No No   Sig: Take 1 tablet (500 mg total) by mouth 2 (two) times a day   rosuvastatin (CRESTOR) 20 MG tablet  Self Yes No   Sig: Take 1 tablet by mouth daily   tamsulosin (FLOMAX) 0 4 mg  Self No No   Sig: Take 2 capsules (0 8 mg total) by mouth daily      Facility-Administered Medications: None     Social History:     Social History     Socioeconomic History   • Marital status: /Civil Union     Spouse name: Not on file   • Number of children: Not on file   • Years of education: Not on file   • Highest education "level: Not on file   Occupational History   • Not on file   Tobacco Use   • Smoking status: Former     Packs/day: 1 00     Years: 15 00     Total pack years: 15 00     Types: Cigarettes     Quit date: 1972     Years since quittin 6     Passive exposure: Past   • Smokeless tobacco: Never   Vaping Use   • Vaping Use: Never used   Substance and Sexual Activity   • Alcohol use: Not Currently     Alcohol/week: 5 0 standard drinks of alcohol     Types: 5 Glasses of wine per week     Comment: Occasional    • Drug use: Never   • Sexual activity: Yes     Partners: Female   Other Topics Concern   • Not on file   Social History Narrative    Pet- dog     Social Determinants of Health     Financial Resource Strain: Not on file   Food Insecurity: Not on file   Transportation Needs: Not on file   Physical Activity: Not on file   Stress: Not on file   Social Connections: Not on file   Intimate Partner Violence: Not on file   Housing Stability: Not on file     Patient Pre-hospital Living Situation: Lives at home  Patient Pre-hospital Level of Mobility: Fairly independent and mobile  Patient Pre-hospital Diet Restrictions: No diet restrictions    Family History:  Family History   Problem Relation Age of Onset   • Heart disease Mother    • Heart attack Mother         46s   • Cancer Mother    • Coronary artery disease Mother    • Cancer Brother         Malignant tumor of lung     Physical Exam:   Vitals:   Blood Pressure: 154/69 (23)  Pulse: (!) 53 (23)  Temperature: 97 7 °F (36 5 °C) (23)  Temp Source: Tympanic (23)  Respirations: 16 (23)  Height: 5' 6\" (167 6 cm) (23)  Weight - Scale: 112 kg (246 lb 0 2 oz) (23)  SpO2: 100 % (23)    General appearance: alert, appears stated age and cooperative  Constitutional- looks a little weak  HEENT - atraumatic and normocephalic  Neck- supple  Skin - no fresh rash  Extremities no fresh focal " deformities  Cardiovascular- S1-S2 heard  Respiratory- bilateral air entry present, no crackles or rhonchi  Skin - no fresh rash  Abdomen - normal bowel sounds present, no rebound tenderness  CNS- No fresh focal deficits  Psych- no acute psychosis     Lab Results: I have personally reviewed pertinent reports  Results from last 7 days   Lab Units 06/21/23  1636   WBC Thousand/uL 7 81   HEMOGLOBIN g/dL 13 2   HEMATOCRIT % 39 7   PLATELETS Thousands/uL 199   NEUTROS PCT % 69   LYMPHS PCT % 20   MONOS PCT % 9   EOS PCT % 2     Results from last 7 days   Lab Units 06/21/23  1636 06/21/23  1429   SODIUM mmol/L 139 139   POTASSIUM mmol/L 4 1 4 2   CHLORIDE mmol/L 102 103   CO2 mmol/L 28 29   ANION GAP mmol/L 9 7   BUN mg/dL 26* 26*   CREATININE mg/dL 1 68* 1 73*   CALCIUM mg/dL 9 0 8 7   ALBUMIN g/dL 4 0 3 8   TOTAL BILIRUBIN mg/dL 0 41 0 37   ALK PHOS U/L 51 51   ALT U/L 17 16   AST U/L 18 20   EGFR ml/min/1 73sq m 37 36   GLUCOSE RANDOM mg/dL 107 134                                        Imaging: I have personally reviewed pertinent reports  No results found  Prior imaging reviewed by me and concern for gallstone pancreatitis    EKG, Pathology, and Other Studies Reviewed on Admission:   EKG  Result Date: 06/21/23  Personally reviewed strips with impression of: No acute ST or T wave changes  I reviewed the strips personally    Epic Records Reviewed: Yes    MD Shadia Briseno 73 Internal Medicine    ** Please Note: This note has been constructed using a voice recognition system   **

## 2023-06-21 NOTE — H&P (VIEW-ONLY)
Consultation - General Surgery   Lane Schilling [de-identified] y o  male MRN: [id number]  Unit/Bed#: -01 Encounter: [id number]    Assessment/Plan     Assessment:  Acute gallstone pancreatitis, recurrent    Plan:  Laparoscopic cholecystectomy once pancreatitis has resolved  Patient needs to be off Plavix for 5 days before surgery can be done  History of Present Illness     HPI:  Lane Schilling is a [de-identified] y o  male who presents with epigastric abdominal pain  Patient was admitted from the emergency room with acute onset of epigastric pain which started this morning  Patient graded the pain as 4 out of 10 but now it is 2 out of 10  He had no associated nausea or vomiting  He was hospitalized about 10 days ago with acute pancreatitis which on work-up proved to be due to gallstones  He had not sought any surgical opinion but remained well in the interim  He has no prior history of abdominal surgeries  He drinks small amount of wine 3 times a week  Consults   For management of acute pancreatitis    Review of Systems   Insulin-dependent diabetes mellitus  Chronic renal failure  Creatinine is 1 6  Aortic stenosis murmur  History of coronary artery disease  Patient had a stent placed 8 years ago  He has been well since with no history of chest pain or dyspnea      Historical Information   Past Medical History:   Diagnosis Date   • Allergic    • Arthritis    • Chronic kidney disease 2021   • Chronic kidney disease (CKD) stage G3a/A1, moderately decreased glomerular filtration rate (GFR) between 45-59 mL/min/1 73 square meter and albuminuria creatinine ratio less than 30 mg/g (HCC)    • Colon polyp    • Diabetes mellitus (HCC)    • GERD (gastroesophageal reflux disease)    • Heart murmur    • History of echocardiogram    • HL (hearing loss)    • Hyperlipidemia    • Hypertension    • Obesity    • Sleep apnea, obstructive      Past Surgical History:   Procedure Laterality Date   • CARDIAC CATHETERIZATION     • "COLONOSCOPY  2018   • COLONOSCOPY     • EYE SURGERY     • HEMORRHOID SURGERY     • JOINT REPLACEMENT  2017    knee   • OTHER SURGICAL HISTORY      Stent    • UPPER GASTROINTESTINAL ENDOSCOPY       Social History   Social History     Substance and Sexual Activity   Alcohol Use Not Currently   • Alcohol/week: 5 0 standard drinks of alcohol   • Types: 5 Glasses of wine per week    Comment: Occasional      Social History     Substance and Sexual Activity   Drug Use Never     Social History     Tobacco Use   Smoking Status Former   • Packs/day: 1 00   • Years: 15 00   • Total pack years: 15 00   • Types: Cigarettes   • Quit date: 1972   • Years since quittin 6   • Passive exposure: Past   Smokeless Tobacco Never     Family History: non-contributory    Meds/Allergies   all current active meds have been reviewed  Allergies   Allergen Reactions   • Januvia [Sitagliptin] Other (See Comments)     pancreatitis   • Iodinated Contrast Media Chest Pain   • Simvastatin Myalgia   • Trulicity [Dulaglutide] Other (See Comments)     Pancreatitis   • Wound Dressing Adhesive Other (See Comments)     Burning sensation       Objective   First Vitals:   Blood Pressure: (!) 186/74 (23)  Pulse: 68 (23)  Temperature: 98 °F (36 7 °C) (23)  Temp Source: Oral (23)  Respirations: 16 (23)  Height: 5' 6\" (167 6 cm) (23)  Weight - Scale: 112 kg (246 lb 0 2 oz) (23)  SpO2: 100 % (23)    Current Vitals:   Blood Pressure: 154/69 (23)  Pulse: (!) 53 (23)  Temperature: 97 7 °F (36 5 °C) (23)  Temp Source: Tympanic (23)  Respirations: 16 (23)  Height: 5' 6\" (167 6 cm) (23)  Weight - Scale: 112 kg (246 lb 0 2 oz) (23)  SpO2: 100 % (23)    No intake or output data in the 24 hours ending 23    Invasive Devices     Peripheral Intravenous Line  Duration        " Peripheral IV 06/21/23 Left Forearm <1 day                Physical Exam   Patient is comfortable at rest  Vital signs are stable    Heart sounds are normal   Patient has a grade 2/6 aortic stenosis murmur  Chest is clear to auscultation  Abdominal exam reveals minimal epigastric tenderness  Extremity exam is normal    Lab Results: I have personally reviewed pertinent lab results  Imaging: I have personally reviewed pertinent films in PACS  EKG, Pathology, and Other Studies: I have personally reviewed pertinent reports  Counseling / Coordination of Care  Total floor / unit time spent today 45 minutes  Greater than 50% of total time was spent with the patient and / or family counseling and / or coordination of care  A description of the counseling / coordination of care: Management of acute pancreatitis

## 2023-06-21 NOTE — CONSULTS
Consultation - General Surgery   John Roman [de-identified] y o  male MRN: [id number]  Unit/Bed#: -01 Encounter: [id number]    Assessment/Plan     Assessment:  Acute gallstone pancreatitis, recurrent    Plan:  Laparoscopic cholecystectomy once pancreatitis has resolved  Patient needs to be off Plavix for 5 days before surgery can be done  History of Present Illness     HPI:  John Roman is a [de-identified] y o  male who presents with epigastric abdominal pain  Patient was admitted from the emergency room with acute onset of epigastric pain which started this morning  Patient graded the pain as 4 out of 10 but now it is 2 out of 10  He had no associated nausea or vomiting  He was hospitalized about 10 days ago with acute pancreatitis which on work-up proved to be due to gallstones  He had not sought any surgical opinion but remained well in the interim  He has no prior history of abdominal surgeries  He drinks small amount of wine 3 times a week  Consults   For management of acute pancreatitis    Review of Systems   Insulin-dependent diabetes mellitus  Chronic renal failure  Creatinine is 1 6  Aortic stenosis murmur  History of coronary artery disease  Patient had a stent placed 8 years ago  He has been well since with no history of chest pain or dyspnea      Historical Information   Past Medical History:   Diagnosis Date   • Allergic    • Arthritis    • Chronic kidney disease 2021   • Chronic kidney disease (CKD) stage G3a/A1, moderately decreased glomerular filtration rate (GFR) between 45-59 mL/min/1 73 square meter and albuminuria creatinine ratio less than 30 mg/g (HCC)    • Colon polyp    • Diabetes mellitus (HCC)    • GERD (gastroesophageal reflux disease)    • Heart murmur    • History of echocardiogram    • HL (hearing loss)    • Hyperlipidemia    • Hypertension    • Obesity    • Sleep apnea, obstructive      Past Surgical History:   Procedure Laterality Date   • CARDIAC CATHETERIZATION     • "COLONOSCOPY  2018   • COLONOSCOPY     • EYE SURGERY     • HEMORRHOID SURGERY     • JOINT REPLACEMENT  2017    knee   • OTHER SURGICAL HISTORY      Stent    • UPPER GASTROINTESTINAL ENDOSCOPY       Social History   Social History     Substance and Sexual Activity   Alcohol Use Not Currently   • Alcohol/week: 5 0 standard drinks of alcohol   • Types: 5 Glasses of wine per week    Comment: Occasional      Social History     Substance and Sexual Activity   Drug Use Never     Social History     Tobacco Use   Smoking Status Former   • Packs/day: 1 00   • Years: 15 00   • Total pack years: 15 00   • Types: Cigarettes   • Quit date: 1972   • Years since quittin 6   • Passive exposure: Past   Smokeless Tobacco Never     Family History: non-contributory    Meds/Allergies   all current active meds have been reviewed  Allergies   Allergen Reactions   • Januvia [Sitagliptin] Other (See Comments)     pancreatitis   • Iodinated Contrast Media Chest Pain   • Simvastatin Myalgia   • Trulicity [Dulaglutide] Other (See Comments)     Pancreatitis   • Wound Dressing Adhesive Other (See Comments)     Burning sensation       Objective   First Vitals:   Blood Pressure: (!) 186/74 (23)  Pulse: 68 (23)  Temperature: 98 °F (36 7 °C) (23)  Temp Source: Oral (23)  Respirations: 16 (23)  Height: 5' 6\" (167 6 cm) (23)  Weight - Scale: 112 kg (246 lb 0 2 oz) (23)  SpO2: 100 % (23)    Current Vitals:   Blood Pressure: 154/69 (23)  Pulse: (!) 53 (23)  Temperature: 97 7 °F (36 5 °C) (23)  Temp Source: Tympanic (23)  Respirations: 16 (23)  Height: 5' 6\" (167 6 cm) (23)  Weight - Scale: 112 kg (246 lb 0 2 oz) (23)  SpO2: 100 % (23)    No intake or output data in the 24 hours ending 23    Invasive Devices     Peripheral Intravenous Line  Duration        " Peripheral IV 06/21/23 Left Forearm <1 day                Physical Exam   Patient is comfortable at rest  Vital signs are stable    Heart sounds are normal   Patient has a grade 2/6 aortic stenosis murmur  Chest is clear to auscultation  Abdominal exam reveals minimal epigastric tenderness  Extremity exam is normal    Lab Results: I have personally reviewed pertinent lab results  Imaging: I have personally reviewed pertinent films in PACS  EKG, Pathology, and Other Studies: I have personally reviewed pertinent reports  Counseling / Coordination of Care  Total floor / unit time spent today 45 minutes  Greater than 50% of total time was spent with the patient and / or family counseling and / or coordination of care  A description of the counseling / coordination of care: Management of acute pancreatitis

## 2023-06-21 NOTE — ASSESSMENT & PLAN NOTE
Lab Results   Component Value Date    EGFR 37 06/21/2023    EGFR 36 06/21/2023    EGFR 46 06/12/2023    CREATININE 1 68 (H) 06/21/2023    CREATININE 1 73 (H) 06/21/2023    CREATININE 1 41 (H) 06/12/2023     • Mild increase in creatinine compared to baseline of around 1 4   • IV fluid hydration  • Trend BMP  • Avoid nephrotoxic agents  • Strict intake and output  • Daily standing weights  • Monitor BUNs/creatinine

## 2023-06-21 NOTE — ASSESSMENT & PLAN NOTE
Lab Results   Component Value Date    HGBA1C 6 7 (H) 05/10/2023       Recent Labs     06/21/23  1804   POCGLU 97       Blood Sugar Average: Last 72 hrs:  (P) 97     • Decrease home Lantus to 25 units daily at bedtime     • Sliding scale insulin while inpatient with Accu-Cheks  • Clear liquid diet for now in the setting of pancreatitis ; advance to diabetic diet when tolerating  • Blood sugar fairly controlled currently on clear liquid diet,

## 2023-06-21 NOTE — ASSESSMENT & PLAN NOTE
• Presents with severe abdominal pain  • Patient has recurrent pancreatitis  • Previously it was thought that the patient might have chronic medication induced pancreatitis however patient has 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 medications for quite some time  • Concern for gallstone pancreatitis  • Lipase significantly elevated  • Will give IV fluid hydration  • Pain regimen as ordered

## 2023-06-22 ENCOUNTER — PREP FOR PROCEDURE (OUTPATIENT)
Dept: SURGERY | Facility: CLINIC | Age: 83
End: 2023-06-22

## 2023-06-22 VITALS
WEIGHT: 246.01 LBS | HEIGHT: 66 IN | HEART RATE: 55 BPM | DIASTOLIC BLOOD PRESSURE: 65 MMHG | RESPIRATION RATE: 16 BRPM | SYSTOLIC BLOOD PRESSURE: 149 MMHG | OXYGEN SATURATION: 95 % | TEMPERATURE: 98.4 F | BODY MASS INDEX: 39.54 KG/M2

## 2023-06-22 DIAGNOSIS — R10.11 RIGHT UPPER QUADRANT ABDOMINAL PAIN: Primary | ICD-10-CM

## 2023-06-22 LAB
ALBUMIN SERPL BCP-MCNC: 3.2 G/DL (ref 3.5–5)
ALP SERPL-CCNC: 37 U/L (ref 34–104)
ALT SERPL W P-5'-P-CCNC: 12 U/L (ref 7–52)
ANION GAP SERPL CALCULATED.3IONS-SCNC: 6 MMOL/L
AST SERPL W P-5'-P-CCNC: 16 U/L (ref 13–39)
BASOPHILS # BLD AUTO: 0.03 THOUSANDS/ÂΜL (ref 0–0.1)
BASOPHILS NFR BLD AUTO: 1 % (ref 0–1)
BILIRUB SERPL-MCNC: 0.46 MG/DL (ref 0.2–1)
BUN SERPL-MCNC: 21 MG/DL (ref 5–25)
CALCIUM ALBUM COR SERPL-MCNC: 8.7 MG/DL (ref 8.3–10.1)
CALCIUM SERPL-MCNC: 8.1 MG/DL (ref 8.4–10.2)
CHLORIDE SERPL-SCNC: 107 MMOL/L (ref 96–108)
CO2 SERPL-SCNC: 27 MMOL/L (ref 21–32)
CREAT SERPL-MCNC: 1.52 MG/DL (ref 0.6–1.3)
EOSINOPHIL # BLD AUTO: 0.21 THOUSAND/ÂΜL (ref 0–0.61)
EOSINOPHIL NFR BLD AUTO: 4 % (ref 0–6)
ERYTHROCYTE [DISTWIDTH] IN BLOOD BY AUTOMATED COUNT: 14 % (ref 11.6–15.1)
GFR SERPL CREATININE-BSD FRML MDRD: 42 ML/MIN/1.73SQ M
GLUCOSE SERPL-MCNC: 119 MG/DL (ref 65–140)
GLUCOSE SERPL-MCNC: 139 MG/DL (ref 65–140)
GLUCOSE SERPL-MCNC: 209 MG/DL (ref 65–140)
HCT VFR BLD AUTO: 34.8 % (ref 36.5–49.3)
HGB BLD-MCNC: 11.6 G/DL (ref 12–17)
IMM GRANULOCYTES # BLD AUTO: 0.02 THOUSAND/UL (ref 0–0.2)
IMM GRANULOCYTES NFR BLD AUTO: 0 % (ref 0–2)
LYMPHOCYTES # BLD AUTO: 1.74 THOUSANDS/ÂΜL (ref 0.6–4.47)
LYMPHOCYTES NFR BLD AUTO: 33 % (ref 14–44)
MCH RBC QN AUTO: 32.1 PG (ref 26.8–34.3)
MCHC RBC AUTO-ENTMCNC: 33.3 G/DL (ref 31.4–37.4)
MCV RBC AUTO: 96 FL (ref 82–98)
MONOCYTES # BLD AUTO: 0.59 THOUSAND/ÂΜL (ref 0.17–1.22)
MONOCYTES NFR BLD AUTO: 11 % (ref 4–12)
NEUTROPHILS # BLD AUTO: 2.71 THOUSANDS/ÂΜL (ref 1.85–7.62)
NEUTS SEG NFR BLD AUTO: 51 % (ref 43–75)
NRBC BLD AUTO-RTO: 0 /100 WBCS
PLATELET # BLD AUTO: 182 THOUSANDS/UL (ref 149–390)
PMV BLD AUTO: 10.3 FL (ref 8.9–12.7)
POTASSIUM SERPL-SCNC: 4 MMOL/L (ref 3.5–5.3)
PROT SERPL-MCNC: 5.6 G/DL (ref 6.4–8.4)
RBC # BLD AUTO: 3.61 MILLION/UL (ref 3.88–5.62)
SODIUM SERPL-SCNC: 140 MMOL/L (ref 135–147)
WBC # BLD AUTO: 5.3 THOUSAND/UL (ref 4.31–10.16)

## 2023-06-22 PROCEDURE — 82787 IGG 1 2 3 OR 4 EACH: CPT | Performed by: NURSE PRACTITIONER

## 2023-06-22 PROCEDURE — 80053 COMPREHEN METABOLIC PANEL: CPT | Performed by: INTERNAL MEDICINE

## 2023-06-22 PROCEDURE — 99239 HOSP IP/OBS DSCHRG MGMT >30: CPT | Performed by: INTERNAL MEDICINE

## 2023-06-22 PROCEDURE — 99222 1ST HOSP IP/OBS MODERATE 55: CPT | Performed by: INTERNAL MEDICINE

## 2023-06-22 PROCEDURE — 99232 SBSQ HOSP IP/OBS MODERATE 35: CPT | Performed by: SURGERY

## 2023-06-22 PROCEDURE — 82948 REAGENT STRIP/BLOOD GLUCOSE: CPT

## 2023-06-22 PROCEDURE — C9113 INJ PANTOPRAZOLE SODIUM, VIA: HCPCS | Performed by: INTERNAL MEDICINE

## 2023-06-22 PROCEDURE — 82784 ASSAY IGA/IGD/IGG/IGM EACH: CPT | Performed by: NURSE PRACTITIONER

## 2023-06-22 PROCEDURE — 85025 COMPLETE CBC W/AUTO DIFF WBC: CPT | Performed by: INTERNAL MEDICINE

## 2023-06-22 RX ORDER — ACETAMINOPHEN 325 MG/1
650 TABLET ORAL EVERY 6 HOURS PRN
Refills: 0
Start: 2023-06-22

## 2023-06-22 RX ORDER — CLONIDINE HYDROCHLORIDE 0.1 MG/1
0.1 TABLET ORAL EVERY 12 HOURS SCHEDULED
Start: 2023-06-22

## 2023-06-22 RX ADMIN — SODIUM CHLORIDE, SODIUM LACTATE, POTASSIUM CHLORIDE, AND CALCIUM CHLORIDE 125 ML/HR: .6; .31; .03; .02 INJECTION, SOLUTION INTRAVENOUS at 04:20

## 2023-06-22 RX ADMIN — LISINOPRIL 40 MG: 20 TABLET ORAL at 09:07

## 2023-06-22 RX ADMIN — HEPARIN SODIUM 5000 UNITS: 5000 INJECTION INTRAVENOUS; SUBCUTANEOUS at 05:09

## 2023-06-22 RX ADMIN — TAMSULOSIN HYDROCHLORIDE 0.8 MG: 0.4 CAPSULE ORAL at 09:06

## 2023-06-22 RX ADMIN — FLUTICASONE PROPIONATE 2 SPRAY: 50 SPRAY, METERED NASAL at 09:29

## 2023-06-22 RX ADMIN — PANTOPRAZOLE SODIUM 40 MG: 40 INJECTION, POWDER, FOR SOLUTION INTRAVENOUS at 09:06

## 2023-06-22 RX ADMIN — METOPROLOL SUCCINATE 25 MG: 25 TABLET, FILM COATED, EXTENDED RELEASE ORAL at 09:07

## 2023-06-22 RX ADMIN — RANOLAZINE 500 MG: 500 TABLET, FILM COATED, EXTENDED RELEASE ORAL at 09:07

## 2023-06-22 RX ADMIN — CLONIDINE HYDROCHLORIDE 0.1 MG: 0.1 TABLET ORAL at 09:07

## 2023-06-22 RX ADMIN — ALLOPURINOL 100 MG: 100 TABLET ORAL at 09:07

## 2023-06-22 RX ADMIN — INSULIN LISPRO 1 UNITS: 100 INJECTION, SOLUTION INTRAVENOUS; SUBCUTANEOUS at 12:03

## 2023-06-22 NOTE — PLAN OF CARE
Problem: PAIN - ADULT  Goal: Verbalizes/displays adequate comfort level or baseline comfort level  Description: Interventions:  - Encourage patient to monitor pain and request assistance  - Assess pain using appropriate pain scale  - Administer analgesics based on type and severity of pain and evaluate response  - Implement non-pharmacological measures as appropriate and evaluate response  - Consider cultural and social influences on pain and pain management  - Notify physician/advanced practitioner if interventions unsuccessful or patient reports new pain  Outcome: Progressing     Problem: INFECTION - ADULT  Goal: Absence or prevention of progression during hospitalization  Description: INTERVENTIONS:  - Assess and monitor for signs and symptoms of infection  - Monitor lab/diagnostic results  - Monitor all insertion sites, i e  indwelling lines, tubes, and drains  - Monitor endotracheal if appropriate and nasal secretions for changes in amount and color  - [place] appropriate cooling/warming therapies per order  - Administer medications as ordered  - Instruct and encourage patient and family to use good hand hygiene technique  - Identify and instruct in appropriate isolation precautions for identified infection/condition  Outcome: Progressing  Goal: Absence of fever/infection during neutropenic period  Description: INTERVENTIONS:  - Monitor WBC    Outcome: Progressing     Problem: SAFETY ADULT  Goal: Patient will remain free of falls  Description: INTERVENTIONS:  - Educate patient/family on patient safety including physical limitations  - Instruct patient to call for assistance with activity   - Consult OT/PT to assist with strengthening/mobility   - Keep Call bell within reach  - Keep bed low and locked with side rails adjusted as appropriate  - Keep care items and personal belongings within reach  - Initiate and maintain comfort rounds  - Make Fall Risk Sign visible to staff  - Apply yellow socks and bracelet for high fall risk patients  - Consider moving patient to room near nurses station  Outcome: Progressing  Goal: Maintain or return to baseline ADL function  Description: INTERVENTIONS:  -  Assess patient's ability to carry out ADLs; assess patient's baseline for ADL function and identify physical deficits which impact ability to perform ADLs (bathing, care of mouth/teeth, toileting, grooming, dressing, etc )  - Assess/evaluate cause of self-care deficits   - Assess range of motion  - Assess patient's mobility; develop plan if impaired  - Assess patient's need for assistive devices and provide as appropriate  - Encourage maximum independence but intervene and supervise when necessary  - Involve family in performance of ADLs  - Assess for home care needs following discharge   - Consider OT consult to assist with ADL evaluation and planning for discharge  - Provide patient education as appropriate  Outcome: Progressing  Goal: Maintains/Returns to pre admission functional level  Description: INTERVENTIONS:  - Perform BMAT or MOVE assessment daily    - Set and communicate daily mobility goal to care team and patient/family/caregiver     - Collaborate with rehabilitation services on mobility goals if consulted  - Out of bed for toileting  - Record patient progress and toleration of activity level   Outcome: Progressing     Problem: DISCHARGE PLANNING  Goal: Discharge to home or other facility with appropriate resources  Description: INTERVENTIONS:  - Identify barriers to discharge w/patient and caregiver  - Arrange for needed discharge resources and transportation as appropriate  - Identify discharge learning needs (meds, wound care, etc )  - Arrange for interpretive services to assist at discharge as needed  - Refer to Case Management Department for coordinating discharge planning if the patient needs post-hospital services based on physician/advanced practitioner order or complex needs related to functional status, cognitive ability, or social support system  Outcome: Progressing     Problem: Knowledge Deficit  Goal: Patient/family/caregiver demonstrates understanding of disease process, treatment plan, medications, and discharge instructions  Description: Complete learning assessment and assess knowledge base    Interventions:  - Provide teaching at level of understanding  - Provide teaching via preferred learning methods  Outcome: Progressing     Problem: Prexisting or High Potential for Compromised Skin Integrity  Goal: Skin integrity is maintained or improved  Description: INTERVENTIONS:  - Identify patients at risk for skin breakdown  - Assess and monitor skin integrity  - Assess and monitor nutrition and hydration status  - Monitor labs   - Assess for incontinence   - Turn and reposition patient  - Assist with mobility/ambulation  - Relieve pressure over bony prominences  - Avoid friction and shearing  - Provide appropriate hygiene as needed including keeping skin clean and dry  - Evaluate need for skin moisturizer/barrier cream  - Collaborate with interdisciplinary team   - Patient/family teaching  - Consider wound care consult   Outcome: Progressing

## 2023-06-22 NOTE — OCCUPATIONAL THERAPY NOTE
Occupational Therapy Screen Note     Patient Name: Quique DINERO'Y Date: 6/22/2023 06/22/23 1105   OT Last Visit   OT Visit Date 06/22/23   Note Type   Note type Screen   Additional Comments OT consult received and chart reviewd  Spoke with RN Anastasiya Dixon who reports pt to be independent with functional mobility and participation in ADLs t/o current hospitalization  No skilled OT needs, will d/c pt from [facility]  Please reconsult if new acute needs arise         Giovanni Heredia Trevon 89 OTR/L   [place] Licensure# [id number]

## 2023-06-22 NOTE — PHYSICAL THERAPY NOTE
Physical Therapy Screen    Patient Name: Isha Calderon  ZSCNELUCIAN Date: 6/22/2023  Problem List  Principal Problem:    Acute pancreatitis  Active Problems:    PAULA (obstructive sleep apnea)    HTN (hypertension), benign    Type 2 diabetes mellitus with kidney complication, with long-term current use of insulin (HCC)    GERD without esophagitis    Mixed hyperlipidemia    Stage 3b chronic [facility] disease ([facility]        06/22/23 1108   PT Last Visit   PT Visit Date 06/22/23   Note Type   Note type Screen   Additional Comments PT consult received and chart reviewed  Spoke with RN Sawyer Burnett whom reports pt has been independently mobilizing t/o his hospitalization, no skilled PT needs  Currently has d/c order in  Will d/c from PT caseload, please reconsult if pt's medical or functional status significantly changes         Chaya Campos, PT, DPT   Available via [employer]  NPI # [id number]  [place] License - [id number]  9/35/[id number]

## 2023-06-22 NOTE — ASSESSMENT & PLAN NOTE
Lab Results   Component Value Date    HGBA1C 6 7 (H) 05/10/2023       Recent Labs     06/21/23  1804 06/21/23  2046 06/22/23  0711   POCGLU 97 152* 139       Blood Sugar Average: Last 72 hrs:  (P) 633 [id number]     • Discharged on home regimen

## 2023-06-22 NOTE — PLAN OF CARE
Problem: PAIN - ADULT  Goal: Verbalizes/displays adequate comfort level or baseline comfort level  Description: Interventions:  - Encourage patient to monitor pain and request assistance  - Assess pain using appropriate pain scale  - Administer analgesics based on type and severity of pain and evaluate response  - Implement non-pharmacological measures as appropriate and evaluate response  - Consider cultural and social influences on pain and pain management  - Notify physician/advanced practitioner if interventions unsuccessful or patient reports new pain  6/22/2023 0133 by Lizbeth Leal RN  Outcome: Progressing  6/22/2023 0130 by Lizbeth Leal RN  Outcome: Progressing  6/22/2023 0122 by Lizbeth Leal RN  Outcome: Progressing     Problem: INFECTION - ADULT  Goal: Absence or prevention of progression during hospitalization  Description: INTERVENTIONS:  - Assess and monitor for signs and symptoms of infection  - Monitor lab/diagnostic results  - Monitor all insertion sites, i e  indwelling lines, tubes, and drains  - Monitor endotracheal if appropriate and nasal secretions for changes in amount and color  - [place] appropriate cooling/warming therapies per order  - Administer medications as ordered  - Instruct and encourage patient and family to use good hand hygiene technique  - Identify and instruct in appropriate isolation precautions for identified infection/condition  6/22/2023 0133 by Lizbeth Leal RN  Outcome: Progressing  6/22/2023 0130 by Lizbeth Leal RN  Outcome: Progressing  6/22/2023 0122 by Lizbeth Leal RN  Outcome: Progressing  Goal: Absence of fever/infection during neutropenic period  Description: INTERVENTIONS:  - Monitor WBC    6/22/2023 0133 by Lizbeth Leal RN  Outcome: Progressing  6/22/2023 0130 by Lizbeth Leal RN  Outcome: Progressing  6/22/2023 0122 by Lizbeth Leal RN  Outcome: Progressing     Problem: SAFETY ADULT  Goal: Patient will remain free of falls  Description: INTERVENTIONS:  - Educate patient/family on patient safety including physical limitations  - Instruct patient to call for assistance with activity   - Consult OT/PT to assist with strengthening/mobility   - Keep Call bell within reach  - Keep bed low and locked with side rails adjusted as appropriate  - Keep care items and personal belongings within reach  - Initiate and maintain comfort rounds  - Make Fall Risk Sign visible to staff  - Offer Toileting every 2 Hours, in advance of need  - Initiate/Maintain bed alarm  - Obtain necessary fall risk management equipment:   - Apply yellow socks and bracelet for high fall risk patients  - Consider moving patient to room near nurses station  6/22/2023 0133 by Milan Montenegro RN  Outcome: Progressing  6/22/2023 0130 by Milan Montenegro RN  Outcome: Progressing  6/22/2023 0122 by Milan Montenegro RN  Outcome: Progressing  Goal: Maintain or return to baseline ADL function  Description: INTERVENTIONS:  -  Assess patient's ability to carry out ADLs; assess patient's baseline for ADL function and identify physical deficits which impact ability to perform ADLs (bathing, care of mouth/teeth, toileting, grooming, dressing, etc )  - Assess/evaluate cause of self-care deficits   - Assess range of motion  - Assess patient's mobility; develop plan if impaired  - Assess patient's need for assistive devices and provide as appropriate  - Encourage maximum independence but intervene and supervise when necessary  - Involve family in performance of ADLs  - Assess for home care needs following discharge   - Consider OT consult to assist with ADL evaluation and planning for discharge  - Provide patient education as appropriate  6/22/2023 0133 by Milan Montenegro RN  Outcome: Progressing  6/22/2023 0130 by Milan Montenegro RN  Outcome: Progressing  6/22/2023 0122 by Milan Montenegro RN  Outcome: Progressing  Goal: Maintains/Returns to pre admission functional level  Description: INTERVENTIONS:  - Perform BMAT or MOVE assessment daily    - Set and communicate daily mobility goal to care team and patient/family/caregiver  - Collaborate with rehabilitation services on mobility goals if consulted  - Perform Range of Motion 3 times a day  - Reposition patient every 2 hours  - Dangle patient 3 times a day  - Stand patient 3 times a day  - Ambulate patient 3 times a day  - Out of bed to chair 3 times a day   - Out of bed for meals 3 times a day  - Out of bed for toileting  - Record patient progress and toleration of activity level   6/22/2023 0133 by Haley Paul RN  Outcome: Progressing  6/22/2023 0130 by Haley Paul RN  Outcome: Progressing  6/22/2023 0122 by Haley Paul RN  Outcome: Progressing     Problem: DISCHARGE PLANNING  Goal: Discharge to home or other facility with appropriate resources  Description: INTERVENTIONS:  - Identify barriers to discharge w/patient and caregiver  - Arrange for needed discharge resources and transportation as appropriate  - Identify discharge learning needs (meds, wound care, etc )  - Arrange for interpretive services to assist at discharge as needed  - Refer to Case Management Department for coordinating discharge planning if the patient needs post-hospital services based on physician/advanced practitioner order or complex needs related to functional status, cognitive ability, or social support system  6/22/2023 0133 by Haley Paul RN  Outcome: Progressing  6/22/2023 0130 by Haley Paul RN  Outcome: Progressing  6/22/2023 0122 by Haley Paul RN  Outcome: Progressing     Problem: Knowledge Deficit  Goal: Patient/family/caregiver demonstrates understanding of disease process, treatment plan, medications, and discharge instructions  Description: Complete learning assessment and assess knowledge base    Interventions:  - Provide teaching at level of understanding  - Provide teaching via preferred learning methods  6/22/2023 0133 by Bakari Diaz RN  Outcome: Progressing  6/22/2023 0130 by Bakari Diaz RN  Outcome: Progressing  6/22/2023 0122 by Bakari Diaz RN  Outcome: Progressing

## 2023-06-22 NOTE — PLAN OF CARE
Problem: PAIN - ADULT  Goal: Verbalizes/displays adequate comfort level or baseline comfort level  Description: Interventions:  - Encourage patient to monitor pain and request assistance  - Assess pain using appropriate pain scale  - Administer analgesics based on type and severity of pain and evaluate response  - Implement non-pharmacological measures as appropriate and evaluate response  - Consider cultural and social influences on pain and pain management  - Notify physician/advanced practitioner if interventions unsuccessful or patient reports new pain  6/22/2023 0130 by Nai Coello RN  Outcome: Progressing  6/22/2023 0122 by Nai Coello RN  Outcome: Progressing     Problem: INFECTION - ADULT  Goal: Absence or prevention of progression during hospitalization  Description: INTERVENTIONS:  - Assess and monitor for signs and symptoms of infection  - Monitor lab/diagnostic results  - Monitor all insertion sites, i e  indwelling lines, tubes, and drains  - Monitor endotracheal if appropriate and nasal secretions for changes in amount and color  - [place] appropriate cooling/warming therapies per order  - Administer medications as ordered  - Instruct and encourage patient and family to use good hand hygiene technique  - Identify and instruct in appropriate isolation precautions for identified infection/condition  6/22/2023 0130 by Nai Coello RN  Outcome: Progressing  6/22/2023 0122 by Nai Coello RN  Outcome: Progressing  Goal: Absence of fever/infection during neutropenic period  Description: INTERVENTIONS:  - Monitor WBC    6/22/2023 0130 by Nai Coello RN  Outcome: Progressing  6/22/2023 0122 by Nai Coello RN  Outcome: Progressing     Problem: SAFETY ADULT  Goal: Patient will remain free of falls  Description: INTERVENTIONS:  - Educate patient/family on patient safety including physical limitations  - Instruct patient to call for assistance with activity   - Consult OT/PT to assist with strengthening/mobility   - Keep Call bell within reach  - Keep bed low and locked with side rails adjusted as appropriate  - Keep care items and personal belongings within reach  - Initiate and maintain comfort rounds  - Make Fall Risk Sign visible to staff  - Offer Toileting every 2 Hours, in advance of need  - Initiate/Maintain bed alarm  - Obtain necessary fall risk management equipment:   - Apply yellow socks and bracelet for high fall risk patients  - Consider moving patient to room near nurses station  6/22/2023 0130 by Raina Loredo RN  Outcome: Progressing  6/22/2023 0122 by Raina Loredo RN  Outcome: Progressing  Goal: Maintain or return to baseline ADL function  Description: INTERVENTIONS:  -  Assess patient's ability to carry out ADLs; assess patient's baseline for ADL function and identify physical deficits which impact ability to perform ADLs (bathing, care of mouth/teeth, toileting, grooming, dressing, etc )  - Assess/evaluate cause of self-care deficits   - Assess range of motion  - Assess patient's mobility; develop plan if impaired  - Assess patient's need for assistive devices and provide as appropriate  - Encourage maximum independence but intervene and supervise when necessary  - Involve family in performance of ADLs  - Assess for home care needs following discharge   - Consider OT consult to assist with ADL evaluation and planning for discharge  - Provide patient education as appropriate  6/22/2023 0130 by Raina Loredo RN  Outcome: Progressing  6/22/2023 0122 by Raina Loredo RN  Outcome: Progressing  Goal: Maintains/Returns to pre admission functional level  Description: INTERVENTIONS:  - Perform BMAT or MOVE assessment daily    - Set and communicate daily mobility goal to care team and patient/family/caregiver  - Collaborate with rehabilitation services on mobility goals if consulted  - Perform Range of Motion 3 times a day  - Reposition patient every 2 hours    - Dangle patient 3 times a day  - Stand patient 3 times a day  - Ambulate patient 3 times a day  - Out of bed to chair 3 times a day   - Out of bed for meals 3 times a day  - Out of bed for toileting  - Record patient progress and toleration of activity level   6/22/2023 0130 by Dinorah Stark RN  Outcome: Progressing  6/22/2023 0122 by Dinorah Stark RN  Outcome: Progressing     Problem: DISCHARGE PLANNING  Goal: Discharge to home or other facility with appropriate resources  Description: INTERVENTIONS:  - Identify barriers to discharge w/patient and caregiver  - Arrange for needed discharge resources and transportation as appropriate  - Identify discharge learning needs (meds, wound care, etc )  - Arrange for interpretive services to assist at discharge as needed  - Refer to Case Management Department for coordinating discharge planning if the patient needs post-hospital services based on physician/advanced practitioner order or complex needs related to functional status, cognitive ability, or social support system  6/22/2023 0130 by Dinorah Stark RN  Outcome: Progressing  6/22/2023 0122 by Dinorah Stark RN  Outcome: Progressing     Problem: Knowledge Deficit  Goal: Patient/family/caregiver demonstrates understanding of disease process, treatment plan, medications, and discharge instructions  Description: Complete learning assessment and assess knowledge base    Interventions:  - Provide teaching at level of understanding  - Provide teaching via preferred learning methods  6/22/2023 0130 by Dinorah Stark RN  Outcome: Progressing  6/22/2023 0122 by Dinorah Stark RN  Outcome: Progressing

## 2023-06-22 NOTE — CONSULTS
Consultation - [facility] Gastroenterology   Gena Villa [de-identified] y o  male MRN: [id number]  Unit/Bed#: -01 Encounter: [id number]        Inpatient consult to gastroenterology  Consult performed by: GERSON Daniel  Consult ordered by: Remedios Daniel          Reason for Consult / Principal Problem:     Pancreatitis, GERD      ASSESSMENT AND PLAN:      1  Recurrent pancreatitis: This is an 37-year-old male with history of recurrent pancreatitis who presents due to epigastric pain consistent with prior episodes of pancreatitis and lipase elevation of 6,378 on outpatient labs as well as increased kidney function  He was recently admitted at the beginning of this month with acute pancreatitis and had CT/MRI/MRCP at that time showing probable gallstone/sludge without ductal dilatation or evidence of choledocholithiasis  He also had some findings suggestive of adenomyomatosis of the gallbladder and narrowing of the pancreatic duct with no evidence of proximal dilation or obvious lesions and a small stricture or ductal nodule which repeat MRI/MRCP was advised for reevaluation  He continues to have episodes of pancreatitis despite stopping Rybelsus, hydrochlorothiazide, Januvia  Denies any heavy alcohol use, drinks a small glass of wine 3 times a week, denies any tobacco use  Denies any unintended weight loss or family history of pancreatic malignancy  Triglycerides, calcium WNL  This morning, abdominal pain has completely resolved and patient is anxious for discharge  -Advance to low-fat diet, avoid alcohol  -Continue IV hydration  -Continue supportive care  -We will add IgG4 due to recurrent pancreatitis to evaluate for autoimmune cause, although more likely biliary  -Repeat MRI scheduled 7/26 to evaluate for follow-up to abnormalities on prior MRI    May need EUS pending results  -Surgery evaluation appreciated and they are planning for laparoscopic cholecystectomy once pancreatitis improves and Plavix is held for 5 days  -If patient tolerates diet advancement then may be able to be discharged later today vs nevin    Thank you for the consultation  Case will be discussed with Dr Jessica Catherine    ______________________________________________________________________    HPI:  Elie Lea is a [de-identified] y o  male with history of diabetes, CKD 3, HLD, HTN, CAD (last stent placed 2001) on Plavix, and recurrent pancreatitis who presented due to severe abdominal pain  He has a history of pancreatitis in the past thought to be secondary to his Rybelsus  More recently he was admitted for pancreatitis in the beginning of the month and he had MRI/MRCP at that time showing probable gallstone/sludge without evidence of choledocholithiasis and cystic areas with reticular bands of lower signal near the gallbladder fundus suggestive of adenomyomatosis correlating with prior ultrasound  There was some focal narrowing of the pancreatic duct noted at the junction of the head and neck secondary to artifact or pancreatitis without obvious lesion and a small stricture or ductal nodule and follow-up MRI advised in 4 to 6 weeks for reevaluation  His Januvia/HCTZ was discontinued and he was also seen by surgery and plan for outpatient follow-up to discuss elective cholecystectomy  Patient messaged the office yesterday due to development of pain similar to prior episodes of pancreatitis and at that time he deferred going back to the hospital and instead was agreeable to having stat labs done  Lipase came back 6,378, BUN 26/creatinine 1 68 and he was advised to present to the ER for admission and treatment  In the ER, he was given morphine, mylanta, 1L fluid bolus and started on LR at 125ml/hr  this morning, he remains afebrile with no leukocytosis  LFTs normal   Renal function is improving, BUN 21, creat 1 52  Lipase 3,787  Hemoglobin trending down to 11 6 likely secondary to hemodilution    He reports abdominal pain is completely resolved today  He was seen by surgery and they are planning laparoscopic cholecystectomy when patient is off Plavix for 5 days and pancreatitis is improved likely Tuesday/Wednesday next week  He takes Prilosec 40 mg daily for history of GERD which controls his symptoms  Denies any dysphagia, nausea/vomiting, diarrhea, constipation, black or bloody stool, unintended weight loss, family history of pancreatic or colonic malignancy  He drinks 1 glass of wine 3 days a week  Denies any tobacco use  He reports last EGD was several years ago  Last colonoscopy performed by Dr Macho Torrez in May 2022 showing polyps, diverticulosis, and an anal scar with evidence of partial anal incontinence  REVIEW OF SYSTEMS:    CONSTITUTIONAL: Denies any fever, chills, rigors, and weight loss  HEENT: No earache or tinnitus  Denies hearing loss or visual disturbances  CARDIOVASCULAR: No chest pain or palpitations  RESPIRATORY: Denies any cough, hemoptysis, shortness of breath or dyspnea on exertion  GASTROINTESTINAL: As noted in the History of Present Illness  GENITOURINARY: No problems with urination  Denies any hematuria or dysuria  NEUROLOGIC: No dizziness or vertigo, denies headaches  MUSCULOSKELETAL: Denies any muscle or joint pain  SKIN: Denies skin rashes or itching  ENDOCRINE: Denies excessive thirst  Denies intolerance to heat or cold  PSYCHOSOCIAL: Denies depression or anxiety  Denies any recent memory loss         Historical Information   Past Medical History:   Diagnosis Date   • Allergic    • Arthritis    • Chronic kidney disease 2021   • Chronic kidney disease (CKD) stage G3a/A1, moderately decreased glomerular filtration rate (GFR) between 45-59 mL/min/1 73 square meter and albuminuria creatinine ratio less than 30 mg/g (HCC)    • Colon polyp    • Diabetes mellitus (HCC)    • GERD (gastroesophageal reflux disease)    • Heart murmur    • History of echocardiogram    • HL (hearing loss)    • Hyperlipidemia    • Hypertension    • Obesity    • Sleep apnea, obstructive      Past Surgical History:   Procedure Laterality Date   • CARDIAC CATHETERIZATION     • COLONOSCOPY  2018   • COLONOSCOPY     • EYE SURGERY     • HEMORRHOID SURGERY     • JOINT REPLACEMENT  2017    knee   • OTHER SURGICAL HISTORY      Stent    • UPPER GASTROINTESTINAL ENDOSCOPY       Social History   Social History     Substance and Sexual Activity   Alcohol Use Not Currently   • Alcohol/week: 5 0 standard drinks of alcohol   • Types: 5 Glasses of wine per week    Comment: Occasional      Social History     Substance and Sexual Activity   Drug Use Never     Social History     Tobacco Use   Smoking Status Former   • Packs/day:    • Years: 15 00   • Total pack years: 15 00   • Types: Cigarettes   • Quit date: 1972   • Years since quittin 6   • Passive exposure: Past   Smokeless Tobacco Never     Family History   Problem Relation Age of Onset   • Heart disease Mother    • Heart attack Mother         46s   • Cancer Mother    • Coronary artery disease Mother    • Cancer Brother         Malignant tumor of lung       Meds/Allergies     Medications Prior to Admission   Medication   • allopurinol (ZYLOPRIM) 100 mg tablet   • calcium carbonate (OS-WAYLON) 600 MG tablet   • cholecalciferol (VITAMIN D3) 1,000 units tablet   • cloNIDine (CATAPRES) 0 1 mg tablet   • clopidogrel (PLAVIX) 75 mg tablet   • fluticasone (FLONASE) 50 mcg/act nasal spray   • JARDIANCE 25 MG TABS   • Levemir FlexTouch 100 units/mL injection pen   • lisinopril (ZESTRIL) 40 mg tablet   • metoprolol succinate (TOPROL-XL) 25 mg 24 hr tablet   • Multiple Vitamins-Minerals (MULTIVITAMIN ADULT PO)   • pyridoxine (VITAMIN B6) 100 mg tablet   • ranolazine (RANEXA) 500 mg 12 hr tablet   • rosuvastatin (CRESTOR) 20 MG tablet   • tamsulosin (FLOMAX) 0 4 mg   • BD Pen Needle Pascale U/F 32G X 4 MM MISC   • insulin aspart protamine-insulin aspart (NovoLOG 70/30) 100 units/mL injection   • Lancets (OneTouch Delica Plus [id number]) MISC   • omeprazole (PriLOSEC) 40 MG capsule   • oxyCODONE (ROXICODONE) 5 immediate release tablet     Current Facility-Administered Medications   Medication Dose Route Frequency   • acetaminophen (TYLENOL) tablet 650 mg  650 mg Oral Q6H PRN   • allopurinol (ZYLOPRIM) tablet 100 mg  100 mg Oral Daily   • aluminum-magnesium hydroxide-simethicone (MYLANTA) oral suspension 30 mL  30 mL Oral Q6H PRN   • atorvastatin (LIPITOR) tablet 40 mg  40 mg Oral Daily With Dinner   • cloNIDine (CATAPRES) tablet 0 1 mg  0 1 mg Oral Q12H [facility]   • fluticasone (FLONASE) 50 mcg/act nasal spray 2 spray  2 spray Nasal Daily   • heparin (porcine) subcutaneous injection 5,000 Units  5,000 Units Subcutaneous Q8H [facility]   • insulin glargine (LANTUS) subcutaneous injection 25 Units 0 25 mL  25 Units Subcutaneous HS   • insulin lispro (HumaLOG) 100 units/mL subcutaneous injection 1-5 Units  1-5 Units Subcutaneous TID AC   • insulin lispro (HumaLOG) 100 units/mL subcutaneous injection 1-5 Units  1-5 Units Subcutaneous HS   • lactated ringers infusion  125 mL/hr Intravenous Continuous   • lisinopril (ZESTRIL) tablet 40 mg  40 mg Oral Daily   • magnesium hydroxide (MILK OF MAGNESIA) oral suspension 30 mL  30 mL Oral Daily PRN   • metoprolol succinate (TOPROL-XL) 24 hr tablet 25 mg  25 mg Oral Daily   • morphine injection 2 mg  2 mg Intravenous Q4H PRN   • ondansetron (ZOFRAN) injection 4 mg  4 mg Intravenous Q6H PRN   • oxyCODONE (ROXICODONE) IR tablet 5 mg  5 mg Oral Q6H PRN   • pantoprazole (PROTONIX) injection 40 mg  40 mg Intravenous Q12H LESLIE   • ranolazine (RANEXA) 12 hr tablet 500 mg  500 mg Oral BID   • tamsulosin (FLOMAX) capsule 0 8 mg  0 8 mg Oral Daily       Allergies   Allergen Reactions   • Januvia [Sitagliptin] Other (See Comments)     pancreatitis   • Iodinated Contrast Media Chest Pain   • Simvastatin Myalgia   • Trulicity [Dulaglutide] Other (See Comments) "Pancreatitis   • Wound Dressing Adhesive Other (See Comments)     Burning sensation           Objective     Blood pressure 149/65, pulse 55, temperature 98 4 °F (36 9 °C), temperature source Oral, resp  rate 16, height 5' 6\" (1 676 m), weight 112 kg (246 lb 0 2 oz), SpO2 95 %  Body mass index is 39 71 kg/m²  Intake/Output Summary (Last 24 hours) at 6/22/2023 0912  Last data filed at 6/22/2023 0601  Gross per 24 hour   Intake 641 ml   Output 1600 ml   Net -959 ml         PHYSICAL EXAM:      General Appearance:   Alert, cooperative, no distress   HEENT:   Normocephalic, atraumatic, anicteric  Neck:  Supple, symmetrical, trachea midline   Lungs:   Clear to auscultation bilaterally; no rales, rhonchi or wheezing; respirations unlabored    Heart[de-identified]   Regular rate and rhythm; no murmur, rub, or gallop     Abdomen:   Soft, non-tender, non-distended; normal bowel sounds; no masses, no organomegaly    Genitalia:   Deferred    Rectal:   Deferred    Extremities:  No cyanosis, clubbing; +1 lower extremity edema   Pulses:  2+ and symmetric all extremities    Skin:  No jaundice, rashes, or lesions    Lymph nodes:  No palpable cervical lymphadenopathy        Lab Results:   Admission on 06/21/2023   Component Date Value   • WBC 06/21/2023 7 81    • RBC 06/21/2023 4 15    • Hemoglobin 06/21/2023 13 2    • Hematocrit 06/21/2023 39 7    • MCV 06/21/2023 96    • MCH 06/21/2023 31 8    • MCHC 06/21/2023 33 2    • RDW 06/21/2023 13 9    • MPV 06/21/2023 10 0    • Platelets 43/11/0394 199    • nRBC 06/21/2023 0    • Neutrophils Relative 06/21/2023 69    • Immat GRANS % 06/21/2023 0    • Lymphocytes Relative 06/21/2023 20    • Monocytes Relative 06/21/2023 9    • Eosinophils Relative 06/21/2023 2    • Basophils Relative 06/21/2023 0    • Neutrophils Absolute 06/21/2023 5 37    • Immature Grans Absolute 06/21/2023 0 02    • Lymphocytes Absolute 06/21/2023 1 53    • Monocytes Absolute 06/21/2023 0 69    • Eosinophils Absolute " 06/21/2023 0 17    • Basophils Absolute 06/21/2023 0 03    • Sodium 06/21/2023 139    • Potassium 06/21/2023 4 1    • Chloride 06/21/2023 102    • CO2 06/21/2023 28    • ANION GAP 06/21/2023 9    • BUN 06/21/2023 26 (H)    • Creatinine 06/21/2023 1 68 (H)    • Glucose 06/21/2023 107    • Calcium 06/21/2023 9 0    • AST 06/21/2023 18    • ALT 06/21/2023 17    • Alkaline Phosphatase 06/21/2023 51    • Total Protein 06/21/2023 7 1    • Albumin 06/21/2023 4 0    • Total Bilirubin 06/21/2023 0 41    • eGFR 06/21/2023 37    • Lipase 06/21/2023 3,787 (H)    • POC Glucose 06/21/2023 97    • POC Glucose 06/21/2023 152 (H)    • Sodium 06/22/2023 140    • Potassium 06/22/2023 4 0    • Chloride 06/22/2023 107    • CO2 06/22/2023 27    • ANION GAP 06/22/2023 6    • BUN 06/22/2023 21    • Creatinine 06/22/2023 1 52 (H)    • Glucose 06/22/2023 119    • Calcium 06/22/2023 8 1 (L)    • Corrected Calcium 06/22/2023 8 7    • AST 06/22/2023 16    • ALT 06/22/2023 12    • Alkaline Phosphatase 06/22/2023 37    • Total Protein 06/22/2023 5 6 (L)    • Albumin 06/22/2023 3 2 (L)    • Total Bilirubin 06/22/2023 0 46    • eGFR 06/22/2023 42    • WBC 06/22/2023 5 30    • RBC 06/22/2023 3 61 (L)    • Hemoglobin 06/22/2023 11 6 (L)    • Hematocrit 06/22/2023 34 8 (L)    • MCV 06/22/2023 96    • MCH 06/22/2023 32 1    • MCHC 06/22/2023 33 3    • RDW 06/22/2023 14 0    • MPV 06/22/2023 10 3    • Platelets 47/97/2894 182    • nRBC 06/22/2023 0    • Neutrophils Relative 06/22/2023 51    • Immat GRANS % 06/22/2023 0    • Lymphocytes Relative 06/22/2023 33    • Monocytes Relative 06/22/2023 11    • Eosinophils Relative 06/22/2023 4    • Basophils Relative 06/22/2023 1    • Neutrophils Absolute 06/22/2023 2 71    • Immature Grans Absolute 06/22/2023 0 02    • Lymphocytes Absolute 06/22/2023 1 74    • Monocytes Absolute 06/22/2023 0 59    • Eosinophils Absolute 06/22/2023 0 21    • Basophils Absolute 06/22/2023 0 03    • POC Glucose 06/22/2023 139 Imaging Studies: I have personally reviewed pertinent imaging studies

## 2023-06-22 NOTE — ASSESSMENT & PLAN NOTE
Acute gallstone pancreatitis, recurrent  Plan:  Laparoscopic cholecystectomy once pancreatitis has resolved  Patient needs to be off Plavix for 5 days before surgery can be done

## 2023-06-22 NOTE — PLAN OF CARE
Problem: PAIN - ADULT  Goal: Verbalizes/displays adequate comfort level or baseline comfort level  Description: Interventions:  - Encourage patient to monitor pain and request assistance  - Assess pain using appropriate pain scale  - Administer analgesics based on type and severity of pain and evaluate response  - Implement non-pharmacological measures as appropriate and evaluate response  - Consider cultural and social influences on pain and pain management  - Notify physician/advanced practitioner if interventions unsuccessful or patient reports new pain  Outcome: Progressing     Problem: INFECTION - ADULT  Goal: Absence or prevention of progression during hospitalization  Description: INTERVENTIONS:  - Assess and monitor for signs and symptoms of infection  - Monitor lab/diagnostic results  - Monitor all insertion sites, i e  indwelling lines, tubes, and drains  - Monitor endotracheal if appropriate and nasal secretions for changes in amount and color  - Crowley appropriate cooling/warming therapies per order  - Administer medications as ordered  - Instruct and encourage patient and family to use good hand hygiene technique  - Identify and instruct in appropriate isolation precautions for identified infection/condition  Outcome: Progressing  Goal: Absence of fever/infection during neutropenic period  Description: INTERVENTIONS:  - Monitor WBC    Outcome: Progressing     Problem: SAFETY ADULT  Goal: Patient will remain free of falls  Description: INTERVENTIONS:  - Educate patient/family on patient safety including physical limitations  - Instruct patient to call for assistance with activity   - Consult OT/PT to assist with strengthening/mobility   - Keep Call bell within reach  - Keep bed low and locked with side rails adjusted as appropriate  - Keep care items and personal belongings within reach  - Initiate and maintain comfort rounds  - Make Fall Risk Sign visible to staff  - Offer Toileting every 2 Hours, in advance of need  - Initiate/Maintain bed alarm  - Obtain necessary fall risk management equipment:   - Apply yellow socks and bracelet for high fall risk patients  - Consider moving patient to room near nurses station  Outcome: Progressing  Goal: Maintain or return to baseline ADL function  Description: INTERVENTIONS:  -  Assess patient's ability to carry out ADLs; assess patient's baseline for ADL function and identify physical deficits which impact ability to perform ADLs (bathing, care of mouth/teeth, toileting, grooming, dressing, etc )  - Assess/evaluate cause of self-care deficits   - Assess range of motion  - Assess patient's mobility; develop plan if impaired  - Assess patient's need for assistive devices and provide as appropriate  - Encourage maximum independence but intervene and supervise when necessary  - Involve family in performance of ADLs  - Assess for home care needs following discharge   - Consider OT consult to assist with ADL evaluation and planning for discharge  - Provide patient education as appropriate  Outcome: Progressing  Goal: Maintains/Returns to pre admission functional level  Description: INTERVENTIONS:  - Perform BMAT or MOVE assessment daily    - Set and communicate daily mobility goal to care team and patient/family/caregiver  - Collaborate with rehabilitation services on mobility goals if consulted  - Perform Range of Motion 3 times a day  - Reposition patient every 2 hours    - Dangle patient 3 times a day  - Stand patient 3 times a day  - Ambulate patient 3 times a day  - Out of bed to chair 3 times a day   - Out of bed for meals 3 times a day  - Out of bed for toileting  - Record patient progress and toleration of activity level   Outcome: Progressing     Problem: DISCHARGE PLANNING  Goal: Discharge to home or other facility with appropriate resources  Description: INTERVENTIONS:  - Identify barriers to discharge w/patient and caregiver  - Arrange for needed discharge resources and transportation as appropriate  - Identify discharge learning needs (meds, wound care, etc )  - Arrange for interpretive services to assist at discharge as needed  - Refer to Case Management Department for coordinating discharge planning if the patient needs post-hospital services based on physician/advanced practitioner order or complex needs related to functional status, cognitive ability, or social support system  Outcome: Progressing     Problem: Knowledge Deficit  Goal: Patient/family/caregiver demonstrates understanding of disease process, treatment plan, medications, and discharge instructions  Description: Complete learning assessment and assess knowledge base    Interventions:  - Provide teaching at level of understanding  - Provide teaching via preferred learning methods  Outcome: Progressing

## 2023-06-22 NOTE — DISCHARGE SUMMARY
[address]  Discharge- Allyssa Hernandez 1940, [de-identified] y o  male MRN: [id number]  Unit/Bed#: [facility] Encounter: [id number]  Primary Care Provider: Abida Freed MD   Date and time admitted to hospital: 6/21/2023  4:11 PM    Admitting Provider:  Romana Gant MD  Discharge Provider:  Romana Gant MD  Admission Date: 6/21/2023       Discharge Date: 06/22/23   LOS: 1  Primary Care Physician at Discharge: Abida Freed, [address] COURSE:  Allyssa Hernandez is a [de-identified] y o  male who presented with abdominal pain nausea and vomiting secondary to recurrent pancreatitis  It was thought that the recurrent pancreatitis is at least related to acute gallstone pancreatitis  Surgery was consulted and because the patient was on Plavix surgery had to be postponed  Patient to be 5 days of Plavix prior to surgery  Please see problem list listed below  The patient, initially admitted to the hospital as inpatient, was discharged earlier than expected given the following: Patient was supposed to get his gallbladder surgery done  But he was on Plavix and therefore surgery had to be postponed  Patient may do better than expected recovery and is being discharged home  REASON FOR ADMISSION/ ADMISSION DIAGNOSES    Recurrent acute pancreatitis likely related to gallstones    DISCHARGE DIAGNOSES  * Acute pancreatitis  Assessment & Plan  Acute gallstone pancreatitis, recurrent  Plan:  Laparoscopic cholecystectomy once pancreatitis has resolved  Patient needs to be off Plavix for 5 days before surgery can be done      Stage 3b chronic kidney disease (CKD) [facility])  Assessment & Plan  Lab Results   Component Value Date    EGFR 42 06/22/2023    EGFR 37 06/21/2023    EGFR 36 06/21/2023    CREATININE 1 52 (H) 06/22/2023    CREATININE 1 68 (H) 06/21/2023    CREATININE 1 73 (H) 06/21/2023     • Creatinine close to baseline    Mixed hyperlipidemia  Assessment & Plan  · Atorvastatin 40 "mg    GERD without esophagitis  Assessment & Plan  · Continue PPI    Type 2 diabetes mellitus with kidney complication, with long-term current use of insulin [facility])  Assessment & Plan  Lab Results   Component Value Date    HGBA1C 6 7 (H) 05/10/2023       Recent Labs     06/21/23  1804 06/21/23 2046 06/22/23  0711   POCGLU 97 152* 139       Blood Sugar Average: Last 72 hrs:  (P) 045 [id number]     • Discharged on home regimen    HTN (hypertension), benign  Assessment & Plan  · Continue metoprolol, lisinopril and clonidine at home dose      CONSULTING PROVIDERS   IP CONSULT TO ACUTE CARE SURGERY  IP CONSULT TO GASTROENTEROLOGY    PROCEDURES PERFORMED  * No surgery found *    RADIOLOGY RESULTS  No results found      LABS  Results from last 7 days   Lab Units 06/22/23  0511 06/21/23  1636 06/21/23  1429   WBC Thousand/uL 5 30 7 81 8 33   HEMOGLOBIN g/dL 11 6* 13 2 13 3   HEMATOCRIT % 34 8* 39 7 40 2   MCV fL 96 96 97   PLATELETS Thousands/uL 182 199 207     Results from last 7 days   Lab Units 06/22/23  0511 06/21/23  1636 06/21/23  1429   SODIUM mmol/L 140 139 139   POTASSIUM mmol/L 4 0 4 1 4 2   CHLORIDE mmol/L 107 102 103   CO2 mmol/L 27 28 29   BUN mg/dL 21 26* 26*   CREATININE mg/dL 1 52* 1 68* 1 73*   CALCIUM mg/dL 8 1* 9 0 8 7   ALBUMIN g/dL 3 2* 4 0 3 8   TOTAL BILIRUBIN mg/dL 0 46 0 41 0 37   ALK PHOS U/L 37 51 51   ALT U/L 12 17 16   AST U/L 16 18 20   EGFR ml/min/1 73sq m 42 37 36   GLUCOSE RANDOM mg/dL 119 107 134                  Results from last 7 days   Lab Units 06/22/23  0711 06/21/23 2046 06/21/23  1804   POC GLUCOSE mg/dl 139 152* 97                       Cultures:         Invalid input(s): \"URIBILINOGEN\"              PHYSICAL EXAM:  Vitals:   Blood Pressure: 149/65 (06/22/23 0725)  Pulse: 55 (06/22/23 0725)  Temperature: 98 4 °F (36 9 °C) (06/22/23 0725)  Temp Source: Oral (06/22/23 0725)  Respirations: 16 (06/22/23 0725)  Height: 5' 6\" (167 6 cm) (06/21/23 8809)  Weight - Scale: 112 kg (246 " lb 0 2 oz) (06/21/23 1759)  SpO2: 95 % (06/22/23 0725)    General appearance: alert, appears stated age and cooperative  HEENT - atraumatic and normocephalic  Neck- supple  Skin - no fresh rash  Extremities no fresh focal deformities  Cardiovascular- S1-S2 heard  Respiratory- bilateral air entry present, no crackles or rhonchi  Skin - no fresh rash  Abdomen - normal bowel sounds present, no rebound tenderness  CNS- No fresh focal deficits  Psych- no acute psychosis     Planned Re-admission: Yes for cholecystectomy  Discharge Disposition: Home/Self Care    Test Results Pending at Discharge:   Incidental findings: None    Medications   · Summary of Medication Adjustments made as a result of this hospitalization: No changes  · Medication Dosing Tapers - Please refer to Discharge Medication List for details on any medication dosing tapers (if applicable to patient)  · Discharge Medication List: See after visit summary for reconciled discharge medications  Diet restrictions: Soft diet       Diet Orders   (From admission, onward)             Start     Ordered    06/22/23 1044  Diet GI; Lo Fat  Diet effective now        References:    Adult Nutrition Support Algorithm    RD Therapeutic Diet Order Protocol   Question Answer Comment   Diet Type GI    GI Lo Fat    RD to adjust diet per protocol? Yes        06/22/23 1044              Activity restrictions: No strenuous activity  Discharge Condition: stable    Outpatient Follow-Up and Discharge Instructions  See after visit summary section titled Discharge Instructions for information provided to patient and family  Code Status: Level 1 - Full Code  Discharge Statement   I spent 35 minutes discharging the patient  This time was spent on the day of discharge  Greater than 50% of total time was spent with the patient and / or family counseling and / or coordination of care      Siobhan Boateng MD  [place] [address] Internal Medicine    ** Please Note: This note has been constructed using a voice recognition system   **

## 2023-06-22 NOTE — PROGRESS NOTES
"Progress Note - General Surgery   Theo Jean-Baptiste [de-identified] y o  male MRN: [id number]  Unit/Bed#: -[facility] Encounter: [id number]    Assessment/Plan    [de-identified] y/o male with HTN, DM2, GERD, PAD on Plavix, PAULA on CPAP, CKD, now p/w recurrent acute gallstone pancreatitis  Abdominal pain has resolved  Abdomen protuberant, soft, non-tender  Pt tolerating CLD w/o N/V  AFVSS on RA  BUN 21 (26)  Cr 1 52 (1 68)  WBC 5 30 (7 81)  LFTs WNL  UO 1 6L    Planning for lap dionne next week after resolution of acute pancreatitis  Plavix must be discontinued for 5 days prior to surgery - this is day 1 off of Plavix  Pt may be discharged from surgical perspective with plan for elective lap dionne next Tues/Wed  Advance to low fat diet as tolerated  PRN analgesics/antiemetics  SQH  D/W attending      /65 (BP Location: Right arm)   Pulse 55   Temp 98 4 °F (36 9 °C) (Oral)   Resp 16   Ht 5' 6\" (1 676 m)   Wt 112 kg (246 lb 0 2 oz)   SpO2 95%   BMI 39 71 kg/m²     Labs in chart were reviewed  Results from last 7 days   Lab Units 06/22/23  0511   WBC Thousand/uL 5 30   HEMOGLOBIN g/dL 11 6*   HEMATOCRIT % 34 8*   PLATELETS Thousands/uL 182     Results from last 7 days   Lab Units 06/22/23  0511   POTASSIUM mmol/L 4 0   CHLORIDE mmol/L 107   CO2 mmol/L 27   BUN mg/dL 21   CREATININE mg/dL 1 52*     Results from last 7 days   Lab Units 06/22/23  0511   AST U/L 16   ALT U/L 12   ALK PHOS U/L 37   TOTAL BILIRUBIN mg/dL 0 46   ALBUMIN g/dL 3 2*           Intake/Output Summary (Last 24 hours) at 6/22/2023 0748  Last data filed at 6/22/2023 0601  Gross per 24 hour   Intake 641 ml   Output 1600 ml   Net -959 ml           Subjective/Objective     Subjective: Pt seen and examined  No acute complaints this morning; abdominal pain has resolved  Tolerating CLD  Review of Systems   Constitutional: Negative for chills and fever  Respiratory: Negative for shortness of breath  Cardiovascular: Negative for chest pain     Gastrointestinal: Negative " for abdominal pain, diarrhea, nausea and vomiting  Genitourinary: Negative for difficulty urinating and dysuria  Objective:     Physical Exam  Vitals and nursing note reviewed  Constitutional:       General: He is not in acute distress  Appearance: He is obese  He is not toxic-appearing  HENT:      Head: Normocephalic and atraumatic  Eyes:      Extraocular Movements: Extraocular movements intact  Cardiovascular:      Rate and Rhythm: Normal rate and regular rhythm  Heart sounds: Murmur heard  Pulmonary:      Effort: Pulmonary effort is normal  No respiratory distress  Abdominal:      General: Abdomen is protuberant  Bowel sounds are normal       Palpations: Abdomen is soft  Tenderness: There is no abdominal tenderness  There is no guarding  Musculoskeletal:         General: Normal range of motion  Cervical back: Normal range of motion  Skin:     General: Skin is warm and dry  Neurological:      Mental Status: He is alert and oriented to person, place, and time  Psychiatric:         Mood and Affect: Mood normal          Behavior: Behavior normal          Thought Content:  Thought content normal             Tabitha Neff PA-C  6/22/2023

## 2023-06-22 NOTE — ASSESSMENT & PLAN NOTE
Lab Results   Component Value Date    EGFR 42 06/22/2023    EGFR 37 06/21/2023    EGFR 36 06/21/2023    CREATININE 1 52 (H) 06/22/2023    CREATININE 1 68 (H) 06/21/2023    CREATININE 1 73 (H) 06/21/2023     • Creatinine close to baseline

## 2023-06-23 ENCOUNTER — TRANSITIONAL CARE MANAGEMENT (OUTPATIENT)
Dept: FAMILY MEDICINE CLINIC | Facility: CLINIC | Age: 83
End: 2023-06-23

## 2023-06-23 RX ORDER — INSULIN ASPART 100 [IU]/ML
INJECTION, SOLUTION INTRAVENOUS; SUBCUTANEOUS
COMMUNITY

## 2023-06-23 RX ORDER — CLOPIDOGREL BISULFATE 75 MG/1
75 TABLET ORAL DAILY
Status: ON HOLD | COMMUNITY
End: 2023-06-28 | Stop reason: SDUPTHER

## 2023-06-23 NOTE — PRE-PROCEDURE INSTRUCTIONS
Pre-Surgery Instructions:   Medication Instructions   • acetaminophen (TYLENOL) 325 mg tablet Uses PRN- OK to take day of surgery   • allopurinol (ZYLOPRIM) 100 mg tablet Take day of surgery  • cholecalciferol (VITAMIN D3) 1,000 units tablet Stop taking prior to surgey-last dose 6/23/23   • cloNIDine (CATAPRES) 0 1 mg tablet Take day of surgery  • clopidogrel (PLAVIX) 75 mg tablet Instructions provided by MD-Last dose 6/21/23   • fluticasone (FLONASE) 50 mcg/act nasal spray Hold day of surgery  • insulin aspart (NovoLOG FlexPen) 100 UNIT/ML injection pen Hold day of surgery  • JARDIANCE 25 MG TABS Stop taking 4 days prior to surgery  Last dose 6/23/23   • Levemir FlexTouch 100 units/mL injection pen Take day of surgery  • lisinopril (ZESTRIL) 40 mg tablet Hold day of surgery  • metoprolol succinate (TOPROL-XL) 25 mg 24 hr tablet Take day of surgery  • Multiple Vitamins-Minerals (MULTIVITAMIN ADULT PO) Stop taking prior to surgey-last dose 6/23/23   • omeprazole (PriLOSEC) 40 MG capsule Take day of surgery  • pyridoxine (VITAMIN B6) 100 mg tablet Stop taking prior to surgey-last dose 6/23/23   • ranolazine (RANEXA) 500 mg 12 hr tablet Take day of surgery  • rosuvastatin (CRESTOR) 20 MG tablet Take day of surgery  • tamsulosin (FLOMAX) 0 4 mg Take day of surgery  Covid screening negative as per patient  Reviewed with patient via phone all medication and showering instructions  Advised not to take any NSAID's, Vitamins or Herbal products prior to the DOS  Acetaminophen products are ok to take  Instructed about NPO after midnight the night before DOS, except sips of water with allowed medications on the morning of the DOS  Informed about call from [facility] with the time to arrive for the scheduled surgery  Patient verbalized understanding

## 2023-06-25 LAB
IGG SERPL-MCNC: 858 MG/DL (ref 603–1613)
IGG1 SER-MCNC: 411 MG/DL (ref 248–810)
IGG2 SER-MCNC: 325 MG/DL (ref 130–555)
IGG3 SER-MCNC: 71 MG/DL (ref 15–102)
IGG4 SER-MCNC: 61 MG/DL (ref 2–96)

## 2023-06-26 ENCOUNTER — OFFICE VISIT (OUTPATIENT)
Dept: FAMILY MEDICINE CLINIC | Facility: CLINIC | Age: 83
End: 2023-06-26
Payer: MEDICARE

## 2023-06-26 VITALS
OXYGEN SATURATION: 98 % | SYSTOLIC BLOOD PRESSURE: 146 MMHG | WEIGHT: 245 LBS | TEMPERATURE: 97.7 F | BODY MASS INDEX: 39.37 KG/M2 | RESPIRATION RATE: 18 BRPM | HEIGHT: 66 IN | DIASTOLIC BLOOD PRESSURE: 72 MMHG | HEART RATE: 47 BPM

## 2023-06-26 DIAGNOSIS — K85.10 ACUTE BILIARY PANCREATITIS WITHOUT INFECTION OR NECROSIS: Primary | ICD-10-CM

## 2023-06-26 DIAGNOSIS — N18.32 TYPE 2 DIABETES MELLITUS WITH STAGE 3B CHRONIC KIDNEY DISEASE, WITH LONG-TERM CURRENT USE OF INSULIN (HCC): ICD-10-CM

## 2023-06-26 DIAGNOSIS — E11.22 TYPE 2 DIABETES MELLITUS WITH STAGE 3B CHRONIC KIDNEY DISEASE, WITH LONG-TERM CURRENT USE OF INSULIN (HCC): ICD-10-CM

## 2023-06-26 DIAGNOSIS — Z79.4 TYPE 2 DIABETES MELLITUS WITH STAGE 3B CHRONIC KIDNEY DISEASE, WITH LONG-TERM CURRENT USE OF INSULIN (HCC): ICD-10-CM

## 2023-06-26 DIAGNOSIS — E66.01 OBESITY, MORBID (HCC): ICD-10-CM

## 2023-06-26 DIAGNOSIS — E78.2 MIXED HYPERLIPIDEMIA: ICD-10-CM

## 2023-06-26 DIAGNOSIS — I10 HTN (HYPERTENSION), BENIGN: ICD-10-CM

## 2023-06-26 PROCEDURE — 99214 OFFICE O/P EST MOD 30 MIN: CPT

## 2023-06-26 NOTE — ASSESSMENT & PLAN NOTE
Resolved    Patient is scheduled for elective cholecystectomy for June 28, 2023 meanwhile patient was advised any symptoms call the office first

## 2023-06-26 NOTE — PROGRESS NOTES
Assessment & Plan     1  Acute biliary pancreatitis without infection or necrosis  Assessment & Plan:  Resolved  Patient is scheduled for elective cholecystectomy for June 28, 2023 meanwhile patient was advised any symptoms call the office first      2  Type 2 diabetes mellitus with stage 3b chronic kidney disease, with long-term current use of insulin (HCC)  Assessment & Plan:    Lab Results   Component Value Date    HGBA1C 6 7 (H) 05/10/2023   Under control  Continue current medication  We will re-evaluate at next office visit  3  HTN (hypertension), benign  Assessment & Plan:  Under control  Continue current medication  We will re-evaluate at next office visit  4  Mixed hyperlipidemia  Assessment & Plan:  Under control  Continue current medication  We will re-evaluate at next office visit  5  Obesity, morbid ([address] )  Assessment & Plan:  Patient was advised to lose weight  Potential consequences of obesity discussed with patient  Advised to have portion control no more than 60% of meal or may consider intermittent fasting  We will continue to monitor          BMI Counseling: There is no height or weight on file to calculate BMI  The BMI is above normal  Nutrition recommendations include decreasing portion sizes, decreasing fast food intake, limiting drinks that contain sugar, moderation in carbohydrate intake, increasing intake of lean protein and reducing intake of saturated and trans fat  Exercise recommendations include vigorous physical activity 75 minutes/week  No pharmacotherapy was ordered  Rationale for BMI follow-up plan is due to patient being overweight or obese  Subjective     Transitional Care Management Review:   Roscoe Leiva is a [de-identified] y o  male here for TCM follow up       During the TCM phone call patient stated:  TCM Call     Date and time call was made  6/23/2023 11:03 AM    Hospital care reviewed  Records reviewed    Patient was hospitialized at [address]    Date of Admission  06/21/23    Date of discharge  06/22/23    Diagnosis  Acute pancreatitis    Disposition  Home    Current Symptoms  None      TCM Call     Post hospital issues  None    Scheduled for follow up? Yes    I have advised the patient to call PCP with any new or worsening symptoms  Ananda Yin MA        Patient is here for transitional care management as he was hospitalized for overnight for 22 hours on June 21, 2023 and discharged on June 22, 2023 as he had acute recurrent gallstone pancreatitis which he recovered well patient currently feeling okay denies any chest pain, shortness of breath, abdominal pain, nausea, vomiting, fever or chills  No blood in stool or black stool no bowel or bladder issues  No lightheadedness or dizziness  No tingling numbness or weakness  No other new problem patient has been scheduled for outpatient cholecystectomy on June 28, 2023  All the records from the hospital reviewed    Review of Systems   Constitutional: Negative for chills and fever  HENT: Negative for congestion, ear pain and sore throat  Eyes: Negative for pain and visual disturbance  Respiratory: Negative for cough and shortness of breath  Cardiovascular: Negative for chest pain and palpitations  Gastrointestinal: Negative for abdominal pain and vomiting  Endocrine: Negative for cold intolerance, heat intolerance, polydipsia, polyphagia and polyuria  Genitourinary: Negative for difficulty urinating, dysuria, frequency and hematuria  Musculoskeletal: Negative for arthralgias and back pain  Skin: Negative for color change and rash  Neurological: Negative for dizziness, seizures, syncope, light-headedness and headaches  Psychiatric/Behavioral: Negative for agitation and behavioral problems  All other systems reviewed and are negative        Objective     /72 (BP Location: Right arm, Patient Position: Sitting, Cuff Size: Standard)   Pulse (!) 47   Temp "97 7 °F (36 5 °C) (Tympanic)   Resp 18   Ht 5' 6\" (1 676 m)   Wt 111 kg (245 lb)   SpO2 98%   BMI 39 54 kg/m²      Physical Exam  Vitals and nursing note reviewed  Constitutional:       General: He is not in acute distress  Appearance: Normal appearance  He is well-developed  He is obese  He is not ill-appearing, toxic-appearing or diaphoretic  HENT:      Head: Normocephalic and atraumatic  Right Ear: Tympanic membrane normal  There is no impacted cerumen  Left Ear: Tympanic membrane normal  There is no impacted cerumen  Nose: Nose normal  No congestion or rhinorrhea  Mouth/Throat:      Mouth: Mucous membranes are moist       Pharynx: Oropharynx is clear  Eyes:      General: No scleral icterus  Right eye: No discharge  Left eye: No discharge  Extraocular Movements: Extraocular movements intact  Conjunctiva/sclera: Conjunctivae normal       Pupils: Pupils are equal, round, and reactive to light  Cardiovascular:      Rate and Rhythm: Normal rate and regular rhythm  Pulses: Normal pulses  Heart sounds: Normal heart sounds  No murmur heard  Pulmonary:      Effort: Pulmonary effort is normal  No respiratory distress  Breath sounds: Normal breath sounds  No wheezing  Abdominal:      General: Abdomen is flat  Bowel sounds are normal  There is no distension  Palpations: Abdomen is soft  Tenderness: There is no abdominal tenderness  There is no right CVA tenderness or left CVA tenderness  Musculoskeletal:         General: Normal range of motion  Cervical back: Normal range of motion and neck supple  Right lower leg: No edema  Left lower leg: No edema  Skin:     General: Skin is warm and dry  Capillary Refill: Capillary refill takes 2 to 3 seconds  Coloration: Skin is not jaundiced  Neurological:      General: No focal deficit present  Mental Status: He is alert and oriented to person, place, and time   " Mental status is at baseline  Motor: No weakness     Psychiatric:         Mood and Affect: Mood normal          Behavior: Behavior normal        Medications have been reviewed by provider in current encounter    Lorena Erickson MD

## 2023-06-26 NOTE — ASSESSMENT & PLAN NOTE
Lab Results   Component Value Date    HGBA1C 6 7 (H) 05/10/2023   Under control  Continue current medication  We will re-evaluate at next office visit

## 2023-06-27 ENCOUNTER — ANESTHESIA EVENT (OUTPATIENT)
Dept: PERIOP | Facility: HOSPITAL | Age: 83
End: 2023-06-27
Payer: MEDICARE

## 2023-06-28 ENCOUNTER — ANESTHESIA (OUTPATIENT)
Dept: PERIOP | Facility: HOSPITAL | Age: 83
End: 2023-06-28
Payer: MEDICARE

## 2023-06-28 ENCOUNTER — HOSPITAL ENCOUNTER (OUTPATIENT)
Facility: HOSPITAL | Age: 83
Setting detail: OUTPATIENT SURGERY
Discharge: HOME | End: 2023-06-28
Attending: SURGERY | Admitting: SURGERY
Payer: MEDICARE

## 2023-06-28 VITALS
WEIGHT: 246.4 LBS | HEIGHT: 66 IN | TEMPERATURE: 97.7 F | SYSTOLIC BLOOD PRESSURE: 176 MMHG | OXYGEN SATURATION: 92 % | DIASTOLIC BLOOD PRESSURE: 80 MMHG | HEART RATE: 66 BPM | RESPIRATION RATE: 18 BRPM | BODY MASS INDEX: 39.6 KG/M2

## 2023-06-28 DIAGNOSIS — R10.11 RIGHT UPPER QUADRANT ABDOMINAL PAIN: ICD-10-CM

## 2023-06-28 DIAGNOSIS — I25.10 CORONARY ARTERY DISEASE WITHOUT ANGINA PECTORIS, UNSPECIFIED VESSEL OR LESION TYPE, UNSPECIFIED WHETHER NATIVE OR TRANSPLANTED HEART: ICD-10-CM

## 2023-06-28 DIAGNOSIS — Z90.49 S/P LAPAROSCOPIC CHOLECYSTECTOMY: Primary | ICD-10-CM

## 2023-06-28 LAB
GLUCOSE SERPL-MCNC: 149 MG/DL (ref 65–140)
GLUCOSE SERPL-MCNC: 209 MG/DL (ref 65–140)

## 2023-06-28 PROCEDURE — 82948 REAGENT STRIP/BLOOD GLUCOSE: CPT

## 2023-06-28 PROCEDURE — 88304 TISSUE EXAM BY PATHOLOGIST: CPT | Performed by: PATHOLOGY

## 2023-06-28 PROCEDURE — 47562 LAPAROSCOPIC CHOLECYSTECTOMY: CPT | Performed by: SURGERY

## 2023-06-28 RX ORDER — SODIUM CHLORIDE, SODIUM LACTATE, POTASSIUM CHLORIDE, CALCIUM CHLORIDE 600; 310; 30; 20 MG/100ML; MG/100ML; MG/100ML; MG/100ML
100 INJECTION, SOLUTION INTRAVENOUS CONTINUOUS
Status: DISCONTINUED | OUTPATIENT
Start: 2023-06-28 | End: 2023-06-28 | Stop reason: HOSPADM

## 2023-06-28 RX ORDER — MAGNESIUM HYDROXIDE 1200 MG/15ML
LIQUID ORAL AS NEEDED
Status: DISCONTINUED | OUTPATIENT
Start: 2023-06-28 | End: 2023-06-28 | Stop reason: HOSPADM

## 2023-06-28 RX ORDER — SODIUM CHLORIDE, SODIUM LACTATE, POTASSIUM CHLORIDE, CALCIUM CHLORIDE 600; 310; 30; 20 MG/100ML; MG/100ML; MG/100ML; MG/100ML
INJECTION, SOLUTION INTRAVENOUS CONTINUOUS PRN
Status: DISCONTINUED | OUTPATIENT
Start: 2023-06-28 | End: 2023-06-28

## 2023-06-28 RX ORDER — METOPROLOL TARTRATE 5 MG/5ML
INJECTION INTRAVENOUS AS NEEDED
Status: DISCONTINUED | OUTPATIENT
Start: 2023-06-28 | End: 2023-06-28

## 2023-06-28 RX ORDER — HYDROMORPHONE HYDROCHLORIDE 1 MG/ML
INJECTION, SOLUTION INTRAMUSCULAR; INTRAVENOUS; SUBCUTANEOUS AS NEEDED
Status: DISCONTINUED | OUTPATIENT
Start: 2023-06-28 | End: 2023-06-28

## 2023-06-28 RX ORDER — PROPOFOL 10 MG/ML
INJECTION, EMULSION INTRAVENOUS AS NEEDED
Status: DISCONTINUED | OUTPATIENT
Start: 2023-06-28 | End: 2023-06-28

## 2023-06-28 RX ORDER — OXYCODONE HYDROCHLORIDE AND ACETAMINOPHEN 5; 325 MG/1; MG/1
1 TABLET ORAL EVERY 4 HOURS PRN
Qty: 10 TABLET | Refills: 0 | Status: SHIPPED | OUTPATIENT
Start: 2023-06-28 | End: 2023-07-14 | Stop reason: ALTCHOICE

## 2023-06-28 RX ORDER — BUPIVACAINE HYDROCHLORIDE AND EPINEPHRINE 5; 5 MG/ML; UG/ML
INJECTION, SOLUTION EPIDURAL; INTRACAUDAL; PERINEURAL AS NEEDED
Status: DISCONTINUED | OUTPATIENT
Start: 2023-06-28 | End: 2023-06-28 | Stop reason: HOSPADM

## 2023-06-28 RX ORDER — PROMETHAZINE HYDROCHLORIDE 25 MG/ML
25 INJECTION, SOLUTION INTRAMUSCULAR; INTRAVENOUS ONCE AS NEEDED
Status: DISCONTINUED | OUTPATIENT
Start: 2023-06-28 | End: 2023-06-28 | Stop reason: HOSPADM

## 2023-06-28 RX ORDER — HYDROMORPHONE HCL/PF 1 MG/ML
0.5 SYRINGE (ML) INJECTION
Status: DISCONTINUED | OUTPATIENT
Start: 2023-06-28 | End: 2023-06-28 | Stop reason: HOSPADM

## 2023-06-28 RX ORDER — CEFAZOLIN SODIUM 2 G/50ML
2000 SOLUTION INTRAVENOUS ONCE
Status: COMPLETED | OUTPATIENT
Start: 2023-06-28 | End: 2023-06-28

## 2023-06-28 RX ORDER — METOCLOPRAMIDE HYDROCHLORIDE 5 MG/ML
10 INJECTION INTRAMUSCULAR; INTRAVENOUS ONCE AS NEEDED
Status: DISCONTINUED | OUTPATIENT
Start: 2023-06-28 | End: 2023-06-28 | Stop reason: HOSPADM

## 2023-06-28 RX ORDER — LIDOCAINE HYDROCHLORIDE 10 MG/ML
INJECTION, SOLUTION EPIDURAL; INFILTRATION; INTRACAUDAL; PERINEURAL AS NEEDED
Status: DISCONTINUED | OUTPATIENT
Start: 2023-06-28 | End: 2023-06-28

## 2023-06-28 RX ORDER — FENTANYL CITRATE/PF 50 MCG/ML
25 SYRINGE (ML) INJECTION
Status: DISCONTINUED | OUTPATIENT
Start: 2023-06-28 | End: 2023-06-28 | Stop reason: HOSPADM

## 2023-06-28 RX ORDER — ROCURONIUM BROMIDE 10 MG/ML
INJECTION, SOLUTION INTRAVENOUS AS NEEDED
Status: DISCONTINUED | OUTPATIENT
Start: 2023-06-28 | End: 2023-06-28

## 2023-06-28 RX ORDER — DEXAMETHASONE SODIUM PHOSPHATE 10 MG/ML
INJECTION, SOLUTION INTRAMUSCULAR; INTRAVENOUS AS NEEDED
Status: DISCONTINUED | OUTPATIENT
Start: 2023-06-28 | End: 2023-06-28

## 2023-06-28 RX ORDER — HEPARIN SODIUM 5000 [USP'U]/ML
5000 INJECTION, SOLUTION INTRAVENOUS; SUBCUTANEOUS ONCE
Status: COMPLETED | OUTPATIENT
Start: 2023-06-28 | End: 2023-06-28

## 2023-06-28 RX ORDER — ONDANSETRON 2 MG/ML
INJECTION INTRAMUSCULAR; INTRAVENOUS AS NEEDED
Status: DISCONTINUED | OUTPATIENT
Start: 2023-06-28 | End: 2023-06-28

## 2023-06-28 RX ORDER — FENTANYL CITRATE 50 UG/ML
INJECTION, SOLUTION INTRAMUSCULAR; INTRAVENOUS AS NEEDED
Status: DISCONTINUED | OUTPATIENT
Start: 2023-06-28 | End: 2023-06-28

## 2023-06-28 RX ORDER — CLOPIDOGREL BISULFATE 75 MG/1
75 TABLET ORAL DAILY
Refills: 0
Start: 2023-06-29

## 2023-06-28 RX ORDER — GLYCOPYRROLATE 0.2 MG/ML
INJECTION INTRAMUSCULAR; INTRAVENOUS AS NEEDED
Status: DISCONTINUED | OUTPATIENT
Start: 2023-06-28 | End: 2023-06-28

## 2023-06-28 RX ORDER — KETOROLAC TROMETHAMINE 30 MG/ML
INJECTION, SOLUTION INTRAMUSCULAR; INTRAVENOUS AS NEEDED
Status: DISCONTINUED | OUTPATIENT
Start: 2023-06-28 | End: 2023-06-28

## 2023-06-28 RX ORDER — ACETAMINOPHEN 325 MG/1
975 TABLET ORAL ONCE
Status: COMPLETED | OUTPATIENT
Start: 2023-06-28 | End: 2023-06-28

## 2023-06-28 RX ADMIN — DEXAMETHASONE SODIUM PHOSPHATE 10 MG: 10 INJECTION, SOLUTION INTRAMUSCULAR; INTRAVENOUS at 07:35

## 2023-06-28 RX ADMIN — SUGAMMADEX 200 MG: 100 INJECTION, SOLUTION INTRAVENOUS at 08:25

## 2023-06-28 RX ADMIN — KETOROLAC TROMETHAMINE 15 MG: 30 INJECTION, SOLUTION INTRAMUSCULAR at 08:20

## 2023-06-28 RX ADMIN — FENTANYL CITRATE 50 MCG: 50 INJECTION, SOLUTION INTRAMUSCULAR; INTRAVENOUS at 07:35

## 2023-06-28 RX ADMIN — CEFAZOLIN SODIUM 2000 MG: 2 SOLUTION INTRAVENOUS at 07:40

## 2023-06-28 RX ADMIN — HYDROMORPHONE HYDROCHLORIDE 0.5 MG: 1 INJECTION, SOLUTION INTRAMUSCULAR; INTRAVENOUS; SUBCUTANEOUS at 08:32

## 2023-06-28 RX ADMIN — HYDROMORPHONE HYDROCHLORIDE 0.5 MG: 1 INJECTION, SOLUTION INTRAMUSCULAR; INTRAVENOUS; SUBCUTANEOUS at 07:59

## 2023-06-28 RX ADMIN — ACETAMINOPHEN 975 MG: 325 TABLET, FILM COATED ORAL at 07:10

## 2023-06-28 RX ADMIN — METOPROLOL TARTRATE 2.5 MG: 5 INJECTION INTRAVENOUS at 08:20

## 2023-06-28 RX ADMIN — SODIUM CHLORIDE, SODIUM LACTATE, POTASSIUM CHLORIDE, AND CALCIUM CHLORIDE: .6; .31; .03; .02 INJECTION, SOLUTION INTRAVENOUS at 07:35

## 2023-06-28 RX ADMIN — ONDANSETRON 4 MG: 2 INJECTION INTRAMUSCULAR; INTRAVENOUS at 08:20

## 2023-06-28 RX ADMIN — HEPARIN SODIUM 5000 UNITS: 5000 INJECTION INTRAVENOUS; SUBCUTANEOUS at 07:10

## 2023-06-28 RX ADMIN — GLYCOPYRROLATE 0.2 MG: 0.2 INJECTION, SOLUTION INTRAMUSCULAR; INTRAVENOUS at 08:02

## 2023-06-28 RX ADMIN — LIDOCAINE HYDROCHLORIDE 50 MG: 10 INJECTION, SOLUTION EPIDURAL; INFILTRATION; INTRACAUDAL at 07:35

## 2023-06-28 RX ADMIN — METOPROLOL TARTRATE 2.5 MG: 5 INJECTION INTRAVENOUS at 08:12

## 2023-06-28 RX ADMIN — PROPOFOL 140 MG: 10 INJECTION, EMULSION INTRAVENOUS at 07:35

## 2023-06-28 RX ADMIN — ROCURONIUM BROMIDE 50 MG: 10 INJECTION, SOLUTION INTRAVENOUS at 07:35

## 2023-06-28 RX ADMIN — SODIUM CHLORIDE, SODIUM LACTATE, POTASSIUM CHLORIDE, AND CALCIUM CHLORIDE 100 ML/HR: .6; .31; .03; .02 INJECTION, SOLUTION INTRAVENOUS at 07:10

## 2023-06-28 RX ADMIN — INSULIN HUMAN 5 UNITS: 100 INJECTION, SOLUTION PARENTERAL at 07:45

## 2023-06-28 NOTE — ANESTHESIA POSTPROCEDURE EVALUATION
Post-Op Assessment Note    CV Status:  Stable  Pain Score: 0    Pain management: adequate     Mental Status:  Alert and awake   Hydration Status:  Euvolemic   PONV Controlled:  Controlled   Airway Patency:  Patent      Post Op Vitals Reviewed: Yes      Staff: Anesthesiologist, CRNA         There were no known notable events for this encounter      BP  171/75   Temp   97 6   Pulse  81   Resp   15   SpO2   98

## 2023-06-28 NOTE — OP NOTE
OPERATIVE REPORT  PATIENT NAME: Caesar Manzano    :  1940  MRN: [id number]  Pt Location: [facility] OR ROOM 02    SURGERY DATE: 2023    Surgeon(s) and Role:     * Connor Rowan MD - Primary     * Betty Levine PA-C - Assisting    Preop Diagnosis:  Recurrent gallstone pancreatitis    Postop diagnosis:  Recurrent gallstone pancreatitis    Procedure(s):  CHOLECYSTECTOMY LAPAROSCOPIC/possible open    Specimen(s):  ID Type Source Tests Collected by Time Destination   1 :  Tissue Gallbladder TISSUE EXAM Connor Rowan MD 2023 [id number]        Estimated Blood Loss:   Less than 10 mm    Drains:  * No LDAs found *    Anesthesia Type:   General endotracheal anesthesia and local anesthesia uses 30 mils of 0 5% Marcaine with 1 in 200,000 epinephrine    Operative Indications: This is an 61-year-old male who is an insulin-dependent diabetic  He has had at least 3 episodes of pancreatitis  He was hospitalized few weeks ago with gallstone pancreatitis and after recovering was discharged to make a surgical appointment for cholecystectomy  However before this appointment he got readmitted  This episode also promptly resolved  Patient was on Plavix hence we had to discharge him and wait for 5 days before bringing him in for surgery  Operative Findings:  Patient had a thin-walled gallbladder  Cystic duct was moderately dilated  No cholangiogram was done  Rest of the laparoscopy was unremarkable  Complications:   None    Procedure and Technique:  Patient was brought to the operating room suite  He was identified by me  He was laid supine on the operating table  General endotracheal anesthesia was given  Orogastric tube was placed  Patient was given preoperative doses of cefazolin and subcutaneous heparin  Abdominal wall was cleaned with ChloraPrep and patient was draped  Local infiltration anesthesia was given at all the intended port sites    The first incision was made in the inferior umbilical crease dividing the skin and the subcutaneous tissues  The umbilical fascia was grasped with a Kocher clamp and a Veress needle was used  After confirming intraperitoneal location of the Veress needle with aspiration and saline drop, a pneumoperitoneum was induced using carbon dioxide to a pressure of 15 mmHg  The Veress needle was then removed  11 mm port was placed and a laparoscope was introduced  A laparoscopic exam was then performed  Patient was then placed in a reverse Trendelenburg position with left side down  5 mm port was placed in the right subcostal area and a 5 mm port was placed in the midline in the epigastric area  The gallbladder was grasped at the neck through the lateral port and the Calot's triangle was exposed  Cystic duct and cystic artery were dissected out  Critical view of safety was obtained  Cystic duct and cystic artery were divided between clips  The gallbladder was removed from the liver bed with the help of EndoShears and cautery  We copiously irrigated the right upper quadrant and ensured that hemostasis was adequate  After removing the gallbladder it was placed in an Endobag and removed through the umbilical port site after stretching the fascial incision  The patient was then desufflated and all the ports were removed  The fascial incision in the umbilicus was closed with interrupted 0 Vicryl sutures  All skin incisions were closed with subcuticular 4-0 Monocryl  Surgical glue was applied  Patient tolerated the procedure well  I was present for the entire procedure  No qualified surgical resident was available  The services of a physician assistant were required for holding the camera and the instruments      Patient Disposition:  PACU         SIGNATURE:  Mannie Martins MD  DATE: June 28, 2023  TIME: 8:28 AM

## 2023-06-28 NOTE — ANESTHESIA PREPROCEDURE EVALUATION
"Procedure:  CHOLECYSTECTOMY LAPAROSCOPIC/possible open (Abdomen)    Relevant Problems   CARDIO   (+) Coronary artery disease involving native coronary artery without angina pectoris   (+) HTN (hypertension), benign   (+) Mixed hyperlipidemia      ENDO   (+) Type 2 diabetes mellitus with kidney complication, with long-term current use of insulin (HCC)      GI/HEPATIC   (+) Acute pancreatitis   (+) GERD without esophagitis      /RENAL   (+) Stage 3b chronic kidney disease (CKD) (HCC)      PULMONARY   (+) PAULA (obstructive sleep apnea)      Echo 4/6/2023:   •  Left Ventricle: There is mild concentric hypertrophy  Systolic function   is normal with an ejection fraction of 60-65%  •  Left Atrium: Left atrium cavity is moderately dilated  •  Aortic Valve: The aortic valve is trileaflet  The leaflets are   calcified  There is mild regurgitation  There is moderate stenosis  •  Tricuspid Valve: There is trace regurgitation  •  Pulmonic Valve: There is mild regurgitation  •  Ascending Aorta: The aortic root is mildly dilated  Lab Results   Component Value Date    WBC 5 30 06/22/2023    HGB 11 6 (L) 06/22/2023    HCT 34 8 (L) 06/22/2023    MCV 96 06/22/2023     06/22/2023     Lab Results   Component Value Date    SODIUM 140 06/22/2023    K 4 0 06/22/2023     06/22/2023    CO2 27 06/22/2023    BUN 21 06/22/2023    CREATININE 1 52 (H) 06/22/2023    GLUC 119 06/22/2023    CALCIUM 8 1 (L) 06/22/2023     No results found for: \"INR\", \"PROTIME\"  Lab Results   Component Value Date    HGBA1C 6 7 (H) 05/10/2023               Anesthesia Plan  ASA Score- 3     Anesthesia Type- general with ASA Monitors  Additional Monitors:   Airway Plan: ETT            Plan Factors-    Induction-     Postoperative Plan-     Informed Consent-             "

## 2023-06-28 NOTE — INTERVAL H&P NOTE
H&P reviewed  After examining the patient I find no changes in the patients condition since the H&P had been written      Vitals:    06/28/23 0705   BP: (!) 178/76   Pulse: 55   Resp: 14   Temp: 97 7 °F (36 5 °C)   SpO2: 98%

## 2023-06-29 ENCOUNTER — NURSE TRIAGE (OUTPATIENT)
Dept: OTHER | Facility: OTHER | Age: 83
End: 2023-06-29

## 2023-06-29 NOTE — TELEPHONE ENCOUNTER
Patient will follow care advise and also put some slight pressure on top of gauze over belly button  He will wait to hear back from the office this morning  He states that he stopped his blood thinner 5 days prior to surgery and he was to restart it today  He wants to hold off because of this slight oozing from his belly button

## 2023-06-29 NOTE — TELEPHONE ENCOUNTER
"Regarding: a little bleeding from surgery site  ----- Message from [employer] sent at 6/29/2023  7:35 AM EDT -----  \"I had my gallbladder removed yesterday  I think the incision at the belly button is seeping a little blood  I dont know what to do about it  \"    "

## 2023-06-29 NOTE — TELEPHONE ENCOUNTER
"  Reason for Disposition  • [1] Clear or blood-tinged fluid draining from wound AND [2] no fever    Answer Assessment - Initial Assessment Questions  1  SYMPTOM: \"What's the main symptom you're concerned about? \" (e g , redness, pain, drainage)      Small oozing of blood at his belly button  2  ONSET: \"When did bleeding   start? \"      This morning  3  SURGERY: \"What surgery was performed? \"      Laparoscopic removal of his gall bladder  4  DATE of SURGERY: \"When was surgery performed? \"       yesterday  5  INCISION SITE: \"Where is the incision located? \"       Belly button  6  REDNESS: \"Is there any redness at the incision site? \" If yes, ask: \"How wide across is the redness? \" (Inches, centimeters)       No redness  7  PAIN: \"Is there any pain? \" If Yes, ask: \"How bad is it? \"  (Scale 1-10; or mild, moderate, severe)      Only some stinging type pain, on and off  8  BLEEDING: \"Is there any bleeding? \" If Yes, ask: \"How much? \" and \"Where? \"      Very small amount under glue -slight bubble formed  9  DRAINAGE: \"Is there any drainage from the incision site? \" If yes, ask: \"What color and how much? \" (e g , red, cloudy, pus; drops, teaspoon)      No purulent drainage  10  FEVER: \"Do you have a fever? \" If Yes, ask: \"What is your temperature, how was it measured, and when did it start? \"        None  11  OTHER SYMPTOMS: \"Do you have any other symptoms? \" (e g , shaking chills, weakness, rash elsewhere on body)  None    Protocols used: POST-OP INCISION SYMPTOMS AND QUESTIONS-ADULT-    "

## 2023-07-01 ENCOUNTER — NURSE TRIAGE (OUTPATIENT)
Dept: OTHER | Facility: OTHER | Age: 83
End: 2023-07-01

## 2023-07-01 NOTE — TELEPHONE ENCOUNTER
"  Reason for Disposition  • [1] Small red area or streak AND [2] no fever  • Saline dressing changes, questions about  • Other post-op symptom or question    Answer Assessment - Initial Assessment Questions  1  SYMPTOM: \"What's the main symptom you're concerned about? \" (e g , redness, pain, drainage)      Increased redness  2  ONSET: \"When did it  start? \"      7/1  3  SURGERY: \"What surgery was performed? \"     CHOLECYSTECTOMY LAPAROSCOPIC (Abdomen)   4  DATE of SURGERY: \"When was surgery performed?\"    6/28  5  INCISION SITE: \"Where is the incision located? \"      Incision at belly button   6  REDNESS: \"Is there any redness at the incision site? \" If yes, ask: \"How wide across is the redness? \" (Inches, centimeters)     Moderate redness with some blotches, 0 5-1 inch  7  PAIN: \"Is there any pain? \" If Yes, ask: \"How bad is it? \"  (Scale 1-10; or mild, moderate, severe)   tender   8  BLEEDING: \"Is there any bleeding? \" If Yes, ask: \"How much? \" and \"Where? \"    Denies   9  DRAINAGE: \"Is there any drainage from the incision site? \" If yes, ask: \"What color and how much? \" (e g , red, cloudy, pus; drops, teaspoon)     Denies   10  FEVER: \"Do you have a fever? \" If Yes, ask: \"What is your temperature, how was it measured, and when did it start? \"       Denies   11  OTHER SYMPTOMS: \"Do you have any other symptoms? \" (e g , shaking chills, weakness, rash elsewhere on body)     Chills, mild swelling, and tender to touch, warm to the touch, generally don't feel well  Back of neck feels warm   Sore throat (mild)    Protocols used: POST-OP INCISION SYMPTOMS AND QUESTIONS-ADULT-AH, POST-OP SYMPTOMS AND QUESTIONS-ADULT-AH    "

## 2023-07-01 NOTE — TELEPHONE ENCOUNTER
S/P CHOLECYSTECTOMY LAPAROSCOPIC (Abdomen) on 6/28/23 with Kiah Douglas MD  C/o mild-moderate redness ( about 0 5-1 inch) around naval incision, with warmth, and mild edema  Denies drainage, foul odor, or severe redness/streaks  Afebrile  C/o chills, tenderness to incision (mild), sore throat, and back of neck feels warm  No additional symptoms reported  Care advice given  Informed to call back if worsening/developing symptoms  Verbalized understanding  Agreeable with disposition  No further questions

## 2023-07-01 NOTE — TELEPHONE ENCOUNTER
"Regarding: post-op/ incision site by belly button/ warm at t he touch/ discomfort/ swelling at the area  ----- Message from Hurtis Apgar sent at 2023  7:41 PM EDT -----  Pt called, \" I had my gallbladder removed  The incision site by the belly button looks swollen, is red, looks irritated, is warm by the touch, and I feel feverish  \"    "

## 2023-07-05 PROCEDURE — 88304 TISSUE EXAM BY PATHOLOGIST: CPT | Performed by: PATHOLOGY

## 2023-07-05 NOTE — TELEPHONE ENCOUNTER
I spoke with him. Explained to him some oozing is normal.  Watch for color change and if gets hot to the touch call the office right away.

## 2023-07-07 ENCOUNTER — APPOINTMENT (EMERGENCY)
Dept: CT IMAGING | Facility: HOSPITAL | Age: 83
End: 2023-07-07
Payer: MEDICARE

## 2023-07-07 ENCOUNTER — HOSPITAL ENCOUNTER (INPATIENT)
Facility: HOSPITAL | Age: 83
LOS: 3 days | Discharge: HOME | End: 2023-07-10
Attending: EMERGENCY MEDICINE | Admitting: INTERNAL MEDICINE
Payer: MEDICARE

## 2023-07-07 DIAGNOSIS — R19.7 DIARRHEA DUE TO MALABSORPTION: ICD-10-CM

## 2023-07-07 DIAGNOSIS — K90.9 DIARRHEA DUE TO MALABSORPTION: ICD-10-CM

## 2023-07-07 DIAGNOSIS — K85.90 ACUTE PANCREATITIS: Primary | ICD-10-CM

## 2023-07-07 PROBLEM — Z92.89 HISTORY OF RECENT HOSPITALIZATION: Status: ACTIVE | Noted: 2022-07-12

## 2023-07-07 PROBLEM — I50.30 (HFPEF) HEART FAILURE WITH PRESERVED EJECTION FRACTION (HCC): Status: RESOLVED | Noted: 2020-07-09 | Resolved: 2023-01-11

## 2023-07-07 PROBLEM — I50.30 (HFPEF) HEART FAILURE WITH PRESERVED EJECTION FRACTION (HCC): Status: ACTIVE | Noted: 2020-07-09

## 2023-07-07 PROBLEM — Z92.89 HISTORY OF RECENT HOSPITALIZATION: Status: RESOLVED | Noted: 2022-07-12 | Resolved: 2023-01-11

## 2023-07-07 PROBLEM — I35.0 MODERATE AORTIC STENOSIS: Status: RESOLVED | Noted: 2023-01-11 | Resolved: 2023-01-11

## 2023-07-07 PROBLEM — I35.0 MODERATE AORTIC STENOSIS: Status: ACTIVE | Noted: 2023-01-11

## 2023-07-07 LAB
ALBUMIN SERPL BCP-MCNC: 3.7 G/DL (ref 3.5–5)
ALP SERPL-CCNC: 60 U/L (ref 34–104)
ALT SERPL W P-5'-P-CCNC: 14 U/L (ref 7–52)
ANION GAP SERPL CALCULATED.3IONS-SCNC: 7 MMOL/L
AST SERPL W P-5'-P-CCNC: 18 U/L (ref 13–39)
BASOPHILS # BLD AUTO: 0.03 THOUSANDS/ÂΜL (ref 0–0.1)
BASOPHILS NFR BLD AUTO: 0 % (ref 0–1)
BILIRUB SERPL-MCNC: 0.39 MG/DL (ref 0.2–1)
BUN SERPL-MCNC: 25 MG/DL (ref 5–25)
CALCIUM SERPL-MCNC: 8.6 MG/DL (ref 8.4–10.2)
CHLORIDE SERPL-SCNC: 105 MMOL/L (ref 96–108)
CHOLEST SERPL-MCNC: 124 MG/DL
CO2 SERPL-SCNC: 29 MMOL/L (ref 21–32)
CREAT SERPL-MCNC: 1.73 MG/DL (ref 0.6–1.3)
EOSINOPHIL # BLD AUTO: 0.24 THOUSAND/ÂΜL (ref 0–0.61)
EOSINOPHIL NFR BLD AUTO: 3 % (ref 0–6)
ERYTHROCYTE [DISTWIDTH] IN BLOOD BY AUTOMATED COUNT: 14.3 % (ref 11.6–15.1)
GFR SERPL CREATININE-BSD FRML MDRD: 36 ML/MIN/1.73SQ M
GLUCOSE SERPL-MCNC: 128 MG/DL (ref 65–140)
GLUCOSE SERPL-MCNC: 134 MG/DL (ref 65–140)
GLUCOSE SERPL-MCNC: 140 MG/DL (ref 65–140)
GLUCOSE SERPL-MCNC: 155 MG/DL (ref 65–140)
HCT VFR BLD AUTO: 39.8 % (ref 36.5–49.3)
HDLC SERPL-MCNC: 53 MG/DL
HGB BLD-MCNC: 12.9 G/DL (ref 12–17)
IMM GRANULOCYTES # BLD AUTO: 0.01 THOUSAND/UL (ref 0–0.2)
IMM GRANULOCYTES NFR BLD AUTO: 0 % (ref 0–2)
LACTATE SERPL-SCNC: 0.8 MMOL/L (ref 0.5–2)
LDLC SERPL CALC-MCNC: 47 MG/DL (ref 0–100)
LIPASE SERPL-CCNC: 8111 U/L (ref 11–82)
LYMPHOCYTES # BLD AUTO: 1.67 THOUSANDS/ÂΜL (ref 0.6–4.47)
LYMPHOCYTES NFR BLD AUTO: 21 % (ref 14–44)
MAGNESIUM SERPL-MCNC: 1.9 MG/DL (ref 1.9–2.7)
MCH RBC QN AUTO: 31.5 PG (ref 26.8–34.3)
MCHC RBC AUTO-ENTMCNC: 32.4 G/DL (ref 31.4–37.4)
MCV RBC AUTO: 97 FL (ref 82–98)
MONOCYTES # BLD AUTO: 0.49 THOUSAND/ÂΜL (ref 0.17–1.22)
MONOCYTES NFR BLD AUTO: 6 % (ref 4–12)
NEUTROPHILS # BLD AUTO: 5.64 THOUSANDS/ÂΜL (ref 1.85–7.62)
NEUTS SEG NFR BLD AUTO: 70 % (ref 43–75)
NONHDLC SERPL-MCNC: 71 MG/DL
NRBC BLD AUTO-RTO: 0 /100 WBCS
PLATELET # BLD AUTO: 191 THOUSANDS/UL (ref 149–390)
PMV BLD AUTO: 10 FL (ref 8.9–12.7)
POTASSIUM SERPL-SCNC: 3.8 MMOL/L (ref 3.5–5.3)
PROT SERPL-MCNC: 6.5 G/DL (ref 6.4–8.4)
RBC # BLD AUTO: 4.1 MILLION/UL (ref 3.88–5.62)
SODIUM SERPL-SCNC: 141 MMOL/L (ref 135–147)
TRIGL SERPL-MCNC: 118 MG/DL
WBC # BLD AUTO: 8.08 THOUSAND/UL (ref 4.31–10.16)

## 2023-07-07 PROCEDURE — 85025 COMPLETE CBC W/AUTO DIFF WBC: CPT | Performed by: EMERGENCY MEDICINE

## 2023-07-07 PROCEDURE — 83735 ASSAY OF MAGNESIUM: CPT | Performed by: EMERGENCY MEDICINE

## 2023-07-07 PROCEDURE — 82948 REAGENT STRIP/BLOOD GLUCOSE: CPT

## 2023-07-07 PROCEDURE — 99285 EMERGENCY DEPT VISIT HI MDM: CPT | Performed by: EMERGENCY MEDICINE

## 2023-07-07 PROCEDURE — 74176 CT ABD & PELVIS W/O CONTRAST: CPT

## 2023-07-07 PROCEDURE — 99222 1ST HOSP IP/OBS MODERATE 55: CPT | Performed by: INTERNAL MEDICINE

## 2023-07-07 PROCEDURE — 96361 HYDRATE IV INFUSION ADD-ON: CPT

## 2023-07-07 PROCEDURE — 96374 THER/PROPH/DIAG INJ IV PUSH: CPT

## 2023-07-07 PROCEDURE — 83605 ASSAY OF LACTIC ACID: CPT | Performed by: EMERGENCY MEDICINE

## 2023-07-07 PROCEDURE — 96376 TX/PRO/DX INJ SAME DRUG ADON: CPT

## 2023-07-07 PROCEDURE — 80061 LIPID PANEL: CPT | Performed by: PHYSICIAN ASSISTANT

## 2023-07-07 PROCEDURE — 36415 COLL VENOUS BLD VENIPUNCTURE: CPT | Performed by: EMERGENCY MEDICINE

## 2023-07-07 PROCEDURE — 99285 EMERGENCY DEPT VISIT HI MDM: CPT

## 2023-07-07 PROCEDURE — 99024 POSTOP FOLLOW-UP VISIT: CPT | Performed by: SURGERY

## 2023-07-07 PROCEDURE — 83690 ASSAY OF LIPASE: CPT | Performed by: EMERGENCY MEDICINE

## 2023-07-07 PROCEDURE — 80053 COMPREHEN METABOLIC PANEL: CPT | Performed by: EMERGENCY MEDICINE

## 2023-07-07 PROCEDURE — 99223 1ST HOSP IP/OBS HIGH 75: CPT | Performed by: INTERNAL MEDICINE

## 2023-07-07 RX ORDER — INSULIN LISPRO 100 [IU]/ML
2-12 INJECTION, SOLUTION INTRAVENOUS; SUBCUTANEOUS
Status: DISCONTINUED | OUTPATIENT
Start: 2023-07-07 | End: 2023-07-10 | Stop reason: HOSPADM

## 2023-07-07 RX ORDER — METOPROLOL SUCCINATE 25 MG/1
25 TABLET, EXTENDED RELEASE ORAL DAILY
Status: DISCONTINUED | OUTPATIENT
Start: 2023-07-07 | End: 2023-07-10 | Stop reason: HOSPADM

## 2023-07-07 RX ORDER — LISINOPRIL 20 MG/1
40 TABLET ORAL DAILY
Status: DISCONTINUED | OUTPATIENT
Start: 2023-07-07 | End: 2023-07-10 | Stop reason: HOSPADM

## 2023-07-07 RX ORDER — HYDROMORPHONE HCL/PF 1 MG/ML
0.5 SYRINGE (ML) INJECTION ONCE
Status: COMPLETED | OUTPATIENT
Start: 2023-07-07 | End: 2023-07-07

## 2023-07-07 RX ORDER — CLOPIDOGREL BISULFATE 75 MG/1
75 TABLET ORAL DAILY
Status: DISCONTINUED | OUTPATIENT
Start: 2023-07-07 | End: 2023-07-10 | Stop reason: HOSPADM

## 2023-07-07 RX ORDER — RANOLAZINE 500 MG/1
500 TABLET, EXTENDED RELEASE ORAL 2 TIMES DAILY
Status: DISCONTINUED | OUTPATIENT
Start: 2023-07-07 | End: 2023-07-10 | Stop reason: HOSPADM

## 2023-07-07 RX ORDER — OXYCODONE HYDROCHLORIDE 5 MG/1
5 TABLET ORAL EVERY 4 HOURS PRN
Status: DISCONTINUED | OUTPATIENT
Start: 2023-07-07 | End: 2023-07-10 | Stop reason: HOSPADM

## 2023-07-07 RX ORDER — ONDANSETRON 2 MG/ML
4 INJECTION INTRAMUSCULAR; INTRAVENOUS EVERY 6 HOURS PRN
Status: DISCONTINUED | OUTPATIENT
Start: 2023-07-07 | End: 2023-07-10 | Stop reason: HOSPADM

## 2023-07-07 RX ORDER — HEPARIN SODIUM 5000 [USP'U]/ML
5000 INJECTION, SOLUTION INTRAVENOUS; SUBCUTANEOUS EVERY 8 HOURS SCHEDULED
Status: DISCONTINUED | OUTPATIENT
Start: 2023-07-07 | End: 2023-07-10 | Stop reason: HOSPADM

## 2023-07-07 RX ORDER — TAMSULOSIN HYDROCHLORIDE 0.4 MG/1
0.8 CAPSULE ORAL
Status: DISCONTINUED | OUTPATIENT
Start: 2023-07-07 | End: 2023-07-10 | Stop reason: HOSPADM

## 2023-07-07 RX ORDER — SODIUM CHLORIDE, SODIUM LACTATE, POTASSIUM CHLORIDE, CALCIUM CHLORIDE 600; 310; 30; 20 MG/100ML; MG/100ML; MG/100ML; MG/100ML
75 INJECTION, SOLUTION INTRAVENOUS CONTINUOUS
Status: DISCONTINUED | OUTPATIENT
Start: 2023-07-07 | End: 2023-07-09

## 2023-07-07 RX ORDER — ATORVASTATIN CALCIUM 40 MG/1
40 TABLET, FILM COATED ORAL
Status: DISCONTINUED | OUTPATIENT
Start: 2023-07-07 | End: 2023-07-10 | Stop reason: HOSPADM

## 2023-07-07 RX ORDER — HYDROMORPHONE HCL IN WATER/PF 6 MG/30 ML
0.2 PATIENT CONTROLLED ANALGESIA SYRINGE INTRAVENOUS EVERY 4 HOURS PRN
Status: DISCONTINUED | OUTPATIENT
Start: 2023-07-07 | End: 2023-07-10 | Stop reason: HOSPADM

## 2023-07-07 RX ORDER — INSULIN LISPRO 100 [IU]/ML
1-5 INJECTION, SOLUTION INTRAVENOUS; SUBCUTANEOUS
Status: DISCONTINUED | OUTPATIENT
Start: 2023-07-07 | End: 2023-07-07

## 2023-07-07 RX ORDER — INSULIN LISPRO 100 [IU]/ML
1-5 INJECTION, SOLUTION INTRAVENOUS; SUBCUTANEOUS EVERY 6 HOURS SCHEDULED
Status: DISCONTINUED | OUTPATIENT
Start: 2023-07-07 | End: 2023-07-07

## 2023-07-07 RX ORDER — OXYCODONE HYDROCHLORIDE 5 MG/1
2.5 TABLET ORAL EVERY 4 HOURS PRN
Status: DISCONTINUED | OUTPATIENT
Start: 2023-07-07 | End: 2023-07-10 | Stop reason: HOSPADM

## 2023-07-07 RX ORDER — CLONIDINE HYDROCHLORIDE 0.1 MG/1
0.1 TABLET ORAL EVERY 12 HOURS SCHEDULED
Status: DISCONTINUED | OUTPATIENT
Start: 2023-07-07 | End: 2023-07-10 | Stop reason: HOSPADM

## 2023-07-07 RX ORDER — ALLOPURINOL 100 MG/1
100 TABLET ORAL DAILY
Status: DISCONTINUED | OUTPATIENT
Start: 2023-07-07 | End: 2023-07-10 | Stop reason: HOSPADM

## 2023-07-07 RX ORDER — ACETAMINOPHEN 325 MG/1
650 TABLET ORAL EVERY 6 HOURS PRN
Status: DISCONTINUED | OUTPATIENT
Start: 2023-07-07 | End: 2023-07-10 | Stop reason: HOSPADM

## 2023-07-07 RX ADMIN — METOPROLOL SUCCINATE 25 MG: 25 TABLET, FILM COATED, EXTENDED RELEASE ORAL at 09:49

## 2023-07-07 RX ADMIN — SODIUM CHLORIDE, SODIUM LACTATE, POTASSIUM CHLORIDE, AND CALCIUM CHLORIDE 200 ML/HR: .6; .31; .03; .02 INJECTION, SOLUTION INTRAVENOUS at 04:24

## 2023-07-07 RX ADMIN — HYDROMORPHONE HYDROCHLORIDE 0.5 MG: 1 INJECTION, SOLUTION INTRAMUSCULAR; INTRAVENOUS; SUBCUTANEOUS at 01:51

## 2023-07-07 RX ADMIN — OXYCODONE HYDROCHLORIDE 2.5 MG: 5 TABLET ORAL at 09:46

## 2023-07-07 RX ADMIN — SODIUM CHLORIDE 1000 ML: 0.9 INJECTION, SOLUTION INTRAVENOUS at 00:42

## 2023-07-07 RX ADMIN — RANOLAZINE 500 MG: 500 TABLET, FILM COATED, EXTENDED RELEASE ORAL at 09:48

## 2023-07-07 RX ADMIN — INSULIN LISPRO 1 UNITS: 100 INJECTION, SOLUTION INTRAVENOUS; SUBCUTANEOUS at 11:54

## 2023-07-07 RX ADMIN — RANOLAZINE 500 MG: 500 TABLET, FILM COATED, EXTENDED RELEASE ORAL at 17:00

## 2023-07-07 RX ADMIN — ALLOPURINOL 100 MG: 100 TABLET ORAL at 09:49

## 2023-07-07 RX ADMIN — HEPARIN SODIUM 5000 UNITS: 5000 INJECTION INTRAVENOUS; SUBCUTANEOUS at 14:45

## 2023-07-07 RX ADMIN — OXYCODONE HYDROCHLORIDE 5 MG: 5 TABLET ORAL at 21:29

## 2023-07-07 RX ADMIN — CLONIDINE HYDROCHLORIDE 0.1 MG: 0.1 TABLET ORAL at 09:50

## 2023-07-07 RX ADMIN — HYDROMORPHONE HYDROCHLORIDE 0.5 MG: 1 INJECTION, SOLUTION INTRAMUSCULAR; INTRAVENOUS; SUBCUTANEOUS at 00:41

## 2023-07-07 RX ADMIN — SODIUM CHLORIDE, SODIUM LACTATE, POTASSIUM CHLORIDE, AND CALCIUM CHLORIDE 125 ML/HR: .6; .31; .03; .02 INJECTION, SOLUTION INTRAVENOUS at 17:52

## 2023-07-07 RX ADMIN — ATORVASTATIN CALCIUM 40 MG: 40 TABLET, FILM COATED ORAL at 17:00

## 2023-07-07 RX ADMIN — OXYCODONE HYDROCHLORIDE 5 MG: 5 TABLET ORAL at 14:45

## 2023-07-07 RX ADMIN — HEPARIN SODIUM 5000 UNITS: 5000 INJECTION INTRAVENOUS; SUBCUTANEOUS at 21:29

## 2023-07-07 RX ADMIN — HYDROMORPHONE HYDROCHLORIDE 0.2 MG: 0.2 INJECTION, SOLUTION INTRAMUSCULAR; INTRAVENOUS; SUBCUTANEOUS at 05:41

## 2023-07-07 RX ADMIN — HEPARIN SODIUM 5000 UNITS: 5000 INJECTION INTRAVENOUS; SUBCUTANEOUS at 05:42

## 2023-07-07 RX ADMIN — CLOPIDOGREL BISULFATE 75 MG: 75 TABLET ORAL at 09:48

## 2023-07-07 RX ADMIN — LISINOPRIL 40 MG: 20 TABLET ORAL at 09:50

## 2023-07-07 RX ADMIN — TAMSULOSIN HYDROCHLORIDE 0.8 MG: 0.4 CAPSULE ORAL at 17:00

## 2023-07-07 RX ADMIN — CLONIDINE HYDROCHLORIDE 0.1 MG: 0.1 TABLET ORAL at 21:29

## 2023-07-07 NOTE — PROGRESS NOTES
Progress Note - General Surgery   Prateek Avina 82 y.o. male MRN: [id number]  Unit/Bed#: -01 Encounter: [id number]    Assessment:  Recurrent acute pancreatitis. Etiology unknown at present time. Patient is 9 days status post laparoscopic cholecystectomy. Pathology report shows chronic cholecystitis with cholelithiasis. Patient does not drink any alcohol. Patient has no incriminating medications for pancreatitis. Patient's biochemistry is normal i.e. calcium levels and triglyceride levels. Plan:  Patient is n.p.o. at present time. Will start on diet once this episode resolves. MRCP is pending. Subjective/Objective   Chief Complaint: Epigastric pain    Subjective: Patient started with severe epigastric pain last night. He had a normal meal with no alcohol. Patient was hospitalized for this reason. Patient is 9 days status post laparoscopic cholecystectomy and had been undergoing an uncomplicated recovery. Objective: Patient is in mild pain    Blood pressure 159/60, pulse 62, temperature 98.9 °F (37.2 °C), temperature source Oral, resp. rate 17, height 5' 6" (1.676 m), weight 113 kg (249 lb), SpO2 94 %. ,Body mass index is 40.19 kg/m². No intake or output data in the 24 hours ending 07/07/23 1421    Invasive Devices     Peripheral Intravenous Line  Duration           Peripheral IV 07/07/23 Right Antecubital <1 day                Physical Exam: Laparoscopy incisions are healing satisfactorily    Lab, Imaging and other studies:I have personally reviewed pertinent lab results.     VTE Pharmacologic Prophylaxis: Heparin  VTE Mechanical Prophylaxis: sequential compression device

## 2023-07-07 NOTE — ED PROVIDER NOTES
History  Chief Complaint   Patient presents with   • Abdominal Pain     Pt c/o upper abdominal pain. Pain started about an hr ago. No medications PTA. 8:10 pain     Patient is an 22-year-old male seen in the emergency department with concern for strong epigastric pain which began approximately 1 hour prior to evaluation. Patient notes no nausea, vomiting, diarrhea, fever, chest pain, shortness of breath. Patient states that he did not take any medication for symptom control this evening prior to evaluation in the emergency department. Review of records shows that patient had cholecystectomy on 2023, apparently for recurrent gallstone pancreatitis. Patient notes no radiation of the pain. Patient notes pain similar to previous episodes of pancreatitis. Pain is worse with palpation, but patient notes no other definite clear exacerbating or alleviating factors for the pain. Prior to Admission Medications   Prescriptions Last Dose Informant Patient Reported? Taking?    BD Pen Needle Pascale U/F 32G X 4 MM MISC  Self Yes No   Sig: USE AS INSTRUCTED WITH INSULIN PEN   Fexofenadine HCl (MUCINEX ALLERGY PO)   Yes No   Sig: Take by mouth   JARDIANCE 25 MG TABS  Self Yes No   Sig: Take 25 mg by mouth daily   Lancets (OneTouch Delica Plus [id number]) MISC  Self Yes No   Sig: TEST GLUCOSE 3 4 TIMES/DAY   Levemir FlexTouch 100 units/mL injection pen  Self Yes No   Si Units daily In AM   Loratadine (CLARITIN PO)   Yes No   Sig: Take by mouth   Multiple Vitamins-Minerals (MULTIVITAMIN ADULT PO)  Self Yes No   Sig: Take by mouth daily   Pseudoephedrine HCl (SUDAFED PO)   Yes No   Sig: Take by mouth   acetaminophen (TYLENOL) 325 mg tablet   No No   Sig: Take 2 tablets (650 mg total) by mouth every 6 (six) hours as needed for mild pain   allopurinol (ZYLOPRIM) 100 mg tablet  Self Yes No   Sig: Take 100 mg by mouth daily   calcium carbonate (OS-WAYLON) 600 MG tablet  Self Yes No   Sig: Take 600 mg by mouth daily cholecalciferol (VITAMIN D3) 1,000 units tablet  Self Yes No   Sig: Take 2,000 Units by mouth daily   cloNIDine (CATAPRES) 0.1 mg tablet   No No   Sig: Take 1 tablet (0.1 mg total) by mouth every 12 (twelve) hours   clopidogrel (PLAVIX) 75 mg tablet   No No   Sig: Take 1 tablet (75 mg total) by mouth daily Instructed to stop prior to Surgery-Last dose 23 Do not start before 2023.    fluticasone (FLONASE) 50 mcg/act nasal spray  Self No No   Si sprays into each nostril daily   insulin aspart (NovoLOG FlexPen) 100 UNIT/ML injection pen   Yes No   Sig: Inject under the skin 3 (three) times a day with meals Sliding scale as ordered TID with meals   lisinopril (ZESTRIL) 40 mg tablet  Self No No   Sig: Take 1 tablet (40 mg total) by mouth daily   metoprolol succinate (TOPROL-XL) 25 mg 24 hr tablet  Self No No   Sig: Take 1 tablet (25 mg total) by mouth daily   omeprazole (PriLOSEC) 40 MG capsule  Self No No   Sig: Take 1 capsule (40 mg total) by mouth daily   Patient taking differently: Take 40 mg by mouth daily before breakfast   oxyCODONE-acetaminophen (Percocet) 5-325 mg per tablet   No No   Sig: Take 1 tablet by mouth every 4 (four) hours as needed for severe pain for up to 10 doses Max Daily Amount: 6 tablets   pyridoxine (VITAMIN B6) 100 mg tablet  Self Yes No   Sig: Take 100 mg by mouth daily   ranolazine (RANEXA) 500 mg 12 hr tablet   No No   Sig: Take 1 tablet (500 mg total) by mouth 2 (two) times a day   rosuvastatin (CRESTOR) 20 MG tablet  Self Yes No   Sig: Take 1 tablet by mouth daily   tamsulosin (FLOMAX) 0.4 mg  Self No No   Sig: Take 2 capsules (0.8 mg total) by mouth daily      Facility-Administered Medications: None       Past Medical History:   Diagnosis Date   • Allergic    • Arthritis    • Chronic kidney disease    • Chronic kidney disease (CKD) stage G3a/A1, moderately decreased glomerular filtration rate (GFR) between 45-59 mL/min/1.73 square meter and albuminuria creatinine ratio less than 30 mg/g (HCC)    • Colon polyp    • Diabetes mellitus (HCC)    • GERD (gastroesophageal reflux disease)    • Heart murmur    • History of echocardiogram    • HL (hearing loss)    • Hyperlipidemia    • Hypertension    • Obesity    • Sleep apnea, obstructive        Past Surgical History:   Procedure Laterality Date   • CARDIAC CATHETERIZATION     • COLONOSCOPY  2018   • COLONOSCOPY     • EYE SURGERY     • HEMORRHOID SURGERY     • JOINT REPLACEMENT  2017    knee   • OTHER SURGICAL HISTORY      Stent    • [place] LAPAROSCOPY SURG CHOLECYSTECTOMY N/A 2023    Procedure: CHOLECYSTECTOMY LAPAROSCOPIC;  Surgeon: Charlene Benites MD;  Location:  MAIN OR;  Service: General   • UPPER GASTROINTESTINAL ENDOSCOPY         Family History   Problem Relation Age of Onset   • Heart disease Mother    • Heart attack Mother         46s   • Cancer Mother    • Coronary artery disease Mother    • Cancer Brother         Malignant tumor of lung     I have reviewed and agree with the history as documented. E-Cigarette/Vaping   • E-Cigarette Use Never User      E-Cigarette/Vaping Substances   • Nicotine No    • THC No    • CBD No    • Flavoring No    • Other No    • Unknown No      Social History     Tobacco Use   • Smoking status: Former     Packs/day: 1.00     Years: 15.00     Total pack years: 15.00     Types: Cigarettes     Quit date: 1972     Years since quittin.7     Passive exposure: Past   • Smokeless tobacco: Never   Vaping Use   • Vaping Use: Never used   Substance Use Topics   • Alcohol use: Not Currently     Alcohol/week: 5.0 standard drinks of alcohol     Types: 5 Glasses of wine per week     Comment: Occasional    • Drug use: Never       Review of Systems   Constitutional: Negative for chills and fever. HENT: Negative for ear pain and sore throat. Eyes: Negative for pain and visual disturbance. Respiratory: Negative for cough and shortness of breath.     Cardiovascular: Negative for chest pain and palpitations. Gastrointestinal: Positive for abdominal pain. Negative for diarrhea, nausea and vomiting. Genitourinary: Negative for decreased urine volume and difficulty urinating. Musculoskeletal: Negative for gait problem and neck stiffness. Skin: Negative for color change and rash. Neurological: Negative for seizures and syncope. Psychiatric/Behavioral: Negative for agitation and confusion. All other systems reviewed and are negative. Physical Exam  Physical Exam  Vitals and nursing note reviewed. Constitutional:       General: He is not in acute distress. Appearance: He is well-developed. HENT:      Head: Normocephalic and atraumatic. Right Ear: External ear normal.      Left Ear: External ear normal.      Nose: Nose normal.      Mouth/Throat:      Pharynx: Oropharynx is clear. Eyes:      General: No scleral icterus. Conjunctiva/sclera: Conjunctivae normal.   Cardiovascular:      Rate and Rhythm: Normal rate and regular rhythm. Heart sounds: No murmur heard. Pulmonary:      Effort: Pulmonary effort is normal. No respiratory distress. Breath sounds: Normal breath sounds. Abdominal:      Palpations: Abdomen is soft. Tenderness: There is abdominal tenderness. Comments: multiple incisions which are clean/dry/intact; moderate epigastric tenderness   Musculoskeletal:         General: No deformity or signs of injury. Cervical back: Normal range of motion and neck supple. Skin:     General: Skin is warm and dry. Neurological:      General: No focal deficit present. Mental Status: He is alert. Cranial Nerves: No cranial nerve deficit. Sensory: No sensory deficit. Psychiatric:         Mood and Affect: Mood normal.         Thought Content:  Thought content normal.         Vital Signs  ED Triage Vitals   Temperature Pulse Respirations Blood Pressure SpO2   07/07/23 0020 07/07/23 0020 07/07/23 0020 07/07/23 0020 07/07/23 0020   98 °F (36.7 °C) 64 20 (!) 181/48 99 %      Temp Source Heart Rate Source Patient Position - Orthostatic VS BP Location FiO2 (%)   07/07/23 0020 07/07/23 0020 07/07/23 0020 07/07/23 0020 --   Oral Monitor Lying Right arm       Pain Score       07/07/23 0041       8           Vitals:    07/07/23 0020 07/07/23 0311   BP: (!) 181/48 155/50   Pulse: 64 66   Patient Position - Orthostatic VS: Lying Lying         Visual Acuity      ED Medications  Medications   cloNIDine (CATAPRES) tablet 0.1 mg (has no administration in time range)   clopidogrel (PLAVIX) tablet 75 mg (has no administration in time range)   allopurinol (ZYLOPRIM) tablet 100 mg (has no administration in time range)   acetaminophen (TYLENOL) tablet 650 mg (has no administration in time range)   lisinopril (ZESTRIL) tablet 40 mg (has no administration in time range)   metoprolol succinate (TOPROL-XL) 24 hr tablet 25 mg (has no administration in time range)   ranolazine (RANEXA) 12 hr tablet 500 mg (has no administration in time range)   atorvastatin (LIPITOR) tablet 40 mg (has no administration in time range)   tamsulosin (FLOMAX) capsule 0.8 mg (has no administration in time range)   lactated ringers infusion (200 mL/hr Intravenous New Bag 7/7/23 4154)   ondansetron (ZOFRAN) injection 4 mg (has no administration in time range)   heparin (porcine) subcutaneous injection 5,000 Units (has no administration in time range)   oxyCODONE (ROXICODONE) IR tablet 2.5 mg (has no administration in time range)   oxyCODONE (ROXICODONE) IR tablet 5 mg (has no administration in time range)   HYDROmorphone HCl (DILAUDID) injection 0.2 mg (has no administration in time range)   naloxone (NARCAN) 0.04 mg/mL syringe 0.04 mg (has no administration in time range)   insulin lispro (HumaLOG) 100 units/mL subcutaneous injection 1-5 Units (has no administration in time range)   sodium chloride 0.9 % bolus 1,000 mL (1,000 mL Intravenous New Bag 7/7/23 0042)   HYDROmorphone (DILAUDID) injection 0.5 mg (0.5 mg Intravenous Given 7/7/23 0041)   HYDROmorphone (DILAUDID) injection 0.5 mg (0.5 mg Intravenous Given 7/7/23 0151)       Diagnostic Studies  Results Reviewed     Procedure Component Value Units Date/Time    Fingerstick Glucose (POCT) [[id number]]  (Normal) Collected: 07/07/23 0427    Lab Status: Final result Updated: 07/07/23 0428     POC Glucose 140 mg/dl     Comprehensive metabolic panel [[id number]]  (Abnormal) Collected: 07/07/23 0042    Lab Status: Final result Specimen: Blood from Arm, Right Updated: 07/07/23 0123     Sodium 141 mmol/L      Potassium 3.8 mmol/L      Chloride 105 mmol/L      CO2 29 mmol/L      ANION GAP 7 mmol/L      BUN 25 mg/dL      Creatinine 1.73 mg/dL      Glucose 128 mg/dL      Calcium 8.6 mg/dL      AST 18 U/L      ALT 14 U/L      Alkaline Phosphatase 60 U/L      Total Protein 6.5 g/dL      Albumin 3.7 g/dL      Total Bilirubin 0.39 mg/dL      eGFR 36 ml/min/1.73sq m     Narrative:      [place] guidelines for Chronic Kidney Disease (CKD):   •  Stage 1 with normal or high GFR (GFR > 90 mL/min/1.73 square meters)  •  Stage 2 Mild CKD (GFR = 60-89 mL/min/1.73 square meters)  •  Stage 3A Moderate CKD (GFR = 45-59 mL/min/1.73 square meters)  •  Stage 3B Moderate CKD (GFR = 30-44 mL/min/1.73 square meters)  •  Stage 4 Severe CKD (GFR = 15-29 mL/min/1.73 square meters)  •  Stage 5 End Stage CKD (GFR <15 mL/min/1.73 square meters)  Note: GFR calculation is accurate only with a steady state creatinine    Magnesium [[id number]]  (Normal) Collected: 07/07/23 0042    Lab Status: Final result Specimen: Blood from Arm, Right Updated: 07/07/23 0123     Magnesium 1.9 mg/dL     Lipase [[id number]]  (Abnormal) Collected: 07/07/23 0042    Lab Status: Final result Specimen: Blood from Arm, Right Updated: 07/07/23 0123     Lipase 8,111 u/L     Lactic acid, plasma (w/reflex if result > 2.0) [[id number]]  (Normal) Collected: 07/07/23 0042    Lab Status: Final result Specimen: Blood from Arm, Right Updated: 07/07/23 0104     LACTIC ACID 0.8 mmol/L     Narrative:      Result may be elevated if tourniquet was used during collection. CBC and differential [[id number]] Collected: 07/07/23 0042    Lab Status: Final result Specimen: Blood from Arm, Right Updated: 07/07/23 0049     WBC 8.08 Thousand/uL      RBC 4.10 Million/uL      Hemoglobin 12.9 g/dL      Hematocrit 39.8 %      MCV 97 fL      MCH 31.5 pg      MCHC 32.4 g/dL      RDW 14.3 %      MPV 10.0 fL      Platelets 914 Thousands/uL      nRBC 0 /100 WBCs      Neutrophils Relative 70 %      Immat GRANS % 0 %      Lymphocytes Relative 21 %      Monocytes Relative 6 %      Eosinophils Relative 3 %      Basophils Relative 0 %      Neutrophils Absolute 5.64 Thousands/µL      Immature Grans Absolute 0.01 Thousand/uL      Lymphocytes Absolute 1.67 Thousands/µL      Monocytes Absolute 0.49 Thousand/µL      Eosinophils Absolute 0.24 Thousand/µL      Basophils Absolute 0.03 Thousands/µL                  CT abdomen pelvis wo contrast   Final Result by Jenn Hernández MD (07/07 8903)      Mild to moderate diffuse peripancreatic inflammatory stranding most compatible with acute pancreatitis. Correlation with serum lipase levels is recommended. There is no focal fluid collection to suggest an abscess or pseudocyst.      No free air or free fluid. Scattered colonic diverticulosis with no inflammatory changes present to suggest acute diverticulitis. Workstation performed: [id number]                    Procedures  Procedures         ED Course  ED Course as of 07/07/23 0457   Fri Jul 07, 2023 0208 CT abdomen/pelvis-    IMPRESSION:     Mild to moderate diffuse peripancreatic inflammatory stranding most compatible with acute pancreatitis. Correlation with serum lipase levels is recommended.  There is no focal fluid collection to suggest an abscess or pseudocyst.     No free air or free fluid.     Scattered colonic diverticulosis with no inflammatory changes present to suggest acute diverticulitis.                                  SBIRT 22yo+    Flowsheet Row Most Recent Value   Initial Alcohol Screen: US AUDIT-C     1. How often do you have a drink containing alcohol? 0 Filed at: 07/07/2023 0011   2. How many drinks containing alcohol do you have on a typical day you are drinking? 0 Filed at: 07/07/2023 0011   3a. Male UNDER 65: How often do you have five or more drinks on one occasion? 0 Filed at: 07/07/2023 0011   3b. FEMALE Any Age, or MALE 65+: How often do you have 4 or more drinks on one occassion? 0 Filed at: 07/07/2023 0011   Audit-C Score 0 Filed at: 07/07/2023 0011   ALEA: How many times in the past year have you. .. Used an illegal drug or used a prescription medication for non-medical reasons? Never Filed at: 07/07/2023 0011                    Medical Decision Making  Patient is an 82-year-old male seen in the emergency department with concern for upper abdominal pain. Differential diagnosis includes acute pancreatitis, abscess, biliary injury, bowel obstruction. Patient was treated with medication for symptom control. Laboratory evaluation remarkable for elevated creatinine of 1.73, elevated lipase of 8111. CT abdomen/pelvis was obtained to evaluate for acute intra-abdominal abnormality. CT imaging showed findings consistent with acute pancreatitis. Evaluation is consistent with acute pancreatitis. Plan to admit patient for further evaluation and treatment. Case was reviewed with surgery team(Dr. Jaci Martínez), with recommendation for admission to medicine team.  Case was reviewed with medicine team.  Plan of care was reviewed with patient. Acute pancreatitis: acute illness or injury  Amount and/or Complexity of Data Reviewed  Labs: ordered. Decision-making details documented in ED Course. Radiology: ordered. Decision-making details documented in ED Course.       Risk  Prescription drug management. Decision regarding hospitalization. Disposition  Final diagnoses:   Acute pancreatitis     Time reflects when diagnosis was documented in both MDM as applicable and the Disposition within this note     Time User Action Codes Description Comment    7/7/2023  1:40 AM Lazaro Lane Add [K85.90] Acute pancreatitis       ED Disposition     ED Disposition   Admit    Condition   Stable    Date/Time   Fri Jul 7, 2023  3:11 AM    Comment   Case was reviewed with medicine team and the patient's admission status was agreed to be Admission Status: inpatient status to the service of Dr. Sveta Levine. Follow-up Information    None         Patient's Medications   Discharge Prescriptions    No medications on file       No discharge procedures on file.     PDMP Review       Value Time User    PDMP Reviewed  Yes 6/22/2023 10:47 Arturo Cooks, MD          ED Provider  Electronically Signed by           Rowena Gerber MD  07/07/23 9733

## 2023-07-07 NOTE — ASSESSMENT & PLAN NOTE
· IVF, n.p.o., analgesia. Diet advancement as tolerated  · Repeat MRI scheduled 7/26 to evaluate for follow-up to abnormalities on prior MRI. May need EUS pending results. Discuss with GI whether this will need to be as an inpatient or can still happen as an outpatient. They are consulted  · LFTs within normal limits  · Lipase 8000  · 7/7/2023 CT AP shows acute pancreatitis without abscess or pseudocyst on the pancreas.

## 2023-07-07 NOTE — CONSULTS
Consultation -[facility]   Argenis Bone 82 y.o. male MRN: [id number]    Unit/Bed#: -01 Encounter: [id number]\      Physician Requesting Consult:  Dr Jesusita Melo    Reason for Consult / Principal Problem: acute pancreatitis     HPI:   This is a 82 y.o. male with a PMH of pancreatitis, gallstone s/p lap dionne June 28, 2023, obesity, dm on insulin, ckd, gerd, as, hfpef, blanca who presents with epigastric pain x 1 hour. Reports sharp pain in the epigastrium, similar to previous pancreatitis episodes. Started approximately 1 hour prior to admission, he did not try any pain medications prior to admission. He has not drank alcohol in about 2 months. Patient was seen by us on prior admission, and pancreatitis etiology was unclear and it could have been related to medication versus microlithiasis versus alcohol the patient only had admitted to minimal consumption and he did have sludge and possible nonshadowing stones on imaging at that time. MRI was ordered on prior admission to evaluate for malignancy. Patient was noted at that time to have evidence of peripancreatic edema and the pancreatic duct demonstrated some focal narrowing at the junction of the head and neck region which could be related to artifact or the pancreatitis so plan was for MRCP in 4 to 6 weeks and he is actually scheduled for repeat MRI 7/26. IgG4 was also within normal limits on prior admission. Patient currently reports that he has not taken his glipizide or hydrochlorothiazide since last admission nor has he drank alcohol. Reports pain is a 5 out of 10. Feels very bloated and has not moved his bowels and did tolerate liquids but feels very full but denies any nausea or vomiting. Allergies:    Allergies   Allergen Reactions   • Januvia [Sitagliptin] Other (See Comments)     pancreatitis   • Iodinated Contrast Media Other (See Comments)     Per pt, "got real hot feeling"   • Semaglutide Other (See Comments)     pancreatits   • Simvastatin Myalgia   • Trulicity [Dulaglutide] Other (See Comments)     Pancreatitis   • Wound Dressing Adhesive Other (See Comments)     Burning sensation       Medications:  Current Facility-Administered Medications:   •  acetaminophen (TYLENOL) tablet 650 mg, 650 mg, Oral, Q6H PRN, Veda Garcia PA-C  •  allopurinol (ZYLOPRIM) tablet 100 mg, 100 mg, Oral, Daily, Veda Garcia PA-C, 100 mg at 07/07/23 7172  •  atorvastatin (LIPITOR) tablet 40 mg, 40 mg, Oral, Daily With Dinner, Aman Gaytan PA-C  •  cloNIDine (CATAPRES) tablet 0.1 mg, 0.1 mg, Oral, Q12H [address], Veda Garcia PA-C, 0.1 mg at 07/07/23 0950  •  clopidogrel (PLAVIX) tablet 75 mg, 75 mg, Oral, Daily, Veda Garcia PA-C, 75 mg at 07/07/23 0948  •  heparin (porcine) subcutaneous injection 5,000 Units, 5,000 Units, Subcutaneous, Q8H [address], Veda Garcia PA-C, 5,000 Units at 07/07/23 0542  •  HYDROmorphone HCl (DILAUDID) injection 0.2 mg, 0.2 mg, Intravenous, Q4H PRN, Veda Garcia PA-C, 0.2 mg at 07/07/23 0541  •  insulin lispro (HumaLOG) 100 units/mL subcutaneous injection 1-5 Units, 1-5 Units, Subcutaneous, Q6H [address] **AND** Fingerstick Glucose (POCT), , , 4x Daily AC and at bedtime, Gevena Kanner, MD  •  lactated ringers infusion, 125 mL/hr, Intravenous, Continuous, Gevena Kanner, MD, Last Rate: 125 mL/hr at 07/07/23 0952, 125 mL/hr at 07/07/23 0952  •  lisinopril (ZESTRIL) tablet 40 mg, 40 mg, Oral, Daily, Veda Garcia PA-C, 40 mg at 07/07/23 0950  •  metoprolol succinate (TOPROL-XL) 24 hr tablet 25 mg, 25 mg, Oral, Daily, Veda Garcia PA-C, 25 mg at 07/07/23 0949  •  naloxone (NARCAN) 0.04 mg/mL syringe 0.04 mg, 0.04 mg, Intravenous, Q1MIN PRN, Veda Garcia PA-C  •  ondansetron (ZOFRAN) injection 4 mg, 4 mg, Intravenous, Q6H PRN, Veda Garcia PA-C  •  oxyCODONE (ROXICODONE) IR tablet 2.5 mg, 2.5 mg, Oral, Q4H PRN, Veda Garcia PA-C, 2.5 mg at 07/07/23 0946  •  oxyCODONE (ROXICODONE) IR tablet 5 mg, 5 mg, Oral, Q4H PRN, Aman Gaytan PA-C  • ranolazine (RANEXA) 12 hr tablet 500 mg, 500 mg, Oral, BID, Veda Garcia PA-C, 500 mg at 07/07/23 5328  •  tamsulosin (FLOMAX) capsule 0.8 mg, 0.8 mg, Oral, Daily With Tracy Li PA-C    Past Medical history:  Past Medical History:   Diagnosis Date   • Allergic    • Arthritis    • Chronic kidney disease 2021   • Chronic kidney disease (CKD) stage G3a/A1, moderately decreased glomerular filtration rate (GFR) between 45-59 mL/min/1.73 square meter and albuminuria creatinine ratio less than 30 mg/g (HCC)    • Colon polyp    • Diabetes mellitus (HCC)    • GERD (gastroesophageal reflux disease)    • Heart murmur    • History of echocardiogram    • HL (hearing loss)    • Hyperlipidemia    • Hypertension    • Obesity    • Sleep apnea, obstructive        Past Surgical History:   Past Surgical History:   Procedure Laterality Date   • CARDIAC CATHETERIZATION     • COLONOSCOPY  03/09/2018   • COLONOSCOPY     • EYE SURGERY     • HEMORRHOID SURGERY     • JOINT REPLACEMENT  2017    knee   • OTHER SURGICAL HISTORY      Stent    • VT LAPAROSCOPY SURG CHOLECYSTECTOMY N/A 6/28/2023    Procedure: CHOLECYSTECTOMY LAPAROSCOPIC;  Surgeon: Katy Penaloza MD;  Location: [facility];  Service: General   • UPPER GASTROINTESTINAL ENDOSCOPY         Family History:   Family History   Problem Relation Age of Onset   • Heart disease Mother    • Heart attack Mother         46s   • Cancer Mother    • Coronary artery disease Mother    • Cancer Brother         Malignant tumor of lung       Social history:   Social History     Socioeconomic History   • Marital status: /Civil Union     Spouse name: Not on file   • Number of children: Not on file   • Years of education: Not on file   • Highest education level: Not on file   Occupational History   • Not on file   Tobacco Use   • Smoking status: Former     Packs/day: 1.00     Years: 15.00     Total pack years: 15.00     Types: Cigarettes     Quit date: 11/1/1972     Years since quittin.7     Passive exposure: Past   • Smokeless tobacco: Never   Vaping Use   • Vaping Use: Never used   Substance and Sexual Activity   • Alcohol use: Not Currently     Alcohol/week: 5.0 standard drinks of alcohol     Types: 5 Glasses of wine per week     Comment: Occasional    • Drug use: Never   • Sexual activity: Yes     Partners: Female   Other Topics Concern   • Not on file   Social History Narrative    Pet- dog     Social Determinants of Health     Financial Resource Strain: Not on file   Food Insecurity: Not on file   Transportation Needs: Not on file   Physical Activity: Not on file   Stress: Not on file   Social Connections: Not on file   Intimate Partner Violence: Not on file   Housing Stability: Not on file       Review of Systems: All other systems were reviewed and were negative, otherwise please refer to HPI    Physical Exam: /60 (BP Location: Left arm)   Pulse 62   Temp 98.9 °F (37.2 °C) (Oral)   Resp 17   Ht 5' 6" (1.676 m)   Wt 113 kg (249 lb)   SpO2 94%   BMI 40.19 kg/m²     General Appearance:    Alert, cooperative, no distress, appears stated age   Head:    Normocephalic, without obvious abnormality, atraumatic   Eyes:    No scleral icterus           Mouth:  Mucosa moist   Neck:   Supple, symmetrical, trachea midline, no thyromegaly       Lungs:     Clear to auscultation bilaterally, respirations unlabored       Heart:    Regular rate and rhythm, S1 and S2 normal, no murmur, rub   or gallop     Abdomen:    Distended, positive tenderness palpation of epigastric area as well as left upper quadrant, hypoactive bowel sounds, no rebound rigidity guarding   Genitalia:   deferred   Rectal:   deferred   Extremities:   Extremities normal,no cyanosis or edema       Skin:   Skin color, texture, turgor normal, no rashes or lesions       Neurologic:   Grossly intact, no focal deficit           Lab Results:   Recent Results (from the past 24 hour(s))   CBC and differential    Collection Time: 07/07/23 12:42 AM   Result Value Ref Range    WBC 8.08 4.31 - 10.16 Thousand/uL    RBC 4.10 3.88 - 5.62 Million/uL    Hemoglobin 12.9 12.0 - 17.0 g/dL    Hematocrit 39.8 36.5 - 49.3 %    MCV 97 82 - 98 fL    MCH 31.5 26.8 - 34.3 pg    MCHC 32.4 31.4 - 37.4 g/dL    RDW 14.3 11.6 - 15.1 %    MPV 10.0 8.9 - 12.7 fL    Platelets 431 705 - 934 Thousands/uL    nRBC 0 /100 WBCs    Neutrophils Relative 70 43 - 75 %    Immat GRANS % 0 0 - 2 %    Lymphocytes Relative 21 14 - 44 %    Monocytes Relative 6 4 - 12 %    Eosinophils Relative 3 0 - 6 %    Basophils Relative 0 0 - 1 %    Neutrophils Absolute 5.64 1.85 - 7.62 Thousands/µL    Immature Grans Absolute 0.01 0.00 - 0.20 Thousand/uL    Lymphocytes Absolute 1.67 0.60 - 4.47 Thousands/µL    Monocytes Absolute 0.49 0.17 - 1.22 Thousand/µL    Eosinophils Absolute 0.24 0.00 - 0.61 Thousand/µL    Basophils Absolute 0.03 0.00 - 0.10 Thousands/µL   Comprehensive metabolic panel    Collection Time: 07/07/23 12:42 AM   Result Value Ref Range    Sodium 141 135 - 147 mmol/L    Potassium 3.8 3.5 - 5.3 mmol/L    Chloride 105 96 - 108 mmol/L    CO2 29 21 - 32 mmol/L    ANION GAP 7 mmol/L    BUN 25 5 - 25 mg/dL    Creatinine 1.73 (H) 0.60 - 1.30 mg/dL    Glucose 128 65 - 140 mg/dL    Calcium 8.6 8.4 - 10.2 mg/dL    AST 18 13 - 39 U/L    ALT 14 7 - 52 U/L    Alkaline Phosphatase 60 34 - 104 U/L    Total Protein 6.5 6.4 - 8.4 g/dL    Albumin 3.7 3.5 - 5.0 g/dL    Total Bilirubin 0.39 0.20 - 1.00 mg/dL    eGFR 36 ml/min/1.73sq m   Magnesium    Collection Time: 07/07/23 12:42 AM   Result Value Ref Range    Magnesium 1.9 1.9 - 2.7 mg/dL   Lipase    Collection Time: 07/07/23 12:42 AM   Result Value Ref Range    Lipase 8,111 (H) 11 - 82 u/L   Lactic acid, plasma (w/reflex if result > 2.0)    Collection Time: 07/07/23 12:42 AM   Result Value Ref Range    LACTIC ACID 0.8 0.5 - 2.0 mmol/L   Fingerstick Glucose (POCT)    Collection Time: 07/07/23  4:27 AM   Result Value Ref Range    POC Glucose 140 65 - 140 mg/dl       Imaging Studies: CT abdomen pelvis wo contrast    Result Date: 7/7/2023  Narrative: CT ABDOMEN AND PELVIS WITHOUT IV CONTRAST INDICATION:   epigastric pain, history of pancreatitis. COMPARISON: CT abdomen pelvis dated June 9, 2023. TECHNIQUE:  CT examination of the abdomen and pelvis was performed without intravenous contrast. Multiplanar 2D reformatted images were created from the source data. This examination, like all CT scans performed in the [facility], was performed utilizing techniques to minimize radiation dose exposure, including the use of iterative reconstruction and automated exposure control. Radiation dose length product (DLP) for this visit:  1083.4 mGy-cm Enteric contrast was not administered. FINDINGS: ABDOMEN LOWER CHEST: Atelectatic changes are noted at the lung bases. LIVER/BILIARY TREE:  Unremarkable. GALLBLADDER: Gallbladder is surgically absent. No evidence of complication. SPLEEN:  Unremarkable. PANCREAS: There is mild to moderate peripancreatic inflammatory stranding suspicious for acute pancreatitis. However, there is no focal fluid collection to suggest a pseudocyst or abscess. Evaluation of the pancreatic parenchyma was limited by the lack  of intravenous contrast. ADRENAL GLANDS:  Unremarkable. KIDNEYS/URETERS: No hydronephrosis. Bilateral renal cysts are again noted. STOMACH AND BOWEL:  There is colonic diverticulosis without evidence of acute diverticulitis. APPENDIX:  A normal appendix was visualized. ABDOMINOPELVIC CAVITY:  No ascites. No pneumoperitoneum. No lymphadenopathy. VESSELS: Atherosclerotic changes are present. No evidence of aneurysm. PELVIS REPRODUCTIVE ORGANS: The prostate is enlarged. URINARY BLADDER:  Unremarkable. ABDOMINAL WALL/INGUINAL REGIONS: There is a small fat-containing umbilical hernia with mild inflammatory stranding which may be postoperative in nature. Bilateral fat-containing renal hernias are again noted. OSSEOUS STRUCTURES:  No acute fracture or destructive osseous lesion. Impression: Mild to moderate diffuse peripancreatic inflammatory stranding most compatible with acute pancreatitis. Correlation with serum lipase levels is recommended. There is no focal fluid collection to suggest an abscess or pseudocyst. No free air or free fluid. Scattered colonic diverticulosis with no inflammatory changes present to suggest acute diverticulitis. Workstation performed: [id number]     MRI abdomen w wo contrast and mrcp    Result Date: 6/12/2023  Narrative: MRI OF THE ABDOMEN WITH AND WITHOUT CONTRAST WITH MRCP INDICATION: 82 years / Male  MRI with MRCP to rule out choledocholithiasis. COMPARISON: 6/9/2023 CT; ultrasound from 6/4/2023; 8/20/2018 TECHNIQUE:  Multiplanar/multisequence MRI of the abdomen with 3D MRCP was performed before and after administration of contrast. IV Contrast:  11 mL of Gadobutrol injection (SINGLE-DOSE) FINDINGS: LOWER CHEST:   Trace right pleural effusion. Ector Piety LIVER: Normal in size and configuration. No suspicious mass. The hepatic veins and portal veins are patent. BILE DUCTS:  No intrahepatic or extrahepatic bile duct dilation. Common bile duct is normal in caliber. No choledocholithiasis, biliary stricture or suspicious mass. GALLBLADDER: Moderately distended some layering low signal gallstones/sludge are seen close to the gallbladder neck. Opaque stones were noted on the prior CT. Small cystic areas near the gallbladder fundus may suggest a small area of adenomyomatosis. No obvious nodularity mass is identified. Some ringdown artifact was noted on recent ultrasound. Coronal postcontrast images show this area to be some somewhat linear/reticular in configuration. PANCREAS: Peripancreatic edema is seen without evidence of fluid collection. The pancreatic duct measures up to 3.  At the junction of the pancreatic head and neck there appears to be focal narrowing of the pancreatic duct although the more proximal duct does not appear dilated in the pancreatic body. This could be related to artifact. This is noted on image 10, series 1250. The pancreatic duct elsewhere appears normal in caliber. The pancreas still maintains relatively high T1-weighted signal. Normal enhancement is seen including in the area of the junction of the pancreatic head and neck. There is mild diminished diffusion restriction seen diffusely in the pancreas and probably related to pancreatitis. ADRENAL GLANDS:  Normal. SPLEEN:  Normal. KIDNEYS/PROXIMAL URETERS:  No hydroureteronephrosis. No suspicious renal mass. Multiple renal cysts are identified. The largest is on the left measuring up to 5 cm size. BOWEL:   No dilated loops of bowel. The stomach is not well distended. PERITONEUM/RETROPERITONEUM: Trace perisplenic ascites is identified. Trace fluid/edema in the right paracolic gutter is seen. There is also edema involving Gerota's fascia and the perinephric space well as the anterior pararenal space adjacent to the pancreas extending towards the spleen. This correlates with the infiltration of the peripancreatic fat on CT suggesting pancreatitis. LYMPH NODES:  No abdominal lymphadenopathy. VASCULAR STRUCTURES:  No aneurysm. ABDOMINAL WALL: Some edema appears to be present within the oblique musculature in both flank regions. OSSEOUS STRUCTURES:  No suspicious osseous lesion. Impression: Probable gallstone/sludge without biliary ductal dilatation or evidence of choledocholithiasis. Cystic areas with reticular bands of lower signal near the gallbladder fundus suggests adenomyomatosis correlating with the ultrasound. No obvious polyp is identified. Continued follow-up is advised. Evidence of peripancreatic edema suggests pancreatitis. There is normal pancreatic enhancement and no evidence of collection.  The pancreatic duct demonstrates some focal narrowing at the junction of the head and neck region which may be related to artifact and/or patient's pancreatitis. There is no evidence of dilatation proximally or obvious lesion. A small stricture or ductal  nodule is difficult to exclude however. Follow-up MRI/MRCP is advised in 4 to 6 weeks time for reevaluation of the pancreas/pancreatic duct. The study was marked in [facility] for immediate notification. Workstation performed: [id number]     CT abdomen pelvis wo contrast    Result Date: 6/9/2023  Narrative: CT ABDOMEN AND PELVIS WITHOUT IV CONTRAST INDICATION:   Epigastric pain Epigastric Pain. COMPARISON: 8/20/2018 TECHNIQUE:  CT examination of the abdomen and pelvis was performed without intravenous contrast. Multiplanar 2D reformatted images were created from the source data. This examination, like all CT scans performed in the [facility], was performed utilizing techniques to minimize radiation dose exposure, including the use of iterative reconstruction and automated exposure control. Radiation dose length product (DLP) for this visit:  1349 mGy-cm Enteric contrast was administered. FINDINGS: ABDOMEN LOWER CHEST:  No clinically significant abnormality identified in the visualized lower chest. LIVER/BILIARY TREE:  Unremarkable. GALLBLADDER: There are gallstone(s) within the gallbladder, without pericholecystic inflammatory changes. SPLEEN:  Unremarkable. PANCREAS: Extensive peripancreatic inflammatory change consistent with acute pancreatitis. No abscess or other complication in the [place] enhanced study. ADRENAL GLANDS:  Unremarkable. KIDNEYS/URETERS: Left greater than right renal cysts as before. STOMACH AND BOWEL: Small sliding-type hiatal hernia noted. There is also diverticular disease without diverticulitis. APPENDIX: A normal appendix was visualized. ABDOMINOPELVIC CAVITY:  No ascites. No pneumoperitoneum. No lymphadenopathy. VESSELS:  Unremarkable for patient's age. PELVIS REPRODUCTIVE ORGANS: The prostate is enlarged. URINARY BLADDER:  Unremarkable.  ABDOMINAL WALL/INGUINAL REGIONS: Bilateral fat-containing inguinal hernias. OSSEOUS STRUCTURES:  No acute fracture or destructive osseous lesion. Impression: Peripancreatic inflammatory change consistent with acute uncomplicated pancreatitis in this unenhanced study. Workstation performed: [id number]       Assessment/Plan:   Acute pancreatitis  Assessment & Plan  • IVF, n.p.o., analgesia. Diet advancement as tolerated  • Repeat MRI scheduled 7/26 to evaluate for follow-up to abnormalities on prior MRI.  May need EUS pending results. Would not recommend MRI MRCP at this time due to active inflammation which will limit findings again  • LFTs within normal limits  • Lipase 8000 on admission  • Triglycerides remain within normal limits  • 7/7/2023 CT AP shows acute pancreatitis without abscess or pseudocyst on the pancreas. • Continue IV hydration but it was lowered due to signs of fluid overload with lower extremity edema  • Patient was started on clear liquid diet but he reports feeling significantly full so would recommend to downgrade to n.p.o. for now  • Follow daily labs  • Explained to patient that this could be idiopathic, medications have been discontinued as well as he has undergone cholecystectomy, and denies any alcohol consumption, no evidence of autoimmune pancreatitis  • Must rule out any ductal nodule or small stricture within the pancreatic duct with MRI as above once acute pancreatitis improves  • Patient was concerned about elevated glucose and spoke with hospitalist regarding case    Thank you for the consultation. Case will be discussed with Dr. Parviz Brian. No

## 2023-07-07 NOTE — ASSESSMENT & PLAN NOTE
· Hospitalized for acute pancreatitis 6/9 - 6/12 and again on 6/21 - 6/22   · He has been off of Rybelsus, hydrochlorothiazide, Januvia for months  · Thought to be from recurring gallstones given imaging findings from the early June hospitalization: CT/MRI/MRCP showed probable gallstone/sludge without ductal dilatation or evidence of choledocholithiasis. He also had some findings suggestive of adenomyomatosis of the gallbladder and narrowing of the pancreatic duct with no evidence of proximal dilation or obvious lesions and a small stricture or ductal nodule. · GI and general surgery were consulted and it was determined the patient would undergo lap dionne which happened on 5/34/3138 without complications thus far.  Fu as OP next week at Post- op check

## 2023-07-07 NOTE — QUICK NOTE
Contacted by ED re: this 83 y/o M p/w concern for smoldering vs recurrent acute pancreatitis. Recently admitted for presumed biliary pancreatitis, s/p laparoscopic cholecystectomy on 6/28. VS: Afebrile, HR60s, normotensive, 95% on room air    Abdomen reportedly tender in epigastrium w/o peritonitis per discussion with examining emergency physician     Cr 1.73  Tbili 0.39  Remainder of CMP WNL    Lipase 8,111  Lactic acid 0.8  WBC 8.08, normal differential     CT abdomen/pelvis reviewed: mild peripancreatic inflammation, no discreet collections, no gas,, scattered diverticulosis    Assessment: 82 M with mild pancreatitis s/p recent admission for same with cholecystectomy.  LFTs WNL, no evidence of necrosis on scan    PLAN  -no acute surgical intervention  -medical admission, IV fluid resuscitation  -no role for antibiotics  -monitor exam, urine output, labs  -does not need serial lipase levels  -trial of diet  -DVT PPX

## 2023-07-07 NOTE — H&P
[facility]  H&P  Name: Lauren Branch 82 y.o. male I MRN: [id number]  Unit/Bed#: ED 05 I Date of Admission: 7/7/2023   Date of Service: 7/7/2023 I Hospital Day: 0      Assessment/Plan   * Acute pancreatitis  Assessment & Plan  · IVF, n.p.o., analgesia. Diet advancement as tolerated  · Repeat MRI scheduled 7/26 to evaluate for follow-up to abnormalities on prior MRI. May need EUS pending results. Discuss with GI whether this will need to be as an inpatient or can still happen as an outpatient. They are consulted  · LFTs within normal limits  · Lipase 8000  · 7/7/2023 CT AP shows acute pancreatitis without abscess or pseudocyst on the pancreas. Moderate aortic stenosis  Assessment & Plan  · Noted on April 2023 echo    History of recent hospitalization  Assessment & Plan  · Hospitalized for acute pancreatitis 6/9 - 6/12 and again on 6/21 - 6/22   · He has been off of Rybelsus, hydrochlorothiazide, Januvia for months  · Thought to be from recurring gallstones given imaging findings from the early June hospitalization: CT/MRI/MRCP showed probable gallstone/sludge without ductal dilatation or evidence of choledocholithiasis. He also had some findings suggestive of adenomyomatosis of the gallbladder and narrowing of the pancreatic duct with no evidence of proximal dilation or obvious lesions and a small stricture or ductal nodule. · GI and general surgery were consulted and it was determined the patient would undergo lap dionne which happened on 3/45/6341 without complications thus far.  Fu as OP next week at [address] [place] [address]- op check    Type 2 diabetes mellitus with kidney complication, with long-term current use of insulin [facility])  Assessment & Plan    Lab Results   Component Value Date    HGBA1C 6.7 (H) 05/10/2023     · Home regimen: Levemir 36 units every morning, NovoLog sliding scale 3 times daily AC, Jardiance  · Inpatient regimen: Lispro SSI every 6 hours given n.p.o. status    BUN   Date Value Ref Range Status   07/07/2023 25 5 - 25 mg/dL Final   06/22/2023 21 5 - 25 mg/dL Final   06/21/2023 26 (H) 5 - 25 mg/dL Final     Creatinine   Date Value Ref Range Status   07/07/2023 1.73 (H) 0.60 - 1.30 mg/dL Final     Comment:     Standardized to IDMS reference method   06/22/2023 1.52 (H) 0.60 - 1.30 mg/dL Final     Comment:     Standardized to IDMS reference method   06/21/2023 1.68 (H) 0.60 - 1.30 mg/dL Final     Comment:     Standardized to IDMS reference method     · Chronic and stable    (HFpEF) heart failure with preserved ejection fraction [facility])  Assessment & Plan  Wt Readings from Last 3 Encounters:   06/28/23 112 kg (246 lb 6.4 oz)   06/26/23 111 kg (245 lb)   06/21/23 112 kg (246 lb 0.2 oz)       · Grade 1 diastolic dysfunction according to April 2023 echocardiogram.  He is compensated  · Noted, continue ACE-I, beta-blocker. Not on diuretic      HTN (hypertension), benign  Assessment & Plan  · Continue home medications Brogle, lisinopril, Catapres        VTE Pharmacologic Prophylaxis: VTE Score: 5 High Risk (Score >/= 5) - Pharmacological DVT Prophylaxis Ordered: heparin. Sequential Compression Devices Ordered. Code Status: Level 1 - Full Code   Discussion with family: Patient declined call to . says he will call    Anticipated Length of Stay: Patient will be admitted on an inpatient basis with an anticipated length of stay of greater than 2 midnights secondary to ivf, analgesia, gi consult. Total Time Spent on Date of Encounter in care of patient: 55 minutes This time was spent on one or more of the following: performing physical exam; counseling and coordination of care; obtaining or reviewing history; documenting in the medical record; reviewing/ordering tests, medications or procedures; communicating with other healthcare professionals and discussing with patient's family/caregivers.     Chief Complaint: epigastric pain x 1 hour    History of Present Illness:  Lauren So is a 82 y.o. male with a PMH of pancreatitis, gall stone s/p lap dionne June 28, 2023, obesity, dm on insulin, ckd, gerd, as, hfpef, blanca who presents with epigastric pain x 1 hour. Reports sharp pain in the epigastrium, similar to previous pancreatitis episodes. Started approximately 1 hour PTA, he did not try any pain medications prior to admission. It is mildly improved with IV Dilaudid in the emergency room but still comes and goes, worse with laughing. No vomiting, no diarrhea. He is due for postop check after the lap dionne next week with GEN surg. He has not drank alcohol in about 2 months. He denies shortness of breath. Review of Systems:  Review of Systems   Constitutional: Negative for chills, fatigue and fever. HENT: Negative. Eyes: Negative. Respiratory: Negative. Cardiovascular: Negative. Gastrointestinal: Negative for diarrhea and vomiting. Epigastric pain   Endocrine: Negative. Genitourinary: Negative. Musculoskeletal: Negative for back pain. Skin: Negative for color change. Abdominal wounds from the lap dionne are healing well and there has been no discharge or pain according to the patient   Allergic/Immunologic: Negative for immunocompromised state. Neurological: Negative for dizziness and weakness. Hematological: Negative. Psychiatric/Behavioral: Negative for confusion.        Past Medical and Surgical History:   Past Medical History:   Diagnosis Date   • Allergic    • Arthritis    • Chronic kidney disease 2021   • Chronic kidney disease (CKD) stage G3a/A1, moderately decreased glomerular filtration rate (GFR) between 45-59 mL/min/1.73 square meter and albuminuria creatinine ratio less than 30 mg/g (HCC)    • Colon polyp    • Diabetes mellitus (HCC)    • GERD (gastroesophageal reflux disease)    • Heart murmur    • History of echocardiogram    • HL (hearing loss)    • Hyperlipidemia    • Hypertension    • Obesity    • Sleep apnea, obstructive Past Surgical History:   Procedure Laterality Date   • CARDIAC CATHETERIZATION     • COLONOSCOPY  03/09/2018   • COLONOSCOPY     • EYE SURGERY     • HEMORRHOID SURGERY     • JOINT REPLACEMENT  2017    knee   • OTHER SURGICAL HISTORY      Stent    • IL LAPAROSCOPY SURG CHOLECYSTECTOMY N/A 6/28/2023    Procedure: CHOLECYSTECTOMY LAPAROSCOPIC;  Surgeon: Susan Yanez MD;  Location:  MAIN OR;  Service: General   • UPPER GASTROINTESTINAL ENDOSCOPY         Meds/Allergies:  Prior to Admission medications    Medication Sig Start Date End Date Taking?  Authorizing Provider   acetaminophen (TYLENOL) 325 mg tablet Take 2 tablets (650 mg total) by mouth every 6 (six) hours as needed for mild pain 6/22/23   Deana Soto MD   allopurinol (ZYLOPRIM) 100 mg tablet Take 100 mg by mouth daily 5/20/23   Historical Provider, MD   BD Pen Needle Pascale U/F 32G X 4 MM MISC USE AS INSTRUCTED WITH INSULIN PEN 8/27/20   Historical Provider, MD   calcium carbonate (OS-WAYLON) 600 MG tablet Take 600 mg by mouth daily    Historical Provider, MD   cholecalciferol (VITAMIN D3) 1,000 units tablet Take 2,000 Units by mouth daily    Historical Provider, MD   cloNIDine (CATAPRES) 0.1 mg tablet Take 1 tablet (0.1 mg total) by mouth every 12 (twelve) hours 6/22/23   Deana Soto MD   clopidogrel (PLAVIX) 75 mg tablet Take 1 tablet (75 mg total) by mouth daily Instructed to stop prior to Surgery-Last dose 6/21/23 Do not start before June 29, 2023. 6/29/23   Beryle Angry, PA-C   Fexofenadine HCl [facility] PO) Take by mouth    Historical Provider, MD   fluticasone Marilee Trinidad) 50 mcg/act nasal spray 2 sprays into each nostril daily 5/23/23   Sapna Alicea MD   insulin aspart (NovoLOG FlexPen) 100 UNIT/ML injection pen Inject under the skin 3 (three) times a day with meals Sliding scale as ordered TID with meals    Historical Provider, MD   JARDIANCE 25 MG TABS Take 25 mg by mouth daily 6/9/20   Historical Provider, MD   Lancets (OneTouch Delica Plus [id number]) MISC TEST GLUCOSE 3 4 TIMES/DAY 8/28/20   Historical Provider, MD Castro FlexTouch 100 units/mL injection pen 36 Units daily In AM 8/27/20   Historical Provider, MD   lisinopril (ZESTRIL) 40 mg tablet Take 1 tablet (40 mg total) by mouth daily 4/3/23   Hernandez Leal MD   Loratadine (CLARITIN PO) Take by mouth    Historical Provider, MD   metoprolol succinate (TOPROL-XL) 25 mg 24 hr tablet Take 1 tablet (25 mg total) by mouth daily 5/16/23   Hernandez Leal MD   Multiple Vitamins-Minerals (MULTIVITAMIN ADULT PO) Take by mouth daily    Historical Provider, MD   omeprazole (PriLOSEC) 40 MG capsule Take 1 capsule (40 mg total) by mouth daily  Patient taking differently: Take 40 mg by mouth daily before breakfast 5/15/23   Gita Salazar MD   oxyCODONE-acetaminophen (Percocet) 5-325 mg per tablet Take 1 tablet by mouth every 4 (four) hours as needed for severe pain for up to 10 doses Max Daily Amount: 6 tablets 6/28/23   Nicolsaa Handley PA-C   Pseudoephedrine HCl (SUDAFED PO) Take by mouth    Historical Provider, MD   pyridoxine (VITAMIN B6) 100 mg tablet Take 100 mg by mouth daily    Historical Provider, MD   ranolazine (RANEXA) 500 mg 12 hr tablet Take 1 tablet (500 mg total) by mouth 2 (two) times a day 6/6/23   Hernandez Leal MD   rosuvastatin (CRESTOR) 20 MG tablet Take 1 tablet by mouth daily 7/1/22 7/1/23  Historical Provider, MD   tamsulosin (FLOMAX) 0.4 mg Take 2 capsules (0.8 mg total) by mouth daily 4/3/23   Hernandez Leal MD     I have reviewed home medications using recent Epic encounter. Allergies:    Allergies   Allergen Reactions   • Januvia [Sitagliptin] Other (See Comments)     pancreatitis   • Iodinated Contrast Media Other (See Comments)     Per pt, "got real hot feeling"   • Semaglutide Other (See Comments)     pancreatits   • Simvastatin Myalgia   • Trulicity [Dulaglutide] Other (See Comments)     Pancreatitis   • Wound Dressing Adhesive Other (See Comments)     Burning sensation       Social History:  Marital Status: /Civil Union   Occupation: retired   Patient Pre-hospital Living Situation: Home with wife  Patient Pre-hospital Level of Mobility: walks without assistance  Patient Pre-hospital Diet Restrictions: Low-fat  Substance Use History:   Social History     Substance and Sexual Activity   Alcohol Use Not Currently   • Alcohol/week: 5.0 standard drinks of alcohol   • Types: 5 Glasses of wine per week    Comment: Occasional    Has not drank alcohol for 2 months    Social History     Tobacco Use   Smoking Status Former   • Packs/day: 1.00   • Years: 15.00   • Total pack years: 15.00   • Types: Cigarettes   • Quit date: 1972   • Years since quittin.7   • Passive exposure: Past   Smokeless Tobacco Never     Social History     Substance and Sexual Activity   Drug Use Never       Family History:  Family History   Problem Relation Age of Onset   • Heart disease Mother    • Heart attack Mother         46s   • Cancer Mother    • Coronary artery disease Mother    • Cancer Brother         Malignant tumor of lung       Physical Exam:     Vitals:   Blood Pressure: 155/50 (23)  Pulse: 66 (23)  Temperature: 98 °F (36.7 °C) (23)  Temp Source: Oral (23 0020)  Respirations: 18 (23)  Height: 5' 6" (167.6 cm) (23)  Weight - Scale: 113 kg (250 lb) (23)  SpO2: 95 % (23)    Physical Exam  Constitutional:       Comments: Mild distress comes and goes with waves of epigastric abdominal pain, quickly resolves just as fast as it comes on   HENT:      Head: Normocephalic and atraumatic. Nose: Nose normal.      Mouth/Throat:      Mouth: Mucous membranes are moist.   Eyes:      Conjunctiva/sclera: Conjunctivae normal.   Cardiovascular:      Rate and Rhythm: Normal rate and regular rhythm. Heart sounds: Murmur heard.    Pulmonary:      Effort: Pulmonary effort is normal.   Abdominal:      General: Bowel sounds are normal.      Palpations: Abdomen is soft. Tenderness: There is abdominal tenderness. Hernia: No hernia is present. Musculoskeletal:         General: No swelling or tenderness. Cervical back: Neck supple. Skin:     General: Skin is warm and dry. Comments: Lap dionne scars are healing well, there is very minimal surrounding bruises, just inferior to the umbilical scar there is mild induration, nontender   Neurological:      General: No focal deficit present. Mental Status: He is alert. Psychiatric:         Behavior: Behavior normal.          Additional Data:     Lab Results:  Results from last 7 days   Lab Units 07/07/23  0042   WBC Thousand/uL 8.08   HEMOGLOBIN g/dL 12.9   HEMATOCRIT % 39.8   PLATELETS Thousands/uL 191   NEUTROS PCT % 70   LYMPHS PCT % 21   MONOS PCT % 6   EOS PCT % 3     Results from last 7 days   Lab Units 07/07/23  0042   SODIUM mmol/L 141   POTASSIUM mmol/L 3.8   CHLORIDE mmol/L 105   CO2 mmol/L 29   BUN mg/dL 25   CREATININE mg/dL 1.73*   ANION GAP mmol/L 7   CALCIUM mg/dL 8.6   ALBUMIN g/dL 3.7   TOTAL BILIRUBIN mg/dL 0.39   ALK PHOS U/L 60   ALT U/L 14   AST U/L 18   GLUCOSE RANDOM mg/dL 128         Results from last 7 days   Lab Units 07/07/23  0427   POC GLUCOSE mg/dl 140         Results from last 7 days   Lab Units 07/07/23  0042   LACTIC ACID mmol/L 0.8       Lines/Drains:  Invasive Devices     Peripheral Intravenous Line  Duration           Peripheral IV 07/07/23 Right Antecubital <1 day                    Imaging: Reviewed radiology reports from this admission including: abdominal/pelvic CT  CT abdomen pelvis wo contrast   Final Result by May Bryson MD (07/07 5126)      Mild to moderate diffuse peripancreatic inflammatory stranding most compatible with acute pancreatitis. Correlation with serum lipase levels is recommended.  There is no focal fluid collection to suggest an abscess or pseudocyst. No free air or free fluid. Scattered colonic diverticulosis with no inflammatory changes present to suggest acute diverticulitis. Workstation performed: [id number]             EKG and Other Studies Reviewed on Admission:   · EKG: No EKG obtained. ** Please Note: This note has been constructed using a voice recognition system.  **

## 2023-07-07 NOTE — ED NOTES
Patient asked to change in to hospital gown. Patient refused and stated "I didn't have to change the last few times I've been here." Provider made aware.      Tracie Reyez RN  07/07/23 7787

## 2023-07-07 NOTE — PLAN OF CARE
Problem: PAIN - ADULT  Goal: Verbalizes/displays adequate comfort level or baseline comfort level  Description: Interventions:  - Encourage patient to monitor pain and request assistance  - Assess pain using appropriate pain scale  - Administer analgesics based on type and severity of pain and evaluate response  - Implement non-pharmacological measures as appropriate and evaluate response  - Consider cultural and social influences on pain and pain management  - Notify physician/advanced practitioner if interventions unsuccessful or patient reports new pain  Outcome: Progressing     Problem: INFECTION - ADULT  Goal: Absence or prevention of progression during hospitalization  Description: INTERVENTIONS:  - Assess and monitor for signs and symptoms of infection  - Monitor lab/diagnostic results  - Monitor all insertion sites, i.e. indwelling lines, tubes, and drains  - Monitor endotracheal if appropriate and nasal secretions for changes in amount and color  - [place] appropriate cooling/warming therapies per order  - Administer medications as ordered  - Instruct and encourage patient and family to use good hand hygiene technique  - Identify and instruct in appropriate isolation precautions for identified infection/condition  Outcome: Progressing     Problem: SAFETY ADULT  Goal: Patient will remain free of falls  Description: INTERVENTIONS:  - Educate patient/family on patient safety including physical limitations  - Instruct patient to call for assistance with activity   - Consult OT/PT to assist with strengthening/mobility   - Keep Call bell within reach  - Keep bed low and locked with side rails adjusted as appropriate  - Keep care items and personal belongings within reach  - Initiate and maintain comfort rounds  - Make Fall Risk Sign visible to staff  - Offer Toileting every 2 Hours, in advance of need  - Obtain necessary fall risk management equipment: yellow socks and bracelets  - Apply yellow socks and bracelet for high fall risk patients  - Consider moving patient to room near nurses station  Outcome: Progressing  Goal: Maintain or return to baseline ADL function  Description: INTERVENTIONS:  -  Assess patient's ability to carry out ADLs; assess patient's baseline for ADL function and identify physical deficits which impact ability to perform ADLs (bathing, care of mouth/teeth, toileting, grooming, dressing, etc.)  - Assess/evaluate cause of self-care deficits   - Assess range of motion  - Assess patient's mobility; develop plan if impaired  - Assess patient's need for assistive devices and provide as appropriate  - Encourage maximum independence but intervene and supervise when necessary  - Involve family in performance of ADLs  - Assess for home care needs following discharge   - Consider OT consult to assist with ADL evaluation and planning for discharge  - Provide patient education as appropriate  Outcome: Progressing  Goal: Maintains/Returns to pre admission functional level  Description: INTERVENTIONS:  - Perform BMAT or MOVE assessment daily.   - Set and communicate daily mobility goal to care team and patient/family/caregiver.    - Collaborate with rehabilitation services on mobility goals if consulted  - Dangle patient 3 times a day  - Stand patient 4 times a day  - Ambulate patient 4 times a day  - Out of bed to chair 3 times a day   - Out of bed for toileting  - Record patient progress and toleration of activity level   Outcome: Progressing     Problem: DISCHARGE PLANNING  Goal: Discharge to home or other facility with appropriate resources  Description: INTERVENTIONS:  - Identify barriers to discharge w/patient and caregiver  - Arrange for needed discharge resources and transportation as appropriate  - Identify discharge learning needs (meds, wound care, etc.)  - Arrange for interpretive services to assist at discharge as needed  - Refer to Case Management Department for coordinating discharge planning if the patient needs post-hospital services based on physician/advanced practitioner order or complex needs related to functional status, cognitive ability, or social support system  Outcome: Progressing

## 2023-07-07 NOTE — ASSESSMENT & PLAN NOTE
Lab Results   Component Value Date    HGBA1C 6.7 (H) 05/10/2023     · Home regimen: Levemir 36 units every morning, NovoLog sliding scale 3 times daily AC, Jardiance  · Inpatient regimen: Lispro SSI every 6 hours given n.p.o. status    BUN   Date Value Ref Range Status   07/07/2023 25 5 - 25 mg/dL Final   06/22/2023 21 5 - 25 mg/dL Final   06/21/2023 26 (H) 5 - 25 mg/dL Final     Creatinine   Date Value Ref Range Status   07/07/2023 1.73 (H) 0.60 - 1.30 mg/dL Final     Comment:     Standardized to IDMS reference method   06/22/2023 1.52 (H) 0.60 - 1.30 mg/dL Final     Comment:     Standardized to IDMS reference method   06/21/2023 1.68 (H) 0.60 - 1.30 mg/dL Final     Comment:     Standardized to IDMS reference method     · Chronic and stable

## 2023-07-07 NOTE — ASSESSMENT & PLAN NOTE
Wt Readings from Last 3 Encounters:   06/28/23 112 kg (246 lb 6.4 oz)   06/26/23 111 kg (245 lb)   06/21/23 112 kg (246 lb 0.2 oz)       · Grade 1 diastolic dysfunction according to April 2023 echocardiogram.  He is compensated  · Noted, continue ACE-I, beta-blocker.   Not on diuretic

## 2023-07-08 LAB
ALBUMIN SERPL BCP-MCNC: 3.4 G/DL (ref 3.5–5)
ALP SERPL-CCNC: 46 U/L (ref 34–104)
ALT SERPL W P-5'-P-CCNC: 11 U/L (ref 7–52)
ANION GAP SERPL CALCULATED.3IONS-SCNC: 7 MMOL/L
AST SERPL W P-5'-P-CCNC: 12 U/L (ref 13–39)
BASOPHILS # BLD AUTO: 0.02 THOUSANDS/ÂΜL (ref 0–0.1)
BASOPHILS NFR BLD AUTO: 0 % (ref 0–1)
BILIRUB SERPL-MCNC: 0.77 MG/DL (ref 0.2–1)
BUN SERPL-MCNC: 19 MG/DL (ref 5–25)
CALCIUM ALBUM COR SERPL-MCNC: 9.1 MG/DL (ref 8.3–10.1)
CALCIUM SERPL-MCNC: 8.6 MG/DL (ref 8.4–10.2)
CHLORIDE SERPL-SCNC: 104 MMOL/L (ref 96–108)
CO2 SERPL-SCNC: 28 MMOL/L (ref 21–32)
CREAT SERPL-MCNC: 1.47 MG/DL (ref 0.6–1.3)
EOSINOPHIL # BLD AUTO: 0.18 THOUSAND/ÂΜL (ref 0–0.61)
EOSINOPHIL NFR BLD AUTO: 3 % (ref 0–6)
ERYTHROCYTE [DISTWIDTH] IN BLOOD BY AUTOMATED COUNT: 14.2 % (ref 11.6–15.1)
GFR SERPL CREATININE-BSD FRML MDRD: 44 ML/MIN/1.73SQ M
GLUCOSE SERPL-MCNC: 118 MG/DL (ref 65–140)
GLUCOSE SERPL-MCNC: 142 MG/DL (ref 65–140)
GLUCOSE SERPL-MCNC: 145 MG/DL (ref 65–140)
GLUCOSE SERPL-MCNC: 164 MG/DL (ref 65–140)
GLUCOSE SERPL-MCNC: 214 MG/DL (ref 65–140)
HCT VFR BLD AUTO: 36.7 % (ref 36.5–49.3)
HGB BLD-MCNC: 11.8 G/DL (ref 12–17)
IMM GRANULOCYTES # BLD AUTO: 0.02 THOUSAND/UL (ref 0–0.2)
IMM GRANULOCYTES NFR BLD AUTO: 0 % (ref 0–2)
LIPASE SERPL-CCNC: 686 U/L (ref 11–82)
LYMPHOCYTES # BLD AUTO: 1.58 THOUSANDS/ÂΜL (ref 0.6–4.47)
LYMPHOCYTES NFR BLD AUTO: 29 % (ref 14–44)
MCH RBC QN AUTO: 31.5 PG (ref 26.8–34.3)
MCHC RBC AUTO-ENTMCNC: 32.2 G/DL (ref 31.4–37.4)
MCV RBC AUTO: 98 FL (ref 82–98)
MONOCYTES # BLD AUTO: 0.6 THOUSAND/ÂΜL (ref 0.17–1.22)
MONOCYTES NFR BLD AUTO: 11 % (ref 4–12)
NEUTROPHILS # BLD AUTO: 3.01 THOUSANDS/ÂΜL (ref 1.85–7.62)
NEUTS SEG NFR BLD AUTO: 57 % (ref 43–75)
NRBC BLD AUTO-RTO: 0 /100 WBCS
PLATELET # BLD AUTO: 154 THOUSANDS/UL (ref 149–390)
PMV BLD AUTO: 10.2 FL (ref 8.9–12.7)
POTASSIUM SERPL-SCNC: 4.2 MMOL/L (ref 3.5–5.3)
PROT SERPL-MCNC: 6 G/DL (ref 6.4–8.4)
RBC # BLD AUTO: 3.75 MILLION/UL (ref 3.88–5.62)
SODIUM SERPL-SCNC: 139 MMOL/L (ref 135–147)
WBC # BLD AUTO: 5.41 THOUSAND/UL (ref 4.31–10.16)

## 2023-07-08 PROCEDURE — 82948 REAGENT STRIP/BLOOD GLUCOSE: CPT

## 2023-07-08 PROCEDURE — 80053 COMPREHEN METABOLIC PANEL: CPT | Performed by: INTERNAL MEDICINE

## 2023-07-08 PROCEDURE — 83690 ASSAY OF LIPASE: CPT | Performed by: INTERNAL MEDICINE

## 2023-07-08 PROCEDURE — 99232 SBSQ HOSP IP/OBS MODERATE 35: CPT | Performed by: INTERNAL MEDICINE

## 2023-07-08 PROCEDURE — 85025 COMPLETE CBC W/AUTO DIFF WBC: CPT | Performed by: INTERNAL MEDICINE

## 2023-07-08 RX ADMIN — CLONIDINE HYDROCHLORIDE 0.1 MG: 0.1 TABLET ORAL at 08:45

## 2023-07-08 RX ADMIN — RANOLAZINE 500 MG: 500 TABLET, FILM COATED, EXTENDED RELEASE ORAL at 08:45

## 2023-07-08 RX ADMIN — ALLOPURINOL 100 MG: 100 TABLET ORAL at 08:45

## 2023-07-08 RX ADMIN — METOPROLOL SUCCINATE 25 MG: 25 TABLET, FILM COATED, EXTENDED RELEASE ORAL at 08:45

## 2023-07-08 RX ADMIN — HEPARIN SODIUM 5000 UNITS: 5000 INJECTION INTRAVENOUS; SUBCUTANEOUS at 05:38

## 2023-07-08 RX ADMIN — HEPARIN SODIUM 5000 UNITS: 5000 INJECTION INTRAVENOUS; SUBCUTANEOUS at 21:17

## 2023-07-08 RX ADMIN — LISINOPRIL 40 MG: 20 TABLET ORAL at 08:45

## 2023-07-08 RX ADMIN — CLOPIDOGREL BISULFATE 75 MG: 75 TABLET ORAL at 08:45

## 2023-07-08 RX ADMIN — TAMSULOSIN HYDROCHLORIDE 0.8 MG: 0.4 CAPSULE ORAL at 17:21

## 2023-07-08 RX ADMIN — HEPARIN SODIUM 5000 UNITS: 5000 INJECTION INTRAVENOUS; SUBCUTANEOUS at 14:16

## 2023-07-08 RX ADMIN — SODIUM CHLORIDE, SODIUM LACTATE, POTASSIUM CHLORIDE, AND CALCIUM CHLORIDE 125 ML/HR: .6; .31; .03; .02 INJECTION, SOLUTION INTRAVENOUS at 01:12

## 2023-07-08 RX ADMIN — CLONIDINE HYDROCHLORIDE 0.1 MG: 0.1 TABLET ORAL at 21:17

## 2023-07-08 RX ADMIN — INSULIN LISPRO 2 UNITS: 100 INJECTION, SOLUTION INTRAVENOUS; SUBCUTANEOUS at 17:21

## 2023-07-08 RX ADMIN — RANOLAZINE 500 MG: 500 TABLET, FILM COATED, EXTENDED RELEASE ORAL at 17:21

## 2023-07-08 RX ADMIN — ATORVASTATIN CALCIUM 40 MG: 40 TABLET, FILM COATED ORAL at 17:21

## 2023-07-08 RX ADMIN — INSULIN LISPRO 2 UNITS: 100 INJECTION, SOLUTION INTRAVENOUS; SUBCUTANEOUS at 11:41

## 2023-07-08 NOTE — PROGRESS NOTES
Progress Note - [facility] [place] [facility]  Roxy Blount 82 y.o. male MRN: [id number]    Unit/Bed#: -01 Encounter: [id number]      Assessment/Plan:  27-year-old male with past medical history of gallstone pancreatitis, s/p cholecystectomy, history of diabetes mellitus now admitted with epigastric pain and found to have elevated lipase, LFT within normal limit, CT scan shows evidence of acute pancreatitis without any complication. 1.  Acute pancreatitis  Patient presented with abdominal pain. CT scan done on 7/7 showed acute pancreatitis without abscess or pseudocyst.  LFTs within normal limits. Lipase 8000 on admission. Triglycerides within normal limits. Pancreatitis etiology unclear. Pancreatitis may be secondary to stone, idiopathic,  Medication, and less likely alcohol use since patient only reports minimal intake and no history of binge drinking. Patient reports no alcohol intake for 2 months. No evidence of autoimmune pancreatitis on previous work-up. Patient currently denies any abdominal pain, nausea, or vomiting.    -May advance to low-fat diet as tolerated  -Continue supportive care  -Pain management per attending  -MRI done 6/10 showed probable gallstone sludge without biliary ductal dilation or evidence of choledocholithiasis. Pancreatic duct demonstrated some focal narrowing at the junction of the head and neck region which may be related to artifact and or patient's pancreatitis. A small stricture or ductal nodule is difficult to exclude. Repeat imaging was recommended in 4 to 6 weeks. -MRI scheduled for 7/26 as outpatient once acute episode of pancreatitis has resolved to re-evaluate abnormality seen on previous MRI. -If tolerating low-fat diet and symptoms remain controlled may discharge from GI standpoint    Subjective:   Patient reports he feels good. Denies nausea, vomiting, or abdominal pain. Tolerating full liquid diet.     Objective:     Vitals: Blood pressure 160/64, pulse 58, temperature 99.2 °F (37.3 °C), temperature source Oral, resp. rate 18, height 5' 6" (1.676 m), weight 113 kg (249 lb), SpO2 94 %. ,Body mass index is 40.19 kg/m². Intake/Output Summary (Last 24 hours) at 7/8/2023 1339  Last data filed at 7/8/2023 1248  Gross per 24 hour   Intake 2413 ml   Output 2350 ml   Net 63 ml       Physical Exam: Physical Exam  Vitals and nursing note reviewed. Constitutional:       General: He is not in acute distress. Cardiovascular:      Rate and Rhythm: Normal rate and regular rhythm. Pulses: Normal pulses. Heart sounds: Normal heart sounds. Pulmonary:      Effort: Pulmonary effort is normal. No respiratory distress. Breath sounds: Normal breath sounds. No stridor. No wheezing, rhonchi or rales. Abdominal:      General: Bowel sounds are normal. There is no distension. Palpations: Abdomen is soft. There is no mass. Tenderness: There is no abdominal tenderness. There is no guarding or rebound. Hernia: No hernia is present. Musculoskeletal:      Right lower leg: No edema. Left lower leg: No edema. Skin:     General: Skin is warm and dry. Capillary Refill: Capillary refill takes less than 2 seconds. Coloration: Skin is not jaundiced or pale. Neurological:      Mental Status: He is alert and oriented to person, place, and time. Psychiatric:         Mood and Affect: Mood normal.         Invasive Devices     Peripheral Intravenous Line  Duration           Peripheral IV 07/07/23 Dorsal (posterior); Left Hand <1 day                Current Facility-Administered Medications:   •  acetaminophen (TYLENOL) tablet 650 mg, 650 mg, Oral, Q6H PRN, Veda Garcia PA-C  •  allopurinol (ZYLOPRIM) tablet 100 mg, 100 mg, Oral, Daily, Veda Garcia PA-C, 100 mg at 07/08/23 0845  •  atorvastatin (LIPITOR) tablet 40 mg, 40 mg, Oral, Daily With Dinner, Hector Monte PA-C, 40 mg at 07/07/23 1700  •  cloNIDine (CATAPRES) tablet 0.1 mg, 0.1 mg, Oral, Q12H [address], [place] REJI Garcia, 0.1 mg at 07/08/23 0845  •  clopidogrel (PLAVIX) tablet 75 mg, 75 mg, Oral, Daily, Veda Garcia PA-C, 75 mg at 07/08/23 0845  •  heparin (porcine) subcutaneous injection 5,000 Units, 5,000 Units, Subcutaneous, Q8H [facility], Veda Garcia PA-C, 5,000 Units at 07/08/23 3540  •  HYDROmorphone HCl (DILAUDID) injection 0.2 mg, 0.2 mg, Intravenous, Q4H PRN, Veda Garcia PA-C, 0.2 mg at 07/07/23 0541  •  insulin lispro (HumaLOG) 100 units/mL subcutaneous injection 2-12 Units, 2-12 Units, Subcutaneous, TID AC, 2 Units at 07/08/23 1141 **AND** Fingerstick Glucose (POCT), , , TID AC, Alcira Cohn MD  •  lactated ringers infusion, 75 mL/hr, Intravenous, Continuous, Alcira Cohn MD, Last Rate: 75 mL/hr at 07/08/23 0843, 75 mL/hr at 07/08/23 0843  •  lisinopril (ZESTRIL) tablet 40 mg, 40 mg, Oral, Daily, Veda Garcia PA-C, 40 mg at 07/08/23 0845  •  metoprolol succinate (TOPROL-XL) 24 hr tablet 25 mg, 25 mg, Oral, Daily, Veda Garcia PA-C, 25 mg at 07/08/23 0845  •  naloxone (NARCAN) 0.04 mg/mL syringe 0.04 mg, 0.04 mg, Intravenous, Q1MIN PRN, Veda Garcia PA-C  •  ondansetron (ZOFRAN) injection 4 mg, 4 mg, Intravenous, Q6H PRN, Veda Garcia PA-C  •  oxyCODONE (ROXICODONE) IR tablet 2.5 mg, 2.5 mg, Oral, Q4H PRN, Veda Garcia PA-C, 2.5 mg at 07/07/23 0946  •  oxyCODONE (ROXICODONE) IR tablet 5 mg, 5 mg, Oral, Q4H PRN, FELICITA Preston-CON, 5 mg at 07/07/23 2129  •  ranolazine (RANEXA) 12 hr tablet 500 mg, 500 mg, Oral, BID, FELICITA Preston-C, 500 mg at 07/08/23 0845  •  tamsulosin (FLOMAX) capsule 0.8 mg, 0.8 mg, Oral, Daily With Dinner, Kunal Miller PA-C, 0.8 mg at 07/07/23 1700    Lab Results:   Recent Results (from the past 24 hour(s))   Fingerstick Glucose (POCT)    Collection Time: 07/07/23  4:54 PM   Result Value Ref Range    POC Glucose 134 65 - 140 mg/dl   CBC and differential    Collection Time: 07/08/23  4:43 AM   Result Value Ref Range    WBC 5.41 4.31 - 10.16 Thousand/uL    RBC 3.75 (L) 3.88 - 5.62 Million/uL Hemoglobin 11.8 (L) 12.0 - 17.0 g/dL    Hematocrit 36.7 36.5 - 49.3 %    MCV 98 82 - 98 fL    MCH 31.5 26.8 - 34.3 pg    MCHC 32.2 31.4 - 37.4 g/dL    RDW 14.2 11.6 - 15.1 %    MPV 10.2 8.9 - 12.7 fL    Platelets 734 912 - 695 Thousands/uL    nRBC 0 /100 WBCs    Neutrophils Relative 57 43 - 75 %    Immat GRANS % 0 0 - 2 %    Lymphocytes Relative 29 14 - 44 %    Monocytes Relative 11 4 - 12 %    Eosinophils Relative 3 0 - 6 %    Basophils Relative 0 0 - 1 %    Neutrophils Absolute 3.01 1.85 - 7.62 Thousands/µL    Immature Grans Absolute 0.02 0.00 - 0.20 Thousand/uL    Lymphocytes Absolute 1.58 0.60 - 4.47 Thousands/µL    Monocytes Absolute 0.60 0.17 - 1.22 Thousand/µL    Eosinophils Absolute 0.18 0.00 - 0.61 Thousand/µL    Basophils Absolute 0.02 0.00 - 0.10 Thousands/µL   Comprehensive metabolic panel    Collection Time: 07/08/23  4:43 AM   Result Value Ref Range    Sodium 139 135 - 147 mmol/L    Potassium 4.2 3.5 - 5.3 mmol/L    Chloride 104 96 - 108 mmol/L    CO2 28 21 - 32 mmol/L    ANION GAP 7 mmol/L    BUN 19 5 - 25 mg/dL    Creatinine 1.47 (H) 0.60 - 1.30 mg/dL    Glucose 142 (H) 65 - 140 mg/dL    Calcium 8.6 8.4 - 10.2 mg/dL    Corrected Calcium 9.1 8.3 - 10.1 mg/dL    AST 12 (L) 13 - 39 U/L    ALT 11 7 - 52 U/L    Alkaline Phosphatase 46 34 - 104 U/L    Total Protein 6.0 (L) 6.4 - 8.4 g/dL    Albumin 3.4 (L) 3.5 - 5.0 g/dL    Total Bilirubin 0.77 0.20 - 1.00 mg/dL    eGFR 44 ml/min/1.73sq m   Lipase    Collection Time: 07/08/23  4:43 AM   Result Value Ref Range    Lipase 686 (H) 11 - 82 u/L   Fingerstick Glucose (POCT)    Collection Time: 07/08/23  7:45 AM   Result Value Ref Range    POC Glucose 145 (H) 65 - 140 mg/dl   Fingerstick Glucose (POCT)    Collection Time: 07/08/23 11:20 AM   Result Value Ref Range    POC Glucose 214 (H) 65 - 140 mg/dl             Imaging Studies: CT abdomen pelvis wo contrast    Result Date: 7/7/2023  Narrative: CT ABDOMEN AND PELVIS WITHOUT IV CONTRAST INDICATION:   epigastric pain, history of pancreatitis. COMPARISON: CT abdomen pelvis dated June 9, 2023. TECHNIQUE:  CT examination of the abdomen and pelvis was performed without intravenous contrast. Multiplanar 2D reformatted images were created from the source data. This examination, like all CT scans performed in the [facility], was performed utilizing techniques to minimize radiation dose exposure, including the use of iterative reconstruction and automated exposure control. Radiation dose length product (DLP) for this visit:  1083.4 mGy-cm Enteric contrast was not administered. FINDINGS: ABDOMEN LOWER CHEST: Atelectatic changes are noted at the lung bases. LIVER/BILIARY TREE:  Unremarkable. GALLBLADDER: Gallbladder is surgically absent. No evidence of complication. SPLEEN:  Unremarkable. PANCREAS: There is mild to moderate peripancreatic inflammatory stranding suspicious for acute pancreatitis. However, there is no focal fluid collection to suggest a pseudocyst or abscess. Evaluation of the pancreatic parenchyma was limited by the lack  of intravenous contrast. ADRENAL GLANDS:  Unremarkable. KIDNEYS/URETERS: No hydronephrosis. Bilateral renal cysts are again noted. STOMACH AND BOWEL:  There is colonic diverticulosis without evidence of acute diverticulitis. APPENDIX:  A normal appendix was visualized. ABDOMINOPELVIC CAVITY:  No ascites. No pneumoperitoneum. No lymphadenopathy. VESSELS: Atherosclerotic changes are present. No evidence of aneurysm. PELVIS REPRODUCTIVE ORGANS: The prostate is enlarged. URINARY BLADDER:  Unremarkable. ABDOMINAL WALL/INGUINAL REGIONS: There is a small fat-containing umbilical hernia with mild inflammatory stranding which may be postoperative in nature. Bilateral fat-containing renal hernias are again noted. OSSEOUS STRUCTURES:  No acute fracture or destructive osseous lesion.      Impression: Mild to moderate diffuse peripancreatic inflammatory stranding most compatible with acute pancreatitis. Correlation with serum lipase levels is recommended. There is no focal fluid collection to suggest an abscess or pseudocyst. No free air or free fluid. Scattered colonic diverticulosis with no inflammatory changes present to suggest acute diverticulitis. Workstation performed: [id number]     MRI abdomen w wo contrast and mrcp    Result Date: 6/12/2023  Narrative: MRI OF THE ABDOMEN WITH AND WITHOUT CONTRAST WITH MRCP INDICATION: 82 years / Male  MRI with MRCP to rule out choledocholithiasis. COMPARISON: 6/9/2023 CT; ultrasound from 6/4/2023; 8/20/2018 TECHNIQUE:  Multiplanar/multisequence MRI of the abdomen with 3D MRCP was performed before and after administration of contrast. IV Contrast:  11 mL of Gadobutrol injection (SINGLE-DOSE) FINDINGS: LOWER CHEST:   Trace right pleural effusion. Saurabh Baas LIVER: Normal in size and configuration. No suspicious mass. The hepatic veins and portal veins are patent. BILE DUCTS:  No intrahepatic or extrahepatic bile duct dilation. Common bile duct is normal in caliber. No choledocholithiasis, biliary stricture or suspicious mass. GALLBLADDER: Moderately distended some layering low signal gallstones/sludge are seen close to the gallbladder neck. Opaque stones were noted on the prior CT. Small cystic areas near the gallbladder fundus may suggest a small area of adenomyomatosis. No obvious nodularity mass is identified. Some ringdown artifact was noted on recent ultrasound. Coronal postcontrast images show this area to be some somewhat linear/reticular in configuration. PANCREAS: Peripancreatic edema is seen without evidence of fluid collection. The pancreatic duct measures up to 3. At the junction of the pancreatic head and neck there appears to be focal narrowing of the pancreatic duct although the more proximal duct does not appear dilated in the pancreatic body. This could be related to artifact. This is noted on image 10, series 1250.  The pancreatic duct elsewhere appears normal in caliber. The pancreas still maintains relatively high T1-weighted signal. Normal enhancement is seen including in the area of the junction of the pancreatic head and neck. There is mild diminished diffusion restriction seen diffusely in the pancreas and probably related to pancreatitis. ADRENAL GLANDS:  Normal. SPLEEN:  Normal. KIDNEYS/PROXIMAL URETERS:  No hydroureteronephrosis. No suspicious renal mass. Multiple renal cysts are identified. The largest is on the left measuring up to 5 cm size. BOWEL:   No dilated loops of bowel. The stomach is not well distended. PERITONEUM/RETROPERITONEUM: Trace perisplenic ascites is identified. Trace fluid/edema in the right paracolic gutter is seen. There is also edema involving Gerota's fascia and the perinephric space well as the anterior pararenal space adjacent to the pancreas extending towards the spleen. This correlates with the infiltration of the peripancreatic fat on CT suggesting pancreatitis. LYMPH NODES:  No abdominal lymphadenopathy. VASCULAR STRUCTURES:  No aneurysm. ABDOMINAL WALL: Some edema appears to be present within the oblique musculature in both flank regions. OSSEOUS STRUCTURES:  No suspicious osseous lesion. Impression: Probable gallstone/sludge without biliary ductal dilatation or evidence of choledocholithiasis. Cystic areas with reticular bands of lower signal near the gallbladder fundus suggests adenomyomatosis correlating with the ultrasound. No obvious polyp is identified. Continued follow-up is advised. Evidence of peripancreatic edema suggests pancreatitis. There is normal pancreatic enhancement and no evidence of collection. The pancreatic duct demonstrates some focal narrowing at the junction of the head and neck region which may be related to artifact and/or patient's pancreatitis. There is no evidence of dilatation proximally or obvious lesion. A small stricture or ductal  nodule is difficult to exclude however. Follow-up MRI/MRCP is advised in 4 to 6 weeks time for reevaluation of the pancreas/pancreatic duct. The study was marked in [facility] for immediate notification. Workstation performed: [id number]     CT abdomen pelvis wo contrast    Result Date: 6/9/2023  Narrative: CT ABDOMEN AND PELVIS WITHOUT IV CONTRAST INDICATION:   Epigastric pain Epigastric Pain. COMPARISON: 8/20/2018 TECHNIQUE:  CT examination of the abdomen and pelvis was performed without intravenous contrast. Multiplanar 2D reformatted images were created from the source data. This examination, like all CT scans performed in the [facility], was performed utilizing techniques to minimize radiation dose exposure, including the use of iterative reconstruction and automated exposure control. Radiation dose length product (DLP) for this visit:  1349 mGy-cm Enteric contrast was administered. FINDINGS: ABDOMEN LOWER CHEST:  No clinically significant abnormality identified in the visualized lower chest. LIVER/BILIARY TREE:  Unremarkable. GALLBLADDER: There are gallstone(s) within the gallbladder, without pericholecystic inflammatory changes. SPLEEN:  Unremarkable. PANCREAS: Extensive peripancreatic inflammatory change consistent with acute pancreatitis. No abscess or other complication in the [facility] enhanced study. ADRENAL GLANDS:  Unremarkable. KIDNEYS/URETERS: Left greater than right renal cysts as before. STOMACH AND BOWEL: Small sliding-type hiatal hernia noted. There is also diverticular disease without diverticulitis. APPENDIX: A normal appendix was visualized. ABDOMINOPELVIC CAVITY:  No ascites. No pneumoperitoneum. No lymphadenopathy. VESSELS:  Unremarkable for patient's age. PELVIS REPRODUCTIVE ORGANS: The prostate is enlarged. URINARY BLADDER:  Unremarkable. ABDOMINAL WALL/INGUINAL REGIONS: Bilateral fat-containing inguinal hernias. OSSEOUS STRUCTURES:  No acute fracture or destructive osseous lesion.      Impression: Peripancreatic inflammatory change consistent with acute uncomplicated pancreatitis in this unenhanced study. Workstation performed: [id number]           GERSON See      Please Note: "This note has been constructed using a voice recognition system. Therefore there may be syntax, spelling, and/or grammatical errors.  Please call if you have any questions. "**

## 2023-07-08 NOTE — ASSESSMENT & PLAN NOTE
· IVF, n.p.o., analgesia. Diet advancement as tolerated  · Repeat MRI scheduled 7/26 to evaluate for follow-up to abnormalities on prior MRI. May need EUS pending results. Discuss with GI whether this will need to be as an inpatient or can still happen as an outpatient. They are consulted  · LFTs within normal limits  · Lipase 8000  · 7/7/2023 CT AP shows acute pancreatitis without abscess or pseudocyst on the pancreas. · Now improved significantly, lipase levels improved significantly down to 600s  · Patient denies any abdominal pain, will start the patient on full liquid diet and advance as tolerated  · Discussed with surgical team Dr. Cliff Mathis, no acute intervention required at this stage  · Patient was seen by GI team as well, outpatient MRI scheduled for July 26 secondary to recurrent pancreatitis.   · Decrease IV fluids to 75 cc/h, out of bed to chair, discussed with the nurse to ambulate patient

## 2023-07-08 NOTE — ASSESSMENT & PLAN NOTE
Wt Readings from Last 3 Encounters:   07/07/23 113 kg (249 lb)   06/28/23 112 kg (246 lb 6.4 oz)   06/26/23 111 kg (245 lb)       · Grade 1 diastolic dysfunction according to April 2023 echocardiogram.  He is compensated  · Noted, continue ACE-I, beta-blocker.   Not on diuretic

## 2023-07-08 NOTE — PROGRESS NOTES
[address]  Progress Note  Name: Enzo Sutherland  MRN: [id number]  Unit/Bed#: -01 I Date of Admission: 7/7/2023   Date of Service: 7/8/2023 I Hospital Day: 1    Assessment/Plan   * Acute pancreatitis  Assessment & Plan  · IVF, n.p.o., analgesia. Diet advancement as tolerated  · Repeat MRI scheduled 7/26 to evaluate for follow-up to abnormalities on prior MRI. May need EUS pending results. Discuss with GI whether this will need to be as an inpatient or can still happen as an outpatient. They are consulted  · LFTs within normal limits  · Lipase 8000  · 7/7/2023 CT AP shows acute pancreatitis without abscess or pseudocyst on the pancreas. · Now improved significantly, lipase levels improved significantly down to 600s  · Patient denies any abdominal pain, will start the patient on full liquid diet and advance as tolerated  · Discussed with surgical team Dr. Liang Roberts, no acute intervention required at this stage  · Patient was seen by GI team as well, outpatient MRI scheduled for July 26 secondary to recurrent pancreatitis. · Decrease IV fluids to 75 cc/h, out of bed to chair, discussed with the nurse to ambulate patient    Moderate aortic stenosis  Assessment & Plan  · Noted on April 2023 echo    History of recent hospitalization  Assessment & Plan  · Hospitalized for acute pancreatitis 6/9 - 6/12 and again on 6/21 - 6/22   · He has been off of Rybelsus, hydrochlorothiazide, Januvia for months  · Thought to be from recurring gallstones given imaging findings from the early June hospitalization: CT/MRI/MRCP showed probable gallstone/sludge without ductal dilatation or evidence of choledocholithiasis. He also had some findings suggestive of adenomyomatosis of the gallbladder and narrowing of the pancreatic duct with no evidence of proximal dilation or obvious lesions and a small stricture or ductal nodule.   · GI and general surgery were consulted and it was determined the patient would undergo lap dionne which happened on 8/36/7151 without complications thus far. Fu as OP next week at [address] [place] [address]- op check    Type 2 diabetes mellitus with kidney complication, with long-term current use of insulin ([facility])  Assessment & Plan    Lab Results   Component Value Date    HGBA1C 6.7 (H) 05/10/2023     · Home regimen: Levemir 36 units every morning, NovoLog sliding scale 3 times daily AC, Jardiance  · Inpatient regimen: Lispro SSI every 6 hours given n.p.o. status    BUN   Date Value Ref Range Status   07/08/2023 19 5 - 25 mg/dL Final   07/07/2023 25 5 - 25 mg/dL Final   06/22/2023 21 5 - 25 mg/dL Final     Creatinine   Date Value Ref Range Status   07/08/2023 1.47 (H) 0.60 - 1.30 mg/dL Final     Comment:     Standardized to IDMS reference method   07/07/2023 1.73 (H) 0.60 - 1.30 mg/dL Final     Comment:     Standardized to IDMS reference method   06/22/2023 1.52 (H) 0.60 - 1.30 mg/dL Final     Comment:     Standardized to IDMS reference method     · Chronic and stable    (HFpEF) heart failure with preserved ejection fraction [facility])  Assessment & Plan  Wt Readings from Last 3 Encounters:   07/07/23 113 kg (249 lb)   06/28/23 112 kg (246 lb 6.4 oz)   06/26/23 111 kg (245 lb)       · Grade 1 diastolic dysfunction according to April 2023 echocardiogram.  He is compensated  · Noted, continue ACE-I, beta-blocker. Not on diuretic      HTN (hypertension), benign  Assessment & Plan  · Continue home medications Brogle, lisinopril, Catapres           VTE Pharmacologic Prophylaxis:   Pharmacologic: Enoxaparin (Lovenox)  Mechanical VTE Prophylaxis in Place: Yes    Patient Centered Rounds: I have performed bedside rounds with nursing staff today. Discussions with Specialists or Other Care Team Provider: Surgical team    Education and Discussions with Family / Patient: Patient's wife Seth Wall over the phone    Time Spent for Care: 30 minutes.   More than 50% of total time spent on counseling and coordination of care as described above.    Current Length of Stay: 1 day(s)    Current Patient Status: Inpatient   Certification Statement: The patient will continue to require additional inpatient hospital stay due to Acute pancreatitis    Discharge Plan: 2023 Home    Code Status: Level 1 - Full Code      Subjective:   Patient denies any abdominal pain, nausea vomiting. Denies any diarrhea  Patient reports improvement in swelling of the upper and lower extremities    Objective:     Vitals:   Temp (24hrs), Av.6 °F (37 °C), Min:98.1 °F (36.7 °C), Max:99.2 °F (37.3 °C)    Temp:  [98.1 °F (36.7 °C)-99.2 °F (37.3 °C)] 99.2 °F (37.3 °C)  HR:  [57-63] 58  Resp:  [16-18] 18  BP: (133-160)/(58-94) 160/64  SpO2:  [91 %-94 %] 94 %  Body mass index is 40.19 kg/m². Input and Output Summary (last 24 hours):        Intake/Output Summary (Last 24 hours) at 2023 0936  Last data filed at 2023 0843  Gross per 24 hour   Intake 1940 ml   Output 1950 ml   Net -10 ml       Physical Exam:     Physical Exam  General appearance:  Patient not in acute distress  Eyes:  Pupils equal reacting to light  ENT:  Moist oral mucous membranes  CVS:  S1-S2 heard, regular rate and rhythm, no pedal edema  Chest:  Bilateral air entry present, clear to auscultation  Abdomen:  Soft, nontender, bowel sounds present  CNS:  No focal neurological deficits  Genitourinary: deferred  Skin:  No acute rash   psychiatric:  No psychosis  Musculoskeletal:  No joint deformities      Additional Data:     Labs:    Results from last 7 days   Lab Units 23  0443   WBC Thousand/uL 5.41   HEMOGLOBIN g/dL 11.8*   HEMATOCRIT % 36.7   PLATELETS Thousands/uL 154   NEUTROS PCT % 57   LYMPHS PCT % 29   MONOS PCT % 11   EOS PCT % 3     Results from last 7 days   Lab Units 23  0443   SODIUM mmol/L 139   POTASSIUM mmol/L 4.2   CHLORIDE mmol/L 104   CO2 mmol/L 28   BUN mg/dL 19   CREATININE mg/dL 1.47*   ANION GAP mmol/L 7   CALCIUM mg/dL 8.6   ALBUMIN g/dL 3.4*   TOTAL BILIRUBIN mg/dL 0. 77   ALK PHOS U/L 46   ALT U/L 11   AST U/L 12*   GLUCOSE RANDOM mg/dL 142*         Results from last 7 days   Lab Units 07/08/23  0745 07/07/23  1654 07/07/23  1055 07/07/23  0427   POC GLUCOSE mg/dl 145* 134 155* 140         Results from last 7 days   Lab Units 07/07/23  0042   LACTIC ACID mmol/L 0.8           * I Have Reviewed All Lab Data Listed Above. * Additional Pertinent Lab Tests Reviewed: All [facility] Admission Reviewed        Recent Cultures (last 7 days):           Last 24 Hours Medication List:   Current Facility-Administered Medications   Medication Dose Route Frequency Provider Last Rate   • acetaminophen  650 mg Oral Q6H PRN Veda Garcia PA-C     • allopurinol  100 mg Oral Daily Veda Garcia PA-C     • atorvastatin  40 mg Oral Daily With [place]'s REJI Anderson     • cloNIDine  0.1 mg Oral Q12H [address] Veda Garcia PA-C     • clopidogrel  75 mg Oral Daily Veda Garcia PA-C     • heparin (porcine)  5,000 Units Subcutaneous Q8H [address] Veda Garcia PA-C     • HYDROmorphone  0.2 mg Intravenous Q4H PRN Veda Garcia PA-C     • insulin lispro  2-12 Units Subcutaneous TID AC Henry Porter MD     • lactated ringers  75 mL/hr Intravenous Continuous Henry Porter MD 75 mL/hr (07/08/23 0843)   • lisinopril  40 mg Oral Daily Veda Garcia PA-C     • metoprolol succinate  25 mg Oral Daily Veda Garcia PA-C     • naloxone  0.04 mg Intravenous Q1MIN PRN Veda Garcia PA-C     • ondansetron  4 mg Intravenous Q6H PRN Veda Garcia PA-C     • oxyCODONE  2.5 mg Oral Q4H PRN Veda Garcia PA-C     • oxyCODONE  5 mg Oral Q4H PRN Veda Garcia PA-C     • ranolazine  500 mg Oral BID Veda Garcia PA-C     • tamsulosin  0.8 mg Oral Daily With [place]'s REJI Anderson          Today, Patient Was Seen By: Aletha Dang MD    ** Please Note: Dictation voice to text software may have been used in the creation of this document.  **

## 2023-07-08 NOTE — ASSESSMENT & PLAN NOTE
Lab Results   Component Value Date    HGBA1C 6.7 (H) 05/10/2023     · Home regimen: Levemir 36 units every morning, NovoLog sliding scale 3 times daily AC, Jardiance  · Inpatient regimen: Lispro SSI every 6 hours given n.p.o. status    BUN   Date Value Ref Range Status   07/08/2023 19 5 - 25 mg/dL Final   07/07/2023 25 5 - 25 mg/dL Final   06/22/2023 21 5 - 25 mg/dL Final     Creatinine   Date Value Ref Range Status   07/08/2023 1.47 (H) 0.60 - 1.30 mg/dL Final     Comment:     Standardized to IDMS reference method   07/07/2023 1.73 (H) 0.60 - 1.30 mg/dL Final     Comment:     Standardized to IDMS reference method   06/22/2023 1.52 (H) 0.60 - 1.30 mg/dL Final     Comment:     Standardized to IDMS reference method     · Chronic and stable

## 2023-07-08 NOTE — PLAN OF CARE
Problem: PAIN - ADULT  Goal: Verbalizes/displays adequate comfort level or baseline comfort level  Description: Interventions:  - Encourage patient to monitor pain and request assistance  - Assess pain using appropriate pain scale  - Administer analgesics based on type and severity of pain and evaluate response  - Implement non-pharmacological measures as appropriate and evaluate response  - Consider cultural and social influences on pain and pain management  - Notify physician/advanced practitioner if interventions unsuccessful or patient reports new pain  Outcome: Progressing     Problem: INFECTION - ADULT  Goal: Absence or prevention of progression during hospitalization  Description: INTERVENTIONS:  - Assess and monitor for signs and symptoms of infection  - Monitor lab/diagnostic results  - Monitor all insertion sites, i.e. indwelling lines, tubes, and drains  - Monitor endotracheal if appropriate and nasal secretions for changes in amount and color  - [place] appropriate cooling/warming therapies per order  - Administer medications as ordered  - Instruct and encourage patient and family to use good hand hygiene technique  - Identify and instruct in appropriate isolation precautions for identified infection/condition  Outcome: Progressing  Goal: Absence of fever/infection during neutropenic period  Description: INTERVENTIONS:  - Monitor WBC    Outcome: Progressing     Problem: SAFETY ADULT  Goal: Patient will remain free of falls  Description: INTERVENTIONS:  - Educate patient/family on patient safety including physical limitations  - Instruct patient to call for assistance with activity   - Consult OT/PT to assist with strengthening/mobility   - Keep Call bell within reach  - Keep bed low and locked with side rails adjusted as appropriate  - Keep care items and personal belongings within reach  - Initiate and maintain comfort rounds  - Make Fall Risk Sign visible to staff  - Apply yellow socks and bracelet for high fall risk patients  - Consider moving patient to room near nurses station  Outcome: Progressing  Goal: Maintain or return to baseline ADL function  Description: INTERVENTIONS:  -  Assess patient's ability to carry out ADLs; assess patient's baseline for ADL function and identify physical deficits which impact ability to perform ADLs (bathing, care of mouth/teeth, toileting, grooming, dressing, etc.)  - Assess/evaluate cause of self-care deficits   - Assess range of motion  - Assess patient's mobility; develop plan if impaired  - Assess patient's need for assistive devices and provide as appropriate  - Encourage maximum independence but intervene and supervise when necessary  - Involve family in performance of ADLs  - Assess for home care needs following discharge   - Consider OT consult to assist with ADL evaluation and planning for discharge  - Provide patient education as appropriate  Outcome: Progressing

## 2023-07-08 NOTE — ASSESSMENT & PLAN NOTE
· Hospitalized for acute pancreatitis 6/9 - 6/12 and again on 6/21 - 6/22   · He has been off of Rybelsus, hydrochlorothiazide, Januvia for months  · Thought to be from recurring gallstones given imaging findings from the early June hospitalization: CT/MRI/MRCP showed probable gallstone/sludge without ductal dilatation or evidence of choledocholithiasis. He also had some findings suggestive of adenomyomatosis of the gallbladder and narrowing of the pancreatic duct with no evidence of proximal dilation or obvious lesions and a small stricture or ductal nodule. · GI and general surgery were consulted and it was determined the patient would undergo lap dionne which happened on 4/55/3685 without complications thus far.  Fu as OP next week at [facility]

## 2023-07-08 NOTE — PLAN OF CARE
Problem: PAIN - ADULT  Goal: Verbalizes/displays adequate comfort level or baseline comfort level  Description: Interventions:  - Encourage patient to monitor pain and request assistance  - Assess pain using appropriate pain scale  - Administer analgesics based on type and severity of pain and evaluate response  - Implement non-pharmacological measures as appropriate and evaluate response  - Consider cultural and social influences on pain and pain management  - Notify physician/advanced practitioner if interventions unsuccessful or patient reports new pain  Outcome: Progressing     Problem: DISCHARGE PLANNING  Goal: Discharge to home or other facility with appropriate resources  Description: INTERVENTIONS:  - Identify barriers to discharge w/patient and caregiver  - Arrange for needed discharge resources and transportation as appropriate  - Identify discharge learning needs (meds, wound care, etc.)  - Arrange for interpretive services to assist at discharge as needed  - Refer to Case Management Department for coordinating discharge planning if the patient needs post-hospital services based on physician/advanced practitioner order or complex needs related to functional status, cognitive ability, or social support system  Outcome: Progressing

## 2023-07-09 PROBLEM — R19.7 DIARRHEA DUE TO MALABSORPTION: Status: ACTIVE | Noted: 2023-07-09

## 2023-07-09 PROBLEM — K90.9 DIARRHEA DUE TO MALABSORPTION: Status: ACTIVE | Noted: 2023-07-09

## 2023-07-09 LAB
ALBUMIN SERPL BCP-MCNC: 3.2 G/DL (ref 3.5–5)
ALP SERPL-CCNC: 46 U/L (ref 34–104)
ALT SERPL W P-5'-P-CCNC: 11 U/L (ref 7–52)
ANION GAP SERPL CALCULATED.3IONS-SCNC: 10 MMOL/L
AST SERPL W P-5'-P-CCNC: 13 U/L (ref 13–39)
BASOPHILS # BLD AUTO: 0.02 THOUSANDS/ÂΜL (ref 0–0.1)
BASOPHILS NFR BLD AUTO: 0 % (ref 0–1)
BILIRUB SERPL-MCNC: 0.6 MG/DL (ref 0.2–1)
BUN SERPL-MCNC: 18 MG/DL (ref 5–25)
CALCIUM ALBUM COR SERPL-MCNC: 9.7 MG/DL (ref 8.3–10.1)
CALCIUM SERPL-MCNC: 9.1 MG/DL (ref 8.4–10.2)
CHLORIDE SERPL-SCNC: 104 MMOL/L (ref 96–108)
CO2 SERPL-SCNC: 27 MMOL/L (ref 21–32)
CREAT SERPL-MCNC: 1.39 MG/DL (ref 0.6–1.3)
EOSINOPHIL # BLD AUTO: 0.2 THOUSAND/ÂΜL (ref 0–0.61)
EOSINOPHIL NFR BLD AUTO: 3 % (ref 0–6)
ERYTHROCYTE [DISTWIDTH] IN BLOOD BY AUTOMATED COUNT: 13.7 % (ref 11.6–15.1)
GFR SERPL CREATININE-BSD FRML MDRD: 47 ML/MIN/1.73SQ M
GLUCOSE SERPL-MCNC: 159 MG/DL (ref 65–140)
GLUCOSE SERPL-MCNC: 162 MG/DL (ref 65–140)
GLUCOSE SERPL-MCNC: 183 MG/DL (ref 65–140)
GLUCOSE SERPL-MCNC: 194 MG/DL (ref 65–140)
GLUCOSE SERPL-MCNC: 278 MG/DL (ref 65–140)
HCT VFR BLD AUTO: 36.5 % (ref 36.5–49.3)
HGB BLD-MCNC: 12.1 G/DL (ref 12–17)
IMM GRANULOCYTES # BLD AUTO: 0.02 THOUSAND/UL (ref 0–0.2)
IMM GRANULOCYTES NFR BLD AUTO: 0 % (ref 0–2)
LYMPHOCYTES # BLD AUTO: 1.29 THOUSANDS/ÂΜL (ref 0.6–4.47)
LYMPHOCYTES NFR BLD AUTO: 21 % (ref 14–44)
MCH RBC QN AUTO: 31.8 PG (ref 26.8–34.3)
MCHC RBC AUTO-ENTMCNC: 33.2 G/DL (ref 31.4–37.4)
MCV RBC AUTO: 96 FL (ref 82–98)
MONOCYTES # BLD AUTO: 0.56 THOUSAND/ÂΜL (ref 0.17–1.22)
MONOCYTES NFR BLD AUTO: 9 % (ref 4–12)
NEUTROPHILS # BLD AUTO: 4.08 THOUSANDS/ÂΜL (ref 1.85–7.62)
NEUTS SEG NFR BLD AUTO: 67 % (ref 43–75)
NRBC BLD AUTO-RTO: 0 /100 WBCS
PLATELET # BLD AUTO: 160 THOUSANDS/UL (ref 149–390)
PMV BLD AUTO: 10.6 FL (ref 8.9–12.7)
POTASSIUM SERPL-SCNC: 4 MMOL/L (ref 3.5–5.3)
PROT SERPL-MCNC: 5.9 G/DL (ref 6.4–8.4)
RBC # BLD AUTO: 3.81 MILLION/UL (ref 3.88–5.62)
SODIUM SERPL-SCNC: 141 MMOL/L (ref 135–147)
WBC # BLD AUTO: 6.17 THOUSAND/UL (ref 4.31–10.16)

## 2023-07-09 PROCEDURE — 80053 COMPREHEN METABOLIC PANEL: CPT | Performed by: INTERNAL MEDICINE

## 2023-07-09 PROCEDURE — 99232 SBSQ HOSP IP/OBS MODERATE 35: CPT | Performed by: INTERNAL MEDICINE

## 2023-07-09 PROCEDURE — 99231 SBSQ HOSP IP/OBS SF/LOW 25: CPT | Performed by: SURGERY

## 2023-07-09 PROCEDURE — 85025 COMPLETE CBC W/AUTO DIFF WBC: CPT | Performed by: INTERNAL MEDICINE

## 2023-07-09 PROCEDURE — 82948 REAGENT STRIP/BLOOD GLUCOSE: CPT

## 2023-07-09 RX ORDER — LOPERAMIDE HYDROCHLORIDE 2 MG/1
2 CAPSULE ORAL 3 TIMES DAILY PRN
Status: DISCONTINUED | OUTPATIENT
Start: 2023-07-09 | End: 2023-07-10 | Stop reason: HOSPADM

## 2023-07-09 RX ADMIN — LISINOPRIL 40 MG: 20 TABLET ORAL at 08:22

## 2023-07-09 RX ADMIN — HEPARIN SODIUM 5000 UNITS: 5000 INJECTION INTRAVENOUS; SUBCUTANEOUS at 05:57

## 2023-07-09 RX ADMIN — INSULIN LISPRO 2 UNITS: 100 INJECTION, SOLUTION INTRAVENOUS; SUBCUTANEOUS at 08:24

## 2023-07-09 RX ADMIN — ALLOPURINOL 100 MG: 100 TABLET ORAL at 08:23

## 2023-07-09 RX ADMIN — TAMSULOSIN HYDROCHLORIDE 0.8 MG: 0.4 CAPSULE ORAL at 17:08

## 2023-07-09 RX ADMIN — RANOLAZINE 500 MG: 500 TABLET, FILM COATED, EXTENDED RELEASE ORAL at 08:20

## 2023-07-09 RX ADMIN — HEPARIN SODIUM 5000 UNITS: 5000 INJECTION INTRAVENOUS; SUBCUTANEOUS at 21:10

## 2023-07-09 RX ADMIN — CLONIDINE HYDROCHLORIDE 0.1 MG: 0.1 TABLET ORAL at 08:21

## 2023-07-09 RX ADMIN — SODIUM CHLORIDE, SODIUM LACTATE, POTASSIUM CHLORIDE, AND CALCIUM CHLORIDE 75 ML/HR: .6; .31; .03; .02 INJECTION, SOLUTION INTRAVENOUS at 00:24

## 2023-07-09 RX ADMIN — INSULIN LISPRO 6 UNITS: 100 INJECTION, SOLUTION INTRAVENOUS; SUBCUTANEOUS at 11:49

## 2023-07-09 RX ADMIN — RANOLAZINE 500 MG: 500 TABLET, FILM COATED, EXTENDED RELEASE ORAL at 17:08

## 2023-07-09 RX ADMIN — ATORVASTATIN CALCIUM 40 MG: 40 TABLET, FILM COATED ORAL at 17:08

## 2023-07-09 RX ADMIN — INSULIN LISPRO 2 UNITS: 100 INJECTION, SOLUTION INTRAVENOUS; SUBCUTANEOUS at 17:10

## 2023-07-09 RX ADMIN — HEPARIN SODIUM 5000 UNITS: 5000 INJECTION INTRAVENOUS; SUBCUTANEOUS at 14:33

## 2023-07-09 RX ADMIN — METOPROLOL SUCCINATE 25 MG: 25 TABLET, FILM COATED, EXTENDED RELEASE ORAL at 08:20

## 2023-07-09 RX ADMIN — CLOPIDOGREL BISULFATE 75 MG: 75 TABLET ORAL at 08:21

## 2023-07-09 RX ADMIN — INSULIN DETEMIR 10 UNITS: 100 INJECTION, SOLUTION SUBCUTANEOUS at 09:32

## 2023-07-09 RX ADMIN — LOPERAMIDE HYDROCHLORIDE 2 MG: 2 CAPSULE ORAL at 09:32

## 2023-07-09 RX ADMIN — CLONIDINE HYDROCHLORIDE 0.1 MG: 0.1 TABLET ORAL at 21:11

## 2023-07-09 NOTE — ASSESSMENT & PLAN NOTE
Patient has no abdominal pain, fever, blood in the stools. 2 times this morning. Unlikely acute infection. Likely secondary to  acute pancreatitis  Give Imodium.

## 2023-07-09 NOTE — PLAN OF CARE
Problem: PAIN - ADULT  Goal: Verbalizes/displays adequate comfort level or baseline comfort level  Description: Interventions:  - Encourage patient to monitor pain and request assistance  - Assess pain using appropriate pain scale  - Administer analgesics based on type and severity of pain and evaluate response  - Implement non-pharmacological measures as appropriate and evaluate response  - Consider cultural and social influences on pain and pain management  - Notify physician/advanced practitioner if interventions unsuccessful or patient reports new pain  Outcome: Progressing     Problem: INFECTION - ADULT  Goal: Absence or prevention of progression during hospitalization  Description: INTERVENTIONS:  - Assess and monitor for signs and symptoms of infection  - Monitor lab/diagnostic results  - Monitor all insertion sites, i.e. indwelling lines, tubes, and drains  - Monitor endotracheal if appropriate and nasal secretions for changes in amount and color  - [place] appropriate cooling/warming therapies per order  - Administer medications as ordered  - Instruct and encourage patient and family to use good hand hygiene technique  - Identify and instruct in appropriate isolation precautions for identified infection/condition  Outcome: Progressing  Goal: Absence of fever/infection during neutropenic period  Description: INTERVENTIONS:  - Monitor WBC    Outcome: Progressing     Problem: SAFETY ADULT  Goal: Patient will remain free of falls  Description: INTERVENTIONS:  - Educate patient/family on patient safety including physical limitations  - Instruct patient to call for assistance with activity   - Consult OT/PT to assist with strengthening/mobility   - Keep Call bell within reach  - Keep bed low and locked with side rails adjusted as appropriate  - Keep care items and personal belongings within reach  - Initiate and maintain comfort rounds  - Make Fall Risk Sign visible to staff  - Offer Toileting every 2 Hours, in advance of need  - Initiate/Maintain bed alarm  - Obtain necessary fall risk management equipment:   - Apply yellow socks and bracelet for high fall risk patients  - Consider moving patient to room near nurses station  Outcome: Progressing  Goal: Maintain or return to baseline ADL function  Description: INTERVENTIONS:  -  Assess patient's ability to carry out ADLs; assess patient's baseline for ADL function and identify physical deficits which impact ability to perform ADLs (bathing, care of mouth/teeth, toileting, grooming, dressing, etc.)  - Assess/evaluate cause of self-care deficits   - Assess range of motion  - Assess patient's mobility; develop plan if impaired  - Assess patient's need for assistive devices and provide as appropriate  - Encourage maximum independence but intervene and supervise when necessary  - Involve family in performance of ADLs  - Assess for home care needs following discharge   - Consider OT consult to assist with ADL evaluation and planning for discharge  - Provide patient education as appropriate  Outcome: Progressing  Goal: Maintains/Returns to pre admission functional level  Description: INTERVENTIONS:  - Perform BMAT or MOVE assessment daily.   - Set and communicate daily mobility goal to care team and patient/family/caregiver. - Collaborate with rehabilitation services on mobility goals if consulted  - Perform Range of Motion 3 times a day. - Reposition patient every 2 hours.   - Dangle patient 3 times a day  - Stand patient 3 times a day  - Ambulate patient 3 times a day  - Out of bed to chair 3 times a day   - Out of bed for meals 3 times a day  - Out of bed for toileting  - Record patient progress and toleration of activity level   Outcome: Progressing     Problem: DISCHARGE PLANNING  Goal: Discharge to home or other facility with appropriate resources  Description: INTERVENTIONS:  - Identify barriers to discharge w/patient and caregiver  - Arrange for needed discharge resources and transportation as appropriate  - Identify discharge learning needs (meds, wound care, etc.)  - Arrange for interpretive services to assist at discharge as needed  - Refer to Case Management Department for coordinating discharge planning if the patient needs post-hospital services based on physician/advanced practitioner order or complex needs related to functional status, cognitive ability, or social support system  Outcome: Progressing

## 2023-07-09 NOTE — PROGRESS NOTES
24-year-old male patient who is here for acute pancreatitis of unknown etiology. Patient had a laparoscopic cholecystectomy for chronic calculus cholecystitis done 10 days ago. Currently he is tolerating low-fat diet. He is not complaining of any abdominal pain. He has diarrhea. On clinical exam his abdomen is soft nontender. There is no guarding. Incisions are healing well. Patient already has a outpatient MRCP/MRI scheduled to investigate pancreatic pathology. No active surgical intervention required. Surgery team will sign off for now. Discharge when able.

## 2023-07-09 NOTE — PROGRESS NOTES
Progress Note - [facility]  Carmen Gutierrez 82 y.o. male MRN: [id number]    Unit/Bed#: -01 Encounter: [id number]      Assessment/Plan:  72-year-old male with past medical history of gallstone pancreatitis, s/p cholecystectomy, history of diabetes mellitus now admitted with epigastric pain and found to have elevated lipase, LFT within normal limit, CT scan shows evidence of acute pancreatitis without any complication.     1. Acute pancreatitis  Patient presented with abdominal pain. CT scan done on 7/7 showed acute pancreatitis without abscess or pseudocyst.  LFTs within normal limits. Lipase 8000 on admission. Triglycerides within normal limits. Pancreatitis etiology unclear. Pancreatitis may be secondary to stone, idiopathic,  Medication, and less likely alcohol use since patient only reports minimal intake and no history of binge drinking. Patient reports no alcohol intake for 2 months. No evidence of autoimmune pancreatitis on previous work-up. Patient currently denies any abdominal pain, nausea, or vomiting.     -Low-fat diet as tolerated  -Continue supportive care  -Pain management per attending  -MRI done 6/10 showed probable gallstone sludge without biliary ductal dilation or evidence of choledocholithiasis. Pancreatic duct demonstrated some focal narrowing at the junction of the head and neck region which may be related to artifact and or patient's pancreatitis. A small stricture or ductal nodule is difficult to exclude. Repeat imaging was recommended in 4 to 6 weeks. -MRI scheduled for 7/26 as outpatient once acute episode of pancreatitis has resolved to re-evaluate abnormality seen on previous MRI. -Recommend holding Jardiance this may be contributing to his pancreatitis. Patient has not been receiving this medication while hospitalized. -If tolerating low-fat diet and symptoms remain controlled may discharge from GI standpoint    Subjective:   Lying in bed in no acute distress. Tolerating diet. Denies nausea or vomiting. Denies abdominal pain. Patient reports 2 loose bowel movements this AM.  Patient reported he took Imodium and has had no further episodes since. Patient stated he does take Imodium as needed at home for intermittent episodes of diarrhea. Objective:     Vitals: Blood pressure 160/88, pulse 63, temperature 98.7 °F (37.1 °C), temperature source Oral, resp. rate 20, height 5' 6" (1.676 m), weight 113 kg (249 lb), SpO2 93 %. ,Body mass index is 40.19 kg/m². Intake/Output Summary (Last 24 hours) at 7/9/2023 1237  Last data filed at 7/9/2023 1000  Gross per 24 hour   Intake 946 ml   Output 3225 ml   Net -2279 ml       Physical Exam: Physical Exam  Vitals and nursing note reviewed. Constitutional:       General: He is not in acute distress. Cardiovascular:      Rate and Rhythm: Normal rate and regular rhythm. Pulses: Normal pulses. Heart sounds: Normal heart sounds. Pulmonary:      Effort: Pulmonary effort is normal. No respiratory distress. Breath sounds: Normal breath sounds. No stridor. No wheezing, rhonchi or rales. Abdominal:      General: Bowel sounds are normal. There is no distension. Palpations: Abdomen is soft. There is no mass. Tenderness: There is no abdominal tenderness. There is no guarding or rebound. Hernia: No hernia is present. Musculoskeletal:      Right lower leg: No edema. Left lower leg: No edema. Skin:     General: Skin is warm and dry. Capillary Refill: Capillary refill takes less than 2 seconds. Coloration: Skin is not jaundiced or pale. Neurological:      Mental Status: He is alert and oriented to person, place, and time. Invasive Devices     Peripheral Intravenous Line  Duration           Peripheral IV 07/07/23 Dorsal (posterior); Left Hand 1 day                Current Facility-Administered Medications:   •  acetaminophen (TYLENOL) tablet 650 mg, 650 mg, Oral, Q6H PRN, Jo Maurice REJI  •  allopurinol (ZYLOPRIM) tablet 100 mg, 100 mg, Oral, Daily, Veda Garcia PA-C, 100 mg at 07/09/23 7153  •  atorvastatin (LIPITOR) tablet 40 mg, 40 mg, Oral, Daily With [employer], REJI, 40 mg at 07/08/23 1721  •  cloNIDine (CATAPRES) tablet 0.1 mg, 0.1 mg, Oral, Q12H [facility], Veda Garcia PA-C, 0.1 mg at 07/09/23 0329  •  clopidogrel (PLAVIX) tablet 75 mg, 75 mg, Oral, Daily, Veda Garcia PA-C, 75 mg at 07/09/23 4063  •  heparin (porcine) subcutaneous injection 5,000 Units, 5,000 Units, Subcutaneous, Q8H [facility], Veda Garcia PA-C, 5,000 Units at 07/09/23 0557  •  HYDROmorphone HCl (DILAUDID) injection 0.2 mg, 0.2 mg, Intravenous, Q4H PRN, Veda Garcia PA-C, 0.2 mg at 07/07/23 0541  •  insulin detemir (LEVEMIR) subcutaneous injection 10 Units, 10 Units, Subcutaneous, QAMCourtney MD, 10 Units at 07/09/23 0932  •  insulin lispro (HumaLOG) 100 units/mL subcutaneous injection 2-12 Units, 2-12 Units, Subcutaneous, TID AC, 6 Units at 07/09/23 1149 **AND** Fingerstick Glucose (POCT), , , TID AC, Courtney Avendano MD  •  lisinopril (ZESTRIL) tablet 40 mg, 40 mg, Oral, Daily, Veda Garcia PA-C, 40 mg at 07/09/23 7941  •  loperamide (IMODIUM) capsule 2 mg, 2 mg, Oral, TID PRN, Courtney Avendano MD, 2 mg at 07/09/23 0932  •  metoprolol succinate (TOPROL-XL) 24 hr tablet 25 mg, 25 mg, Oral, Daily, Veda Garcia PA-C, 25 mg at 07/09/23 0820  •  naloxone (NARCAN) 0.04 mg/mL syringe 0.04 mg, 0.04 mg, Intravenous, Q1MIN PRN, Veda Garcia PA-C  •  ondansetron (ZOFRAN) injection 4 mg, 4 mg, Intravenous, Q6H PRN, Veda Garcia PA-C  •  oxyCODONE (ROXICODONE) IR tablet 2.5 mg, 2.5 mg, Oral, Q4H PRN, Veda Garcia PA-C, 2.5 mg at 07/07/23 0946  •  oxyCODONE (ROXICODONE) IR tablet 5 mg, 5 mg, Oral, Q4H PRN, Veda Garcia PA-C, 5 mg at 07/07/23 2129  •  ranolazine (RANEXA) 12 hr tablet 500 mg, 500 mg, Oral, BID, Veda Garcia PA-C, 500 mg at 07/09/23 0820  •  tamsulosin (FLOMAX) capsule 0.8 mg, 0.8 mg, Oral, Daily With [employer]JI, 0.8 mg at 07/08/23 1721    Lab Results:   Recent Results (from the past 24 hour(s))   Fingerstick Glucose (POCT)    Collection Time: 07/08/23  3:32 PM   Result Value Ref Range    POC Glucose 164 (H) 65 - 140 mg/dl   Fingerstick Glucose (POCT)    Collection Time: 07/08/23 11:52 PM   Result Value Ref Range    POC Glucose 118 65 - 140 mg/dl   CBC and differential    Collection Time: 07/09/23  5:58 AM   Result Value Ref Range    WBC 6.17 4.31 - 10.16 Thousand/uL    RBC 3.81 (L) 3.88 - 5.62 Million/uL    Hemoglobin 12.1 12.0 - 17.0 g/dL    Hematocrit 36.5 36.5 - 49.3 %    MCV 96 82 - 98 fL    MCH 31.8 26.8 - 34.3 pg    MCHC 33.2 31.4 - 37.4 g/dL    RDW 13.7 11.6 - 15.1 %    MPV 10.6 8.9 - 12.7 fL    Platelets 355 159 - 107 Thousands/uL    nRBC 0 /100 WBCs    Neutrophils Relative 67 43 - 75 %    Immat GRANS % 0 0 - 2 %    Lymphocytes Relative 21 14 - 44 %    Monocytes Relative 9 4 - 12 %    Eosinophils Relative 3 0 - 6 %    Basophils Relative 0 0 - 1 %    Neutrophils Absolute 4.08 1.85 - 7.62 Thousands/µL    Immature Grans Absolute 0.02 0.00 - 0.20 Thousand/uL    Lymphocytes Absolute 1.29 0.60 - 4.47 Thousands/µL    Monocytes Absolute 0.56 0.17 - 1.22 Thousand/µL    Eosinophils Absolute 0.20 0.00 - 0.61 Thousand/µL    Basophils Absolute 0.02 0.00 - 0.10 Thousands/µL   Comprehensive metabolic panel    Collection Time: 07/09/23  5:58 AM   Result Value Ref Range    Sodium 141 135 - 147 mmol/L    Potassium 4.0 3.5 - 5.3 mmol/L    Chloride 104 96 - 108 mmol/L    CO2 27 21 - 32 mmol/L    ANION GAP 10 mmol/L    BUN 18 5 - 25 mg/dL    Creatinine 1.39 (H) 0.60 - 1.30 mg/dL    Glucose 159 (H) 65 - 140 mg/dL    Calcium 9.1 8.4 - 10.2 mg/dL    Corrected Calcium 9.7 8.3 - 10.1 mg/dL    AST 13 13 - 39 U/L    ALT 11 7 - 52 U/L    Alkaline Phosphatase 46 34 - 104 U/L    Total Protein 5.9 (L) 6.4 - 8.4 g/dL    Albumin 3.2 (L) 3.5 - 5.0 g/dL    Total Bilirubin 0.60 0.20 - 1.00 mg/dL    eGFR 47 ml/min/1.73sq m   Fingerstick Glucose (POCT) Collection Time: 07/09/23  7:05 AM   Result Value Ref Range    POC Glucose 162 (H) 65 - 140 mg/dl   Fingerstick Glucose (POCT)    Collection Time: 07/09/23 11:04 AM   Result Value Ref Range    POC Glucose 278 (H) 65 - 140 mg/dl             Imaging Studies: CT abdomen pelvis wo contrast    Result Date: 7/7/2023  Narrative: CT ABDOMEN AND PELVIS WITHOUT IV CONTRAST INDICATION:   epigastric pain, history of pancreatitis. COMPARISON: CT abdomen pelvis dated June 9, 2023. TECHNIQUE:  CT examination of the abdomen and pelvis was performed without intravenous contrast. Multiplanar 2D reformatted images were created from the source data. This examination, like all CT scans performed in the [facility], was performed utilizing techniques to minimize radiation dose exposure, including the use of iterative reconstruction and automated exposure control. Radiation dose length product (DLP) for this visit:  1083.4 mGy-cm Enteric contrast was not administered. FINDINGS: ABDOMEN LOWER CHEST: Atelectatic changes are noted at the lung bases. LIVER/BILIARY TREE:  Unremarkable. GALLBLADDER: Gallbladder is surgically absent. No evidence of complication. SPLEEN:  Unremarkable. PANCREAS: There is mild to moderate peripancreatic inflammatory stranding suspicious for acute pancreatitis. However, there is no focal fluid collection to suggest a pseudocyst or abscess. Evaluation of the pancreatic parenchyma was limited by the lack  of intravenous contrast. ADRENAL GLANDS:  Unremarkable. KIDNEYS/URETERS: No hydronephrosis. Bilateral renal cysts are again noted. STOMACH AND BOWEL:  There is colonic diverticulosis without evidence of acute diverticulitis. APPENDIX:  A normal appendix was visualized. ABDOMINOPELVIC CAVITY:  No ascites. No pneumoperitoneum. No lymphadenopathy. VESSELS: Atherosclerotic changes are present. No evidence of aneurysm. PELVIS REPRODUCTIVE ORGANS: The prostate is enlarged.  URINARY BLADDER: Unremarkable. ABDOMINAL WALL/INGUINAL REGIONS: There is a small fat-containing umbilical hernia with mild inflammatory stranding which may be postoperative in nature. Bilateral fat-containing renal hernias are again noted. OSSEOUS STRUCTURES:  No acute fracture or destructive osseous lesion. Impression: Mild to moderate diffuse peripancreatic inflammatory stranding most compatible with acute pancreatitis. Correlation with serum lipase levels is recommended. There is no focal fluid collection to suggest an abscess or pseudocyst. No free air or free fluid. Scattered colonic diverticulosis with no inflammatory changes present to suggest acute diverticulitis. Workstation performed: [id number]     MRI abdomen w wo contrast and mrcp    Result Date: 6/12/2023  Narrative: MRI OF THE ABDOMEN WITH AND WITHOUT CONTRAST WITH MRCP INDICATION: 82 years / Male  MRI with MRCP to rule out choledocholithiasis. COMPARISON: 6/9/2023 CT; ultrasound from 6/4/2023; 8/20/2018 TECHNIQUE:  Multiplanar/multisequence MRI of the abdomen with 3D MRCP was performed before and after administration of contrast. IV Contrast:  11 mL of Gadobutrol injection (SINGLE-DOSE) FINDINGS: LOWER CHEST:   Trace right pleural effusion. Jennifer Brooms LIVER: Normal in size and configuration. No suspicious mass. The hepatic veins and portal veins are patent. BILE DUCTS:  No intrahepatic or extrahepatic bile duct dilation. Common bile duct is normal in caliber. No choledocholithiasis, biliary stricture or suspicious mass. GALLBLADDER: Moderately distended some layering low signal gallstones/sludge are seen close to the gallbladder neck. Opaque stones were noted on the prior CT. Small cystic areas near the gallbladder fundus may suggest a small area of adenomyomatosis. No obvious nodularity mass is identified. Some ringdown artifact was noted on recent ultrasound. Coronal postcontrast images show this area to be some somewhat linear/reticular in configuration.  PANCREAS: Peripancreatic edema is seen without evidence of fluid collection. The pancreatic duct measures up to 3. At the junction of the pancreatic head and neck there appears to be focal narrowing of the pancreatic duct although the more proximal duct does not appear dilated in the pancreatic body. This could be related to artifact. This is noted on image 10, series 1250. The pancreatic duct elsewhere appears normal in caliber. The pancreas still maintains relatively high T1-weighted signal. Normal enhancement is seen including in the area of the junction of the pancreatic head and neck. There is mild diminished diffusion restriction seen diffusely in the pancreas and probably related to pancreatitis. ADRENAL GLANDS:  Normal. SPLEEN:  Normal. KIDNEYS/PROXIMAL URETERS:  No hydroureteronephrosis. No suspicious renal mass. Multiple renal cysts are identified. The largest is on the left measuring up to 5 cm size. BOWEL:   No dilated loops of bowel. The stomach is not well distended. PERITONEUM/RETROPERITONEUM: Trace perisplenic ascites is identified. Trace fluid/edema in the right paracolic gutter is seen. There is also edema involving Gerota's fascia and the perinephric space well as the anterior pararenal space adjacent to the pancreas extending towards the spleen. This correlates with the infiltration of the peripancreatic fat on CT suggesting pancreatitis. LYMPH NODES:  No abdominal lymphadenopathy. VASCULAR STRUCTURES:  No aneurysm. ABDOMINAL WALL: Some edema appears to be present within the oblique musculature in both flank regions. OSSEOUS STRUCTURES:  No suspicious osseous lesion. Impression: Probable gallstone/sludge without biliary ductal dilatation or evidence of choledocholithiasis. Cystic areas with reticular bands of lower signal near the gallbladder fundus suggests adenomyomatosis correlating with the ultrasound. No obvious polyp is identified. Continued follow-up is advised.  Evidence of peripancreatic edema suggests pancreatitis. There is normal pancreatic enhancement and no evidence of collection. The pancreatic duct demonstrates some focal narrowing at the junction of the head and neck region which may be related to artifact and/or patient's pancreatitis. There is no evidence of dilatation proximally or obvious lesion. A small stricture or ductal  nodule is difficult to exclude however. Follow-up MRI/MRCP is advised in 4 to 6 weeks time for reevaluation of the pancreas/pancreatic duct. The study was marked in [facility] for immediate notification. Workstation performed: [id number]     CT abdomen pelvis wo contrast    Result Date: 6/9/2023  Narrative: CT ABDOMEN AND PELVIS WITHOUT IV CONTRAST INDICATION:   Epigastric pain Epigastric Pain. COMPARISON: 8/20/2018 TECHNIQUE:  CT examination of the abdomen and pelvis was performed without intravenous contrast. Multiplanar 2D reformatted images were created from the source data. This examination, like all CT scans performed in the [place], was performed utilizing techniques to minimize radiation dose exposure, including the use of iterative reconstruction and automated exposure control. Radiation dose length product (DLP) for this visit:  1349 mGy-cm Enteric contrast was administered. FINDINGS: ABDOMEN LOWER CHEST:  No clinically significant abnormality identified in the visualized lower chest. LIVER/BILIARY TREE:  Unremarkable. GALLBLADDER: There are gallstone(s) within the gallbladder, without pericholecystic inflammatory changes. SPLEEN:  Unremarkable. PANCREAS: Extensive peripancreatic inflammatory change consistent with acute pancreatitis. No abscess or other complication in the [place] enhanced study. ADRENAL GLANDS:  Unremarkable. KIDNEYS/URETERS: Left greater than right renal cysts as before. STOMACH AND BOWEL: Small sliding-type hiatal hernia noted. There is also diverticular disease without diverticulitis. APPENDIX: A normal appendix was visualized. ABDOMINOPELVIC CAVITY:  No ascites. No pneumoperitoneum. No lymphadenopathy. VESSELS:  Unremarkable for patient's age. PELVIS REPRODUCTIVE ORGANS: The prostate is enlarged. URINARY BLADDER:  Unremarkable. ABDOMINAL WALL/INGUINAL REGIONS: Bilateral fat-containing inguinal hernias. OSSEOUS STRUCTURES:  No acute fracture or destructive osseous lesion. Impression: Peripancreatic inflammatory change consistent with acute uncomplicated pancreatitis in this unenhanced study. Workstation performed: [id number]           GERSON Montejo      Please Note: "This note has been constructed using a voice recognition system. Therefore there may be syntax, spelling, and/or grammatical errors.  Please call if you have any questions. "**

## 2023-07-09 NOTE — PROGRESS NOTES
[address]  Progress Note  Name: Cris Brambila  MRN: [id number]  Unit/Bed#: -01 I Date of Admission: 7/7/2023   Date of Service: 7/9/2023 I Hospital Day: 2    Assessment/Plan   * Acute pancreatitis  Assessment & Plan  · IVF, n.p.o., analgesia. Diet advancement as tolerated  · Repeat MRI scheduled 7/26 to evaluate for follow-up to abnormalities on prior MRI. May need EUS pending results. Discuss with GI whether this will need to be as an inpatient or can still happen as an outpatient. They are consulted  · LFTs within normal limits  · Lipase 8000  · 7/7/2023 CT AP shows acute pancreatitis without abscess or pseudocyst on the pancreas. · Now improved significantly, lipase levels improved significantly down to 600s  · Patient denies any abdominal pain, will start the patient on full liquid diet and advance as tolerated  · Discussed with surgical team Dr. Alex Waite, no acute intervention required at this stage  · Patient was seen by GI team as well, outpatient MRI scheduled for July 26 secondary to recurrent pancreatitis. · Discontinue IV fluids. · He denies any abdominal pain today, currently on low-fat diet    Diarrhea due to malabsorption  Assessment & Plan  Patient has no abdominal pain, fever, blood in the stools. 2 times this morning. Unlikely acute infection. Likely secondary to  acute pancreatitis  Give Imodium. Moderate aortic stenosis  Assessment & Plan  · Noted on April 2023 echo    History of recent hospitalization  Assessment & Plan  · Hospitalized for acute pancreatitis 6/9 - 6/12 and again on 6/21 - 6/22   · He has been off of Rybelsus, hydrochlorothiazide, Januvia for months  · Thought to be from recurring gallstones given imaging findings from the early June hospitalization: CT/MRI/MRCP showed probable gallstone/sludge without ductal dilatation or evidence of choledocholithiasis.   He also had some findings suggestive of adenomyomatosis of the gallbladder and narrowing of the pancreatic duct with no evidence of proximal dilation or obvious lesions and a small stricture or ductal nodule. · GI and general surgery were consulted and it was determined the patient would undergo lap dionne which happened on 8/74/8858 without complications thus far. Fu as OP next week at [address] [place] [address]- op check    Type 2 diabetes mellitus with kidney complication, with long-term current use of insulin ([facility])  Assessment & Plan    Lab Results   Component Value Date    HGBA1C 6.7 (H) 05/10/2023     · Home regimen: Levemir 36 units every morning, NovoLog sliding scale 3 times daily AC, Jardiance  · Inpatient regimen: Lispro SSI every 6 hours given n.p.o. status    BUN   Date Value Ref Range Status   07/09/2023 18 5 - 25 mg/dL Final   07/08/2023 19 5 - 25 mg/dL Final   07/07/2023 25 5 - 25 mg/dL Final     Creatinine   Date Value Ref Range Status   07/09/2023 1.39 (H) 0.60 - 1.30 mg/dL Final     Comment:     Standardized to IDMS reference method   07/08/2023 1.47 (H) 0.60 - 1.30 mg/dL Final     Comment:     Standardized to IDMS reference method   07/07/2023 1.73 (H) 0.60 - 1.30 mg/dL Final     Comment:     Standardized to IDMS reference method     · Chronic and stable    (HFpEF) heart failure with preserved ejection fraction [facility])  Assessment & Plan  Wt Readings from Last 3 Encounters:   07/07/23 113 kg (249 lb)   06/28/23 112 kg (246 lb 6.4 oz)   06/26/23 111 kg (245 lb)       · Grade 1 diastolic dysfunction according to April 2023 echocardiogram.  He is compensated  · Noted, continue ACE-I, beta-blocker. Not on diuretic      HTN (hypertension), benign  Assessment & Plan  · Continue home medications Brogle, lisinopril, Catapres           I had a long conversation with the patient regarding his blood sugars, patient was not happy with current blood sugars.   He states he usually takes Lantus and Levemir  I discussed with him that in the hospital we tend to target blood sugars between 1  to avoid hypoglycemia episodes  I have restarted him on Levemir 10 units, at home patient takes Levemir 40 units daily and NovoLog 18 units with meals. VTE Pharmacologic Prophylaxis:   Pharmacologic: Heparin  Mechanical VTE Prophylaxis in Place: Yes    Patient Centered Rounds: I have performed bedside rounds with nursing staff today. Discussions with Specialists or Other Care Team Provider: None    Education and Discussions with Family / Patient: Patient    Time Spent for Care: 30 minutes. More than 50% of total time spent on counseling and coordination of care as described above. Current Length of Stay: 2 day(s)    Current Patient Status: Inpatient   Certification Statement: The patient will continue to require additional inpatient hospital stay due to Diarrhea    Discharge Plan: 7/10/2023    Code Status: Level 1 - Full Code      Subjective:   Patient reports diarrhea x2, loose watery stools, denies any abdominal pain, fever, chills. Denies any blood in the stools  Discussed with the nursing staff, not foul-smelling stools  At home patient has intermittent diarrhea and usually takes Imodium. Objective:     Vitals:   Temp (24hrs), Av.8 °F (37.1 °C), Min:98.5 °F (36.9 °C), Max:99.2 °F (37.3 °C)    Temp:  [98.5 °F (36.9 °C)-99.2 °F (37.3 °C)] 98.7 °F (37.1 °C)  HR:  [50-63] 63  Resp:  [16-20] 20  BP: (154-160)/(69-88) 160/88  SpO2:  [93 %-96 %] 93 %  Body mass index is 40.19 kg/m². Input and Output Summary (last 24 hours):        Intake/Output Summary (Last 24 hours) at 2023 0950  Last data filed at 2023 0501  Gross per 24 hour   Intake 473 ml   Output 3225 ml   Net -2752 ml       Physical Exam:     Physical Exam  General appearance:  Patient not in acute distress  Eyes:  Pupils equal reacting to light  ENT:  Moist oral mucous membranes  CVS:  S1-S2 heard, regular rate and rhythm, trace pedal edema  Chest:  Bilateral air entry present, clear to auscultation  Abdomen:  Soft, nontender, bowel sounds present  CNS:  No focal neurological deficits  Genitourinary: deferred  Skin:  No acute rash   psychiatric:  No psychosis  Musculoskeletal:  No joint deformities      Additional Data:     Labs:    Results from last 7 days   Lab Units 07/09/23  0558   WBC Thousand/uL 6.17   HEMOGLOBIN g/dL 12.1   HEMATOCRIT % 36.5   PLATELETS Thousands/uL 160   NEUTROS PCT % 67   LYMPHS PCT % 21   MONOS PCT % 9   EOS PCT % 3     Results from last 7 days   Lab Units 07/09/23  0558   SODIUM mmol/L 141   POTASSIUM mmol/L 4.0   CHLORIDE mmol/L 104   CO2 mmol/L 27   BUN mg/dL 18   CREATININE mg/dL 1.39*   ANION GAP mmol/L 10   CALCIUM mg/dL 9.1   ALBUMIN g/dL 3.2*   TOTAL BILIRUBIN mg/dL 0.60   ALK PHOS U/L 46   ALT U/L 11   AST U/L 13   GLUCOSE RANDOM mg/dL 159*         Results from last 7 days   Lab Units 07/09/23  0705 07/08/23  2352 07/08/23  1532 07/08/23  1120 07/08/23  0745 07/07/23  1654 07/07/23  1055 07/07/23  0427   POC GLUCOSE mg/dl 162* 118 164* 214* 145* 134 155* 140         Results from last 7 days   Lab Units 07/07/23  0042   LACTIC ACID mmol/L 0.8           * I Have Reviewed All Lab Data Listed Above. * Additional Pertinent Lab Tests Reviewed:  All [facility] Admission Reviewed        Recent Cultures (last 7 days):           Last 24 Hours Medication List:   Current Facility-Administered Medications   Medication Dose Route Frequency Provider Last Rate   • acetaminophen  650 mg Oral Q6H PRN Veda Garcia PA-C     • allopurinol  100 mg Oral Daily Veda Garcia PA-C     • atorvastatin  40 mg Oral Daily With [employer]     • cloNIDine  0.1 mg Oral Q12H [address] Veda Garcia PA-C     • clopidogrel  75 mg Oral Daily Veda Garcia PA-C     • heparin (porcine)  5,000 Units Subcutaneous Q8H [address] Veda Garcia PA-C     • HYDROmorphone  0.2 mg Intravenous Q4H PRN Veda Garcia PA-C     • insulin detemir  10 Units Subcutaneous QAM Marvel Villagran MD     • insulin lispro  2-12 Units Subcutaneous TID AC Marvel Villagran MD • lisinopril  40 mg Oral Daily Veda Garcia PA-C     • loperamide  2 mg Oral TID PRN Lanette Briscoe MD     • metoprolol succinate  25 mg Oral Daily Veda Garcia PA-C     • naloxone  0.04 mg Intravenous Q1MIN PRN Veda Garcia PA-C     • ondansetron  4 mg Intravenous Q6H PRN Veda Garcia PA-C     • oxyCODONE  2.5 mg Oral Q4H PRN Veda Garcia PA-C     • oxyCODONE  5 mg Oral Q4H PRN Veda Garcia PA-C     • ranolazine  500 mg Oral BID Veda Garcia PA-C     • tamsulosin  0.8 mg Oral Daily With [place]harmesh Anderson PA-C          Today, Patient Was Seen By: Lety Erickson MD    ** Please Note: Dictation voice to text software may have been used in the creation of this document.  **

## 2023-07-09 NOTE — ASSESSMENT & PLAN NOTE
Lab Results   Component Value Date    HGBA1C 6.7 (H) 05/10/2023     · Home regimen: Levemir 36 units every morning, NovoLog sliding scale 3 times daily AC, Jardiance  · Inpatient regimen: Lispro SSI every 6 hours given n.p.o. status    BUN   Date Value Ref Range Status   07/09/2023 18 5 - 25 mg/dL Final   07/08/2023 19 5 - 25 mg/dL Final   07/07/2023 25 5 - 25 mg/dL Final     Creatinine   Date Value Ref Range Status   07/09/2023 1.39 (H) 0.60 - 1.30 mg/dL Final     Comment:     Standardized to IDMS reference method   07/08/2023 1.47 (H) 0.60 - 1.30 mg/dL Final     Comment:     Standardized to IDMS reference method   07/07/2023 1.73 (H) 0.60 - 1.30 mg/dL Final     Comment:     Standardized to IDMS reference method     · Chronic and stable

## 2023-07-09 NOTE — ASSESSMENT & PLAN NOTE
· Hospitalized for acute pancreatitis 6/9 - 6/12 and again on 6/21 - 6/22   · He has been off of Rybelsus, hydrochlorothiazide, Januvia for months  · Thought to be from recurring gallstones given imaging findings from the early June hospitalization: CT/MRI/MRCP showed probable gallstone/sludge without ductal dilatation or evidence of choledocholithiasis. He also had some findings suggestive of adenomyomatosis of the gallbladder and narrowing of the pancreatic duct with no evidence of proximal dilation or obvious lesions and a small stricture or ductal nodule. · GI and general surgery were consulted and it was determined the patient would undergo lap dionne which happened on 1/19/6907 without complications thus far.  Fu as OP next week at Post- op check

## 2023-07-09 NOTE — ASSESSMENT & PLAN NOTE
· IVF, n.p.o., analgesia. Diet advancement as tolerated  · Repeat MRI scheduled 7/26 to evaluate for follow-up to abnormalities on prior MRI. May need EUS pending results. Discuss with GI whether this will need to be as an inpatient or can still happen as an outpatient. They are consulted  · LFTs within normal limits  · Lipase 8000  · 7/7/2023 CT AP shows acute pancreatitis without abscess or pseudocyst on the pancreas. · Now improved significantly, lipase levels improved significantly down to 600s  · Patient denies any abdominal pain, will start the patient on full liquid diet and advance as tolerated  · Discussed with surgical team Dr. Virgin Hashimoto, no acute intervention required at this stage  · Patient was seen by GI team as well, outpatient MRI scheduled for July 26 secondary to recurrent pancreatitis. · Discontinue IV fluids.   · He denies any abdominal pain today, currently on low-fat diet

## 2023-07-10 ENCOUNTER — TELEPHONE (OUTPATIENT)
Dept: OTHER | Facility: OTHER | Age: 83
End: 2023-07-10

## 2023-07-10 VITALS
DIASTOLIC BLOOD PRESSURE: 70 MMHG | WEIGHT: 249 LBS | HEIGHT: 66 IN | HEART RATE: 60 BPM | TEMPERATURE: 98.2 F | SYSTOLIC BLOOD PRESSURE: 151 MMHG | BODY MASS INDEX: 40.02 KG/M2 | OXYGEN SATURATION: 92 % | RESPIRATION RATE: 18 BRPM

## 2023-07-10 LAB — GLUCOSE SERPL-MCNC: 191 MG/DL (ref 65–140)

## 2023-07-10 PROCEDURE — 99232 SBSQ HOSP IP/OBS MODERATE 35: CPT | Performed by: NURSE PRACTITIONER

## 2023-07-10 PROCEDURE — 82948 REAGENT STRIP/BLOOD GLUCOSE: CPT

## 2023-07-10 PROCEDURE — 99024 POSTOP FOLLOW-UP VISIT: CPT | Performed by: SURGERY

## 2023-07-10 PROCEDURE — 99238 HOSP IP/OBS DSCHRG MGMT 30/<: CPT | Performed by: INTERNAL MEDICINE

## 2023-07-10 RX ADMIN — HEPARIN SODIUM 5000 UNITS: 5000 INJECTION INTRAVENOUS; SUBCUTANEOUS at 05:45

## 2023-07-10 RX ADMIN — METOPROLOL SUCCINATE 25 MG: 25 TABLET, FILM COATED, EXTENDED RELEASE ORAL at 08:31

## 2023-07-10 RX ADMIN — ALLOPURINOL 100 MG: 100 TABLET ORAL at 08:31

## 2023-07-10 RX ADMIN — RANOLAZINE 500 MG: 500 TABLET, FILM COATED, EXTENDED RELEASE ORAL at 08:31

## 2023-07-10 RX ADMIN — INSULIN DETEMIR 10 UNITS: 100 INJECTION, SOLUTION SUBCUTANEOUS at 08:31

## 2023-07-10 RX ADMIN — INSULIN LISPRO 2 UNITS: 100 INJECTION, SOLUTION INTRAVENOUS; SUBCUTANEOUS at 07:48

## 2023-07-10 RX ADMIN — LISINOPRIL 40 MG: 20 TABLET ORAL at 08:30

## 2023-07-10 RX ADMIN — CLONIDINE HYDROCHLORIDE 0.1 MG: 0.1 TABLET ORAL at 08:30

## 2023-07-10 RX ADMIN — CLOPIDOGREL BISULFATE 75 MG: 75 TABLET ORAL at 08:30

## 2023-07-10 NOTE — PROGRESS NOTES
Progress Note- Kb Douglas 82 y.o. male MRN: [id number]    Unit/Bed#: -01 Encounter: [id number]      Assessment and Plan:    29-year-old male with past medical history of gallstone pancreatitis, s/p cholecystectomy, history of diabetes mellitus now admitted with epigastric pain and found to have elevated lipase, LFT within normal limit, CT scan shows evidence of acute pancreatitis without any complication.     1.  Acute pancreatitis: Patient presented with abdominal pain.  CT scan done on 7/7 showed acute pancreatitis without abscess or pseudocyst.  LFTs within normal limits.  Lipase 8000 on admission.  Triglycerides within normal limits. He had recent lap dionne 6/28 due to recurrent pancreatitis with path showing acute and chronic cholecystitis with cholelithiasis.  Pancreatitis etiology unclear. He did have some narrowing at the junction of head and neck region of the pancreatic duct on prior imaging.  Pancreatitis may be secondary to stone, idiopathic, medication, rule out underlying malignancy. No evidence of autoimmune pancreatitis on previous work-up and patient has not had alcohol in 2 months.  Patient currently denies any abdominal pain, nausea, or vomiting. He is tolerating diet.     -Continue low-fat diet as tolerated    -Continue supportive care    -MRI done 6/10 showed probable gallstone sludge without biliary ductal dilation or evidence of choledocholithiasis.  Pancreatic duct demonstrated some focal narrowing at the junction of the head and neck region which may be related to artifact and or patient's pancreatitis.  A small stricture or ductal nodule is difficult to exclude.  Repeat imaging was recommended in 4 to 6 weeks.  -Follow-up outpatient MRI scheduled for 7/26 once acute episode of pancreatitis has resolved to re-evaluate abnormality seen on previous MRI. -Recommend holding Jardiance this may be contributing to his pancreatitis.  Patient has not been receiving this medication while hospitalized. -If tolerating low-fat diet and symptoms remain controlled may discharge from GI standpoint and follow-up with Dr. Marlena Martinez after MRI is obtained    ______________________________________________________________________    Subjective:     Patient sitting in chair at bedside. Denies any abdominal pain, nausea or vomiting. He is tolerating his diet. Anxious for discharge.     Medication Administration - last 24 hours from 07/09/2023 0816 to 07/10/2023 0816       Date/Time Order Dose Route Action Action by     07/09/2023 2111 EDT cloNIDine (CATAPRES) tablet 0.1 mg 0.1 mg Oral Given Keila Guzman RN     07/09/2023 7226 EDT cloNIDine (CATAPRES) tablet 0.1 mg 0.1 mg Oral Given Susan Slaughter RN     07/09/2023 0341 EDT clopidogrel (PLAVIX) tablet 75 mg 75 mg Oral Given Susan Slauhgter RN     07/09/2023 8290 EDT allopurinol (ZYLOPRIM) tablet 100 mg 100 mg Oral Given Susan Slaughter RN     07/09/2023 8971 EDT lisinopril (ZESTRIL) tablet 40 mg 40 mg Oral Given Susan Slaughter RN     07/09/2023 0820 EDT metoprolol succinate (TOPROL-XL) 24 hr tablet 25 mg 25 mg Oral Given Susan Slaughter RN     07/09/2023 1708 EDT ranolazine (RANEXA) 12 hr tablet 500 mg 500 mg Oral Given Ambreen Jovel LPN     63/40/7256 7866 EDT ranolazine (RANEXA) 12 hr tablet 500 mg 500 mg Oral Given Susan Slaughter RN     07/09/2023 1708 EDT atorvastatin (LIPITOR) tablet 40 mg 40 mg Oral Given Ambreen Jovel LPN     83/84/0003 3251 EDT tamsulosin (FLOMAX) capsule 0.8 mg 0.8 mg Oral Given Ambreen Jovel LPN     56/03/1125 3188 EDT lactated ringers infusion 0 mL/hr Intravenous Stopped Susanna Soriano, SUNDEEP     07/10/2023 0545 EDT heparin (porcine) subcutaneous injection 5,000 Units 5,000 Units Subcutaneous Given Keila Guzman RN     07/09/2023 2110 EDT heparin (porcine) subcutaneous injection 5,000 Units 5,000 Units Subcutaneous Given Keila Guzman RN     07/09/2023 1433 EDT heparin (porcine) subcutaneous injection 5,000 Units 5,000 Units Subcutaneous Given Kezia Carrillo LPN     23/25/8471 0035 EDT insulin lispro (HumaLOG) 100 units/mL subcutaneous injection 2-12 Units 2 Units Subcutaneous Given Cinthya Wells RN     07/09/2023 1710 EDT insulin lispro (HumaLOG) 100 units/mL subcutaneous injection 2-12 Units 2 Units Subcutaneous Given Kezia Carrillo LPN     93/29/3882 9235 EDT insulin lispro (HumaLOG) 100 units/mL subcutaneous injection 2-12 Units 6 Units Subcutaneous Given Denton Lopez RN     07/09/2023 3252 EDT insulin lispro (HumaLOG) 100 units/mL subcutaneous injection 2-12 Units 2 Units Subcutaneous Given Denton Lopez RN     07/09/2023 9672 EDT loperamide (IMODIUM) capsule 2 mg 2 mg Oral Given Kezia Carrillo LPN     41/30/0511 3025 EDT insulin detemir (LEVEMIR) subcutaneous injection 10 Units 10 Units Subcutaneous Given Kezia Carrillo LPN          Objective:     Vitals: Blood pressure 151/70, pulse 60, temperature 98.2 °F (36.8 °C), temperature source Oral, resp. rate 18, height 5' 6" (1.676 m), weight 113 kg (249 lb), SpO2 92 %. ,Body mass index is 40.19 kg/m². Intake/Output Summary (Last 24 hours) at 7/10/2023 0816  Last data filed at 7/9/2023 1201  Gross per 24 hour   Intake 723 ml   Output --   Net 723 ml       Physical Exam:   General Appearance: Awake and alert, in no acute distress  Chest: clear to auscultation, no rales or rhonchi  Cardiac: S1 & S2 normal, no murmur or gallop  Abdomen: Soft, non-tender, non-distended; bowel sounds normal; no masses or no organomegaly    Invasive Devices     Peripheral Intravenous Line  Duration           Peripheral IV 07/07/23 Dorsal (posterior); Left Hand 2 days                Lab Results:  Admission on 07/07/2023   Component Date Value   • WBC 07/07/2023 8.08    • RBC 07/07/2023 4.10    • Hemoglobin 07/07/2023 12.9    • Hematocrit 07/07/2023 39.8    • MCV 07/07/2023 97    • MCH 07/07/2023 31.5    • MCHC 07/07/2023 32.4    • RDW 07/07/2023 14.3    • MPV 07/07/2023 10.0    • Platelets 73/39/5961 191    • nRBC 07/07/2023 0    • Neutrophils Relative 07/07/2023 70    • Immat GRANS % 07/07/2023 0    • Lymphocytes Relative 07/07/2023 21    • Monocytes Relative 07/07/2023 6    • Eosinophils Relative 07/07/2023 3    • Basophils Relative 07/07/2023 0    • Neutrophils Absolute 07/07/2023 5.64    • Immature Grans Absolute 07/07/2023 0.01    • Lymphocytes Absolute 07/07/2023 1.67    • Monocytes Absolute 07/07/2023 0.49    • Eosinophils Absolute 07/07/2023 0.24    • Basophils Absolute 07/07/2023 0.03    • Sodium 07/07/2023 141    • Potassium 07/07/2023 3.8    • Chloride 07/07/2023 105    • CO2 07/07/2023 29    • ANION GAP 07/07/2023 7    • BUN 07/07/2023 25    • Creatinine 07/07/2023 1.73 (H)    • Glucose 07/07/2023 128    • Calcium 07/07/2023 8.6    • AST 07/07/2023 18    • ALT 07/07/2023 14    • Alkaline Phosphatase 07/07/2023 60    • Total Protein 07/07/2023 6.5    • Albumin 07/07/2023 3.7    • Total Bilirubin 07/07/2023 0.39    • eGFR 07/07/2023 36    • Magnesium 07/07/2023 1.9    • Lipase 07/07/2023 8,111 (H)    • LACTIC ACID 07/07/2023 0.8    • POC Glucose 07/07/2023 140    • Cholesterol 07/07/2023 124    • Triglycerides 07/07/2023 118    • HDL, Direct 07/07/2023 53    • LDL Calculated 07/07/2023 47    • Non-HDL-Chol (CHOL-HDL) 07/07/2023 71    • POC Glucose 07/07/2023 155 (H)    • POC Glucose 07/07/2023 134    • WBC 07/08/2023 5.41    • RBC 07/08/2023 3.75 (L)    • Hemoglobin 07/08/2023 11.8 (L)    • Hematocrit 07/08/2023 36.7    • MCV 07/08/2023 98    • MCH 07/08/2023 31.5    • MCHC 07/08/2023 32.2    • RDW 07/08/2023 14.2    • MPV 07/08/2023 10.2    • Platelets 75/95/8309 154    • nRBC 07/08/2023 0    • Neutrophils Relative 07/08/2023 57    • Immat GRANS % 07/08/2023 0    • Lymphocytes Relative 07/08/2023 29    • Monocytes Relative 07/08/2023 11    • Eosinophils Relative 07/08/2023 3    • Basophils Relative 07/08/2023 0    • Neutrophils Absolute 07/08/2023 3.01    • Immature Grans Absolute 07/08/2023 0.02    • Lymphocytes Absolute 07/08/2023 1.58    • Monocytes Absolute 07/08/2023 0.60    • Eosinophils Absolute 07/08/2023 0.18    • Basophils Absolute 07/08/2023 0.02    • Sodium 07/08/2023 139    • Potassium 07/08/2023 4.2    • Chloride 07/08/2023 104    • CO2 07/08/2023 28    • ANION GAP 07/08/2023 7    • BUN 07/08/2023 19    • Creatinine 07/08/2023 1.47 (H)    • Glucose 07/08/2023 142 (H)    • Calcium 07/08/2023 8.6    • Corrected Calcium 07/08/2023 9.1    • AST 07/08/2023 12 (L)    • ALT 07/08/2023 11    • Alkaline Phosphatase 07/08/2023 46    • Total Protein 07/08/2023 6.0 (L)    • Albumin 07/08/2023 3.4 (L)    • Total Bilirubin 07/08/2023 0.77    • eGFR 07/08/2023 44    • Lipase 07/08/2023 686 (H)    • POC Glucose 07/08/2023 145 (H)    • POC Glucose 07/08/2023 214 (H)    • POC Glucose 07/08/2023 164 (H)    • POC Glucose 07/08/2023 118    • WBC 07/09/2023 6.17    • RBC 07/09/2023 3.81 (L)    • Hemoglobin 07/09/2023 12.1    • Hematocrit 07/09/2023 36.5    • MCV 07/09/2023 96    • MCH 07/09/2023 31.8    • MCHC 07/09/2023 33.2    • RDW 07/09/2023 13.7    • MPV 07/09/2023 10.6    • Platelets 66/74/4853 160    • nRBC 07/09/2023 0    • Neutrophils Relative 07/09/2023 67    • Immat GRANS % 07/09/2023 0    • Lymphocytes Relative 07/09/2023 21    • Monocytes Relative 07/09/2023 9    • Eosinophils Relative 07/09/2023 3    • Basophils Relative 07/09/2023 0    • Neutrophils Absolute 07/09/2023 4.08    • Immature Grans Absolute 07/09/2023 0.02    • Lymphocytes Absolute 07/09/2023 1.29    • Monocytes Absolute 07/09/2023 0.56    • Eosinophils Absolute 07/09/2023 0.20    • Basophils Absolute 07/09/2023 0.02    • Sodium 07/09/2023 141    • Potassium 07/09/2023 4.0    • Chloride 07/09/2023 104    • CO2 07/09/2023 27    • ANION GAP 07/09/2023 10    • BUN 07/09/2023 18    • Creatinine 07/09/2023 1.39 (H)    • Glucose 07/09/2023 159 (H)    • Calcium 07/09/2023 9.1    • Corrected Calcium 07/09/2023 9.7    • AST 07/09/2023 13    • ALT 07/09/2023 11    • Alkaline Phosphatase 07/09/2023 46    • Total Protein 07/09/2023 5.9 (L)    • Albumin 07/09/2023 3.2 (L)    • Total Bilirubin 07/09/2023 0.60    • eGFR 07/09/2023 47    • POC Glucose 07/09/2023 162 (H)    • POC Glucose 07/09/2023 278 (H)    • POC Glucose 07/09/2023 183 (H)    • POC Glucose 07/09/2023 194 (H)    • POC Glucose 07/10/2023 191 (H)        Imaging Studies: I have personally reviewed pertinent imaging studies.

## 2023-07-10 NOTE — ASSESSMENT & PLAN NOTE
· Hospitalized for acute pancreatitis 6/9 - 6/12 and again on 6/21 - 6/22   · He has been off of Rybelsus, hydrochlorothiazide, Januvia for months  · Thought to be from recurring gallstones given imaging findings from the early June hospitalization: CT/MRI/MRCP showed probable gallstone/sludge without ductal dilatation or evidence of choledocholithiasis. He also had some findings suggestive of adenomyomatosis of the gallbladder and narrowing of the pancreatic duct with no evidence of proximal dilation or obvious lesions and a small stricture or ductal nodule. · GI and general surgery were consulted and it was determined the patient would undergo lap dionne which happened on 2/13/9525 without complications thus far.  Fu as outpatient at [address] [place] [address]- op check

## 2023-07-10 NOTE — ASSESSMENT & PLAN NOTE
Patient has no abdominal pain, fever, blood in the stools.   Follow-up with GI  Continue Imodium as needed

## 2023-07-10 NOTE — PROGRESS NOTES
Progress Note - General Surgery   Adonis Gutierrez 82 y.o. male MRN: [id number]  Unit/Bed#: -Marissa Encounter: [id number]    Assessment:  Recurrent acute pancreatitis. Present episode resolved. Etiology unknown at present time. May be drug related possibly Jardiance, may be delayed presentation of choledocholithiasis. Plan:  Possible discharge as per primary service    Subjective/Objective   Chief Complaint: No complaints    Subjective: Patient is abdominal pain is completely resolved. He is now tolerating low-fat diet. Objective: Vital signs are stable. Blood pressure 151/70, pulse 60, temperature 98.2 °F (36.8 °C), temperature source Oral, resp. rate 18, height 5' 6" (1.676 m), weight 113 kg (249 lb), SpO2 92 %. ,Body mass index is 40.19 kg/m². Intake/Output Summary (Last 24 hours) at 7/10/2023 0927  Last data filed at 7/10/2023 0801  Gross per 24 hour   Intake 963 ml   Output --   Net 963 ml       Invasive Devices     Peripheral Intravenous Line  Duration           Peripheral IV 07/07/23 Dorsal (posterior); Left Hand 2 days                Physical Exam: No exam performed today, Not required. Lab, Imaging and other studies:I have personally reviewed pertinent lab results.     VTE Pharmacologic Prophylaxis: Heparin  VTE Mechanical Prophylaxis: sequential compression device

## 2023-07-10 NOTE — DISCHARGE SUMMARY
[address] [facility]  Discharge- Carmen Gutierrez 1940, 82 y.o. male MRN: [id number]  Unit/Bed#: -Marissa Encounter: [id number]  Primary Care Provider: Elliot Vidal MD   Date and time admitted to hospital: 7/7/2023 12:09 AM    Diarrhea due to malabsorption  Assessment & Plan  Patient has no abdominal pain, fever, blood in the stools. Follow-up with GI  Continue Imodium as needed    Moderate aortic stenosis  Assessment & Plan  · Noted on April 2023 echo    History of recent hospitalization  Assessment & Plan  · Hospitalized for acute pancreatitis 6/9 - 6/12 and again on 6/21 - 6/22   · He has been off of Rybelsus, hydrochlorothiazide, Januvia for months  · Thought to be from recurring gallstones given imaging findings from the early June hospitalization: CT/MRI/MRCP showed probable gallstone/sludge without ductal dilatation or evidence of choledocholithiasis. He also had some findings suggestive of adenomyomatosis of the gallbladder and narrowing of the pancreatic duct with no evidence of proximal dilation or obvious lesions and a small stricture or ductal nodule. · GI and general surgery were consulted and it was determined the patient would undergo lap dionne which happened on 8/48/9605 without complications thus far.  Fu as outpatient at [address] [place] [address]- op check    Type 2 diabetes mellitus with kidney complication, with long-term current use of insulin [facility])  Assessment & Plan    Lab Results   Component Value Date    HGBA1C 6.7 (H) 05/10/2023     · Home regimen: Levemir 36 units every morning, NovoLog sliding scale 3 times daily AC, Jardiance  · Inpatient regimen: Lispro SSI every 6 hours given n.p.o. status    BUN   Date Value Ref Range Status   07/09/2023 18 5 - 25 mg/dL Final   07/08/2023 19 5 - 25 mg/dL Final   07/07/2023 25 5 - 25 mg/dL Final     Creatinine   Date Value Ref Range Status   07/09/2023 1.39 (H) 0.60 - 1.30 mg/dL Final     Comment:     Standardized to IDMS reference method 07/08/2023 1.47 (H) 0.60 - 1.30 mg/dL Final     Comment:     Standardized to IDMS reference method   07/07/2023 1.73 (H) 0.60 - 1.30 mg/dL Final     Comment:     Standardized to IDMS reference method     · Chronic and stable    (HFpEF) heart failure with preserved ejection fraction [facility])  Assessment & Plan  Wt Readings from Last 3 Encounters:   07/07/23 113 kg (249 lb)   06/28/23 112 kg (246 lb 6.4 oz)   06/26/23 111 kg (245 lb)       · Grade 1 diastolic dysfunction according to April 2023 echocardiogram.  He is compensated  · Noted, continue ACE-I, beta-blocker. Not on diuretic  · Now that he is not on Jardiance he may need diuretics. Follow-up with primary care doctor      HTN (hypertension), benign  Assessment & Plan  · Continue clonidine, lisinopril and metoprolol succinate home doses    * Acute pancreatitis  Assessment & Plan  · IVF, n.p.o., analgesia. Diet advancement as tolerated  · Repeat MRI scheduled 7/26 to evaluate for follow-up to abnormalities on prior MRI. May need EUS pending results. Discuss with GI whether this will need to be as an inpatient or can still happen as an outpatient. They are consulted  · LFTs within normal limits  · Lipase 8000  · 7/7/2023 CT AP shows acute pancreatitis without abscess or pseudocyst on the pancreas. · Now improved significantly, lipase levels improved significantly down to 600s  · Patient denies any abdominal pain, will start the patient on full liquid diet and advance as tolerated  · Discussed with surgical team Dr. Sherice Thakkar, no acute intervention required at this stage  · Patient was seen by GI team as well, outpatient MRI scheduled for July 26 secondary to recurrent pancreatitis. · Discontinue IV fluids.   · He denies any abdominal pain today, currently on low-fat diet and tolerating well   · Continue to hold Jardiance on discharge        Medical Problems     Resolved Problems  Date Reviewed: 7/10/2023   None       Discharging Physician / Practitioner: Dorothy Diana Deanna Sorenson MD  PCP: Girma Gottlieb MD  Admission Date:   Admission Orders (From admission, onward)     Ordered        07/07/23 0311  INPATIENT ADMISSION  Once                      Discharge Date: 07/10/23    Consultations During Hospital Stay:  · Gastroenterology  · General surgery    Procedures Performed:   · none    Significant Findings / Test Results:   CT abdomen pelvis wo contrast [[id number]] Collected: 07/07/23 0145   Order Status: Completed Updated: 07/07/23 0205   Narrative:     CT ABDOMEN AND PELVIS WITHOUT IV CONTRAST     INDICATION:   epigastric pain, history of pancreatitis. COMPARISON: CT abdomen pelvis dated June 9, 2023. TECHNIQUE:  CT examination of the abdomen and pelvis was performed without intravenous contrast. Multiplanar 2D reformatted images were created from the source data. This examination, like all CT scans performed in the [facility], was performed utilizing techniques to minimize radiation dose exposure, including the use of iterative reconstruction and automated exposure control. Radiation dose length   product (DLP) for this visit:  1083.4 mGy-cm     Enteric contrast was not administered. FINDINGS:     ABDOMEN     LOWER CHEST: Atelectatic changes are noted at the lung bases. LIVER/BILIARY TREE:  Unremarkable. GALLBLADDER: Gallbladder is surgically absent. No evidence of complication. SPLEEN:  Unremarkable. PANCREAS: There is mild to moderate peripancreatic inflammatory stranding suspicious for acute pancreatitis.  However, there is no focal fluid collection to suggest a pseudocyst or abscess.  Evaluation of the pancreatic parenchyma was limited by the lack    of intravenous contrast.     ADRENAL GLANDS:  Unremarkable. KIDNEYS/URETERS: No hydronephrosis. Bilateral renal cysts are again noted. STOMACH AND BOWEL: Nurys Salter is colonic diverticulosis without evidence of acute diverticulitis.      APPENDIX:  A normal appendix was visualized. ABDOMINOPELVIC CAVITY:  No ascites.  No pneumoperitoneum.  No lymphadenopathy. VESSELS: Atherosclerotic changes are present.  No evidence of aneurysm. PELVIS     REPRODUCTIVE ORGANS: The prostate is enlarged. URINARY BLADDER:  Unremarkable. ABDOMINAL WALL/INGUINAL REGIONS: There is a small fat-containing umbilical hernia with mild inflammatory stranding which may be postoperative in nature. Bilateral fat-containing renal hernias are again noted. OSSEOUS STRUCTURES:  No acute fracture or destructive osseous lesion. Impression:       Mild to moderate diffuse peripancreatic inflammatory stranding most compatible with acute pancreatitis. Correlation with serum lipase levels is recommended. There is no focal fluid collection to suggest an abscess or pseudocyst.     No free air or free fluid. Scattered colonic diverticulosis with no inflammatory changes present to suggest acute diverticulitis. ·     Incidental Findings:   ·     Test Results Pending at Discharge (will require follow up): Outpatient Tests Requested:  CBC  CMP     Complications:  See hospital course    Reason for Admission: epigastric pain     Hospital Course:   28-year-old male with history of pancreatitis, gallstones status post lap dionne in June, obesity, type 2 diabetes mellitus, CKD, GERD, HFpEF, PAULA and aortic Stenosis who presented with epigastric pain. Patient reported pain in the epigastric area similar to previous episodes of pancreatitis. Pain improved initially with IV Dilaudid but then recurred. Patient denies any vomiting or diarrhea. Patient denies any other symptoms. Patient was diagnosed with acute pancreatitis was a started on IV fluids, was put n.p.o. and analgesia. CT scan did no show any abscess or pseudocyst on the pancreas. During his hospital stay diet was advanced as tolerated, patient was given IV fluids and adequate pain control was achieved.   Glucose was closely monitored, and hyperglycemia treated as needed with sliding scale insulin regimen. Patient was advised to stop taking Jardiance. GI and surgery followed the patient closely. Patient will have follow-up with gastroenterology and surgery later this week and MRIs are scheduled for 7/26. Please see above list of diagnoses and related plan for additional information. Condition at Discharge: stable    Discharge Day Visit / Exam:   Subjective:    Patient was seen this morning at bedside, he denies any symptoms, he is tolerating diet without any difficulties. Vitals: Blood Pressure: 151/70 (07/10/23 0701)  Pulse: 60 (07/10/23 0701)  Temperature: 98.2 °F (36.8 °C) (07/10/23 0701)  Temp Source: Oral (07/10/23 0701)  Respirations: 18 (07/10/23 0701)  Height: 5' 6" (167.6 cm) (07/07/23 0841)  Weight - Scale: 113 kg (249 lb) (07/07/23 0841)  SpO2: 92 % (07/10/23 0706)  Exam:   Physical Exam  Vitals reviewed. Constitutional:       General: He is not in acute distress. Appearance: He is normal weight. He is not toxic-appearing or diaphoretic. HENT:      Head: Normocephalic and atraumatic. Nose: Nose normal.      Mouth/Throat:      Mouth: Mucous membranes are moist.   Eyes:      General: No scleral icterus. Cardiovascular:      Rate and Rhythm: Normal rate. Heart sounds: Murmur heard. Abdominal:      General: There is no distension. Palpations: Abdomen is soft. Tenderness: There is no abdominal tenderness. Musculoskeletal:      Right lower leg: Edema present. Left lower leg: Edema present. Comments: Trace pedal edema   Skin:     General: Skin is warm. Neurological:      Mental Status: Mental status is at baseline. Psychiatric:         Mood and Affect: Mood normal.          Plan of care discussed with patient in detail. Discharge instructions/Information to patient and family:   See after visit summary for information provided to patient and family.       Provisions for Follow-Up Care:  See after visit summary for information related to follow-up care and any pertinent home health orders. Disposition:   Home    Planned Readmission: no     Discharge Statement:  I spent 30 minutes discharging the patient. This time was spent on the day of discharge. I had direct contact with the patient on the day of discharge. Greater than 50% of the total time was spent examining patient, answering all patient questions, arranging and discussing plan of care with patient as well as directly providing post-discharge instructions. Additional time then spent on discharge activities. Discharge Medications:  See after visit summary for reconciled discharge medications provided to patient and/or family.       **Please Note: This note may have been constructed using a voice recognition system**

## 2023-07-10 NOTE — PROGRESS NOTES
Patient discharged to home. VSS. No distress noted. Patient verbalized understanding of discharge instructions including diagnosis, medications, follow-up, lab work and plan of care.

## 2023-07-10 NOTE — ASSESSMENT & PLAN NOTE
· IVF, n.p.o., analgesia. Diet advancement as tolerated  · Repeat MRI scheduled 7/26 to evaluate for follow-up to abnormalities on prior MRI. May need EUS pending results. Discuss with GI whether this will need to be as an inpatient or can still happen as an outpatient. They are consulted  · LFTs within normal limits  · Lipase 8000  · 7/7/2023 CT AP shows acute pancreatitis without abscess or pseudocyst on the pancreas. · Now improved significantly, lipase levels improved significantly down to 600s  · Patient denies any abdominal pain, will start the patient on full liquid diet and advance as tolerated  · Discussed with surgical team Dr. French Echevarria, no acute intervention required at this stage  · Patient was seen by GI team as well, outpatient MRI scheduled for July 26 secondary to recurrent pancreatitis. · Discontinue IV fluids.   · He denies any abdominal pain today, currently on low-fat diet and tolerating well   · Continue to hold Jardiance on discharge

## 2023-07-10 NOTE — PLAN OF CARE
Problem: PAIN - ADULT  Goal: Verbalizes/displays adequate comfort level or baseline comfort level  Description: Interventions:  - Encourage patient to monitor pain and request assistance  - Assess pain using appropriate pain scale  - Administer analgesics based on type and severity of pain and evaluate response  - Implement non-pharmacological measures as appropriate and evaluate response  - Consider cultural and social influences on pain and pain management  - Notify physician/advanced practitioner if interventions unsuccessful or patient reports new pain  7/10/2023 0708 by Lizzy Victoria RN  Outcome: Progressing  7/10/2023 0659 by Lizzy Victoria RN  Outcome: Progressing     Problem: DISCHARGE PLANNING  Goal: Discharge to home or other facility with appropriate resources  Description: INTERVENTIONS:  - Identify barriers to discharge w/patient and caregiver  - Arrange for needed discharge resources and transportation as appropriate  - Identify discharge learning needs (meds, wound care, etc.)  - Arrange for interpretive services to assist at discharge as needed  - Refer to Case Management Department for coordinating discharge planning if the patient needs post-hospital services based on physician/advanced practitioner order or complex needs related to functional status, cognitive ability, or social support system  7/10/2023 0708 by Lizzy Victoria RN  Outcome: Progressing  7/10/2023 0659 by Lizzy Victoria RN  Outcome: Progressing

## 2023-07-10 NOTE — CASE MANAGEMENT
Case Management Assessment & Discharge Planning Note    Patient name Argenis Bone  Location [facility] [place]/-01 MRN [id number]  : 1940 Date 7/10/2023       Current Admission Date: 2023  Current Admission Diagnosis:Acute pancreatitis   Patient Active Problem List    Diagnosis Date Noted   • Diarrhea due to malabsorption 2023   • Platelets decreased ([address]) 2023   • Left arm swelling 2023   • Acute pancreatitis 2023   • Epigastric pain 2023   • Moderate aortic stenosis 2023   • Stage 3b chronic kidney disease (CKD) ([address]) 2023   • Venous stasis dermatitis of both lower extremities 2022   • Type 2 diabetes mellitus with kidney complication, with long-term current use of insulin ([address]) 2022   • History of recent hospitalization 2022   • GERD without esophagitis 2022   • Mixed hyperlipidemia 2022   • Coronary artery disease involving native coronary artery without angina pectoris 2022   • ED (erectile dysfunction) 2022   • Obesity, morbid (HCC)    • PAULA (obstructive sleep apnea) 2020   • HTN (hypertension), benign 2020   • (HFpEF) heart failure with preserved ejection fraction ([address]) 2020      LOS (days): 3  Geometric Mean LOS (GMLOS) (days): 3.10  Days to GMLOS:-0.2     OBJECTIVE:  PATIENT READMITTED TO HOSPITAL  Risk of Unplanned Readmission Score: 18.42         Current admission status: Inpatient       Preferred Pharmacy:   [address], [address] [place] [address]  Phone: [phone] Fax: [phone]    Primary Care Provider: Grisel Marin MD    Primary Insurance: MEDICARE  Secondary Insurance: BLUE CROSS    ASSESSMENT:    Readmission Root Cause  30 Day Readmission: Yes    Patient Information  Admitted from[de-identified] Home  Mental Status: Alert  During Assessment patient was accompanied by: Spouse  Assessment information provided by[de-identified] Patient  Primary Caregiver: Self  Support Systems: Spouse/significant other  [place]  Residence: [facility] [address] [facility] do you live in?: [facility] entry access options.  Select all that apply.: No steps to enter home, Elevator  Type of Current Residence: Apartment  Floor Level: 3  Upon entering residence, is there a bedroom on the main floor (no further steps)?: Yes  Upon entering residence, is there a bathroom on the main floor (no further steps)?: Yes  In the last 12 months, was there a time when you were not able to pay the mortgage or rent on time?: No  In the last 12 months, how many places have you lived?: 1  In the last 12 months, was there a time when you did not have a steady place to sleep or slept in a shelter (including now)?: No  Homeless/housing insecurity resource given?: No  Living Arrangements: Lives w/ Spouse/significant other  Is patient a ?: No    Activities of Daily Living Prior to Admission  Functional Status: Independent  Completes ADLs independently?: Yes  Ambulates independently?: Yes  Does patient use assisted devices?: No  Does patient currently own DME?: No  Does patient have a history of Outpatient Therapy (PT/OT)?: No  Does the patient have a history of Short-Term Rehab?: No  Does patient have a history of HHC?: No  Does patient currently have [facility]?: No    Patient Information Continued  Income Source: Pension/half-way  Does patient have prescription coverage?: Yes  Within the past 12 months, you worried that your food would run out before you got the money to buy more.: Never true  Within the past 12 months, the food you bought just didn't last and you didn't have money to get more.: Never true  Food insecurity resource given?: No  Does patient receive dialysis treatments?: No  Does patient have a history of substance abuse?: No  Does patient have a history of Mental Health Diagnosis?: No    PHQ 2/9 Screening   Reviewed PHQ 2/9 Depression Screening Score?: No    Means of Transportation  Means of Transport to Appts[de-identified] Drives Self  In the past 12 months, has lack of transportation kept you from medical appointments or from getting medications?: No  In the past 12 months, has lack of transportation kept you from meetings, work, or from getting things needed for daily living?: No  Was application for public transport provided?: No    DISCHARGE DETAILS:    Discharge planning discussed with[de-identified] Patient  Freedom of Choice: Yes     CM contacted family/caregiver?: Yes  Were Treatment Team discharge recommendations reviewed with patient/caregiver?: Yes  Did patient/caregiver verbalize understanding of patient care needs?: Yes  Were patient/caregiver advised of the risks associated with not following Treatment Team discharge recommendations?: Yes    Contacts  Patient Contacts: Estil Expose  Relationship to Patient[de-identified] Family  Contact Method: In Person  Reason/Outcome: Emergency Contact, Discharge [facility]         Is the patient interested in [address] at discharge?: No    DME Referral Provided  Referral made for DME?: No    Other Referral/Resources/Interventions Provided:  Interventions: None Indicated    Would you like to participate in our [facility] netZentry service program?  : No - Declined    Treatment Team Recommendation: Home  Discharge Destination Plan[de-identified] Home  Transport at Discharge : Family  ETA of Transport (Date): 07/10/23     IMM Given (Date):: 07/10/23  IMM Given to[de-identified] Patient  IMM reviewed with patient, patient agrees with discharge determination.     KAREN Arriola, ACSW, ACM, CCM  07/10/23 9:34 AM

## 2023-07-10 NOTE — PLAN OF CARE
Problem: PAIN - ADULT  Goal: Verbalizes/displays adequate comfort level or baseline comfort level  Description: Interventions:  - Encourage patient to monitor pain and request assistance  - Assess pain using appropriate pain scale  - Administer analgesics based on type and severity of pain and evaluate response  - Implement non-pharmacological measures as appropriate and evaluate response  - Consider cultural and social influences on pain and pain management  - Notify physician/advanced practitioner if interventions unsuccessful or patient reports new pain  Outcome: Progressing     Problem: INFECTION - ADULT  Goal: Absence or prevention of progression during hospitalization  Description: INTERVENTIONS:  - Assess and monitor for signs and symptoms of infection  - Monitor lab/diagnostic results  - Monitor all insertion sites, i.e. indwelling lines, tubes, and drains  - Monitor endotracheal if appropriate and nasal secretions for changes in amount and color  - [place] appropriate cooling/warming therapies per order  - Administer medications as ordered  - Instruct and encourage patient and family to use good hand hygiene technique  - Identify and instruct in appropriate isolation precautions for identified infection/condition  Outcome: Progressing  Goal: Absence of fever/infection during neutropenic period  Description: INTERVENTIONS:  - Monitor WBC    Outcome: Progressing     Problem: SAFETY ADULT  Goal: Patient will remain free of falls  Description: INTERVENTIONS:  - Educate patient/family on patient safety including physical limitations  - Instruct patient to call for assistance with activity   - Consult OT/PT to assist with strengthening/mobility   - Keep Call bell within reach  - Keep bed low and locked with side rails adjusted as appropriate  - Keep care items and personal belongings within reach  - Initiate and maintain comfort rounds  - Make Fall Risk Sign visible to staff  - Offer Toileting every 2 Hours, in advance of need  - Initiate/Maintain bed alarm  - Obtain necessary fall risk management equipment:   - Apply yellow socks and bracelet for high fall risk patients  - Consider moving patient to room near nurses station  Outcome: Progressing  Goal: Maintain or return to baseline ADL function  Description: INTERVENTIONS:  -  Assess patient's ability to carry out ADLs; assess patient's baseline for ADL function and identify physical deficits which impact ability to perform ADLs (bathing, care of mouth/teeth, toileting, grooming, dressing, etc.)  - Assess/evaluate cause of self-care deficits   - Assess range of motion  - Assess patient's mobility; develop plan if impaired  - Assess patient's need for assistive devices and provide as appropriate  - Encourage maximum independence but intervene and supervise when necessary  - Involve family in performance of ADLs  - Assess for home care needs following discharge   - Consider OT consult to assist with ADL evaluation and planning for discharge  - Provide patient education as appropriate  Outcome: Progressing  Goal: Maintains/Returns to pre admission functional level  Description: INTERVENTIONS:  - Perform BMAT or MOVE assessment daily.   - Set and communicate daily mobility goal to care team and patient/family/caregiver. - Collaborate with rehabilitation services on mobility goals if consulted  - Perform Range of Motion 3 times a day. - Reposition patient every 2 hours.   - Dangle patient 3 times a day  - Stand patient 3 times a day  - Ambulate patient 3 times a day  - Out of bed to chair 3 times a day   - Out of bed for meals 3 times a day  - Out of bed for toileting  - Record patient progress and toleration of activity level   Outcome: Progressing     Problem: DISCHARGE PLANNING  Goal: Discharge to home or other facility with appropriate resources  Description: INTERVENTIONS:  - Identify barriers to discharge w/patient and caregiver  - Arrange for needed discharge resources and transportation as appropriate  - Identify discharge learning needs (meds, wound care, etc.)  - Arrange for interpretive services to assist at discharge as needed  - Refer to Case Management Department for coordinating discharge planning if the patient needs post-hospital services based on physician/advanced practitioner order or complex needs related to functional status, cognitive ability, or social support system  Outcome: Progressing

## 2023-07-10 NOTE — ASSESSMENT & PLAN NOTE
Wt Readings from Last 3 Encounters:   07/07/23 113 kg (249 lb)   06/28/23 112 kg (246 lb 6.4 oz)   06/26/23 111 kg (245 lb)       · Grade 1 diastolic dysfunction according to April 2023 echocardiogram.  He is compensated  · Noted, continue ACE-I, beta-blocker. Not on diuretic  · Now that he is not on Jardiance he may need diuretics.   Follow-up with primary care doctor

## 2023-07-10 NOTE — PLAN OF CARE
Problem: PAIN - ADULT  Goal: Verbalizes/displays adequate comfort level or baseline comfort level  Description: Interventions:  - Encourage patient to monitor pain and request assistance  - Assess pain using appropriate pain scale  - Administer analgesics based on type and severity of pain and evaluate response  - Implement non-pharmacological measures as appropriate and evaluate response  - Consider cultural and social influences on pain and pain management  - Notify physician/advanced practitioner if interventions unsuccessful or patient reports new pain  Outcome: Progressing     Problem: INFECTION - ADULT  Goal: Absence or prevention of progression during hospitalization  Description: INTERVENTIONS:  - Assess and monitor for signs and symptoms of infection  - Monitor lab/diagnostic results  - Monitor all insertion sites, i.e. indwelling lines, tubes, and drains  - Monitor endotracheal if appropriate and nasal secretions for changes in amount and color  - [place] appropriate cooling/warming therapies per order  - Administer medications as ordered  - Instruct and encourage patient and family to use good hand hygiene technique  - Identify and instruct in appropriate isolation precautions for identified infection/condition  Outcome: Progressing  Goal: Absence of fever/infection during neutropenic period  Description: INTERVENTIONS:  - Monitor WBC    Outcome: Progressing     Problem: DISCHARGE PLANNING  Goal: Discharge to home or other facility with appropriate resources  Description: INTERVENTIONS:  - Identify barriers to discharge w/patient and caregiver  - Arrange for needed discharge resources and transportation as appropriate  - Identify discharge learning needs (meds, wound care, etc.)  - Arrange for interpretive services to assist at discharge as needed  - Refer to Case Management Department for coordinating discharge planning if the patient needs post-hospital services based on physician/advanced practitioner order or complex needs related to functional status, cognitive ability, or social support system  Outcome: Progressing

## 2023-07-11 ENCOUNTER — TRANSITIONAL CARE MANAGEMENT (OUTPATIENT)
Dept: FAMILY MEDICINE CLINIC | Facility: CLINIC | Age: 83
End: 2023-07-11

## 2023-07-14 ENCOUNTER — OFFICE VISIT (OUTPATIENT)
Dept: FAMILY MEDICINE CLINIC | Facility: CLINIC | Age: 83
End: 2023-07-14
Payer: MEDICARE

## 2023-07-14 VITALS
WEIGHT: 249 LBS | TEMPERATURE: 97.6 F | SYSTOLIC BLOOD PRESSURE: 150 MMHG | OXYGEN SATURATION: 98 % | HEART RATE: 63 BPM | DIASTOLIC BLOOD PRESSURE: 60 MMHG | BODY MASS INDEX: 40.02 KG/M2 | HEIGHT: 66 IN

## 2023-07-14 DIAGNOSIS — E11.22 TYPE 2 DIABETES MELLITUS WITH STAGE 3B CHRONIC KIDNEY DISEASE, WITH LONG-TERM CURRENT USE OF INSULIN (HCC): ICD-10-CM

## 2023-07-14 DIAGNOSIS — N18.32 TYPE 2 DIABETES MELLITUS WITH STAGE 3B CHRONIC KIDNEY DISEASE, WITH LONG-TERM CURRENT USE OF INSULIN (HCC): ICD-10-CM

## 2023-07-14 DIAGNOSIS — E78.2 MIXED HYPERLIPIDEMIA: ICD-10-CM

## 2023-07-14 DIAGNOSIS — G47.33 OSA (OBSTRUCTIVE SLEEP APNEA): ICD-10-CM

## 2023-07-14 DIAGNOSIS — I50.32 CHRONIC HEART FAILURE WITH PRESERVED EJECTION FRACTION (HCC): ICD-10-CM

## 2023-07-14 DIAGNOSIS — I10 HTN (HYPERTENSION), BENIGN: ICD-10-CM

## 2023-07-14 DIAGNOSIS — N18.32 STAGE 3B CHRONIC KIDNEY DISEASE (CKD) (HCC): ICD-10-CM

## 2023-07-14 DIAGNOSIS — Z00.00 MEDICARE ANNUAL WELLNESS VISIT, SUBSEQUENT: Primary | ICD-10-CM

## 2023-07-14 DIAGNOSIS — Z79.4 TYPE 2 DIABETES MELLITUS WITH STAGE 3B CHRONIC KIDNEY DISEASE, WITH LONG-TERM CURRENT USE OF INSULIN (HCC): ICD-10-CM

## 2023-07-14 DIAGNOSIS — E66.01 OBESITY, MORBID (HCC): ICD-10-CM

## 2023-07-14 DIAGNOSIS — K85.90 ACUTE RECURRENT PANCREATITIS: ICD-10-CM

## 2023-07-14 PROCEDURE — 99214 OFFICE O/P EST MOD 30 MIN: CPT

## 2023-07-14 PROCEDURE — G0439 PPPS, SUBSEQ VISIT: HCPCS

## 2023-07-14 RX ORDER — TORSEMIDE 10 MG/1
10 TABLET ORAL
COMMUNITY
Start: 2023-07-13 | End: 2024-07-12

## 2023-07-14 NOTE — ASSESSMENT & PLAN NOTE
Under control with CPAP which patient uses every night and patient wakes up refreshed. Patient does not feel daytime sleepiness or fatigue. He will continue evaluate every office visit.

## 2023-07-14 NOTE — ASSESSMENT & PLAN NOTE
Lab Results   Component Value Date    EGFR 47 07/09/2023    EGFR 44 07/08/2023    EGFR 36 07/07/2023    CREATININE 1.39 (H) 07/09/2023    CREATININE 1.47 (H) 07/08/2023    CREATININE 1.73 (H) 07/07/2023   creatinine and GFR stable   Will need periodic BMP  Avoid NSAIDs like ibuprofen Aleve Advil etc.  Avoid high potassium diet.   We will continue to monitor

## 2023-07-14 NOTE — PROGRESS NOTES
Assessment and Plan:     Problem List Items Addressed This Visit        Endocrine    Type 2 diabetes mellitus with kidney complication, with long-term current use of insulin ([address])       Lab Results   Component Value Date    HGBA1C 6.7 (H) 05/10/2023   Under control. Continue current medication. We will re-evaluate at next office visit. Relevant Medications    torsemide (DEMADEX) 10 mg tablet       Respiratory    PAULA (obstructive sleep apnea)     Under control with CPAP which patient uses every night and patient wakes up refreshed. Patient does not feel daytime sleepiness or fatigue. He will continue evaluate every office visit. Cardiovascular and Mediastinum    HTN (hypertension), benign     Under control. Continue current medication. We will re-evaluate at next office visit. Relevant Medications    torsemide (DEMADEX) 10 mg tablet    (HFpEF) heart failure with preserved ejection fraction (HCC)     Wt Readings from Last 3 Encounters:   07/14/23 113 kg (249 lb)   07/07/23 113 kg (249 lb)   06/28/23 112 kg (246 lb 6.4 oz)     Under control. Continue current medication. We will re-evaluate at next office visit. Has been followed by cardiologist                Genitourinary    Stage 3b chronic kidney disease (CKD) ([address])     Lab Results   Component Value Date    EGFR 47 07/09/2023    EGFR 44 07/08/2023    EGFR 36 07/07/2023    CREATININE 1.39 (H) 07/09/2023    CREATININE 1.47 (H) 07/08/2023    CREATININE 1.73 (H) 07/07/2023   creatinine and GFR stable   Will need periodic BMP  Avoid NSAIDs like ibuprofen Aleve Advil etc.  Avoid high potassium diet. We will continue to monitor              Relevant Medications    torsemide (DEMADEX) 10 mg tablet       Other    Obesity, morbid ([address])     Patient was advised to lose weight. Potential consequences of obesity discussed with patient.   Advised to have portion control no more than 60% of meal or may consider intermittent fasting  We will continue to monitor           Mixed hyperlipidemia     Under control. Continue current medication. We will re-evaluate at next office visit. Other Visit Diagnoses     Medicare annual wellness visit, subsequent    -  Primary    Acute recurrent pancreatitis            BMI Counseling: Body mass index is 40.19 kg/m². The BMI is above normal. Nutrition recommendations include decreasing portion sizes, decreasing fast food intake, limiting drinks that contain sugar, moderation in carbohydrate intake, increasing intake of lean protein and reducing intake of saturated and trans fat. Exercise recommendations include vigorous physical activity 75 minutes/week. No pharmacotherapy was ordered. Rationale for BMI follow-up plan is due to patient being overweight or obese. Preventive health issues were discussed with patient, and age appropriate screening tests were ordered as noted in patient's After Visit Summary. Personalized health advice and appropriate referrals for health education or preventive services given if needed, as noted in patient's After Visit Summary. History of Present Illness:     Patient presents for a Medicare Wellness Visit    Patient here for transitional care management and Medicare wellness and review of chronic medical problems and  the labs and imaging if it is applicable. Currently has no specific complaints other than mentioned in the review of systems  Denies chest pain, SOB, cough, abdominal pain, nausea, vomiting, fever, chills, lightheadedness, dizziness,headache, tingling or numbness. No bowel or bladder problem. Patient was admitted to the hospital again first time with acute pancreatitis even after his cholecystectomy. He is a scheduled MRI in the next couple weeks. But currently asymptomatic as far as pancreatitis goes no other new problems.      Patient Care Team:  Flakito Floyd MD as PCP - General (Internal Medicine)     Review of Systems:     Review of Systems   Constitutional: Negative for chills and fever. HENT: Negative for congestion, ear pain and sore throat. Eyes: Negative for pain and visual disturbance. Respiratory: Negative for cough and shortness of breath. Cardiovascular: Negative for chest pain and palpitations. Gastrointestinal: Negative for abdominal pain and vomiting. Endocrine: Negative for cold intolerance, heat intolerance, polydipsia, polyphagia and polyuria. Genitourinary: Negative for difficulty urinating, dysuria, frequency and hematuria. Musculoskeletal: Negative for arthralgias and back pain. Skin: Negative for color change and rash. Neurological: Negative for dizziness, seizures, syncope, light-headedness and headaches. Psychiatric/Behavioral: Negative for agitation and behavioral problems. All other systems reviewed and are negative.        Problem List:     Patient Active Problem List   Diagnosis   • PAULA (obstructive sleep apnea)   • HTN (hypertension), benign   • (HFpEF) heart failure with preserved ejection fraction (HCC)   • Obesity, morbid ([address])   • Type 2 diabetes mellitus with kidney complication, with long-term current use of insulin ([address])   • History of recent hospitalization   • GERD without esophagitis   • Mixed hyperlipidemia   • Coronary artery disease involving native coronary artery without angina pectoris   • ED (erectile dysfunction)   • Venous stasis dermatitis of both lower extremities   • Moderate aortic stenosis   • Stage 3b chronic kidney disease (CKD) ([facility])   • Epigastric pain   • Acute pancreatitis   • Left arm swelling   • Platelets decreased ([address])   • Diarrhea due to malabsorption      Past Medical and Surgical History:     Past Medical History:   Diagnosis Date   • Allergic    • Arthritis    • Chronic kidney disease 2021   • Chronic kidney disease (CKD) stage G3a/A1, moderately decreased glomerular filtration rate (GFR) between 45-59 mL/min/1.73 square meter and albuminuria creatinine ratio less than 30 mg/g (HCC)    • Colon polyp    • Diabetes mellitus (HCC)    • GERD (gastroesophageal reflux disease)    • Heart murmur    • History of echocardiogram    • HL (hearing loss)    • Hyperlipidemia    • Hypertension    • Obesity    • Sleep apnea, obstructive      Past Surgical History:   Procedure Laterality Date   • CARDIAC CATHETERIZATION     • COLONOSCOPY  2018   • COLONOSCOPY     • EYE SURGERY     • HEMORRHOID SURGERY     • JOINT REPLACEMENT  2017    knee   • OTHER SURGICAL HISTORY      Stent    • [place] LAPAROSCOPY SURG CHOLECYSTECTOMY N/A 2023    Procedure: CHOLECYSTECTOMY LAPAROSCOPIC;  Surgeon: Maxwell Arellano MD;  Location:  MAIN OR;  Service: General   • UPPER GASTROINTESTINAL ENDOSCOPY        Family History:     Family History   Problem Relation Age of Onset   • Heart disease Mother    • Heart attack Mother         46s   • Cancer Mother    • Coronary artery disease Mother    • Cancer Brother         Malignant tumor of lung      Social History:     Social History     Socioeconomic History   • Marital status: /Civil Union     Spouse name: None   • Number of children: None   • Years of education: None   • Highest education level: None   Occupational History   • None   Tobacco Use   • Smoking status: Former     Packs/day: 2.00     Years: 15.00     Total pack years: 30.00     Types: Cigarettes, Pipe, Cigars     Quit date: 1972     Years since quittin.7     Passive exposure: Past   • Smokeless tobacco: Never   Vaping Use   • Vaping Use: Never used   Substance and Sexual Activity   • Alcohol use:  Yes     Alcohol/week: 5.0 standard drinks of alcohol     Types: 5 Glasses of wine per week     Comment: Occasional    • Drug use: Never   • Sexual activity: Not Currently     Partners: Female   Other Topics Concern   • None   Social History Narrative    Pet- dog     Social Determinants of Health     Financial Resource Strain: Unknown (2023) Overall Financial Resource Strain (CARDIA)    • Difficulty of Paying Living Expenses: Patient refused   Food Insecurity: No Food Insecurity (7/10/2023)    Hunger Vital Sign    • Worried About Running Out of Food in the Last Year: Never true    • Ran Out of Food in the Last Year: Never true   Transportation Needs: Unknown (7/14/2023)    [facility] - Transportation    • Lack of Transportation (Medical): Patient refused    • Lack of Transportation (Non-Medical): Patient refused   Physical Activity: Not on file   Stress: Not on file   Social Connections: Not on file   Intimate Partner Violence: Not on file   Housing Stability: [address] [facility]  (7/10/2023)    Housing Stability Vital Sign    • Unable to Pay for Housing in the Last Year: No    • Number of Places Lived in the Last Year: 1    • Unstable Housing in the Last Year: No      Medications and Allergies:     Current Outpatient Medications   Medication Sig Dispense Refill   • acetaminophen (TYLENOL) 325 mg tablet Take 2 tablets (650 mg total) by mouth every 6 (six) hours as needed for mild pain  0   • allopurinol (ZYLOPRIM) 100 mg tablet Take 100 mg by mouth daily     • BD Pen Needle Pascale U/F 32G X 4 MM MISC USE AS INSTRUCTED WITH INSULIN PEN     • calcium carbonate (OS-WAYLON) 600 MG tablet Take 1,200 mg by mouth daily     • cholecalciferol (VITAMIN D3) 1,000 units tablet Take 2,000 Units by mouth daily     • cloNIDine (CATAPRES) 0.1 mg tablet Take 1 tablet (0.1 mg total) by mouth every 12 (twelve) hours     • clopidogrel (PLAVIX) 75 mg tablet Take 1 tablet (75 mg total) by mouth daily Instructed to stop prior to Surgery-Last dose 6/21/23 Do not start before June 29, 2023.  0   • Fexofenadine HCl (MUCINEX ALLERGY PO) Take by mouth     • fluticasone (FLONASE) 50 mcg/act nasal spray 2 sprays into each nostril daily 48 mL 0   • insulin aspart (NovoLOG FlexPen) 100 UNIT/ML injection pen Inject under the skin 3 (three) times a day with meals Sliding scale as ordered TID with meals • Lancets (OneTouch Delica Plus [id number]) MISC TEST GLUCOSE 3 4 TIMES/DAY     • Levemir FlexTouch 100 units/mL injection pen 36 Units daily In AM     • lisinopril (ZESTRIL) 40 mg tablet Take 1 tablet (40 mg total) by mouth daily 90 tablet 1   • metoprolol succinate (TOPROL-XL) 25 mg 24 hr tablet Take 1 tablet (25 mg total) by mouth daily 90 tablet 0   • Multiple Vitamins-Minerals (MULTIVITAMIN ADULT PO) Take by mouth daily     • omeprazole (PriLOSEC) 40 MG capsule Take 1 capsule (40 mg total) by mouth daily (Patient taking differently: Take 40 mg by mouth daily before breakfast) 180 capsule 0   • Pseudoephedrine HCl (SUDAFED PO) Take by mouth     • pyridoxine (VITAMIN B6) 100 mg tablet Take 100 mg by mouth daily     • ranolazine (RANEXA) 500 mg 12 hr tablet Take 1 tablet (500 mg total) by mouth 2 (two) times a day 180 tablet 0   • rosuvastatin (CRESTOR) 20 MG tablet Take 1 tablet by mouth daily     • tamsulosin (FLOMAX) 0.4 mg Take 2 capsules (0.8 mg total) by mouth daily 180 capsule 1   • torsemide (DEMADEX) 10 mg tablet Take 10 mg by mouth       No current facility-administered medications for this visit.      Allergies   Allergen Reactions   • Januvia [Sitagliptin] Other (See Comments)     pancreatitis   • Iodinated Contrast Media Other (See Comments)     Per pt, "got real hot feeling"   • Semaglutide Other (See Comments)     pancreatits   • Simvastatin Myalgia   • Trulicity [Dulaglutide] Other (See Comments)     Pancreatitis   • Wound Dressing Adhesive Other (See Comments)     Burning sensation      Immunizations:     Immunization History   Administered Date(s) Administered   • COVID-19 MODERNA VACC 0.5 ML IM 03/02/2021, 03/30/2021, 01/03/2022, 01/31/2022   • INFLUENZA 11/01/2005, 11/22/2005, 12/05/2006, 10/04/2007, 09/30/2008, 10/07/2009   • Influenza, high dose seasonal 0.7 mL 10/11/2022   • Pneumococcal 01/01/2004   • Pneumococcal Conjugate 13-Valent 04/04/2022   • Pneumococcal Conjugate PCV 7 04/12/2023 • Pneumococcal Conjugate Vaccine 20-valent (Pcv20), Polysace 04/12/2023      Health Maintenance: There are no preventive care reminders to display for this patient. Topic Date Due   • Influenza Vaccine (1) 09/01/2023      Medicare Screening Tests and Risk Assessments:     Venessa Falcon is here for his Subsequent Wellness visit. Health Risk Assessment:   Patient rates overall health as fair. Patient feels that their physical health rating is slightly worse. Patient is satisfied with their life. Eyesight was rated as slightly worse. Hearing was rated as slightly worse. Patient feels that their emotional and mental health rating is same. Patients states they are sometimes angry. Patient states they are sometimes unusually tired/fatigued. Pain experienced in the last 7 days has been a lot. Patient's pain rating has been 8/10. Patient states that he has experienced weight loss or gain in last 6 months. Fall Risk Screening: In the past year, patient has experienced: no history of falling in past year      Home Safety:  Patient does not have trouble with stairs inside or outside of their home. Patient has working smoke alarms and has working carbon monoxide detector. Home safety hazards include: none. Nutrition:   Current diet is Diabetic. Medications:   Patient is currently taking over-the-counter supplements. OTC medications include: See list. Patient is able to manage medications. Activities of Daily Living (ADLs)/Instrumental Activities of Daily Living (IADLs):   Walk and transfer into and out of bed and chair?: Yes  Dress and groom yourself?: Yes    Bathe or shower yourself?: Yes    Feed yourself?  Yes  Do your laundry/housekeeping?: Yes  Manage your money, pay your bills and track your expenses?: Yes  Make your own meals?: Yes    Do your own shopping?: Yes    Durable Medical Equipment Suppliers  [employer]    Previous Hospitalizations:   Any hospitalizations or ED visits within the last 12 months?: Yes    How many hospitalizations have you had in the last year?: 3-4    Advance Care Planning:   Living will: Yes    Durable POA for healthcare: Yes    Advanced directive: Yes      PREVENTIVE SCREENINGS      Cardiovascular Screening:    General: Screening Not Indicated and History Lipid Disorder      Diabetes Screening:     General: Screening Not Indicated and History Diabetes      Prostate Cancer Screening:    General: Screening Not Indicated      Abdominal Aortic Aneurysm (AAA) Screening:    Risk factors include: tobacco use        Lung Cancer Screening:     General: Screening Not Indicated    Screening, Brief Intervention, and Referral to Treatment (SBIRT)    Screening  Typical number of drinks in a day: 0  Typical number of drinks in a week: 0  Interpretation: Low risk drinking behavior. AUDIT-C Screenin) How often did you have a drink containing alcohol in the past year? 2 to 3 times a week  2) How many drinks did you have on a typical day when you were drinking in the past year? 1 to 2  3) How often did you have 6 or more drinks on one occasion in the past year? never    AUDIT-C Score: 3  Interpretation: Score 0-3 (male): Negative screen for alcohol misuse    Single Item Drug Screening:  How often have you used an illegal drug (including marijuana) or a prescription medication for non-medical reasons in the past year? never    Single Item Drug Screen Score: 0  Interpretation: Negative screen for possible drug use disorder    No results found. Physical Exam:     /60 (BP Location: Left arm, Patient Position: Sitting, Cuff Size: Adult)   Pulse 63   Temp 97.6 °F (36.4 °C) (Tympanic)   Ht 5' 6" (1.676 m)   Wt 113 kg (249 lb)   SpO2 98%   BMI 40.19 kg/m²     Physical Exam  Vitals and nursing note reviewed. Constitutional:       General: He is not in acute distress. Appearance: Normal appearance. He is well-developed. He is obese. He is not ill-appearing, toxic-appearing or diaphoretic. HENT:      Head: Normocephalic and atraumatic. Right Ear: Tympanic membrane normal. There is no impacted cerumen. Left Ear: Tympanic membrane normal. There is no impacted cerumen. Nose: Nose normal. No congestion or rhinorrhea. Mouth/Throat:      Mouth: Mucous membranes are moist.      Pharynx: Oropharynx is clear. Eyes:      General: No scleral icterus. Right eye: No discharge. Left eye: No discharge. Extraocular Movements: Extraocular movements intact. Conjunctiva/sclera: Conjunctivae normal.      Pupils: Pupils are equal, round, and reactive to light. Cardiovascular:      Rate and Rhythm: Normal rate and regular rhythm. Pulses: Normal pulses. Heart sounds: Normal heart sounds. No murmur heard. Pulmonary:      Effort: Pulmonary effort is normal. No respiratory distress. Breath sounds: Normal breath sounds. No wheezing. Abdominal:      General: Abdomen is flat. Bowel sounds are normal. There is no distension. Palpations: Abdomen is soft. Tenderness: There is no abdominal tenderness. There is no right CVA tenderness or left CVA tenderness. Musculoskeletal:         General: Normal range of motion. Cervical back: Normal range of motion and neck supple. Right lower leg: No edema. Left lower leg: No edema. Skin:     General: Skin is warm and dry. Capillary Refill: Capillary refill takes 2 to 3 seconds. Coloration: Skin is not jaundiced. Neurological:      General: No focal deficit present. Mental Status: He is alert and oriented to person, place, and time. Mental status is at baseline. Motor: No weakness.    Psychiatric:         Mood and Affect: Mood normal.         Behavior: Behavior normal.        [facility] Call     Date and time call was made  7/11/2023  1:22 PM    Hospital care reviewed  Records reviewed    Patient was hospitialized at  [address]    Date of Admission  07/07/23    Date of discharge 07/10/23    Diagnosis  Acute pancreatitis    Disposition  Home    Current Symptoms  None      TCM Call     Post hospital issues  None    Scheduled for follow up?   Yes    I have advised the patient to call PCP with any new or worsening symptoms  SIS Traore MD

## 2023-07-14 NOTE — PATIENT INSTRUCTIONS
Medicare Preventive Visit Patient Instructions  Thank you for completing your Welcome to Medicare Visit or Medicare Annual Wellness Visit today. Your next wellness visit will be due in one year (7/14/2024). The screening/preventive services that you may require over the next 5-10 years are detailed below. Some tests may not apply to you based off risk factors and/or age. Screening tests ordered at today's visit but not completed yet may show as past due. Also, please note that scanned in results may not display below. Preventive Screenings:  Service Recommendations Previous Testing/Comments   Colorectal Cancer Screening  · Colonoscopy    · Fecal Occult Blood Test (FOBT)/Fecal Immunochemical Test (FIT)  · Fecal DNA/Cologuard Test  · Flexible Sigmoidoscopy Age: 43-73 years old   Colonoscopy: every 10 years (May be performed more frequently if at higher risk)  OR  FOBT/FIT: every 1 year  OR  Cologuard: every 3 years  OR  Sigmoidoscopy: every 5 years  Screening may be recommended earlier than age 39 if at higher risk for colorectal cancer. Also, an individualized decision between you and your healthcare provider will decide whether screening between the ages of 77-80 would be appropriate.  Colonoscopy: 05/13/2022  FOBT/FIT: Not on file  Cologuard: Not on file  Sigmoidoscopy: Not on file          Prostate Cancer Screening Individualized decision between patient and health care provider in men between ages of 53-66   Medicare will cover every 12 months beginning on the day after your 50th birthday PSA: No results in last 5 years           Hepatitis C Screening Once for adults born between 80 and 1965  More frequently in patients at high risk for Hepatitis C Hep C Antibody: Not on file        Diabetes Screening 1-2 times per year if you're at risk for diabetes or have pre-diabetes Fasting glucose: 111 mg/dL (6/10/2023)  A1C: 6.7 % (5/10/2023)      Cholesterol Screening Once every 5 years if you don't have a lipid disorder. May order more often based on risk factors. Lipid panel: 07/07/2023         Other Preventive Screenings Covered by Medicare:  1. Abdominal Aortic Aneurysm (AAA) Screening: covered once if your at risk. You're considered to be at risk if you have a family history of AAA or a male between the age of 70-76 who smoking at least 100 cigarettes in your lifetime. 2. Lung Cancer Screening: covers low dose CT scan once per year if you meet all of the following conditions: (1) Age 48-67; (2) No signs or symptoms of lung cancer; (3) Current smoker or have quit smoking within the last 15 years; (4) You have a tobacco smoking history of at least 20 pack years (packs per day x number of years you smoked); (5) You get a written order from a healthcare provider. 3. Glaucoma Screening: covered annually if you're considered high risk: (1) You have diabetes OR (2) Family history of glaucoma OR (3)  aged 48 and older OR (3)  American aged 72 and older  3. Osteoporosis Screening: covered every 2 years if you meet one of the following conditions: (1) Have a vertebral abnormality; (2) On glucocorticoid therapy for more than 3 months; (3) Have primary hyperparathyroidism; (4) On osteoporosis medications and need to assess response to drug therapy. 5. HIV Screening: covered annually if you're between the age of 14-79. Also covered annually if you are younger than 13 and older than 72 with risk factors for HIV infection. For pregnant patients, it is covered up to 3 times per pregnancy.     Immunizations:  Immunization Recommendations   Influenza Vaccine Annual influenza vaccination during flu season is recommended for all persons aged >= 6 months who do not have contraindications   Pneumococcal Vaccine   * Pneumococcal conjugate vaccine = PCV13 (Prevnar 13), PCV15 (Vaxneuvance), PCV20 (Prevnar 20)  * Pneumococcal polysaccharide vaccine = PPSV23 (Pneumovax) Adults 20-63 years old: 1-3 doses may be recommended based on certain risk factors  Adults 72 years old: 1-2 doses may be recommended based off what pneumonia vaccine you previously received   Hepatitis B Vaccine 3 dose series if at intermediate or high risk (ex: diabetes, end stage renal disease, liver disease)   Tetanus (Td) Vaccine - COST NOT COVERED BY MEDICARE PART B Following completion of primary series, a booster dose should be given every 10 years to maintain immunity against tetanus. Td may also be given as tetanus wound prophylaxis. Tdap Vaccine - COST NOT COVERED BY MEDICARE PART B Recommended at least once for all adults. For pregnant patients, recommended with each pregnancy. Shingles Vaccine (Shingrix) - COST NOT COVERED BY MEDICARE PART B  2 shot series recommended in those aged 48 and above     Health Maintenance Due:  There are no preventive care reminders to display for this patient. Immunizations Due:      Topic Date Due   • Influenza Vaccine (1) 09/01/2023     Advance Directives   What are advance directives? Advance directives are legal documents that state your wishes and plans for medical care. These plans are made ahead of time in case you lose your ability to make decisions for yourself. Advance directives can apply to any medical decision, such as the treatments you want, and if you want to donate organs. What are the types of advance directives? There are many types of advance directives, and each state has rules about how to use them. You may choose a combination of any of the following:  · Living will: This is a written record of the treatment you want. You can also choose which treatments you do not want, which to limit, and which to stop at a certain time. This includes surgery, medicine, IV fluid, and tube feedings. · Durable power of  for healthcare [facility]): This is a written record that states who you want to make healthcare choices for you when you are unable to make them for yourself.  This person, called a proxy, is usually a family member or a friend. You may choose more than 1 proxy. · Do not resuscitate (DNR) order:  A DNR order is used in case your heart stops beating or you stop breathing. It is a request not to have certain forms of treatment, such as CPR. A DNR order may be included in other types of advance directives. · Medical directive: This covers the care that you want if you are in a coma, near death, or unable to make decisions for yourself. You can list the treatments you want for each condition. Treatment may include pain medicine, surgery, blood transfusions, dialysis, IV or tube feedings, and a ventilator (breathing machine). · Values history: This document has questions about your views, beliefs, and how you feel and think about life. This information can help others choose the care that you would choose. Why are advance directives important? An advance directive helps you control your care. Although spoken wishes may be used, it is better to have your wishes written down. Spoken wishes can be misunderstood, or not followed. Treatments may be given even if you do not want them. An advance directive may make it easier for your family to make difficult choices about your care. Weight Management   Why it is important to manage your weight:  Being overweight increases your risk of health conditions such as heart disease, high blood pressure, type 2 diabetes, and certain types of cancer. It can also increase your risk for osteoarthritis, sleep apnea, and other respiratory problems. Aim for a slow, steady weight loss. Even a small amount of weight loss can lower your risk of health problems. How to lose weight safely:  A safe and healthy way to lose weight is to eat fewer calories and get regular exercise. You can lose up about 1 pound a week by decreasing the number of calories you eat by 500 calories each day.    Healthy meal plan for weight management:  A healthy meal plan includes a variety of foods, contains fewer calories, and helps you stay healthy. A healthy meal plan includes the following:  · Eat whole-grain foods more often. A healthy meal plan should contain fiber. Fiber is the part of grains, fruits, and vegetables that is not broken down by your body. Whole-grain foods are healthy and provide extra fiber in your diet. Some examples of whole-grain foods are whole-wheat breads and pastas, oatmeal, brown rice, and bulgur. · Eat a variety of vegetables every day. Include dark, leafy greens such as spinach, kale, florentino greens, and mustard greens. Eat yellow and orange vegetables such as carrots, sweet potatoes, and winter squash. · Eat a variety of fruits every day. Choose fresh or canned fruit (canned in its own juice or light syrup) instead of juice. Fruit juice has very little or no fiber. · Eat low-fat dairy foods. Drink fat-free (skim) milk or 1% milk. Eat fat-free yogurt and low-fat cottage cheese. Try low-fat cheeses such as mozzarella and other reduced-fat cheeses. · Choose meat and other protein foods that are low in fat. Choose beans or other legumes such as split peas or lentils. Choose fish, skinless poultry (chicken or turkey), or lean cuts of red meat (beef or pork). Before you cook meat or poultry, cut off any visible fat. · Use less fat and oil. Try baking foods instead of frying them. Add less fat, such as margarine, sour cream, regular salad dressing and mayonnaise to foods. Eat fewer high-fat foods. Some examples of high-fat foods include french fries, doughnuts, ice cream, and cakes. · Eat fewer sweets. Limit foods and drinks that are high in sugar. This includes candy, cookies, regular soda, and sweetened drinks. Exercise:  Exercise at least 30 minutes per day on most days of the week. Some examples of exercise include walking, biking, dancing, and swimming.  You can also fit in more physical activity by taking the stairs instead of the elevator or parking farther away from stores. Ask your healthcare provider about the best exercise plan for you. © Copyright [employer] 2018 Information is for End User's use only and may not be sold, redistributed or otherwise used for commercial purposes.  All illustrations and images included in CareNotes® are the copyrighted property of [employer] Inc. or [address] [place] [address]

## 2023-07-14 NOTE — ASSESSMENT & PLAN NOTE
Wt Readings from Last 3 Encounters:   07/14/23 113 kg (249 lb)   07/07/23 113 kg (249 lb)   06/28/23 112 kg (246 lb 6.4 oz)     Under control. Continue current medication. We will re-evaluate at next office visit.   Has been followed by cardiologist

## 2023-07-14 NOTE — ASSESSMENT & PLAN NOTE
Lab Results   Component Value Date    HGBA1C 6.7 (H) 05/10/2023   Under control. Continue current medication. We will re-evaluate at next office visit.

## 2023-07-24 ENCOUNTER — HOSPITAL ENCOUNTER (INPATIENT)
Facility: HOSPITAL | Age: 83
LOS: 4 days | Discharge: HOME | DRG: 439 | End: 2023-07-28
Attending: EMERGENCY MEDICINE | Admitting: INTERNAL MEDICINE
Payer: MEDICARE

## 2023-07-24 ENCOUNTER — NURSE TRIAGE (OUTPATIENT)
Age: 83
End: 2023-07-24

## 2023-07-24 ENCOUNTER — EPISODE CHANGES (OUTPATIENT)
Dept: CASE MANAGEMENT | Facility: OTHER | Age: 83
End: 2023-07-24

## 2023-07-24 ENCOUNTER — APPOINTMENT (OUTPATIENT)
Dept: LAB | Facility: HOSPITAL | Age: 83
DRG: 439 | End: 2023-07-24
Payer: MEDICARE

## 2023-07-24 DIAGNOSIS — N18.9 CHRONIC KIDNEY DISEASE: ICD-10-CM

## 2023-07-24 DIAGNOSIS — K85.90 ACUTE RECURRENT PANCREATITIS: ICD-10-CM

## 2023-07-24 DIAGNOSIS — K85.90 ACUTE RECURRENT PANCREATITIS: Primary | ICD-10-CM

## 2023-07-24 DIAGNOSIS — K86.1 CHRONIC PANCREATITIS (HCC): Primary | ICD-10-CM

## 2023-07-24 LAB
ALBUMIN SERPL BCP-MCNC: 3.8 G/DL (ref 3.5–5)
ALP SERPL-CCNC: 48 U/L (ref 34–104)
ALT SERPL W P-5'-P-CCNC: 17 U/L (ref 7–52)
ANION GAP SERPL CALCULATED.3IONS-SCNC: 7 MMOL/L
AST SERPL W P-5'-P-CCNC: 19 U/L (ref 13–39)
BILIRUB SERPL-MCNC: 0.55 MG/DL (ref 0.2–1)
BUN SERPL-MCNC: 24 MG/DL (ref 5–25)
CALCIUM SERPL-MCNC: 9.1 MG/DL (ref 8.4–10.2)
CHLORIDE SERPL-SCNC: 102 MMOL/L (ref 96–108)
CO2 SERPL-SCNC: 31 MMOL/L (ref 21–32)
CREAT SERPL-MCNC: 1.7 MG/DL (ref 0.6–1.3)
ERYTHROCYTE [DISTWIDTH] IN BLOOD BY AUTOMATED COUNT: 14.2 % (ref 11.6–15.1)
GFR SERPL CREATININE-BSD FRML MDRD: 37 ML/MIN/1.73SQ M
GLUCOSE P FAST SERPL-MCNC: 96 MG/DL (ref 65–99)
GLUCOSE SERPL-MCNC: 118 MG/DL (ref 65–140)
GLUCOSE SERPL-MCNC: 68 MG/DL (ref 65–140)
GLUCOSE SERPL-MCNC: 73 MG/DL (ref 65–140)
GLUCOSE SERPL-MCNC: 75 MG/DL (ref 65–140)
HCT VFR BLD AUTO: 40.4 % (ref 36.5–49.3)
HGB BLD-MCNC: 13.4 G/DL (ref 12–17)
LIPASE SERPL-CCNC: 5201 U/L (ref 11–82)
MCH RBC QN AUTO: 31.7 PG (ref 26.8–34.3)
MCHC RBC AUTO-ENTMCNC: 33.2 G/DL (ref 31.4–37.4)
MCV RBC AUTO: 96 FL (ref 82–98)
PLATELET # BLD AUTO: 204 THOUSANDS/UL (ref 149–390)
PMV BLD AUTO: 10 FL (ref 8.9–12.7)
POTASSIUM SERPL-SCNC: 4 MMOL/L (ref 3.5–5.3)
PROT SERPL-MCNC: 6.7 G/DL (ref 6.4–8.4)
RBC # BLD AUTO: 4.23 MILLION/UL (ref 3.88–5.62)
SODIUM SERPL-SCNC: 140 MMOL/L (ref 135–147)
WBC # BLD AUTO: 8.64 THOUSAND/UL (ref 4.31–10.16)

## 2023-07-24 PROCEDURE — 96361 HYDRATE IV INFUSION ADD-ON: CPT

## 2023-07-24 PROCEDURE — 96374 THER/PROPH/DIAG INJ IV PUSH: CPT

## 2023-07-24 PROCEDURE — 82948 REAGENT STRIP/BLOOD GLUCOSE: CPT

## 2023-07-24 PROCEDURE — 36415 COLL VENOUS BLD VENIPUNCTURE: CPT

## 2023-07-24 PROCEDURE — 99285 EMERGENCY DEPT VISIT HI MDM: CPT

## 2023-07-24 PROCEDURE — C9113 INJ PANTOPRAZOLE SODIUM, VIA: HCPCS | Performed by: INTERNAL MEDICINE

## 2023-07-24 PROCEDURE — 99223 1ST HOSP IP/OBS HIGH 75: CPT | Performed by: INTERNAL MEDICINE

## 2023-07-24 PROCEDURE — 80053 COMPREHEN METABOLIC PANEL: CPT

## 2023-07-24 PROCEDURE — 83690 ASSAY OF LIPASE: CPT

## 2023-07-24 PROCEDURE — 99285 EMERGENCY DEPT VISIT HI MDM: CPT | Performed by: PHYSICIAN ASSISTANT

## 2023-07-24 PROCEDURE — 85027 COMPLETE CBC AUTOMATED: CPT

## 2023-07-24 RX ORDER — PANTOPRAZOLE SODIUM 40 MG/1
40 TABLET, DELAYED RELEASE ORAL
Status: DISCONTINUED | OUTPATIENT
Start: 2023-07-25 | End: 2023-07-24

## 2023-07-24 RX ORDER — RANOLAZINE 500 MG/1
500 TABLET, EXTENDED RELEASE ORAL 2 TIMES DAILY
Status: DISCONTINUED | OUTPATIENT
Start: 2023-07-24 | End: 2023-07-28 | Stop reason: HOSPADM

## 2023-07-24 RX ORDER — INSULIN GLARGINE 100 [IU]/ML
12 INJECTION, SOLUTION SUBCUTANEOUS
Status: DISCONTINUED | OUTPATIENT
Start: 2023-07-24 | End: 2023-07-24

## 2023-07-24 RX ORDER — HYDROMORPHONE HCL IN WATER/PF 6 MG/30 ML
0.2 PATIENT CONTROLLED ANALGESIA SYRINGE INTRAVENOUS EVERY 4 HOURS PRN
Status: DISCONTINUED | OUTPATIENT
Start: 2023-07-24 | End: 2023-07-28 | Stop reason: HOSPADM

## 2023-07-24 RX ORDER — SODIUM CHLORIDE, SODIUM GLUCONATE, SODIUM ACETATE, POTASSIUM CHLORIDE, MAGNESIUM CHLORIDE, SODIUM PHOSPHATE, DIBASIC, AND POTASSIUM PHOSPHATE .53; .5; .37; .037; .03; .012; .00082 G/100ML; G/100ML; G/100ML; G/100ML; G/100ML; G/100ML; G/100ML
125 INJECTION, SOLUTION INTRAVENOUS CONTINUOUS
Status: DISCONTINUED | OUTPATIENT
Start: 2023-07-24 | End: 2023-07-24

## 2023-07-24 RX ORDER — CLONIDINE HYDROCHLORIDE 0.1 MG/1
0.1 TABLET ORAL EVERY 12 HOURS SCHEDULED
Status: DISCONTINUED | OUTPATIENT
Start: 2023-07-24 | End: 2023-07-28 | Stop reason: HOSPADM

## 2023-07-24 RX ORDER — INSULIN LISPRO 100 [IU]/ML
1-6 INJECTION, SOLUTION INTRAVENOUS; SUBCUTANEOUS EVERY 6 HOURS SCHEDULED
Status: DISCONTINUED | OUTPATIENT
Start: 2023-07-24 | End: 2023-07-28 | Stop reason: HOSPADM

## 2023-07-24 RX ORDER — ONDANSETRON 2 MG/ML
4 INJECTION INTRAMUSCULAR; INTRAVENOUS EVERY 6 HOURS PRN
Status: DISCONTINUED | OUTPATIENT
Start: 2023-07-24 | End: 2023-07-28 | Stop reason: HOSPADM

## 2023-07-24 RX ORDER — HYDROMORPHONE HCL/PF 1 MG/ML
0.5 SYRINGE (ML) INJECTION EVERY 4 HOURS PRN
Status: DISCONTINUED | OUTPATIENT
Start: 2023-07-24 | End: 2023-07-28 | Stop reason: HOSPADM

## 2023-07-24 RX ORDER — HEPARIN SODIUM 5000 [USP'U]/ML
5000 INJECTION, SOLUTION INTRAVENOUS; SUBCUTANEOUS EVERY 8 HOURS SCHEDULED
Status: DISCONTINUED | OUTPATIENT
Start: 2023-07-24 | End: 2023-07-28 | Stop reason: HOSPADM

## 2023-07-24 RX ORDER — FLUTICASONE PROPIONATE 50 MCG
2 SPRAY, SUSPENSION (ML) NASAL DAILY
Status: DISCONTINUED | OUTPATIENT
Start: 2023-07-24 | End: 2023-07-28 | Stop reason: HOSPADM

## 2023-07-24 RX ORDER — DEXTROSE MONOHYDRATE 25 G/50ML
25 INJECTION, SOLUTION INTRAVENOUS ONCE
Status: COMPLETED | OUTPATIENT
Start: 2023-07-24 | End: 2023-07-24

## 2023-07-24 RX ORDER — METOPROLOL SUCCINATE 25 MG/1
25 TABLET, EXTENDED RELEASE ORAL DAILY
Status: DISCONTINUED | OUTPATIENT
Start: 2023-07-25 | End: 2023-07-28 | Stop reason: HOSPADM

## 2023-07-24 RX ORDER — SODIUM CHLORIDE, SODIUM GLUCONATE, SODIUM ACETATE, POTASSIUM CHLORIDE, MAGNESIUM CHLORIDE, SODIUM PHOSPHATE, DIBASIC, AND POTASSIUM PHOSPHATE .53; .5; .37; .037; .03; .012; .00082 G/100ML; G/100ML; G/100ML; G/100ML; G/100ML; G/100ML; G/100ML
125 INJECTION, SOLUTION INTRAVENOUS CONTINUOUS
Status: DISPENSED | OUTPATIENT
Start: 2023-07-24 | End: 2023-07-25

## 2023-07-24 RX ORDER — CLOPIDOGREL BISULFATE 75 MG/1
75 TABLET ORAL DAILY
Status: DISCONTINUED | OUTPATIENT
Start: 2023-07-25 | End: 2023-07-28 | Stop reason: HOSPADM

## 2023-07-24 RX ORDER — PANTOPRAZOLE SODIUM 40 MG/10ML
40 INJECTION, POWDER, LYOPHILIZED, FOR SOLUTION INTRAVENOUS
Status: DISCONTINUED | OUTPATIENT
Start: 2023-07-24 | End: 2023-07-28 | Stop reason: HOSPADM

## 2023-07-24 RX ORDER — TAMSULOSIN HYDROCHLORIDE 0.4 MG/1
0.8 CAPSULE ORAL DAILY
Status: DISCONTINUED | OUTPATIENT
Start: 2023-07-25 | End: 2023-07-28 | Stop reason: HOSPADM

## 2023-07-24 RX ADMIN — DEXTROSE MONOHYDRATE 25 ML: 25 INJECTION, SOLUTION INTRAVENOUS at 14:25

## 2023-07-24 RX ADMIN — SODIUM CHLORIDE, SODIUM GLUCONATE, SODIUM ACETATE, POTASSIUM CHLORIDE, MAGNESIUM CHLORIDE, SODIUM PHOSPHATE, DIBASIC, AND POTASSIUM PHOSPHATE 125 ML/HR: .53; .5; .37; .037; .03; .012; .00082 INJECTION, SOLUTION INTRAVENOUS at 15:57

## 2023-07-24 RX ADMIN — HYDROMORPHONE HYDROCHLORIDE 0.2 MG: 0.2 INJECTION, SOLUTION INTRAMUSCULAR; INTRAVENOUS; SUBCUTANEOUS at 22:24

## 2023-07-24 RX ADMIN — SODIUM CHLORIDE 1000 ML: 0.9 INJECTION, SOLUTION INTRAVENOUS at 13:31

## 2023-07-24 RX ADMIN — HEPARIN SODIUM 5000 UNITS: 5000 INJECTION INTRAVENOUS; SUBCUTANEOUS at 22:11

## 2023-07-24 RX ADMIN — CLONIDINE HYDROCHLORIDE 0.1 MG: 0.1 TABLET ORAL at 22:13

## 2023-07-24 RX ADMIN — SODIUM CHLORIDE, SODIUM GLUCONATE, SODIUM ACETATE, POTASSIUM CHLORIDE, MAGNESIUM CHLORIDE, SODIUM PHOSPHATE, DIBASIC, AND POTASSIUM PHOSPHATE 125 ML/HR: .53; .5; .37; .037; .03; .012; .00082 INJECTION, SOLUTION INTRAVENOUS at 23:58

## 2023-07-24 RX ADMIN — PANTOPRAZOLE SODIUM 40 MG: 40 INJECTION, POWDER, FOR SOLUTION INTRAVENOUS at 15:56

## 2023-07-24 RX ADMIN — HYDROMORPHONE HYDROCHLORIDE 0.2 MG: 0.2 INJECTION, SOLUTION INTRAMUSCULAR; INTRAVENOUS; SUBCUTANEOUS at 18:34

## 2023-07-24 RX ADMIN — RANOLAZINE 500 MG: 500 TABLET, EXTENDED RELEASE ORAL at 18:26

## 2023-07-24 RX ADMIN — HEPARIN SODIUM 5000 UNITS: 5000 INJECTION INTRAVENOUS; SUBCUTANEOUS at 15:56

## 2023-07-24 NOTE — TELEPHONE ENCOUNTER
----- [facility] at 7/24/2023  8:09 AM EDT -----  Patients GI provider:  Dr. Allie Atkins    Number to return call: [phone]      Reason for call: Pt calling stating he is experiencing chest pain. He feels it is a "mild" case of pancreatitis. Patient was advised to go to ED for evaluation, but a nurse would contact him as well.      Please contact patient to discuss symptoms

## 2023-07-24 NOTE — H&P
[address] [facility]  H&P  Name: Radha Waite 82 y.o. male I MRN: [id number]  Unit/Bed#: [facility] I Date of Admission: 7/24/2023   Date of Service: 7/24/2023 I Hospital Day: 0      Assessment/Plan   * Acute pancreatitis  Assessment & Plan  Patient presenting with abdominal pain, elevated lipase 5201  CT scan done on 7 7 show acute pancreatitis without abscess or pseudocyst  LFTs within normal limits. Etiology unclear, no evidence of autoimmune pancreatitis and previous work-up  MRI done on 6/10 showed probably gallstone sludge without biliary duct dilation or evidence of choledocholithiasis.   Pancreatic duct demonstrated some focal narrowing at the junction of the head and neck region which may be related to artifact   Patient had a scheduled MRI on 7/26  N.p.o., start IV fluids  Pain management with as needed Dilaudid  Gastroenterology consulted, appreciate recommendations      Stage 3b chronic kidney disease (CKD) [facility])  Assessment & Plan  Lab Results   Component Value Date    EGFR 37 07/24/2023    EGFR 47 07/09/2023    EGFR 44 07/08/2023    CREATININE 1.70 (H) 07/24/2023    CREATININE 1.39 (H) 07/09/2023    CREATININE 1.47 (H) 07/08/2023   Creatinine 1.7 today, baseline appears to be close to baseline  Avoid hypotension and nephrotoxins  Continue to monitor kidney function    Venous stasis dermatitis of both lower extremities  Assessment & Plan  Continue compressions and elevation     Coronary artery disease involving native coronary artery without angina pectoris  Assessment & Plan  Continue Plavix, metoprolol and Ranexa    Mixed hyperlipidemia  Assessment & Plan  Holding statins for now      Type 2 diabetes mellitus with kidney complication, with long-term current use of insulin [facility])  Assessment & Plan  Lab Results   Component Value Date    HGBA1C 6.7 (H) 05/10/2023       Recent Labs     07/24/23  1342 07/24/23  1436   POCGLU 68 118       Blood Sugar Average: Last 72 hrs:  (P) 93   Holding insulin coverage with meals, patient n.p.o. Continue Lantus 12 units daily  Start sliding scale insulin and frequent Accu-Cheks  Hypoglycemia protocol in place    (HFpEF) heart failure with preserved ejection fraction (HCC)  Assessment & Plan  Wt Readings from Last 3 Encounters:   07/24/23 109 kg (240 lb)   07/14/23 113 kg (249 lb)   07/07/23 113 kg (249 lb)   Appears to be euvolemic  Holding torsemide due to acute episode of pancreatitis and need for IV fluids          HTN (hypertension), benign  Assessment & Plan  Continue clonidine and metoprolol succinate  Holding lisinopril and torsemide in the setting of acute pancreatitis    PAULA (obstructive sleep apnea)  Assessment & Plan  Continue CPAP therapy while inpatient          VTE Pharmacologic Prophylaxis:   Moderate Risk (Score 3-4) - Pharmacological DVT Prophylaxis Ordered: heparin. Code Status: Level 1 - Full Code   Discussion with family: Updated  (wife) at bedside. Anticipated Length of Stay: Patient will be admitted on an inpatient basis with an anticipated length of stay of greater than 2 midnights secondary to Acute pancreatitis. Total Time Spent on Date of Encounter in care of patient: 65 minutes This time was spent on one or more of the following: performing physical exam; counseling and coordination of care; obtaining or reviewing history; documenting in the medical record; reviewing/ordering tests, medications or procedures; communicating with other healthcare professionals and discussing with patient's family/caregivers. Chief Complaint: Epigastric pain  History of Present Illness:  Lauren Branch is a 82 y.o. male with a PMH of pancreatitis, presents with complaints of abdominal pain with elevated lipase level.   Patient had a CT of the abdomen done on 7/7 showed acute pancreatitis without abscess or pseudocyst.  LFTs were within normal limits and etiology was thought to be unclear no evidence of autoimmune pancreatitis, previous work-up with MRI showed probable gallstone sludge without biliary duct dilation or evidence of choledocholithiasis. Patient denies of fever chills or cough. Patient denies of any nausea or vomiting. Denies of any abdominal pain. Review of Systems:  Review of Systems   Gastrointestinal: Positive for abdominal pain and nausea. All other systems reviewed and are negative. Past Medical and Surgical History:   Past Medical History:   Diagnosis Date   • Allergic    • Arthritis    • Chronic kidney disease 2021   • Chronic kidney disease (CKD) stage G3a/A1, moderately decreased glomerular filtration rate (GFR) between 45-59 mL/min/1.73 square meter and albuminuria creatinine ratio less than 30 mg/g (HCC)    • Colon polyp    • Diabetes mellitus (HCC)    • GERD (gastroesophageal reflux disease)    • Heart murmur    • History of echocardiogram    • HL (hearing loss)    • Hyperlipidemia    • Hypertension    • Obesity    • Sleep apnea, obstructive        Past Surgical History:   Procedure Laterality Date   • CARDIAC CATHETERIZATION     • COLONOSCOPY  03/09/2018   • COLONOSCOPY     • EYE SURGERY     • HEMORRHOID SURGERY     • JOINT REPLACEMENT  2017    knee   • OTHER SURGICAL HISTORY      Stent    • UT LAPAROSCOPY SURG CHOLECYSTECTOMY N/A 6/28/2023    Procedure: CHOLECYSTECTOMY LAPAROSCOPIC;  Surgeon: Gisela Lucas MD;  Location:  MAIN OR;  Service: General   • UPPER GASTROINTESTINAL ENDOSCOPY         Meds/Allergies:  Prior to Admission medications    Medication Sig Start Date End Date Taking?  Authorizing Provider   acetaminophen (TYLENOL) 325 mg tablet Take 2 tablets (650 mg total) by mouth every 6 (six) hours as needed for mild pain 6/22/23  Yes Sanjuanita Moreno MD   allopurinol (ZYLOPRIM) 100 mg tablet Take 100 mg by mouth daily 5/20/23  Yes Historical Provider, MD   calcium carbonate (OS-WAYLON) 600 MG tablet Take 1,200 mg by mouth daily   Yes Historical Provider, MD   cholecalciferol (VITAMIN D3) 1,000 units tablet Take 2,000 Units by mouth daily   Yes Historical Provider, MD   cloNIDine (CATAPRES) 0.1 mg tablet Take 1 tablet (0.1 mg total) by mouth every 12 (twelve) hours 6/22/23  Yes Louis Vela MD   clopidogrel (PLAVIX) 75 mg tablet Take 1 tablet (75 mg total) by mouth daily Instructed to stop prior to Surgery-Last dose 6/21/23 Do not start before June 29, 2023. 6/29/23  Yes Sejal Frazier PA-C   Fexofenadine HCl ([address]) Take by mouth   Yes Historical Provider, MD   fluticasone Any Hedge) 50 mcg/act nasal spray 2 sprays into each nostril daily 5/23/23  Yes Marco Cherry MD   insulin aspart (NovoLOG FlexPen) 100 UNIT/ML injection pen Inject under the skin 3 (three) times a day with meals Sliding scale as ordered TID with meals   Yes Historical Provider, MD   Levemir FlexTouch 100 units/mL injection pen 36 Units daily In AM 8/27/20  Yes Historical Provider, MD   lisinopril (ZESTRIL) 40 mg tablet Take 1 tablet (40 mg total) by mouth daily 4/3/23  Yes Marco Cherry MD   metoprolol succinate (TOPROL-XL) 25 mg 24 hr tablet Take 1 tablet (25 mg total) by mouth daily 5/16/23  Yes Marco Cherry MD   Multiple Vitamins-Minerals (MULTIVITAMIN ADULT PO) Take by mouth daily   Yes Historical Provider, MD   omeprazole (PriLOSEC) 40 MG capsule Take 1 capsule (40 mg total) by mouth daily  Patient taking differently: Take 40 mg by mouth daily before breakfast 5/15/23  Yes Linda Ryder MD   Pseudoephedrine HCl (SUDAFED PO) Take by mouth   Yes Historical Provider, MD   pyridoxine (VITAMIN B6) 100 mg tablet Take 100 mg by mouth daily   Yes Historical Provider, MD   ranolazine (RANEXA) 500 mg 12 hr tablet Take 1 tablet (500 mg total) by mouth 2 (two) times a day 6/6/23  Yes Marco Cherry MD   rosuvastatin (CRESTOR) 20 MG tablet Take 1 tablet by mouth daily 7/1/22 7/24/23 Yes Historical Provider, MD   tamsulosin (FLOMAX) 0.4 mg Take 2 capsules (0.8 mg total) by mouth daily 4/3/23 Yes Pauly Bermudez MD   torsemide [facility]) 10 mg tablet Take 10 mg by mouth 23 Yes Historical Provider, MD   BD Pen Needle Pascale U/F 32G X 4 MM MISC USE AS INSTRUCTED WITH INSULIN PEN 20   Historical Provider, MD   Lancets (OneTouch Delica Plus [id number]) MISC TEST GLUCOSE 3 4 TIMES/DAY 20   Historical Provider, MD     I have reviewed home medications with patient personally. Allergies:    Allergies   Allergen Reactions   • Januvia [Sitagliptin] Other (See Comments)     pancreatitis   • Iodinated Contrast Media Other (See Comments)     Per pt, "got real hot feeling"   • Semaglutide Other (See Comments)     pancreatits   • Simvastatin Myalgia   • Trulicity [Dulaglutide] Other (See Comments)     Pancreatitis   • Wound Dressing Adhesive Other (See Comments)     Burning sensation       Social History:  Marital Status: /Civil Union   Occupation: retired  Patient Pre-hospital Living Situation: Home  Patient Pre-hospital Level of Mobility: walks  Patient Pre-hospital Diet Restrictions: retired  Substance Use History:   Social History     Substance and Sexual Activity   Alcohol Use Not Currently   • Alcohol/week: 5.0 standard drinks of alcohol   • Types: 5 Glasses of wine per week    Comment: no alcohol since March     Social History     Tobacco Use   Smoking Status Former   • Packs/day: 2.00   • Years: 15.00   • Total pack years: 30.00   • Types: Cigarettes, Pipe, Cigars   • Quit date: 1972   • Years since quittin.7   • Passive exposure: Past   Smokeless Tobacco Never     Social History     Substance and Sexual Activity   Drug Use Never       Family History:  Family History   Problem Relation Age of Onset   • Heart disease Mother    • Heart attack Mother         46s   • Cancer Mother    • Coronary artery disease Mother    • Cancer Brother         Malignant tumor of lung       Physical Exam:     Vitals:   Blood Pressure: 141/61 (23 1614)  Pulse: (!) 54 (23 1614)  Temperature: 97.7 °F (36.5 °C) (07/24/23 1614)  Temp Source: Oral (07/24/23 1614)  Respirations: 18 (07/24/23 1614)  Height: 5' 6" (167.6 cm) (07/24/23 1258)  Weight - Scale: 109 kg (240 lb) (07/24/23 1258)  SpO2: 95 % (07/24/23 1614)    Physical Exam   HEENT-PERRLA, moist oral mucosa  Neck-supple, no JVD elevation   Respiratory-equal air entry bilaterally, no rales or rhonchi  Cardiovascular system-S1, S2 heard, no murmur or gallops or rubs  Abdomen-soft, epigastric pain, no guarding or rigidity, bowel sounds heard  Extremities-no pedal edema  Peripheral pulses palpable  Musculoskeletal-no contractures  Central nervous system-no acute focal neurological deficit ,no sensory or motor deficit noted. Skin-no rash noted        Additional Data:     Lab Results:  Results from last 7 days   Lab Units 07/24/23  1005   WBC Thousand/uL 8.64   HEMOGLOBIN g/dL 13.4   HEMATOCRIT % 40.4   PLATELETS Thousands/uL 204     Results from last 7 days   Lab Units 07/24/23  1005   SODIUM mmol/L 140   POTASSIUM mmol/L 4.0   CHLORIDE mmol/L 102   CO2 mmol/L 31   BUN mg/dL 24   CREATININE mg/dL 1.70*   ANION GAP mmol/L 7   CALCIUM mg/dL 9.1   ALBUMIN g/dL 3.8   TOTAL BILIRUBIN mg/dL 0.55   ALK PHOS U/L 48   ALT U/L 17   AST U/L 19         Results from last 7 days   Lab Units 07/24/23  1631 07/24/23  1436 07/24/23  1342   POC GLUCOSE mg/dl 73 118 68               Lines/Drains:  Invasive Devices     Peripheral Intravenous Line  Duration           Peripheral IV 07/24/23 Dorsal (posterior); Left Hand <1 day                    Imaging: Personally reviewed the following imaging: abdominal/pelvic CT  MRI abdomen w wo contrast and mrcp    (Results Pending)       EKG and Other Studies Reviewed on Admission:   · EKG: No EKG obtained. ** Please Note: This note has been constructed using a voice recognition system.  **

## 2023-07-24 NOTE — ASSESSMENT & PLAN NOTE
Wt Readings from Last 3 Encounters:   07/24/23 109 kg (240 lb)   07/14/23 113 kg (249 lb)   07/07/23 113 kg (249 lb)   Appears to be euvolemic  Holding torsemide due to acute episode of pancreatitis and need for IV fluids

## 2023-07-24 NOTE — TELEPHONE ENCOUNTER
Spoke with patient, advised of lab results and to go to the ED. He understood and is on his way. No further questions.

## 2023-07-24 NOTE — ASSESSMENT & PLAN NOTE
Patient presenting with abdominal pain, elevated lipase 5201  CT scan done on 7 7 show acute pancreatitis without abscess or pseudocyst  LFTs within normal limits. Etiology unclear, no evidence of autoimmune pancreatitis and previous work-up  MRI done on 6/10 showed probably gallstone sludge without biliary duct dilation or evidence of choledocholithiasis.   Pancreatic duct demonstrated some focal narrowing at the junction of the head and neck region which may be related to artifact   Patient had a scheduled MRI on 7/26  N.p.o., start IV fluids  Pain management with as needed Dilaudid  Gastroenterology consulted, appreciate recommendations

## 2023-07-24 NOTE — ASSESSMENT & PLAN NOTE
Lab Results   Component Value Date    HGBA1C 6.7 (H) 05/10/2023       Recent Labs     07/24/23  1342 07/24/23  1436   POCGLU 68 118       Blood Sugar Average: Last 72 hrs:  (P) 93   Holding insulin coverage with meals, patient n.p.o.   Continue Lantus 12 units daily  Start sliding scale insulin and frequent Accu-Cheks  Hypoglycemia protocol in place

## 2023-07-24 NOTE — ED NOTES
Pt reported his blood sugar was 68 on his Dexcom CGM. Pt reports no symptoms of hypoglycemia. Silvano [facility] aware.  Repeated with hospital glucometer, result of Samuel Hsu, [address]  07/24/23 7497

## 2023-07-24 NOTE — TELEPHONE ENCOUNTER
I contacted patient and advised he report to ER due to elevated lipase result 5.201 u/L. Patient states he will report to [facility] for evaluation.

## 2023-07-24 NOTE — TELEPHONE ENCOUNTER
I contacted patient, reviewed that stat labs ordered and reviewed to report to ER with any symptom changes. Patient will report to lab now.

## 2023-07-24 NOTE — PLAN OF CARE
Problem: PAIN - ADULT  Goal: Verbalizes/displays adequate comfort level or baseline comfort level  Description: Interventions:  - Encourage patient to monitor pain and request assistance  - Assess pain using appropriate pain scale  - Administer analgesics based on type and severity of pain and evaluate response  - Implement non-pharmacological measures as appropriate and evaluate response  - Consider cultural and social influences on pain and pain management  - Notify physician/advanced practitioner if interventions unsuccessful or patient reports new pain  Outcome: Progressing     Problem: INFECTION - ADULT  Goal: Absence or prevention of progression during hospitalization  Description: INTERVENTIONS:  - Assess and monitor for signs and symptoms of infection  - Monitor lab/diagnostic results  - Monitor all insertion sites, i.e. indwelling lines, tubes, and drains  - Monitor endotracheal if appropriate and nasal secretions for changes in amount and color  - [place] appropriate cooling/warming therapies per order  - Administer medications as ordered  - Instruct and encourage patient and family to use good hand hygiene technique  - Identify and instruct in appropriate isolation precautions for identified infection/condition  Outcome: Progressing  Goal: Absence of fever/infection during neutropenic period  Description: INTERVENTIONS:  - Monitor WBC    Outcome: Progressing     Problem: SAFETY ADULT  Goal: Patient will remain free of falls  Description: INTERVENTIONS:  - Educate patient/family on patient safety including physical limitations  - Instruct patient to call for assistance with activity   - Consult OT/PT to assist with strengthening/mobility   - Keep Call bell within reach  - Keep bed low and locked with side rails adjusted as appropriate  - Keep care items and personal belongings within reach  - Initiate and maintain comfort rounds  - Make Fall Risk Sign visible to staff  - Apply yellow socks and bracelet for high fall risk patients  - Consider moving patient to room near nurses station  Outcome: Progressing  Goal: Maintain or return to baseline ADL function  Description: INTERVENTIONS:  -  Assess patient's ability to carry out ADLs; assess patient's baseline for ADL function and identify physical deficits which impact ability to perform ADLs (bathing, care of mouth/teeth, toileting, grooming, dressing, etc.)  - Assess/evaluate cause of self-care deficits   - Assess range of motion  - Assess patient's mobility; develop plan if impaired  - Assess patient's need for assistive devices and provide as appropriate  - Encourage maximum independence but intervene and supervise when necessary  - Involve family in performance of ADLs  - Assess for home care needs following discharge   - Consider OT consult to assist with ADL evaluation and planning for discharge  - Provide patient education as appropriate  Outcome: Progressing  Goal: Maintains/Returns to pre admission functional level  Description: INTERVENTIONS:  - Perform BMAT or MOVE assessment daily.   - Set and communicate daily mobility goal to care team and patient/family/caregiver.    - Collaborate with rehabilitation services on mobility goals if consulted- Out of bed for toileting  - Record patient progress and toleration of activity level   Outcome: Progressing     Problem: DISCHARGE PLANNING  Goal: Discharge to home or other facility with appropriate resources  Description: INTERVENTIONS:  - Identify barriers to discharge w/patient and caregiver  - Arrange for needed discharge resources and transportation as appropriate  - Identify discharge learning needs (meds, wound care, etc.)  - Arrange for interpretive services to assist at discharge as needed  - Refer to Case Management Department for coordinating discharge planning if the patient needs post-hospital services based on physician/advanced practitioner order or complex needs related to functional status, cognitive ability, or social support system  Outcome: Progressing     Problem: Knowledge Deficit  Goal: Patient/family/caregiver demonstrates understanding of disease process, treatment plan, medications, and discharge instructions  Description: Complete learning assessment and assess knowledge base. Interventions:  - Provide teaching at level of understanding  - Provide teaching via preferred learning methods  Outcome: Progressing     Problem: Nutrition/Hydration-ADULT  Goal: Nutrient/Hydration intake appropriate for improving, restoring or maintaining nutritional needs  Description: Monitor and assess patient's nutrition/hydration status for malnutrition. Collaborate with interdisciplinary team and initiate plan and interventions as ordered. Monitor patient's weight and dietary intake as ordered or per policy. Utilize nutrition screening tool and intervene as necessary. Determine patient's food preferences and provide high-protein, high-caloric foods as appropriate.      INTERVENTIONS:  - Monitor oral intake, urinary output, labs, and treatment plans  - Assess nutrition and hydration status and recommend course of action  - Evaluate amount of meals eaten  - Assist patient with eating if necessary   - Allow adequate time for meals  - Recommend/ encourage appropriate diets, oral nutritional supplements, and vitamin/mineral supplements  - Order, calculate, and assess calorie counts as needed  - Assess need for intravenous fluids  - Provide specific nutrition/hydration education as appropriate  - Include patient/family/caregiver in decisions related to nutrition  Outcome: Progressing

## 2023-07-24 NOTE — ED NOTES
Pt reported BS reading of 58 on his Dexcom, remains asymptomatic.  Gilbert [facility] aware, new orders received     Constanza Hernandes RN  07/24/23 9065

## 2023-07-24 NOTE — ASSESSMENT & PLAN NOTE
Lab Results   Component Value Date    EGFR 37 07/24/2023    EGFR 47 07/09/2023    EGFR 44 07/08/2023    CREATININE 1.70 (H) 07/24/2023    CREATININE 1.39 (H) 07/09/2023    CREATININE 1.47 (H) 07/08/2023   Creatinine 1.7 today, baseline appears to be close to baseline  Avoid hypotension and nephrotoxins  Continue to monitor kidney function

## 2023-07-24 NOTE — ASSESSMENT & PLAN NOTE
Continue clonidine and metoprolol succinate  Holding lisinopril and torsemide in the setting of acute pancreatitis

## 2023-07-24 NOTE — ED NOTES
Repeat blood sugar 118.  Pt transported to [address] [address] via [place] at this time     Camila Vidal RN  07/24/23 9785

## 2023-07-24 NOTE — ED PROVIDER NOTES
History  Chief Complaint   Patient presents with   • Abdominal Pain     States abd pain started last night, called PCP and was sent for lab work, Lipase is elevated and pt has a hx of pancreatitis     Past Medical History: Arthritis, CKD, DM, GERD, Heart murmur, Hyperlipidemia,HTN, Obesity, Sleep apnea, obstructive   PSH: CARDIAC CATHETERIZATION, EYE SURGERY, HEMORRHOID SURGERY, TKR, LAP CHOLECYSTECTOMY  - 6/28/2023, Sergio Fonseca      Pt returns to ED for ongoing epigastric/LUQ, 5/10 abd pain/elevated lipase done at outpt labs today 5201  PT with recurrent pancreatitis, 5 times, recent admission, recent lap dionne, was improving, then began with abd pain again, had repeat labs with elevated lipase and referred to ED  Pt denies nausea/vomiting, some non-bloody diarrhea, no fever/cp/sob. Pt states stopped jardiance, has been trying to regulate diet/read labels      Admitted 7/7-7/10: pancreatitis/gallstones s/p lap dionne, obesity, DM, CKD, GERD, PAULA, AS who presented with epigastric pain, dx with acute pancreatitis. CT scan did no show any abscess or pseudocyst on the pancreas. During his hospital stay diet was advanced as tolerated, patient was given IV fluids and adequate pain control was achieved. Glucose was closely monitored, and hyperglycemia treated as needed with sliding scale insulin regimen. Pt advised to stop taking Jardiance. GI and surgery followed the patient closely. Patient will have follow-up with gastroenterology and surgery later this week and MRIs are scheduled for 7/26.             Prior to Admission Medications   Prescriptions Last Dose Informant Patient Reported? Taking?    BD Pen Needle Pascale U/F 32G X 4 MM MISC  Self Yes No   Sig: USE AS INSTRUCTED WITH INSULIN PEN   Fexofenadine HCl (MUCINEX ALLERGY PO) 7/23/2023  Yes Yes   Sig: Take by mouth   Lancets (OneTouch Delica Plus [id number]) MISC  Self Yes No   Sig: TEST GLUCOSE 3 4 TIMES/DAY   Levemir FlexTouch 100 units/mL injection pen 2023 Self Yes Yes   Si Units daily In AM   Multiple Vitamins-Minerals (MULTIVITAMIN ADULT PO) 2023 Self Yes Yes   Sig: Take by mouth daily   Pseudoephedrine HCl (SUDAFED PO) 2023  Yes Yes   Sig: Take by mouth   acetaminophen (TYLENOL) 325 mg tablet Past Month  No Yes   Sig: Take 2 tablets (650 mg total) by mouth every 6 (six) hours as needed for mild pain   allopurinol (ZYLOPRIM) 100 mg tablet 2023 Self Yes Yes   Sig: Take 100 mg by mouth daily   calcium carbonate (OS-WAYLON) 600 MG tablet 2023 Self Yes Yes   Sig: Take 1,200 mg by mouth daily   cholecalciferol (VITAMIN D3) 1,000 units tablet 2023 Self Yes Yes   Sig: Take 2,000 Units by mouth daily   cloNIDine (CATAPRES) 0.1 mg tablet 2023  No Yes   Sig: Take 1 tablet (0.1 mg total) by mouth every 12 (twelve) hours   clopidogrel (PLAVIX) 75 mg tablet 2023  No Yes   Sig: Take 1 tablet (75 mg total) by mouth daily Instructed to stop prior to Surgery-Last dose 23 Do not start before 2023.    fluticasone (FLONASE) 50 mcg/act nasal spray 2023 Self No Yes   Si sprays into each nostril daily   insulin aspart (NovoLOG FlexPen) 100 UNIT/ML injection pen 2023  Yes Yes   Sig: Inject under the skin 3 (three) times a day with meals Sliding scale as ordered TID with meals   lisinopril (ZESTRIL) 40 mg tablet 2023 Self No Yes   Sig: Take 1 tablet (40 mg total) by mouth daily   metoprolol succinate (TOPROL-XL) 25 mg 24 hr tablet 2023 Self No Yes   Sig: Take 1 tablet (25 mg total) by mouth daily   omeprazole (PriLOSEC) 40 MG capsule 2023 Self No Yes   Sig: Take 1 capsule (40 mg total) by mouth daily   Patient taking differently: Take 40 mg by mouth daily before breakfast   pyridoxine (VITAMIN B6) 100 mg tablet 2023 Self Yes Yes   Sig: Take 100 mg by mouth daily   ranolazine (RANEXA) 500 mg 12 hr tablet 2023  No Yes   Sig: Take 1 tablet (500 mg total) by mouth 2 (two) times a day   rosuvastatin (CRESTOR) 20 MG tablet 7/24/2023 Self Yes Yes   Sig: Take 1 tablet by mouth daily   tamsulosin (FLOMAX) 0.4 mg 7/24/2023 Self No Yes   Sig: Take 2 capsules (0.8 mg total) by mouth daily   torsemide (DEMADEX) 10 mg tablet 7/24/2023  Yes Yes   Sig: Take 10 mg by mouth      Facility-Administered Medications: None       Past Medical History:   Diagnosis Date   • Allergic    • Arthritis    • Chronic kidney disease 2021   • Chronic kidney disease (CKD) stage G3a/A1, moderately decreased glomerular filtration rate (GFR) between 45-59 mL/min/1.73 square meter and albuminuria creatinine ratio less than 30 mg/g (HCC)    • Colon polyp    • Diabetes mellitus (HCC)    • GERD (gastroesophageal reflux disease)    • Heart murmur    • History of echocardiogram    • HL (hearing loss)    • Hyperlipidemia    • Hypertension    • Obesity    • Sleep apnea, obstructive        Past Surgical History:   Procedure Laterality Date   • CARDIAC CATHETERIZATION     • COLONOSCOPY  03/09/2018   • COLONOSCOPY     • EYE SURGERY     • HEMORRHOID SURGERY     • JOINT REPLACEMENT  2017    knee   • OTHER SURGICAL HISTORY      Stent    • [place] LAPAROSCOPY SURG CHOLECYSTECTOMY N/A 6/28/2023    Procedure: CHOLECYSTECTOMY LAPAROSCOPIC;  Surgeon: Remedios Flores MD;  Location: [facility] OR;  Service: General   • UPPER GASTROINTESTINAL ENDOSCOPY         Family History   Problem Relation Age of Onset   • Heart disease Mother    • Heart attack Mother         46s   • Cancer Mother    • Coronary artery disease Mother    • Cancer Brother         Malignant tumor of lung     I have reviewed and agree with the history as documented.     E-Cigarette/Vaping   • E-Cigarette Use Never User      E-Cigarette/Vaping Substances   • Nicotine No    • THC No    • CBD No    • Flavoring No    • Other No    • Unknown No      Social History     Tobacco Use   • Smoking status: Former     Packs/day: 2.00     Years: 15.00     Total pack years: 30.00 Types: Cigarettes, Pipe, Cigars     Quit date: 1972     Years since quittin.7     Passive exposure: Past   • Smokeless tobacco: Never   Vaping Use   • Vaping Use: Never used   Substance Use Topics   • Alcohol use: Not Currently     Alcohol/week: 5.0 standard drinks of alcohol     Types: 5 Glasses of wine per week     Comment: no alcohol since March   • Drug use: Never       Review of Systems   Constitutional: Negative for fever. HENT: Negative for rhinorrhea and trouble swallowing. Respiratory: Negative for shortness of breath. Cardiovascular: Negative for chest pain. Gastrointestinal: Positive for abdominal pain and diarrhea (loose stools). Negative for vomiting. Skin: Negative for rash. Neurological: Negative for headaches. All other systems reviewed and are negative. Physical Exam  Physical Exam  Vitals and nursing note reviewed. Constitutional:       General: He is in acute distress (mild). Appearance: He is well-developed. He is obese. HENT:      Head: Normocephalic and atraumatic. Right Ear: External ear normal.      Left Ear: External ear normal.      Nose: Nose normal.      Mouth/Throat:      Mouth: Mucous membranes are moist.      Pharynx: Oropharynx is clear. Eyes:      Conjunctiva/sclera: Conjunctivae normal.   Cardiovascular:      Rate and Rhythm: Normal rate and regular rhythm. Pulmonary:      Effort: Pulmonary effort is normal. No respiratory distress. Breath sounds: Normal breath sounds. Abdominal:      General: Bowel sounds are normal.      Palpations: Abdomen is soft. Tenderness: There is abdominal tenderness in the epigastric area. There is guarding. There is no right CVA tenderness or left CVA tenderness. Musculoskeletal:         General: Normal range of motion. Cervical back: Normal range of motion. Skin:     General: Skin is warm and dry. Neurological:      General: No focal deficit present.       Mental Status: He is alert and oriented to person, place, and time. Psychiatric:         Behavior: Behavior normal.         Vital Signs  ED Triage Vitals [07/24/23 1258]   Temperature Pulse Respirations Blood Pressure SpO2   98 °F (36.7 °C) 69 18 140/55 96 %      Temp src Heart Rate Source Patient Position - Orthostatic VS BP Location FiO2 (%)   -- -- -- -- --      Pain Score       5           Vitals:    07/24/23 1258 07/24/23 1415   BP: 140/55 (!) 144/47   Pulse: 69 (!) 53         Visual Acuity      ED Medications  Medications   sodium chloride 0.9 % bolus 1,000 mL (1,000 mL Intravenous New Bag 7/24/23 1331)   dextrose 50 % IV solution 25 mL (25 mL Intravenous Given 7/24/23 1425)       Diagnostic Studies  Results Reviewed     Procedure Component Value Units Date/Time    Fingerstick Glucose (POCT) [[id number]]  (Normal) Collected: 07/24/23 1436    Lab Status: Final result Updated: 07/24/23 1438     POC Glucose 118 mg/dl     Fingerstick Glucose (POCT) [[id number]]  (Normal) Collected: 07/24/23 1342    Lab Status: Final result Updated: 07/24/23 1438     POC Glucose 68 mg/dl                  No orders to display              Procedures  Procedures         ED Course                               SBIRT 20yo+    Flowsheet Row Most Recent Value   Initial Alcohol Screen: US AUDIT-C     1. How often do you have a drink containing alcohol? 0 Filed at: 07/24/2023 1300   2. How many drinks containing alcohol do you have on a typical day you are drinking? 0 Filed at: 07/24/2023 1300   3a. Male UNDER 65: How often do you have five or more drinks on one occasion? 0 Filed at: 07/24/2023 1300   3b. FEMALE Any Age, or MALE 65+: How often do you have 4 or more drinks on one occassion? 0 Filed at: 07/24/2023 1300   Audit-C Score 0 Filed at: 07/24/2023 1300   ALEA: How many times in the past year have you. .. Used an illegal drug or used a prescription medication for non-medical reasons?  Never Filed at: 07/24/2023 1300                    Medical Decision Making  Pt with recurrent, pancreatitis; had out pt labs showing lipase >5000; pt appears comfortable in emergency department at present, reached out to GI, agrees with plan to admit initially requesting CT with IV contrast for patient has a contrast dye states he got IV contrast 1 time and it "made his abdomen warm so they told him he had an allergy so has not gotten contrast dye after that. Patient scheduled for MRI in 2 days. We will admit for either more urgent MRI or premedicate prior to IV contrast.  Pt has glucose monitor, states it's dropping below 60, pt has no sx, will order amp D50    Amount and/or Complexity of Data Reviewed  External Data Reviewed: labs and notes. Labs: ordered. Decision-making details documented in ED Course. Details: Lipase 5,201, Cr 1.7 (slightly elevated from baseline), wbc 8.64  Discussion of management or test interpretation with external provider(s): [facility] on call Mag Mckeon,    Risk  Prescription drug management. Decision regarding hospitalization. Disposition  Final diagnoses:   Chronic pancreatitis ([address]) - Abdominal pain   Chronic kidney disease     Time reflects when diagnosis was documented in both MDM as applicable and the Disposition within this note     Time User Action Codes Description Comment    7/24/2023  1:32 PM Dellis Dl Add [K86.1] Chronic pancreatitis ([address])     7/24/2023  2:26 PM Dellis Dl Modify [K86.1] Chronic pancreatitis ([address]) Abdominal pain    7/24/2023  2:28 PM Dellis Dl Add [N18.9] Chronic kidney disease       ED Disposition     ED Disposition   Admit    Condition   Stable    Date/Time   Mon Jul 24, 2023  2:26 PM    Comment   Case was discussed with Toby Call and the patient's admission status was agreed to be Admission Status: inpatient status to the service of Dr. Toby Call .            Follow-up Information    None         Patient's Medications   Discharge Prescriptions    No medications on file       No discharge procedures on file.     PDMP Review       Value Time User    PDMP Reviewed  Yes 7/10/2023  9:21 AM Myrtle Morgan MD          ED Provider  Electronically Signed by           Glenn Peraza PA-C  07/24/23 4342

## 2023-07-25 ENCOUNTER — PATIENT OUTREACH (OUTPATIENT)
Dept: CASE MANAGEMENT | Facility: OTHER | Age: 83
End: 2023-07-25

## 2023-07-25 ENCOUNTER — APPOINTMENT (INPATIENT)
Dept: MRI IMAGING | Facility: HOSPITAL | Age: 83
DRG: 439 | End: 2023-07-25
Payer: MEDICARE

## 2023-07-25 PROBLEM — R07.9 CHEST PAIN: Status: ACTIVE | Noted: 2023-07-25

## 2023-07-25 LAB
2HR DELTA HS TROPONIN: 0 NG/L
ALBUMIN SERPL BCP-MCNC: 3.3 G/DL (ref 3.5–5)
ALP SERPL-CCNC: 49 U/L (ref 34–104)
ALT SERPL W P-5'-P-CCNC: 12 U/L (ref 7–52)
ANION GAP SERPL CALCULATED.3IONS-SCNC: 8 MMOL/L
AST SERPL W P-5'-P-CCNC: 16 U/L (ref 13–39)
BASOPHILS # BLD AUTO: 0.02 THOUSANDS/ÂΜL (ref 0–0.1)
BASOPHILS NFR BLD AUTO: 0 % (ref 0–1)
BILIRUB DIRECT SERPL-MCNC: 0.11 MG/DL (ref 0–0.2)
BILIRUB SERPL-MCNC: 0.64 MG/DL (ref 0.2–1)
BUN SERPL-MCNC: 17 MG/DL (ref 5–25)
CALCIUM SERPL-MCNC: 8.5 MG/DL (ref 8.4–10.2)
CARDIAC TROPONIN I PNL SERPL HS: 12 NG/L
CARDIAC TROPONIN I PNL SERPL HS: 12 NG/L
CHLORIDE SERPL-SCNC: 105 MMOL/L (ref 96–108)
CO2 SERPL-SCNC: 28 MMOL/L (ref 21–32)
CREAT SERPL-MCNC: 1.41 MG/DL (ref 0.6–1.3)
EOSINOPHIL # BLD AUTO: 0.1 THOUSAND/ÂΜL (ref 0–0.61)
EOSINOPHIL NFR BLD AUTO: 2 % (ref 0–6)
ERYTHROCYTE [DISTWIDTH] IN BLOOD BY AUTOMATED COUNT: 14.2 % (ref 11.6–15.1)
GFR SERPL CREATININE-BSD FRML MDRD: 46 ML/MIN/1.73SQ M
GLUCOSE SERPL-MCNC: 111 MG/DL (ref 65–140)
GLUCOSE SERPL-MCNC: 116 MG/DL (ref 65–140)
GLUCOSE SERPL-MCNC: 134 MG/DL (ref 65–140)
GLUCOSE SERPL-MCNC: 152 MG/DL (ref 65–140)
GLUCOSE SERPL-MCNC: 163 MG/DL (ref 65–140)
GLUCOSE SERPL-MCNC: 61 MG/DL (ref 65–140)
HCT VFR BLD AUTO: 36 % (ref 36.5–49.3)
HGB BLD-MCNC: 11.9 G/DL (ref 12–17)
IMM GRANULOCYTES # BLD AUTO: 0.02 THOUSAND/UL (ref 0–0.2)
IMM GRANULOCYTES NFR BLD AUTO: 0 % (ref 0–2)
LIPASE SERPL-CCNC: 1079 U/L (ref 11–82)
LYMPHOCYTES # BLD AUTO: 1.34 THOUSANDS/ÂΜL (ref 0.6–4.47)
LYMPHOCYTES NFR BLD AUTO: 23 % (ref 14–44)
MAGNESIUM SERPL-MCNC: 1.9 MG/DL (ref 1.9–2.7)
MCH RBC QN AUTO: 31.8 PG (ref 26.8–34.3)
MCHC RBC AUTO-ENTMCNC: 33.1 G/DL (ref 31.4–37.4)
MCV RBC AUTO: 96 FL (ref 82–98)
MONOCYTES # BLD AUTO: 0.71 THOUSAND/ÂΜL (ref 0.17–1.22)
MONOCYTES NFR BLD AUTO: 12 % (ref 4–12)
NEUTROPHILS # BLD AUTO: 3.6 THOUSANDS/ÂΜL (ref 1.85–7.62)
NEUTS SEG NFR BLD AUTO: 63 % (ref 43–75)
NRBC BLD AUTO-RTO: 0 /100 WBCS
PHOSPHATE SERPL-MCNC: 3.6 MG/DL (ref 2.3–4.1)
PLATELET # BLD AUTO: 169 THOUSANDS/UL (ref 149–390)
PMV BLD AUTO: 10.7 FL (ref 8.9–12.7)
POTASSIUM SERPL-SCNC: 4 MMOL/L (ref 3.5–5.3)
PROT SERPL-MCNC: 5.9 G/DL (ref 6.4–8.4)
RBC # BLD AUTO: 3.74 MILLION/UL (ref 3.88–5.62)
SODIUM SERPL-SCNC: 141 MMOL/L (ref 135–147)
WBC # BLD AUTO: 5.79 THOUSAND/UL (ref 4.31–10.16)

## 2023-07-25 PROCEDURE — 83690 ASSAY OF LIPASE: CPT | Performed by: INTERNAL MEDICINE

## 2023-07-25 PROCEDURE — 84100 ASSAY OF PHOSPHORUS: CPT | Performed by: INTERNAL MEDICINE

## 2023-07-25 PROCEDURE — 80076 HEPATIC FUNCTION PANEL: CPT | Performed by: INTERNAL MEDICINE

## 2023-07-25 PROCEDURE — 85025 COMPLETE CBC W/AUTO DIFF WBC: CPT | Performed by: INTERNAL MEDICINE

## 2023-07-25 PROCEDURE — 80048 BASIC METABOLIC PNL TOTAL CA: CPT | Performed by: INTERNAL MEDICINE

## 2023-07-25 PROCEDURE — 74183 MRI ABD W/O CNTR FLWD CNTR: CPT

## 2023-07-25 PROCEDURE — 99232 SBSQ HOSP IP/OBS MODERATE 35: CPT | Performed by: INTERNAL MEDICINE

## 2023-07-25 PROCEDURE — 99222 1ST HOSP IP/OBS MODERATE 55: CPT | Performed by: INTERNAL MEDICINE

## 2023-07-25 PROCEDURE — 82948 REAGENT STRIP/BLOOD GLUCOSE: CPT

## 2023-07-25 PROCEDURE — 83735 ASSAY OF MAGNESIUM: CPT | Performed by: INTERNAL MEDICINE

## 2023-07-25 PROCEDURE — 84484 ASSAY OF TROPONIN QUANT: CPT | Performed by: INTERNAL MEDICINE

## 2023-07-25 PROCEDURE — A9585 GADOBUTROL INJECTION: HCPCS | Performed by: INTERNAL MEDICINE

## 2023-07-25 PROCEDURE — C9113 INJ PANTOPRAZOLE SODIUM, VIA: HCPCS | Performed by: INTERNAL MEDICINE

## 2023-07-25 RX ORDER — DEXTROSE MONOHYDRATE 25 G/50ML
INJECTION, SOLUTION INTRAVENOUS
Status: COMPLETED
Start: 2023-07-25 | End: 2023-07-25

## 2023-07-25 RX ORDER — SODIUM CHLORIDE, SODIUM GLUCONATE, SODIUM ACETATE, POTASSIUM CHLORIDE, MAGNESIUM CHLORIDE, SODIUM PHOSPHATE, DIBASIC, AND POTASSIUM PHOSPHATE .53; .5; .37; .037; .03; .012; .00082 G/100ML; G/100ML; G/100ML; G/100ML; G/100ML; G/100ML; G/100ML
150 INJECTION, SOLUTION INTRAVENOUS CONTINUOUS
Status: DISPENSED | OUTPATIENT
Start: 2023-07-25 | End: 2023-07-26

## 2023-07-25 RX ADMIN — HEPARIN SODIUM 5000 UNITS: 5000 INJECTION INTRAVENOUS; SUBCUTANEOUS at 14:48

## 2023-07-25 RX ADMIN — PANTOPRAZOLE SODIUM 40 MG: 40 INJECTION, POWDER, FOR SOLUTION INTRAVENOUS at 08:11

## 2023-07-25 RX ADMIN — HEPARIN SODIUM 5000 UNITS: 5000 INJECTION INTRAVENOUS; SUBCUTANEOUS at 06:02

## 2023-07-25 RX ADMIN — DEXTROSE MONOHYDRATE 50 ML: 25 INJECTION, SOLUTION INTRAVENOUS at 00:55

## 2023-07-25 RX ADMIN — FLUTICASONE PROPIONATE 2 SPRAY: 50 SPRAY, METERED NASAL at 08:11

## 2023-07-25 RX ADMIN — CLONIDINE HYDROCHLORIDE 0.1 MG: 0.1 TABLET ORAL at 08:11

## 2023-07-25 RX ADMIN — HYDROMORPHONE HYDROCHLORIDE 0.2 MG: 0.2 INJECTION, SOLUTION INTRAMUSCULAR; INTRAVENOUS; SUBCUTANEOUS at 21:57

## 2023-07-25 RX ADMIN — HEPARIN SODIUM 5000 UNITS: 5000 INJECTION INTRAVENOUS; SUBCUTANEOUS at 21:45

## 2023-07-25 RX ADMIN — SODIUM CHLORIDE, SODIUM GLUCONATE, SODIUM ACETATE, POTASSIUM CHLORIDE, MAGNESIUM CHLORIDE, SODIUM PHOSPHATE, DIBASIC, AND POTASSIUM PHOSPHATE 150 ML/HR: .53; .5; .37; .037; .03; .012; .00082 INJECTION, SOLUTION INTRAVENOUS at 22:03

## 2023-07-25 RX ADMIN — METOPROLOL SUCCINATE 25 MG: 25 TABLET, FILM COATED, EXTENDED RELEASE ORAL at 08:11

## 2023-07-25 RX ADMIN — HYDROMORPHONE HYDROCHLORIDE 0.2 MG: 0.2 INJECTION, SOLUTION INTRAMUSCULAR; INTRAVENOUS; SUBCUTANEOUS at 06:06

## 2023-07-25 RX ADMIN — GADOBUTROL 10 ML: 604.72 INJECTION INTRAVENOUS at 11:16

## 2023-07-25 RX ADMIN — CLONIDINE HYDROCHLORIDE 0.1 MG: 0.1 TABLET ORAL at 21:45

## 2023-07-25 RX ADMIN — SODIUM CHLORIDE, SODIUM GLUCONATE, SODIUM ACETATE, POTASSIUM CHLORIDE, MAGNESIUM CHLORIDE, SODIUM PHOSPHATE, DIBASIC, AND POTASSIUM PHOSPHATE 125 ML/HR: .53; .5; .37; .037; .03; .012; .00082 INJECTION, SOLUTION INTRAVENOUS at 09:58

## 2023-07-25 RX ADMIN — CLOPIDOGREL BISULFATE 75 MG: 75 TABLET ORAL at 08:11

## 2023-07-25 RX ADMIN — HYDROMORPHONE HYDROCHLORIDE 0.5 MG: 1 INJECTION, SOLUTION INTRAMUSCULAR; INTRAVENOUS; SUBCUTANEOUS at 09:58

## 2023-07-25 RX ADMIN — RANOLAZINE 500 MG: 500 TABLET, EXTENDED RELEASE ORAL at 17:23

## 2023-07-25 RX ADMIN — HYDROMORPHONE HYDROCHLORIDE 0.5 MG: 1 INJECTION, SOLUTION INTRAMUSCULAR; INTRAVENOUS; SUBCUTANEOUS at 14:57

## 2023-07-25 RX ADMIN — RANOLAZINE 500 MG: 500 TABLET, EXTENDED RELEASE ORAL at 08:11

## 2023-07-25 RX ADMIN — TAMSULOSIN HYDROCHLORIDE 0.8 MG: 0.4 CAPSULE ORAL at 08:11

## 2023-07-25 NOTE — ASSESSMENT & PLAN NOTE
Lab Results   Component Value Date    HGBA1C 6.7 (H) 05/10/2023       Recent Labs     07/25/23  0040 07/25/23  0126 07/25/23  0559 07/25/23  0959   POCGLU 61* 152* 116 134       Blood Sugar Average: Last 72 hrs:  (P) 99.625   Holding insulin coverage with meals, patient n.p.o.   Continue Lantus 12 units daily  Start sliding scale insulin and frequent Accu-Cheks  Hypoglycemia protocol in place

## 2023-07-25 NOTE — OCCUPATIONAL THERAPY NOTE
Occupational Therapy Cancellation Note     Patient Name: Prateek Avina  NBARF'Y Date: 7/25/2023 07/25/23 7994   Note Type   Note type Cancelled Session   Cancel Reasons Refusal   Additional Comments OT consult received and chart reviewed. Attempted to see patient for OT eval, pt refusing despite encouragment "I am on pain meds and I am just tired". Spouse and Dtr present. Will f/u as able for completion on OT evaluation.           Cosme Salazar MS OTR/L   [place] Licensure#[id number]

## 2023-07-25 NOTE — CASE MANAGEMENT
Case Management Assessment    Patient name Miguelina San  Location /-86 MRN [id number]  : 1940 Date 2023       Current Admission Date: 2023  Current Admission Diagnosis:Acute pancreatitis   Patient Active Problem List    Diagnosis Date Noted   • Diarrhea due to malabsorption 2023   • Platelets decreased ([address]) 2023   • Left arm swelling 2023   • Acute pancreatitis 2023   • Epigastric pain 2023   • Moderate aortic stenosis 2023   • Stage 3b chronic kidney disease (CKD) ([address]) 2023   • Venous stasis dermatitis of both lower extremities 2022   • Type 2 diabetes mellitus with kidney complication, with long-term current use of insulin ([address]) 2022   • History of recent hospitalization 2022   • GERD without esophagitis 2022   • Mixed hyperlipidemia 2022   • Coronary artery disease involving native coronary artery without angina pectoris 2022   • ED (erectile dysfunction) 2022   • Obesity, morbid (HCC)    • PAULA (obstructive sleep apnea) 2020   • HTN (hypertension), benign 2020   • (HFpEF) heart failure with preserved ejection fraction ([address]) 2020      LOS (days): 1  Geometric Mean LOS (GMLOS) (days): 3.10  Days to GMLOS:2.2     OBJECTIVE:  PATIENT READMITTED [address] of Unplanned Readmission Score: 25.39         Current admission status: Inpatient       Preferred Pharmacy:   [address], [address] [place] [address]  Phone: [phone] Fax: [phone]    Primary Care Provider: Erica Samson MD    Primary Insurance: MEDICARE  Secondary Insurance: BLUE CROSS    ASSESSMENT:  [facility] Proxies    There are no active Health Care Proxies on file.                  Readmission Root Cause  30 Day Readmission: Yes  Who directed you to return to the hospital?: Family, Self  Did you understand whom to contact if you had questions or problems?: Yes  Did you get your prescriptions before you left the hospital?: Yes  Were you able to get your prescriptions filled when you left the hospital?: Yes  Did you take your medications as prescribed?: Yes  Were you able to get to your follow-up appointments?: No  Reason[de-identified] Readmitted prior to appointment  During previous admission, was a post-acute recommendation made?: No  Patient was readmitted due to: acute pancreatitis  Action Plan: GI evaluation    Patient Information  [facility]  Mental Status: Alert  During Assessment patient was accompanied by: Not accompanied during assessment  Assessment information provided by[de-identified] Patient  Primary Caregiver: Self  Support Systems: Spouse/significant other  Home entry access options. Select all that apply.: Stairs  Number of steps to enter home. : 1  Do the steps have railings?: Yes  Type of Current Residence: Apartment  Floor Level: 3 (elevator)  Upon entering residence, is there a bedroom on the main floor (no further steps)?: Yes  Upon entering residence, is there a bathroom on the main floor (no further steps)?: Yes  In the last 12 months, was there a time when you were not able to pay the mortgage or rent on time?: No  In the last 12 months, was there a time when you did not have a steady place to sleep or slept in a shelter (including now)?: No  Homeless/housing insecurity resource given?: N/A  Living Arrangements: Lives w/ Spouse/significant other    Activities of Daily Living Prior to Admission  Functional Status: Independent  Completes ADLs independently?: Yes  Ambulates independently?: Yes  Does patient use assisted devices?: Yes  Assisted Devices (DME) used: CPAP  Does patient currently own DME?: Yes  What DME does the patient currently own?: CPAP  Does patient have a history of Outpatient Therapy (PT/OT)?: No  Does the patient have a history of Short-Term Rehab?: Yes  Does patient have a history of HHC?: No  Does patient currently have [facility]?: No         Patient Information Continued  Income Source: Pension/snf  Does patient have prescription coverage?: Yes  Within the past 12 months, you worried that your food would run out before you got the money to buy more.: Never true  Within the past 12 months, the food you bought just didn't last and you didn't have money to get more.: Never true  Food insecurity resource given?: N/A  Does patient receive dialysis treatments?: No  Does patient have a history of substance abuse?: No  Does patient have a history of Mental Health Diagnosis?: No         Means of Transportation  Means of Transport to Appts[de-identified] Family transport  In the past 12 months, has lack of transportation kept you from medical appointments or from getting medications?: No  In the past 12 months, has lack of transportation kept you from meetings, work, or from getting things needed for daily living?: No  Was application for public transport provided?: N/A

## 2023-07-25 NOTE — ASSESSMENT & PLAN NOTE
Wt Readings from Last 3 Encounters:   07/24/23 109 kg (240 lb)   07/14/23 113 kg (249 lb)   07/07/23 113 kg (249 lb)   Appears to be euvolemic  Holding torsemide due to acute episode of pancreatitis and need for IV fluids  Watch for fluid overload,

## 2023-07-25 NOTE — ASSESSMENT & PLAN NOTE
Patient presenting with abdominal pain, elevated lipase 5201  CT scan done on 7 7 show acute pancreatitis without abscess or pseudocyst  LFTs within normal limits. Etiology unclear, no evidence of autoimmune pancreatitis and previous work-up  MRI done on 6/10 showed probably gallstone sludge without biliary duct dilation or evidence of choledocholithiasis. Pancreatic duct demonstrated some focal narrowing at the junction of the head and neck region which may be related to artifact   Patient had a scheduled MRI on 7/26  N.p.o., start IV fluids   Pain management with as needed Dilaudid    Lipase level is trending down however abdominal pain continues will increase IV fluid hydration to 150 cc/h. Awaiting MRI of the abdomen report. GI input appreciated.

## 2023-07-25 NOTE — RESPIRATORY THERAPY NOTE
Pt refuse Cpap HS. Pt wears nasal pillows. We do not carry nasal pillows. Offered pt nasal mask or the full face mask. Pt refused. Informed pt that he is allowed to bring in his home Cpap.

## 2023-07-25 NOTE — CONSULTS
Consultation -[facility]   Rise Lean 82 y.o. male MRN: [id number]    Unit/Bed#: -01 Encounter: [id number]\      Physician Requesting Consult: Dr Nedra Hewitt    Reason for Consult / Principal Problem:  pancreatitis    HPI:   This is a 82 y.o. male with a PMH of pancreatitis, presents with complaints of abdominal pain with elevated lipase level. Patient was recently admitted and had a CT of the abdomen done on 7/7 showed acute pancreatitis without abscess or pseudocyst.   Patient was seen by us on multiple prior admissions, 5th episode in past 2 months. His etiology of pancreatitis was unclear and it could have been related to medication versus microlithiasis versus alcohol the patient only had admitted to minimal consumption and he did have sludge and possible nonshadowing stones on imaging at that time. Pt underwent cholecystectomy. MRI was ordered  to evaluate for malignancy in June. Patient was noted at that time to have evidence of peripancreatic edema and the pancreatic duct demonstrated some focal narrowing at the junction of the head and neck region which could be related to artifact or the pancreatitis so plan was for MRCP in 4 to 6 weeks and he was actually scheduled for repeat MRI 7/26. IgG4 was also within normal limits on prior admission. Patient reports this is his eighth episode total of pancreatitis with first 1 in 2017 which was reportedly related to [address] Patient was previously on Glipizide and hydrochlorothiazide which subsequently were discontinued in case that was the cause of the pancreatitis. He reports that pain is a 5-6 out of 10 currently. Reports that he has been moving his bowels mostly daily a few times after cholecystectomy. Went for MRI this morning. Denies any nausea or vomiting. Allergies:    Allergies   Allergen Reactions   • Januvia [Sitagliptin] Other (See Comments)     pancreatitis   • Iodinated Contrast Media Other (See Comments)     Per pt, "got real hot feeling"   • Semaglutide Other (See Comments)     pancreatits   • Simvastatin Myalgia   • Trulicity [Dulaglutide] Other (See Comments)     Pancreatitis   • Wound Dressing Adhesive Other (See Comments)     Burning sensation       Medications:  Current Facility-Administered Medications:   •  cloNIDine (CATAPRES) tablet 0.1 mg, 0.1 mg, Oral, Q12H [facility], Kavitha Thomas MD, 0.1 mg at 07/25/23 2527  •  clopidogrel (PLAVIX) tablet 75 mg, 75 mg, Oral, Daily, Kavitha Thomas MD, 75 mg at 07/25/23 0811  •  fluticasone (FLONASE) 50 mcg/act nasal spray 2 spray, 2 spray, Nasal, Daily, Kavitha Thomas MD, 2 spray at 07/25/23 9838  •  heparin (porcine) subcutaneous injection 5,000 Units, 5,000 Units, Subcutaneous, Q8H [facility], 5,000 Units at 07/25/23 0602 **AND** [CANCELED] Platelet count, , , Once, Kavitha Thomas MD  •  HYDROmorphone (DILAUDID) injection 0.5 mg, 0.5 mg, Intravenous, Q4H PRN, Kavitha Thomas MD  •  HYDROmorphone HCl (DILAUDID) injection 0.2 mg, 0.2 mg, Intravenous, Q4H PRN, Kavitha Thomas MD, 0.2 mg at 07/25/23 0606  •  insulin lispro (HumaLOG) 100 units/mL subcutaneous injection 1-6 Units, 1-6 Units, Subcutaneous, Q6H [facility] **AND** Fingerstick Glucose (POCT), , , Q6H, Myrtle Das MD  •  metoprolol succinate (TOPROL-XL) 24 hr tablet 25 mg, 25 mg, Oral, Daily, Kavitha Thomas MD, 25 mg at 07/25/23 9046  •  ondansetron (ZOFRAN) injection 4 mg, 4 mg, Intravenous, Q6H PRN, Kavitha Thomas MD  •  pantoprazole (PROTONIX) injection 40 mg, 40 mg, Intravenous, Q24H [facility], Kavitha Thomas MD, 40 mg at 07/25/23 3079  •  ranolazine (RANEXA) 12 hr tablet 500 mg, 500 mg, Oral, BID, Kavitha Thomas MD, 500 mg at 07/25/23 6026  •  tamsulosin (FLOMAX) capsule 0.8 mg, 0.8 mg, Oral, Daily, Myrtle Yeny Das MD, 0.8 mg at 07/25/23 0034    Past Medical history:  Past Medical History:   Diagnosis Date   • Allergic    • Arthritis    • Chronic kidney disease    • Chronic kidney disease (CKD) stage G3a/A1, moderately decreased glomerular filtration rate (GFR) between 45-59 mL/min/1.73 square meter and albuminuria creatinine ratio less than 30 mg/g (HCC)    • Colon polyp    • Diabetes mellitus (HCC)    • GERD (gastroesophageal reflux disease)    • Heart murmur    • History of echocardiogram    • HL (hearing loss)    • Hyperlipidemia    • Hypertension    • Obesity    • Sleep apnea, obstructive        Past Surgical History:   Past Surgical History:   Procedure Laterality Date   • CARDIAC CATHETERIZATION     • COLONOSCOPY  2018   • COLONOSCOPY     • EYE SURGERY     • HEMORRHOID SURGERY     • JOINT REPLACEMENT  2017    knee   • OTHER SURGICAL HISTORY      Stent    • [place] LAPAROSCOPY SURG CHOLECYSTECTOMY N/A 2023    Procedure: CHOLECYSTECTOMY LAPAROSCOPIC;  Surgeon: Chandra Cardoza MD;  Location:  MAIN OR;  Service: General   • UPPER GASTROINTESTINAL ENDOSCOPY         Family History:   Family History   Problem Relation Age of Onset   • Heart disease Mother    • Heart attack Mother         46s   • Cancer Mother    • Coronary artery disease Mother    • Cancer Brother         Malignant tumor of lung       Social history:   Social History     Socioeconomic History   • Marital status: /Civil Union     Spouse name: Not on file   • Number of children: Not on file   • Years of education: Not on file   • Highest education level: Not on file   Occupational History   • Not on file   Tobacco Use   • Smoking status: Former     Packs/day: 2.00     Years: 15.00     Total pack years: 30.00     Types: Cigarettes, Pipe, Cigars     Quit date: 1972     Years since quittin.7     Passive exposure: Past   • Smokeless tobacco: Never   Vaping Use   • Vaping Use: Never used   Substance and Sexual Activity   • Alcohol use: Not Currently     Alcohol/week: 5.0 standard drinks of alcohol     Types: 5 Glasses of wine per week     Comment: no alcohol since March   • Drug use: Never   • Sexual activity: Not Currently     Partners: Female   Other Topics Concern   • Not on file   Social History Narrative    Pet- dog     Social Determinants of Health     Financial Resource Strain: Unknown (7/14/2023)    Overall Financial Resource Strain (CARDIA)    • Difficulty of Paying Living Expenses: Patient refused   Food Insecurity: No Food Insecurity (7/10/2023)    Hunger Vital Sign    • Worried About Running Out of Food in the Last Year: Never true    • Ran Out of Food in the Last Year: Never true   Transportation Needs: Unknown (7/14/2023)    PRAPARE - Transportation    • Lack of Transportation (Medical): Patient refused    • Lack of Transportation (Non-Medical): Patient refused   Physical Activity: Not on file   Stress: Not on file   Social Connections: Not on file   Intimate Partner Violence: Not on file   Housing Stability: [address]  (7/10/2023)    Housing Stability Vital Sign    • Unable to Pay for Housing in the Last Year: No    • Number of Places Lived in the Last Year: 1    • Unstable Housing in the Last Year: No       Review of Systems: All other systems were reviewed and were negative, otherwise please refer to HPI    Physical Exam: /67 (BP Location: Right arm)   Pulse 61   Temp 98.5 °F (36.9 °C) (Oral)   Resp 20   Ht 5' 6" (1.676 m)   Wt 109 kg (240 lb)   SpO2 93%   BMI 38.74 kg/m²     General Appearance:    Alert, cooperative, no distress, appears stated age   Head:    Normocephalic, without obvious abnormality, atraumatic   Eyes:    No scleral icterus           Mouth:  Mucosa moist   Neck:   Supple, symmetrical, trachea midline, no thyromegaly       Lungs:     Clear to auscultation bilaterally, respirations unlabored       Heart:    Regular rate and rhythm, S1 and S2 normal, no murmur, rub   or gallop     Abdomen:     Soft, tenderness palpation of epigastric area and left upper quadrant, protuberant positive bowel sounds no rebound rigidity guarding   Genitalia: deferred   Rectal:   deferred   Extremities:   Extremities normal,no cyanosis or edema       Skin:   Skin color, texture, turgor normal, no rashes or lesions       Neurologic:   Grossly intact, no focal deficit           Lab Results:   Recent Results (from the past 24 hour(s))   CBC and Platelet    Collection Time: 07/24/23 10:05 AM   Result Value Ref Range    WBC 8.64 4.31 - 10.16 Thousand/uL    RBC 4.23 3.88 - 5.62 Million/uL    Hemoglobin 13.4 12.0 - 17.0 g/dL    Hematocrit 40.4 36.5 - 49.3 %    MCV 96 82 - 98 fL    MCH 31.7 26.8 - 34.3 pg    MCHC 33.2 31.4 - 37.4 g/dL    RDW 14.2 11.6 - 15.1 %    Platelets 101 381 - 072 Thousands/uL    MPV 10.0 8.9 - 12.7 fL   Comprehensive metabolic panel    Collection Time: 07/24/23 10:05 AM   Result Value Ref Range    Sodium 140 135 - 147 mmol/L    Potassium 4.0 3.5 - 5.3 mmol/L    Chloride 102 96 - 108 mmol/L    CO2 31 21 - 32 mmol/L    ANION GAP 7 mmol/L    BUN 24 5 - 25 mg/dL    Creatinine 1.70 (H) 0.60 - 1.30 mg/dL    Glucose, Fasting 96 65 - 99 mg/dL    Calcium 9.1 8.4 - 10.2 mg/dL    AST 19 13 - 39 U/L    ALT 17 7 - 52 U/L    Alkaline Phosphatase 48 34 - 104 U/L    Total Protein 6.7 6.4 - 8.4 g/dL    Albumin 3.8 3.5 - 5.0 g/dL    Total Bilirubin 0.55 0.20 - 1.00 mg/dL    eGFR 37 ml/min/1.73sq m   Lipase    Collection Time: 07/24/23 10:05 AM   Result Value Ref Range    Lipase 5,201 (H) 11 - 82 u/L   Fingerstick Glucose (POCT)    Collection Time: 07/24/23  1:42 PM   Result Value Ref Range    POC Glucose 68 65 - 140 mg/dl   Fingerstick Glucose (POCT)    Collection Time: 07/24/23  2:36 PM   Result Value Ref Range    POC Glucose 118 65 - 140 mg/dl   Fingerstick Glucose (POCT)    Collection Time: 07/24/23  4:31 PM   Result Value Ref Range    POC Glucose 73 65 - 140 mg/dl   Fingerstick Glucose (POCT)    Collection Time: 07/24/23  5:29 PM   Result Value Ref Range    POC Glucose 75 65 - 140 mg/dl   Fingerstick Glucose (POCT)    Collection Time: 07/25/23 12:40 AM   Result Value Ref Range    POC Glucose 61 (L) 65 - 140 mg/dl   Fingerstick Glucose (POCT)    Collection Time: 07/25/23  1:26 AM   Result Value Ref Range    POC Glucose 152 (H) 65 - 140 mg/dl   Basic metabolic panel    Collection Time: 07/25/23  5:51 AM   Result Value Ref Range    Sodium 141 135 - 147 mmol/L    Potassium 4.0 3.5 - 5.3 mmol/L    Chloride 105 96 - 108 mmol/L    CO2 28 21 - 32 mmol/L    ANION GAP 8 mmol/L    BUN 17 5 - 25 mg/dL    Creatinine 1.41 (H) 0.60 - 1.30 mg/dL    Glucose 111 65 - 140 mg/dL    Calcium 8.5 8.4 - 10.2 mg/dL    eGFR 46 ml/min/1.73sq m   Magnesium    Collection Time: 07/25/23  5:51 AM   Result Value Ref Range    Magnesium 1.9 1.9 - 2.7 mg/dL   Phosphorus    Collection Time: 07/25/23  5:51 AM   Result Value Ref Range    Phosphorus 3.6 2.3 - 4.1 mg/dL   Hepatic function panel    Collection Time: 07/25/23  5:51 AM   Result Value Ref Range    Total Bilirubin 0.64 0.20 - 1.00 mg/dL    Bilirubin, Direct 0.11 0.00 - 0.20 mg/dL    Alkaline Phosphatase 49 34 - 104 U/L    AST 16 13 - 39 U/L    ALT 12 7 - 52 U/L    Total Protein 5.9 (L) 6.4 - 8.4 g/dL    Albumin 3.3 (L) 3.5 - 5.0 g/dL   CBC and differential    Collection Time: 07/25/23  5:51 AM   Result Value Ref Range    WBC 5.79 4.31 - 10.16 Thousand/uL    RBC 3.74 (L) 3.88 - 5.62 Million/uL    Hemoglobin 11.9 (L) 12.0 - 17.0 g/dL    Hematocrit 36.0 (L) 36.5 - 49.3 %    MCV 96 82 - 98 fL    MCH 31.8 26.8 - 34.3 pg    MCHC 33.1 31.4 - 37.4 g/dL    RDW 14.2 11.6 - 15.1 %    MPV 10.7 8.9 - 12.7 fL    Platelets 974 622 - 313 Thousands/uL    nRBC 0 /100 WBCs    Neutrophils Relative 63 43 - 75 %    Immat GRANS % 0 0 - 2 %    Lymphocytes Relative 23 14 - 44 %    Monocytes Relative 12 4 - 12 %    Eosinophils Relative 2 0 - 6 %    Basophils Relative 0 0 - 1 %    Neutrophils Absolute 3.60 1.85 - 7.62 Thousands/µL    Immature Grans Absolute 0.02 0.00 - 0.20 Thousand/uL    Lymphocytes Absolute 1.34 0.60 - 4.47 Thousands/µL    Monocytes Absolute 0.71 0.17 - 1.22 Thousand/µL    Eosinophils Absolute 0.10 0.00 - 0.61 Thousand/µL    Basophils Absolute 0.02 0.00 - 0.10 Thousands/µL   Fingerstick Glucose (POCT)    Collection Time: 07/25/23  5:59 AM   Result Value Ref Range    POC Glucose 116 65 - 140 mg/dl       Imaging Studies: CT abdomen pelvis wo contrast    Result Date: 7/7/2023  Narrative: CT ABDOMEN AND PELVIS WITHOUT IV CONTRAST INDICATION:   epigastric pain, history of pancreatitis. COMPARISON: CT abdomen pelvis dated June 9, 2023. TECHNIQUE:  CT examination of the abdomen and pelvis was performed without intravenous contrast. Multiplanar 2D reformatted images were created from the source data. This examination, like all CT scans performed in the [facility], was performed utilizing techniques to minimize radiation dose exposure, including the use of iterative reconstruction and automated exposure control. Radiation dose length product (DLP) for this visit:  1083.4 mGy-cm Enteric contrast was not administered. FINDINGS: ABDOMEN LOWER CHEST: Atelectatic changes are noted at the lung bases. LIVER/BILIARY TREE:  Unremarkable. GALLBLADDER: Gallbladder is surgically absent. No evidence of complication. SPLEEN:  Unremarkable. PANCREAS: There is mild to moderate peripancreatic inflammatory stranding suspicious for acute pancreatitis. However, there is no focal fluid collection to suggest a pseudocyst or abscess. Evaluation of the pancreatic parenchyma was limited by the lack  of intravenous contrast. ADRENAL GLANDS:  Unremarkable. KIDNEYS/URETERS: No hydronephrosis. Bilateral renal cysts are again noted. STOMACH AND BOWEL:  There is colonic diverticulosis without evidence of acute diverticulitis. APPENDIX:  A normal appendix was visualized. ABDOMINOPELVIC CAVITY:  No ascites. No pneumoperitoneum. No lymphadenopathy. VESSELS: Atherosclerotic changes are present. No evidence of aneurysm. PELVIS REPRODUCTIVE ORGANS: The prostate is enlarged.  URINARY BLADDER: Unremarkable. ABDOMINAL WALL/INGUINAL REGIONS: There is a small fat-containing umbilical hernia with mild inflammatory stranding which may be postoperative in nature. Bilateral fat-containing renal hernias are again noted. OSSEOUS STRUCTURES:  No acute fracture or destructive osseous lesion. Impression: Mild to moderate diffuse peripancreatic inflammatory stranding most compatible with acute pancreatitis. Correlation with serum lipase levels is recommended. There is no focal fluid collection to suggest an abscess or pseudocyst. No free air or free fluid. Scattered colonic diverticulosis with no inflammatory changes present to suggest acute diverticulitis. Workstation performed: [id number]       Assessment/Plan:   Acute pancreatitis  Patient presenting with abdominal pain, elevated lipase 5201  CT scan done on 7/ 7 show acute pancreatitis without abscess or pseudocyst  LFTs remains within normal limits. Etiology unclear, no evidence of autoimmune pancreatitis and previous work-up  MRI done on 6/10 showed probably gallstone sludge without biliary duct dilation or evidence of choledocholithiasis. Pancreatic duct demonstrated some focal narrowing at the junction of the head and neck region which may be related to artifact   Patient is status postcholecystectomy 6/28/2023  Continue n.p.o.   Pain management with as needed Dilaudid  Follow-up MRI MRCP which was repeated today, no repeat CT done on admission as he cannot have IV contrast due to reported allergy and CKD  Would recommend LR at 150 cc/h, patient did have signs of fluid overload on prior admission with pancreatitis with more aggressive hydration  Spoke with hospitalist regarding case  Thank you for the consultation and case will be discussed with Dr. Zaid Barger

## 2023-07-25 NOTE — PLAN OF CARE
Problem: PAIN - ADULT  Goal: Verbalizes/displays adequate comfort level or baseline comfort level  Description: Interventions:  - Encourage patient to monitor pain and request assistance  - Assess pain using appropriate pain scale  - Administer analgesics based on type and severity of pain and evaluate response  - Implement non-pharmacological measures as appropriate and evaluate response  - Consider cultural and social influences on pain and pain management  - Notify physician/advanced practitioner if interventions unsuccessful or patient reports new pain  Outcome: Progressing     Problem: INFECTION - ADULT  Goal: Absence or prevention of progression during hospitalization  Description: INTERVENTIONS:  - Assess and monitor for signs and symptoms of infection  - Monitor lab/diagnostic results  - Monitor all insertion sites, i.e. indwelling lines, tubes, and drains  - Monitor endotracheal if appropriate and nasal secretions for changes in amount and color  - [place] appropriate cooling/warming therapies per order  - Administer medications as ordered  - Instruct and encourage patient and family to use good hand hygiene technique  - Identify and instruct in appropriate isolation precautions for identified infection/condition  Outcome: Progressing  Goal: Absence of fever/infection during neutropenic period  Description: INTERVENTIONS:  - Monitor WBC    Outcome: Progressing     Problem: SAFETY ADULT  Goal: Patient will remain free of falls  Description: INTERVENTIONS:  - Educate patient/family on patient safety including physical limitations  - Instruct patient to call for assistance with activity   - Consult OT/PT to assist with strengthening/mobility   - Keep Call bell within reach  - Keep bed low and locked with side rails adjusted as appropriate  - Keep care items and personal belongings within reach  - Initiate and maintain comfort rounds  - Make Fall Risk Sign visible to staff  - Apply yellow socks and bracelet for high fall risk patients  - Consider moving patient to room near nurses station  Outcome: Progressing  Goal: Maintain or return to baseline ADL function  Description: INTERVENTIONS:  -  Assess patient's ability to carry out ADLs; assess patient's baseline for ADL function and identify physical deficits which impact ability to perform ADLs (bathing, care of mouth/teeth, toileting, grooming, dressing, etc.)  - Assess/evaluate cause of self-care deficits   - Assess range of motion  - Assess patient's mobility; develop plan if impaired  - Assess patient's need for assistive devices and provide as appropriate  - Encourage maximum independence but intervene and supervise when necessary  - Involve family in performance of ADLs  - Assess for home care needs following discharge   - Consider OT consult to assist with ADL evaluation and planning for discharge  - Provide patient education as appropriate  Outcome: Progressing  Goal: Maintains/Returns to pre admission functional level  Description: INTERVENTIONS:  - Perform BMAT or MOVE assessment daily.   - Set and communicate daily mobility goal to care team and patient/family/caregiver.    - Collaborate with rehabilitation services on mobility goals if consulted  - Out of bed for toileting  - Record patient progress and toleration of activity level   Outcome: Progressing     Problem: DISCHARGE PLANNING  Goal: Discharge to home or other facility with appropriate resources  Description: INTERVENTIONS:  - Identify barriers to discharge w/patient and caregiver  - Arrange for needed discharge resources and transportation as appropriate  - Identify discharge learning needs (meds, wound care, etc.)  - Arrange for interpretive services to assist at discharge as needed  - Refer to Case Management Department for coordinating discharge planning if the patient needs post-hospital services based on physician/advanced practitioner order or complex needs related to functional status, cognitive ability, or social support system  Outcome: Progressing     Problem: Knowledge Deficit  Goal: Patient/family/caregiver demonstrates understanding of disease process, treatment plan, medications, and discharge instructions  Description: Complete learning assessment and assess knowledge base. Interventions:  - Provide teaching at level of understanding  - Provide teaching via preferred learning methods  Outcome: Progressing     Problem: Nutrition/Hydration-ADULT  Goal: Nutrient/Hydration intake appropriate for improving, restoring or maintaining nutritional needs  Description: Monitor and assess patient's nutrition/hydration status for malnutrition. Collaborate with interdisciplinary team and initiate plan and interventions as ordered. Monitor patient's weight and dietary intake as ordered or per policy. Utilize nutrition screening tool and intervene as necessary. Determine patient's food preferences and provide high-protein, high-caloric foods as appropriate.      INTERVENTIONS:  - Monitor oral intake, urinary output, labs, and treatment plans  - Assess nutrition and hydration status and recommend course of action  - Evaluate amount of meals eaten  - Assist patient with eating if necessary   - Allow adequate time for meals  - Recommend/ encourage appropriate diets, oral nutritional supplements, and vitamin/mineral supplements  - Order, calculate, and assess calorie counts as needed  - Assess need for intravenous fluids  - Provide specific nutrition/hydration education as appropriate  - Include patient/family/caregiver in decisions related to nutrition  Outcome: Progressing

## 2023-07-25 NOTE — PHYSICAL THERAPY NOTE
Physical Therapy Cancellation Note         07/25/23 1136   Note Type   Note type Cancelled Session   Cancel Reasons Patient off floor/test   Additional Comments PT consult received and chart reviewed. PT attempt, pt currently off unit. Will f/u as able and appropriate.        Marina Lyman, PT, DPT   Available via Mobile Pulse  NPI # [id number]  [place] - [id number]  6/75/4265

## 2023-07-25 NOTE — ASSESSMENT & PLAN NOTE
Lab Results   Component Value Date    EGFR 46 07/25/2023    EGFR 37 07/24/2023    EGFR 47 07/09/2023    CREATININE 1.41 (H) 07/25/2023    CREATININE 1.70 (H) 07/24/2023    CREATININE 1.39 (H) 07/09/2023   Creatinine 1.7 today, baseline appears to be close to baseline  Avoid hypotension and nephrotoxins  Continue to monitor kidney function

## 2023-07-25 NOTE — PLAN OF CARE
Problem: PAIN - ADULT  Goal: Verbalizes/displays adequate comfort level or baseline comfort level  Description: Interventions:  - Encourage patient to monitor pain and request assistance  - Assess pain using appropriate pain scale  - Administer analgesics based on type and severity of pain and evaluate response  - Implement non-pharmacological measures as appropriate and evaluate response  - Consider cultural and social influences on pain and pain management  - Notify physician/advanced practitioner if interventions unsuccessful or patient reports new pain  Outcome: Progressing     Problem: INFECTION - ADULT  Goal: Absence or prevention of progression during hospitalization  Description: INTERVENTIONS:  - Assess and monitor for signs and symptoms of infection  - Monitor lab/diagnostic results  - Monitor all insertion sites, i.e. indwelling lines, tubes, and drains  - Monitor endotracheal if appropriate and nasal secretions for changes in amount and color  - [place] appropriate cooling/warming therapies per order  - Administer medications as ordered  - Instruct and encourage patient and family to use good hand hygiene technique  - Identify and instruct in appropriate isolation precautions for identified infection/condition  Outcome: Progressing     Problem: SAFETY ADULT  Goal: Patient will remain free of falls  Description: INTERVENTIONS:  - Educate patient/family on patient safety including physical limitations  - Instruct patient to call for assistance with activity   - Consult OT/PT to assist with strengthening/mobility   - Keep Call bell within reach  - Keep bed low and locked with side rails adjusted as appropriate  - Keep care items and personal belongings within reach  - Initiate and maintain comfort rounds  - Make Fall Risk Sign visible to staff  - Offer Toileting every 2 Hours, in advance of need  - Initiate/Maintain bed alarm  - Obtain necessary fall risk management equipment: bed alarm, yellow socks and bracelet  - Apply yellow socks and bracelet for high fall risk patients  - Consider moving patient to room near nurses station  Outcome: Progressing  Goal: Maintain or return to baseline ADL function  Description: INTERVENTIONS:  -  Assess patient's ability to carry out ADLs; assess patient's baseline for ADL function and identify physical deficits which impact ability to perform ADLs (bathing, care of mouth/teeth, toileting, grooming, dressing, etc.)  - Assess/evaluate cause of self-care deficits   - Assess range of motion  - Assess patient's mobility; develop plan if impaired  - Assess patient's need for assistive devices and provide as appropriate  - Encourage maximum independence but intervene and supervise when necessary  - Involve family in performance of ADLs  - Assess for home care needs following discharge   - Consider OT consult to assist with ADL evaluation and planning for discharge  - Provide patient education as appropriate  Outcome: Progressing  Goal: Maintains/Returns to pre admission functional level  Description: INTERVENTIONS:  - Perform BMAT or MOVE assessment daily.   - Set and communicate daily mobility goal to care team and patient/family/caregiver. - Collaborate with rehabilitation services on mobility goals if consulted  - Perform Range of Motion 2 times a day. - Reposition patient every 2 hours.   - Dangle patient 3 times a day  - Stand patient 3 times a day  - Ambulate patient 3 times a day  - Out of bed to chair 4 times a day   - Out of bed for meals 4 times a day  - Out of bed for toileting  - Record patient progress and toleration of activity level   Outcome: Progressing     Problem: DISCHARGE PLANNING  Goal: Discharge to home or other facility with appropriate resources  Description: INTERVENTIONS:  - Identify barriers to discharge w/patient and caregiver  - Arrange for needed discharge resources and transportation as appropriate  - Identify discharge learning needs (meds, wound care, etc.)  - Arrange for interpretive services to assist at discharge as needed  - Refer to Case Management Department for coordinating discharge planning if the patient needs post-hospital services based on physician/advanced practitioner order or complex needs related to functional status, cognitive ability, or social support system  Outcome: Progressing     Problem: Knowledge Deficit  Goal: Patient/family/caregiver demonstrates understanding of disease process, treatment plan, medications, and discharge instructions  Description: Complete learning assessment and assess knowledge base. Interventions:  - Provide teaching at level of understanding  - Provide teaching via preferred learning methods  Outcome: Progressing     Problem: Nutrition/Hydration-ADULT  Goal: Nutrient/Hydration intake appropriate for improving, restoring or maintaining nutritional needs  Description: Monitor and assess patient's nutrition/hydration status for malnutrition. Collaborate with interdisciplinary team and initiate plan and interventions as ordered. Monitor patient's weight and dietary intake as ordered or per policy. Utilize nutrition screening tool and intervene as necessary. Determine patient's food preferences and provide high-protein, high-caloric foods as appropriate.      INTERVENTIONS:  - Monitor oral intake, urinary output, labs, and treatment plans  - Assess nutrition and hydration status and recommend course of action  - Evaluate amount of meals eaten  - Assist patient with eating if necessary   - Allow adequate time for meals  - Recommend/ encourage appropriate diets, oral nutritional supplements, and vitamin/mineral supplements  - Order, calculate, and assess calorie counts as needed  - Recommend, monitor, and adjust tube feedings and TPN/PPN based on assessed needs  - Assess need for intravenous fluids  - Provide specific nutrition/hydration education as appropriate  - Include patient/family/caregiver in decisions related to nutrition  Outcome: Progressing

## 2023-07-25 NOTE — PROGRESS NOTES
Outpatient Care Management Note:  In basket referral for the [facility] program received. Chart review completed. Chart review completed for the following sections:  • Recent Vital Signs  • Allergies/Contradictions  • Medication Review   • History   • SDOH   • Problem List  • Immunizations  • Past hospitalizations and major procedures, including surgery  • Significant past illnesses and treatment history including: History and Physical, Other provider notes, PT, OT, ST, Medications/Infusions/Transfusions, Surgery, Diet/Fluid restrictions and lab and radiology results  • Relevant past medications related to the patient's condition    Based on current diagnosis list, does not qualify for BYP at this time.

## 2023-07-25 NOTE — ASSESSMENT & PLAN NOTE
Complains of epigastric pain radiating to the chest, likely with pancreatitis, lipase level is trending down, EKG shows no acute ST-T changes but RBBB which is old.   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

## 2023-07-25 NOTE — PROGRESS NOTES
[id number] [facility] [place]  Progress Note  Name: Enzo Sutherland  MRN: [id number]  Unit/Bed#: -01 I Date of Admission: 7/24/2023   Date of Service: 7/25/2023 I Hospital Day: 1    Assessment/Plan   * Acute pancreatitis  Assessment & Plan  Patient presenting with abdominal pain, elevated lipase 5201  CT scan done on 7 7 show acute pancreatitis without abscess or pseudocyst  LFTs within normal limits. Etiology unclear, no evidence of autoimmune pancreatitis and previous work-up  MRI done on 6/10 showed probably gallstone sludge without biliary duct dilation or evidence of choledocholithiasis. Pancreatic duct demonstrated some focal narrowing at the junction of the head and neck region which may be related to artifact   Patient had a scheduled MRI on 7/26  N.p.o., start IV fluids   Pain management with as needed Dilaudid    Lipase level is trending down however abdominal pain continues will increase IV fluid hydration to 150 cc/h. Awaiting MRI of the abdomen report. GI input appreciated.       Stage 3b chronic kidney disease (CKD) [facility])  Assessment & Plan  Lab Results   Component Value Date    EGFR 46 07/25/2023    EGFR 37 07/24/2023    EGFR 47 07/09/2023    CREATININE 1.41 (H) 07/25/2023    CREATININE 1.70 (H) 07/24/2023    CREATININE 1.39 (H) 07/09/2023   Creatinine 1.7 today, baseline appears to be close to baseline  Avoid hypotension and nephrotoxins  Continue to monitor kidney function    Venous stasis dermatitis of both lower extremities  Assessment & Plan  Continue compressions and elevation     Coronary artery disease involving native coronary artery without angina pectoris  Assessment & Plan  Continue Plavix, metoprolol and Ranexa    Mixed hyperlipidemia  Assessment & Plan  Continue to hold statin    Type 2 diabetes mellitus with kidney complication, with long-term current use of insulin [facility])  Assessment & Plan  Lab Results   Component Value Date    HGBA1C 6.7 (H) 05/10/2023       Recent Labs     07/25/23  0040 07/25/23  0126 07/25/23  0559 07/25/23  0959   POCGLU 61* 152* 116 134       Blood Sugar Average: Last 72 hrs:  (P) 99.625   Holding insulin coverage with meals, patient n.p.o. Continue Lantus 12 units daily  Start sliding scale insulin and frequent Accu-Cheks  Hypoglycemia protocol in place    (HFpEF) heart failure with preserved ejection fraction (HCC)  Assessment & Plan  Wt Readings from Last 3 Encounters:   07/24/23 109 kg (240 lb)   07/14/23 113 kg (249 lb)   07/07/23 113 kg (249 lb)   Appears to be euvolemic  Holding torsemide due to acute episode of pancreatitis and need for IV fluids  Watch for fluid overload,          HTN (hypertension), benign  Assessment & Plan  Continue clonidine and metoprolol succinate  Holding lisinopril and torsemide in the setting of acute pancreatitis    PAULA (obstructive sleep apnea)  Assessment & Plan  Continue CPAP therapy while inpatient           VTE Pharmacologic Prophylaxis:   Moderate Risk (Score 3-4) - Pharmacological DVT Prophylaxis Ordered: heparin. Patient Centered Rounds: I performed bedside rounds with nursing staff today. Discussions with Specialists or Other Care Team Provider: d/w GI     Education and Discussions with Family / Patient: Updated  (wife) via phone. Total Time Spent on Date of Encounter in care of patient: 55 minutes This time was spent on one or more of the following: performing physical exam; counseling and coordination of care; obtaining or reviewing history; documenting in the medical record; reviewing/ordering tests, medications or procedures; communicating with other healthcare professionals and discussing with patient's family/caregivers. Current Length of Stay: 1 day(s)  Current Patient Status: Inpatient   Certification Statement: The patient will continue to require additional inpatient hospital stay due to ACUTE PANCREATITIS   Discharge Plan: Anticipate discharge in 48 hrs to home.     Code Status: Level 1 - Full Code    Subjective:   Patient complained of epigastric pain and also midsternal chest pain which is somewhat in intensity than yesterday. Patient is n.p.o., denies of any shortness of breath or fever, chills    Objective:     Vitals:   Temp (24hrs), Av.1 °F (36.7 °C), Min:97.7 °F (36.5 °C), Max:98.5 °F (36.9 °C)    Temp:  [97.7 °F (36.5 °C)-98.5 °F (36.9 °C)] 98.4 °F (36.9 °C)  HR:  [] 103  Resp:  [18-20] 18  BP: (124-153)/(57-69) 124/57  SpO2:  [93 %-94 %] 93 %  Body mass index is 38.74 kg/m². Input and Output Summary (last 24 hours): Intake/Output Summary (Last 24 hours) at 2023 1621  Last data filed at 2023 0601  Gross per 24 hour   Intake --   Output 1450 ml   Net -1450 ml       Physical Exam:   Physical Exam   HEENT-PERRLA, moist oral mucosa  Neck-supple, no JVD elevation   Respiratory-equal air entry bilaterally, no rales or rhonchi  Cardiovascular system-S1, S2 heard, no murmur or gallops or rubs  Abdomen-soft, epigastric tenderness present, no guarding or rigidity, bowel sounds heard  Extremities-no pedal edema  Peripheral pulses palpable  Musculoskeletal-no contractures  Central nervous system-no acute focal neurological deficit ,no sensory or motor deficit noted.   Skin-no rash noted        Additional Data:     Labs:  Results from last 7 days   Lab Units 23  0551   WBC Thousand/uL 5.79   HEMOGLOBIN g/dL 11.9*   HEMATOCRIT % 36.0*   PLATELETS Thousands/uL 169   NEUTROS PCT % 63   LYMPHS PCT % 23   MONOS PCT % 12   EOS PCT % 2     Results from last 7 days   Lab Units 23  0551   SODIUM mmol/L 141   POTASSIUM mmol/L 4.0   CHLORIDE mmol/L 105   CO2 mmol/L 28   BUN mg/dL 17   CREATININE mg/dL 1.41*   ANION GAP mmol/L 8   CALCIUM mg/dL 8.5   ALBUMIN g/dL 3.3*   TOTAL BILIRUBIN mg/dL 0.64   ALK PHOS U/L 49   ALT U/L 12   AST U/L 16   GLUCOSE RANDOM mg/dL 111         Results from last 7 days   Lab Units 23  0959 23  0559 23  0126 07/25/23  0040 07/24/23  1729 07/24/23  1631 07/24/23  1436 07/24/23  1342   POC GLUCOSE mg/dl 134 116 152* 61* 75 73 118 68               Lines/Drains:  Invasive Devices     Peripheral Intravenous Line  Duration           Peripheral IV 07/24/23 Dorsal (posterior); Left Hand 1 day                      Imaging: Personally reviewed the following imaging: MRI abdomen/MRCP    Recent Cultures (last 7 days):         Last 24 Hours Medication List:   Current Facility-Administered Medications   Medication Dose Route Frequency Provider Last Rate   • cloNIDine  0.1 mg Oral Q12H 151 Eligio Ying MD     • clopidogrel  75 mg Oral Daily Devante Bhandari MD     • fluticasone  2 spray Nasal Daily Devante Bhandari MD     • heparin (porcine)  5,000 Units Subcutaneous Q8H 151 Eligio Ying MD     • HYDROmorphone  0.5 mg Intravenous Q4H PRN Devante Bhandari MD     • HYDROmorphone  0.2 mg Intravenous Q4H PRN Devante Bhandari MD     • insulin lispro  1-6 Units Subcutaneous Q6H 151 Eligio Ying MD     • metoprolol succinate  25 mg Oral Daily Devante Bhandari MD     • multi-electrolyte  150 mL/hr Intravenous Continuous Cheyenne Smith  mL/hr (07/25/23 1138)   • ondansetron  4 mg Intravenous Q6H PRN Devante Bhandari MD     • pantoprazole  40 mg Intravenous Q24H 151 Eligio Ying MD     • ranolazine  500 mg Oral BID Devante Bhandari MD     • tamsulosin  0.8 mg Oral Daily Devante Bhandari MD          Today, Patient Was Seen By: Cheyenne Smith MD    **Please Note: This note may have been constructed using a voice recognition system. **

## 2023-07-26 LAB
ALBUMIN SERPL BCP-MCNC: 3.1 G/DL (ref 3.5–5)
ALP SERPL-CCNC: 45 U/L (ref 34–104)
ALT SERPL W P-5'-P-CCNC: 10 U/L (ref 7–52)
ANION GAP SERPL CALCULATED.3IONS-SCNC: 7 MMOL/L
AST SERPL W P-5'-P-CCNC: 14 U/L (ref 13–39)
BILIRUB SERPL-MCNC: 0.69 MG/DL (ref 0.2–1)
BUN SERPL-MCNC: 18 MG/DL (ref 5–25)
CALCIUM ALBUM COR SERPL-MCNC: 8.8 MG/DL (ref 8.3–10.1)
CALCIUM SERPL-MCNC: 8.1 MG/DL (ref 8.4–10.2)
CHLORIDE SERPL-SCNC: 104 MMOL/L (ref 96–108)
CO2 SERPL-SCNC: 27 MMOL/L (ref 21–32)
CREAT SERPL-MCNC: 1.32 MG/DL (ref 0.6–1.3)
ERYTHROCYTE [DISTWIDTH] IN BLOOD BY AUTOMATED COUNT: 13.8 % (ref 11.6–15.1)
GFR SERPL CREATININE-BSD FRML MDRD: 50 ML/MIN/1.73SQ M
GLUCOSE SERPL-MCNC: 146 MG/DL (ref 65–140)
GLUCOSE SERPL-MCNC: 146 MG/DL (ref 65–140)
GLUCOSE SERPL-MCNC: 149 MG/DL (ref 65–140)
GLUCOSE SERPL-MCNC: 152 MG/DL (ref 65–140)
GLUCOSE SERPL-MCNC: 160 MG/DL (ref 65–140)
GLUCOSE SERPL-MCNC: 175 MG/DL (ref 65–140)
GLUCOSE SERPL-MCNC: 194 MG/DL (ref 65–140)
HCT VFR BLD AUTO: 35.2 % (ref 36.5–49.3)
HGB BLD-MCNC: 11.3 G/DL (ref 12–17)
LIPASE SERPL-CCNC: 372 U/L (ref 11–82)
MCH RBC QN AUTO: 31.7 PG (ref 26.8–34.3)
MCHC RBC AUTO-ENTMCNC: 32.1 G/DL (ref 31.4–37.4)
MCV RBC AUTO: 99 FL (ref 82–98)
PLATELET # BLD AUTO: 148 THOUSANDS/UL (ref 149–390)
PMV BLD AUTO: 10.4 FL (ref 8.9–12.7)
POTASSIUM SERPL-SCNC: 4 MMOL/L (ref 3.5–5.3)
PROT SERPL-MCNC: 5.6 G/DL (ref 6.4–8.4)
RBC # BLD AUTO: 3.57 MILLION/UL (ref 3.88–5.62)
SODIUM SERPL-SCNC: 138 MMOL/L (ref 135–147)
WBC # BLD AUTO: 6.47 THOUSAND/UL (ref 4.31–10.16)

## 2023-07-26 PROCEDURE — 83690 ASSAY OF LIPASE: CPT | Performed by: INTERNAL MEDICINE

## 2023-07-26 PROCEDURE — 82948 REAGENT STRIP/BLOOD GLUCOSE: CPT

## 2023-07-26 PROCEDURE — 99232 SBSQ HOSP IP/OBS MODERATE 35: CPT | Performed by: INTERNAL MEDICINE

## 2023-07-26 PROCEDURE — C9113 INJ PANTOPRAZOLE SODIUM, VIA: HCPCS | Performed by: INTERNAL MEDICINE

## 2023-07-26 PROCEDURE — 97166 OT EVAL MOD COMPLEX 45 MIN: CPT

## 2023-07-26 PROCEDURE — 80053 COMPREHEN METABOLIC PANEL: CPT | Performed by: INTERNAL MEDICINE

## 2023-07-26 PROCEDURE — 85027 COMPLETE CBC AUTOMATED: CPT | Performed by: INTERNAL MEDICINE

## 2023-07-26 PROCEDURE — 97162 PT EVAL MOD COMPLEX 30 MIN: CPT

## 2023-07-26 RX ADMIN — HEPARIN SODIUM 5000 UNITS: 5000 INJECTION INTRAVENOUS; SUBCUTANEOUS at 14:57

## 2023-07-26 RX ADMIN — HYDROMORPHONE HYDROCHLORIDE 0.2 MG: 0.2 INJECTION, SOLUTION INTRAMUSCULAR; INTRAVENOUS; SUBCUTANEOUS at 08:20

## 2023-07-26 RX ADMIN — FLUTICASONE PROPIONATE 2 SPRAY: 50 SPRAY, METERED NASAL at 11:12

## 2023-07-26 RX ADMIN — HYDROMORPHONE HYDROCHLORIDE 0.2 MG: 0.2 INJECTION, SOLUTION INTRAMUSCULAR; INTRAVENOUS; SUBCUTANEOUS at 15:49

## 2023-07-26 RX ADMIN — HEPARIN SODIUM 5000 UNITS: 5000 INJECTION INTRAVENOUS; SUBCUTANEOUS at 06:52

## 2023-07-26 RX ADMIN — HEPARIN SODIUM 5000 UNITS: 5000 INJECTION INTRAVENOUS; SUBCUTANEOUS at 21:21

## 2023-07-26 RX ADMIN — TAMSULOSIN HYDROCHLORIDE 0.8 MG: 0.4 CAPSULE ORAL at 08:19

## 2023-07-26 RX ADMIN — HYDROMORPHONE HYDROCHLORIDE 0.2 MG: 0.2 INJECTION, SOLUTION INTRAMUSCULAR; INTRAVENOUS; SUBCUTANEOUS at 20:00

## 2023-07-26 RX ADMIN — CLONIDINE HYDROCHLORIDE 0.1 MG: 0.1 TABLET ORAL at 21:21

## 2023-07-26 RX ADMIN — CLONIDINE HYDROCHLORIDE 0.1 MG: 0.1 TABLET ORAL at 08:19

## 2023-07-26 RX ADMIN — RANOLAZINE 500 MG: 500 TABLET, EXTENDED RELEASE ORAL at 08:19

## 2023-07-26 RX ADMIN — INSULIN LISPRO 1 UNITS: 100 INJECTION, SOLUTION INTRAVENOUS; SUBCUTANEOUS at 12:39

## 2023-07-26 RX ADMIN — HYDROMORPHONE HYDROCHLORIDE 0.5 MG: 1 INJECTION, SOLUTION INTRAMUSCULAR; INTRAVENOUS; SUBCUTANEOUS at 03:41

## 2023-07-26 RX ADMIN — METOPROLOL SUCCINATE 25 MG: 25 TABLET, FILM COATED, EXTENDED RELEASE ORAL at 08:20

## 2023-07-26 RX ADMIN — INSULIN LISPRO 2 UNITS: 100 INJECTION, SOLUTION INTRAVENOUS; SUBCUTANEOUS at 17:57

## 2023-07-26 RX ADMIN — CLOPIDOGREL BISULFATE 75 MG: 75 TABLET ORAL at 08:20

## 2023-07-26 RX ADMIN — RANOLAZINE 500 MG: 500 TABLET, EXTENDED RELEASE ORAL at 17:53

## 2023-07-26 RX ADMIN — PANTOPRAZOLE SODIUM 40 MG: 40 INJECTION, POWDER, FOR SOLUTION INTRAVENOUS at 08:20

## 2023-07-26 NOTE — ASSESSMENT & PLAN NOTE
Wt Readings from Last 3 Encounters:   07/24/23 109 kg (240 lb)   07/14/23 113 kg (249 lb)   07/07/23 113 kg (249 lb)   He does have chronic lower extremity edema he notes and he is unclear if this is worsening. Due to his continued increased abdominal pain, will closely monitor for volume overload while continuing fluids.

## 2023-07-26 NOTE — PLAN OF CARE
Problem: PAIN - ADULT  Goal: Verbalizes/displays adequate comfort level or baseline comfort level  Description: Interventions:  - Encourage patient to monitor pain and request assistance  - Assess pain using appropriate pain scale  - Administer analgesics based on type and severity of pain and evaluate response  - Implement non-pharmacological measures as appropriate and evaluate response  - Consider cultural and social influences on pain and pain management  - Notify physician/advanced practitioner if interventions unsuccessful or patient reports new pain  Outcome: Progressing     Problem: INFECTION - ADULT  Goal: Absence or prevention of progression during hospitalization  Description: INTERVENTIONS:  - Assess and monitor for signs and symptoms of infection  - Monitor lab/diagnostic results  - Monitor all insertion sites, i.e. indwelling lines, tubes, and drains  - Monitor endotracheal if appropriate and nasal secretions for changes in amount and color  - [place] appropriate cooling/warming therapies per order  - Administer medications as ordered  - Instruct and encourage patient and family to use good hand hygiene technique  - Identify and instruct in appropriate isolation precautions for identified infection/condition  Outcome: Progressing     Problem: SAFETY ADULT  Goal: Patient will remain free of falls  Description: INTERVENTIONS:  - Educate patient/family on patient safety including physical limitations  - Instruct patient to call for assistance with activity   - Consult OT/PT to assist with strengthening/mobility   - Keep Call bell within reach  - Keep bed low and locked with side rails adjusted as appropriate  - Keep care items and personal belongings within reach  - Initiate and maintain comfort rounds  - Make Fall Risk Sign visible to staff  - Offer Toileting every 2 Hours, in advance of need  - Initiate/Maintain bed alarm  - Obtain necessary fall risk management equipment: bed alarm; yellow socks and bracelet   - Apply yellow socks and bracelet for high fall risk patients  - Consider moving patient to room near nurses station  Outcome: Progressing  Goal: Maintain or return to baseline ADL function  Description: INTERVENTIONS:  -  Assess patient's ability to carry out ADLs; assess patient's baseline for ADL function and identify physical deficits which impact ability to perform ADLs (bathing, care of mouth/teeth, toileting, grooming, dressing, etc.)  - Assess/evaluate cause of self-care deficits   - Assess range of motion  - Assess patient's mobility; develop plan if impaired  - Assess patient's need for assistive devices and provide as appropriate  - Encourage maximum independence but intervene and supervise when necessary  - Involve family in performance of ADLs  - Assess for home care needs following discharge   - Consider OT consult to assist with ADL evaluation and planning for discharge  - Provide patient education as appropriate  Outcome: Progressing  Goal: Maintains/Returns to pre admission functional level  Description: INTERVENTIONS:  - Perform BMAT or MOVE assessment daily.   - Set and communicate daily mobility goal to care team and patient/family/caregiver. - Collaborate with rehabilitation services on mobility goals if consulted  - Perform Range of Motion 3 times a day. - Reposition patient every 2 hours.   - Record patient progress and toleration of activity level   Outcome: Progressing     Problem: DISCHARGE PLANNING  Goal: Discharge to home or other facility with appropriate resources  Description: INTERVENTIONS:  - Identify barriers to discharge w/patient and caregiver  - Arrange for needed discharge resources and transportation as appropriate  - Identify discharge learning needs (meds, wound care, etc.)  - Arrange for interpretive services to assist at discharge as needed  - Refer to Case Management Department for coordinating discharge planning if the patient needs post-hospital services based on physician/advanced practitioner order or complex needs related to functional status, cognitive ability, or social support system  Outcome: Progressing     Problem: Knowledge Deficit  Goal: Patient/family/caregiver demonstrates understanding of disease process, treatment plan, medications, and discharge instructions  Description: Complete learning assessment and assess knowledge base. Interventions:  - Provide teaching at level of understanding  - Provide teaching via preferred learning methods  Outcome: Progressing     Problem: Nutrition/Hydration-ADULT  Goal: Nutrient/Hydration intake appropriate for improving, restoring or maintaining nutritional needs  Description: Monitor and assess patient's nutrition/hydration status for malnutrition. Collaborate with interdisciplinary team and initiate plan and interventions as ordered. Monitor patient's weight and dietary intake as ordered or per policy. Utilize nutrition screening tool and intervene as necessary. Determine patient's food preferences and provide high-protein, high-caloric foods as appropriate.      INTERVENTIONS:  - Monitor oral intake, urinary output, labs, and treatment plans  - Assess nutrition and hydration status and recommend course of action  - Evaluate amount of meals eaten  - Assist patient with eating if necessary   - Allow adequate time for meals  - Recommend/ encourage appropriate diets, oral nutritional supplements, and vitamin/mineral supplements  - Order, calculate, and assess calorie counts as needed  - Recommend, monitor, and adjust tube feedings and TPN/PPN based on assessed needs  - Assess need for intravenous fluids  - Provide specific nutrition/hydration education as appropriate  - Include patient/family/caregiver in decisions related to nutrition  Outcome: Progressing

## 2023-07-26 NOTE — ASSESSMENT & PLAN NOTE
Hold PTA lisinopril and torsemide in the setting of acute pancreatitis. Continue clonidine and metoprolol.   Blood pressure is controlled

## 2023-07-26 NOTE — PROGRESS NOTES
[id number] [facility]  Progress Note  Name: Felisa Enrique  MRN: [id number]  Unit/Bed#: -01 I Date of Admission: 7/24/2023   Date of Service: 7/26/2023 I Hospital Day: 2    Assessment/Plan   * Acute pancreatitis  Assessment & Plan  The patient is presenting with abdominal pain and increased lipase. This is a recurrent condition and he notes it is his fifth time with this pathology. An MRI was performed last evening which showed a potential 7 mm nodule that was recommended for endoscopic ultrasound. GI had already been planning on endoscopic ultrasound as well once his pancreatitis improved. · Continue pain control, IV hydration, currently on sips of water   · Appreciate GI support     Chest pain  Assessment & Plan  Complains of epigastric pain radiating to the chest, likely with pancreatitis, lipase level is trending down, EKG shows no acute ST-T changes but RBBB which is old.   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, troponin x2 negative  Continue to monitor    Stage 3b chronic kidney disease (CKD) [facility])  Assessment & Plan  Lab Results   Component Value Date    EGFR 50 07/26/2023    EGFR 46 07/25/2023    EGFR 37 07/24/2023    CREATININE 1.32 (H) 07/26/2023    CREATININE 1.41 (H) 07/25/2023    CREATININE 1.70 (H) 07/24/2023   Creatinine continues to improve with IV hydration    Venous stasis dermatitis of both lower extremities  Assessment & Plan  Apply compression stockings and continue elevation     Coronary artery disease involving native coronary artery without angina pectoris  Assessment & Plan  Continue Plavix, Lopressor and Ranexa    Mixed hyperlipidemia  Assessment & Plan  Statin held    Type 2 diabetes mellitus with kidney complication, with long-term current use of insulin [facility])  Assessment & Plan  Lab Results   Component Value Date    HGBA1C 6.7 (H) 05/10/2023       Recent Labs     07/25/23  1621 07/26/23  0007 07/26/23  0429 07/26/23  0652   POCGLU 163* 152* 149* 146* Blood Sugar Average: Last 72 hrs:  (P) 117.25   We will hold mealtime insulin while NPO. Continue Lantus 12 units with a sliding scale    (HFpEF) heart failure with preserved ejection fraction (HCC)  Assessment & Plan  Wt Readings from Last 3 Encounters:   07/24/23 109 kg (240 lb)   07/14/23 113 kg (249 lb)   07/07/23 113 kg (249 lb)   He does have chronic lower extremity edema he notes and he is unclear if this is worsening. Due to his continued increased abdominal pain, will closely monitor for volume overload while continuing fluids. HTN (hypertension), benign  Assessment & Plan  Hold PTA lisinopril and torsemide in the setting of acute pancreatitis. Continue clonidine and metoprolol. Blood pressure is controlled    PAULA (obstructive sleep apnea)  Assessment & Plan  Continue CPAP while inpatient           VTE Pharmacologic Prophylaxis:   Moderate Risk (Score 3-4) - Pharmacological DVT Prophylaxis Ordered: heparin. Patient Centered Rounds: I performed bedside rounds with nursing staff today. Discussions with Specialists or Other Care Team Provider: GI    Education and Discussions with Family / Patient: Patient declined call to . Total Time Spent on Date of Encounter in care of patient: 35 minutes This time was spent on one or more of the following: performing physical exam; counseling and coordination of care; obtaining or reviewing history; documenting in the medical record; reviewing/ordering tests, medications or procedures; communicating with other healthcare professionals and discussing with patient's family/caregivers. Current Length of Stay: 2 day(s)  Current Patient Status: Inpatient   Certification Statement: The patient will continue to require additional inpatient hospital stay due to acute pancreatitis  Discharge Plan: Anticipate discharge in 24-48 hrs to home.     Code Status: Level 1 - Full Code    Subjective:   Patient continues to endorse abdominal pain, noting he feels worse than the day he was admitted on . He denies current nausea although did note when he was first on the MRI table last night he experienced some nausea. He denies passing gas. "When are you going to feed me? "We talk about how adding glucose to his meals at this point could increase pancreatic movement and pain. I let him know that we typically will advance diet based on his pain but it is notable that his Dilaudid has recently been increased for increasing pain. We discussed the results of the MRI including the 7 mm nodule and that Dr. Corazon Richards will see him later today. Objective:     Vitals:   Temp (24hrs), Av.5 °F (36.9 °C), Min:98.4 °F (36.9 °C), Max:98.6 °F (37 °C)    Temp:  [98.4 °F (36.9 °C)-98.6 °F (37 °C)] 98.6 °F (37 °C)  HR:  [] 66  Resp:  [18-20] 18  BP: (124-151)/(57-74) 145/74  SpO2:  [93 %-94 %] 94 %  Body mass index is 38.74 kg/m². Input and Output Summary (last 24 hours): Intake/Output Summary (Last 24 hours) at 2023 0942  Last data filed at 2023 2145  Gross per 24 hour   Intake 0 ml   Output 400 ml   Net -400 ml       Physical Exam:   Physical Exam  Vitals and nursing note reviewed. Constitutional:       Appearance: Normal appearance. He is obese. He is not ill-appearing. HENT:      Head: Normocephalic. Nose: Nose normal. No congestion. Mouth/Throat:      Mouth: Mucous membranes are moist.      Pharynx: No oropharyngeal exudate. Eyes:      General: No scleral icterus. Conjunctiva/sclera: Conjunctivae normal.   Abdominal:      General: There is no distension. Palpations: Abdomen is soft. Tenderness: There is abdominal tenderness. Musculoskeletal:      Cervical back: Neck supple. No rigidity. Skin:     General: Skin is warm. Coloration: Skin is not jaundiced. Neurological:      Mental Status: He is alert and oriented to person, place, and time.    Psychiatric:         Behavior: Behavior normal. Additional Data:     Labs:  Results from last 7 days   Lab Units 07/26/23  0528 07/25/23  0551   WBC Thousand/uL 6.47 5.79   HEMOGLOBIN g/dL 11.3* 11.9*   HEMATOCRIT % 35.2* 36.0*   PLATELETS Thousands/uL 148* 169   NEUTROS PCT %  --  63   LYMPHS PCT %  --  23   MONOS PCT %  --  12   EOS PCT %  --  2     Results from last 7 days   Lab Units 07/26/23  0528   SODIUM mmol/L 138   POTASSIUM mmol/L 4.0   CHLORIDE mmol/L 104   CO2 mmol/L 27   BUN mg/dL 18   CREATININE mg/dL 1.32*   ANION GAP mmol/L 7   CALCIUM mg/dL 8.1*   ALBUMIN g/dL 3.1*   TOTAL BILIRUBIN mg/dL 0.69   ALK PHOS U/L 45   ALT U/L 10   AST U/L 14   GLUCOSE RANDOM mg/dL 146*         Results from last 7 days   Lab Units 07/26/23  0652 07/26/23  0429 07/26/23  0007 07/25/23  1621 07/25/23  0959 07/25/23  0559 07/25/23  0126 07/25/23  0040 07/24/23  1729 07/24/23  1631 07/24/23  1436 07/24/23  1342   POC GLUCOSE mg/dl 146* 149* 152* 163* 134 116 152* 61* 75 73 118 68               Lines/Drains:  Invasive Devices     Peripheral Intravenous Line  Duration           Peripheral IV 07/26/23 Right Antecubital <1 day                      Imaging: Personally reviewed the following imaging: MRI abdomen/MRCP    Recent Cultures (last 7 days):         Last 24 Hours Medication List:   Current Facility-Administered Medications   Medication Dose Route Frequency Provider Last Rate   • cloNIDine  0.1 mg Oral Q12H 151 Eligio Ying MD     • clopidogrel  75 mg Oral Daily Brianda Parmar MD     • fluticasone  2 spray Nasal Daily Myrtle Das MD     • heparin (porcine)  5,000 Units Subcutaneous Q8H 151 Eligio Ying MD     • HYDROmorphone  0.5 mg Intravenous Q4H PRN Brianda Parmar MD     • HYDROmorphone  0.2 mg Intravenous Q4H PRN Brianda Parmar MD     • insulin lispro  1-6 Units Subcutaneous Q6H [address] Myrtle Das MD     • metoprolol succinate  25 mg Oral Daily Myrtle Yeny Das MD     • ondansetron  4 mg Intravenous Q6H PRN Myrtle Katerina Huntley MD     • pantoprazole  40 mg Intravenous Q24H 151 Eligio Ying MD     • ranolazine  500 mg Oral BID Devante Bhandari MD     • tamsulosin  0.8 mg Oral Daily Devante Bhandari MD          Today, Patient Was Seen By: Vik Mckeon MD    **Please Note: This note may have been constructed using a voice recognition system. **

## 2023-07-26 NOTE — OCCUPATIONAL THERAPY NOTE
Occupational Therapy Evaluation     Patient Name: Connor Benítez  Today's Date: 7/26/2023  Problem List  Principal Problem:    Acute pancreatitis  Active Problems:    PAULA (obstructive sleep apnea)    HTN (hypertension), benign    (HFpEF) heart failure with preserved ejection fraction (HCC)    Type 2 diabetes mellitus with kidney complication, with long-term current use of insulin (HCC)    Mixed hyperlipidemia    Coronary artery disease involving native coronary artery without angina pectoris    Venous stasis dermatitis of both lower extremities    Stage 3b chronic kidney disease (CKD) ([address])    Chest pain    Past Medical History  Past Medical History:   Diagnosis Date    Allergic     Arthritis     Chronic kidney disease 2021    Chronic kidney disease (CKD) stage G3a/A1, moderately decreased glomerular filtration rate (GFR) between 45-59 mL/min/1.73 square meter and albuminuria creatinine ratio less than 30 mg/g (HCC)     Colon polyp     Diabetes mellitus (HCC)     GERD (gastroesophageal reflux disease)     Heart murmur     History of echocardiogram     HL (hearing loss)     Hyperlipidemia     Hypertension     Obesity     Sleep apnea, obstructive      Past Surgical History  Past Surgical History:   Procedure Laterality Date    CARDIAC CATHETERIZATION      COLONOSCOPY  03/09/2018    COLONOSCOPY      EYE SURGERY      HEMORRHOID SURGERY      JOINT REPLACEMENT  2017    knee    OTHER SURGICAL HISTORY      Stent     UT LAPAROSCOPY SURG CHOLECYSTECTOMY N/A 6/28/2023    Procedure: CHOLECYSTECTOMY LAPAROSCOPIC;  Surgeon: Nathaniel Villanueva MD;  Location:  MAIN OR;  Service: General    UPPER GASTROINTESTINAL ENDOSCOPY          07/26/23 0846   OT Last Visit   OT Visit Date 07/26/23   Note Type   Note type Evaluation   Pain Assessment   Pain Assessment Tool 0-10   Pain Score 5   Pain Location/Orientation Orientation: Upper; Location: Abdomen   Pain Radiating Towards non radiating   Pain Onset/Description Onset: Ongoing;Frequency: Constant/Continuous; Descriptor: Discomfort   Effect of Pain on Daily Activities limits comfort   Patient's Stated Pain Goal No pain   Hospital Pain Intervention(s) Repositioned; Ambulation/increased activity   Multiple Pain Sites No   Restrictions/Precautions   Weight Bearing Precautions Per Order No   Other Precautions Multiple lines; Fall Risk;Pain   Home Living   Type of [address] One level;Performs ADLs on one level; Able to live on main level with bedroom/bathroom; Elevator  (1 [address] building, elevator access to 3rd floor apartment)   Bathroom Shower/Tub Tub/shower unit   [employer] Grab bars in shower; Shower chair   Bathroom Accessibility Accessible   Home Equipment Cane;Grab bars  [facility])   Prior Function   Level of [place] Independent with ADLs; Independent with functional mobility; Independent with IADLS   Lives With Spouse   Receives Help From Family   IADLs Independent with driving; Independent with meal prep; Independent with medication management   Falls in the last 6 months 0  (Pt denies)   Vocational Retired   Comments Pt reports prior to admission being fully independent with all self-care tasks and functional mobility for household and community distances with no AD. Lifestyle   Autonomy At baseline pt is (I) c ADLs/IADLs and functional mobility w/o AD. Reciprocal Relationships Supportive family   Service to Others Retired   Intrinsic Gratification Pt enjoys playing [employer] and doing jigaw puzzles. General   Additional Pertinent History Pt admitted 7/27/2023 c elevated Lipaseand pt has a hx of pancreatitis. Family/Caregiver Present No   Subjective   Subjective "I have been here 8 times"   ADL   Eating Assistance 7  Independent   Grooming Assistance 7  Independent   Grooming Deficit Brushing hair;Wash/dry face; Wash/dry hands;Supervision/safety  (standing at the sink to complete grooming tasks)   [address] Supervision/Setup   LB Bathing Assistance 5  Supervision/Setup   UB Dressing Assistance 5  Supervision/Setup   LB Dressing Assistance 5  Supervision/Setup   LB Dressing Deficit Don/doff R sock; Don/doff L sock; Increased time to complete   Toileting Assistance  5  Supervision/Setup   Additional Comments Pt is limited by pain. Bed Mobility   Supine to Sit 6  Modified independent   Additional items HOB elevated; Bedrails   Sit to Supine   (Unable to assess; pt OOB in recliner chair at the end of the session with all needs in reach)   Additional Comments Pt denies dizziness/lightheadedness with postural changes. SOB noted with functional reach to participate in ADL tasks, pt repositions self on bed to optimize independence. Transfers   Sit to Stand 6  Modified independent   Additional items Verbal cues   Stand to Sit 6  Modified independent   Additional items Armrests   Stand pivot 6  Modified independent   Additional Comments No use of AD. Pt with good balance during functional transfers, no LOB noted. Functional Mobility   Functional Mobility 6  Modified independent   Additional Comments for functional household distance w/o use of AD. Pt denies SOB or increased pain. Balance   Static Sitting Normal   Dynamic Sitting Good   Static Standing Good   Dynamic Standing Fair +   Activity Tolerance   Activity Tolerance Patient limited by pain   Medical Staff Made Aware Spoke with care coordination, SLIM, PT   Nurse Made Aware Spoke with RN Evan Player   RUE Assessment   RUE Assessment WFL   LUE Assessment   LUE Assessment WFL   Hand Function   Gross Motor Coordination Functional   Fine Motor Coordination Functional   Sensation   Light Touch No apparent deficits  (Pt denies)   Vision-Basic Assessment   Current Vision No visual deficits   Cognition   Overall Cognitive Status WFL   Arousal/Participation Alert; Responsive; Cooperative   Attention Within functional limits   Orientation Level Oriented X4   Memory Within functional limits   Following Commands Follows multistep commands with increased time or repetition   Assessment   Prognosis Good   Assessment Patient is a 82 y.o. male seen for OT evaluation at [facility] following admission on 7/24/2023  s/p Acute pancreatitis. Comorbidities and significant PMHx impacting functional performance include: PAULA, HTN, HFpEF, 2DM, HLD, 2DM, CAD, CKD, GERD. Patient presents with active orders for OT eval and treat. Prior to admission, patient was independent with functional mobility without assistive device, independent with ADLS and independent with IADLS. Upon initial evaluation, patient is supervision for UB ADLs, supervision for LB ADLs, BARBARA for bed mobility, BARBARA for transfers and  BARBARA for functional mobility household distance with no AD. Based on functional eval, pt presents with intact  attention, intact  safety awareness, intact  problem solving skills, and intact   memory. Occupational performance is affected by the following deficits: (+) pain  The AM-PAC & Barthel Index outcome tools were used to assist in determining pt safety w/self care /mobility and appropriate d/c recommendations, see above for score. No further acute OT needs identified at this time. Recommend continued active ADL participation and mobilization with hospital staff while in the hospital to increase pt’s endurance and strength upon D/C. From OT standpoint, recommend D/C to return to previous enviorment with family support when medically cleared. D/C pt from OT caseload at this time. Goals   Patient Goals to have less pain and return home   Plan   OT Frequency Eval only   Recommendation   OT Discharge Recommendation No rehabilitation needs   Additional Comments  The patient's raw score on the AM-PAC Daily Activity Inpatient Short Form is 24. A raw score of greater than or equal to 19 suggests the patient may benefit from discharge to home.  Please refer to the recommendation of the Occupational Therapist for safe discharge planning. AM-PAC Daily Activity Inpatient   Lower Body Dressing 4   Bathing 4   Toileting 4   Upper Body Dressing 4   Grooming 4   Eating 4   Daily Activity Raw Score 24   Daily Activity Standardized Score (Calc for Raw Score >=11) 57.54   AM-PAC Applied Cognition Inpatient   Following a Speech/Presentation 4   Understanding Ordinary Conversation 4   Taking Medications 4   Remembering Where Things Are Placed or Put Away 4   Remembering List of 4-5 Errands 4   Taking Care of Complicated Tasks 4   Applied Cognition Raw Score 24   Applied Cognition Standardized Score 62.21   Barthel Index   Feeding 10   Bathing 5   Grooming Score 5   Dressing Score 10   Bladder Score 10   Bowels Score 10   Toilet Use Score 10   Transfers (Bed/Chair) Score 15   Mobility (Level Surface) Score 15   Stairs Score 10   Barthel Index Score 100   Modified [place]on Scale   Modified Moise Scale 1   End of Consult   Education Provided Yes   Patient Position at End of Consult Bedside chair; All needs within reach   Nurse Communication Nurse aware of consult     Lorena Marroquin, [address] [facility] OTR/L   [place] Licensure# [id number]

## 2023-07-26 NOTE — ASSESSMENT & PLAN NOTE
The patient is presenting with abdominal pain and increased lipase. This is a recurrent condition and he notes it is his fifth time with this pathology. An MRI was performed last evening which showed a potential 7 mm nodule that was recommended for endoscopic ultrasound. GI had already been planning on endoscopic ultrasound as well once his pancreatitis improved.     · Continue pain control, IV hydration, currently on sips of water   · Appreciate GI support

## 2023-07-26 NOTE — ASSESSMENT & PLAN NOTE
Lab Results   Component Value Date    EGFR 50 07/26/2023    EGFR 46 07/25/2023    EGFR 37 07/24/2023    CREATININE 1.32 (H) 07/26/2023    CREATININE 1.41 (H) 07/25/2023    CREATININE 1.70 (H) 07/24/2023   Creatinine continues to improve with IV hydration

## 2023-07-26 NOTE — PHYSICAL THERAPY NOTE
PHYSICAL THERAPY Evaluation  DATE: 07/26/23  TIME: 2017-2249    NAME:  Radha Waite  AGE:   82 y.o. Mrn:   [id number]  Length Of Stay: 2    ADMIT DX:  Chronic pancreatitis ([address]) [K86.1]  Chronic kidney disease [N18.9]    Past Medical History:   Diagnosis Date    Allergic     Arthritis     Chronic kidney disease 2021    Chronic kidney disease (CKD) stage G3a/A1, moderately decreased glomerular filtration rate (GFR) between 45-59 mL/min/1.73 square meter and albuminuria creatinine ratio less than 30 mg/g (HCC)     Colon polyp     Diabetes mellitus (HCC)     GERD (gastroesophageal reflux disease)     Heart murmur     History of echocardiogram     HL (hearing loss)     Hyperlipidemia     Hypertension     Obesity     Sleep apnea, obstructive      Past Surgical History:   Procedure Laterality Date    CARDIAC CATHETERIZATION      COLONOSCOPY  03/09/2018    COLONOSCOPY      EYE SURGERY      HEMORRHOID SURGERY      JOINT REPLACEMENT  2017    knee    OTHER SURGICAL HISTORY      Stent     DC LAPAROSCOPY SURG CHOLECYSTECTOMY N/A 6/28/2023    Procedure: CHOLECYSTECTOMY LAPAROSCOPIC;  Surgeon: Seth Barber MD;  Location:  MAIN OR;  Service: General    UPPER GASTROINTESTINAL ENDOSCOPY         Performed at least 2 patient identifiers during session: Name and Epic photo     07/26/23 0827   PT Last Visit   PT Visit Date 07/26/23   Note Type   Note type Evaluation   Pain Assessment   Pain Assessment Tool 0-10   Pain Score 5  (5/10 at rest, no change with mobility)   Pain Location/Orientation Orientation: Upper; Location: Abdomen   Pain Onset/Description Onset: Ongoing; Descriptor: Discomfort   Effect of Pain on Daily Activities limits comfort   Patient's Stated Pain Goal No pain   Hospital Pain Intervention(s) Repositioned; Ambulation/increased activity   Multiple Pain Sites No   Restrictions/Precautions   Weight Bearing Precautions Per Order No   Other Precautions Fall Risk;Pain   Home Living   Type of Home Apartment Home Layout One level;Performs ADLs on one level; Able to live on main level with bedroom/bathroom;Stairs to enter with rails;Elevator  ([address] building, elevator access to 3rd floor apartment)   Bathroom Shower/Tub Tub/shower unit   [employer] Grab bars in shower; Shower chair   Bathroom Accessibility Accessible   Home Equipment Cane   Additional Comments Pt enjoys playing [employer] and doing jigaw puzzles. Prior Function   Level of [place] Independent with ADLs; Independent with functional mobility; Independent with IADLS   Lives With Spouse   Receives Help From Family   IADLs Independent with driving; Independent with meal prep; Independent with medication management   Falls in the last 6 months 0  (pt denies)   Vocational Retired   Comments Pt reports that at baseline he is fully independent with all aspects of self care and functional mobility of household and community distances with no AD. General   Additional Pertinent History Pt admitted 7/24/23 with c/o abdominal pain, elevated lipase levels. Of note, pt with multiple recent hospital admissions for similar issues. Dx: acute pancreatitis.    Family/Caregiver Present No   Cognition   Overall Cognitive Status WFL   Arousal/Participation Cooperative   Orientation Level Oriented X4   Memory Within functional limits   Following Commands Follows multistep commands with increased time or repetition   Subjective   Subjective "I sit around a lot at home."   RUE Assessment   RUE Assessment WFL   LUE Assessment   LUE Assessment WFL   RLE Assessment   RLE Assessment WFL  (+ edema noted to B lower legs)   LLE Assessment   LLE Assessment WFL  (+ edema noted to B lower legs)   Coordination   Movements are Fluid and Coordinated 1   Sensation WFL   Light Touch   RLE Light Touch Grossly intact   LLE Light Touch Grossly intact   Proprioception   RLE Proprioception Grossly intact   LLE Proprioception Grossly Intact   Self Selected Walking Speed   SSWS Trial 1 (Seconds) 5.78 Seconds   SSWS Trial 2 (Seconds) 6.18 Seconds   SSWS Trial 3 (Seconds) 5.54 Seconds   SSWS Average Time (Seconds) 5.8 seconds   SSWS Average Score (m/sec) 0.9 m/sec   Bed Mobility   Supine to Sit 6  Modified independent   Additional items HOB elevated; Bedrails   Sit to Supine   (NT as pt was left seated OOB in recliner chair at end of session)   Transfers   Sit to Stand 6  Modified independent   Additional items Bedrails; Other  (requires use of UEs to clear hips and manage balance during transfer.)   Stand to Sit 6  Modified independent   Additional items Armrests  (Requires use of UEs to manage descent. Despite UE use, exhibits poor eccentric control of descent to sitting in chair.)   Stand pivot 6  Modified independent   Additional items Armrests  (Minimal use of UEs to ensure stability during transfer.)   Ambulation/Elevation   Gait pattern Wide ALESIA; Decreased foot clearance; Short stride   Gait Assistance 7  Independent   Assistive Device None   Distance 130'   Ambulation/Elevation Additional Comments pt ambulated independently without LOB while managing minimal balance challenges (start/stop, head rotations, converstation, avoidance of obstacles). Pt often reaches for grab supports for safety confidence. Balance   Static Sitting Normal   Dynamic Sitting Good   Static Standing Fair +   Dynamic Standing Fair   Ambulatory Fair   Endurance Deficit   Endurance Deficit Yes   Endurance Deficit Description Pt reportedly only tolerating household distances, before requiring seated rest break. No significant dyspnea or adverse symptoms noted during eval.   Activity Tolerance   Activity Tolerance Patient limited by fatigue   Medical Staff Made Aware Coordinated care with OT. Nurse Made Aware Spoke with RN Lana Green   Assessment   Prognosis Good   Assessment Pt is an 82 yr old male [facility]/ c/o abdominal pain, dx: acute pancreatitis.  PT consult received for functional mobility evaluation and discharge planning. Activity orders: up and OOB as tolerated. Comorbidities affecting pt's functional status include: arthritis, L TKA, obesity, CAD, HFpEF. Pt exhibiting physical deficits on eval that include: LE/trunk weakness, LE joint mobility/flexibility, impaired dynamic standing/ambulating balance and stability, decreased endurance. Deficits may contribute to functional limitations that were seen on eval that included: difficulty with sit<>stand transfer, functional standing and transfering tasks outside ALESIA, tolerance for prolonged functional activity/ambulation, gait pattern disturbances, and potential increased fall risk. Pt appears to be currently mobilizing and functioning at his baseline, despite ongoing symptoms of pain. Per patient's report, these concerns noted on eval are consistent with how the patient mobilizes and performs functional acitivities at his baseline. Per pt's SSWS of 0.9 m/s (community ambulatory), AM-PAC  of 23/24 (slight functional limition), and Barthel Index of 100/100 (no dependency) pt is appropriate to return home at discharge. No skilled PT services indicated in the acute care setting, will d/c current PT orders. Recommend multiple bouts of independen ambulation in hallway during remainder of his stay in order to combat hospital related deconditioning. Barriers to Discharge None   Barriers to Discharge Comments Patient appropriate to return home at discharge. Goals   PT Treatment Day 0   Plan   PT Frequency Other (Comment)  (Eval and discharge.)   Recommendation   PT Discharge Recommendation No rehabilitation needs   Additional Comments Pt appears close to, or at baseline function, despite ongoing pain. Current presence of symtpoms do not appear to significantly influence the patient's functional mobility/ADLs. Pt denies therapy intervention at this time, and request to discharge therapy order.    Additional Comments 2 Pt advised of physical deficits and discussed functional limitations that could contribute to safe daily independent functionality. Advised pt of the benefits of ongoing therapy care, and recommended inquiry with pt's PCP if he has any future therapy needs. AM-PAC Basic Mobility Inpatient   Turning in Flat Bed Without Bedrails 4   Lying on Back to Sitting on Edge of Flat Bed Without Bedrails 3   Moving Bed to Chair 4   Standing Up From Chair Using Arms 4   Walk in Room 4   Climb 3-5 Stairs With Railing 4   Basic Mobility Inpatient Raw Score 23   Basic Mobility Standardized Score 50.88   Highest Level Of Mobility   -HLM Goal 7: Walk 25 feet or more   JH-HLM Achieved 7: Walk 25 feet or more   Modified Pendleton Scale   Modified Pendleton Scale 1   Barthel Index   Feeding 10   Bathing 5   Grooming Score 5   Dressing Score 10   Bladder Score 10   Bowels Score 10   Toilet Use Score 10   Transfers (Bed/Chair) Score 15   Mobility (Level Surface) Score 15   Stairs Score 10   Barthel Index Score 100   End of Consult   Patient Position at End of Consult Bedside chair; All needs within reach       The patient's AM-PAC Basic Mobility Inpatient Short Form Raw Score is 23. A Raw score of greater than 16 suggests the patient may benefit from discharge to home. Please also refer to the recommendation of the Physical Therapist for safe discharge planning.     Gait Speed Interpretation:  Gain of 0.1 m/s is a predictor of well-being in those w/ abnormal walking speed compared to age-matched peers  <0.7m/s is at an increased risk for falls    Household ambulator: <0.4 m/s  Limited community ambulator: 0.4-0.8 m/s  Community ambulator: 0.8-1.2 m/s  Able to safely cross streets: >1.2 m/s    Ledy Morales, PT, DPT   Available via [facility] # [id number]  [place] License - [id number]  4/16/9251

## 2023-07-26 NOTE — ASSESSMENT & PLAN NOTE
Lab Results   Component Value Date    HGBA1C 6.7 (H) 05/10/2023       Recent Labs     07/25/23  1621 07/26/23  0007 07/26/23  0429 07/26/23  0652   POCGLU 163* 152* 149* 146*       Blood Sugar Average: Last 72 hrs:  (P) 117.25   We will hold mealtime insulin while NPO.   Continue Lantus 12 units with a sliding scale

## 2023-07-26 NOTE — PROGRESS NOTES
Progress Note - Cari Leyva 82 y.o. male MRN: [id number]    Unit/Bed#: -01 Encounter: [id number]        Assessment/Plan:  Acute pancreatitis  Patient presenting with abdominal pain, elevated lipase 5201  CT scan done on 7/ 7 show acute pancreatitis without abscess or pseudocyst  LFTs remains within normal limits. Etiology unclear, no evidence of autoimmune pancreatitis and previous work-up  MRI done on 6/10 showed probably gallstone sludge without biliary duct dilation or evidence of choledocholithiasis.  Pancreatic duct demonstrated some focal narrowing at the junction of the head and neck region which may be related to artifact  Patient did receive IV hydration @150cc/hr on admission   Patient is status postcholecystectomy 6/28/2023  Advance to clear liquid diet  Pain management with as needed Dilaudid  MRI MRCP showing the pancreatic duct has a similar appearance to the previous MRCP. Apparent transition/narrowing of the duct at the junction of the pancreatic head and neck appears unchanged. However, there is a questionable 7 mm nodule along the   undersurface of the junction head and neck with some suspected diffusion restriction in this area. EUS is recommended at this time to exclude a small pancreatic mass, peripancreatic edema seen previously has largely resolved with some trace fluid persisting. No organized collection  Needs EUS in 2 to 4 weeks, will contact office to schedule    Subjective:   Patient is sitting in chair at bedside and he states that he is feeling better. He reports that his pain is about a 3 out of 10. No bowel movement since admission. No nausea or vomiting.     Objective:     Vitals: /74 (BP Location: Left arm)   Pulse 66   Temp 98.6 °F (37 °C) (Oral)   Resp 18   Ht 5' 6" (1.676 m)   Wt 109 kg (240 lb)   SpO2 94%   BMI 38.74 kg/m²       Physical Exam:  Gen-alert no acute distress  Abd-soft positive bowel sounds protuberant, positive tenderness palpation of epigastric area, no rebound rigidity guarding       Lab, Imaging and other studies:   Recent Results (from the past 72 hour(s))   CBC and Platelet    Collection Time: 07/24/23 10:05 AM   Result Value Ref Range    WBC 8.64 4.31 - 10.16 Thousand/uL    RBC 4.23 3.88 - 5.62 Million/uL    Hemoglobin 13.4 12.0 - 17.0 g/dL    Hematocrit 40.4 36.5 - 49.3 %    MCV 96 82 - 98 fL    MCH 31.7 26.8 - 34.3 pg    MCHC 33.2 31.4 - 37.4 g/dL    RDW 14.2 11.6 - 15.1 %    Platelets 275 251 - 153 Thousands/uL    MPV 10.0 8.9 - 12.7 fL   Comprehensive metabolic panel    Collection Time: 07/24/23 10:05 AM   Result Value Ref Range    Sodium 140 135 - 147 mmol/L    Potassium 4.0 3.5 - 5.3 mmol/L    Chloride 102 96 - 108 mmol/L    CO2 31 21 - 32 mmol/L    ANION GAP 7 mmol/L    BUN 24 5 - 25 mg/dL    Creatinine 1.70 (H) 0.60 - 1.30 mg/dL    Glucose, Fasting 96 65 - 99 mg/dL    Calcium 9.1 8.4 - 10.2 mg/dL    AST 19 13 - 39 U/L    ALT 17 7 - 52 U/L    Alkaline Phosphatase 48 34 - 104 U/L    Total Protein 6.7 6.4 - 8.4 g/dL    Albumin 3.8 3.5 - 5.0 g/dL    Total Bilirubin 0.55 0.20 - 1.00 mg/dL    eGFR 37 ml/min/1.73sq m   Lipase    Collection Time: 07/24/23 10:05 AM   Result Value Ref Range    Lipase 5,201 (H) 11 - 82 u/L   Fingerstick Glucose (POCT)    Collection Time: 07/24/23  1:42 PM   Result Value Ref Range    POC Glucose 68 65 - 140 mg/dl   Fingerstick Glucose (POCT)    Collection Time: 07/24/23  2:36 PM   Result Value Ref Range    POC Glucose 118 65 - 140 mg/dl   Fingerstick Glucose (POCT)    Collection Time: 07/24/23  4:31 PM   Result Value Ref Range    POC Glucose 73 65 - 140 mg/dl   Fingerstick Glucose (POCT)    Collection Time: 07/24/23  5:29 PM   Result Value Ref Range    POC Glucose 75 65 - 140 mg/dl   Fingerstick Glucose (POCT)    Collection Time: 07/25/23 12:40 AM   Result Value Ref Range    POC Glucose 61 (L) 65 - 140 mg/dl   Fingerstick Glucose (POCT)    Collection Time: 07/25/23  1:26 AM   Result Value Ref Range    POC Glucose 152 (H) 65 - 140 mg/dl   Basic metabolic panel    Collection Time: 07/25/23  5:51 AM   Result Value Ref Range    Sodium 141 135 - 147 mmol/L    Potassium 4.0 3.5 - 5.3 mmol/L    Chloride 105 96 - 108 mmol/L    CO2 28 21 - 32 mmol/L    ANION GAP 8 mmol/L    BUN 17 5 - 25 mg/dL    Creatinine 1.41 (H) 0.60 - 1.30 mg/dL    Glucose 111 65 - 140 mg/dL    Calcium 8.5 8.4 - 10.2 mg/dL    eGFR 46 ml/min/1.73sq m   Magnesium    Collection Time: 07/25/23  5:51 AM   Result Value Ref Range    Magnesium 1.9 1.9 - 2.7 mg/dL   Phosphorus    Collection Time: 07/25/23  5:51 AM   Result Value Ref Range    Phosphorus 3.6 2.3 - 4.1 mg/dL   Hepatic function panel    Collection Time: 07/25/23  5:51 AM   Result Value Ref Range    Total Bilirubin 0.64 0.20 - 1.00 mg/dL    Bilirubin, Direct 0.11 0.00 - 0.20 mg/dL    Alkaline Phosphatase 49 34 - 104 U/L    AST 16 13 - 39 U/L    ALT 12 7 - 52 U/L    Total Protein 5.9 (L) 6.4 - 8.4 g/dL    Albumin 3.3 (L) 3.5 - 5.0 g/dL   CBC and differential    Collection Time: 07/25/23  5:51 AM   Result Value Ref Range    WBC 5.79 4.31 - 10.16 Thousand/uL    RBC 3.74 (L) 3.88 - 5.62 Million/uL    Hemoglobin 11.9 (L) 12.0 - 17.0 g/dL    Hematocrit 36.0 (L) 36.5 - 49.3 %    MCV 96 82 - 98 fL    MCH 31.8 26.8 - 34.3 pg    MCHC 33.1 31.4 - 37.4 g/dL    RDW 14.2 11.6 - 15.1 %    MPV 10.7 8.9 - 12.7 fL    Platelets 261 290 - 465 Thousands/uL    nRBC 0 /100 WBCs    Neutrophils Relative 63 43 - 75 %    Immat GRANS % 0 0 - 2 %    Lymphocytes Relative 23 14 - 44 %    Monocytes Relative 12 4 - 12 %    Eosinophils Relative 2 0 - 6 %    Basophils Relative 0 0 - 1 %    Neutrophils Absolute 3.60 1.85 - 7.62 Thousands/µL    Immature Grans Absolute 0.02 0.00 - 0.20 Thousand/uL    Lymphocytes Absolute 1.34 0.60 - 4.47 Thousands/µL    Monocytes Absolute 0.71 0.17 - 1.22 Thousand/µL    Eosinophils Absolute 0.10 0.00 - 0.61 Thousand/µL    Basophils Absolute 0.02 0.00 - 0.10 Thousands/µL   Lipase    Collection Time: 07/25/23  5:51 AM   Result Value Ref Range    Lipase 1,079 (H) 11 - 82 u/L   Fingerstick Glucose (POCT)    Collection Time: 07/25/23  5:59 AM   Result Value Ref Range    POC Glucose 116 65 - 140 mg/dl   HS Troponin 0hr (reflex protocol)    Collection Time: 07/25/23  9:57 AM   Result Value Ref Range    hs TnI 0hr 12 "Refer to ACS Flowchart"- see link ng/L   Fingerstick Glucose (POCT)    Collection Time: 07/25/23  9:59 AM   Result Value Ref Range    POC Glucose 134 65 - 140 mg/dl   HS Troponin I 2hr    Collection Time: 07/25/23 12:06 PM   Result Value Ref Range    hs TnI 2hr 12 "Refer to ACS Flowchart"- see link ng/L    Delta 2hr hsTnI 0 <20 ng/L   Fingerstick Glucose (POCT)    Collection Time: 07/25/23  4:21 PM   Result Value Ref Range    POC Glucose 163 (H) 65 - 140 mg/dl   Fingerstick Glucose (POCT)    Collection Time: 07/26/23 12:07 AM   Result Value Ref Range    POC Glucose 152 (H) 65 - 140 mg/dl   Fingerstick Glucose (POCT)    Collection Time: 07/26/23  4:29 AM   Result Value Ref Range    POC Glucose 149 (H) 65 - 140 mg/dl   CBC    Collection Time: 07/26/23  5:28 AM   Result Value Ref Range    WBC 6.47 4.31 - 10.16 Thousand/uL    RBC 3.57 (L) 3.88 - 5.62 Million/uL    Hemoglobin 11.3 (L) 12.0 - 17.0 g/dL    Hematocrit 35.2 (L) 36.5 - 49.3 %    MCV 99 (H) 82 - 98 fL    MCH 31.7 26.8 - 34.3 pg    MCHC 32.1 31.4 - 37.4 g/dL    RDW 13.8 11.6 - 15.1 %    Platelets 570 (L) 110 - 390 Thousands/uL    MPV 10.4 8.9 - 12.7 fL   Comprehensive metabolic panel    Collection Time: 07/26/23  5:28 AM   Result Value Ref Range    Sodium 138 135 - 147 mmol/L    Potassium 4.0 3.5 - 5.3 mmol/L    Chloride 104 96 - 108 mmol/L    CO2 27 21 - 32 mmol/L    ANION GAP 7 mmol/L    BUN 18 5 - 25 mg/dL    Creatinine 1.32 (H) 0.60 - 1.30 mg/dL    Glucose 146 (H) 65 - 140 mg/dL    Calcium 8.1 (L) 8.4 - 10.2 mg/dL    Corrected Calcium 8.8 8.3 - 10.1 mg/dL    AST 14 13 - 39 U/L    ALT 10 7 - 52 U/L    Alkaline Phosphatase 45 34 - 104 U/L    Total Protein 5.6 (L) 6.4 - 8.4 g/dL    Albumin 3.1 (L) 3.5 - 5.0 g/dL    Total Bilirubin 0.69 0.20 - 1.00 mg/dL    eGFR 50 ml/min/1.73sq m   Lipase    Collection Time: 07/26/23  5:28 AM   Result Value Ref Range    Lipase 372 (H) 11 - 82 u/L   Fingerstick Glucose (POCT)    Collection Time: 07/26/23  6:52 AM   Result Value Ref Range    POC Glucose 146 (H) 65 - 140 mg/dl

## 2023-07-26 NOTE — PLAN OF CARE
Problem: PAIN - ADULT  Goal: Verbalizes/displays adequate comfort level or baseline comfort level  Description: Interventions:  - Encourage patient to monitor pain and request assistance  - Assess pain using appropriate pain scale  - Administer analgesics based on type and severity of pain and evaluate response  - Implement non-pharmacological measures as appropriate and evaluate response  - Consider cultural and social influences on pain and pain management  - Notify physician/advanced practitioner if interventions unsuccessful or patient reports new pain  Outcome: Progressing     Problem: INFECTION - ADULT  Goal: Absence or prevention of progression during hospitalization  Description: INTERVENTIONS:  - Assess and monitor for signs and symptoms of infection  - Monitor lab/diagnostic results  - Monitor all insertion sites, i.e. indwelling lines, tubes, and drains  - Monitor endotracheal if appropriate and nasal secretions for changes in amount and color  - [place] appropriate cooling/warming therapies per order  - Administer medications as ordered  - Instruct and encourage patient and family to use good hand hygiene technique  - Identify and instruct in appropriate isolation precautions for identified infection/condition  Outcome: Progressing     Problem: SAFETY ADULT  Goal: Patient will remain free of falls  Description: INTERVENTIONS:  - Educate patient/family on patient safety including physical limitations  - Instruct patient to call for assistance with activity   - Consult OT/PT to assist with strengthening/mobility   - Keep Call bell within reach  - Keep bed low and locked with side rails adjusted as appropriate  - Keep care items and personal belongings within reach  - Initiate and maintain comfort rounds  - Make Fall Risk Sign visible to staff  - Apply yellow socks and bracelet for high fall risk patients  - Consider moving patient to room near nurses station  Outcome: Progressing

## 2023-07-27 ENCOUNTER — TELEPHONE (OUTPATIENT)
Dept: GASTROENTEROLOGY | Facility: CLINIC | Age: 83
End: 2023-07-27

## 2023-07-27 LAB
GLUCOSE SERPL-MCNC: 166 MG/DL (ref 65–140)
GLUCOSE SERPL-MCNC: 170 MG/DL (ref 65–140)
GLUCOSE SERPL-MCNC: 170 MG/DL (ref 65–140)
GLUCOSE SERPL-MCNC: 219 MG/DL (ref 65–140)
GLUCOSE SERPL-MCNC: 227 MG/DL (ref 65–140)

## 2023-07-27 PROCEDURE — 99232 SBSQ HOSP IP/OBS MODERATE 35: CPT | Performed by: INTERNAL MEDICINE

## 2023-07-27 PROCEDURE — C9113 INJ PANTOPRAZOLE SODIUM, VIA: HCPCS | Performed by: INTERNAL MEDICINE

## 2023-07-27 PROCEDURE — 82948 REAGENT STRIP/BLOOD GLUCOSE: CPT

## 2023-07-27 RX ORDER — OXYCODONE HYDROCHLORIDE 5 MG/1
5 TABLET ORAL EVERY 4 HOURS PRN
Status: DISCONTINUED | OUTPATIENT
Start: 2023-07-27 | End: 2023-07-28 | Stop reason: HOSPADM

## 2023-07-27 RX ORDER — INSULIN LISPRO 100 [IU]/ML
3 INJECTION, SOLUTION INTRAVENOUS; SUBCUTANEOUS
Status: DISCONTINUED | OUTPATIENT
Start: 2023-07-27 | End: 2023-07-27

## 2023-07-27 RX ORDER — INSULIN LISPRO 100 [IU]/ML
6 INJECTION, SOLUTION INTRAVENOUS; SUBCUTANEOUS
Status: DISCONTINUED | OUTPATIENT
Start: 2023-07-27 | End: 2023-07-28

## 2023-07-27 RX ADMIN — INSULIN LISPRO 6 UNITS: 100 INJECTION, SOLUTION INTRAVENOUS; SUBCUTANEOUS at 12:27

## 2023-07-27 RX ADMIN — OXYCODONE HYDROCHLORIDE 5 MG: 5 TABLET ORAL at 14:04

## 2023-07-27 RX ADMIN — CLOPIDOGREL BISULFATE 75 MG: 75 TABLET ORAL at 09:25

## 2023-07-27 RX ADMIN — RANOLAZINE 500 MG: 500 TABLET, EXTENDED RELEASE ORAL at 09:25

## 2023-07-27 RX ADMIN — TAMSULOSIN HYDROCHLORIDE 0.8 MG: 0.4 CAPSULE ORAL at 09:25

## 2023-07-27 RX ADMIN — FLUTICASONE PROPIONATE 2 SPRAY: 50 SPRAY, METERED NASAL at 09:32

## 2023-07-27 RX ADMIN — INSULIN LISPRO 1 UNITS: 100 INJECTION, SOLUTION INTRAVENOUS; SUBCUTANEOUS at 06:27

## 2023-07-27 RX ADMIN — CLONIDINE HYDROCHLORIDE 0.1 MG: 0.1 TABLET ORAL at 22:04

## 2023-07-27 RX ADMIN — CLONIDINE HYDROCHLORIDE 0.1 MG: 0.1 TABLET ORAL at 09:25

## 2023-07-27 RX ADMIN — HYDROMORPHONE HYDROCHLORIDE 0.2 MG: 0.2 INJECTION, SOLUTION INTRAMUSCULAR; INTRAVENOUS; SUBCUTANEOUS at 18:02

## 2023-07-27 RX ADMIN — PANTOPRAZOLE SODIUM 40 MG: 40 INJECTION, POWDER, FOR SOLUTION INTRAVENOUS at 09:25

## 2023-07-27 RX ADMIN — RANOLAZINE 500 MG: 500 TABLET, EXTENDED RELEASE ORAL at 17:11

## 2023-07-27 RX ADMIN — HEPARIN SODIUM 5000 UNITS: 5000 INJECTION INTRAVENOUS; SUBCUTANEOUS at 06:27

## 2023-07-27 RX ADMIN — HEPARIN SODIUM 5000 UNITS: 5000 INJECTION INTRAVENOUS; SUBCUTANEOUS at 22:04

## 2023-07-27 RX ADMIN — HEPARIN SODIUM 5000 UNITS: 5000 INJECTION INTRAVENOUS; SUBCUTANEOUS at 14:04

## 2023-07-27 RX ADMIN — HYDROMORPHONE HYDROCHLORIDE 0.2 MG: 0.2 INJECTION, SOLUTION INTRAMUSCULAR; INTRAVENOUS; SUBCUTANEOUS at 00:42

## 2023-07-27 RX ADMIN — INSULIN DETEMIR 18 UNITS: 100 INJECTION, SOLUTION SUBCUTANEOUS at 12:04

## 2023-07-27 RX ADMIN — INSULIN LISPRO 1 UNITS: 100 INJECTION, SOLUTION INTRAVENOUS; SUBCUTANEOUS at 00:42

## 2023-07-27 RX ADMIN — INSULIN LISPRO 6 UNITS: 100 INJECTION, SOLUTION INTRAVENOUS; SUBCUTANEOUS at 17:11

## 2023-07-27 RX ADMIN — METOPROLOL SUCCINATE 25 MG: 25 TABLET, FILM COATED, EXTENDED RELEASE ORAL at 09:25

## 2023-07-27 RX ADMIN — INSULIN LISPRO 2 UNITS: 100 INJECTION, SOLUTION INTRAVENOUS; SUBCUTANEOUS at 17:12

## 2023-07-27 RX ADMIN — INSULIN LISPRO 2 UNITS: 100 INJECTION, SOLUTION INTRAVENOUS; SUBCUTANEOUS at 12:27

## 2023-07-27 NOTE — ASSESSMENT & PLAN NOTE
Hold PTA lisinopril and torsemide in the setting of acute pancreatitis. Continue clonidine and metoprolol.   Blood pressure is less than systolic 153

## 2023-07-27 NOTE — ASSESSMENT & PLAN NOTE
Lab Results   Component Value Date    HGBA1C 6.7 (H) 05/10/2023       Recent Labs     07/26/23  1704 07/26/23  2203 07/26/23  2350 07/27/23  0542   POCGLU 194* 175* 170* 170*       Blood Sugar Average: Last 72 hrs:  (P) 698.5208944342335729   Controlled with current regimen, he noted he is not eating as well so we will still continue to hold mealtime insulin

## 2023-07-27 NOTE — ASSESSMENT & PLAN NOTE
The patient is presenting with abdominal pain and increased lipase. This is a recurrent condition and he notes it is his fifth time with this pathology. An MRI was performed last evening which showed a potential 7 mm nodule that was recommended for endoscopic ultrasound. GI had already been planning on endoscopic ultrasound as well once his pancreatitis improved. · Continue pain control-GI advanced to diet yesterday to clears, clears induced about a 2-4 in abdominal pain and he advanced it further for dinner noting pain consistent with the pain that brought him in around a 6-7.    · Appreciate GI support-we will discuss with them

## 2023-07-27 NOTE — TELEPHONE ENCOUNTER
Dr. Concepcion Wynn, per Krystal Villasenor, pt needs to be scheduled for an EUS. Please advise if needs FNA and/or Cytology. Thank you!

## 2023-07-27 NOTE — PLAN OF CARE
Problem: PAIN - ADULT  Goal: Verbalizes/displays adequate comfort level or baseline comfort level  Description: Interventions:  - Encourage patient to monitor pain and request assistance  - Assess pain using appropriate pain scale  - Administer analgesics based on type and severity of pain and evaluate response  - Implement non-pharmacological measures as appropriate and evaluate response  - Consider cultural and social influences on pain and pain management  - Notify physician/advanced practitioner if interventions unsuccessful or patient reports new pain  Outcome: Not Progressing     Problem: SAFETY ADULT  Goal: Patient will remain free of falls  Description: INTERVENTIONS:  - Educate patient/family on patient safety including physical limitations  - Instruct patient to call for assistance with activity   - Consult OT/PT to assist with strengthening/mobility   - Keep Call bell within reach  - Keep bed low and locked with side rails adjusted as appropriate  - Keep care items and personal belongings within reach  - Initiate and maintain comfort rounds  - Make Fall Risk Sign visible to staff  - Offer Toileting every prn Hours, in advance of need  - Initiate/Maintain  n/a alarm  - Obtain necessary fall risk management equipment: n/a  - Apply yellow socks and bracelet for high fall risk patients  - Consider moving patient to room near nurses station  Outcome: Not Progressing  Goal: Maintain or return to baseline ADL function  Description: INTERVENTIONS:  -  Assess patient's ability to carry out ADLs; assess patient's baseline for ADL function and identify physical deficits which impact ability to perform ADLs (bathing, care of mouth/teeth, toileting, grooming, dressing, etc.)  - Assess/evaluate cause of self-care deficits   - Assess range of motion  - Assess patient's mobility; develop plan if impaired  - Assess patient's need for assistive devices and provide as appropriate  - Encourage maximum independence but intervene and supervise when necessary  - Involve family in performance of ADLs  - Assess for home care needs following discharge   - Consider OT consult to assist with ADL evaluation and planning for discharge  - Provide patient education as appropriate  Outcome: Not Progressing     Problem: DISCHARGE PLANNING  Goal: Discharge to home or other facility with appropriate resources  Description: INTERVENTIONS:  - Identify barriers to discharge w/patient and caregiver  - Arrange for needed discharge resources and transportation as appropriate  - Identify discharge learning needs (meds, wound care, etc.)  - Arrange for interpretive services to assist at discharge as needed  - Refer to Case Management Department for coordinating discharge planning if the patient needs post-hospital services based on physician/advanced practitioner order or complex needs related to functional status, cognitive ability, or social support system  Outcome: Not Progressing     Problem: Knowledge Deficit  Goal: Patient/family/caregiver demonstrates understanding of disease process, treatment plan, medications, and discharge instructions  Description: Complete learning assessment and assess knowledge base.   Interventions:  - Provide teaching at level of understanding  - Provide teaching via preferred learning methods  Outcome: Not Progressing

## 2023-07-27 NOTE — PROGRESS NOTES
Progress Note - Vassie Cure 82 y.o. male MRN: [id number]    Unit/Bed#: -[facility] Encounter: [id number]        Assessment/Plan:  Acute pancreatitis  Patient presenting with abdominal pain, elevated lipase 5201on admission, has trended down to 372  CT scan done on 7/ 7 show acute pancreatitis without abscess or pseudocyst  LFTs remains within normal limits. Etiology unclear, no evidence of autoimmune pancreatitis and previous work-up  MRI done on 6/10 showed probably gallstone sludge without biliary duct dilation or evidence of choledocholithiasis.  Pancreatic duct demonstrated some focal narrowing at the junction of the head and neck region which may be related to artifact  Patient did receive IV hydration @150cc/hr on admission   Patient is status postcholecystectomy 6/28/2023  Advanced to clear liquid diet, patient did have some increased pain last night but now states that he feels comfortable with pain level about a 2/10 so we will try on low-fat diet  Pain management with as needed Dilaudid  MRI MRCP showing the pancreatic duct has a similar appearance to the previous MRCP. Apparent transition/narrowing of the duct at the junction of the pancreatic head and neck appears unchanged. However, there is a questionable 7 mm nodule along the   undersurface of the junction head and neck with some suspected diffusion restriction in this area. EUS is recommended at this time to exclude a small pancreatic mass, peripancreatic edema seen previously has largely resolved with some trace fluid persisting. No organized collection  Needs EUS in 2 to 4 weeks, contacted office to schedule    Subjective:   Patient is sitting in chair at bedside. He reports that he feels okay today but yesterday had severe pain after having some clear liquids. He states pain level now is about a 2 out of 10. He reports passage of gas but no bowel movement. Denies nausea and vomiting.     Objective:     Vitals: /60 (BP Location: Left arm)   Pulse 63   Temp 98.6 °F (37 °C) (Oral)   Resp 20   Ht 5' 6" (1.676 m)   Wt 109 kg (240 lb)   SpO2 94%   BMI 38.74 kg/m²       Physical Exam:  Gen-alert no acute distress  Abd-soft hypoactive bowel sounds nondistended, no significant tenderness to palpation no rebound rigidity guarding       Lab, Imaging and other studies:   Recent Results (from the past 72 hour(s))   CBC and Platelet    Collection Time: 07/24/23 10:05 AM   Result Value Ref Range    WBC 8.64 4.31 - 10.16 Thousand/uL    RBC 4.23 3.88 - 5.62 Million/uL    Hemoglobin 13.4 12.0 - 17.0 g/dL    Hematocrit 40.4 36.5 - 49.3 %    MCV 96 82 - 98 fL    MCH 31.7 26.8 - 34.3 pg    MCHC 33.2 31.4 - 37.4 g/dL    RDW 14.2 11.6 - 15.1 %    Platelets 576 665 - 332 Thousands/uL    MPV 10.0 8.9 - 12.7 fL   Comprehensive metabolic panel    Collection Time: 07/24/23 10:05 AM   Result Value Ref Range    Sodium 140 135 - 147 mmol/L    Potassium 4.0 3.5 - 5.3 mmol/L    Chloride 102 96 - 108 mmol/L    CO2 31 21 - 32 mmol/L    ANION GAP 7 mmol/L    BUN 24 5 - 25 mg/dL    Creatinine 1.70 (H) 0.60 - 1.30 mg/dL    Glucose, Fasting 96 65 - 99 mg/dL    Calcium 9.1 8.4 - 10.2 mg/dL    AST 19 13 - 39 U/L    ALT 17 7 - 52 U/L    Alkaline Phosphatase 48 34 - 104 U/L    Total Protein 6.7 6.4 - 8.4 g/dL    Albumin 3.8 3.5 - 5.0 g/dL    Total Bilirubin 0.55 0.20 - 1.00 mg/dL    eGFR 37 ml/min/1.73sq m   Lipase    Collection Time: 07/24/23 10:05 AM   Result Value Ref Range    Lipase 5,201 (H) 11 - 82 u/L   Fingerstick Glucose (POCT)    Collection Time: 07/24/23  1:42 PM   Result Value Ref Range    POC Glucose 68 65 - 140 mg/dl   Fingerstick Glucose (POCT)    Collection Time: 07/24/23  2:36 PM   Result Value Ref Range    POC Glucose 118 65 - 140 mg/dl   Fingerstick Glucose (POCT)    Collection Time: 07/24/23  4:31 PM   Result Value Ref Range    POC Glucose 73 65 - 140 mg/dl   Fingerstick Glucose (POCT)    Collection Time: 07/24/23  5:29 PM   Result Value Ref Range    POC Glucose 75 65 - 140 mg/dl   Fingerstick Glucose (POCT)    Collection Time: 07/25/23 12:40 AM   Result Value Ref Range    POC Glucose 61 (L) 65 - 140 mg/dl   Fingerstick Glucose (POCT)    Collection Time: 07/25/23  1:26 AM   Result Value Ref Range    POC Glucose 152 (H) 65 - 140 mg/dl   Basic metabolic panel    Collection Time: 07/25/23  5:51 AM   Result Value Ref Range    Sodium 141 135 - 147 mmol/L    Potassium 4.0 3.5 - 5.3 mmol/L    Chloride 105 96 - 108 mmol/L    CO2 28 21 - 32 mmol/L    ANION GAP 8 mmol/L    BUN 17 5 - 25 mg/dL    Creatinine 1.41 (H) 0.60 - 1.30 mg/dL    Glucose 111 65 - 140 mg/dL    Calcium 8.5 8.4 - 10.2 mg/dL    eGFR 46 ml/min/1.73sq m   Magnesium    Collection Time: 07/25/23  5:51 AM   Result Value Ref Range    Magnesium 1.9 1.9 - 2.7 mg/dL   Phosphorus    Collection Time: 07/25/23  5:51 AM   Result Value Ref Range    Phosphorus 3.6 2.3 - 4.1 mg/dL   Hepatic function panel    Collection Time: 07/25/23  5:51 AM   Result Value Ref Range    Total Bilirubin 0.64 0.20 - 1.00 mg/dL    Bilirubin, Direct 0.11 0.00 - 0.20 mg/dL    Alkaline Phosphatase 49 34 - 104 U/L    AST 16 13 - 39 U/L    ALT 12 7 - 52 U/L    Total Protein 5.9 (L) 6.4 - 8.4 g/dL    Albumin 3.3 (L) 3.5 - 5.0 g/dL   CBC and differential    Collection Time: 07/25/23  5:51 AM   Result Value Ref Range    WBC 5.79 4.31 - 10.16 Thousand/uL    RBC 3.74 (L) 3.88 - 5.62 Million/uL    Hemoglobin 11.9 (L) 12.0 - 17.0 g/dL    Hematocrit 36.0 (L) 36.5 - 49.3 %    MCV 96 82 - 98 fL    MCH 31.8 26.8 - 34.3 pg    MCHC 33.1 31.4 - 37.4 g/dL    RDW 14.2 11.6 - 15.1 %    MPV 10.7 8.9 - 12.7 fL    Platelets 414 689 - 381 Thousands/uL    nRBC 0 /100 WBCs    Neutrophils Relative 63 43 - 75 %    Immat GRANS % 0 0 - 2 %    Lymphocytes Relative 23 14 - 44 %    Monocytes Relative 12 4 - 12 %    Eosinophils Relative 2 0 - 6 %    Basophils Relative 0 0 - 1 %    Neutrophils Absolute 3.60 1.85 - 7.62 Thousands/µL    Immature Grans Absolute 0.02 0.00 - 0.20 Thousand/uL    Lymphocytes Absolute 1.34 0.60 - 4.47 Thousands/µL    Monocytes Absolute 0.71 0.17 - 1.22 Thousand/µL    Eosinophils Absolute 0.10 0.00 - 0.61 Thousand/µL    Basophils Absolute 0.02 0.00 - 0.10 Thousands/µL   Lipase    Collection Time: 07/25/23  5:51 AM   Result Value Ref Range    Lipase 1,079 (H) 11 - 82 u/L   Fingerstick Glucose (POCT)    Collection Time: 07/25/23  5:59 AM   Result Value Ref Range    POC Glucose 116 65 - 140 mg/dl   HS Troponin 0hr (reflex protocol)    Collection Time: 07/25/23  9:57 AM   Result Value Ref Range    hs TnI 0hr 12 "Refer to ACS Flowchart"- see link ng/L   Fingerstick Glucose (POCT)    Collection Time: 07/25/23  9:59 AM   Result Value Ref Range    POC Glucose 134 65 - 140 mg/dl   HS Troponin I 2hr    Collection Time: 07/25/23 12:06 PM   Result Value Ref Range    hs TnI 2hr 12 "Refer to ACS Flowchart"- see link ng/L    Delta 2hr hsTnI 0 <20 ng/L   Fingerstick Glucose (POCT)    Collection Time: 07/25/23  4:21 PM   Result Value Ref Range    POC Glucose 163 (H) 65 - 140 mg/dl   Fingerstick Glucose (POCT)    Collection Time: 07/26/23 12:07 AM   Result Value Ref Range    POC Glucose 152 (H) 65 - 140 mg/dl   Fingerstick Glucose (POCT)    Collection Time: 07/26/23  4:29 AM   Result Value Ref Range    POC Glucose 149 (H) 65 - 140 mg/dl   CBC    Collection Time: 07/26/23  5:28 AM   Result Value Ref Range    WBC 6.47 4.31 - 10.16 Thousand/uL    RBC 3.57 (L) 3.88 - 5.62 Million/uL    Hemoglobin 11.3 (L) 12.0 - 17.0 g/dL    Hematocrit 35.2 (L) 36.5 - 49.3 %    MCV 99 (H) 82 - 98 fL    MCH 31.7 26.8 - 34.3 pg    MCHC 32.1 31.4 - 37.4 g/dL    RDW 13.8 11.6 - 15.1 %    Platelets 849 (L) 514 - 390 Thousands/uL    MPV 10.4 8.9 - 12.7 fL   Comprehensive metabolic panel    Collection Time: 07/26/23  5:28 AM   Result Value Ref Range    Sodium 138 135 - 147 mmol/L    Potassium 4.0 3.5 - 5.3 mmol/L    Chloride 104 96 - 108 mmol/L    CO2 27 21 - 32 mmol/L    ANION GAP 7 mmol/L    BUN 18 5 - 25 mg/dL    Creatinine 1.32 (H) 0.60 - 1.30 mg/dL    Glucose 146 (H) 65 - 140 mg/dL    Calcium 8.1 (L) 8.4 - 10.2 mg/dL    Corrected Calcium 8.8 8.3 - 10.1 mg/dL    AST 14 13 - 39 U/L    ALT 10 7 - 52 U/L    Alkaline Phosphatase 45 34 - 104 U/L    Total Protein 5.6 (L) 6.4 - 8.4 g/dL    Albumin 3.1 (L) 3.5 - 5.0 g/dL    Total Bilirubin 0.69 0.20 - 1.00 mg/dL    eGFR 50 ml/min/1.73sq m   Lipase    Collection Time: 07/26/23  5:28 AM   Result Value Ref Range    Lipase 372 (H) 11 - 82 u/L   Fingerstick Glucose (POCT)    Collection Time: 07/26/23  6:52 AM   Result Value Ref Range    POC Glucose 146 (H) 65 - 140 mg/dl   Fingerstick Glucose (POCT)    Collection Time: 07/26/23 11:24 AM   Result Value Ref Range    POC Glucose 160 (H) 65 - 140 mg/dl   Fingerstick Glucose (POCT)    Collection Time: 07/26/23  5:04 PM   Result Value Ref Range    POC Glucose 194 (H) 65 - 140 mg/dl   Fingerstick Glucose (POCT)    Collection Time: 07/26/23 10:03 PM   Result Value Ref Range    POC Glucose 175 (H) 65 - 140 mg/dl   Fingerstick Glucose (POCT)    Collection Time: 07/26/23 11:50 PM   Result Value Ref Range    POC Glucose 170 (H) 65 - 140 mg/dl   Fingerstick Glucose (POCT)    Collection Time: 07/27/23  5:42 AM   Result Value Ref Range    POC Glucose 170 (H) 65 - 140 mg/dl

## 2023-07-27 NOTE — PROGRESS NOTES
[address] [facility]  Progress Note  Name: Ana Hardwick  MRN: [id number]  Unit/Bed#: -01 I Date of Admission: 7/24/2023   Date of Service: 7/27/2023 I Hospital Day: 3    Assessment/Plan   * Acute pancreatitis  Assessment & Plan  The patient is presenting with abdominal pain and increased lipase. This is a recurrent condition and he notes it is his fifth time with this pathology. An MRI was performed last evening which showed a potential 7 mm nodule that was recommended for endoscopic ultrasound. GI had already been planning on endoscopic ultrasound as well once his pancreatitis improved. · Continue pain control-GI advanced to diet yesterday to clears, clears induced about a 2-4 in abdominal pain and he advanced it further for dinner noting pain consistent with the pain that brought him in around a 6-7.    · Appreciate GI support-we will discuss with them     Stage 3b chronic kidney disease (CKD) [facility])  Assessment & Plan  Lab Results   Component Value Date    EGFR 50 07/26/2023    EGFR 46 07/25/2023    EGFR 37 07/24/2023    CREATININE 1.32 (H) 07/26/2023    CREATININE 1.41 (H) 07/25/2023    CREATININE 1.70 (H) 07/24/2023   Creatinine at baseline last blood work    Venous stasis dermatitis of both lower extremities  Assessment & Plan  Apply compression stockings and continue elevation     Coronary artery disease involving native coronary artery without angina pectoris  Assessment & Plan  Continue Plavix, Lopressor and Ranexa    Mixed hyperlipidemia  Assessment & Plan  Statin held    Type 2 diabetes mellitus with kidney complication, with long-term current use of insulin [facility])  Assessment & Plan  Lab Results   Component Value Date    HGBA1C 6.7 (H) 05/10/2023       Recent Labs     07/26/23  1704 07/26/23  2203 07/26/23  2350 07/27/23  0542   POCGLU 194* 175* 170* 170*       Blood Sugar Average: Last 72 hrs:  (P) [id number]   Controlled with current regimen, he noted he is not eating as well so we will still continue to hold mealtime insulin    (HFpEF) heart failure with preserved ejection fraction [facility])  Assessment & Plan  Wt Readings from Last 3 Encounters:   07/24/23 109 kg (240 lb)   07/14/23 113 kg (249 lb)   07/07/23 113 kg (249 lb)   He is now off IV fluids. He denies shortness of breath and he is not hypoxic. Will resume diuretic when taking p.o.          HTN (hypertension), benign  Assessment & Plan  Hold PTA lisinopril and torsemide in the setting of acute pancreatitis. Continue clonidine and metoprolol. Blood pressure is less than systolic 686    PAULA (obstructive sleep apnea)  Assessment & Plan  Continue CPAP while inpatient             VTE Pharmacologic Prophylaxis:   Moderate Risk (Score 3-4) - Pharmacological DVT Prophylaxis Ordered: heparin. Patient Centered Rounds: I performed bedside rounds with nursing staff today. Discussions with Specialists or Other Care Team Provider: GI    Education and Discussions with Family / Patient: Patient declined call to . Total Time Spent on Date of Encounter in care of patient: 35 minutes This time was spent on one or more of the following: performing physical exam; counseling and coordination of care; obtaining or reviewing history; documenting in the medical record; reviewing/ordering tests, medications or procedures; communicating with other healthcare professionals and discussing with patient's family/caregivers. Current Length of Stay: 3 day(s)  Current Patient Status: Inpatient   Certification Statement: The patient will continue to require additional inpatient hospital stay due to pancreatitis  Discharge Plan: Anticipate discharge in 24-48 hrs to home. Code Status: Level 1 - Full Code    Subjective:   Patient is concerned about the pain he had last night with his advancement of his diet. He was only able to tolerate about half of the food before pain started.   He describes it as epigastric pain and a scale of 6-7 out of 10. He feels this is the same pain he came to the emergency department with. He denies nausea. Currently, he is sipping slowly on apple juice and states his pain is at his baseline which is 2. He is concerned about advancing his diet further and looking forward to talking to GI. He denies any chest pain or shortness of breath. He feels the swelling in his hands has improved. He denies constipation or diarrhea and has not had a bowel movement since his admission    Objective:     Vitals:   Temp (24hrs), Av.5 °F (36.9 °C), Min:97.9 °F (36.6 °C), Max:99 °F (37.2 °C)    Temp:  [97.9 °F (36.6 °C)-99 °F (37.2 °C)] 98.6 °F (37 °C)  HR:  [57-65] 63  Resp:  [18-20] 20  BP: (147-175)/(60-71) 147/60  SpO2:  [93 %-96 %] 94 %  Body mass index is 38.74 kg/m². Input and Output Summary (last 24 hours): Intake/Output Summary (Last 24 hours) at 2023 0817  Last data filed at 2023 0515  Gross per 24 hour   Intake 2240 ml   Output 1050 ml   Net 1190 ml       Physical Exam:   Physical Exam  Vitals and nursing note reviewed. Constitutional:       Appearance: Normal appearance. He is not ill-appearing or diaphoretic. HENT:      Head: Normocephalic. Nose: No congestion. Mouth/Throat:      Mouth: Mucous membranes are moist.      Pharynx: No oropharyngeal exudate. Eyes:      General: No scleral icterus. Conjunctiva/sclera: Conjunctivae normal.   Pulmonary:      Effort: Pulmonary effort is normal. No respiratory distress. Abdominal:      General: Bowel sounds are normal. There is no distension. Palpations: Abdomen is soft. Tenderness: There is no abdominal tenderness. Musculoskeletal:      Cervical back: Normal range of motion and neck supple. No rigidity. Right lower leg: Edema present. Left lower leg: Edema present. Skin:     General: Skin is warm. Coloration: Skin is not jaundiced.    Neurological:      Mental Status: He is alert and oriented to person, place, and time. Psychiatric:         Mood and Affect: Mood normal.         Behavior: Behavior normal.          Additional Data:     Labs:  Results from last 7 days   Lab Units 07/26/23  0528 07/25/23  0551   WBC Thousand/uL 6.47 5.79   HEMOGLOBIN g/dL 11.3* 11.9*   HEMATOCRIT % 35.2* 36.0*   PLATELETS Thousands/uL 148* 169   NEUTROS PCT %  --  63   LYMPHS PCT %  --  23   MONOS PCT %  --  12   EOS PCT %  --  2     Results from last 7 days   Lab Units 07/26/23  0528   SODIUM mmol/L 138   POTASSIUM mmol/L 4.0   CHLORIDE mmol/L 104   CO2 mmol/L 27   BUN mg/dL 18   CREATININE mg/dL 1.32*   ANION GAP mmol/L 7   CALCIUM mg/dL 8.1*   ALBUMIN g/dL 3.1*   TOTAL BILIRUBIN mg/dL 0.69   ALK PHOS U/L 45   ALT U/L 10   AST U/L 14   GLUCOSE RANDOM mg/dL 146*         Results from last 7 days   Lab Units 07/27/23  0542 07/26/23  2350 07/26/23  2203 07/26/23  1704 07/26/23  1124 07/26/23  0652 07/26/23  0429 07/26/23  0007 07/25/23  1621 07/25/23  0959 07/25/23  0559 07/25/23  0126   POC GLUCOSE mg/dl 170* 170* 175* 194* 160* 146* 149* 152* 163* 134 116 152*               Lines/Drains:  Invasive Devices     Peripheral Intravenous Line  Duration           Peripheral IV 07/26/23 Right Antecubital 1 day                      Imaging: No pertinent imaging reviewed.     Recent Cultures (last 7 days):         Last 24 Hours Medication List:   Current Facility-Administered Medications   Medication Dose Route Frequency Provider Last Rate   • cloNIDine  0.1 mg Oral Q12H 151 Eligio Ying MD     • clopidogrel  75 mg Oral Daily Mike Hazel MD     • fluticasone  2 spray Nasal Daily Mike Hazel MD     • heparin (porcine)  5,000 Units Subcutaneous Q8H 151 Eligio Ying MD     • HYDROmorphone  0.5 mg Intravenous Q4H PRN Mike Hazel MD     • HYDROmorphone  0.2 mg Intravenous Q4H PRN Mike Hazel MD     • insulin lispro  1-6 Units Subcutaneous Q6H 151 Eligio Ying MD     • metoprolol succinate  25 mg Oral Daily Darshan Mcgarry MD     • ondansetron  4 mg Intravenous Q6H PRN Darshan Mcgarry MD     • pantoprazole  40 mg Intravenous Q24H 151 Eligio Ying MD     • ranolazine  500 mg Oral BID Darshan Mcgarry MD     • tamsulosin  0.8 mg Oral Daily Darshan Mcgarry MD          Today, Patient Was Seen By: Frank Jernigan MD    **Please Note: This note may have been constructed using a voice recognition system. **

## 2023-07-27 NOTE — ASSESSMENT & PLAN NOTE
Lab Results   Component Value Date    EGFR 50 07/26/2023    EGFR 46 07/25/2023    EGFR 37 07/24/2023    CREATININE 1.32 (H) 07/26/2023    CREATININE 1.41 (H) 07/25/2023    CREATININE 1.70 (H) 07/24/2023   Creatinine at baseline last blood work

## 2023-07-27 NOTE — ASSESSMENT & PLAN NOTE
Wt Readings from Last 3 Encounters:   07/24/23 109 kg (240 lb)   07/14/23 113 kg (249 lb)   07/07/23 113 kg (249 lb)   He is now off IV fluids. He denies shortness of breath and he is not hypoxic. Will resume diuretic when taking p.o.

## 2023-07-28 ENCOUNTER — TRANSITIONAL CARE MANAGEMENT (OUTPATIENT)
Dept: FAMILY MEDICINE CLINIC | Facility: CLINIC | Age: 83
End: 2023-07-28

## 2023-07-28 VITALS
SYSTOLIC BLOOD PRESSURE: 139 MMHG | RESPIRATION RATE: 20 BRPM | TEMPERATURE: 98.6 F | HEIGHT: 66 IN | WEIGHT: 240 LBS | BODY MASS INDEX: 38.57 KG/M2 | OXYGEN SATURATION: 94 % | HEART RATE: 61 BPM | DIASTOLIC BLOOD PRESSURE: 65 MMHG

## 2023-07-28 LAB — GLUCOSE SERPL-MCNC: 218 MG/DL (ref 65–140)

## 2023-07-28 PROCEDURE — 99239 HOSP IP/OBS DSCHRG MGMT >30: CPT | Performed by: INTERNAL MEDICINE

## 2023-07-28 PROCEDURE — 99232 SBSQ HOSP IP/OBS MODERATE 35: CPT | Performed by: INTERNAL MEDICINE

## 2023-07-28 PROCEDURE — C9113 INJ PANTOPRAZOLE SODIUM, VIA: HCPCS | Performed by: INTERNAL MEDICINE

## 2023-07-28 PROCEDURE — 82948 REAGENT STRIP/BLOOD GLUCOSE: CPT

## 2023-07-28 RX ORDER — INSULIN LISPRO 100 [IU]/ML
8 INJECTION, SOLUTION INTRAVENOUS; SUBCUTANEOUS
Status: DISCONTINUED | OUTPATIENT
Start: 2023-07-28 | End: 2023-07-28 | Stop reason: HOSPADM

## 2023-07-28 RX ADMIN — METOPROLOL SUCCINATE 25 MG: 25 TABLET, FILM COATED, EXTENDED RELEASE ORAL at 08:22

## 2023-07-28 RX ADMIN — INSULIN LISPRO 6 UNITS: 100 INJECTION, SOLUTION INTRAVENOUS; SUBCUTANEOUS at 08:20

## 2023-07-28 RX ADMIN — CLOPIDOGREL BISULFATE 75 MG: 75 TABLET ORAL at 08:22

## 2023-07-28 RX ADMIN — RANOLAZINE 500 MG: 500 TABLET, EXTENDED RELEASE ORAL at 08:22

## 2023-07-28 RX ADMIN — INSULIN DETEMIR 18 UNITS: 100 INJECTION, SOLUTION SUBCUTANEOUS at 08:16

## 2023-07-28 RX ADMIN — HEPARIN SODIUM 5000 UNITS: 5000 INJECTION INTRAVENOUS; SUBCUTANEOUS at 05:44

## 2023-07-28 RX ADMIN — CLONIDINE HYDROCHLORIDE 0.1 MG: 0.1 TABLET ORAL at 08:22

## 2023-07-28 RX ADMIN — PANTOPRAZOLE SODIUM 40 MG: 40 INJECTION, POWDER, FOR SOLUTION INTRAVENOUS at 08:17

## 2023-07-28 RX ADMIN — FLUTICASONE PROPIONATE 2 SPRAY: 50 SPRAY, METERED NASAL at 08:15

## 2023-07-28 RX ADMIN — TAMSULOSIN HYDROCHLORIDE 0.8 MG: 0.4 CAPSULE ORAL at 08:22

## 2023-07-28 NOTE — ASSESSMENT & PLAN NOTE
Lab Results   Component Value Date    HGBA1C 6.7 (H) 05/10/2023       Recent Labs     07/27/23  1222 07/27/23  1555 07/27/23  2100 07/28/23  0819   POCGLU 227* 219* 166* 218*       Blood Sugar Average: Last 72 hrs:  (P) [id number]     Continue home insulin regimen at discharge  Monitor Accu-Cheks  Outpatient follow-up

## 2023-07-28 NOTE — PLAN OF CARE
Problem: PAIN - ADULT  Goal: Verbalizes/displays adequate comfort level or baseline comfort level  Description: Interventions:  - Encourage patient to monitor pain and request assistance  - Assess pain using appropriate pain scale  - Administer analgesics based on type and severity of pain and evaluate response  - Implement non-pharmacological measures as appropriate and evaluate response  - Consider cultural and social influences on pain and pain management  - Notify physician/advanced practitioner if interventions unsuccessful or patient reports new pain  Outcome: Progressing     Problem: INFECTION - ADULT  Goal: Absence or prevention of progression during hospitalization  Description: INTERVENTIONS:  - Assess and monitor for signs and symptoms of infection  - Monitor lab/diagnostic results  - Monitor all insertion sites, i.e. indwelling lines, tubes, and drains  - Monitor endotracheal if appropriate and nasal secretions for changes in amount and color  - [place] appropriate cooling/warming therapies per order  - Administer medications as ordered  - Instruct and encourage patient and family to use good hand hygiene technique  - Identify and instruct in appropriate isolation precautions for identified infection/condition  Outcome: Progressing     Problem: DISCHARGE PLANNING  Goal: Discharge to home or other facility with appropriate resources  Description: INTERVENTIONS:  - Identify barriers to discharge w/patient and caregiver  - Arrange for needed discharge resources and transportation as appropriate  - Identify discharge learning needs (meds, wound care, etc.)  - Arrange for interpretive services to assist at discharge as needed  - Refer to Case Management Department for coordinating discharge planning if the patient needs post-hospital services based on physician/advanced practitioner order or complex needs related to functional status, cognitive ability, or social support system  Outcome: Progressing Problem: Knowledge Deficit  Goal: Patient/family/caregiver demonstrates understanding of disease process, treatment plan, medications, and discharge instructions  Description: Complete learning assessment and assess knowledge base. Interventions:  - Provide teaching at level of understanding  - Provide teaching via preferred learning methods  Outcome: Progressing     Problem: Nutrition/Hydration-ADULT  Goal: Nutrient/Hydration intake appropriate for improving, restoring or maintaining nutritional needs  Description: Monitor and assess patient's nutrition/hydration status for malnutrition. Collaborate with interdisciplinary team and initiate plan and interventions as ordered. Monitor patient's weight and dietary intake as ordered or per policy. Utilize nutrition screening tool and intervene as necessary. Determine patient's food preferences and provide high-protein, high-caloric foods as appropriate.      INTERVENTIONS:  - Monitor oral intake, urinary output, labs, and treatment plans  - Assess nutrition and hydration status and recommend course of action  - Evaluate amount of meals eaten  - Assist patient with eating if necessary   - Allow adequate time for meals  - Recommend/ encourage appropriate diets, oral nutritional supplements, and vitamin/mineral supplements  - Order, calculate, and assess calorie counts as needed  - Recommend, monitor, and adjust tube feedings and TPN/PPN based on assessed needs  - Assess need for intravenous fluids  - Provide specific nutrition/hydration education as appropriate  - Include patient/family/caregiver in decisions related to nutrition  Outcome: Progressing

## 2023-07-28 NOTE — ASSESSMENT & PLAN NOTE
Wt Readings from Last 3 Encounters:   07/24/23 109 kg (240 lb)   07/14/23 113 kg (249 lb)   07/07/23 113 kg (249 lb)     2D echo 4/2023 EF 60 to 65%, LVH  Resume home regimen torsemide, lisinopril at discharge  Outpatient cardiology follow-up

## 2023-07-28 NOTE — PROGRESS NOTES
Progress Note - Colton Antoine 82 y.o. male MRN: [id number]    Unit/Bed#: -01 Encounter: [id number]        Assessment/Plan:  Acute pancreatitis  Patient presenting with abdominal pain, elevated lipase 5201on admission, has trended down to 372  CT scan done on 7/ 7 show acute pancreatitis without abscess or pseudocyst  Etiology unclear, cannot exclude pancreatic ductal stricture/ or malignancy, no evidence of autoimmune pancreatitis and previous work-up  Patient did receive [email or web address] on admission   Patient is status postcholecystectomy 6/28/2023  -diet advanced to low fat  MRI MRCP showing the pancreatic duct has a similar appearance to the previous MRCP. Apparent transition/narrowing of the duct at the junction of the pancreatic head and neck appears unchanged. However, there is a questionable 7 mm nodule along the   undersurface of the junction head and neck with some suspected diffusion restriction in this area. EUS is recommended at this time to exclude a small pancreatic mass, peripancreatic edema seen previously has largely resolved with some trace fluid persisting. No organized collection  Needs EUS in 2 to 4 weeks, contacted office to schedule  Plan is for discharge today, discussed importance of maintaining low-fat diet    Subjective:   Patient is lying in bed. He reports that he had pain after eating his low-fat meal yesterday but then today it disappeared and he was able to tolerate his breakfast without any issues and patient is going to be discharged home.     Objective:     Vitals: /65 (BP Location: Left arm)   Pulse 61   Temp 98.6 °F (37 °C) (Oral)   Resp 20   Ht 5' 6" (1.676 m)   Wt 109 kg (240 lb)   SpO2 94%   BMI 38.74 kg/m²       Physical Exam:  Gen-alert no acute distress  Abd-soft positive bowel sounds nontender nondistended, no rebound rigidity or guarding     Lab, Imaging and other studies:   Recent Results (from the past 72 hour(s))   HS Troponin 0hr (reflex protocol)    Collection Time: 07/25/23  9:57 AM   Result Value Ref Range    hs TnI 0hr 12 "Refer to ACS Flowchart"- see link ng/L   Fingerstick Glucose (POCT)    Collection Time: 07/25/23  9:59 AM   Result Value Ref Range    POC Glucose 134 65 - 140 mg/dl   HS Troponin I 2hr    Collection Time: 07/25/23 12:06 PM   Result Value Ref Range    hs TnI 2hr 12 "Refer to ACS Flowchart"- see link ng/L    Delta 2hr hsTnI 0 <20 ng/L   Fingerstick Glucose (POCT)    Collection Time: 07/25/23  4:21 PM   Result Value Ref Range    POC Glucose 163 (H) 65 - 140 mg/dl   Fingerstick Glucose (POCT)    Collection Time: 07/26/23 12:07 AM   Result Value Ref Range    POC Glucose 152 (H) 65 - 140 mg/dl   Fingerstick Glucose (POCT)    Collection Time: 07/26/23  4:29 AM   Result Value Ref Range    POC Glucose 149 (H) 65 - 140 mg/dl   CBC    Collection Time: 07/26/23  5:28 AM   Result Value Ref Range    WBC 6.47 4.31 - 10.16 Thousand/uL    RBC 3.57 (L) 3.88 - 5.62 Million/uL    Hemoglobin 11.3 (L) 12.0 - 17.0 g/dL    Hematocrit 35.2 (L) 36.5 - 49.3 %    MCV 99 (H) 82 - 98 fL    MCH 31.7 26.8 - 34.3 pg    MCHC 32.1 31.4 - 37.4 g/dL    RDW 13.8 11.6 - 15.1 %    Platelets 198 (L) 581 - 390 Thousands/uL    MPV 10.4 8.9 - 12.7 fL   Comprehensive metabolic panel    Collection Time: 07/26/23  5:28 AM   Result Value Ref Range    Sodium 138 135 - 147 mmol/L    Potassium 4.0 3.5 - 5.3 mmol/L    Chloride 104 96 - 108 mmol/L    CO2 27 21 - 32 mmol/L    ANION GAP 7 mmol/L    BUN 18 5 - 25 mg/dL    Creatinine 1.32 (H) 0.60 - 1.30 mg/dL    Glucose 146 (H) 65 - 140 mg/dL    Calcium 8.1 (L) 8.4 - 10.2 mg/dL    Corrected Calcium 8.8 8.3 - 10.1 mg/dL    AST 14 13 - 39 U/L    ALT 10 7 - 52 U/L    Alkaline Phosphatase 45 34 - 104 U/L    Total Protein 5.6 (L) 6.4 - 8.4 g/dL    Albumin 3.1 (L) 3.5 - 5.0 g/dL    Total Bilirubin 0.69 0.20 - 1.00 mg/dL    eGFR 50 ml/min/1.73sq m   Lipase    Collection Time: 07/26/23  5:28 AM   Result Value Ref Range    Lipase 372 (H) 11 - 82 u/L   Fingerstick Glucose (POCT)    Collection Time: 07/26/23  6:52 AM   Result Value Ref Range    POC Glucose 146 (H) 65 - 140 mg/dl   Fingerstick Glucose (POCT)    Collection Time: 07/26/23 11:24 AM   Result Value Ref Range    POC Glucose 160 (H) 65 - 140 mg/dl   Fingerstick Glucose (POCT)    Collection Time: 07/26/23  5:04 PM   Result Value Ref Range    POC Glucose 194 (H) 65 - 140 mg/dl   Fingerstick Glucose (POCT)    Collection Time: 07/26/23 10:03 PM   Result Value Ref Range    POC Glucose 175 (H) 65 - 140 mg/dl   Fingerstick Glucose (POCT)    Collection Time: 07/26/23 11:50 PM   Result Value Ref Range    POC Glucose 170 (H) 65 - 140 mg/dl   Fingerstick Glucose (POCT)    Collection Time: 07/27/23  5:42 AM   Result Value Ref Range    POC Glucose 170 (H) 65 - 140 mg/dl   Fingerstick Glucose (POCT)    Collection Time: 07/27/23 12:22 PM   Result Value Ref Range    POC Glucose 227 (H) 65 - 140 mg/dl   Fingerstick Glucose (POCT)    Collection Time: 07/27/23  3:55 PM   Result Value Ref Range    POC Glucose 219 (H) 65 - 140 mg/dl   Fingerstick Glucose (POCT)    Collection Time: 07/27/23  9:00 PM   Result Value Ref Range    POC Glucose 166 (H) 65 - 140 mg/dl   Fingerstick Glucose (POCT)    Collection Time: 07/28/23  8:19 AM   Result Value Ref Range    POC Glucose 218 (H) 65 - 140 mg/dl

## 2023-07-28 NOTE — ASSESSMENT & PLAN NOTE
Lab Results   Component Value Date    EGFR 50 07/26/2023    EGFR 46 07/25/2023    EGFR 37 07/24/2023    CREATININE 1.32 (H) 07/26/2023    CREATININE 1.41 (H) 07/25/2023    CREATININE 1.70 (H) 07/24/2023     Monitor kidney function  Outpatient follow-up

## 2023-07-28 NOTE — DISCHARGE SUMMARY
[id number] [facility]  Discharge- [facility] 1940, 82 y.o. male MRN: [id number]  Unit/Bed#: MS Schere[facility]-Marissa Encounter: [id number]  Primary Care Provider: Tricia Leggett MD   Date and time admitted to hospital: 7/24/2023  1:03 PM    * Acute pancreatitis  Assessment & Plan  Patient with features of acute pancreatitis  Abdominal imaging concerning for 7 mm nodule and is recommended endoscopic ultrasound  Pancreatitis clinically and symptomatically improving  Tolerating oral feeds  GI inputs noted with plans for outpatient endoscopic evaluation follow-up      Chest pain  Assessment & Plan  Denies chest pain today  Resolved    Stage 3b chronic kidney disease (CKD) [facility])  Assessment & Plan  Lab Results   Component Value Date    EGFR 50 07/26/2023    EGFR 46 07/25/2023    EGFR 37 07/24/2023    CREATININE 1.32 (H) 07/26/2023    CREATININE 1.41 (H) 07/25/2023    CREATININE 1.70 (H) 07/24/2023     Monitor kidney function  Outpatient follow-up    Coronary artery disease involving native coronary artery without angina pectoris  Assessment & Plan  Continue Plavix, beta-blocker, Ranexa    Mixed hyperlipidemia  Assessment & Plan  Resume statin at discharge    Type 2 diabetes mellitus with kidney complication, with long-term current use of insulin [facility])  Assessment & Plan  Lab Results   Component Value Date    HGBA1C 6.7 (H) 05/10/2023       Recent Labs     07/27/23  1222 07/27/23  1555 07/27/23  2100 07/28/23  0819   POCGLU 227* 219* 166* 218*       Blood Sugar Average: Last 72 hrs:  (P) [id number]     Continue home insulin regimen at discharge  Monitor Accu-Cheks  Outpatient follow-up    (HFpEF) heart failure with preserved ejection fraction (HCC)  Assessment & Plan  Wt Readings from Last 3 Encounters:   07/24/23 109 kg (240 lb)   07/14/23 113 kg (249 lb)   07/07/23 113 kg (249 lb)     2D echo 4/2023 EF 60 to 65%, LVH  Resume home regimen torsemide, lisinopril at discharge  Outpatient cardiology follow-up          HTN (hypertension), benign  Assessment & Plan  Continue clonidine, metoprolol  Resume lisinopril torsemide at discharge given resolving acute pancreatitis episode  Monitor blood pressures outpatient follow-up    PAULA (obstructive sleep apnea)  Assessment & Plan  CPAP compliance encouraged          Discharge Summary - [facility]    Patient Information: Blaze Matamoros 82 y.o. male MRN: [id number]  Unit/Bed#: -01 Encounter: [id number]    Discharging Physician / Practitioner: Will Woo MD  PCP: Heraclio Maya MD  Admission Date: 7/24/2023  Discharge Date: 07/28/23    Disposition:     Home    Reason for Admission: Epigastric pain    Discharge Diagnoses:     Principal Problem:    Acute pancreatitis  Active Problems:    PAULA (obstructive sleep apnea)    HTN (hypertension), benign    (HFpEF) heart failure with preserved ejection fraction ([address])    Type 2 diabetes mellitus with kidney complication, with long-term current use of insulin ([address])    Mixed hyperlipidemia    Coronary artery disease involving native coronary artery without angina pectoris    Venous stasis dermatitis of both lower extremities    Stage 3b chronic kidney disease (CKD) ([address])    Chest pain  Resolved Problems:    * No resolved hospital problems. *      Consultations During Hospital Stay:  · GI    Procedures Performed:     · MRCP abdomen  Overall, the pancreatic duct has a similar appearance to the previous MRCP. Apparent transition/narrowing of the duct at the junction of the pancreatic head and neck appears unchanged. However, there is a questionable 7 mm nodule along the   undersurface of the junction head and neck with some suspected diffusion restriction in this area. EUS is recommended at this time to exclude a small pancreatic mass.  If negative, follow-up MRI/MRCP in no more than 8 weeks would be advised.     2. Peripancreatic edema seen previously has largely resolved with some trace fluid persisting. No organized collection.     3. Negative for biliary obstruction. Hospital Course:     Blaze Matamoros is a 82 y.o. male patient who originally presented to the hospital on 7/24/2023 due to epigastric abdominal pain. Sent with past medical history of pancreatitis presented with epigastric pain elevated lipase. He has history of recurrent pancreatitis evaluate by GI previously and work-up was unremarkable. He underwent MRCP which revealed features concerning for a nodule and an EUS is recommended. Patient was provided with IV fluids and analgesics, supportive cares with clinical and symptomatic improvement of pancreatitis. He is tolerating oral feeds, abdominal pain is resolved, no nausea or vomiting. He is hemodynamically stable symptomatically improved and is deemed ready for discharge today. His chronic conditions remained stable no medication changes at discharge. Kindly review the chart for details.     GI plans for close outpatient follow-up with endoscopic evaluation noted    Condition at Discharge: fair     Discharge Day Visit / Exam:     Subjective:      Sitting up in chair  Reports feeling better  Abdominal pain is resolved  Tolerating oral feeds  Ambulating in the room and hallways  Agreeable to discharge plan  History chart labs medications reviewed      Vitals: Blood Pressure: 139/65 (07/28/23 0759)  Pulse: 61 (07/28/23 0759)  Temperature: 98.6 °F (37 °C) (07/28/23 0759)  Temp Source: Oral (07/28/23 0759)  Respirations: 20 (07/28/23 0759)  Height: 5' 6" (167.6 cm) (07/24/23 1258)  Weight - Scale: 109 kg (240 lb) (07/24/23 1258)  SpO2: 94 % (07/28/23 0759)  Exam:   Physical Exam    Comfortably sitting up in chair  Obese  Short thick neck  Lungs diminished breath sounds at the bases  No additional sounds  Heart sounds S1 and S2 noted  Heart sounds are distant  Abdomen soft nontender  Abdominal obesity noted  Awake and alert obey simple commands  No pedal edema  No rash    Discharge instructions/Information to patient and family:   See after visit summary for information provided to patient and family. Discharge plan discussed with the patient, reports he is keeping his family updated  Outpatient follow-up with GI, primary care physician    Provisions for Follow-Up Care:  See after visit summary for information related to follow-up care and any pertinent home health orders. Planned Readmission: no     Discharge Statement:  I spent 45 minutes discharging the patient. This time was spent on the day of discharge. I had direct contact with the patient on the day of discharge. Greater than 50% of the total time was spent examining patient, answering all patient questions, arranging and discussing plan of care with patient as well as directly providing post-discharge instructions. Additional time then spent on discharge activities. Discharge Medications:  See after visit summary for reconciled discharge medications provided to patient and family.       ** Please Note: This note has been constructed using a voice recognition system **

## 2023-07-28 NOTE — ASSESSMENT & PLAN NOTE
Patient with features of acute pancreatitis  Abdominal imaging concerning for 7 mm nodule and is recommended endoscopic ultrasound  Pancreatitis clinically and symptomatically improving  Tolerating oral feeds  GI inputs noted with plans for outpatient endoscopic evaluation follow-up

## 2023-07-28 NOTE — ASSESSMENT & PLAN NOTE
Continue clonidine, metoprolol  Resume lisinopril torsemide at discharge given resolving acute pancreatitis episode  Monitor blood pressures outpatient follow-up

## 2023-07-31 ENCOUNTER — OFFICE VISIT (OUTPATIENT)
Dept: FAMILY MEDICINE CLINIC | Facility: CLINIC | Age: 83
End: 2023-07-31
Payer: MEDICARE

## 2023-07-31 VITALS
WEIGHT: 238 LBS | TEMPERATURE: 97.6 F | HEIGHT: 66 IN | SYSTOLIC BLOOD PRESSURE: 140 MMHG | HEART RATE: 58 BPM | OXYGEN SATURATION: 95 % | DIASTOLIC BLOOD PRESSURE: 70 MMHG | BODY MASS INDEX: 38.25 KG/M2

## 2023-07-31 DIAGNOSIS — I50.32 CHRONIC HEART FAILURE WITH PRESERVED EJECTION FRACTION (HCC): ICD-10-CM

## 2023-07-31 DIAGNOSIS — Z79.4 TYPE 2 DIABETES MELLITUS WITH STAGE 3A CHRONIC KIDNEY DISEASE, WITH LONG-TERM CURRENT USE OF INSULIN (HCC): ICD-10-CM

## 2023-07-31 DIAGNOSIS — N18.31 TYPE 2 DIABETES MELLITUS WITH STAGE 3A CHRONIC KIDNEY DISEASE, WITH LONG-TERM CURRENT USE OF INSULIN (HCC): ICD-10-CM

## 2023-07-31 DIAGNOSIS — N18.31 STAGE 3A CHRONIC KIDNEY DISEASE (CKD) (HCC): ICD-10-CM

## 2023-07-31 DIAGNOSIS — K21.9 GERD WITHOUT ESOPHAGITIS: ICD-10-CM

## 2023-07-31 DIAGNOSIS — K85.90 ACUTE RECURRENT PANCREATITIS: Primary | ICD-10-CM

## 2023-07-31 DIAGNOSIS — G47.33 OSA (OBSTRUCTIVE SLEEP APNEA): ICD-10-CM

## 2023-07-31 DIAGNOSIS — E11.22 TYPE 2 DIABETES MELLITUS WITH STAGE 3A CHRONIC KIDNEY DISEASE, WITH LONG-TERM CURRENT USE OF INSULIN (HCC): ICD-10-CM

## 2023-07-31 DIAGNOSIS — E78.2 MIXED HYPERLIPIDEMIA: ICD-10-CM

## 2023-07-31 DIAGNOSIS — I10 HTN (HYPERTENSION), BENIGN: ICD-10-CM

## 2023-07-31 PROCEDURE — 99496 TRANSJ CARE MGMT HIGH F2F 7D: CPT

## 2023-07-31 NOTE — PROGRESS NOTES
Assessment & Plan     1. Acute recurrent pancreatitis    2. HTN (hypertension), benign  Assessment & Plan:  Under control. Continue current medication. We will re-evaluate at next office visit. 3. Chronic heart failure with preserved ejection fraction [facility])  Assessment & Plan:  Wt Readings from Last 3 Encounters:   07/31/23 108 kg (238 lb)   07/24/23 109 kg (240 lb)   07/14/23 113 kg (249 lb)     Under control. Continue current medication. Has been followed by a cardiologist  We will re-evaluate at next office visit. 4. Type 2 diabetes mellitus with stage 3a chronic kidney disease, with long-term current use of insulin ([facility])  Assessment & Plan:    Lab Results   Component Value Date    HGBA1C 6.7 (H) 05/10/2023   Under control. Continue current medication. Has been followed by an endocrinologist  We will re-evaluate at next office visit. 5. Stage 3a chronic kidney disease (CKD) ([address])  Assessment & Plan:  Lab Results   Component Value Date    EGFR 50 07/26/2023    EGFR 46 07/25/2023    EGFR 37 07/24/2023    CREATININE 1.32 (H) 07/26/2023    CREATININE 1.41 (H) 07/25/2023    CREATININE 1.70 (H) 07/24/2023   creatinine and GFR stable   Will need periodic BMP  Avoid NSAIDs like ibuprofen Aleve Advil etc.  Avoid high potassium diet. We will continue to monitor           6. Mixed hyperlipidemia  Assessment & Plan:  Under control. Continue current medication. We will re-evaluate at next office visit. 7. GERD without esophagitis  Assessment & Plan:  Under control. Continue current medication. We will re-evaluate at next office visit. 8. PAULA (obstructive sleep apnea)  Assessment & Plan:  Under control with CPAP which patient uses every night and patient wakes up refreshed. Patient does not feel daytime sleepiness or fatigue. He will continue evaluate every office visit. BMI Counseling: Body mass index is 38.41 kg/m².  The BMI is above normal. Nutrition recommendations include decreasing portion sizes, decreasing fast food intake, limiting drinks that contain sugar, moderation in carbohydrate intake, increasing intake of lean protein and reducing intake of saturated and trans fat. Exercise recommendations include vigorous physical activity 75 minutes/week. No pharmacotherapy was ordered. Rationale for BMI follow-up plan is due to patient being overweight or obese. Subjective     Transitional Care Management Review:   Selvin Gibbs is a 82 y.o. male here for TCM follow up. During the TCM phone call patient stated:  TCM Call     Date and time call was made  7/28/2023  1:32 PM    Hospital care reviewed  Records reviewed    Patient was hospitialized at  [address]    Date of Admission  07/25/23    Date of discharge  07/28/23    Diagnosis  Acute pancreatitis    Disposition  Home    Were the patients medications reviewed and updated  Yes    Current Symptoms  None      TCM Call     Post hospital issues  None    Scheduled for follow up? Yes    Did you obtain your prescribed medications  Yes    Do you need help managing your prescriptions or medications  No    Is transportation to your appointment needed  No    I have advised the patient to call PCP with any new or worsening symptoms  Ene Perez MA        Patient here for transitional care management patient was admitted with acute recurrent pancreatitis from July 24, 2023 to July 28, 2023 all the medical records reviewed patient is supposed to be scheduled for endoscopic ultrasound in near future. Patient had MRCP which reviewed with the patient since he came out of the hospital he is feeling much better. Denies any chest pain, shortness of breath, abdominal pain, nausea, vomiting, fever or chills. No diarrhea or constipation. No blood in stool or black stool. No tingling numbness or weakness. No lightheadedness or dizziness. No other specific complaints.     Review of Systems Constitutional: Negative for chills and fever. HENT: Negative for congestion, ear pain and sore throat. Eyes: Negative for pain and visual disturbance. Respiratory: Negative for cough and shortness of breath. Cardiovascular: Negative for chest pain and palpitations. Gastrointestinal: Negative for abdominal pain and vomiting. Endocrine: Negative for cold intolerance, heat intolerance, polydipsia, polyphagia and polyuria. Genitourinary: Negative for difficulty urinating, dysuria, frequency and hematuria. Musculoskeletal: Negative for arthralgias and back pain. Skin: Negative for color change and rash. Neurological: Negative for dizziness, seizures, syncope, light-headedness and headaches. Psychiatric/Behavioral: Negative for agitation and behavioral problems. All other systems reviewed and are negative. Objective     /70 (BP Location: Left arm, Patient Position: Sitting, Cuff Size: Adult)   Pulse 58   Temp 97.6 °F (36.4 °C) (Tympanic)   Ht 5' 6" (1.676 m)   Wt 108 kg (238 lb)   SpO2 95%   BMI 38.41 kg/m²      Physical Exam  Vitals and nursing note reviewed. Constitutional:       General: He is not in acute distress. Appearance: Normal appearance. He is well-developed. He is obese. He is not ill-appearing, toxic-appearing or diaphoretic. HENT:      Head: Normocephalic and atraumatic. Right Ear: Tympanic membrane normal. There is no impacted cerumen. Left Ear: Tympanic membrane normal. There is no impacted cerumen. Nose: Nose normal. No congestion or rhinorrhea. Mouth/Throat:      Mouth: Mucous membranes are moist.      Pharynx: Oropharynx is clear. Eyes:      General: No scleral icterus. Right eye: No discharge. Left eye: No discharge. Extraocular Movements: Extraocular movements intact. Conjunctiva/sclera: Conjunctivae normal.      Pupils: Pupils are equal, round, and reactive to light.    Cardiovascular:      Rate and Rhythm: Normal rate and regular rhythm. Pulses: Normal pulses. Heart sounds: Normal heart sounds. No murmur heard. Pulmonary:      Effort: Pulmonary effort is normal. No respiratory distress. Breath sounds: Normal breath sounds. No wheezing. Abdominal:      General: Abdomen is flat. Bowel sounds are normal. There is no distension. Palpations: Abdomen is soft. Tenderness: There is no abdominal tenderness. There is no right CVA tenderness or left CVA tenderness. Musculoskeletal:         General: Normal range of motion. Cervical back: Normal range of motion and neck supple. Right lower leg: No edema. Left lower leg: No edema. Skin:     General: Skin is warm and dry. Capillary Refill: Capillary refill takes 2 to 3 seconds. Coloration: Skin is not jaundiced. Neurological:      General: No focal deficit present. Mental Status: He is alert and oriented to person, place, and time. Mental status is at baseline. Motor: No weakness.    Psychiatric:         Mood and Affect: Mood normal.         Behavior: Behavior normal.       Medications have been reviewed by provider in current encounter    Elliot Vidal MD

## 2023-07-31 NOTE — ASSESSMENT & PLAN NOTE
Lab Results   Component Value Date    HGBA1C 6.7 (H) 05/10/2023   Under control. Continue current medication. Has been followed by an endocrinologist  We will re-evaluate at next office visit.

## 2023-07-31 NOTE — ASSESSMENT & PLAN NOTE
Wt Readings from Last 3 Encounters:   07/31/23 108 kg (238 lb)   07/24/23 109 kg (240 lb)   07/14/23 113 kg (249 lb)     Under control. Continue current medication. Has been followed by a cardiologist  We will re-evaluate at next office visit.

## 2023-07-31 NOTE — ASSESSMENT & PLAN NOTE
Lab Results   Component Value Date    EGFR 50 07/26/2023    EGFR 46 07/25/2023    EGFR 37 07/24/2023    CREATININE 1.32 (H) 07/26/2023    CREATININE 1.41 (H) 07/25/2023    CREATININE 1.70 (H) 07/24/2023   creatinine and GFR stable   Will need periodic BMP  Avoid NSAIDs like ibuprofen Aleve Advil etc.  Avoid high potassium diet.   We will continue to monitor

## 2023-08-03 ENCOUNTER — CLINICAL SUPPORT (OUTPATIENT)
Dept: NUTRITION | Facility: HOSPITAL | Age: 83
End: 2023-08-03
Attending: INTERNAL MEDICINE
Payer: MEDICARE

## 2023-08-03 ENCOUNTER — NURSE TRIAGE (OUTPATIENT)
Age: 83
End: 2023-08-03

## 2023-08-03 VITALS — WEIGHT: 238 LBS | BODY MASS INDEX: 38.41 KG/M2

## 2023-08-03 DIAGNOSIS — E11.22 TYPE 2 DIABETES MELLITUS WITH STAGE 3 CHRONIC KIDNEY DISEASE, WITH LONG-TERM CURRENT USE OF INSULIN, UNSPECIFIED WHETHER STAGE 3A OR 3B CKD (HCC): ICD-10-CM

## 2023-08-03 DIAGNOSIS — Z79.4 TYPE 2 DIABETES MELLITUS WITH STAGE 3 CHRONIC KIDNEY DISEASE, WITH LONG-TERM CURRENT USE OF INSULIN, UNSPECIFIED WHETHER STAGE 3A OR 3B CKD (HCC): ICD-10-CM

## 2023-08-03 DIAGNOSIS — K85.30 DRUG-INDUCED ACUTE PANCREATITIS WITHOUT INFECTION OR NECROSIS: ICD-10-CM

## 2023-08-03 DIAGNOSIS — N18.30 TYPE 2 DIABETES MELLITUS WITH STAGE 3 CHRONIC KIDNEY DISEASE, WITH LONG-TERM CURRENT USE OF INSULIN, UNSPECIFIED WHETHER STAGE 3A OR 3B CKD (HCC): ICD-10-CM

## 2023-08-03 PROCEDURE — 97802 MEDICAL NUTRITION INDIV IN: CPT

## 2023-08-03 NOTE — TELEPHONE ENCOUNTER
Please call and inform patient office will be reaching out to schedule follow up appointment. If pain becomes severe he should go to emergency room for further evaluation due to recent pancreatitis. Patient has pancreatitis and may continue with intermitted upper abdomen discomfort and bloating. He should eat a low fat diet, small meals and may take tylenol as needed for discomfort. Continue Omeprazole. He needs to have a EUS for further evaluation this can be set up at follow up appointment.  Thank you

## 2023-08-03 NOTE — TELEPHONE ENCOUNTER
Please call and schedule patient for follow up appointment for pancreatitis was seen in hospital and needs EUS scheduled as outpatient.  Thank you

## 2023-08-03 NOTE — PROGRESS NOTES
Nutrition Assessment Form    Patient Name: Cramen Gutierrez    YOB: 1940    Sex: Male     Assessment Date: 8/3/2023  Start Time: 12:35 pm Stop Time: 1:40 pm Total Minutes: 65     Data:  Present at session: PT    Parent/Patient Concerns/reason for visit: "everything is so vague"   Medical Dx/Reason for Referral: E11.22, N18.30, Z79.4 (ICD-10-CM) - Type 2 diabetes mellitus with stage 3 chronic kidney disease, with long-term current use of insulin, unspecified whether stage 3a or 3b CKD ([address] )    Provider: Alric Room MD MEDICARE  After this appointment pt has 2 hours worth of MNT for MEDICARE   Past Medical History:   Diagnosis Date   • Allergic    • Arthritis    • Chronic kidney disease 2021   • Chronic kidney disease (CKD) stage G3a/A1, moderately decreased glomerular filtration rate (GFR) between 45-59 mL/min/1.73 square meter and albuminuria creatinine ratio less than 30 mg/g ([facility])    • Colon polyp    • Diabetes mellitus ([address])    • GERD (gastroesophageal reflux disease)    • Heart murmur    • History of echocardiogram    • HL (hearing loss)    • Hyperlipidemia    • Hypertension    • Obesity    • Sleep apnea, obstructive        Current Outpatient Medications   Medication Sig Dispense Refill   • acetaminophen (TYLENOL) 325 mg tablet Take 2 tablets (650 mg total) by mouth every 6 (six) hours as needed for mild pain  0   • allopurinol (ZYLOPRIM) 100 mg tablet Take 100 mg by mouth daily     • BD Pen Needle Pascale U/F 32G X 4 MM MISC USE AS INSTRUCTED WITH INSULIN PEN     • calcium carbonate (OS-WAYLON) 600 MG tablet Take 1,200 mg by mouth daily     • cholecalciferol (VITAMIN D3) 1,000 units tablet Take 2,000 Units by mouth daily     • cloNIDine (CATAPRES) 0.1 mg tablet Take 1 tablet (0.1 mg total) by mouth every 12 (twelve) hours     • clopidogrel (PLAVIX) 75 mg tablet Take 1 tablet (75 mg total) by mouth daily Instructed to stop prior to Surgery-Last dose 6/21/23 Do not start before June 29, 2023. 0   • Fexofenadine HCl (MUCINEX ALLERGY PO) Take by mouth     • fluticasone (FLONASE) 50 mcg/act nasal spray 2 sprays into each nostril daily 48 mL 0   • insulin aspart (NovoLOG FlexPen) 100 UNIT/ML injection pen Inject under the skin 3 (three) times a day with meals Sliding scale as ordered TID with meals     • Lancets (OneTouch Delica Plus [id number]) MISC TEST GLUCOSE 3 4 TIMES/DAY     • Levemir FlexTouch 100 units/mL injection pen 36 Units daily In AM     • lisinopril (ZESTRIL) 40 mg tablet Take 1 tablet (40 mg total) by mouth daily 90 tablet 1   • metoprolol succinate (TOPROL-XL) 25 mg 24 hr tablet Take 1 tablet (25 mg total) by mouth daily 90 tablet 0   • Multiple Vitamins-Minerals (MULTIVITAMIN ADULT PO) Take by mouth daily     • omeprazole (PriLOSEC) 40 MG capsule Take 1 capsule (40 mg total) by mouth daily (Patient taking differently: Take 40 mg by mouth daily before breakfast) 180 capsule 0   • Pseudoephedrine HCl (SUDAFED PO) Take by mouth     • pyridoxine (VITAMIN B6) 100 mg tablet Take 100 mg by mouth daily     • ranolazine (RANEXA) 500 mg 12 hr tablet Take 1 tablet (500 mg total) by mouth 2 (two) times a day 180 tablet 0   • rosuvastatin (CRESTOR) 20 MG tablet Take 1 tablet by mouth daily     • tamsulosin (FLOMAX) 0.4 mg Take 2 capsules (0.8 mg total) by mouth daily 180 capsule 1   • torsemide (DEMADEX) 10 mg tablet Take 10 mg by mouth       No current facility-administered medications for this visit.         Additional Meds/Supplements: Vitamin D3, Colace, Bitamin 6, Probiotic,    Special Learning Needs/barriers to learning/any new barriers N/A   Height: 5'6" HC Readings from Last 5 Encounters:   No data found for [facility], [facility].      Weight: 238# Wt Readings from Last 10 Encounters:   07/31/23 108 kg (238 lb)   07/24/23 109 kg (240 lb)   07/14/23 113 kg (249 lb)   07/07/23 113 kg (249 lb)   06/28/23 112 kg (246 lb 6.4 oz)   06/26/23 111 kg (245 lb)   06/21/23 112 kg (246 lb 0.2 oz)   06/16/23 113 kg (248 lb 9.6 oz) 23 112 kg (247 lb 1.8 oz)   23 111 kg (244 lb)     Estimated body mass index is 38.41 kg/m² as calculated from the following:    Height as of 23: 5' 6" (1.676 m). Weight as of 23: 108 kg (238 lb). Recent Weight Change: [x]Yes     []No  Amount:  -11# x 1 mo, 4.4% not significant       Energy Needs: 1613 kcal (25 g/kg IBW 64.5 kg)  52-65 g protein (0.8-1.0 g/lkg IBW  1613 mL fluids ( 1 mL/kcal)     Allergies   Allergen Reactions   • Januvia [Sitagliptin] Other (See Comments)     pancreatitis   • Iodinated Contrast Media Other (See Comments)     Per pt, "got real hot feeling"   • Semaglutide Other (See Comments)     pancreatits   • Simvastatin Myalgia   • Trulicity [Dulaglutide] Other (See Comments)     Pancreatitis   • Wound Dressing Adhesive Other (See Comments)     Burning sensation    or intolerances NKFA   Social History     Substance and Sexual Activity   Alcohol Use Not Currently   • Alcohol/week: 5.0 standard drinks of alcohol   • Types: 5 Glasses of wine per week    Comment: no alcohol since March    Doesn't drink N/A   Social History     Tobacco Use   Smoking Status Former   • Packs/day: 2.00   • Years: 15.00   • Total pack years: 30.00   • Types: Cigarettes, Pipe, Cigars   • Quit date: 1972   • Years since quittin.7   • Passive exposure: Past   Smokeless Tobacco Never       Who shops? patient   Who cooks/cooking methods/Eating out/take out habits   patient  Every Friday-[employer] with breakfast    Exercise: Take out at least daily most days     Other: ie: Sleep habits/ stress level/ work habits household-lives with ?/ food security C-pap nightly    Prior Nutritional Counseling?  [x]Yes     []No  When: Years ago     Why: DM        Diet Hx:  Breakfast: 2 pieces of toast with cream cheese, 2gg   Lunch: Cheese sandwich + side         Dinner: Meat, veg, starch         Snacks: AM - juice (not daily)  PM - dessert  HS - dessert   Other Notes/ Initial Assessment:    Pt refusing to sign medicare form-states its not necessary. Reviewed in detail Medicare information: 1st year 3 hrs are covered, 2nd year and on 2 hrs are covered. Discussed new referral is needed every calendar year-pt challenged this saying he does not need one but he will see if his dr will give him a new one in 2024. Medical issues: HTN, Hiatal hernia, Pancreatitis, T2DM, Acid reflux, Kidney disease. Pt has to have a procedure to check on a note in his pancrease. Household: Pt & Wife. Wife has MS so it is over pt who cooks/shops. Pt with multiple recent hospital admissions. This writer met with pt during an inpatient stay at this location ~2mo ago. Pt states pancreatitis is the main issue right now, DM being the 2nd. PT states he has been told to have a low-fat die tbut he isnt sure what that means, how much he can have in a day. Discussed that according to the national pancreatic association intake is ~30-50g total fat/day, 10% of total kcal from saturated fat. Also reviewed estimated needs for kcal, protein, carbohydrate, fluids. Pt states he wants to find a happy medium between eating healthy but not giving up all of the foods he loves-including dessert with every lunch and dinner. Gave pt portion book and started to go over carbohydrate counting but then pt stated he doesn't need to do that-he plans to look at labels and keep fat within the suggested range.  Much of pt;s appointment was pt leading the conversation discussing his thoughts and feelings about medical care and related topics including pt feeling his prescribed insulin level is too high-he knows people who take less so why cant he, A1C has been trending upward since 10/22 but he feels as long as it is <7.0 it is fine, etc.     F/U scheduled 11/2023               Nutrition Diagnosis:   Altered Nutrition-Related Laboratory values  related to T2DM dx as evidenced by  Elevated BG & A1C       Any change or new dx since previous visit:     Nutrition Diagnosis:         Medical Nutrition Therapy Intervention:  [x]Individualized Meal Plan  1613 kcal (25 g/kg IBW 64.5 kg)  52-65 g protein (0.8-1.0 g/lkg IBW  1613 mL fluids ( 1 mL/kcal)     [x]Understanding Lab Values: A1C, CMP, CBC, BG   []Basic Pathophysiology of Disease []Food/Medication Interactions   []Food Diary [x]Exercise: recommend 150 minutes a week   [x]Lifestyle/Behavior Modification Techniques: carbohydrate counting, balanced meals, low-fat diet []Medication, Mechanism of Action   [x]Label Reading: CHO/ Na/ Fat/ other_________: Fat, CHO, Na, added sugar, saturated fat []Self Blood Glucose Monitoring   [x]Weight/BMI Goals: gain/lose/maintain: No goal wt at this time []Other -           Comprehension: []Excellent  []Very Good  [x]Good  []Fair   []Poor    Receptivity: []Excellent  []Very Good  [x]Good  []Fair   []Poor    Expected Compliance: []Excellent  []Very Good  [x]Good  []Fair   []Poor        Goals (initial)  1.  Keep fat intake between 30-50 g/day   2.   3.       Labs:  CMP  Lab Results   Component Value Date    K 4.0 07/26/2023     07/26/2023    CO2 27 07/26/2023    BUN 18 07/26/2023    CREATININE 1.32 (H) 07/26/2023    GLUF 96 07/24/2023    CALCIUM 8.1 (L) 07/26/2023    CORRECTEDCA 8.8 07/26/2023    AST 14 07/26/2023    ALT 10 07/26/2023    ALKPHOS 45 07/26/2023    EGFR 50 07/26/2023       BMP  Lab Results   Component Value Date    CALCIUM 8.1 (L) 07/26/2023    K 4.0 07/26/2023    CO2 27 07/26/2023     07/26/2023    BUN 18 07/26/2023    CREATININE 1.32 (H) 07/26/2023       Lipids  No results found for: "CHOL"  Lab Results   Component Value Date    HDL 53 07/07/2023    HDL 32 (L) 06/11/2023     Lab Results   Component Value Date    LDLCALC 47 07/07/2023    LDLCALC 52 06/11/2023     Lab Results   Component Value Date    TRIG 118 07/07/2023    TRIG 143 06/11/2023     No results found for: "CHOLHDL"    Hemoglobin A1C  Lab Results   Component Value Date    HGBA1C 6.7 (H) 05/10/2023       Fasting Glucose  Lab Results   Component Value Date    GLUF 96 07/24/2023       Insulin     Thyroid  No results found for: "TSH", "W5JVHGR", "M8TFKOC", "THYROIDAB"    Hepatic Function Panel  Lab Results   Component Value Date    ALT 10 07/26/2023    AST 14 07/26/2023    ALKPHOS 45 07/26/2023       Celiac Disease Antibody Panel  No results found for: "ENDOMYSIAL IGA", "GLIADIN IGA", "GLIADIN IGG", "IGA", "TISSUE TRANSGLUT AB", "TTG IGA"   Iron  No results found for: "IRON", "TIBC", "FERRITIN"         Arlina Dakins, RD LDN  [id number]  [address] [place] [address]

## 2023-08-03 NOTE — TELEPHONE ENCOUNTER
Patient called in, reports he is having RUQ discomfort and a lot of bloating after eating- feels full quick since d/c from hospital on Friday. He also says he will have occasion "head spinning" when I asked if he felt the room spinning or dizziness he said no just feels weird. He is having daily BM, no n/v     Please advise, patient also needs EUS scheduled.

## 2023-08-07 ENCOUNTER — TELEPHONE (OUTPATIENT)
Dept: GASTROENTEROLOGY | Facility: CLINIC | Age: 83
End: 2023-08-07

## 2023-08-07 ENCOUNTER — OFFICE VISIT (OUTPATIENT)
Dept: GASTROENTEROLOGY | Facility: CLINIC | Age: 83
End: 2023-08-07
Payer: MEDICARE

## 2023-08-07 VITALS
HEIGHT: 66 IN | WEIGHT: 240 LBS | DIASTOLIC BLOOD PRESSURE: 55 MMHG | HEART RATE: 72 BPM | BODY MASS INDEX: 38.57 KG/M2 | SYSTOLIC BLOOD PRESSURE: 96 MMHG

## 2023-08-07 DIAGNOSIS — K85.90 ACUTE RECURRENT PANCREATITIS: Primary | ICD-10-CM

## 2023-08-07 DIAGNOSIS — Z90.49 HISTORY OF CHOLECYSTECTOMY: ICD-10-CM

## 2023-08-07 DIAGNOSIS — Q45.3 PANCREATIC DUCTAL ABNORMALITY: ICD-10-CM

## 2023-08-07 PROCEDURE — 99214 OFFICE O/P EST MOD 30 MIN: CPT | Performed by: NURSE PRACTITIONER

## 2023-08-07 RX ORDER — GLIPIZIDE 10 MG/1
TABLET ORAL
Status: ON HOLD | COMMUNITY
Start: 2023-08-03

## 2023-08-07 NOTE — PROGRESS NOTES
[facility] - Outpatient Follow-up Note  Roxy Blount 82 y.o. male MRN: [id number]  Encounter: [id number]          ASSESSMENT AND PLAN:      1. Acute recurrent pancreatitis  2. Pancreatic ductal abnormality  Etiology of recurrent pancreatitis unclear, cannot exclude pancreatic ductal stricture/ or malignancy, no evidence of autoimmune pancreatitis and previous work-up. He is s/p cholecystectomy 6/28/23, denies any alcohol use or drug use. MRI MRCP showing the pancreatic duct has a similar appearance to the previous MRCP. Apparent transition/narrowing of the duct at the junction of the pancreatic head and neck appears unchanged. However, there is a questionable 7 mm nodule along the undersurface of the junction head and neck with some suspected diffusion restriction in this area. EUS is recommended at this time to exclude a small pancreatic mass. EUS scheduled for 8/22 with Dr. Nohelia Garcia. Will obtain clearance to hold Plavix prior to procedure. -     Endoscopic ultrasonography, GI (Upper); Future; Expected date: 08/07/2023      3. History of cholecystectomy  S/p cholecystectomy 6/28/23      ______________________________________________________________________    SUBJECTIVE:  Roxy Blount is a 82 y.o. male with hx DM, CAD on Plavix, CKD, hearing loss, cholecystectomy, and recurrent pancreatitis here for hospital follow up for recurrent pancreatitis. Prior episode in 2017 attributed to [address] [place] [address] Blvd, he had recent cholecystectomy 6/28 & continues with recurrent pancreatitis (2x since surgery). Has PD narrowing/transition of the duct at the junction of the pancreatic head/neck & questionable 7mm nodule along undersurface of junction of the head/neck on MRI/MRCP. Denies any weight loss, diarrhea, heartburn, dysphagia, black/bloody stool. He saw a nutritionist recently, learning how to eat smaller portions. Quit smoking 1972, denies alcohol use or drug use.    Denies any family history of pancreatic abnormality     Has a cardiac murmur, history of CAD with stent placement. To establish care soon with new cardiologist Dr. Ger Chaidez. REVIEW OF SYSTEMS IS OTHERWISE NEGATIVE.       Historical Information   Past Medical History:   Diagnosis Date   • Allergic    • Arthritis    • Chronic kidney disease    • Chronic kidney disease (CKD) stage G3a/A1, moderately decreased glomerular filtration rate (GFR) between 45-59 mL/min/1.73 square meter and albuminuria creatinine ratio less than 30 mg/g (HCC)    • Colon polyp    • Diabetes mellitus (HCC)    • GERD (gastroesophageal reflux disease)    • Heart murmur    • History of echocardiogram    • HL (hearing loss)    • Hyperlipidemia    • Hypertension    • Obesity    • Pancreatitis    • Sleep apnea, obstructive      Past Surgical History:   Procedure Laterality Date   • CARDIAC CATHETERIZATION     • COLONOSCOPY  2018   • COLONOSCOPY     • EYE SURGERY     • HEMORRHOID SURGERY     • JOINT REPLACEMENT      knee   • OTHER SURGICAL HISTORY      Stent    • MO LAPAROSCOPY SURG CHOLECYSTECTOMY N/A 2023    Procedure: CHOLECYSTECTOMY LAPAROSCOPIC;  Surgeon: Chandra Cardoza MD;  Location:  MAIN OR;  Service: General   • UPPER GASTROINTESTINAL ENDOSCOPY       Social History   Social History     Substance and Sexual Activity   Alcohol Use Yes   • Alcohol/week: 5.0 standard drinks of alcohol   • Types: 5 Glasses of wine per week    Comment: no alcohol since March     Social History     Substance and Sexual Activity   Drug Use Never     Social History     Tobacco Use   Smoking Status Former   • Packs/day: 2.00   • Years: 15.00   • Total pack years: 30.00   • Types: Cigarettes, Pipe, Cigars   • Quit date: 1972   • Years since quittin.7   • Passive exposure: Past   Smokeless Tobacco Never     Family History   Problem Relation Age of Onset   • Heart disease Mother    • Heart attack Mother         46s   • Cancer Mother    • Coronary artery disease Mother    • Cancer Brother         Malignant tumor of lung       Meds/Allergies       Current Outpatient Medications:   •  acetaminophen (TYLENOL) 325 mg tablet  •  allopurinol (ZYLOPRIM) 100 mg tablet  •  BD Pen Needle Pascale U/F 32G X 4 MM MISC  •  calcium carbonate (OS-WAYLON) 600 MG tablet  •  cholecalciferol (VITAMIN D3) 1,000 units tablet  •  cloNIDine (CATAPRES) 0.1 mg tablet  •  clopidogrel (PLAVIX) 75 mg tablet  •  Fexofenadine HCl (MUCINEX ALLERGY PO)  •  fluticasone (FLONASE) 50 mcg/act nasal spray  •  glipiZIDE (GLUCOTROL) 10 mg tablet  •  insulin aspart (NovoLOG FlexPen) 100 UNIT/ML injection pen  •  Lancets (OneTouch Delica Plus [id number]) MISC  •  Levemir FlexTouch 100 units/mL injection pen  •  lisinopril (ZESTRIL) 40 mg tablet  •  metoprolol succinate (TOPROL-XL) 25 mg 24 hr tablet  •  Multiple Vitamins-Minerals (MULTIVITAMIN ADULT PO)  •  omeprazole (PriLOSEC) 40 MG capsule  •  Pseudoephedrine HCl (SUDAFED PO)  •  pyridoxine (VITAMIN B6) 100 mg tablet  •  ranolazine (RANEXA) 500 mg 12 hr tablet  •  tamsulosin (FLOMAX) 0.4 mg  •  torsemide (DEMADEX) 10 mg tablet  •  rosuvastatin (CRESTOR) 20 MG tablet    Allergies   Allergen Reactions   • Januvia [Sitagliptin] Other (See Comments)     pancreatitis   • Iodinated Contrast Media Other (See Comments)     Per pt, "got real hot feeling"   • Semaglutide Other (See Comments)     pancreatits   • Simvastatin Myalgia   • Trulicity [Dulaglutide] Other (See Comments)     Pancreatitis   • Wound Dressing Adhesive Other (See Comments)     Burning sensation           Objective     Blood pressure 96/55, pulse 72, height 5' 6" (1.676 m), weight 109 kg (240 lb). Body mass index is 38.74 kg/m². PHYSICAL EXAM:      General Appearance:   Alert, cooperative, no distress   HEENT:   Normocephalic, atraumatic, anicteric.      Neck:  Supple, symmetrical, trachea midline   Lungs:   Clear to auscultation bilaterally; no rales, rhonchi or wheezing; respirations unlabored Heart[de-identified]   Regular rate and rhythm; no murmur. Abdomen:   Soft, non-tender, non-distended; normal bowel sounds; no masses, no organomegaly    Genitalia:   Deferred    Rectal:   Deferred    Extremities:  No cyanosis, clubbing or edema    Skin:  No jaundice, rashes, or lesions    Lymph nodes:  No palpable cervical lymphadenopathy        Lab Results:   No visits with results within 1 Day(s) from this visit.    Latest known visit with results is:   Admission on 07/24/2023, Discharged on 07/28/2023   Component Date Value   • POC Glucose 07/24/2023 68    • POC Glucose 07/24/2023 118    • POC Glucose 07/24/2023 73    • POC Glucose 07/24/2023 75    • POC Glucose 07/25/2023 61 (L)    • POC Glucose 07/25/2023 152 (H)    • Sodium 07/25/2023 141    • Potassium 07/25/2023 4.0    • Chloride 07/25/2023 105    • CO2 07/25/2023 28    • ANION GAP 07/25/2023 8    • BUN 07/25/2023 17    • Creatinine 07/25/2023 1.41 (H)    • Glucose 07/25/2023 111    • Calcium 07/25/2023 8.5    • eGFR 07/25/2023 46    • Magnesium 07/25/2023 1.9    • Phosphorus 07/25/2023 3.6    • Total Bilirubin 07/25/2023 0.64    • Bilirubin, Direct 07/25/2023 0.11    • Alkaline Phosphatase 07/25/2023 49    • AST 07/25/2023 16    • ALT 07/25/2023 12    • Total Protein 07/25/2023 5.9 (L)    • Albumin 07/25/2023 3.3 (L)    • WBC 07/25/2023 5.79    • RBC 07/25/2023 3.74 (L)    • Hemoglobin 07/25/2023 11.9 (L)    • Hematocrit 07/25/2023 36.0 (L)    • MCV 07/25/2023 96    • MCH 07/25/2023 31.8    • MCHC 07/25/2023 33.1    • RDW 07/25/2023 14.2    • MPV 07/25/2023 10.7    • Platelets 16/36/5572 169    • nRBC 07/25/2023 0    • Neutrophils Relative 07/25/2023 63    • Immat GRANS % 07/25/2023 0    • Lymphocytes Relative 07/25/2023 23    • Monocytes Relative 07/25/2023 12    • Eosinophils Relative 07/25/2023 2    • Basophils Relative 07/25/2023 0    • Neutrophils Absolute 07/25/2023 3.60    • Immature Grans Absolute 07/25/2023 0.02    • Lymphocytes Absolute 07/25/2023 1.34 • Monocytes Absolute 07/25/2023 0.71    • Eosinophils Absolute 07/25/2023 0.10    • Basophils Absolute 07/25/2023 0.02    • POC Glucose 07/25/2023 116    • Lipase 07/25/2023 1,079 (H)    • hs TnI 0hr 07/25/2023 12    • POC Glucose 07/25/2023 134    • hs TnI 2hr 07/25/2023 12    • Delta 2hr hsTnI 07/25/2023 0    • POC Glucose 07/25/2023 163 (H)    • POC Glucose 07/26/2023 152 (H)    • WBC 07/26/2023 6.47    • RBC 07/26/2023 3.57 (L)    • Hemoglobin 07/26/2023 11.3 (L)    • Hematocrit 07/26/2023 35.2 (L)    • MCV 07/26/2023 99 (H)    • MCH 07/26/2023 31.7    • MCHC 07/26/2023 32.1    • RDW 07/26/2023 13.8    • Platelets 91/34/1040 148 (L)    • MPV 07/26/2023 10.4    • Sodium 07/26/2023 138    • Potassium 07/26/2023 4.0    • Chloride 07/26/2023 104    • CO2 07/26/2023 27    • ANION GAP 07/26/2023 7    • BUN 07/26/2023 18    • Creatinine 07/26/2023 1.32 (H)    • Glucose 07/26/2023 146 (H)    • Calcium 07/26/2023 8.1 (L)    • Corrected Calcium 07/26/2023 8.8    • AST 07/26/2023 14    • ALT 07/26/2023 10    • Alkaline Phosphatase 07/26/2023 45    • Total Protein 07/26/2023 5.6 (L)    • Albumin 07/26/2023 3.1 (L)    • Total Bilirubin 07/26/2023 0.69    • eGFR 07/26/2023 50    • Lipase 07/26/2023 372 (H)    • POC Glucose 07/26/2023 149 (H)    • POC Glucose 07/26/2023 146 (H)    • POC Glucose 07/26/2023 160 (H)    • POC Glucose 07/26/2023 194 (H)    • POC Glucose 07/26/2023 175 (H)    • POC Glucose 07/26/2023 170 (H)    • POC Glucose 07/27/2023 170 (H)    • POC Glucose 07/27/2023 227 (H)    • POC Glucose 07/27/2023 219 (H)    • POC Glucose 07/27/2023 166 (H)    • POC Glucose 07/28/2023 218 (H)          Radiology Results:   MRI abdomen w wo contrast and mrcp    Result Date: 7/25/2023  Narrative: MRI OF THE ABDOMEN WITH AND WITHOUT CONTRAST WITH MRCP INDICATION:  Pancreatic duct demonstrated some focal narrowing at the junction of the head and neck region which may be related to artifact and/or patient's pancreatitis per previous MRI report. A small stricture or ductal nodule was felt difficult to exclude. COMPARISON: MRI/MRCP 6/10/2023, CT abdomen pelvis 6/7/2023 TECHNIQUE:  Multiplanar/multisequence MRI of the abdomen with 3D MRCP was performed before and after administration of contrast. Pre- and postcontrast subtraction images were also obtained. IV Contrast:  10 mL of Gadobutrol injection (SINGLE-DOSE) FINDINGS: The study is mildly limited by respiratory motion. LOWER CHEST:   Unremarkable. LIVER: Normal in size and configuration. No suspicious mass. The hepatic veins and portal veins are patent. BILE DUCTS: No intrahepatic or extrahepatic bile duct dilation. Common bile duct is normal in caliber. No choledocholithiasis, biliary stricture or obstructing biliary mass. GALLBLADDER: Surgically absent. PANCREAS: Overall, the pancreatic duct has a similar appearance to the previous MRCP. Apparent transition/narrowing of the duct at the junction of the pancreatic head and neck as before. The duct immediately upstream from the transition remains similar in caliber at about 3 mm while the pancreatic duct in the distal body and tail is normal caliber. There is relative diminished T1 signal seen at the junction of the head and neck. There is a questionable 7 mm hypoenhancing focus along the medial undersurface head/neck junction abutting the anterior portal vein (series 11 image 66). There is suggestion of some residual diffusion restriction in this region (series 3 image 17) and perhaps subtle increase in enhancement on delayed imaging (series 13 image 67). Delayed enhancement is more prominent in this region than the prior study. Mild background atrophy/fatty involution particularly in the head. Peripancreatic edema seen previously has largely resolved with some trace fluid persisting. No organized collection. ADRENAL GLANDS:  ormal. SPLEEN:  ormal. KIDNEYS/PROXIMAL URETERS:  o hydroureteronephrosis. o suspicious renal mass. Multiple bilateral renal cysts. Nonspecific bilateral perinephric edema. BOWEL:   No dilated loops of bowel. Colonic diverticulosis. PERITONEUM/RETROPERITONEUM:  o ascites. LYMPH NODES:  o abdominal lymphadenopathy. VASCULAR STRUCTURES:  o aneurysm. ABDOMINAL WALL:  nremarkable. OSSEOUS STRUCTURES:  No suspicious osseous lesion. Degenerative disc disease L5-S1. Impression: 1. Overall, the pancreatic duct has a similar appearance to the previous MRCP. Apparent transition/narrowing of the duct at the junction of the pancreatic head and neck appears unchanged. However, there is a questionable 7 mm nodule along the undersurface of the junction head and neck with some suspected diffusion restriction in this area. EUS is recommended at this time to exclude a small pancreatic mass. If negative, follow-up MRI/MRCP in no more than 8 weeks would be advised. 2. Peripancreatic edema seen previously has largely resolved with some trace fluid persisting. No organized collection. 3. Negative for biliary obstruction. Additional incidental findings as above. The study was marked in [facility] for immediate notification and follow-up.  Workstation performed: [id number]

## 2023-08-14 ENCOUNTER — TELEPHONE (OUTPATIENT)
Dept: FAMILY MEDICINE CLINIC | Facility: CLINIC | Age: 83
End: 2023-08-14

## 2023-08-15 DIAGNOSIS — I25.10 CORONARY ARTERY DISEASE INVOLVING NATIVE CORONARY ARTERY OF NATIVE HEART WITHOUT ANGINA PECTORIS: ICD-10-CM

## 2023-08-15 DIAGNOSIS — J30.1 SEASONAL ALLERGIC RHINITIS DUE TO POLLEN: ICD-10-CM

## 2023-08-15 RX ORDER — FLUTICASONE PROPIONATE 50 MCG
2 SPRAY, SUSPENSION (ML) NASAL DAILY
Qty: 48 ML | Refills: 0 | Status: SHIPPED | OUTPATIENT
Start: 2023-08-15

## 2023-08-15 RX ORDER — METOPROLOL SUCCINATE 25 MG/1
25 TABLET, EXTENDED RELEASE ORAL DAILY
Qty: 90 TABLET | Refills: 0 | Status: SHIPPED | OUTPATIENT
Start: 2023-08-15

## 2023-08-16 ENCOUNTER — NURSE TRIAGE (OUTPATIENT)
Age: 83
End: 2023-08-16

## 2023-08-16 NOTE — TELEPHONE ENCOUNTER
Answer Assessment - Initial Assessment Questions  1. LOCATION: "Where does it hurt?"       Upper center abdomen   2. RADIATION: "Does the pain shoot anywhere else?" (e.g., chest, back)      No   3. ONSET: "When did the pain begin?" (Minutes, hours or days ago)       3 days ago  4. SUDDEN: "Gradual or sudden onset?"      Sudden   5. PATTERN "Does the pain come and go, or is it constant?"     - If constant: "Is it getting better, staying the same, or worsening?"       (Note: Constant means the pain never goes away completely; most serious pain is constant and it progresses)      - If intermittent: "How long does it last?" "Do you have pain now?"      (Note: Intermittent means the pain goes away completely between bouts)      Constant   6. SEVERITY: "How bad is the pain?"  (e.g., Scale 1-10; mild, moderate, or severe)     - MILD (1-3): doesn't interfere with normal activities, abdomen soft and not tender to touch      - MODERATE (4-7): interferes with normal activities or awakens from sleep, tender to touch      - SEVERE (8-10): excruciating pain, doubled over, unable to do any normal activities        4/10. Pt states he does not have pain but feels like pressure. 7. RECURRENT SYMPTOM: "Have you ever had this type of stomach pain before?" If Yes, ask: "When was the last time?" and "What happened that time?"      Pt explained he had pain in the same area he is experiencing discomfort. However the last time he had pain in the upper center abdomen was when he had pancreatitis but pt states this time he does not feel pain in that area he just feels pressure. He stated this time is different, not as intense. 8. CAUSE: "What do you think is causing the stomach pain?"      Pt is unsure     9. RELIEVING/AGGRAVATING FACTORS: "What makes it better or worse?" (e.g., movement, antacids, bowel movement)     Movement does not change symptoms   Pt stated he is on omeprazole daily.  I advised he can try tums to see if it relieves symptoms. Pt having normal bms     10. OTHER SYMPTOMS: "Has there been any vomiting, diarrhea, constipation, or urine problems?"      Denies other symptoms    Protocols used: ABDOMINAL PAIN - MALE-ADULT-[place]    Pt scheduled for EUS 8/22. I advised I would reach out to provider for further recommendations. Discussed alarm symptoms that would prompt ED. I advised liquid diet, low fat and greasy meals.

## 2023-08-17 ENCOUNTER — HOSPITAL ENCOUNTER (INPATIENT)
Facility: HOSPITAL | Age: 83
LOS: 4 days | Discharge: HOME | DRG: 438 | End: 2023-08-22
Attending: EMERGENCY MEDICINE | Admitting: INTERNAL MEDICINE
Payer: MEDICARE

## 2023-08-17 ENCOUNTER — APPOINTMENT (EMERGENCY)
Dept: CT IMAGING | Facility: HOSPITAL | Age: 83
DRG: 438 | End: 2023-08-17
Payer: MEDICARE

## 2023-08-17 DIAGNOSIS — E83.42 HYPOMAGNESEMIA: ICD-10-CM

## 2023-08-17 DIAGNOSIS — K85.90 ACUTE PANCREATITIS: Primary | ICD-10-CM

## 2023-08-17 DIAGNOSIS — N28.9 RENAL INSUFFICIENCY: ICD-10-CM

## 2023-08-17 LAB
ALBUMIN SERPL BCP-MCNC: 3.7 G/DL (ref 3.5–5)
ALP SERPL-CCNC: 59 U/L (ref 34–104)
ALT SERPL W P-5'-P-CCNC: 15 U/L (ref 7–52)
ANION GAP SERPL CALCULATED.3IONS-SCNC: 8 MMOL/L
AST SERPL W P-5'-P-CCNC: 17 U/L (ref 13–39)
BASOPHILS # BLD AUTO: 0.03 THOUSANDS/ÂΜL (ref 0–0.1)
BASOPHILS NFR BLD AUTO: 0 % (ref 0–1)
BILIRUB SERPL-MCNC: 0.36 MG/DL (ref 0.2–1)
BUN SERPL-MCNC: 32 MG/DL (ref 5–25)
CALCIUM SERPL-MCNC: 9.1 MG/DL (ref 8.4–10.2)
CHLORIDE SERPL-SCNC: 102 MMOL/L (ref 96–108)
CO2 SERPL-SCNC: 29 MMOL/L (ref 21–32)
CREAT SERPL-MCNC: 1.95 MG/DL (ref 0.6–1.3)
EOSINOPHIL # BLD AUTO: 0.15 THOUSAND/ÂΜL (ref 0–0.61)
EOSINOPHIL NFR BLD AUTO: 2 % (ref 0–6)
ERYTHROCYTE [DISTWIDTH] IN BLOOD BY AUTOMATED COUNT: 13.2 % (ref 11.6–15.1)
GFR SERPL CREATININE-BSD FRML MDRD: 31 ML/MIN/1.73SQ M
GLUCOSE SERPL-MCNC: 142 MG/DL (ref 65–140)
HCT VFR BLD AUTO: 38.4 % (ref 36.5–49.3)
HGB BLD-MCNC: 12.9 G/DL (ref 12–17)
IMM GRANULOCYTES # BLD AUTO: 0.02 THOUSAND/UL (ref 0–0.2)
IMM GRANULOCYTES NFR BLD AUTO: 0 % (ref 0–2)
LACTATE SERPL-SCNC: 1 MMOL/L (ref 0.5–2)
LIPASE SERPL-CCNC: 492 U/L (ref 11–82)
LYMPHOCYTES # BLD AUTO: 2.11 THOUSANDS/ÂΜL (ref 0.6–4.47)
LYMPHOCYTES NFR BLD AUTO: 26 % (ref 14–44)
MAGNESIUM SERPL-MCNC: 1.8 MG/DL (ref 1.9–2.7)
MCH RBC QN AUTO: 31.7 PG (ref 26.8–34.3)
MCHC RBC AUTO-ENTMCNC: 33.6 G/DL (ref 31.4–37.4)
MCV RBC AUTO: 94 FL (ref 82–98)
MONOCYTES # BLD AUTO: 0.85 THOUSAND/ÂΜL (ref 0.17–1.22)
MONOCYTES NFR BLD AUTO: 10 % (ref 4–12)
NEUTROPHILS # BLD AUTO: 5.07 THOUSANDS/ÂΜL (ref 1.85–7.62)
NEUTS SEG NFR BLD AUTO: 62 % (ref 43–75)
NRBC BLD AUTO-RTO: 0 /100 WBCS
PLATELET # BLD AUTO: 172 THOUSANDS/UL (ref 149–390)
PMV BLD AUTO: 10.2 FL (ref 8.9–12.7)
POTASSIUM SERPL-SCNC: 4 MMOL/L (ref 3.5–5.3)
PROT SERPL-MCNC: 6.9 G/DL (ref 6.4–8.4)
RBC # BLD AUTO: 4.07 MILLION/UL (ref 3.88–5.62)
SODIUM SERPL-SCNC: 139 MMOL/L (ref 135–147)
WBC # BLD AUTO: 8.23 THOUSAND/UL (ref 4.31–10.16)

## 2023-08-17 PROCEDURE — 74176 CT ABD & PELVIS W/O CONTRAST: CPT

## 2023-08-17 PROCEDURE — 80053 COMPREHEN METABOLIC PANEL: CPT | Performed by: EMERGENCY MEDICINE

## 2023-08-17 PROCEDURE — 85025 COMPLETE CBC W/AUTO DIFF WBC: CPT | Performed by: EMERGENCY MEDICINE

## 2023-08-17 PROCEDURE — 83605 ASSAY OF LACTIC ACID: CPT | Performed by: EMERGENCY MEDICINE

## 2023-08-17 PROCEDURE — 96375 TX/PRO/DX INJ NEW DRUG ADDON: CPT

## 2023-08-17 PROCEDURE — 83735 ASSAY OF MAGNESIUM: CPT | Performed by: EMERGENCY MEDICINE

## 2023-08-17 PROCEDURE — 93005 ELECTROCARDIOGRAM TRACING: CPT

## 2023-08-17 PROCEDURE — 83690 ASSAY OF LIPASE: CPT | Performed by: EMERGENCY MEDICINE

## 2023-08-17 PROCEDURE — 36415 COLL VENOUS BLD VENIPUNCTURE: CPT | Performed by: EMERGENCY MEDICINE

## 2023-08-17 PROCEDURE — 99285 EMERGENCY DEPT VISIT HI MDM: CPT | Performed by: EMERGENCY MEDICINE

## 2023-08-17 PROCEDURE — 96365 THER/PROPH/DIAG IV INF INIT: CPT

## 2023-08-17 PROCEDURE — 99285 EMERGENCY DEPT VISIT HI MDM: CPT

## 2023-08-17 PROCEDURE — 96361 HYDRATE IV INFUSION ADD-ON: CPT

## 2023-08-17 RX ORDER — MAGNESIUM SULFATE 1 G/100ML
1 INJECTION INTRAVENOUS ONCE
Status: COMPLETED | OUTPATIENT
Start: 2023-08-17 | End: 2023-08-18

## 2023-08-17 RX ORDER — HYDROMORPHONE HCL/PF 1 MG/ML
0.5 SYRINGE (ML) INJECTION ONCE
Status: COMPLETED | OUTPATIENT
Start: 2023-08-17 | End: 2023-08-17

## 2023-08-17 RX ADMIN — HYDROMORPHONE HYDROCHLORIDE 0.5 MG: 1 INJECTION, SOLUTION INTRAMUSCULAR; INTRAVENOUS; SUBCUTANEOUS at 23:06

## 2023-08-17 RX ADMIN — SODIUM CHLORIDE 500 ML: 0.9 INJECTION, SOLUTION INTRAVENOUS at 23:06

## 2023-08-17 RX ADMIN — SODIUM CHLORIDE 1000 ML: 0.9 INJECTION, SOLUTION INTRAVENOUS at 23:54

## 2023-08-17 RX ADMIN — MAGNESIUM SULFATE HEPTAHYDRATE 1 G: 1 INJECTION, SOLUTION INTRAVENOUS at 23:50

## 2023-08-18 PROBLEM — N18.9 ACUTE-ON-CHRONIC KIDNEY INJURY (HCC): Status: ACTIVE | Noted: 2023-08-18

## 2023-08-18 PROBLEM — N17.9 ACUTE-ON-CHRONIC KIDNEY INJURY (HCC): Status: ACTIVE | Noted: 2023-08-18

## 2023-08-18 LAB
ALBUMIN SERPL BCP-MCNC: 3.5 G/DL (ref 3.5–5)
ALP SERPL-CCNC: 54 U/L (ref 34–104)
ALT SERPL W P-5'-P-CCNC: 14 U/L (ref 7–52)
ANION GAP SERPL CALCULATED.3IONS-SCNC: 8 MMOL/L
AST SERPL W P-5'-P-CCNC: 16 U/L (ref 13–39)
BILIRUB SERPL-MCNC: 0.38 MG/DL (ref 0.2–1)
BILIRUB UR QL STRIP: NEGATIVE
BUN SERPL-MCNC: 29 MG/DL (ref 5–25)
CALCIUM SERPL-MCNC: 8.6 MG/DL (ref 8.4–10.2)
CHLORIDE SERPL-SCNC: 104 MMOL/L (ref 96–108)
CLARITY UR: CLEAR
CO2 SERPL-SCNC: 28 MMOL/L (ref 21–32)
COLOR UR: YELLOW
CREAT SERPL-MCNC: 1.66 MG/DL (ref 0.6–1.3)
ERYTHROCYTE [DISTWIDTH] IN BLOOD BY AUTOMATED COUNT: 13.4 % (ref 11.6–15.1)
GFR SERPL CREATININE-BSD FRML MDRD: 38 ML/MIN/1.73SQ M
GLUCOSE SERPL-MCNC: 159 MG/DL (ref 65–140)
GLUCOSE SERPL-MCNC: 173 MG/DL (ref 65–140)
GLUCOSE SERPL-MCNC: 188 MG/DL (ref 65–140)
GLUCOSE SERPL-MCNC: 210 MG/DL (ref 65–140)
GLUCOSE SERPL-MCNC: 223 MG/DL (ref 65–140)
GLUCOSE SERPL-MCNC: 281 MG/DL (ref 65–140)
GLUCOSE UR STRIP-MCNC: NEGATIVE MG/DL
HCT VFR BLD AUTO: 36.9 % (ref 36.5–49.3)
HGB BLD-MCNC: 11.8 G/DL (ref 12–17)
HGB UR QL STRIP.AUTO: NEGATIVE
KETONES UR STRIP-MCNC: NEGATIVE MG/DL
LEUKOCYTE ESTERASE UR QL STRIP: NEGATIVE
LIPASE SERPL-CCNC: 272 U/L (ref 11–82)
MCH RBC QN AUTO: 31.6 PG (ref 26.8–34.3)
MCHC RBC AUTO-ENTMCNC: 32 G/DL (ref 31.4–37.4)
MCV RBC AUTO: 99 FL (ref 82–98)
NITRITE UR QL STRIP: NEGATIVE
PH UR STRIP.AUTO: 6 [PH]
PLATELET # BLD AUTO: 156 THOUSANDS/UL (ref 149–390)
PMV BLD AUTO: 10.4 FL (ref 8.9–12.7)
POTASSIUM SERPL-SCNC: 3.7 MMOL/L (ref 3.5–5.3)
PROT SERPL-MCNC: 6.2 G/DL (ref 6.4–8.4)
PROT UR STRIP-MCNC: NEGATIVE MG/DL
RBC # BLD AUTO: 3.74 MILLION/UL (ref 3.88–5.62)
SODIUM SERPL-SCNC: 140 MMOL/L (ref 135–147)
SP GR UR STRIP.AUTO: 1.01 (ref 1–1.03)
UROBILINOGEN UR QL STRIP.AUTO: 0.2 E.U./DL
WBC # BLD AUTO: 7.73 THOUSAND/UL (ref 4.31–10.16)

## 2023-08-18 PROCEDURE — 85027 COMPLETE CBC AUTOMATED: CPT

## 2023-08-18 PROCEDURE — 80053 COMPREHEN METABOLIC PANEL: CPT

## 2023-08-18 PROCEDURE — 99222 1ST HOSP IP/OBS MODERATE 55: CPT | Performed by: INTERNAL MEDICINE

## 2023-08-18 PROCEDURE — 83690 ASSAY OF LIPASE: CPT

## 2023-08-18 PROCEDURE — 81003 URINALYSIS AUTO W/O SCOPE: CPT | Performed by: EMERGENCY MEDICINE

## 2023-08-18 PROCEDURE — 82948 REAGENT STRIP/BLOOD GLUCOSE: CPT

## 2023-08-18 RX ORDER — HYDROMORPHONE HCL/PF 1 MG/ML
0.5 SYRINGE (ML) INJECTION EVERY 4 HOURS PRN
Status: DISCONTINUED | OUTPATIENT
Start: 2023-08-18 | End: 2023-08-22 | Stop reason: HOSPADM

## 2023-08-18 RX ORDER — INSULIN LISPRO 100 [IU]/ML
1-6 INJECTION, SOLUTION INTRAVENOUS; SUBCUTANEOUS
Status: DISCONTINUED | OUTPATIENT
Start: 2023-08-18 | End: 2023-08-22 | Stop reason: HOSPADM

## 2023-08-18 RX ORDER — CLONIDINE HYDROCHLORIDE 0.1 MG/1
0.1 TABLET ORAL EVERY 12 HOURS SCHEDULED
Status: DISCONTINUED | OUTPATIENT
Start: 2023-08-18 | End: 2023-08-22 | Stop reason: HOSPADM

## 2023-08-18 RX ORDER — SODIUM CHLORIDE 9 MG/ML
125 INJECTION, SOLUTION INTRAVENOUS CONTINUOUS
Status: DISCONTINUED | OUTPATIENT
Start: 2023-08-18 | End: 2023-08-18

## 2023-08-18 RX ORDER — ACETAMINOPHEN 325 MG/1
650 TABLET ORAL EVERY 6 HOURS PRN
Status: DISCONTINUED | OUTPATIENT
Start: 2023-08-18 | End: 2023-08-22 | Stop reason: HOSPADM

## 2023-08-18 RX ORDER — RANOLAZINE 500 MG/1
500 TABLET, EXTENDED RELEASE ORAL 2 TIMES DAILY
Status: DISCONTINUED | OUTPATIENT
Start: 2023-08-18 | End: 2023-08-22 | Stop reason: HOSPADM

## 2023-08-18 RX ORDER — HEPARIN SODIUM 5000 [USP'U]/ML
5000 INJECTION, SOLUTION INTRAVENOUS; SUBCUTANEOUS EVERY 8 HOURS SCHEDULED
Status: DISCONTINUED | OUTPATIENT
Start: 2023-08-18 | End: 2023-08-22 | Stop reason: HOSPADM

## 2023-08-18 RX ORDER — HYDROMORPHONE HCL IN WATER/PF 6 MG/30 ML
0.2 PATIENT CONTROLLED ANALGESIA SYRINGE INTRAVENOUS EVERY 4 HOURS PRN
Status: DISCONTINUED | OUTPATIENT
Start: 2023-08-18 | End: 2023-08-22 | Stop reason: HOSPADM

## 2023-08-18 RX ORDER — SODIUM CHLORIDE, SODIUM LACTATE, POTASSIUM CHLORIDE, CALCIUM CHLORIDE 600; 310; 30; 20 MG/100ML; MG/100ML; MG/100ML; MG/100ML
75 INJECTION, SOLUTION INTRAVENOUS CONTINUOUS
Status: DISCONTINUED | OUTPATIENT
Start: 2023-08-18 | End: 2023-08-19

## 2023-08-18 RX ORDER — ONDANSETRON 2 MG/ML
4 INJECTION INTRAMUSCULAR; INTRAVENOUS EVERY 6 HOURS PRN
Status: DISCONTINUED | OUTPATIENT
Start: 2023-08-18 | End: 2023-08-22 | Stop reason: HOSPADM

## 2023-08-18 RX ORDER — CLOPIDOGREL BISULFATE 75 MG/1
75 TABLET ORAL DAILY
Status: DISCONTINUED | OUTPATIENT
Start: 2023-08-18 | End: 2023-08-18

## 2023-08-18 RX ORDER — METOPROLOL SUCCINATE 25 MG/1
25 TABLET, EXTENDED RELEASE ORAL DAILY
Status: DISCONTINUED | OUTPATIENT
Start: 2023-08-18 | End: 2023-08-22 | Stop reason: HOSPADM

## 2023-08-18 RX ADMIN — HYDROMORPHONE HYDROCHLORIDE 0.5 MG: 1 INJECTION, SOLUTION INTRAMUSCULAR; INTRAVENOUS; SUBCUTANEOUS at 03:04

## 2023-08-18 RX ADMIN — INSULIN LISPRO 1 UNITS: 100 INJECTION, SOLUTION INTRAVENOUS; SUBCUTANEOUS at 08:47

## 2023-08-18 RX ADMIN — RANOLAZINE 500 MG: 500 TABLET, EXTENDED RELEASE ORAL at 18:27

## 2023-08-18 RX ADMIN — CLONIDINE HYDROCHLORIDE 0.1 MG: 0.1 TABLET ORAL at 21:18

## 2023-08-18 RX ADMIN — INSULIN LISPRO 2 UNITS: 100 INJECTION, SOLUTION INTRAVENOUS; SUBCUTANEOUS at 16:44

## 2023-08-18 RX ADMIN — INSULIN LISPRO 1 UNITS: 100 INJECTION, SOLUTION INTRAVENOUS; SUBCUTANEOUS at 12:34

## 2023-08-18 RX ADMIN — SODIUM CHLORIDE, SODIUM LACTATE, POTASSIUM CHLORIDE, AND CALCIUM CHLORIDE 150 ML/HR: .6; .31; .03; .02 INJECTION, SOLUTION INTRAVENOUS at 10:00

## 2023-08-18 RX ADMIN — METOPROLOL SUCCINATE 25 MG: 25 TABLET, FILM COATED, EXTENDED RELEASE ORAL at 08:47

## 2023-08-18 RX ADMIN — CLONIDINE HYDROCHLORIDE 0.1 MG: 0.1 TABLET ORAL at 08:46

## 2023-08-18 RX ADMIN — HEPARIN SODIUM 5000 UNITS: 5000 INJECTION INTRAVENOUS; SUBCUTANEOUS at 13:45

## 2023-08-18 RX ADMIN — HEPARIN SODIUM 5000 UNITS: 5000 INJECTION INTRAVENOUS; SUBCUTANEOUS at 05:17

## 2023-08-18 RX ADMIN — SODIUM CHLORIDE 125 ML/HR: 0.9 INJECTION, SOLUTION INTRAVENOUS at 03:05

## 2023-08-18 RX ADMIN — HEPARIN SODIUM 5000 UNITS: 5000 INJECTION INTRAVENOUS; SUBCUTANEOUS at 21:18

## 2023-08-18 RX ADMIN — RANOLAZINE 500 MG: 500 TABLET, EXTENDED RELEASE ORAL at 08:47

## 2023-08-18 NOTE — ASSESSMENT & PLAN NOTE
Lab Results   Component Value Date    EGFR 31 08/17/2023    EGFR 50 07/26/2023    EGFR 46 07/25/2023    CREATININE 1.95 (H) 08/17/2023    CREATININE 1.32 (H) 07/26/2023    CREATININE 1.41 (H) 07/25/2023     · Creatinine on admission 1.95, baseline between 1.3 and 1.4  · Creatinine likely elevated in setting of acute pancreatitis  · We will give IVF  · Trend BMP, low threshold to consult nephrology

## 2023-08-18 NOTE — ASSESSMENT & PLAN NOTE
· BP on admission 161/78  · Takes torsemide, metoprolol, lisinopril, and clonidine outpatient  · Will hold torsemide and lisinopril given MAO, continue metoprolol and clonidine Statement Selected

## 2023-08-18 NOTE — ASSESSMENT & PLAN NOTE
Wt Readings from Last 3 Encounters:   08/07/23 109 kg (240 lb)   08/03/23 108 kg (238 lb)   07/31/23 108 kg (238 lb)     · Patient takes Ranexa, metoprolol, and Plavix, continue medications  · Patient additionally takes Demadex, will hold in setting of MAO  · Monitor volume status closely given patient is receiving IVF

## 2023-08-18 NOTE — ASSESSMENT & PLAN NOTE
Lab Results   Component Value Date    HGBA1C 6.7 (H) 05/10/2023       No results for input(s): "POCGLU" in the last 72 hours.     Blood Sugar Average: Last 72 hrs:     · Last A1c 6.7  · Reportedly takes 36 units of insulin detemir in a.m., will hold given likely n.p.o. status for the next few days  · We will continue with sliding scale insulin however as patient improves diet over the next few days will likely need to add insulin detemir

## 2023-08-18 NOTE — ASSESSMENT & PLAN NOTE
· Last lipid panel approximately month ago showed triglycerides 118, HDL 53, LDL 47  · Will hold Crestor given pancreatitis  · AST 17, ALT 15, alk phos 59, continue to trend

## 2023-08-18 NOTE — H&P
[address] [facility]  H&P  Name: Lonnie Smith 82 y.o. male I MRN: [id number]  Unit/Bed#: -01 I Date of Admission: 8/17/2023   Date of Service: 8/18/2023 I Hospital Day: 0      Assessment/Plan   * Acute recurrent pancreatitis  Assessment & Plan  Patient reports having upper abdominal pain for the past 4 days and has gradually gotten worse. He reports it feels exactly like his previous pancreatitis episodes with addition to having lower back pain. 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 It was recommended by the GI team at that time to have an outpatient EUS which is scheduled for next week  · All 3 criteria for pancreatitis with CT abdomen showing acute pancreatitis without fluid collection, lipase 492, and upper abdominal pain  · Teresa criteria: 2  · Ca+ 9.1  · We will give IVF  · Make patient n.p.o.  · Give IV pain medications  · Consult GI    Acute-on-chronic kidney injury [facility])  Assessment & Plan  Lab Results   Component Value Date    EGFR 31 08/17/2023    EGFR 50 07/26/2023    EGFR 46 07/25/2023    CREATININE 1.95 (H) 08/17/2023    CREATININE 1.32 (H) 07/26/2023    CREATININE 1.41 (H) 07/25/2023     · Creatinine on admission 1.95, baseline between 1.3 and 1.4  · Creatinine likely elevated in setting of acute pancreatitis  · We will give IVF  · Trend BMP, low threshold to consult nephrology    Mixed hyperlipidemia  Assessment & Plan  · Last lipid panel approximately month ago showed triglycerides 118, HDL 53, LDL 47  · Will hold Crestor given pancreatitis  · AST 17, ALT 15, alk phos 59, continue to trend    Type 2 diabetes mellitus with kidney complication, with long-term current use of insulin (HCC)  Assessment & Plan  Lab Results   Component Value Date    HGBA1C 6.7 (H) 05/10/2023       No results for input(s): "POCGLU" in the last 72 hours.     Blood Sugar Average: Last 72 hrs:     · Last A1c 6.7  · Reportedly takes 36 units of insulin detemir in a.m., will hold given likely n.p.o. status for the next few days  · We will continue with sliding scale insulin however as patient improves diet over the next few days will likely need to add insulin detemir    (HFpEF) heart failure with preserved ejection fraction (HCC)  Assessment & Plan  Wt Readings from Last 3 Encounters:   08/07/23 109 kg (240 lb)   08/03/23 108 kg (238 lb)   07/31/23 108 kg (238 lb)     · Patient takes Ranexa, metoprolol, and Plavix, continue medications  · Patient additionally takes Demadex, will hold in setting of MAO  · Monitor volume status closely given patient is receiving IVF        HTN (hypertension), benign  Assessment & Plan  · BP on admission 161/78  · Takes torsemide, metoprolol, lisinopril, and clonidine outpatient  · Will hold torsemide and lisinopril given MAO, continue metoprolol and clonidine         VTE Pharmacologic Prophylaxis:   Moderate Risk (Score 3-4) - Pharmacological DVT Prophylaxis Ordered: heparin. Code Status: Prior   Discussion with family: Patient declined call to . Anticipated Length of Stay: Patient will be admitted on an inpatient basis with an anticipated length of stay of greater than 2 midnights secondary to pancreatitis. Total Time Spent on Date of Encounter in care of patient: 55 minutes This time was spent on one or more of the following: performing physical exam; counseling and coordination of care; obtaining or reviewing history; documenting in the medical record; reviewing/ordering tests, medications or procedures; communicating with other healthcare professionals and discussing with patient's family/caregivers. Chief Complaint: Upper Abdominal Pain    History of Present Illness:  Dominick Castillo is a 82 y.o. male with a PMH of CKD, DM, hyperlipidemia, HTN who presents with acute pancreatitis.   The patient has had numerous bouts over the past few months of pancreatitis. Most recent pancreatitis was on 7/24 when he was discharged from [facility].  At that time an MRI showed gallstones with sludge and a possible pancreatic ductal mass. He was instructed to follow-up with the GI team and they recommended having an EUS completed on the outpatient setting. EUS scheduled for 8/22. For this occurrence of pancreatitis, the patient had approximately 4 days of epigastric pain that has slowly been worsening. He called his GI doc on 8/16 who recommended he started a clear liquid diet. He did not want to start a clear liquid diet as he does not believe that is the proper way to live. The pain continued to worsen. Review of Systems:  Review of Systems   Constitutional: Negative for activity change, chills and fever. HENT: Negative for ear pain and sore throat. Eyes: Negative for pain and visual disturbance. Respiratory: Negative for cough and shortness of breath. Cardiovascular: Negative for chest pain, palpitations and leg swelling. Gastrointestinal: Positive for abdominal pain. Negative for abdominal distention, constipation, diarrhea, nausea and vomiting. Genitourinary: Positive for flank pain. Negative for decreased urine volume, dysuria and hematuria. Musculoskeletal: Negative for arthralgias and back pain. Skin: Negative for color change and rash. Neurological: Negative for dizziness, seizures, syncope and light-headedness. All other systems reviewed and are negative.       Past Medical and Surgical History:   Past Medical History:   Diagnosis Date   • Allergic    • Arthritis    • Chronic kidney disease 2021   • Chronic kidney disease (CKD) stage G3a/A1, moderately decreased glomerular filtration rate (GFR) between 45-59 mL/min/1.73 square meter and albuminuria creatinine ratio less than 30 mg/g (HCC)    • Colon polyp    • Diabetes mellitus (HCC)    • GERD (gastroesophageal reflux disease)    • Heart murmur    • History of echocardiogram    • HL (hearing loss)    • Hyperlipidemia    • Hypertension    • Obesity    • Pancreatitis    • Sleep apnea, obstructive        Past Surgical History:   Procedure Laterality Date   • CARDIAC CATHETERIZATION     • COLONOSCOPY  03/09/2018   • COLONOSCOPY     • EYE SURGERY     • HEMORRHOID SURGERY     • JOINT REPLACEMENT  2017    knee   • OTHER SURGICAL HISTORY      Stent    • SD LAPAROSCOPY SURG CHOLECYSTECTOMY N/A 6/28/2023    Procedure: CHOLECYSTECTOMY LAPAROSCOPIC;  Surgeon: Janene Velásquez MD;  Location:  MAIN OR;  Service: General   • UPPER GASTROINTESTINAL ENDOSCOPY         Meds/Allergies:  Prior to Admission medications    Medication Sig Start Date End Date Taking?  Authorizing Provider   fluticasone (FLONASE) 50 mcg/act nasal spray 2 sprays into each nostril daily 8/15/23   Deshawn Hines MD   metoprolol succinate (TOPROL-XL) 25 mg 24 hr tablet Take 1 tablet (25 mg total) by mouth daily 8/15/23   Deshawn Hines MD   acetaminophen (TYLENOL) 325 mg tablet Take 2 tablets (650 mg total) by mouth every 6 (six) hours as needed for mild pain 6/22/23   Deanna Owusu MD   allopurinol (ZYLOPRIM) 100 mg tablet Take 100 mg by mouth daily 5/20/23   Historical Provider, MD   BD Pen Needle Pascale U/F 32G X 4 MM MISC USE AS INSTRUCTED WITH INSULIN PEN 8/27/20   Historical Provider, MD   calcium carbonate (OS-WAYLON) 600 MG tablet Take 1,200 mg by mouth daily    Historical Provider, MD   cholecalciferol (VITAMIN D3) 1,000 units tablet Take 2,000 Units by mouth daily    Historical Provider, MD   cloNIDine (CATAPRES) 0.1 mg tablet Take 1 tablet (0.1 mg total) by mouth every 12 (twelve) hours 6/22/23   Deanna Owusu MD   clopidogrel (PLAVIX) 75 mg tablet Take 1 tablet (75 mg total) by mouth daily Instructed to stop prior to Surgery-Last dose 6/21/23 Do not start before June 29, 2023. 6/29/23   Felisa Alvarado PA-C   Fexofenadine HCl [facility] PO) Take by mouth Historical Provider, MD   glipiZIDE (GLUCOTROL) 10 mg tablet  8/3/23   Historical Provider, MD   insulin aspart (NovoLOG FlexPen) 100 UNIT/ML injection pen Inject under the skin 3 (three) times a day with meals Sliding scale as ordered TID with meals    Historical Provider, MD   Lancets (OneTouch Delica Plus [id number]) MISC TEST GLUCOSE 3 4 TIMES/DAY 8/28/20   Historical Provider, MD   Levemir FlexTouch 100 units/mL injection pen 36 Units daily In AM 8/27/20   Historical Provider, MD   lisinopril (ZESTRIL) 40 mg tablet Take 1 tablet (40 mg total) by mouth daily 4/3/23   Yash Tucker MD   Multiple Vitamins-Minerals (MULTIVITAMIN ADULT PO) Take by mouth daily    Historical Provider, MD   omeprazole (PriLOSEC) 40 MG capsule Take 1 capsule (40 mg total) by mouth daily  Patient taking differently: Take 40 mg by mouth daily before breakfast 5/15/23   Tamela Farr MD   Pseudoephedrine HCl (SUDAFED PO) Take by mouth    Historical Provider, MD   pyridoxine (VITAMIN B6) 100 mg tablet Take 100 mg by mouth daily    Historical Provider, MD   ranolazine (RANEXA) 500 mg 12 hr tablet Take 1 tablet (500 mg total) by mouth 2 (two) times a day 6/6/23   Yash Tucker MD   rosuvastatin (CRESTOR) 20 MG tablet Take 1 tablet by mouth daily 7/1/22 7/28/23  Historical Provider, MD   tamsulosin (FLOMAX) 0.4 mg Take 2 capsules (0.8 mg total) by mouth daily 4/3/23   Yash Tucker MD   torsemide [facility]) 10 mg tablet Take 10 mg by mouth 7/13/23 7/12/24  Historical Provider, MD     I have reviewed home medications with patient personally. Allergies:    Allergies   Allergen Reactions   • Januvia [Sitagliptin] Other (See Comments)     pancreatitis   • Iodinated Contrast Media Other (See Comments)     Per pt, "got real hot feeling"   • Semaglutide Other (See Comments)     pancreatits   • Simvastatin Myalgia   • Trulicity [Dulaglutide] Other (See Comments)     Pancreatitis   • Wound Dressing Adhesive Other (See Comments)     Burning sensation       Social History:  Marital Status: /Civil Union   Occupation: N/AA  Patient Pre-hospital Living Situation: Home  Patient Pre-hospital Level of Mobility: walks  Patient Pre-hospital Diet Restrictions: None  Substance Use History:   Social History     Substance and Sexual Activity   Alcohol Use Not Currently   • Alcohol/week: 5.0 standard drinks of alcohol   • Types: 5 Glasses of wine per week    Comment: no alcohol since March     Social History     Tobacco Use   Smoking Status Former   • Packs/day: 2.00   • Years: 15.00   • Total pack years: 30.00   • Types: Cigarettes, Pipe, Cigars   • Quit date: 1972   • Years since quittin.8   • Passive exposure: Past   Smokeless Tobacco Never     Social History     Substance and Sexual Activity   Drug Use Never       Family History:  Family History   Problem Relation Age of Onset   • Heart disease Mother    • Heart attack Mother         46s   • Cancer Mother    • Coronary artery disease Mother    • Cancer Brother         Malignant tumor of lung       Physical Exam:     Vitals:   Blood Pressure: 166/65 (23)  Pulse: 67 (23)  Temperature: 98.3 °F (36.8 °C) (23)  Temp Source: Oral (23)  Respirations: 18 (23)  Height: 5' 6" (167.6 cm) (23)  Weight - Scale: 108 kg (237 lb 12.8 oz) (23)  SpO2: 98 % (23)    Physical Exam  Vitals and nursing note reviewed. Constitutional:       Appearance: He is normal weight. HENT:      Head: Normocephalic. Nose: Nose normal.      Mouth/Throat:      Mouth: Mucous membranes are moist.      Pharynx: Oropharynx is clear. Eyes:      General: No scleral icterus. Conjunctiva/sclera: Conjunctivae normal.      Pupils: Pupils are equal, round, and reactive to light. Cardiovascular:      Rate and Rhythm: Normal rate and regular rhythm. Heart sounds: No murmur heard. No friction rub. No gallop.    Pulmonary:      Effort: Pulmonary effort is normal. No respiratory distress. Breath sounds: Normal breath sounds. No stridor. No wheezing, rhonchi or rales. Abdominal:      General: Abdomen is flat. There is distension. Palpations: Abdomen is soft. There is no mass. Tenderness: There is abdominal tenderness ( Epigastric). There is right CVA tenderness and left CVA tenderness. There is no guarding or rebound. Hernia: No hernia is present. Musculoskeletal:         General: Normal range of motion. Cervical back: Normal range of motion and neck supple. Right lower leg: No edema. Left lower leg: No edema. Lymphadenopathy:      Cervical: No cervical adenopathy. Skin:     General: Skin is warm. Coloration: Skin is not jaundiced or pale. Findings: No bruising, erythema or lesion. Neurological:      General: No focal deficit present. Mental Status: He is alert and oriented to person, place, and time. Mental status is at baseline. Cranial Nerves: No cranial nerve deficit. Motor: No weakness. Psychiatric:         Mood and Affect: Mood normal.         Behavior: Behavior normal.         Thought Content:  Thought content normal.          Additional Data:     Lab Results:  Results from last 7 days   Lab Units 08/17/23  2300   WBC Thousand/uL 8.23   HEMOGLOBIN g/dL 12.9   HEMATOCRIT % 38.4   PLATELETS Thousands/uL 172   NEUTROS PCT % 62   LYMPHS PCT % 26   MONOS PCT % 10   EOS PCT % 2     Results from last 7 days   Lab Units 08/17/23  2300   SODIUM mmol/L 139   POTASSIUM mmol/L 4.0   CHLORIDE mmol/L 102   CO2 mmol/L 29   BUN mg/dL 32*   CREATININE mg/dL 1.95*   ANION GAP mmol/L 8   CALCIUM mg/dL 9.1   ALBUMIN g/dL 3.7   TOTAL BILIRUBIN mg/dL 0.36   ALK PHOS U/L 59   ALT U/L 15   AST U/L 17   GLUCOSE RANDOM mg/dL 142*                 Results from last 7 days   Lab Units 08/17/23  2300   LACTIC ACID mmol/L 1.0       Lines/Drains:  Invasive Devices     Peripheral Intravenous Line Duration           Peripheral IV 08/17/23 Left;Proximal;Ventral (anterior) Forearm <1 day                    Imaging: Reviewed radiology reports from this admission including: abdominal/pelvic CT  CT abdomen pelvis wo contrast   Final Result by Stephie Wong MD (08/18 0030)      Findings consistent with acute pancreatitis. No evidence of fluid collection. Workstation performed: [id number]                 ** Please Note: This note has been constructed using a voice recognition system.  **

## 2023-08-18 NOTE — CONSULTS
Physician Requesting Consult: May Espinoza*    Reason for Consult / Principal Problem: Pancreatitis    Assessment/Plan:  73-year-old male with a past medical history of CKD, diabetes mellitus, hyperlipidemia, hypertension, and pancreatitis who presents to [address] [place] [address] on 8/17 with report of Increased pain in abdomen which radiates to her back which started approximately 4 days ago. 1.  Acute pancreatitis  Patient presented with increased abdominal pain in abdomen which radiated to the back which started approximately 4 days ago. Patient has been seen here multiple times in the past for similar symptoms. CT of abdomen done on admission showed mild peripancreatic inflammatory change consistent with acute pancreatitis no evidence of fluid collection. Lipase on admission 497. Lipase 272 today. LFTs within normal limits. Etiology of pancreatitis unclear cannot exclude pancreatic ductal stricture or malignancy. No evidence of autoimmune pancreatitis on previous work-up. -Patient is scheduled for EUS on 8/22 with Dr. Kiana Lund due to abnormal MRI I done 7/25/2023 showed overall pancreatic duct had similar appearance to previous MRCP. Apparent transition/narrowing of the duct at the junction of the pancreatic head and neck appears unchanged however there is a questionable 7 mm nodule along the undersurface of the junction head and neck with some suspected diffusion restriction in this area. -Continue to hold Plavix patient is scheduled for EUS on 8/22 and Plavix needs to be held for 5 days prior to procedure.   Patient reports last dose of Plavix was Wednesday 8/16.  -Continue supportive  -Pain management per attending team  -Clear liquid diet may advance to low-fat in a.m. if tolerating and no increase in pain.  -Aggressive IV fluid hydration; change IV fluids to LR at 150 mL/hr.  -Continue antiemetics as needed    HPI: Jose Luis Davies is a 82 y.o. male  Acute recurrent pancreatitis. This is an 68-year-old male with a past medical history of CKD, diabetes mellitus, hyperlipidemia, hypertension, and pancreatitis who presents to [address] [place] [address] on 8/17 with report of Increased pain in abdomen which radiates to her back which started approximately 4 days ago. Patient has been seen here multiple times in the past for similar symptoms. Patient did report taking an oxycodone at 2100 on 8/17. CT of abdomen done on admission showed mild peripancreatic inflammatory change consistent with acute pancreatitis, no evidence of fluid collection. Lipase on admission 497. Lipase 272 today. LFTs within normal limits. Patient denies any nausea, vomiting, acid reflux, heartburn, lower abdominal pain, blood in stool, blood from rectal area, or black tarry stool. Patient currently denies any abdominal pain and states he is feeling better and would like to eat. Patient does not drink alcohol. Patient had previous work-up concerning pancreatitis and was negative for autoimmune pancreatitis. Patient is scheduled for EUS with Dr. Nina Martinez on 8/22 for further evaluation of pancreas due to abnormal MRI. MRI done 7/25/2023 showed overall pancreatic duct had similar appearance to previous MRCP. Apparent transition/narrowing of the duct at the junction of the pancreatic head and neck appears unchanged however there is a questionable 7 mm nodule along the undersurface of the junction head and neck with some suspected diffusion restriction in this area. EUS was recommended for further evaluation to exclude a small pancreatic mass. If EUS is negative recommendations were for MRI MRCP and no more than 8 weeks would be advised. Patient is a former smoker. Patient currently has not drank alcohol in 4 months. Prior to that he reports only having 3 drinks a week. No family history of gastric or colon cancer.   Patient is on Plavix and medical clearance was obtained to hold prior to EUS. Patient reports last dose of Plavix was on 8/17. Allergies:    Allergies   Allergen Reactions   • Januvia [Sitagliptin] Other (See Comments)     pancreatitis   • Iodinated Contrast Media Other (See Comments)     Per pt, "got real hot feeling"   • Semaglutide Other (See Comments)     pancreatits   • Simvastatin Myalgia   • Trulicity [Dulaglutide] Other (See Comments)     Pancreatitis   • Wound Dressing Adhesive Other (See Comments)     Burning sensation       Medications:  Current Facility-Administered Medications:   •  acetaminophen (TYLENOL) tablet 650 mg, 650 mg, Oral, Q6H PRN, Scottie Epperson PA-C  •  cloNIDine (CATAPRES) tablet 0.1 mg, 0.1 mg, Oral, Q12H [address], FELICITA Muahmmad-C, 0.1 mg at 08/18/23 0846  •  heparin (porcine) subcutaneous injection 5,000 Units, 5,000 Units, Subcutaneous, Q8H [address], 5,000 Units at 08/18/23 0517 **AND** [CANCELED] Platelet count, , , Once, Scottie Epperson PA-C  •  HYDROmorphone (DILAUDID) injection 0.5 mg, 0.5 mg, Intravenous, Q4H PRN, Scottie Epperson PA-C, 0.5 mg at 08/18/23 0304  •  HYDROmorphone HCl (DILAUDID) injection 0.2 mg, 0.2 mg, Intravenous, Q4H PRN, Scottie Epperson PA-C  •  insulin lispro (HumaLOG) 100 units/mL subcutaneous injection 1-6 Units, 1-6 Units, Subcutaneous, TID AC, 1 Units at 08/18/23 0847 **AND** Fingerstick Glucose (POCT), , , TID AC, Scottie Epperson PA-C  •  lactated ringers infusion, 150 mL/hr, Intravenous, Continuous, GERSON Anne  •  metoprolol succinate (TOPROL-XL) 24 hr tablet 25 mg, 25 mg, Oral, Daily, Scottie Epperson PA-C, 25 mg at 08/18/23 0847  •  ondansetron (ZOFRAN) injection 4 mg, 4 mg, Intravenous, Q6H PRN, Scottie Epperson PA-C  •  ranolazine (RANEXA) 12 hr tablet 500 mg, 500 mg, Oral, BID, Scottie Epperson PA-C, 500 mg at 08/18/23 8625    Past Medical history:  Past Medical History:   Diagnosis Date   • Allergic    • Arthritis    • Chronic kidney disease 2021   • Chronic kidney disease (CKD) stage G3a/A1, moderately decreased glomerular filtration rate (GFR) between 45-59 mL/min/1.73 square meter and albuminuria creatinine ratio less than 30 mg/g (HCC)    • Colon polyp    • Diabetes mellitus (HCC)    • GERD (gastroesophageal reflux disease)    • Heart murmur    • History of echocardiogram    • HL (hearing loss)    • Hyperlipidemia    • Hypertension    • Obesity    • Pancreatitis    • Sleep apnea, obstructive        Past Surgical History:   Past Surgical History:   Procedure Laterality Date   • CARDIAC CATHETERIZATION     • COLONOSCOPY  2018   • COLONOSCOPY     • EYE SURGERY     • HEMORRHOID SURGERY     • JOINT REPLACEMENT      knee   • OTHER SURGICAL HISTORY      Stent    • [place] LAPAROSCOPY SURG CHOLECYSTECTOMY N/A 2023    Procedure: CHOLECYSTECTOMY LAPAROSCOPIC;  Surgeon: Amalia Kiran MD;  Location:  MAIN OR;  Service: General   • UPPER GASTROINTESTINAL ENDOSCOPY         Social history:   Social History     Tobacco Use   • Smoking status: Former     Packs/day: 2.00     Years: 15.00     Total pack years: 30.00     Types: Cigarettes, Pipe, Cigars     Quit date: 1972     Years since quittin.8     Passive exposure: Past   • Smokeless tobacco: Never   Vaping Use   • Vaping Use: Never used   Substance Use Topics   • Alcohol use: Not Currently     Alcohol/week: 5.0 standard drinks of alcohol     Types: 5 Glasses of wine per week     Comment: no alcohol since March   • Drug use: Never       Family history:   Family History   Problem Relation Age of Onset   • Heart disease Mother    • Heart attack Mother         46s   • Cancer Mother    • Coronary artery disease Mother    • Cancer Brother         Malignant tumor of lung        Review of Systems: Review of Systems   Gastrointestinal: Positive for abdominal pain. All other systems reviewed and are negative. Physical Exam: Physical Exam  Vitals and nursing note reviewed.    Constitutional:       General: He is not in acute distress. HENT:      Head: Normocephalic and atraumatic. Cardiovascular:      Rate and Rhythm: Normal rate and regular rhythm. Pulses: Normal pulses. Heart sounds: Normal heart sounds. Pulmonary:      Effort: Pulmonary effort is normal. No respiratory distress. Breath sounds: Normal breath sounds. No stridor. No wheezing, rhonchi or rales. Abdominal:      General: Bowel sounds are normal. There is distension. Palpations: Abdomen is soft. There is no mass. Tenderness: There is no abdominal tenderness. There is no guarding or rebound. Hernia: No hernia is present. Musculoskeletal:      Cervical back: Normal range of motion and neck supple. Right lower leg: No edema. Left lower leg: No edema. Skin:     General: Skin is warm and dry. Capillary Refill: Capillary refill takes less than 2 seconds. Coloration: Skin is not jaundiced or pale. Neurological:      Mental Status: He is alert and oriented to person, place, and time.            Lab Results:   Recent Results (from the past 24 hour(s))   CBC and differential    Collection Time: 08/17/23 11:00 PM   Result Value Ref Range    WBC 8.23 4.31 - 10.16 Thousand/uL    RBC 4.07 3.88 - 5.62 Million/uL    Hemoglobin 12.9 12.0 - 17.0 g/dL    Hematocrit 38.4 36.5 - 49.3 %    MCV 94 82 - 98 fL    MCH 31.7 26.8 - 34.3 pg    MCHC 33.6 31.4 - 37.4 g/dL    RDW 13.2 11.6 - 15.1 %    MPV 10.2 8.9 - 12.7 fL    Platelets 655 693 - 213 Thousands/uL    nRBC 0 /100 WBCs    Neutrophils Relative 62 43 - 75 %    Immat GRANS % 0 0 - 2 %    Lymphocytes Relative 26 14 - 44 %    Monocytes Relative 10 4 - 12 %    Eosinophils Relative 2 0 - 6 %    Basophils Relative 0 0 - 1 %    Neutrophils Absolute 5.07 1.85 - 7.62 Thousands/µL    Immature Grans Absolute 0.02 0.00 - 0.20 Thousand/uL    Lymphocytes Absolute 2.11 0.60 - 4.47 Thousands/µL    Monocytes Absolute 0.85 0.17 - 1.22 Thousand/µL    Eosinophils Absolute 0.15 0.00 - 0.61 Thousand/µL    Basophils Absolute 0.03 0.00 - 0.10 Thousands/µL   Comprehensive metabolic panel    Collection Time: 08/17/23 11:00 PM   Result Value Ref Range    Sodium 139 135 - 147 mmol/L    Potassium 4.0 3.5 - 5.3 mmol/L    Chloride 102 96 - 108 mmol/L    CO2 29 21 - 32 mmol/L    ANION GAP 8 mmol/L    BUN 32 (H) 5 - 25 mg/dL    Creatinine 1.95 (H) 0.60 - 1.30 mg/dL    Glucose 142 (H) 65 - 140 mg/dL    Calcium 9.1 8.4 - 10.2 mg/dL    AST 17 13 - 39 U/L    ALT 15 7 - 52 U/L    Alkaline Phosphatase 59 34 - 104 U/L    Total Protein 6.9 6.4 - 8.4 g/dL    Albumin 3.7 3.5 - 5.0 g/dL    Total Bilirubin 0.36 0.20 - 1.00 mg/dL    eGFR 31 ml/min/1.73sq m   Magnesium    Collection Time: 08/17/23 11:00 PM   Result Value Ref Range    Magnesium 1.8 (L) 1.9 - 2.7 mg/dL   Lipase    Collection Time: 08/17/23 11:00 PM   Result Value Ref Range    Lipase 492 (H) 11 - 82 u/L   Lactic acid, plasma (w/reflex if result > 2.0)    Collection Time: 08/17/23 11:00 PM   Result Value Ref Range    LACTIC ACID 1.0 0.5 - 2.0 mmol/L   Comprehensive metabolic panel    Collection Time: 08/18/23  5:05 AM   Result Value Ref Range    Sodium 140 135 - 147 mmol/L    Potassium 3.7 3.5 - 5.3 mmol/L    Chloride 104 96 - 108 mmol/L    CO2 28 21 - 32 mmol/L    ANION GAP 8 mmol/L    BUN 29 (H) 5 - 25 mg/dL    Creatinine 1.66 (H) 0.60 - 1.30 mg/dL    Glucose 159 (H) 65 - 140 mg/dL    Calcium 8.6 8.4 - 10.2 mg/dL    AST 16 13 - 39 U/L    ALT 14 7 - 52 U/L    Alkaline Phosphatase 54 34 - 104 U/L    Total Protein 6.2 (L) 6.4 - 8.4 g/dL    Albumin 3.5 3.5 - 5.0 g/dL    Total Bilirubin 0.38 0.20 - 1.00 mg/dL    eGFR 38 ml/min/1.73sq m   Lipase    Collection Time: 08/18/23  5:05 AM   Result Value Ref Range    Lipase 272 (H) 11 - 82 u/L   CBC (With Platelets)    Collection Time: 08/18/23  5:05 AM   Result Value Ref Range    WBC 7.73 4.31 - 10.16 Thousand/uL    RBC 3.74 (L) 3.88 - 5.62 Million/uL    Hemoglobin 11.8 (L) 12.0 - 17.0 g/dL    Hematocrit 36.9 36.5 - 49.3 %    MCV 99 (H) 82 - 98 fL    MCH 31.6 26.8 - 34.3 pg    MCHC 32.0 31.4 - 37.4 g/dL    RDW 13.4 11.6 - 15.1 %    Platelets 925 573 - 104 Thousands/uL    MPV 10.4 8.9 - 12.7 fL   UA w Reflex to Microscopic w Reflex to Culture    Collection Time: 08/18/23  5:52 AM    Specimen: Urine, Clean Catch   Result Value Ref Range    Color, UA Yellow Yellow    Clarity, UA Clear Clear    Specific Gravity, UA 1.015 1.001 - 1.030    pH, UA 6.0 5.0, 5.5, 6.0, 6.5, 7.0, 7.5, 8.0    Leukocytes, UA Negative Negative    Nitrite, UA Negative Negative    Protein, UA Negative Negative, Interference- unable to analyze mg/dl    Glucose, UA Negative Negative mg/dl    Ketones, UA Negative Negative mg/dl    Urobilinogen, UA 0.2 0.2, 1.0 E.U./dl E.U./dl    Bilirubin, UA Negative Negative    Occult Blood, UA Negative Negative   Fingerstick Glucose (POCT)    Collection Time: 08/18/23  7:38 AM   Result Value Ref Range    POC Glucose 173 (H) 65 - 140 mg/dl           Imaging Studies: CT abdomen pelvis wo contrast    Result Date: 8/18/2023  Narrative: CT ABDOMEN AND PELVIS WITHOUT IV CONTRAST INDICATION:   upper abdominal pain, back pain. COMPARISON:  None. TECHNIQUE:  CT examination of the abdomen and pelvis was performed without intravenous contrast. Multiplanar 2D reformatted images were created from the source data. This examination, like all CT scans performed in the [facility], was performed utilizing techniques to minimize radiation dose exposure, including the use of iterative reconstruction and automated exposure control. Radiation dose length product (DLP) for this visit:  999.2 mGy-cm Enteric contrast was administered. FINDINGS: ABDOMEN LOWER CHEST:  No clinically significant abnormality identified in the visualized lower chest. LIVER/BILIARY TREE:  Unremarkable. GALLBLADDER:  No calcified gallstones. No pericholecystic inflammatory change. SPLEEN:  Unremarkable. PANCREAS: Mild peripancreatic inflammatory change. Kerri Lemon ADRENAL GLANDS:  Unremarkable. KIDNEYS/URETERS:  One or more simple renal cyst(s) is noted. Otherwise unremarkable kidneys. No hydronephrosis. STOMACH AND BOWEL:  There is colonic diverticulosis without evidence of acute diverticulitis. APPENDIX:  No findings to suggest appendicitis. ABDOMINOPELVIC CAVITY:  No ascites. No pneumoperitoneum. No lymphadenopathy. VESSELS:  Unremarkable for patient's age. PELVIS REPRODUCTIVE ORGANS:  The prostate is enlarged. URINARY BLADDER:  Unremarkable. ABDOMINAL WALL/INGUINAL REGIONS:  Unremarkable. OSSEOUS STRUCTURES:  No acute fracture or destructive osseous lesion. Impression: Findings consistent with acute pancreatitis. No evidence of fluid collection. Workstation performed: [id number]     MRI abdomen w wo contrast and mrcp    Result Date: 7/25/2023  Narrative: MRI OF THE ABDOMEN WITH AND WITHOUT CONTRAST WITH MRCP INDICATION:  Pancreatic duct demonstrated some focal narrowing at the junction of the head and neck region which may be related to artifact and/or patient's pancreatitis per previous MRI report. A small stricture or ductal nodule was felt difficult to exclude. COMPARISON: MRI/MRCP 6/10/2023, CT abdomen pelvis 6/7/2023 TECHNIQUE:  Multiplanar/multisequence MRI of the abdomen with 3D MRCP was performed before and after administration of contrast. Pre- and postcontrast subtraction images were also obtained. IV Contrast:  10 mL of Gadobutrol injection (SINGLE-DOSE) FINDINGS: The study is mildly limited by respiratory motion. LOWER CHEST:   Unremarkable. LIVER: Normal in size and configuration. No suspicious mass. The hepatic veins and portal veins are patent. BILE DUCTS: No intrahepatic or extrahepatic bile duct dilation. Common bile duct is normal in caliber. No choledocholithiasis, biliary stricture or obstructing biliary mass. GALLBLADDER: Surgically absent. PANCREAS: Overall, the pancreatic duct has a similar appearance to the previous MRCP.  Apparent transition/narrowing of the duct at the junction of the pancreatic head and neck as before. The duct immediately upstream from the transition remains similar in caliber at about 3 mm while the pancreatic duct in the distal body and tail is normal caliber. There is relative diminished T1 signal seen at the junction of the head and neck. There is a questionable 7 mm hypoenhancing focus along the medial undersurface head/neck junction abutting the anterior portal vein (series 11 image 66). There is suggestion of some residual diffusion restriction in this region (series 3 image 17) and perhaps subtle increase in enhancement on delayed imaging (series 13 image 67). Delayed enhancement is more prominent in this region than the prior study. Mild background atrophy/fatty involution particularly in the head. Peripancreatic edema seen previously has largely resolved with some trace fluid persisting. No organized collection. ADRENAL GLANDS:  ormal. SPLEEN:  ormal. KIDNEYS/PROXIMAL URETERS:  o hydroureteronephrosis. o suspicious renal mass. Multiple bilateral renal cysts. Nonspecific bilateral perinephric edema. BOWEL:   No dilated loops of bowel. Colonic diverticulosis. PERITONEUM/RETROPERITONEUM:  o ascites. LYMPH NODES:  o abdominal lymphadenopathy. VASCULAR STRUCTURES:  o aneurysm. ABDOMINAL WALL:  nremarkable. OSSEOUS STRUCTURES:  No suspicious osseous lesion. Degenerative disc disease L5-S1. Impression: 1. Overall, the pancreatic duct has a similar appearance to the previous MRCP. Apparent transition/narrowing of the duct at the junction of the pancreatic head and neck appears unchanged. However, there is a questionable 7 mm nodule along the undersurface of the junction head and neck with some suspected diffusion restriction in this area. EUS is recommended at this time to exclude a small pancreatic mass. If negative, follow-up MRI/MRCP in no more than 8 weeks would be advised.  2. Peripancreatic edema seen previously has largely resolved with some trace fluid persisting. No organized collection. 3. Negative for biliary obstruction. Additional incidental findings as above. The study was marked in [facility] for immediate notification and follow-up. Workstation performed: [id number]       Problem List:   Patient Active Problem List   Diagnosis   • PAULA (obstructive sleep apnea)   • HTN (hypertension), benign   • (HFpEF) heart failure with preserved ejection fraction (HCC)   • Obesity, morbid ([address])   • Type 2 diabetes mellitus with kidney complication, with long-term current use of insulin ([address])   • History of recent hospitalization   • GERD without esophagitis   • Mixed hyperlipidemia   • Coronary artery disease involving native coronary artery without angina pectoris   • ED (erectile dysfunction)   • Venous stasis dermatitis of both lower extremities   • Moderate aortic stenosis   • Stage 3a chronic kidney disease (CKD) ([facility])   • Epigastric pain   • Acute recurrent pancreatitis   • Left arm swelling   • Platelets decreased (HCC)   • Diarrhea due to malabsorption   • Chest pain   • Acute-on-chronic kidney injury ([address])         GERSON Clinton      Please Note: "This note has been constructed using a voice recognition system. Therefore there may be syntax, spelling, and/or grammatical errors.  Please call if you have any questions. "**

## 2023-08-18 NOTE — ED PROVIDER NOTES
History  Chief Complaint   Patient presents with   • Abdominal Pain     Pt reports abdominal pain and back pain that started 4 days ago. Pt has been seen here multiple times for same symptoms. Pt took oxycodone at 2100 tonight. Patient is an 46-year-old male seen in the emergency department with concern for strong, persistent epigastric pain over approximately the past 4 days. Patient also notes some back pain. Patient notes history of recurrent pancreatitis in the past.  Patient notes minimal relief with oxycodone at home. Patient notes no other definite clear exacerbating or alleviating factors for his symptoms. Patient notes no fever, chest pain, shortness of breath, nausea, vomiting, diarrhea, weakness, numbness, tingling. Prior to Admission Medications   Prescriptions Last Dose Informant Patient Reported? Taking?    BD Pen Needle Pascale U/F 32G X 4 MM MISC  Self Yes No   Sig: USE AS INSTRUCTED WITH INSULIN PEN   Fexofenadine HCl (MUCINEX ALLERGY PO)  Self Yes No   Sig: Take by mouth   Lancets (OneTouch Delica Plus [id number]) MISC  Self Yes No   Sig: TEST GLUCOSE 3 4 TIMES/DAY   Levemir FlexTouch 100 units/mL injection pen  Self Yes No   Si Units daily In AM   Multiple Vitamins-Minerals (MULTIVITAMIN ADULT PO)  Self Yes No   Sig: Take by mouth daily   Pseudoephedrine HCl (SUDAFED PO)  Self Yes No   Sig: Take by mouth   acetaminophen (TYLENOL) 325 mg tablet  Self No No   Sig: Take 2 tablets (650 mg total) by mouth every 6 (six) hours as needed for mild pain   allopurinol (ZYLOPRIM) 100 mg tablet  Self Yes No   Sig: Take 100 mg by mouth daily   calcium carbonate (OS-WAYLON) 600 MG tablet  Self Yes No   Sig: Take 1,200 mg by mouth daily   cholecalciferol (VITAMIN D3) 1,000 units tablet  Self Yes No   Sig: Take 2,000 Units by mouth daily   cloNIDine (CATAPRES) 0.1 mg tablet  Self No No   Sig: Take 1 tablet (0.1 mg total) by mouth every 12 (twelve) hours   clopidogrel (PLAVIX) 75 mg tablet  Self No No Sig: Take 1 tablet (75 mg total) by mouth daily Instructed to stop prior to Surgery-Last dose 23 Do not start before 2023.    fluticasone (FLONASE) 50 mcg/act nasal spray   No No   Si sprays into each nostril daily   glipiZIDE (GLUCOTROL) 10 mg tablet  Self Yes No   insulin aspart (NovoLOG FlexPen) 100 UNIT/ML injection pen  Self Yes No   Sig: Inject under the skin 3 (three) times a day with meals Sliding scale as ordered TID with meals   lisinopril (ZESTRIL) 40 mg tablet  Self No No   Sig: Take 1 tablet (40 mg total) by mouth daily   metoprolol succinate (TOPROL-XL) 25 mg 24 hr tablet   No No   Sig: Take 1 tablet (25 mg total) by mouth daily   omeprazole (PriLOSEC) 40 MG capsule  Self No No   Sig: Take 1 capsule (40 mg total) by mouth daily   Patient taking differently: Take 40 mg by mouth daily before breakfast   pyridoxine (VITAMIN B6) 100 mg tablet  Self Yes No   Sig: Take 100 mg by mouth daily   ranolazine (RANEXA) 500 mg 12 hr tablet  Self No No   Sig: Take 1 tablet (500 mg total) by mouth 2 (two) times a day   rosuvastatin (CRESTOR) 20 MG tablet  Self Yes No   Sig: Take 1 tablet by mouth daily   tamsulosin (FLOMAX) 0.4 mg  Self No No   Sig: Take 2 capsules (0.8 mg total) by mouth daily   torsemide (DEMADEX) 10 mg tablet  Self Yes No   Sig: Take 10 mg by mouth      Facility-Administered Medications: None       Past Medical History:   Diagnosis Date   • Allergic    • Arthritis    • Chronic kidney disease    • Chronic kidney disease (CKD) stage G3a/A1, moderately decreased glomerular filtration rate (GFR) between 45-59 mL/min/1.73 square meter and albuminuria creatinine ratio less than 30 mg/g (HCC)    • Colon polyp    • Diabetes mellitus (HCC)    • GERD (gastroesophageal reflux disease)    • Heart murmur    • History of echocardiogram    • HL (hearing loss)    • Hyperlipidemia    • Hypertension    • Obesity    • Pancreatitis    • Sleep apnea, obstructive        Past Surgical History: Procedure Laterality Date   • CARDIAC CATHETERIZATION     • COLONOSCOPY  2018   • COLONOSCOPY     • EYE SURGERY     • HEMORRHOID SURGERY     • JOINT REPLACEMENT  2017    knee   • OTHER SURGICAL HISTORY      Stent    • CO LAPAROSCOPY SURG CHOLECYSTECTOMY N/A 2023    Procedure: CHOLECYSTECTOMY LAPAROSCOPIC;  Surgeon: Terri Linares MD;  Location:  MAIN OR;  Service: General   • UPPER GASTROINTESTINAL ENDOSCOPY         Family History   Problem Relation Age of Onset   • Heart disease Mother    • Heart attack Mother         46s   • Cancer Mother    • Coronary artery disease Mother    • Cancer Brother         Malignant tumor of lung     I have reviewed and agree with the history as documented. E-Cigarette/Vaping   • E-Cigarette Use Never User      E-Cigarette/Vaping Substances   • Nicotine No    • THC No    • CBD No    • Flavoring No    • Other No    • Unknown No      Social History     Tobacco Use   • Smoking status: Former     Packs/day: 2.00     Years: 15.00     Total pack years: 30.00     Types: Cigarettes, Pipe, Cigars     Quit date: 1972     Years since quittin.8     Passive exposure: Past   • Smokeless tobacco: Never   Vaping Use   • Vaping Use: Never used   Substance Use Topics   • Alcohol use: Not Currently     Alcohol/week: 5.0 standard drinks of alcohol     Types: 5 Glasses of wine per week     Comment: no alcohol since March   • Drug use: Never       Review of Systems   Constitutional: Negative for chills and fever. HENT: Negative for ear pain and sore throat. Eyes: Negative for pain and visual disturbance. Respiratory: Negative for cough and shortness of breath. Cardiovascular: Negative for chest pain and palpitations. Gastrointestinal: Positive for abdominal pain. Negative for diarrhea, nausea and vomiting. Genitourinary: Negative for decreased urine volume and difficulty urinating. Musculoskeletal: Positive for back pain. Negative for neck stiffness. Skin: Negative for color change and rash. Neurological: Negative for seizures and syncope. Psychiatric/Behavioral: Negative for agitation and confusion. All other systems reviewed and are negative. Physical Exam  Physical Exam  Vitals and nursing note reviewed. Constitutional:       General: He is not in acute distress. Appearance: He is well-developed. HENT:      Head: Normocephalic and atraumatic. Right Ear: External ear normal.      Left Ear: External ear normal.      Nose: Nose normal.      Mouth/Throat:      Pharynx: Oropharynx is clear. Eyes:      General: No scleral icterus. Conjunctiva/sclera: Conjunctivae normal.   Cardiovascular:      Rate and Rhythm: Normal rate and regular rhythm. Heart sounds: No murmur heard. Pulmonary:      Effort: Pulmonary effort is normal. No respiratory distress. Breath sounds: Normal breath sounds. Abdominal:      General: There is no distension. Palpations: Abdomen is soft. Tenderness: There is abdominal tenderness. Comments: mild epigastric tenderness   Musculoskeletal:         General: No deformity or signs of injury. Cervical back: Normal range of motion and neck supple. Skin:     General: Skin is warm and dry. Neurological:      General: No focal deficit present. Mental Status: He is alert. Cranial Nerves: No cranial nerve deficit. Sensory: No sensory deficit. Psychiatric:         Mood and Affect: Mood normal.         Thought Content:  Thought content normal.         Vital Signs  ED Triage Vitals [08/17/23 2243]   Temperature Pulse Respirations Blood Pressure SpO2   98.3 °F (36.8 °C) 72 16 161/78 98 %      Temp Source Heart Rate Source Patient Position - Orthostatic VS BP Location FiO2 (%)   Oral Monitor Lying Right arm --      Pain Score       6           Vitals:    08/17/23 2243   BP: 161/78   Pulse: 72   Patient Position - Orthostatic VS: Lying         Visual Acuity      ED Medications  Medications   sodium chloride 0.9 % bolus 1,000 mL (1,000 mL Intravenous New Bag 8/17/23 2354)   magnesium sulfate IVPB (premix) SOLN 1 g (1 g Intravenous New Bag 8/17/23 2350)   sodium chloride 0.9 % bolus 500 mL (500 mL Intravenous New Bag 8/17/23 2306)   HYDROmorphone (DILAUDID) injection 0.5 mg (0.5 mg Intravenous Given 8/17/23 2306)       Diagnostic Studies  Results Reviewed     Procedure Component Value Units Date/Time    Comprehensive metabolic panel [[id number]]  (Abnormal) Collected: 08/17/23 2300    Lab Status: Final result Specimen: Blood from Arm, Left Updated: 08/17/23 2321     Sodium 139 mmol/L      Potassium 4.0 mmol/L      Chloride 102 mmol/L      CO2 29 mmol/L      ANION GAP 8 mmol/L      BUN 32 mg/dL      Creatinine 1.95 mg/dL      Glucose 142 mg/dL      Calcium 9.1 mg/dL      AST 17 U/L      ALT 15 U/L      Alkaline Phosphatase 59 U/L      Total Protein 6.9 g/dL      Albumin 3.7 g/dL      Total Bilirubin 0.36 mg/dL      eGFR 31 ml/min/1.73sq m     Narrative:      [place] guidelines for Chronic Kidney Disease (CKD):   •  Stage 1 with normal or high GFR (GFR > 90 mL/min/1.73 square meters)  •  Stage 2 Mild CKD (GFR = 60-89 mL/min/1.73 square meters)  •  Stage 3A Moderate CKD (GFR = 45-59 mL/min/1.73 square meters)  •  Stage 3B Moderate CKD (GFR = 30-44 mL/min/1.73 square meters)  •  Stage 4 Severe CKD (GFR = 15-29 mL/min/1.73 square meters)  •  Stage 5 End Stage CKD (GFR <15 mL/min/1.73 square meters)  Note: GFR calculation is accurate only with a steady state creatinine    Magnesium [[id number]]  (Abnormal) Collected: 08/17/23 2300    Lab Status: Final result Specimen: Blood from Arm, Left Updated: 08/17/23 2321     Magnesium 1.8 mg/dL     Lipase [[id number]]  (Abnormal) Collected: 08/17/23 2300    Lab Status: Final result Specimen: Blood from Arm, Left Updated: 08/17/23 2321     Lipase 492 u/L     Lactic acid, plasma (w/reflex if result > 2.0) [[id number]] (Normal) Collected: 08/17/23 2300    Lab Status: Final result Specimen: Blood from Arm, Left Updated: 08/17/23 2320     LACTIC ACID 1.0 mmol/L     Narrative:      Result may be elevated if tourniquet was used during collection. CBC and differential [[id number]] Collected: 08/17/23 2300    Lab Status: Final result Specimen: Blood from Arm, Left Updated: 08/17/23 2307     WBC 8.23 Thousand/uL      RBC 4.07 Million/uL      Hemoglobin 12.9 g/dL      Hematocrit 38.4 %      MCV 94 fL      MCH 31.7 pg      MCHC 33.6 g/dL      RDW 13.2 %      MPV 10.2 fL      Platelets 019 Thousands/uL      nRBC 0 /100 WBCs      Neutrophils Relative 62 %      Immat GRANS % 0 %      Lymphocytes Relative 26 %      Monocytes Relative 10 %      Eosinophils Relative 2 %      Basophils Relative 0 %      Neutrophils Absolute 5.07 Thousands/µL      Immature Grans Absolute 0.02 Thousand/uL      Lymphocytes Absolute 2.11 Thousands/µL      Monocytes Absolute 0.85 Thousand/µL      Eosinophils Absolute 0.15 Thousand/µL      Basophils Absolute 0.03 Thousands/µL     UA w Reflex to Microscopic w Reflex to Culture [[id number]]     Lab Status: No result Specimen: Urine, Clean Catch                  CT abdomen pelvis wo contrast   Final Result by Robert Jiménez MD (08/18 0030)      Findings consistent with acute pancreatitis. No evidence of fluid collection.             Workstation performed: [id number]                    Procedures  ECG 12 Lead Documentation Only    Date/Time: 8/17/2023 10:54 PM    Performed by: Bonnie Villanueva MD  Authorized by: Bonnie Villanueva MD    Indications / Diagnosis:  Abdominal pain  Patient location:  ED  Rate:     ECG rate:  63    ECG rate assessment: normal    QRS:     QRS axis:  Left  ST segments:     ST segments:  Non-specific  T waves:     T waves: non-specific    Comments:      Sinus rhythm at 63, left axis deviation, , , QTc 468, nonspecific ST-T wave abnormality, first-degree AV block, no definite evidence of acute ischemia             ED Course  ED Course as of 08/18/23 0049   Fri Aug 18, 2023   0033 CT abdomen/pelvis-    IMPRESSION:     Findings consistent with acute pancreatitis. No evidence of fluid collection.                                  SBIRT 20yo+    Flowsheet Row Most Recent Value   Initial Alcohol Screen: US AUDIT-C     1. How often do you have a drink containing alcohol? 0 Filed at: 08/17/2023 2242   2. How many drinks containing alcohol do you have on a typical day you are drinking? 0 Filed at: 08/17/2023 2242   3a. Male UNDER 65: How often do you have five or more drinks on one occasion? 0 Filed at: 08/17/2023 2242   Audit-C Score 0 Filed at: 08/17/2023 2242   ALEA: How many times in the past year have you. .. Used an illegal drug or used a prescription medication for non-medical reasons? Never Filed at: 08/17/2023 2242                    Medical Decision Making  Patient is an 82-year-old male seen in the emergency department with concern for upper abdominal pain. EKG was obtained and noted. Patient was treated with medication for symptom control, with good effect. Laboratory evaluation remarkable for elevated BUN of 32, elevated creatinine of 1.95, elevated glucose of 142, low magnesium of 1.8, elevated lipase of 492. CT abdomen/pelvis was obtained to evaluate for bowel obstruction, diverticulitis, or other acute abnormality. CT imaging showed findings consistent with acute pancreatitis. plan to admit patient for further evaluation and treatment, with concern for acute pancreatitis, hypomagnesemia, renal insufficiency. Case was reviewed with medicine team.  Plan of care was reviewed with patient. Acute pancreatitis: acute illness or injury  Amount and/or Complexity of Data Reviewed  Labs: ordered. Decision-making details documented in ED Course. Radiology: ordered. Decision-making details documented in ED Course. ECG/medicine tests: ordered and independent interpretation performed. Decision-making details documented in ED Course. Risk  Prescription drug management. Decision regarding hospitalization. Disposition  Final diagnoses:   Acute pancreatitis   Renal insufficiency   Hypomagnesemia     Time reflects when diagnosis was documented in both MDM as applicable and the Disposition within this note     Time User Action Codes Description Comment    8/18/2023 12:07 AM Rios Level Add [K85.90] Acute pancreatitis     8/18/2023 12:07 AM Rios Level Add [N28.9] Renal insufficiency     8/18/2023 12:41 AM Rios Level Add [E83.42] Hypomagnesemia       ED Disposition     ED Disposition   Admit    Condition   Stable    Date/Time   Fri Aug 18, 2023 12:42 AM    Comment   Case was reviewed with medicine team, and the patient's admission status was agreed to be Admission Status: inpatient status to the service of Dr. Monica Chase. Follow-up Information    None         Patient's Medications   Discharge Prescriptions    No medications on file       No discharge procedures on file.     PDMP Review       Value Time User    PDMP Reviewed  Yes 7/10/2023  9:21 AM Batool Arevalo MD          ED Provider  Electronically Signed by           Pravin Montgomery MD  08/18/23 8877

## 2023-08-18 NOTE — ASSESSMENT & PLAN NOTE
Patient reports having upper abdominal pain for the past 4 days and has gradually gotten worse. He reports it feels exactly like his previous pancreatitis episodes with addition to having lower back pain. 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   It was recommended by the GI team at that time to have an outpatient EUS which is scheduled for next week  · All 3 criteria for pancreatitis with CT abdomen showing acute pancreatitis without fluid collection, lipase 492, and upper abdominal pain  · Teresa criteria: 2  · Ca+ 9.1  · We will give IVF  · Make patient n.p.o.  · Give IV pain medications  · Consult GI

## 2023-08-18 NOTE — PLAN OF CARE
Problem: MOBILITY - ADULT  Goal: Maintain or return to baseline ADL function  Description: INTERVENTIONS:  -  Assess patient's ability to carry out ADLs; assess patient's baseline for ADL function and identify physical deficits which impact ability to perform ADLs (bathing, care of mouth/teeth, toileting, grooming, dressing, etc.)  - Assess/evaluate cause of self-care deficits   - Assess range of motion  - Assess patient's mobility; develop plan if impaired  - Assess patient's need for assistive devices and provide as appropriate  - Encourage maximum independence but intervene and supervise when necessary  - Involve family in performance of ADLs  - Assess for home care needs following discharge   - Consider OT consult to assist with ADL evaluation and planning for discharge  - Provide patient education as appropriate  Outcome: Progressing  Goal: Maintains/Returns to pre admission functional level  Description: INTERVENTIONS:  - Perform BMAT or MOVE assessment daily.   - Set and communicate daily mobility goal to care team and patient/family/caregiver. - Collaborate with rehabilitation services on mobility goals if consulted  - Perform Range of Motion 3 times a day. - Reposition patient every 2 hours.   - Dangle patient 3 times a day  - Stand patient 3 times a day  - Ambulate patient 3 times a day  - Out of bed to chair 3 times a day   - Out of bed for meals 3 times a day  - Out of bed for toileting  - Record patient progress and toleration of activity level   Outcome: Progressing     Problem: PAIN - ADULT  Goal: Verbalizes/displays adequate comfort level or baseline comfort level  Description: Interventions:  - Encourage patient to monitor pain and request assistance  - Assess pain using appropriate pain scale  - Administer analgesics based on type and severity of pain and evaluate response  - Implement non-pharmacological measures as appropriate and evaluate response  - Consider cultural and social influences on pain and pain management  - Notify physician/advanced practitioner if interventions unsuccessful or patient reports new pain  Outcome: Progressing     Problem: INFECTION - ADULT  Goal: Absence or prevention of progression during hospitalization  Description: INTERVENTIONS:  - Assess and monitor for signs and symptoms of infection  - Monitor lab/diagnostic results  - Monitor all insertion sites, i.e. indwelling lines, tubes, and drains  - Monitor endotracheal if appropriate and nasal secretions for changes in amount and color  - [place] appropriate cooling/warming therapies per order  - Administer medications as ordered  - Instruct and encourage patient and family to use good hand hygiene technique  - Identify and instruct in appropriate isolation precautions for identified infection/condition  Outcome: Progressing  Goal: Absence of fever/infection during neutropenic period  Description: INTERVENTIONS:  - Monitor WBC    Outcome: Progressing     Problem: SAFETY ADULT  Goal: Maintain or return to baseline ADL function  Description: INTERVENTIONS:  -  Assess patient's ability to carry out ADLs; assess patient's baseline for ADL function and identify physical deficits which impact ability to perform ADLs (bathing, care of mouth/teeth, toileting, grooming, dressing, etc.)  - Assess/evaluate cause of self-care deficits   - Assess range of motion  - Assess patient's mobility; develop plan if impaired  - Assess patient's need for assistive devices and provide as appropriate  - Encourage maximum independence but intervene and supervise when necessary  - Involve family in performance of ADLs  - Assess for home care needs following discharge   - Consider OT consult to assist with ADL evaluation and planning for discharge  - Provide patient education as appropriate  Outcome: Progressing  Goal: Maintains/Returns to pre admission functional level  Description: INTERVENTIONS:  - Perform BMAT or MOVE assessment daily.   - Set and communicate daily mobility goal to care team and patient/family/caregiver. - Collaborate with rehabilitation services on mobility goals if consulted  - Perform Range of Motion 3 times a day. - Reposition patient every 2 hours.   - Dangle patient 3 times a day  - Stand patient 3 times a day  - Ambulate patient 3 times a day  - Out of bed to chair 3 times a day   - Out of bed for meals 3 times a day  - Out of bed for toileting  - Record patient progress and toleration of activity level   Outcome: Progressing  Goal: Patient will remain free of falls  Description: INTERVENTIONS:  - Educate patient/family on patient safety including physical limitations  - Instruct patient to call for assistance with activity   - Consult OT/PT to assist with strengthening/mobility   - Keep Call bell within reach  - Keep bed low and locked with side rails adjusted as appropriate  - Keep care items and personal belongings within reach  - Initiate and maintain comfort rounds  - Make Fall Risk Sign visible to staff  - Offer Toileting every 2 Hours, in advance of need  - Initiate/Maintain bed alarm  - Apply yellow socks and bracelet for high fall risk patients  - Consider moving patient to room near nurses station  Outcome: Progressing     Problem: DISCHARGE PLANNING  Goal: Discharge to home or other facility with appropriate resources  Description: INTERVENTIONS:  - Identify barriers to discharge w/patient and caregiver  - Arrange for needed discharge resources and transportation as appropriate  - Identify discharge learning needs (meds, wound care, etc.)  - Arrange for interpretive services to assist at discharge as needed  - Refer to Case Management Department for coordinating discharge planning if the patient needs post-hospital services based on physician/advanced practitioner order or complex needs related to functional status, cognitive ability, or social support system  Outcome: Progressing     Problem: Knowledge Deficit  Goal: Patient/family/caregiver demonstrates understanding of disease process, treatment plan, medications, and discharge instructions  Description: Complete learning assessment and assess knowledge base.   Interventions:  - Provide teaching at level of understanding  - Provide teaching via preferred learning methods  Outcome: Progressing

## 2023-08-19 LAB
ANION GAP SERPL CALCULATED.3IONS-SCNC: 7 MMOL/L
ATRIAL RATE: 63 BPM
BUN SERPL-MCNC: 17 MG/DL (ref 5–25)
CALCIUM SERPL-MCNC: 9 MG/DL (ref 8.4–10.2)
CHLORIDE SERPL-SCNC: 103 MMOL/L (ref 96–108)
CO2 SERPL-SCNC: 32 MMOL/L (ref 21–32)
CREAT SERPL-MCNC: 1.41 MG/DL (ref 0.6–1.3)
GFR SERPL CREATININE-BSD FRML MDRD: 46 ML/MIN/1.73SQ M
GLUCOSE SERPL-MCNC: 209 MG/DL (ref 65–140)
GLUCOSE SERPL-MCNC: 221 MG/DL (ref 65–140)
GLUCOSE SERPL-MCNC: 224 MG/DL (ref 65–140)
GLUCOSE SERPL-MCNC: 56 MG/DL (ref 65–140)
GLUCOSE SERPL-MCNC: 59 MG/DL (ref 65–140)
GLUCOSE SERPL-MCNC: 63 MG/DL (ref 65–140)
P AXIS: 86 DEGREES
POTASSIUM SERPL-SCNC: 4 MMOL/L (ref 3.5–5.3)
PR INTERVAL: 246 MS
QRS AXIS: -64 DEGREES
QRSD INTERVAL: 152 MS
QT INTERVAL: 458 MS
QTC INTERVAL: 468 MS
SODIUM SERPL-SCNC: 142 MMOL/L (ref 135–147)
T WAVE AXIS: 44 DEGREES
VENTRICULAR RATE: 63 BPM

## 2023-08-19 PROCEDURE — 80048 BASIC METABOLIC PNL TOTAL CA: CPT

## 2023-08-19 PROCEDURE — 82948 REAGENT STRIP/BLOOD GLUCOSE: CPT

## 2023-08-19 PROCEDURE — 93010 ELECTROCARDIOGRAM REPORT: CPT | Performed by: INTERNAL MEDICINE

## 2023-08-19 PROCEDURE — 99232 SBSQ HOSP IP/OBS MODERATE 35: CPT

## 2023-08-19 RX ORDER — GUAIFENESIN 600 MG/1
600 TABLET, EXTENDED RELEASE ORAL EVERY 12 HOURS SCHEDULED
Status: DISCONTINUED | OUTPATIENT
Start: 2023-08-19 | End: 2023-08-22 | Stop reason: HOSPADM

## 2023-08-19 RX ORDER — INSULIN LISPRO 100 [IU]/ML
15 INJECTION, SOLUTION INTRAVENOUS; SUBCUTANEOUS
Status: DISCONTINUED | OUTPATIENT
Start: 2023-08-19 | End: 2023-08-22 | Stop reason: HOSPADM

## 2023-08-19 RX ORDER — FLUTICASONE PROPIONATE 50 MCG
1 SPRAY, SUSPENSION (ML) NASAL DAILY
Status: DISCONTINUED | OUTPATIENT
Start: 2023-08-19 | End: 2023-08-22 | Stop reason: HOSPADM

## 2023-08-19 RX ORDER — LORATADINE 10 MG/1
10 TABLET ORAL DAILY
Status: DISCONTINUED | OUTPATIENT
Start: 2023-08-19 | End: 2023-08-22 | Stop reason: HOSPADM

## 2023-08-19 RX ADMIN — SODIUM CHLORIDE, SODIUM LACTATE, POTASSIUM CHLORIDE, AND CALCIUM CHLORIDE 150 ML/HR: .6; .31; .03; .02 INJECTION, SOLUTION INTRAVENOUS at 00:39

## 2023-08-19 RX ADMIN — INSULIN LISPRO 15 UNITS: 100 INJECTION, SOLUTION INTRAVENOUS; SUBCUTANEOUS at 09:46

## 2023-08-19 RX ADMIN — LORATADINE 10 MG: 10 TABLET ORAL at 13:22

## 2023-08-19 RX ADMIN — METOPROLOL SUCCINATE 25 MG: 25 TABLET, FILM COATED, EXTENDED RELEASE ORAL at 08:46

## 2023-08-19 RX ADMIN — GUAIFENESIN 600 MG: 600 TABLET ORAL at 21:03

## 2023-08-19 RX ADMIN — HEPARIN SODIUM 5000 UNITS: 5000 INJECTION INTRAVENOUS; SUBCUTANEOUS at 21:03

## 2023-08-19 RX ADMIN — INSULIN LISPRO 2 UNITS: 100 INJECTION, SOLUTION INTRAVENOUS; SUBCUTANEOUS at 08:46

## 2023-08-19 RX ADMIN — GUAIFENESIN 600 MG: 600 TABLET ORAL at 13:22

## 2023-08-19 RX ADMIN — RANOLAZINE 500 MG: 500 TABLET, EXTENDED RELEASE ORAL at 18:10

## 2023-08-19 RX ADMIN — RANOLAZINE 500 MG: 500 TABLET, EXTENDED RELEASE ORAL at 08:46

## 2023-08-19 RX ADMIN — INSULIN LISPRO 2 UNITS: 100 INJECTION, SOLUTION INTRAVENOUS; SUBCUTANEOUS at 11:36

## 2023-08-19 RX ADMIN — HEPARIN SODIUM 5000 UNITS: 5000 INJECTION INTRAVENOUS; SUBCUTANEOUS at 05:46

## 2023-08-19 RX ADMIN — HEPARIN SODIUM 5000 UNITS: 5000 INJECTION INTRAVENOUS; SUBCUTANEOUS at 13:23

## 2023-08-19 RX ADMIN — CLONIDINE HYDROCHLORIDE 0.1 MG: 0.1 TABLET ORAL at 08:45

## 2023-08-19 RX ADMIN — INSULIN LISPRO 15 UNITS: 100 INJECTION, SOLUTION INTRAVENOUS; SUBCUTANEOUS at 11:35

## 2023-08-19 RX ADMIN — FLUTICASONE PROPIONATE 1 SPRAY: 50 SPRAY, METERED NASAL at 13:23

## 2023-08-19 RX ADMIN — CLONIDINE HYDROCHLORIDE 0.1 MG: 0.1 TABLET ORAL at 20:58

## 2023-08-19 NOTE — PROGRESS NOTES
[id number] [facility]  Progress Note  Name: Juan Quinn  MRN: [id number]  Unit/Bed#: -01 I Date of Admission: 8/17/2023   Date of Service: 8/19/2023 I Hospital Day: 1    Assessment/Plan   * Acute recurrent pancreatitis  Assessment & Plan  Patient reports having upper abdominal pain for the past 4 days and has gradually gotten worse. He reports it feels exactly like his previous pancreatitis episodes with addition to having lower back pain. 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   It was recommended by the GI team at that time to have an outpatient EUS which is scheduled for next week  · All 3 criteria for pancreatitis with CT abdomen showing acute pancreatitis without fluid collection, lipase 492, and upper abdominal pain  · [place] criteria: 2  · S/p aggressive IVF  · With improvement in symptoms-> Will D/C fluids  · Pain Management  · GI Consulted    Acute-on-chronic kidney injury [facility])  Assessment & Plan  Lab Results   Component Value Date    EGFR 38 08/18/2023    EGFR 31 08/17/2023    EGFR 50 07/26/2023    CREATININE 1.66 (H) 08/18/2023    CREATININE 1.95 (H) 08/17/2023    CREATININE 1.32 (H) 07/26/2023     · Creatinine on admission 1.95, baseline between 1.3 and 1.4  · Creatinine likely elevated in setting of acute pancreatitis  · S/p IVF  · Trend BMP    Mixed hyperlipidemia  Assessment & Plan  · Last lipid panel approximately a month ago showed triglycerides 118, HDL 53, LDL 47  · Hold Crestor given pancreatitis  · LFT's improving    Type 2 diabetes mellitus with kidney complication, with long-term current use of insulin [facility])  Assessment & Plan  Lab Results   Component Value Date    HGBA1C 6.7 (H) 05/10/2023       Recent Labs     08/18/23  1543 08/18/23 2008 08/19/23  0703 08/19/23  1126   POCGLU 223* 210* 209* 224*       Blood Sugar Average: Last 72 hrs:  (P) 215.9395006904148807   · Last A1c 6.7  · Home medications:  · 36 units of insulin detemir in a.m., along with Humalog 20-25 units TID with meals  · Initially held giving n.p.o. status  · With persistent hyperglycemia,   · Plan to restart Levemir but at decreased dosing of 15 units   · Plan to restart Humalog but at decreased dosing of 15 units 3 times daily with meals   · C/w Accu-cheks and SSI  · Hypoglycemic protocol  · Diabetic diet    (HFpEF) heart failure with preserved ejection fraction (HCC)  Assessment & Plan  Wt Readings from Last 3 Encounters:   08/18/23 108 kg (237 lb 12.8 oz)   08/07/23 109 kg (240 lb)   08/03/23 108 kg (238 lb)     · Continue Home medications:   · Ranexa, metoprolol, and Plavix  · Demadex initially held in setting of MAO  · Plan to restart in the next 24-48 hrs  · Monitor I/O's and Daily Weights    HTN (hypertension), benign  Assessment & Plan  · BP reviewed and now stable  · Home Medications:   · Torsemide, metoprolol, lisinopril, and clonidine   · Will hold torsemide and lisinopril given MAO  · Continue metoprolol and clonidine with hold parameters             VTE Pharmacologic Prophylaxis: VTE Score: 4 Moderate Risk (Score 3-4) - Pharmacological DVT Prophylaxis Ordered: heparin. Patient Centered Rounds: I performed bedside rounds with nursing staff today. Discussions with Specialists or Other Care Team Provider: CM    Education and Discussions with Family / Patient: Patient declined call to . Total Time Spent on Date of Encounter in care of patient: 45 minutes This time was spent on one or more of the following: performing physical exam; counseling and coordination of care; obtaining or reviewing history; documenting in the medical record; reviewing/ordering tests, medications or procedures; communicating with other healthcare professionals and discussing with patient's family/caregivers.     Current Length of Stay: 1 day(s)  Current Patient Status: Inpatient   Certification Statement: The patient will continue to require additional inpatient hospital stay due to Acute pancreatitis requiring monitoring, medication adjustment, and MAO  Discharge Plan: Anticipate discharge tomorrow to home. Code Status: Level 1 - Full Code    Subjective:   Patient states he feels better today. Patient states his abdominal pain is improved. Patient denies any active chest pain, shortness of breath, nausea, or vomiting. Objective:     Vitals:   Temp (24hrs), Av.1 °F (36.7 °C), Min:97.9 °F (36.6 °C), Max:98.5 °F (36.9 °C)    Temp:  [97.9 °F (36.6 °C)-98.5 °F (36.9 °C)] 97.9 °F (36.6 °C)  HR:  [57-70] 62  Resp:  [18] 18  BP: (119-152)/(61-69) 128/65  SpO2:  [93 %-100 %] 96 %  Body mass index is 38.38 kg/m². Input and Output Summary (last 24 hours): Intake/Output Summary (Last 24 hours) at 2023 1457  Last data filed at 2023 0523  Gross per 24 hour   Intake 954 ml   Output 1980 ml   Net -1026 ml       Physical Exam:   Physical Exam  Vitals and nursing note reviewed. Constitutional:       General: He is not in acute distress. HENT:      Head: Normocephalic. Nose: Nose normal. No congestion. Mouth/Throat:      Mouth: Mucous membranes are dry. Pharynx: Oropharynx is clear. Cardiovascular:      Rate and Rhythm: Normal rate and regular rhythm. Pulses: Normal pulses. Heart sounds: No murmur heard. Pulmonary:      Effort: Pulmonary effort is normal. No respiratory distress. Abdominal:      General: Bowel sounds are normal. There is no distension. Palpations: Abdomen is soft. Tenderness: There is no abdominal tenderness. Musculoskeletal:         General: Normal range of motion. Cervical back: Normal range of motion. Right lower leg: Edema present. Left lower leg: Edema present. Skin:     General: Skin is warm and dry. Capillary Refill: Capillary refill takes less than 2 seconds.    Neurological: Mental Status: He is alert and oriented to person, place, and time. Mental status is at baseline. Motor: No weakness. Psychiatric:         Mood and Affect: Mood normal.         Speech: Speech normal.         Behavior: Behavior is cooperative. Additional Data:     Labs:  Results from last 7 days   Lab Units 08/18/23  0505 08/17/23  2300   WBC Thousand/uL 7.73 8.23   HEMOGLOBIN g/dL 11.8* 12.9   HEMATOCRIT % 36.9 38.4   PLATELETS Thousands/uL 156 172   NEUTROS PCT %  --  62   LYMPHS PCT %  --  26   MONOS PCT %  --  10   EOS PCT %  --  2     Results from last 7 days   Lab Units 08/18/23  0505   SODIUM mmol/L 140   POTASSIUM mmol/L 3.7   CHLORIDE mmol/L 104   CO2 mmol/L 28   BUN mg/dL 29*   CREATININE mg/dL 1.66*   ANION GAP mmol/L 8   CALCIUM mg/dL 8.6   ALBUMIN g/dL 3.5   TOTAL BILIRUBIN mg/dL 0.38   ALK PHOS U/L 54   ALT U/L 14   AST U/L 16   GLUCOSE RANDOM mg/dL 159*         Results from last 7 days   Lab Units 08/19/23  1126 08/19/23  0703 08/18/23  2008 08/18/23  1543 08/18/23  1350 08/18/23  1133 08/18/23  0738   POC GLUCOSE mg/dl 224* 209* 210* 223* 281* 188* 173*         Results from last 7 days   Lab Units 08/17/23  2300   LACTIC ACID mmol/L 1.0       Lines/Drains:  Invasive Devices     Peripheral Intravenous Line  Duration           Peripheral IV 08/17/23 Left;Proximal;Ventral (anterior) Forearm 1 day                      Imaging: No pertinent imaging reviewed.     Recent Cultures (last 7 days):         Last 24 Hours Medication List:   Current Facility-Administered Medications   Medication Dose Route Frequency Provider Last Rate   • acetaminophen  650 mg Oral Q6H PRN Darrell Nissen Prator, PA-C     • cloNIDine  0.1 mg Oral Q12H [address] Darrell Nissen Prator, PA-C     • fluticasone  1 spray Each Nare Daily GERSON Anguiano     • guaiFENesin  600 mg Oral Q12H [address] GERSON Anguiano     • heparin (porcine)  5,000 Units Subcutaneous [facility] [address] [facility] Prator, PA-C     • HYDROmorphone  0.5 mg Intravenous Q4H PRN Kim Epperson PA-C     • HYDROmorphone  0.2 mg Intravenous Q4H PRN Kim Epperson PA-C     • [START ON 8/20/2023] insulin detemir  15 Units Subcutaneous QAM GERSON Richardson     • insulin lispro  1-6 Units Subcutaneous TID [facility] Kim Epperson PA-C     • insulin lispro  15 Units Subcutaneous TID With Meals GERSON Richardson     • loratadine  10 mg Oral Daily GERSON Richardson     • metoprolol succinate  25 mg Oral Daily Kim Epperson PA-C     • ondansetron  4 mg Intravenous Q6H PRN Kim Epperson PA-C     • ranolazine  500 mg Oral BID Kim Epperson PA-C          Today, Patient Was Seen By: GERSON Richardson    **Please Note: This note may have been constructed using a voice recognition system. **

## 2023-08-19 NOTE — ASSESSMENT & PLAN NOTE
Wt Readings from Last 3 Encounters:   08/18/23 108 kg (237 lb 12.8 oz)   08/07/23 109 kg (240 lb)   08/03/23 108 kg (238 lb)     · Continue Home medications:   · Ranexa, metoprolol, and Plavix  · Demadex initially held in setting of MAO  · Plan to restart in the next 24-48 hrs  · Monitor I/O's and Daily Weights

## 2023-08-19 NOTE — ASSESSMENT & PLAN NOTE
Lab Results   Component Value Date    EGFR 38 08/18/2023    EGFR 31 08/17/2023    EGFR 50 07/26/2023    CREATININE 1.66 (H) 08/18/2023    CREATININE 1.95 (H) 08/17/2023    CREATININE 1.32 (H) 07/26/2023     · Creatinine on admission 1.95, baseline between 1.3 and 1.4  · Creatinine likely elevated in setting of acute pancreatitis  · S/p IVF  · Trend BMP

## 2023-08-19 NOTE — ASSESSMENT & PLAN NOTE
Patient reports having upper abdominal pain for the past 4 days and has gradually gotten worse. He reports it feels exactly like his previous pancreatitis episodes with addition to having lower back pain. 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   It was recommended by the GI team at that time to have an outpatient EUS which is scheduled for next week  · All 3 criteria for pancreatitis with CT abdomen showing acute pancreatitis without fluid collection, lipase 492, and upper abdominal pain  · [place] criteria: 2  · S/p aggressive IVF  · With improvement in symptoms-> Will D/C fluids  · Pain Management  · GI Consulted

## 2023-08-19 NOTE — PLAN OF CARE
Problem: MOBILITY - ADULT  Goal: Maintain or return to baseline ADL function  Description: INTERVENTIONS:  -  Assess patient's ability to carry out ADLs; assess patient's baseline for ADL function and identify physical deficits which impact ability to perform ADLs (bathing, care of mouth/teeth, toileting, grooming, dressing, etc.)  - Assess/evaluate cause of self-care deficits   - Assess range of motion  - Assess patient's mobility; develop plan if impaired  - Assess patient's need for assistive devices and provide as appropriate  - Encourage maximum independence but intervene and supervise when necessary  - Involve family in performance of ADLs  - Assess for home care needs following discharge   - Consider OT consult to assist with ADL evaluation and planning for discharge  - Provide patient education as appropriate  Outcome: Progressing  Goal: Maintains/Returns to pre admission functional level  Description: INTERVENTIONS:  - Perform BMAT or MOVE assessment daily.   - Set and communicate daily mobility goal to care team and patient/family/caregiver. - Collaborate with rehabilitation services on mobility goals if consulted  - Perform Range of Motion 3 times a day. - Reposition patient every 2 hours.   - Dangle patient 3 times a day  - Stand patient 3 times a day  - Ambulate patient 3 times a day  - Out of bed to chair 3 times a day   - Out of bed for meals 3 times a day  - Out of bed for toileting  - Record patient progress and toleration of activity level   Outcome: Progressing     Problem: PAIN - ADULT  Goal: Verbalizes/displays adequate comfort level or baseline comfort level  Description: Interventions:  - Encourage patient to monitor pain and request assistance  - Assess pain using appropriate pain scale  - Administer analgesics based on type and severity of pain and evaluate response  - Implement non-pharmacological measures as appropriate and evaluate response  - Consider cultural and social influences on pain and pain management  - Notify physician/advanced practitioner if interventions unsuccessful or patient reports new pain  Outcome: Progressing     Problem: INFECTION - ADULT  Goal: Absence or prevention of progression during hospitalization  Description: INTERVENTIONS:  - Assess and monitor for signs and symptoms of infection  - Monitor lab/diagnostic results  - Monitor all insertion sites, i.e. indwelling lines, tubes, and drains  - Monitor endotracheal if appropriate and nasal secretions for changes in amount and color  - Fork appropriate cooling/warming therapies per order  - Administer medications as ordered  - Instruct and encourage patient and family to use good hand hygiene technique  - Identify and instruct in appropriate isolation precautions for identified infection/condition  Outcome: Progressing  Goal: Absence of fever/infection during neutropenic period  Description: INTERVENTIONS:  - Monitor WBC    Outcome: Progressing     Problem: SAFETY ADULT  Goal: Maintain or return to baseline ADL function  Description: INTERVENTIONS:  -  Assess patient's ability to carry out ADLs; assess patient's baseline for ADL function and identify physical deficits which impact ability to perform ADLs (bathing, care of mouth/teeth, toileting, grooming, dressing, etc.)  - Assess/evaluate cause of self-care deficits   - Assess range of motion  - Assess patient's mobility; develop plan if impaired  - Assess patient's need for assistive devices and provide as appropriate  - Encourage maximum independence but intervene and supervise when necessary  - Involve family in performance of ADLs  - Assess for home care needs following discharge   - Consider OT consult to assist with ADL evaluation and planning for discharge  - Provide patient education as appropriate  Outcome: Progressing  Goal: Maintains/Returns to pre admission functional level  Description: INTERVENTIONS:  - Perform BMAT or MOVE assessment daily.   - Set and communicate daily mobility goal to care team and patient/family/caregiver. - Collaborate with rehabilitation services on mobility goals if consulted  - Perform Range of Motion 3 times a day. - Reposition patient every 2 hours.   - Dangle patient 3 times a day  - Stand patient 3 times a day  - Ambulate patient 3 times a day  - Out of bed to chair 3 times a day   - Out of bed for meals 3 times a day  - Out of bed for toileting  - Record patient progress and toleration of activity level   Outcome: Progressing  Goal: Patient will remain free of falls  Description: INTERVENTIONS:  - Educate patient/family on patient safety including physical limitations  - Instruct patient to call for assistance with activity   - Consult OT/PT to assist with strengthening/mobility   - Keep Call bell within reach  - Keep bed low and locked with side rails adjusted as appropriate  - Keep care items and personal belongings within reach  - Initiate and maintain comfort rounds  - Make Fall Risk Sign visible to staff  - Offer Toileting every 2 Hours, in advance of need  - Initiate/Maintain bed alarm  - Obtain necessary fall risk management equipment:   - Apply yellow socks and bracelet for high fall risk patients  - Consider moving patient to room near nurses station  Outcome: Progressing     Problem: DISCHARGE PLANNING  Goal: Discharge to home or other facility with appropriate resources  Description: INTERVENTIONS:  - Identify barriers to discharge w/patient and caregiver  - Arrange for needed discharge resources and transportation as appropriate  - Identify discharge learning needs (meds, wound care, etc.)  - Arrange for interpretive services to assist at discharge as needed  - Refer to Case Management Department for coordinating discharge planning if the patient needs post-hospital services based on physician/advanced practitioner order or complex needs related to functional status, cognitive ability, or social support system  Outcome: Progressing     Problem: Knowledge Deficit  Goal: Patient/family/caregiver demonstrates understanding of disease process, treatment plan, medications, and discharge instructions  Description: Complete learning assessment and assess knowledge base.   Interventions:  - Provide teaching at level of understanding  - Provide teaching via preferred learning methods  Outcome: Progressing     Problem: GASTROINTESTINAL - ADULT  Goal: Minimal or absence of nausea and/or vomiting  Description: INTERVENTIONS:  - Administer IV fluids if ordered to ensure adequate hydration  - Maintain NPO status until nausea and vomiting are resolved  - Nasogastric tube if ordered  - Administer ordered antiemetic medications as needed  - Provide nonpharmacologic comfort measures as appropriate  - Advance diet as tolerated, if ordered  - Consider nutrition services referral to assist patient with adequate nutrition and appropriate food choices  Outcome: Progressing

## 2023-08-19 NOTE — ASSESSMENT & PLAN NOTE
Lab Results   Component Value Date    HGBA1C 6.7 (H) 05/10/2023       Recent Labs     08/18/23  1543 08/18/23 2008 08/19/23  0703 08/19/23  1126   POCGLU 223* 210* 209* 224*       Blood Sugar Average: Last 72 hrs:  (P) 215.3637444277052838   · Last A1c 6.7  · Home medications:  · 36 units of insulin detemir in a.m., along with Humalog 20-25 units TID with meals  · Initially held giving n.p.o. status  · With persistent hyperglycemia,   · Plan to restart Levemir but at decreased dosing of 15 units   · Plan to restart Humalog but at decreased dosing of 15 units 3 times daily with meals   · C/w Accu-cheks and SSI  · Hypoglycemic protocol  · Diabetic diet

## 2023-08-19 NOTE — ASSESSMENT & PLAN NOTE
· Last lipid panel approximately a month ago showed triglycerides 118, HDL 53, LDL 47  · Hold Crestor given pancreatitis  · LFT's improving

## 2023-08-19 NOTE — ASSESSMENT & PLAN NOTE
· BP reviewed and now stable  · Home Medications:   · Torsemide, metoprolol, lisinopril, and clonidine   · Will hold torsemide and lisinopril given MAO  · Continue metoprolol and clonidine with hold parameters

## 2023-08-20 PROBLEM — R05.9 COUGH: Status: ACTIVE | Noted: 2023-08-20

## 2023-08-20 LAB
ABO GROUP BLD: NORMAL
ALBUMIN SERPL BCP-MCNC: 3.4 G/DL (ref 3.5–5)
ALP SERPL-CCNC: 57 U/L (ref 34–104)
ALT SERPL W P-5'-P-CCNC: 13 U/L (ref 7–52)
ANION GAP SERPL CALCULATED.3IONS-SCNC: 7 MMOL/L
ANION GAP SERPL CALCULATED.3IONS-SCNC: 7 MMOL/L
AST SERPL W P-5'-P-CCNC: 13 U/L (ref 13–39)
BILIRUB SERPL-MCNC: 0.37 MG/DL (ref 0.2–1)
BNP SERPL-MCNC: 199 PG/ML (ref 0–100)
BUN SERPL-MCNC: 18 MG/DL (ref 5–25)
BUN SERPL-MCNC: 18 MG/DL (ref 5–25)
CALCIUM ALBUM COR SERPL-MCNC: 9.4 MG/DL (ref 8.3–10.1)
CALCIUM SERPL-MCNC: 8.4 MG/DL (ref 8.4–10.2)
CALCIUM SERPL-MCNC: 8.9 MG/DL (ref 8.4–10.2)
CHLORIDE SERPL-SCNC: 104 MMOL/L (ref 96–108)
CHLORIDE SERPL-SCNC: 104 MMOL/L (ref 96–108)
CK SERPL-CCNC: 74 U/L (ref 39–308)
CO2 SERPL-SCNC: 27 MMOL/L (ref 21–32)
CO2 SERPL-SCNC: 29 MMOL/L (ref 21–32)
CREAT SERPL-MCNC: 1.33 MG/DL (ref 0.6–1.3)
CREAT SERPL-MCNC: 1.39 MG/DL (ref 0.6–1.3)
CRP SERPL QL: 42.1 MG/L
D DIMER PPP FEU-MCNC: 1.98 UG/ML FEU
ERYTHROCYTE [DISTWIDTH] IN BLOOD BY AUTOMATED COUNT: 13.1 % (ref 11.6–15.1)
GFR SERPL CREATININE-BSD FRML MDRD: 47 ML/MIN/1.73SQ M
GFR SERPL CREATININE-BSD FRML MDRD: 50 ML/MIN/1.73SQ M
GLUCOSE SERPL-MCNC: 164 MG/DL (ref 65–140)
GLUCOSE SERPL-MCNC: 171 MG/DL (ref 65–140)
GLUCOSE SERPL-MCNC: 217 MG/DL (ref 65–140)
GLUCOSE SERPL-MCNC: 220 MG/DL (ref 65–140)
GLUCOSE SERPL-MCNC: 272 MG/DL (ref 65–140)
GLUCOSE SERPL-MCNC: 71 MG/DL (ref 65–140)
HCT VFR BLD AUTO: 34.5 % (ref 36.5–49.3)
HGB BLD-MCNC: 11.3 G/DL (ref 12–17)
MCH RBC QN AUTO: 31.8 PG (ref 26.8–34.3)
MCHC RBC AUTO-ENTMCNC: 32.8 G/DL (ref 31.4–37.4)
MCV RBC AUTO: 97 FL (ref 82–98)
PLATELET # BLD AUTO: 143 THOUSANDS/UL (ref 149–390)
PMV BLD AUTO: 10.5 FL (ref 8.9–12.7)
POTASSIUM SERPL-SCNC: 4 MMOL/L (ref 3.5–5.3)
POTASSIUM SERPL-SCNC: 4.1 MMOL/L (ref 3.5–5.3)
PROCALCITONIN SERPL-MCNC: 0.07 NG/ML
PROT SERPL-MCNC: 6.3 G/DL (ref 6.4–8.4)
RBC # BLD AUTO: 3.55 MILLION/UL (ref 3.88–5.62)
RH BLD: POSITIVE
SARS-COV-2 RNA RESP QL NAA+PROBE: POSITIVE
SODIUM SERPL-SCNC: 138 MMOL/L (ref 135–147)
SODIUM SERPL-SCNC: 140 MMOL/L (ref 135–147)
WBC # BLD AUTO: 7.03 THOUSAND/UL (ref 4.31–10.16)

## 2023-08-20 PROCEDURE — 83880 ASSAY OF NATRIURETIC PEPTIDE: CPT | Performed by: NURSE PRACTITIONER

## 2023-08-20 PROCEDURE — 86901 BLOOD TYPING SEROLOGIC RH(D): CPT | Performed by: NURSE PRACTITIONER

## 2023-08-20 PROCEDURE — 85027 COMPLETE CBC AUTOMATED: CPT

## 2023-08-20 PROCEDURE — 82948 REAGENT STRIP/BLOOD GLUCOSE: CPT

## 2023-08-20 PROCEDURE — 80048 BASIC METABOLIC PNL TOTAL CA: CPT

## 2023-08-20 PROCEDURE — 86900 BLOOD TYPING SEROLOGIC ABO: CPT | Performed by: NURSE PRACTITIONER

## 2023-08-20 PROCEDURE — 85379 FIBRIN DEGRADATION QUANT: CPT | Performed by: NURSE PRACTITIONER

## 2023-08-20 PROCEDURE — 86140 C-REACTIVE PROTEIN: CPT | Performed by: NURSE PRACTITIONER

## 2023-08-20 PROCEDURE — 80053 COMPREHEN METABOLIC PANEL: CPT | Performed by: NURSE PRACTITIONER

## 2023-08-20 PROCEDURE — 99233 SBSQ HOSP IP/OBS HIGH 50: CPT | Performed by: NURSE PRACTITIONER

## 2023-08-20 PROCEDURE — 84145 PROCALCITONIN (PCT): CPT | Performed by: NURSE PRACTITIONER

## 2023-08-20 PROCEDURE — 82550 ASSAY OF CK (CPK): CPT | Performed by: NURSE PRACTITIONER

## 2023-08-20 PROCEDURE — 87635 SARS-COV-2 COVID-19 AMP PRB: CPT | Performed by: NURSE PRACTITIONER

## 2023-08-20 RX ORDER — GUAIFENESIN 100 MG/5ML
200 SOLUTION ORAL EVERY 4 HOURS PRN
Status: DISCONTINUED | OUTPATIENT
Start: 2023-08-20 | End: 2023-08-22 | Stop reason: HOSPADM

## 2023-08-20 RX ORDER — BENZONATATE 100 MG/1
100 CAPSULE ORAL 3 TIMES DAILY PRN
Status: DISCONTINUED | OUTPATIENT
Start: 2023-08-20 | End: 2023-08-20

## 2023-08-20 RX ADMIN — CLONIDINE HYDROCHLORIDE 0.1 MG: 0.1 TABLET ORAL at 08:14

## 2023-08-20 RX ADMIN — METOPROLOL SUCCINATE 25 MG: 25 TABLET, FILM COATED, EXTENDED RELEASE ORAL at 08:13

## 2023-08-20 RX ADMIN — INSULIN LISPRO 15 UNITS: 100 INJECTION, SOLUTION INTRAVENOUS; SUBCUTANEOUS at 08:12

## 2023-08-20 RX ADMIN — ACETAMINOPHEN 650 MG: 325 TABLET, FILM COATED ORAL at 23:34

## 2023-08-20 RX ADMIN — GUAIFENESIN 200 MG: 200 SOLUTION ORAL at 17:11

## 2023-08-20 RX ADMIN — BENZONATATE 100 MG: 100 CAPSULE ORAL at 09:50

## 2023-08-20 RX ADMIN — GUAIFENESIN 600 MG: 600 TABLET ORAL at 08:14

## 2023-08-20 RX ADMIN — INSULIN LISPRO 15 UNITS: 100 INJECTION, SOLUTION INTRAVENOUS; SUBCUTANEOUS at 11:33

## 2023-08-20 RX ADMIN — INSULIN LISPRO 4 UNITS: 100 INJECTION, SOLUTION INTRAVENOUS; SUBCUTANEOUS at 11:32

## 2023-08-20 RX ADMIN — GUAIFENESIN 600 MG: 600 TABLET ORAL at 21:42

## 2023-08-20 RX ADMIN — HEPARIN SODIUM 5000 UNITS: 5000 INJECTION INTRAVENOUS; SUBCUTANEOUS at 13:33

## 2023-08-20 RX ADMIN — REMDESIVIR 200 MG: 100 INJECTION, POWDER, LYOPHILIZED, FOR SOLUTION INTRAVENOUS at 13:26

## 2023-08-20 RX ADMIN — LORATADINE 10 MG: 10 TABLET ORAL at 08:14

## 2023-08-20 RX ADMIN — ACETAMINOPHEN 650 MG: 325 TABLET, FILM COATED ORAL at 13:40

## 2023-08-20 RX ADMIN — RANOLAZINE 500 MG: 500 TABLET, EXTENDED RELEASE ORAL at 08:13

## 2023-08-20 RX ADMIN — HEPARIN SODIUM 5000 UNITS: 5000 INJECTION INTRAVENOUS; SUBCUTANEOUS at 21:42

## 2023-08-20 RX ADMIN — INSULIN LISPRO 2 UNITS: 100 INJECTION, SOLUTION INTRAVENOUS; SUBCUTANEOUS at 08:15

## 2023-08-20 RX ADMIN — CLONIDINE HYDROCHLORIDE 0.1 MG: 0.1 TABLET ORAL at 21:42

## 2023-08-20 RX ADMIN — INSULIN DETEMIR 8 UNITS: 100 INJECTION, SOLUTION SUBCUTANEOUS at 08:12

## 2023-08-20 RX ADMIN — FLUTICASONE PROPIONATE 1 SPRAY: 50 SPRAY, METERED NASAL at 08:17

## 2023-08-20 RX ADMIN — HEPARIN SODIUM 5000 UNITS: 5000 INJECTION INTRAVENOUS; SUBCUTANEOUS at 06:04

## 2023-08-20 RX ADMIN — RANOLAZINE 500 MG: 500 TABLET, EXTENDED RELEASE ORAL at 17:12

## 2023-08-20 NOTE — ASSESSMENT & PLAN NOTE
· Presented with worsening upper abdominal pain x 4 days similar to prior episodes of pancreatitis   · Known history of acute recurrent pancreatitis, last hospitalization 7/24 showed an MRI with gallstones sludge and a possible pancreatic ductal mass approximately 7 mm.   It was recommended by the GI team at that time to have an outpatient EUS which is scheduled for 8/22/2023  · CT abdomen showing acute pancreatitis without fluid collection  · Lipase 492-->272  · S/p  IVF with improvement of symptoms, tolerating diet  · Pain control  · GI recommendations appreciated

## 2023-08-20 NOTE — ASSESSMENT & PLAN NOTE
Wt Readings from Last 3 Encounters:   08/18/23 108 kg (237 lb 12.8 oz)   08/07/23 109 kg (240 lb)   08/03/23 108 kg (238 lb)     · Does not appear volume overloaded  · Continue Ranexa and metoprolol  · Resume torsemide upon discharge  · Plavix was on hold for planned outpatient EUS on Tuesday (last dose was 8/16/2023, needs to be held for 5 days pre procedure).  Will resume as procedure is re-scheduled for September as per discussion with GI  · Monitor I/O's and daily weights

## 2023-08-20 NOTE — ASSESSMENT & PLAN NOTE
· BP reviewed and stable  · Receiving metoprolol and clonidine in hospital  · Resume home torsemide and lisinopril on discharge

## 2023-08-20 NOTE — ASSESSMENT & PLAN NOTE
Wt Readings from Last 3 Encounters:   08/18/23 108 kg (237 lb 12.8 oz)   08/07/23 109 kg (240 lb)   08/03/23 108 kg (238 lb)     · Does not appear volume overloaded  · Continue Ranexa and metoprolol  · Resume torsemide upon discharge  · Plavix is on hold for planned outpatient EUS on Tuesday (last dose was 8/16/2023, needs to be held for 5 days pre procedure)  · Monitor I/O's and daily weights

## 2023-08-20 NOTE — ASSESSMENT & PLAN NOTE
· Reports 1 day of cough, chest congestion, nasal congestion, and myalgias  · Temperature this morning 99.5  · Check COVID

## 2023-08-20 NOTE — QUICK NOTE
Patient COVID positive. Notified patient at bedside. Called wife to update. Oxygen saturation noted to be 91% currently. Due to multiple co-morbidities, will check COVID labs and initiate treatment with remdesevir.  Continue heparin 5000 units SQ TID

## 2023-08-20 NOTE — ASSESSMENT & PLAN NOTE
Lab Results   Component Value Date    HGBA1C 6.7 (H) 05/10/2023       Recent Labs     08/19/23  1536 08/19/23  1632 08/19/23  1955 08/20/23  0708   POCGLU 56* 59* 221* 220*       Blood Sugar Average: Last 72 hrs:  (P) [id number]   · Last hemoglobin A1c: 6.7  · Home medications:  · Insulin detemir 36 units in a.m., along with Humalog 20-25 units TID with meals  · Initially held due to n.p.o. status  · With persistent hyperglycemia, intermittent hypoglycemia  · Contionue Levemir but at decreased dosing of 15 units   · Will decrease Humalog to 8 units 3 times daily with meals   · Continue Accu-cheks and SSI  · Hypoglycemia protocol  · Diabetic diet

## 2023-08-20 NOTE — ASSESSMENT & PLAN NOTE
· Last lipid panel approximately a month ago showed triglycerides 118, HDL 53, LDL 47  · Crestor on hold due to pancreatitis

## 2023-08-20 NOTE — PROGRESS NOTES
[address] [facility]  Progress Note  Name: Alla Yost  MRN: [id number]  Unit/Bed#: -01 I Date of Admission: 8/17/2023   Date of Service: 8/20/2023 I Hospital Day: 2    Assessment/Plan      * Acute recurrent pancreatitis  Assessment & Plan  · Presented with worsening upper abdominal pain x 4 days similar to prior episodes of pancreatitis   · Known history of acute recurrent pancreatitis, last hospitalization 7/24 showed an MRI with gallstones sludge and a possible pancreatic ductal mass approximately 7 mm.   It was recommended by the GI team at that time to have an outpatient EUS which is scheduled for 8/22/2023  · CT abdomen showing acute pancreatitis without fluid collection  · Lipase 492-->272  · S/p  IVF with improvement of symptoms, tolerating diet  · Pain control  · GI recommendations appreciated    (HFpEF) heart failure with preserved ejection fraction (HCC)  Assessment & Plan  Wt Readings from Last 3 Encounters:   08/18/23 108 kg (237 lb 12.8 oz)   08/07/23 109 kg (240 lb)   08/03/23 108 kg (238 lb)     · Does not appear volume overloaded  · Continue Ranexa and metoprolol  · Resume torsemide upon discharge  · Plavix is on hold for planned outpatient EUS on Tuesday (last dose was 8/16/2023, needs to be held for 5 days pre procedure)  · Monitor I/O's and daily weights    Type 2 diabetes mellitus with kidney complication, with long-term current use of insulin [facility])  Assessment & Plan  Lab Results   Component Value Date    HGBA1C 6.7 (H) 05/10/2023       Recent Labs     08/19/23  1536 08/19/23  1632 08/19/23  1955 08/20/23  0708   POCGLU 56* 59* 221* 220*       Blood Sugar Average: Last 72 hrs:  (P) [id number]   · Last hemoglobin A1c: 6.7  · Home medications:  · Insulin detemir 36 units in a.m., along with Humalog 20-25 units TID with meals  · Initially held due to n.p.o. status  · With persistent hyperglycemia, intermittent hypoglycemia  · Contionue Levemir but at decreased dosing of 15 units   · Will decrease Humalog to 8 units 3 times daily with meals   · Continue Accu-cheks and SSI  · Hypoglycemia protocol  · Diabetic diet      Acute-on-chronic kidney injury [facility])  Assessment & Plan  Lab Results   Component Value Date    EGFR 50 08/20/2023    EGFR 46 08/19/2023    EGFR 38 08/18/2023    CREATININE 1.33 (H) 08/20/2023    CREATININE 1.41 (H) 08/19/2023    CREATININE 1.66 (H) 08/18/2023     · Creatinine on admission 1.95  · Baseline 1.3-1.4  · Creatinine likely elevated in setting of acute pancreatitis  · S/p IVF  · RESOLVED    Cough  Assessment & Plan  · Reports 1 day of cough, chest congestion, nasal congestion, and myalgias  · Temperature this morning 99.5  · Check COVID      Mixed hyperlipidemia  Assessment & Plan  · Last lipid panel approximately a month ago showed triglycerides 118, HDL 53, LDL 47  · Crestor on hold due to pancreatitis      HTN (hypertension), benign  Assessment & Plan  · BP reviewed and stable  · Receiving metoprolol and clonidine in hospital  · Resume home torsemide and lisinopril on discharge          VTE Pharmacologic Prophylaxis: VTE Score: 4 High Risk (Score >/= 5) - Pharmacological DVT Prophylaxis Ordered: heparin. Sequential Compression Devices Ordered. Patient Centered Rounds: I performed bedside rounds with nursing staff today. Education and Discussions with Family / Patient: Patient declined call to . Total Time Spent on Date of Encounter in care of patient: 45 minutes This time was spent on one or more of the following: performing physical exam; counseling and coordination of care; obtaining or reviewing history; documenting in the medical record; reviewing/ordering tests, medications or procedures; communicating with other healthcare professionals and discussing with patient's family/caregivers.     Current Length of Stay: 2 day(s)  Current Patient Status: Inpatient   Certification Statement: The patient will continue to require additional inpatient hospital stay due to Pancreatitis, new onset cough and myalgias. Discharge Plan: Anticipate discharge later today or tomorrow to home. Code Status: Level 1 - Full Code    Subjective:   Patient seen and examined. Says he does not have abdominal pain currently. He does report congestion, cough, myalgias, and was noted to have a 99.5 temperature this morning. Objective:     Vitals:   Temp (24hrs), Av.5 °F (36.9 °C), Min:97.9 °F (36.6 °C), Max:99.5 °F (37.5 °C)    Temp:  [97.9 °F (36.6 °C)-99.5 °F (37.5 °C)] 99.5 °F (37.5 °C)  HR:  [60-81] 81  Resp:  [16-18] 16  BP: (128-163)/(60-92) 157/75  SpO2:  [91 %-96 %] 91 %  Body mass index is 38.38 kg/m². Input and Output Summary (last 24 hours): Intake/Output Summary (Last 24 hours) at 2023 1112  Last data filed at 2023 0443  Gross per 24 hour   Intake --   Output 650 ml   Net -650 ml       Physical Exam:   Physical Exam  Vitals and nursing note reviewed. Constitutional:       General: He is not in acute distress. Appearance: He is obese. HENT:      Head: Normocephalic and atraumatic. Nose: Nose normal.      Mouth/Throat:      Mouth: Mucous membranes are moist.      Pharynx: Oropharynx is clear. Eyes:      Extraocular Movements: Extraocular movements intact. Pupils: Pupils are equal, round, and reactive to light. Cardiovascular:      Rate and Rhythm: Normal rate and regular rhythm. Pulmonary:      Effort: Pulmonary effort is normal. No respiratory distress. Breath sounds: Normal breath sounds. No wheezing or rhonchi. Abdominal:      General: Bowel sounds are normal.      Palpations: Abdomen is soft. Tenderness: There is no abdominal tenderness. There is no guarding. Musculoskeletal:      Cervical back: Neck supple. Right lower leg: No edema. Left lower leg: No edema. Skin:     General: Skin is warm and dry. Capillary Refill: Capillary refill takes less than 2 seconds. Neurological:      General: No focal deficit present. Mental Status: He is alert and oriented to person, place, and time. Additional Data:     Labs:  Results from last 7 days   Lab Units 08/20/23  0437 08/18/23  0505 08/17/23  2300   WBC Thousand/uL 7.03   < > 8.23   HEMOGLOBIN g/dL 11.3*   < > 12.9   HEMATOCRIT % 34.5*   < > 38.4   PLATELETS Thousands/uL 143*   < > 172   NEUTROS PCT %  --   --  62   LYMPHS PCT %  --   --  26   MONOS PCT %  --   --  10   EOS PCT %  --   --  2    < > = values in this interval not displayed. Results from last 7 days   Lab Units 08/20/23  0437 08/19/23  1453 08/18/23  0505   SODIUM mmol/L 138   < > 140   POTASSIUM mmol/L 4.1   < > 3.7   CHLORIDE mmol/L 104   < > 104   CO2 mmol/L 27   < > 28   BUN mg/dL 18   < > 29*   CREATININE mg/dL 1.33*   < > 1.66*   ANION GAP mmol/L 7   < > 8   CALCIUM mg/dL 8.4   < > 8.6   ALBUMIN g/dL  --   --  3.5   TOTAL BILIRUBIN mg/dL  --   --  0.38   ALK PHOS U/L  --   --  54   ALT U/L  --   --  14   AST U/L  --   --  16   GLUCOSE RANDOM mg/dL 217*   < > 159*    < > = values in this interval not displayed.          Results from last 7 days   Lab Units 08/20/23  0708 08/19/23  1955 08/19/23  1632 08/19/23  1536 08/19/23  1126 08/19/23  0703 08/18/23 2008 08/18/23  1543 08/18/23  1350 08/18/23  1133 08/18/23  0738   POC GLUCOSE mg/dl 220* 221* 59* 56* 224* 209* 210* 223* 281* 188* 173*         Results from last 7 days   Lab Units 08/17/23  2300   LACTIC ACID mmol/L 1.0       Lines/Drains:  Invasive Devices     Peripheral Intravenous Line  Duration           Peripheral IV 08/17/23 Left;Proximal;Ventral (anterior) Forearm 2 days                Recent Cultures (last 7 days):         Last 24 Hours Medication List:   Current Facility-Administered Medications   Medication Dose Route Frequency Provider Last Rate   • acetaminophen  650 mg Oral Q6H PRN Diann Epperson PA-C     • benzonatate  100 mg Oral TID PRN Sheeba Terry MD • cloNIDine  0.1 mg Oral Q12H [address] Kim Epperson PA-C     • fluticasone  1 spray Each Nare Daily GERSON Aragon     • guaiFENesin  600 mg Oral Q12H [address] GERSON Aragon     • heparin (porcine)  5,000 Units [facility] [place]     • HYDROmorphone  0.5 mg Intravenous Q4H PRN Kim Epperson PA-C     • HYDROmorphone  0.2 mg Intravenous Q4H PRN Kim Epperson PA-C     • insulin detemir  8 Units Subcutaneous QAM GERSON Aragon     • insulin lispro  1-6 Units Subcutaneous TID [facility] Kim Epperson PA-C     • insulin lispro  15 Units Subcutaneous TID With Meals GERSON Aragon     • loratadine  10 mg Oral Daily GERSON Aragon     • metoprolol succinate  25 mg Oral Daily Kim Epperson PA-C     • ondansetron  4 mg Intravenous Q6H PRN Kim Epperson PA-C     • ranolazine  500 mg Oral BID Kim Epperson PA-C          Today, Patient Was Seen By: GERSON Subramanian    **Please Note: This note may have been constructed using a voice recognition system. **

## 2023-08-20 NOTE — ASSESSMENT & PLAN NOTE
Lab Results   Component Value Date    EGFR 50 08/20/2023    EGFR 46 08/19/2023    EGFR 38 08/18/2023    CREATININE 1.33 (H) 08/20/2023    CREATININE 1.41 (H) 08/19/2023    CREATININE 1.66 (H) 08/18/2023     · Creatinine on admission 1.95  · Baseline 1.3-1.4  · Creatinine likely elevated in setting of acute pancreatitis  · S/p IVF  · RESOLVED

## 2023-08-20 NOTE — PLAN OF CARE
Problem: MOBILITY - ADULT  Goal: Maintain or return to baseline ADL function  Description: INTERVENTIONS:  -  Assess patient's ability to carry out ADLs; assess patient's baseline for ADL function and identify physical deficits which impact ability to perform ADLs (bathing, care of mouth/teeth, toileting, grooming, dressing, etc.)  - Assess/evaluate cause of self-care deficits   - Assess range of motion  - Assess patient's mobility; develop plan if impaired  - Assess patient's need for assistive devices and provide as appropriate  - Encourage maximum independence but intervene and supervise when necessary  - Involve family in performance of ADLs  - Assess for home care needs following discharge   - Consider OT consult to assist with ADL evaluation and planning for discharge  - Provide patient education as appropriate  Outcome: Progressing  Goal: Maintains/Returns to pre admission functional level  Description: INTERVENTIONS:  - Perform BMAT or MOVE assessment daily.   - Set and communicate daily mobility goal to care team and patient/family/caregiver. - Collaborate with rehabilitation services on mobility goals if consulted  - Perform Range of Motion 3 times a day. - Reposition patient every 2 hours.   - Dangle patient 3 times a day  - Stand patient 3 times a day  - Ambulate patient 3 times a day  - Out of bed to chair 3 times a day   - Out of bed for meals 3 times a day  - Out of bed for toileting  - Record patient progress and toleration of activity level   Outcome: Progressing     Problem: PAIN - ADULT  Goal: Verbalizes/displays adequate comfort level or baseline comfort level  Description: Interventions:  - Encourage patient to monitor pain and request assistance  - Assess pain using appropriate pain scale  - Administer analgesics based on type and severity of pain and evaluate response  - Implement non-pharmacological measures as appropriate and evaluate response  - Consider cultural and social influences on pain and pain management  - Notify physician/advanced practitioner if interventions unsuccessful or patient reports new pain  Outcome: Progressing     Problem: INFECTION - ADULT  Goal: Absence or prevention of progression during hospitalization  Description: INTERVENTIONS:  - Assess and monitor for signs and symptoms of infection  - Monitor lab/diagnostic results  - Monitor all insertion sites, i.e. indwelling lines, tubes, and drains  - Monitor endotracheal if appropriate and nasal secretions for changes in amount and color  - [place] appropriate cooling/warming therapies per order  - Administer medications as ordered  - Instruct and encourage patient and family to use good hand hygiene technique  - Identify and instruct in appropriate isolation precautions for identified infection/condition  Outcome: Progressing  Goal: Absence of fever/infection during neutropenic period  Description: INTERVENTIONS:  - Monitor WBC    Outcome: Progressing     Problem: SAFETY ADULT  Goal: Maintain or return to baseline ADL function  Description: INTERVENTIONS:  -  Assess patient's ability to carry out ADLs; assess patient's baseline for ADL function and identify physical deficits which impact ability to perform ADLs (bathing, care of mouth/teeth, toileting, grooming, dressing, etc.)  - Assess/evaluate cause of self-care deficits   - Assess range of motion  - Assess patient's mobility; develop plan if impaired  - Assess patient's need for assistive devices and provide as appropriate  - Encourage maximum independence but intervene and supervise when necessary  - Involve family in performance of ADLs  - Assess for home care needs following discharge   - Consider OT consult to assist with ADL evaluation and planning for discharge  - Provide patient education as appropriate  Outcome: Progressing  Goal: Maintains/Returns to pre admission functional level  Description: INTERVENTIONS:  - Perform BMAT or MOVE assessment daily.   - Set and communicate daily mobility goal to care team and patient/family/caregiver. - Collaborate with rehabilitation services on mobility goals if consulted  - Perform Range of Motion 3 times a day. - Reposition patient every 2 hours.   - Dangle patient 3 times a day  - Stand patient 3 times a day  - Ambulate patient 3 times a day  - Out of bed to chair 3 times a day   - Out of bed for meals 3 times a day  - Out of bed for toileting  - Record patient progress and toleration of activity level   Outcome: Progressing  Goal: Patient will remain free of falls  Description: INTERVENTIONS:  - Educate patient/family on patient safety including physical limitations  - Instruct patient to call for assistance with activity   - Consult OT/PT to assist with strengthening/mobility   - Keep Call bell within reach  - Keep bed low and locked with side rails adjusted as appropriate  - Keep care items and personal belongings within reach  - Initiate and maintain comfort rounds  - Make Fall Risk Sign visible to staff  - Offer Toileting every 2 Hours, in advance of need  - Initiate/Maintain bed alarm  - Obtain necessary fall risk management equipment:   - Apply yellow socks and bracelet for high fall risk patients  - Consider moving patient to room near nurses station  Outcome: Progressing     Problem: DISCHARGE PLANNING  Goal: Discharge to home or other facility with appropriate resources  Description: INTERVENTIONS:  - Identify barriers to discharge w/patient and caregiver  - Arrange for needed discharge resources and transportation as appropriate  - Identify discharge learning needs (meds, wound care, etc.)  - Arrange for interpretive services to assist at discharge as needed  - Refer to Case Management Department for coordinating discharge planning if the patient needs post-hospital services based on physician/advanced practitioner order or complex needs related to functional status, cognitive ability, or social support system  Outcome: Progressing     Problem: Knowledge Deficit  Goal: Patient/family/caregiver demonstrates understanding of disease process, treatment plan, medications, and discharge instructions  Description: Complete learning assessment and assess knowledge base.   Interventions:  - Provide teaching at level of understanding  - Provide teaching via preferred learning methods  Outcome: Progressing     Problem: GASTROINTESTINAL - ADULT  Goal: Minimal or absence of nausea and/or vomiting  Description: INTERVENTIONS:  - Administer IV fluids if ordered to ensure adequate hydration  - Maintain NPO status until nausea and vomiting are resolved  - Nasogastric tube if ordered  - Administer ordered antiemetic medications as needed  - Provide nonpharmacologic comfort measures as appropriate  - Advance diet as tolerated, if ordered  - Consider nutrition services referral to assist patient with adequate nutrition and appropriate food choices  Outcome: Progressing

## 2023-08-21 ENCOUNTER — APPOINTMENT (INPATIENT)
Dept: RADIOLOGY | Facility: HOSPITAL | Age: 83
DRG: 438 | End: 2023-08-21
Payer: MEDICARE

## 2023-08-21 ENCOUNTER — TELEPHONE (OUTPATIENT)
Dept: GASTROENTEROLOGY | Facility: CLINIC | Age: 83
End: 2023-08-21

## 2023-08-21 PROBLEM — U07.1 COVID: Status: ACTIVE | Noted: 2023-08-21

## 2023-08-21 PROBLEM — R09.02 HYPOXIA: Status: ACTIVE | Noted: 2023-08-21

## 2023-08-21 LAB
ALBUMIN SERPL BCP-MCNC: 3.4 G/DL (ref 3.5–5)
ALP SERPL-CCNC: 54 U/L (ref 34–104)
ALT SERPL W P-5'-P-CCNC: 11 U/L (ref 7–52)
ANION GAP SERPL CALCULATED.3IONS-SCNC: 7 MMOL/L
AST SERPL W P-5'-P-CCNC: 13 U/L (ref 13–39)
BASOPHILS # BLD AUTO: 0.02 THOUSANDS/ÂΜL (ref 0–0.1)
BASOPHILS NFR BLD AUTO: 0 % (ref 0–1)
BILIRUB SERPL-MCNC: 0.35 MG/DL (ref 0.2–1)
BUN SERPL-MCNC: 18 MG/DL (ref 5–25)
CALCIUM ALBUM COR SERPL-MCNC: 9.2 MG/DL (ref 8.3–10.1)
CALCIUM SERPL-MCNC: 8.7 MG/DL (ref 8.4–10.2)
CHLORIDE SERPL-SCNC: 103 MMOL/L (ref 96–108)
CO2 SERPL-SCNC: 31 MMOL/L (ref 21–32)
CREAT SERPL-MCNC: 1.46 MG/DL (ref 0.6–1.3)
CRP SERPL QL: 89.7 MG/L
EOSINOPHIL # BLD AUTO: 0.13 THOUSAND/ÂΜL (ref 0–0.61)
EOSINOPHIL NFR BLD AUTO: 2 % (ref 0–6)
ERYTHROCYTE [DISTWIDTH] IN BLOOD BY AUTOMATED COUNT: 13.6 % (ref 11.6–15.1)
GFR SERPL CREATININE-BSD FRML MDRD: 44 ML/MIN/1.73SQ M
GLUCOSE SERPL-MCNC: 127 MG/DL (ref 65–140)
GLUCOSE SERPL-MCNC: 131 MG/DL (ref 65–140)
GLUCOSE SERPL-MCNC: 202 MG/DL (ref 65–140)
GLUCOSE SERPL-MCNC: 208 MG/DL (ref 65–140)
GLUCOSE SERPL-MCNC: 243 MG/DL (ref 65–140)
HCT VFR BLD AUTO: 35.8 % (ref 36.5–49.3)
HGB BLD-MCNC: 11.5 G/DL (ref 12–17)
IMM GRANULOCYTES # BLD AUTO: 0.01 THOUSAND/UL (ref 0–0.2)
IMM GRANULOCYTES NFR BLD AUTO: 0 % (ref 0–2)
LYMPHOCYTES # BLD AUTO: 1.43 THOUSANDS/ÂΜL (ref 0.6–4.47)
LYMPHOCYTES NFR BLD AUTO: 24 % (ref 14–44)
MAGNESIUM SERPL-MCNC: 1.8 MG/DL (ref 1.9–2.7)
MCH RBC QN AUTO: 31.8 PG (ref 26.8–34.3)
MCHC RBC AUTO-ENTMCNC: 32.1 G/DL (ref 31.4–37.4)
MCV RBC AUTO: 99 FL (ref 82–98)
MONOCYTES # BLD AUTO: 0.66 THOUSAND/ÂΜL (ref 0.17–1.22)
MONOCYTES NFR BLD AUTO: 11 % (ref 4–12)
NEUTROPHILS # BLD AUTO: 3.63 THOUSANDS/ÂΜL (ref 1.85–7.62)
NEUTS SEG NFR BLD AUTO: 63 % (ref 43–75)
NRBC BLD AUTO-RTO: 0 /100 WBCS
PLATELET # BLD AUTO: 137 THOUSANDS/UL (ref 149–390)
PMV BLD AUTO: 10.7 FL (ref 8.9–12.7)
POTASSIUM SERPL-SCNC: 3.8 MMOL/L (ref 3.5–5.3)
PROT SERPL-MCNC: 6.2 G/DL (ref 6.4–8.4)
RBC # BLD AUTO: 3.62 MILLION/UL (ref 3.88–5.62)
SODIUM SERPL-SCNC: 141 MMOL/L (ref 135–147)
WBC # BLD AUTO: 5.88 THOUSAND/UL (ref 4.31–10.16)

## 2023-08-21 PROCEDURE — 85025 COMPLETE CBC W/AUTO DIFF WBC: CPT | Performed by: NURSE PRACTITIONER

## 2023-08-21 PROCEDURE — 83735 ASSAY OF MAGNESIUM: CPT | Performed by: NURSE PRACTITIONER

## 2023-08-21 PROCEDURE — 80053 COMPREHEN METABOLIC PANEL: CPT | Performed by: NURSE PRACTITIONER

## 2023-08-21 PROCEDURE — 99232 SBSQ HOSP IP/OBS MODERATE 35: CPT | Performed by: NURSE PRACTITIONER

## 2023-08-21 PROCEDURE — 82948 REAGENT STRIP/BLOOD GLUCOSE: CPT

## 2023-08-21 PROCEDURE — 86140 C-REACTIVE PROTEIN: CPT | Performed by: NURSE PRACTITIONER

## 2023-08-21 PROCEDURE — 71045 X-RAY EXAM CHEST 1 VIEW: CPT

## 2023-08-21 RX ORDER — CLOPIDOGREL BISULFATE 75 MG/1
75 TABLET ORAL DAILY
Status: DISCONTINUED | OUTPATIENT
Start: 2023-08-21 | End: 2023-08-22 | Stop reason: HOSPADM

## 2023-08-21 RX ADMIN — RANOLAZINE 500 MG: 500 TABLET, EXTENDED RELEASE ORAL at 17:08

## 2023-08-21 RX ADMIN — REMDESIVIR 100 MG: 100 INJECTION, POWDER, LYOPHILIZED, FOR SOLUTION INTRAVENOUS at 13:07

## 2023-08-21 RX ADMIN — ACETAMINOPHEN 650 MG: 325 TABLET, FILM COATED ORAL at 08:41

## 2023-08-21 RX ADMIN — LORATADINE 10 MG: 10 TABLET ORAL at 08:13

## 2023-08-21 RX ADMIN — FLUTICASONE PROPIONATE 1 SPRAY: 50 SPRAY, METERED NASAL at 09:22

## 2023-08-21 RX ADMIN — HEPARIN SODIUM 5000 UNITS: 5000 INJECTION INTRAVENOUS; SUBCUTANEOUS at 13:07

## 2023-08-21 RX ADMIN — HEPARIN SODIUM 5000 UNITS: 5000 INJECTION INTRAVENOUS; SUBCUTANEOUS at 21:04

## 2023-08-21 RX ADMIN — GUAIFENESIN 600 MG: 600 TABLET ORAL at 08:13

## 2023-08-21 RX ADMIN — INSULIN LISPRO 15 UNITS: 100 INJECTION, SOLUTION INTRAVENOUS; SUBCUTANEOUS at 08:14

## 2023-08-21 RX ADMIN — INSULIN LISPRO 2 UNITS: 100 INJECTION, SOLUTION INTRAVENOUS; SUBCUTANEOUS at 08:12

## 2023-08-21 RX ADMIN — HEPARIN SODIUM 5000 UNITS: 5000 INJECTION INTRAVENOUS; SUBCUTANEOUS at 05:12

## 2023-08-21 RX ADMIN — CLOPIDOGREL BISULFATE 75 MG: 75 TABLET ORAL at 17:08

## 2023-08-21 RX ADMIN — INSULIN LISPRO 3 UNITS: 100 INJECTION, SOLUTION INTRAVENOUS; SUBCUTANEOUS at 11:55

## 2023-08-21 RX ADMIN — RANOLAZINE 500 MG: 500 TABLET, EXTENDED RELEASE ORAL at 08:13

## 2023-08-21 RX ADMIN — GUAIFENESIN 600 MG: 600 TABLET ORAL at 20:55

## 2023-08-21 RX ADMIN — METOPROLOL SUCCINATE 25 MG: 25 TABLET, FILM COATED, EXTENDED RELEASE ORAL at 08:13

## 2023-08-21 RX ADMIN — INSULIN DETEMIR 8 UNITS: 100 INJECTION, SOLUTION SUBCUTANEOUS at 08:12

## 2023-08-21 RX ADMIN — CLONIDINE HYDROCHLORIDE 0.1 MG: 0.1 TABLET ORAL at 20:59

## 2023-08-21 RX ADMIN — INSULIN LISPRO 15 UNITS: 100 INJECTION, SOLUTION INTRAVENOUS; SUBCUTANEOUS at 17:08

## 2023-08-21 RX ADMIN — INSULIN LISPRO 15 UNITS: 100 INJECTION, SOLUTION INTRAVENOUS; SUBCUTANEOUS at 11:51

## 2023-08-21 RX ADMIN — CLONIDINE HYDROCHLORIDE 0.1 MG: 0.1 TABLET ORAL at 08:13

## 2023-08-21 NOTE — CASE MANAGEMENT
Case Management Assessment    Patient name Radha Bustamante  Location /-16 MRN [id number]  : 1940 Date 2023       Current Admission Date: 2023  Current Admission Diagnosis:COVID   Patient Active Problem List    Diagnosis Date Noted   • Hypoxia 2023   • COVID 2023   • Cough 2023   • Acute-on-chronic kidney injury ([address]) 2023   • Chest pain 2023   • Diarrhea due to malabsorption 2023   • Platelets decreased ([address]) 2023   • Left arm swelling 2023   • Acute recurrent pancreatitis 2023   • Epigastric pain 2023   • Moderate aortic stenosis 2023   • Stage 3a chronic kidney disease (CKD) ([address]) 2023   • Venous stasis dermatitis of both lower extremities 2022   • Type 2 diabetes mellitus with kidney complication, with long-term current use of insulin ([address]) 2022   • History of recent hospitalization 2022   • GERD without esophagitis 2022   • Mixed hyperlipidemia 2022   • Coronary artery disease involving native coronary artery without angina pectoris 2022   • ED (erectile dysfunction) 2022   • Obesity, morbid (HCC)    • PAULA (obstructive sleep apnea) 2020   • HTN (hypertension), benign 2020   • (HFpEF) heart failure with preserved ejection fraction ([address]) 2020      LOS (days): 3  Geometric Mean LOS (GMLOS) (days): 4.30  Days to GMLOS:0.7     OBJECTIVE:  PATIENT READMITTED TO HOSPITAL  Risk of Unplanned Readmission Score: 27.59         Current admission status: Inpatient       Preferred Pharmacy:   [facility], [address] [place] [address] [facility]  Phone: [phone] Fax: [phone]    Primary Care Provider: Lady Priscila MD    Primary Insurance: MEDICARE  Secondary Insurance: BLUE CROSS    ASSESSMENT:  Jie Proxies    There are no active Health Care Proxies on file.                  Readmission Root Cause  30 Day Readmission: Yes  Who directed you to return to the hospital?: Self  Did you understand whom to contact if you had questions or problems?: Yes  Did you get your prescriptions before you left the hospital?: Yes  Were you able to get your prescriptions filled when you left the hospital?: Yes  Did you take your medications as prescribed?: Yes  Were you able to get to your follow-up appointments?: No  Reason[de-identified] Readmitted prior to appointment  During previous admission, was a post-acute recommendation made?: No  Patient was readmitted due to: pnceritis and covid  Action Plan: medical stability and follow up providers    Patient Information  Admitted from[de-identified] [place]  Mental Status: Alert  During Assessment patient was accompanied by: Not accompanied during assessment  Assessment information provided by[de-identified] Patient  Support Systems: Spouse/significant other  Home entry access options.  Select all that apply.: No steps to enter home  Type of Current Residence: Apartment  Floor Level: 3 (elevator)  Upon entering residence, is there a bedroom on the main floor (no further steps)?: Yes  Upon entering residence, is there a bathroom on the main floor (no further steps)?: Yes  In the last 12 months, was there a time when you were not able to pay the mortgage or rent on time?: No  In the last 12 months, was there a time when you did not have a steady place to sleep or slept in a shelter (including now)?: No  Homeless/housing insecurity resource given?: N/A  Living Arrangements: Lives w/ Spouse/significant other    Activities of Daily Living Prior to Admission  Functional Status: Independent  Completes ADLs independently?: Yes  Ambulates independently?: Yes  Does patient use assisted devices?: No  Does patient currently own DME?: No  Does patient have a history of Outpatient Therapy (PT/OT)?: Yes  Does the patient have a history of Short-Term Rehab?: Yes  Does patient have a history of HHC?: No  Does patient currently have [facility]?: No         Patient Information Continued  Income Source: Pension/senior care  Does patient have prescription coverage?: Yes  Within the past 12 months, you worried that your food would run out before you got the money to buy more.: Never true  Within the past 12 months, the food you bought just didn't last and you didn't have money to get more.: Never true  Food insecurity resource given?: N/A  Does patient receive dialysis treatments?: No  Does patient have a history of substance abuse?: No  Does patient have a history of Mental Health Diagnosis?: No         Means of Transportation  In the past 12 months, has lack of transportation kept you from medical appointments or from getting medications?: No  In the past 12 months, has lack of transportation kept you from meetings, work, or from getting things needed for daily living?: No  Was application for public transport provided?: N/A

## 2023-08-21 NOTE — ASSESSMENT & PLAN NOTE
Lab Results   Component Value Date    EGFR 44 08/21/2023    EGFR 47 08/20/2023    EGFR 50 08/20/2023    CREATININE 1.46 (H) 08/21/2023    CREATININE 1.39 (H) 08/20/2023    CREATININE 1.33 (H) 08/20/2023     · Creatinine on admission 1.95  · Baseline 1.3-1.4  · Creatinine likely elevated in setting of acute pancreatitis  · S/p IVF  · RESOLVED Sinus tachycardia Sinus tachycardia Sinus tachycardia

## 2023-08-21 NOTE — ASSESSMENT & PLAN NOTE
· Presented with worsening upper abdominal pain x 4 days similar to prior episodes of pancreatitis   · Known history of acute recurrent pancreatitis, last hospitalization 7/24 showed an MRI with gallstones sludge and a possible pancreatic ductal mass approximately 7 mm.   It was recommended by the GI team at that time to have an outpatient EUS which is scheduled for 8/22/2023; due to current Covid infection, GI indicated they would re-schedule outpatient EUS in September; patient aware   · CT abdomen showing acute pancreatitis without fluid collection  · Lipase 492-->272  · S/p  IVF with improvement of symptoms, tolerating diet  · Pain control  · GI recommendations appreciated

## 2023-08-21 NOTE — PLAN OF CARE
Problem: MOBILITY - ADULT  Goal: Maintain or return to baseline ADL function  Description: INTERVENTIONS:  -  Assess patient's ability to carry out ADLs; assess patient's baseline for ADL function and identify physical deficits which impact ability to perform ADLs (bathing, care of mouth/teeth, toileting, grooming, dressing, etc.)  - Assess/evaluate cause of self-care deficits   - Assess range of motion  - Assess patient's mobility; develop plan if impaired  - Assess patient's need for assistive devices and provide as appropriate  - Encourage maximum independence but intervene and supervise when necessary  - Involve family in performance of ADLs  - Assess for home care needs following discharge   - Consider OT consult to assist with ADL evaluation and planning for discharge  - Provide patient education as appropriate  Outcome: Progressing  Goal: Maintains/Returns to pre admission functional level  Description: INTERVENTIONS:  - Perform BMAT or MOVE assessment daily.   - Set and communicate daily mobility goal to care team and patient/family/caregiver. - Collaborate with rehabilitation services on mobility goals if consulted  - Perform Range of Motion 3 times a day. - Reposition patient every 2 hours.   - Dangle patient 3 times a day  - Stand patient 3 times a day  - Ambulate patient 3 times a day  - Out of bed to chair 3 times a day   - Out of bed for meals 3 times a day  - Out of bed for toileting  - Record patient progress and toleration of activity level   Outcome: Progressing     Problem: PAIN - ADULT  Goal: Verbalizes/displays adequate comfort level or baseline comfort level  Description: Interventions:  - Encourage patient to monitor pain and request assistance  - Assess pain using appropriate pain scale  - Administer analgesics based on type and severity of pain and evaluate response  - Implement non-pharmacological measures as appropriate and evaluate response  - Consider cultural and social influences on pain and pain management  - Notify physician/advanced practitioner if interventions unsuccessful or patient reports new pain  Outcome: Progressing     Problem: INFECTION - ADULT  Goal: Absence or prevention of progression during hospitalization  Description: INTERVENTIONS:  - Assess and monitor for signs and symptoms of infection  - Monitor lab/diagnostic results  - Monitor all insertion sites, i.e. indwelling lines, tubes, and drains  - Monitor endotracheal if appropriate and nasal secretions for changes in amount and color  - Hardin appropriate cooling/warming therapies per order  - Administer medications as ordered  - Instruct and encourage patient and family to use good hand hygiene technique  - Identify and instruct in appropriate isolation precautions for identified infection/condition  Outcome: Progressing  Goal: Absence of fever/infection during neutropenic period  Description: INTERVENTIONS:  - Monitor WBC    Outcome: Progressing     Problem: SAFETY ADULT  Goal: Maintain or return to baseline ADL function  Description: INTERVENTIONS:  -  Assess patient's ability to carry out ADLs; assess patient's baseline for ADL function and identify physical deficits which impact ability to perform ADLs (bathing, care of mouth/teeth, toileting, grooming, dressing, etc.)  - Assess/evaluate cause of self-care deficits   - Assess range of motion  - Assess patient's mobility; develop plan if impaired  - Assess patient's need for assistive devices and provide as appropriate  - Encourage maximum independence but intervene and supervise when necessary  - Involve family in performance of ADLs  - Assess for home care needs following discharge   - Consider OT consult to assist with ADL evaluation and planning for discharge  - Provide patient education as appropriate  Outcome: Progressing  Goal: Maintains/Returns to pre admission functional level  Description: INTERVENTIONS:  - Perform BMAT or MOVE assessment daily.   - Set and communicate daily mobility goal to care team and patient/family/caregiver. - Collaborate with rehabilitation services on mobility goals if consulted  - Perform Range of Motion 3 times a day. - Reposition patient every 2 hours.   - Dangle patient 3 times a day  - Stand patient 3 times a day  - Ambulate patient 3 times a day  - Out of bed to chair 3 times a day   - Out of bed for meals 3 times a day  - Out of bed for toileting  - Record patient progress and toleration of activity level   Outcome: Progressing  Goal: Patient will remain free of falls  Description: INTERVENTIONS:  - Educate patient/family on patient safety including physical limitations  - Instruct patient to call for assistance with activity   - Consult OT/PT to assist with strengthening/mobility   - Keep Call bell within reach  - Keep bed low and locked with side rails adjusted as appropriate  - Keep care items and personal belongings within reach  - Initiate and maintain comfort rounds  - Make Fall Risk Sign visible to staff  - Offer Toileting every 2 Hours, in advance of need  - Initiate/Maintain bed alarm  - Obtain necessary fall risk management equipment:   - Apply yellow socks and bracelet for high fall risk patients  - Consider moving patient to room near nurses station  Outcome: Progressing     Problem: DISCHARGE PLANNING  Goal: Discharge to home or other facility with appropriate resources  Description: INTERVENTIONS:  - Identify barriers to discharge w/patient and caregiver  - Arrange for needed discharge resources and transportation as appropriate  - Identify discharge learning needs (meds, wound care, etc.)  - Arrange for interpretive services to assist at discharge as needed  - Refer to Case Management Department for coordinating discharge planning if the patient needs post-hospital services based on physician/advanced practitioner order or complex needs related to functional status, cognitive ability, or social support system  Outcome: Progressing     Problem: Knowledge Deficit  Goal: Patient/family/caregiver demonstrates understanding of disease process, treatment plan, medications, and discharge instructions  Description: Complete learning assessment and assess knowledge base.   Interventions:  - Provide teaching at level of understanding  - Provide teaching via preferred learning methods  Outcome: Progressing     Problem: GASTROINTESTINAL - ADULT  Goal: Minimal or absence of nausea and/or vomiting  Description: INTERVENTIONS:  - Administer IV fluids if ordered to ensure adequate hydration  - Maintain NPO status until nausea and vomiting are resolved  - Nasogastric tube if ordered  - Administer ordered antiemetic medications as needed  - Provide nonpharmacologic comfort measures as appropriate  - Advance diet as tolerated, if ordered  - Consider nutrition services referral to assist patient with adequate nutrition and appropriate food choices  Outcome: Progressing     Problem: SKIN/TISSUE INTEGRITY - ADULT  Goal: Skin Integrity remains intact(Skin Breakdown Prevention)  Description: Assess:  -Perform Omar assessment every shift  -Clean and moisturize skin every shift  -Inspect skin when repositioning, toileting, and assisting with ADLS  -Assess under medical devices   -Assess extremities for adequate circulation and sensation     Bed Management:  -Have minimal linens on bed & keep smooth, unwrinkled  -Change linens as needed when moist or perspiring  -Avoid sitting or lying in one position for more than 2 hours while in bed  -Keep HOB at 30 degrees     Toileting:  -Offer bedside commode  -Assess for incontinence every shift  -Use incontinent care products after each incontinent episode such as cream    Activity:  -Mobilize patient 2 times a day  -Encourage activity and walks on unit  -Encourage or provide ROM exercises   -Turn and reposition patient every 2 Hours  -Use appropriate equipment to lift or move patient in bed  -Instruct/ Assist with weight shifting every 2 hr when out of bed in chair  -Consider limitation of chair time 2 hour intervals    Skin Care:  -Avoid use of baby powder, tape, friction and shearing, hot water or constrictive clothing  -Relieve pressure over bony prominences using cushion  -Do not massage red bony areas    Next Steps:  -Teach patient strategies to minimize risks    -Consider consults to  interdisciplinary teams   Outcome: Progressing

## 2023-08-21 NOTE — TELEPHONE ENCOUNTER
Called and spoke to pt and rescheduled procedure 2 weeks out. Called and spoke to Anita in the lab and made aware to cancel tomorrow and is rescheduled to 9/7/23. Scheduled date of EUS W/ FNA & CYTOLOGY(as of today): 9/7/23  Physician performing: Dr. Nirmala Monte  Location of : [address]   Instructions reviewed with patient by: sent via my chart and mailed  Clearances: already has clearance to hold Plavix 5 days prior and pt will do so.

## 2023-08-21 NOTE — ASSESSMENT & PLAN NOTE
· Reports 1 day of cough, chest congestion, nasal congestion, and myalgias  · Patient tested positive for Covid on 8/20, following mild pathway  · Currently on room air, O2 sats 95%  · As needed cough medication  · Incentive spirometer

## 2023-08-21 NOTE — PROGRESS NOTES
[address]  Progress Note  Name: Chantal Blount  MRN: [id number]  Unit/Bed#: -01 I Date of Admission: 8/17/2023   Date of Service: 8/21/2023 I Hospital Day: 3    Assessment/Plan   * COVID  Assessment & Plan  · Patient had developed symptoms of cough, runny nose during hospitalization  · He tested positive for COVID on 8/20  · Started on remdesivir IV, day 2/4  · Overnight had some increased O2 needs during sleep, O2 sats dropped to 91%, 2 L nasal cannula was applied  · Patient currently is on room air with O2 sats 95-96%. 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   It was recommended by the GI team at that time to have an outpatient EUS which is scheduled for 8/22/2023; due to current Covid infection, GI indicated they would re-schedule outpatient EUS in September; patient aware   · CT abdomen showing acute pancreatitis without fluid collection  · Lipase 492-->272  · S/p  IVF with improvement of symptoms, tolerating diet  · Pain control  · GI recommendations appreciated    Acute-on-chronic kidney injury [facility])  Assessment & Plan  Lab Results   Component Value Date    EGFR 44 08/21/2023    EGFR 47 08/20/2023    EGFR 50 08/20/2023    CREATININE 1.46 (H) 08/21/2023    CREATININE 1.39 (H) 08/20/2023    CREATININE 1.33 (H) 08/20/2023     · Creatinine on admission 1.95  · Baseline 1.3-1.4  · Creatinine likely elevated in setting of acute pancreatitis  · S/p IVF  · RESOLVED    Hypoxia  Assessment & Plan  · Patient had some desaturation overnight with sleep, required O2 2 liters  · Currently on room air  · Will do overnight pulse ox; follow up on results     Cough  Assessment & Plan  · Reports 1 day of cough, chest congestion, nasal congestion, and myalgias  · Patient tested positive for Covid on 8/20, following mild pathway  · Currently on room air, O2 sats 95%  · As needed cough medication  · Incentive spirometer      (HFpEF) heart failure with preserved ejection fraction (HCC)  Assessment & Plan  Wt Readings from Last 3 Encounters:   08/18/23 108 kg (237 lb 12.8 oz)   08/07/23 109 kg (240 lb)   08/03/23 108 kg (238 lb)     · Does not appear volume overloaded  · Continue Ranexa and metoprolol  · Resume torsemide upon discharge  · Plavix was on hold for planned outpatient EUS on Tuesday (last dose was 8/16/2023, needs to be held for 5 days pre procedure).  Will resume as procedure is re-scheduled for September as per discussion with GI  · Monitor I/O's and daily weights    Type 2 diabetes mellitus with kidney complication, with long-term current use of insulin [facility])  Assessment & Plan  Lab Results   Component Value Date    HGBA1C 6.7 (H) 05/10/2023       Recent Labs     08/20/23  1114 08/20/23  1546 08/20/23  2101 08/21/23  0734   POCGLU 272* 71 171* 202*       Blood Sugar Average: Last 72 hrs:  (P) 427.1973762678284393   · Last hemoglobin A1c: 6.7  · Home medications:  · Insulin detemir 36 units in a.m., along with Humalog 20-25 units TID with meals  · Initially held due to n.p.o. status  · With persistent hyperglycemia, intermittent hypoglycemia  · Continue Levemir but at decreased dosing of 15 units   · Will decrease Humalog to 8 units 3 times daily with meals   · Continue Accu-cheks and SSI  · Hypoglycemia protocol  · Diabetic diet      HTN (hypertension), benign  Assessment & Plan  · BP reviewed and stable  · Receiving metoprolol and clonidine in hospital  · Resume home torsemide and lisinopril on discharge     Mixed hyperlipidemia  Assessment & Plan  · Last lipid panel approximately a month ago showed triglycerides 118, HDL 53, LDL 47  · Crestor on hold due to pancreatitis                 VTE Pharmacologic Prophylaxis: VTE Score: 4 Moderate Risk (Score 3-4) - Pharmacological DVT Prophylaxis Ordered: heparin. Patient Centered Rounds: I performed bedside rounds with nursing staff today. Discussions with Specialists or Other Care Team Provider: Multidisciplinary team     Education and Discussions with Family / Patient: Patient indicated he would update his wife via phone . Total Time Spent on Date of Encounter in care of patient: 45 minutes This time was spent on one or more of the following: performing physical exam; counseling and coordination of care; obtaining or reviewing history; documenting in the medical record; reviewing/ordering tests, medications or procedures; communicating with other healthcare professionals and discussing with patient's family/caregivers. Current Length of Stay: 3 day(s)  Current Patient Status: Inpatient   Certification Statement: The patient will continue to require additional inpatient hospital stay due to IV remdesiver; had temp overnight; overnight pulse oximetry   Discharge Plan: Anticipate discharge in 24-48 hrs to home. Code Status: Level 1 - Full Code    Subjective:   Patient seen sitting up in bed, resting comfortably on room air. Still is congested, + cough; had a fever last night. No nausea or vomiting. Tolerated breakfast.     Objective:     Vitals:   Temp (24hrs), Av.5 °F (37.5 °C), Min:98.3 °F (36.8 °C), Max:100.9 °F (38.3 °C)    Temp:  [98.3 °F (36.8 °C)-100.9 °F (38.3 °C)] 98.5 °F (36.9 °C)  HR:  [60-71] 68  Resp:  [16-17] 16  BP: (139-162)/(60-79) 159/69  SpO2:  [91 %-98 %] 95 %  Body mass index is 38.38 kg/m². Input and Output Summary (last 24 hours):      Intake/Output Summary (Last 24 hours) at 2023 1105  Last data filed at 2023 0501  Gross per 24 hour   Intake --   Output 400 ml   Net -400 ml       Physical Exam:   Physical Exam  Vitals and nursing note reviewed. Constitutional:       General: He is not in acute distress. HENT:      Head: Normocephalic. Nose: Congestion present. Mouth/Throat:      Mouth: Mucous membranes are moist.   Eyes:      Extraocular Movements: Extraocular movements intact. Conjunctiva/sclera: Conjunctivae normal.   Cardiovascular:      Rate and Rhythm: Normal rate and regular rhythm. Pulses: Normal pulses. Pulmonary:      Comments: Diminished; congested +cough  Abdominal:      General: Bowel sounds are normal. There is no distension. Palpations: Abdomen is soft. Tenderness: There is no abdominal tenderness. Genitourinary:     Comments: Voiding spontaneously   Musculoskeletal:         General: Normal range of motion. Cervical back: Normal range of motion. Right lower leg: No edema. Left lower leg: No edema. Skin:     General: Skin is warm and dry. Capillary Refill: Capillary refill takes less than 2 seconds. Neurological:      General: No focal deficit present. Mental Status: He is alert and oriented to person, place, and time. Psychiatric:         Mood and Affect: Mood normal.         Behavior: Behavior normal.         Thought Content:  Thought content normal.         Judgment: Judgment normal.          Additional Data:     Labs:  Results from last 7 days   Lab Units 08/21/23  0512   WBC Thousand/uL 5.88   HEMOGLOBIN g/dL 11.5*   HEMATOCRIT % 35.8*   PLATELETS Thousands/uL 137*   NEUTROS PCT % 63   LYMPHS PCT % 24   MONOS PCT % 11   EOS PCT % 2     Results from last 7 days   Lab Units 08/21/23  0512   SODIUM mmol/L 141   POTASSIUM mmol/L 3.8   CHLORIDE mmol/L 103   CO2 mmol/L 31   BUN mg/dL 18   CREATININE mg/dL 1.46*   ANION GAP mmol/L 7   CALCIUM mg/dL 8.7   ALBUMIN g/dL 3.4*   TOTAL BILIRUBIN mg/dL 0.35   ALK PHOS U/L 54   ALT U/L 11   AST U/L 13   GLUCOSE RANDOM mg/dL 208*         Results from last 7 days   Lab Units 08/21/23  0734 08/20/23  2101 08/20/23  1546 08/20/23  1114 08/20/23  0708 08/19/23  1955 08/19/23  1632 08/19/23  1536 08/19/23  1126 08/19/23  0703 08/18/23 2008 08/18/23  1543   POC GLUCOSE mg/dl 202* 171* 71 272* 220* 221* 59* 56* 224* 209* 210* 223*         Results from last 7 days   Lab Units 08/20/23  1349 08/17/23  2300   LACTIC ACID mmol/L  --  1.0   PROCALCITONIN ng/ml 0.07  --        Lines/Drains:  Invasive Devices     Peripheral Intravenous Line  Duration           Peripheral IV 08/17/23 Left;Proximal;Ventral (anterior) Forearm 3 days                      Imaging:  Chest x ray pending   Recent Cultures (last 7 days):         Last 24 Hours Medication List:   Current Facility-Administered Medications   Medication Dose Route Frequency Provider Last Rate   • acetaminophen  650 mg Oral Q6H PRN Tiffany Epperson PA-C     • cloNIDine  0.1 mg Oral Q12H [address] Tiffany Epperson PA-C     • fluticasone  1 spray Each Nare Daily GERSON Arita     • guaiFENesin  600 mg Oral Q12H [address]  GERSON Arita     • guaiFENesin  200 mg Oral Q4H PRN GERSON Castro     • heparin (porcine)  5,000 Units Subcutaneous [facility] Tiffany Epperson PA-C     • HYDROmorphone  0.5 mg Intravenous Q4H PRN Tiffany Epperson PA-C     • HYDROmorphone  0.2 mg Intravenous Q4H PRN Tiffany Epperson PA-C     • insulin detemir  8 Units Subcutaneous QAM GERSON Arita     • insulin lispro  1-6 Units Subcutaneous TID AC Tiffany Epperson PA-C     • insulin lispro  15 Units Subcutaneous TID With Meals GERSON Arita     • loratadine  10 mg Oral Daily GERSON Arita     • metoprolol succinate  25 mg Oral Daily Tiffany Epperson PA-C     • ondansetron  4 mg Intravenous Q6H PRN MARY PalaciosC     • ranolazine  500 mg Oral BID Tiffany Epperson PA-C     • remdesivir  100 mg Intravenous Q24H GERSON Castro          Today, Patient Was Seen By: GERSON Pena    **Please Note: This note may have been constructed using a voice recognition system. **

## 2023-08-21 NOTE — ASSESSMENT & PLAN NOTE
Lab Results   Component Value Date    HGBA1C 6.7 (H) 05/10/2023       Recent Labs     08/20/23  1114 08/20/23  1546 08/20/23  2101 08/21/23  0734   POCGLU 272* 71 171* 202*       Blood Sugar Average: Last 72 hrs:  (P) [id number]   · Last hemoglobin A1c: 6.7  · Home medications:  · Insulin detemir 36 units in a.m., along with Humalog 20-25 units TID with meals  · Initially held due to n.p.o. status  · With persistent hyperglycemia, intermittent hypoglycemia  · Continue Levemir but at decreased dosing of 15 units   · Will decrease Humalog to 8 units 3 times daily with meals   · Continue Accu-cheks and SSI  · Hypoglycemia protocol  · Diabetic diet

## 2023-08-21 NOTE — TELEPHONE ENCOUNTER
----- Message from Fabio Wong PA-C sent at 8/21/2023  7:59 AM EDT -----  We need to cancel EUS for tomorrow because patient has COVID so I would at least schedule at least 2 weeks out due to the COVID infection-he still currently hospitalized

## 2023-08-21 NOTE — ASSESSMENT & PLAN NOTE
· Patient had some desaturation overnight with sleep, required O2 2 liters  · Currently on room air  · Will do overnight pulse ox; follow up on results

## 2023-08-21 NOTE — ASSESSMENT & PLAN NOTE
· Patient had developed symptoms of cough, runny nose during hospitalization  · He tested positive for COVID on 8/20  · Started on remdesivir IV, day 2/4  · Overnight had some increased O2 needs during sleep, O2 sats dropped to 91%, 2 L nasal cannula was applied  · Patient currently is on room air with O2 sats 95-96%.   · Incentive spirometer provided  · Encouraged OOB to chair, ambulation which patient indicated he is doing  · Monitor

## 2023-08-22 ENCOUNTER — TRANSITIONAL CARE MANAGEMENT (OUTPATIENT)
Dept: FAMILY MEDICINE CLINIC | Facility: CLINIC | Age: 83
End: 2023-08-22

## 2023-08-22 VITALS
HEART RATE: 72 BPM | WEIGHT: 237.8 LBS | SYSTOLIC BLOOD PRESSURE: 124 MMHG | BODY MASS INDEX: 38.22 KG/M2 | HEIGHT: 66 IN | OXYGEN SATURATION: 96 % | DIASTOLIC BLOOD PRESSURE: 94 MMHG | RESPIRATION RATE: 18 BRPM | TEMPERATURE: 98.3 F

## 2023-08-22 LAB
ALBUMIN SERPL BCP-MCNC: 3.2 G/DL (ref 3.5–5)
ALP SERPL-CCNC: 51 U/L (ref 34–104)
ALT SERPL W P-5'-P-CCNC: 12 U/L (ref 7–52)
ANION GAP SERPL CALCULATED.3IONS-SCNC: 9 MMOL/L
AST SERPL W P-5'-P-CCNC: 14 U/L (ref 13–39)
BASOPHILS # BLD AUTO: 0.02 THOUSANDS/ÂΜL (ref 0–0.1)
BASOPHILS NFR BLD AUTO: 0 % (ref 0–1)
BILIRUB SERPL-MCNC: 0.35 MG/DL (ref 0.2–1)
BUN SERPL-MCNC: 21 MG/DL (ref 5–25)
CALCIUM ALBUM COR SERPL-MCNC: 9 MG/DL (ref 8.3–10.1)
CALCIUM SERPL-MCNC: 8.4 MG/DL (ref 8.4–10.2)
CHLORIDE SERPL-SCNC: 102 MMOL/L (ref 96–108)
CO2 SERPL-SCNC: 28 MMOL/L (ref 21–32)
CREAT SERPL-MCNC: 1.3 MG/DL (ref 0.6–1.3)
CRP SERPL QL: 85.2 MG/L
EOSINOPHIL # BLD AUTO: 0.38 THOUSAND/ÂΜL (ref 0–0.61)
EOSINOPHIL NFR BLD AUTO: 6 % (ref 0–6)
ERYTHROCYTE [DISTWIDTH] IN BLOOD BY AUTOMATED COUNT: 13.4 % (ref 11.6–15.1)
GFR SERPL CREATININE-BSD FRML MDRD: 51 ML/MIN/1.73SQ M
GLUCOSE SERPL-MCNC: 180 MG/DL (ref 65–140)
GLUCOSE SERPL-MCNC: 218 MG/DL (ref 65–140)
GLUCOSE SERPL-MCNC: 392 MG/DL (ref 65–140)
HCT VFR BLD AUTO: 36.7 % (ref 36.5–49.3)
HGB BLD-MCNC: 12 G/DL (ref 12–17)
IMM GRANULOCYTES # BLD AUTO: 0.02 THOUSAND/UL (ref 0–0.2)
IMM GRANULOCYTES NFR BLD AUTO: 0 % (ref 0–2)
LYMPHOCYTES # BLD AUTO: 1.47 THOUSANDS/ÂΜL (ref 0.6–4.47)
LYMPHOCYTES NFR BLD AUTO: 22 % (ref 14–44)
MCH RBC QN AUTO: 31.7 PG (ref 26.8–34.3)
MCHC RBC AUTO-ENTMCNC: 32.7 G/DL (ref 31.4–37.4)
MCV RBC AUTO: 97 FL (ref 82–98)
MONOCYTES # BLD AUTO: 0.92 THOUSAND/ÂΜL (ref 0.17–1.22)
MONOCYTES NFR BLD AUTO: 14 % (ref 4–12)
NEUTROPHILS # BLD AUTO: 3.91 THOUSANDS/ÂΜL (ref 1.85–7.62)
NEUTS SEG NFR BLD AUTO: 58 % (ref 43–75)
NRBC BLD AUTO-RTO: 0 /100 WBCS
PLATELET # BLD AUTO: 136 THOUSANDS/UL (ref 149–390)
PMV BLD AUTO: 10.8 FL (ref 8.9–12.7)
POTASSIUM SERPL-SCNC: 4.3 MMOL/L (ref 3.5–5.3)
PROT SERPL-MCNC: 6 G/DL (ref 6.4–8.4)
RBC # BLD AUTO: 3.79 MILLION/UL (ref 3.88–5.62)
SODIUM SERPL-SCNC: 139 MMOL/L (ref 135–147)
WBC # BLD AUTO: 6.72 THOUSAND/UL (ref 4.31–10.16)

## 2023-08-22 PROCEDURE — 85025 COMPLETE CBC W/AUTO DIFF WBC: CPT | Performed by: NURSE PRACTITIONER

## 2023-08-22 PROCEDURE — 82948 REAGENT STRIP/BLOOD GLUCOSE: CPT

## 2023-08-22 PROCEDURE — 94761 N-INVAS EAR/PLS OXIMETRY MLT: CPT

## 2023-08-22 PROCEDURE — 86140 C-REACTIVE PROTEIN: CPT | Performed by: NURSE PRACTITIONER

## 2023-08-22 PROCEDURE — 99239 HOSP IP/OBS DSCHRG MGMT >30: CPT | Performed by: PHYSICIAN ASSISTANT

## 2023-08-22 PROCEDURE — 80053 COMPREHEN METABOLIC PANEL: CPT | Performed by: NURSE PRACTITIONER

## 2023-08-22 RX ORDER — ONDANSETRON 4 MG/1
4 TABLET, FILM COATED ORAL EVERY 8 HOURS PRN
Qty: 20 TABLET | Refills: 0 | Status: SHIPPED | OUTPATIENT
Start: 2023-08-22

## 2023-08-22 RX ADMIN — HEPARIN SODIUM 5000 UNITS: 5000 INJECTION INTRAVENOUS; SUBCUTANEOUS at 05:09

## 2023-08-22 RX ADMIN — INSULIN LISPRO 2 UNITS: 100 INJECTION, SOLUTION INTRAVENOUS; SUBCUTANEOUS at 09:29

## 2023-08-22 RX ADMIN — RANOLAZINE 500 MG: 500 TABLET, EXTENDED RELEASE ORAL at 09:28

## 2023-08-22 RX ADMIN — INSULIN DETEMIR 8 UNITS: 100 INJECTION, SOLUTION SUBCUTANEOUS at 09:30

## 2023-08-22 RX ADMIN — CLONIDINE HYDROCHLORIDE 0.1 MG: 0.1 TABLET ORAL at 09:28

## 2023-08-22 RX ADMIN — ACETAMINOPHEN 650 MG: 325 TABLET, FILM COATED ORAL at 06:41

## 2023-08-22 RX ADMIN — LORATADINE 10 MG: 10 TABLET ORAL at 09:28

## 2023-08-22 RX ADMIN — INSULIN LISPRO 15 UNITS: 100 INJECTION, SOLUTION INTRAVENOUS; SUBCUTANEOUS at 11:41

## 2023-08-22 RX ADMIN — METOPROLOL SUCCINATE 25 MG: 25 TABLET, FILM COATED, EXTENDED RELEASE ORAL at 09:28

## 2023-08-22 RX ADMIN — INSULIN LISPRO 6 UNITS: 100 INJECTION, SOLUTION INTRAVENOUS; SUBCUTANEOUS at 11:41

## 2023-08-22 RX ADMIN — INSULIN LISPRO 15 UNITS: 100 INJECTION, SOLUTION INTRAVENOUS; SUBCUTANEOUS at 09:28

## 2023-08-22 RX ADMIN — FLUTICASONE PROPIONATE 1 SPRAY: 50 SPRAY, METERED NASAL at 09:46

## 2023-08-22 RX ADMIN — GUAIFENESIN 600 MG: 600 TABLET ORAL at 09:28

## 2023-08-22 RX ADMIN — REMDESIVIR 100 MG: 100 INJECTION, POWDER, LYOPHILIZED, FOR SOLUTION INTRAVENOUS at 13:04

## 2023-08-22 RX ADMIN — CLOPIDOGREL BISULFATE 75 MG: 75 TABLET ORAL at 09:28

## 2023-08-22 NOTE — RESPIRATORY THERAPY NOTE
Home Oxygen Qualifying Test     Patient name: Darylene Brady        : 1940   Date of Test:  2023  Diagnosis:    Home Oxygen Test:    **Medicare Guidelines require item(s) 1-5 on all ambulatory patients or 1 and 2 on non-ambulatory patients. 1. Baseline SPO2 on Room Air at rest 95 %   a. If <= 88% on Room Air add O2 via NC to obtain SpO2 >=88%. If LPM needed, document LPM  needed to reach =>88%    2. SPO2 during exertion on Room Air 97  %  a. During exertion monitor SPO2. If SPO2 increases >=89%, do not add supplemental oxygen    3. SPO2 on Oxygen at Rest  NA % at  NA LPM    4. SPO2 during exertion on Oxygen  NA % at NA  LPM    5. Test performed during exertion activity. []  Supplemental Home Oxygen is indicated. [x]  Client does not qualify for home oxygen. Respiratory Additional Notes-     Patient seen for a Home Oxygen Qualifying Test.  Patient was received on room air, saturating 95%. Testing explained to patient prior to starting. Patient was tested in his room due to Covid status. Patient was ambulated a total distance of 160 feet, unassisted and on room air. No complaints or issues during testing. Patient does not qualify for supplemental oxygen.       Keila Harris, RT

## 2023-08-22 NOTE — ASSESSMENT & PLAN NOTE
Presented with worsening upper abdominal pain x 4 days, similar to prior episodes of pancreatitis. 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    · Patient was recommended by GI team at that time to have outpatient EUS, originally scheduled for 8/22  · With COVID + status, will plan to reschedule re-schedule outpatient EUS in September per GI  · CT A/P revealed acute pancreatitis without fluid collection  · Lipase 492->272, symptomatically improved with IVF and advancing diet  · Improved, continue outpatient F/U with GI and plan for rescheduled outpatient EUS in September

## 2023-08-22 NOTE — ASSESSMENT & PLAN NOTE
Lab Results   Component Value Date    HGBA1C 6.7 (H) 05/10/2023       Recent Labs     08/21/23  1638 08/21/23  2103 08/22/23  0711 08/22/23  1058   POCGLU 131 127 218* 392*       Blood Sugar Average: Last 72 hrs:  (P) [id number]   · Last hemoglobin A1c: 6.7. Reports compliance with insulin at home. · Home regimen: Levemir 36U qAM, 20-25U TID with meals.   · Basal insulin initially held with NPO status, BG fluctuating throughout admission  · Decreased insulin regimen while inpatient with Levemir 8U qAM, Humalog 15U TID with meals  · Resume home insulin regimen with improved oral intake at discharge  · SSI coverage with Accu-Cheks PARVIN

## 2023-08-22 NOTE — PLAN OF CARE
Problem: MOBILITY - ADULT  Goal: Maintain or return to baseline ADL function  Description: INTERVENTIONS:  -  Assess patient's ability to carry out ADLs; assess patient's baseline for ADL function and identify physical deficits which impact ability to perform ADLs (bathing, care of mouth/teeth, toileting, grooming, dressing, etc.)  - Assess/evaluate cause of self-care deficits   - Assess range of motion  - Assess patient's mobility; develop plan if impaired  - Assess patient's need for assistive devices and provide as appropriate  - Encourage maximum independence but intervene and supervise when necessary  - Involve family in performance of ADLs  - Assess for home care needs following discharge   - Consider OT consult to assist with ADL evaluation and planning for discharge  - Provide patient education as appropriate  Outcome: Progressing  Goal: Maintains/Returns to pre admission functional level  Description: INTERVENTIONS:  - Perform BMAT or MOVE assessment daily.   - Set and communicate daily mobility goal to care team and patient/family/caregiver. - Collaborate with rehabilitation services on mobility goals if consulted  - Perform Range of Motion 4 times a day. - Reposition patient every 2 hours.   - Dangle patient 3 times a day  - Stand patient 3 times a day  - Ambulate patient 3 times a day  - Out of bed to chair 3 times a day   - Out of bed for meals 3 times a day  - Out of bed for toileting  - Record patient progress and toleration of activity level   Outcome: Progressing     Problem: PAIN - ADULT  Goal: Verbalizes/displays adequate comfort level or baseline comfort level  Description: Interventions:  - Encourage patient to monitor pain and request assistance  - Assess pain using appropriate pain scale  - Administer analgesics based on type and severity of pain and evaluate response  - Implement non-pharmacological measures as appropriate and evaluate response  - Consider cultural and social influences on pain and pain management  - Notify physician/advanced practitioner if interventions unsuccessful or patient reports new pain  Outcome: Progressing     Problem: INFECTION - ADULT  Goal: Absence or prevention of progression during hospitalization  Description: INTERVENTIONS:  - Assess and monitor for signs and symptoms of infection  - Monitor lab/diagnostic results  - Monitor all insertion sites, i.e. indwelling lines, tubes, and drains  - Monitor endotracheal if appropriate and nasal secretions for changes in amount and color  - [place] appropriate cooling/warming therapies per order  - Administer medications as ordered  - Instruct and encourage patient and family to use good hand hygiene technique  - Identify and instruct in appropriate isolation precautions for identified infection/condition  Outcome: Progressing     Problem: SAFETY ADULT  Goal: Maintain or return to baseline ADL function  Description: INTERVENTIONS:  -  Assess patient's ability to carry out ADLs; assess patient's baseline for ADL function and identify physical deficits which impact ability to perform ADLs (bathing, care of mouth/teeth, toileting, grooming, dressing, etc.)  - Assess/evaluate cause of self-care deficits   - Assess range of motion  - Assess patient's mobility; develop plan if impaired  - Assess patient's need for assistive devices and provide as appropriate  - Encourage maximum independence but intervene and supervise when necessary  - Involve family in performance of ADLs  - Assess for home care needs following discharge   - Consider OT consult to assist with ADL evaluation and planning for discharge  - Provide patient education as appropriate  Outcome: Progressing  Goal: Maintains/Returns to pre admission functional level  Description: INTERVENTIONS:  - Perform BMAT or MOVE assessment daily.   - Set and communicate daily mobility goal to care team and patient/family/caregiver.    - Collaborate with rehabilitation services on mobility goals if consulted  - Perform Range of Motion 4 times a day. - Reposition patient every 2 hours.   - Dangle patient 3 times a day  - Stand patient 3 times a day  - Ambulate patient 3 times a day  - Out of bed to chair 3 times a day   - Out of bed for meals 3 times a day  - Out of bed for toileting  - Record patient progress and toleration of activity level   Outcome: Progressing  Goal: Patient will remain free of falls  Description: INTERVENTIONS:  - Educate patient/family on patient safety including physical limitations  - Instruct patient to call for assistance with activity   - Consult OT/PT to assist with strengthening/mobility   - Keep Call bell within reach  - Keep bed low and locked with side rails adjusted as appropriate  - Keep care items and personal belongings within reach  - Initiate and maintain comfort rounds  - Make Fall Risk Sign visible to staff  - Offer Toileting every 2 Hours, in advance of need  - Initiate/Maintain alarm  - Obtain necessary fall risk management equipment:   - Apply yellow socks and bracelet for high fall risk patients  - Consider moving patient to room near nurses station  Outcome: Progressing     Problem: DISCHARGE PLANNING  Goal: Discharge to home or other facility with appropriate resources  Description: INTERVENTIONS:  - Identify barriers to discharge w/patient and caregiver  - Arrange for needed discharge resources and transportation as appropriate  - Identify discharge learning needs (meds, wound care, etc.)  - Arrange for interpretive services to assist at discharge as needed  - Refer to Case Management Department for coordinating discharge planning if the patient needs post-hospital services based on physician/advanced practitioner order or complex needs related to functional status, cognitive ability, or social support system  Outcome: Progressing     Problem: Knowledge Deficit  Goal: Patient/family/caregiver demonstrates understanding of disease process, treatment plan, medications, and discharge instructions  Description: Complete learning assessment and assess knowledge base.   Interventions:  - Provide teaching at level of understanding  - Provide teaching via preferred learning methods  Outcome: Progressing     Problem: GASTROINTESTINAL - ADULT  Goal: Minimal or absence of nausea and/or vomiting  Description: INTERVENTIONS:  - Administer IV fluids if ordered to ensure adequate hydration  - Maintain NPO status until nausea and vomiting are resolved  - Nasogastric tube if ordered  - Administer ordered antiemetic medications as needed  - Provide nonpharmacologic comfort measures as appropriate  - Advance diet as tolerated, if ordered  - Consider nutrition services referral to assist patient with adequate nutrition and appropriate food choices  Outcome: Progressing     Problem: SKIN/TISSUE INTEGRITY - ADULT  Goal: Skin Integrity remains intact(Skin Breakdown Prevention)  Description: Assess:  -Perform Omar assessment every shift   -Clean and moisturize skin every   -Inspect skin when repositioning, toileting, and assisting with ADLS  -Assess under medical devices such as every   -Assess extremities for adequate circulation and sensation     Bed Management:  -Have minimal linens on bed & keep smooth, unwrinkled  -Change linens as needed when moist or perspiring  -Avoid sitting or lying in one position for more than 2  hours while in bed  -Keep HOB at 30 degrees     Toileting:  -Offer bedside commode  -Assess for incontinence every 2 hours   -Use incontinent care products after each incontinent episode such as barrier cream    Activity:  -Mobilize patient 4 times a day  -Encourage activity and walks on unit  -Encourage or provide ROM exercises   -Turn and reposition patient every 2 Hours  -Use appropriate equipment to lift or move patient in bed  -Instruct/ Assist with weight shifting every 30 mins when out of bed in chair  -Consider limitation of chair time 2 hour intervals    Skin Care:  -Avoid use of baby powder, tape, friction and shearing, hot water or constrictive clothing  -Relieve pressure over bony prominences using pillows, allevyn foam  -Do not massage red bony areas    Next Steps:  -Teach patient strategies to minimize risks such as   -Consider consults to  interdisciplinary teams such as   Outcome: Progressing

## 2023-08-22 NOTE — PLAN OF CARE
Problem: MOBILITY - ADULT  Goal: Maintain or return to baseline ADL function  Description: INTERVENTIONS:  -  Assess patient's ability to carry out ADLs; assess patient's baseline for ADL function and identify physical deficits which impact ability to perform ADLs (bathing, care of mouth/teeth, toileting, grooming, dressing, etc.)  - Assess/evaluate cause of self-care deficits   - Assess range of motion  - Assess patient's mobility; develop plan if impaired  - Assess patient's need for assistive devices and provide as appropriate  - Encourage maximum independence but intervene and supervise when necessary  - Involve family in performance of ADLs  - Assess for home care needs following discharge   - Consider OT consult to assist with ADL evaluation and planning for discharge  - Provide patient education as appropriate  8/22/2023 0411 by Sue Howard RN  Outcome: Progressing  8/22/2023 0051 by Sue Howard RN  Outcome: Progressing  Goal: Maintains/Returns to pre admission functional level  Description: INTERVENTIONS:  - Perform BMAT or MOVE assessment daily.   - Set and communicate daily mobility goal to care team and patient/family/caregiver. - Collaborate with rehabilitation services on mobility goals if consulted  - Perform Range of Motion 3 times a day. - Reposition patient every 2 hours.   - Dangle patient 3 times a day  - Stand patient 3 times a day  - Ambulate patient 3 times a day  - Out of bed to chair 3 times a day   - Out of bed for meals 3 times a day  - Out of bed for toileting  - Record patient progress and toleration of activity level   8/22/2023 0411 by Sue Howard RN  Outcome: Progressing  8/22/2023 0051 by Sue Howard RN  Outcome: Progressing     Problem: PAIN - ADULT  Goal: Verbalizes/displays adequate comfort level or baseline comfort level  Description: Interventions:  - Encourage patient to monitor pain and request assistance  - Assess pain using appropriate pain scale  - Administer analgesics based on type and severity of pain and evaluate response  - Implement non-pharmacological measures as appropriate and evaluate response  - Consider cultural and social influences on pain and pain management  - Notify physician/advanced practitioner if interventions unsuccessful or patient reports new pain  8/22/2023 0411 by Jose Eduardo Payne RN  Outcome: Progressing  8/22/2023 0051 by Jose Eduardo Payne RN  Outcome: Progressing     Problem: INFECTION - ADULT  Goal: Absence or prevention of progression during hospitalization  Description: INTERVENTIONS:  - Assess and monitor for signs and symptoms of infection  - Monitor lab/diagnostic results  - Monitor all insertion sites, i.e. indwelling lines, tubes, and drains  - Monitor endotracheal if appropriate and nasal secretions for changes in amount and color  - [place] appropriate cooling/warming therapies per order  - Administer medications as ordered  - Instruct and encourage patient and family to use good hand hygiene technique  - Identify and instruct in appropriate isolation precautions for identified infection/condition  8/22/2023 0411 by Jose Eduardo Payne RN  Outcome: Progressing  8/22/2023 0051 by Jose Eduardo Payne RN  Outcome: Progressing  Goal: Absence of fever/infection during neutropenic period  Description: INTERVENTIONS:  - Monitor WBC    8/22/2023 0411 by Jose Eduardo Payne RN  Outcome: Progressing  8/22/2023 0051 by Jose Eduardo Payne RN  Outcome: Progressing     Problem: SAFETY ADULT  Goal: Maintain or return to baseline ADL function  Description: INTERVENTIONS:  -  Assess patient's ability to carry out ADLs; assess patient's baseline for ADL function and identify physical deficits which impact ability to perform ADLs (bathing, care of mouth/teeth, toileting, grooming, dressing, etc.)  - Assess/evaluate cause of self-care deficits   - Assess range of motion  - Assess patient's mobility; develop plan if impaired  - Assess patient's need for assistive devices and provide as appropriate  - Encourage maximum independence but intervene and supervise when necessary  - Involve family in performance of ADLs  - Assess for home care needs following discharge   - Consider OT consult to assist with ADL evaluation and planning for discharge  - Provide patient education as appropriate  8/22/2023 0411 by Anitha Forrester RN  Outcome: Progressing  8/22/2023 0051 by Anitha Forrester RN  Outcome: Progressing  Goal: Maintains/Returns to pre admission functional level  Description: INTERVENTIONS:  - Perform BMAT or MOVE assessment daily.   - Set and communicate daily mobility goal to care team and patient/family/caregiver. - Collaborate with rehabilitation services on mobility goals if consulted  - Perform Range of Motion 3 times a day. - Reposition patient every 2 hours.   - Dangle patient 3 times a day  - Stand patient 3 times a day  - Ambulate patient 3 times a day  - Out of bed to chair 3 times a day   - Out of bed for meals 3 times a day  - Out of bed for toileting  - Record patient progress and toleration of activity level   8/22/2023 0411 by Anitha Forrester RN  Outcome: Progressing  8/22/2023 0051 by Anitha Forrester RN  Outcome: Progressing  Goal: Patient will remain free of falls  Description: INTERVENTIONS:  - Educate patient/family on patient safety including physical limitations  - Instruct patient to call for assistance with activity   - Consult OT/PT to assist with strengthening/mobility   - Keep Call bell within reach  - Keep bed low and locked with side rails adjusted as appropriate  - Keep care items and personal belongings within reach  - Initiate and maintain comfort rounds  - Make Fall Risk Sign visible to staff  - Offer Toileting every 2 Hours, in advance of need  - Initiate/Maintain bed alarm  - Obtain necessary fall risk management equipment:   - Apply yellow socks and bracelet for high fall risk patients  - Consider moving patient to room near nurses station  8/22/2023 0411 by Daniel Navarrete RN  Outcome: Progressing  8/22/2023 0051 by Daniel Navarrete RN  Outcome: Progressing     Problem: DISCHARGE PLANNING  Goal: Discharge to home or other facility with appropriate resources  Description: INTERVENTIONS:  - Identify barriers to discharge w/patient and caregiver  - Arrange for needed discharge resources and transportation as appropriate  - Identify discharge learning needs (meds, wound care, etc.)  - Arrange for interpretive services to assist at discharge as needed  - Refer to Case Management Department for coordinating discharge planning if the patient needs post-hospital services based on physician/advanced practitioner order or complex needs related to functional status, cognitive ability, or social support system  8/22/2023 0411 by Daniel Navarrete RN  Outcome: Progressing  8/22/2023 0051 by Daniel Navarrete RN  Outcome: Progressing     Problem: Knowledge Deficit  Goal: Patient/family/caregiver demonstrates understanding of disease process, treatment plan, medications, and discharge instructions  Description: Complete learning assessment and assess knowledge base.   Interventions:  - Provide teaching at level of understanding  - Provide teaching via preferred learning methods  8/22/2023 0411 by Daniel Navarrete RN  Outcome: Progressing  8/22/2023 0051 by Daniel Navarrete RN  Outcome: Progressing     Problem: GASTROINTESTINAL - ADULT  Goal: Minimal or absence of nausea and/or vomiting  Description: INTERVENTIONS:  - Administer IV fluids if ordered to ensure adequate hydration  - Maintain NPO status until nausea and vomiting are resolved  - Nasogastric tube if ordered  - Administer ordered antiemetic medications as needed  - Provide nonpharmacologic comfort measures as appropriate  - Advance diet as tolerated, if ordered  - Consider nutrition services referral to assist patient with adequate nutrition and appropriate food choices  8/22/2023 0411 by Farnaz Montiel Lisa Cadena RN  Outcome: Progressing  8/22/2023 0051 by Mary Ann Benitez RN  Outcome: Progressing     Problem: SKIN/TISSUE INTEGRITY - ADULT  Goal: Skin Integrity remains intact(Skin Breakdown Prevention)  Description: Assess:  -Perform Omar assessment every shift  -Clean and moisturize skin every shift  -Inspect skin when repositioning, toileting, and assisting with ADLS  -Assess under medical devices   -Assess extremities for adequate circulation and sensation     Bed Management:  -Have minimal linens on bed & keep smooth, unwrinkled  -Change linens as needed when moist or perspiring  -Avoid sitting or lying in one position for more than 2 hours while in bed  -Keep HOB at 30 degrees     Toileting:  -Offer bedside commode  -Assess for incontinence every shift  -Use incontinent care products after each incontinent episode such as cream    Activity:  -Mobilize patient 3 times a day  -Encourage activity and walks on unit  -Encourage or provide ROM exercises   -Turn and reposition patient every 2 Hours  -Use appropriate equipment to lift or move patient in bed  -Instruct/ Assist with weight shifting every 2 hr when out of bed in chair  -Consider limitation of chair time 2 hour intervals    Skin Care:  -Avoid use of baby powder, tape, friction and shearing, hot water or constrictive clothing  -Relieve pressure over bony prominences using cushion  -Do not massage red bony areas    Next Steps:  -Teach patient strategies to minimize risks    -Consider consults to  interdisciplinary teams   8/22/2023 0411 by Mary Ann Benitez RN  Outcome: Progressing  8/22/2023 0051 by Mary Ann Benitez RN  Outcome: Progressing

## 2023-08-22 NOTE — ASSESSMENT & PLAN NOTE
· Last lipid panel approximately 1 month ago: TGCs 118, HDL 53, LDL 47  · Continue holding Crestor with pancreatitis  · F/U with PCP to discuss other HLD medication options

## 2023-08-22 NOTE — DISCHARGE INSTR - AVS FIRST PAGE
You were treated and evaluated for recurrent pancreatitis initially, also found to be COVID-positive during admission. GI evaluated you, you were treated with IV fluids with improvement in your pancreatitis. 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 You will need to obtain outpatient EUS to further evaluate pancreatic ductal mass, planning to reschedule with Dr. Kiana Lund in September. You will need to hold your Plavix 5 days prior to your procedure. You were treated with antiviral medication IV remdesivir wall inpatient for COVID. Since you no longer require supplemental oxygen, no further antiviral treatment or additional medications such as steroids are necessary. Recommend taking over-the-counter Tylenol as needed for pain, Robitussin as needed for coughing/congestion. Will send prescription for as needed Zofran for nausea/vomiting.

## 2023-08-22 NOTE — DISCHARGE SUMMARY
[address] [facility]  Discharge- Tae Morillo 1940, 82 y.o. male MRN: [id number]  Unit/Bed#: -Marissa Encounter: [id number]  Primary Care Provider: Ronna Espinosa MD   Date and time admitted to hospital: 8/17/2023 10:37 PM    * [address]  Patient developed symptoms of cough, runny nose during hospitalization, found to be COVID + on 8/20. Mild COVID pathway, intermittent hypoxia requiring 2L O2 initially overnight. · CXR did not reveal acute cardiopulmonary disease, no findings concerning for viral pneumonia  · Inflammatory markers: CRP 89->85. CK, procal wnl. . · D-dimer: 1.98. Low risk for PE per Wells score, no need for CTA chest.  · Maintained on SQ heparin for DVT ppx  · Desat screen: Does not qualify for home O2, stable on RA with SpO2 >95%. · IS, OOB as tolerated, supportive care  · Completed 3/5 doses IV remdesivir, no need for further treatment. Did not receive steroids, while stable on RA no need to initiate on discharge. Acute recurrent pancreatitis  Assessment & Plan  Presented with worsening upper abdominal pain x 4 days, similar to prior episodes of pancreatitis. 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    · Patient was recommended by GI team at that time to have outpatient EUS, originally scheduled for 8/22  · With COVID + status, will plan to reschedule re-schedule outpatient EUS in September per GI  · CT A/P revealed acute pancreatitis without fluid collection  · Lipase 492->272, symptomatically improved with IVF and advancing diet  · Improved, continue outpatient F/U with GI and plan for rescheduled outpatient EUS in September    Acute-on-chronic kidney injury [facility])  Assessment & Plan  Lab Results   Component Value Date    EGFR 51 08/22/2023    EGFR 44 08/21/2023    EGFR 47 08/20/2023    CREATININE 1.30 08/22/2023    CREATININE 1.46 (H) 08/21/2023 CREATININE 1.39 (H) 08/20/2023     Creatinine on admission 1.95. Baseline Cr around 1.3-1.4. · Likely prerenal in setting of acute pancreatitis  · Home torsemide, lisinopril held during admission but can be resumed at discharge  · Resolved, received IVFs with return to baseline renal function    Mixed hyperlipidemia  Assessment & Plan  · Last lipid panel approximately 1 month ago: TGCs 118, HDL 53, LDL 47  · Continue holding Crestor with pancreatitis  · F/U with PCP to discuss other HLD medication options    Type 2 diabetes mellitus with kidney complication, with long-term current use of insulin [facility])  Assessment & Plan  Lab Results   Component Value Date    HGBA1C 6.7 (H) 05/10/2023       Recent Labs     08/21/23  1638 08/21/23  2103 08/22/23  0711 08/22/23  1058   POCGLU 131 127 218* 392*       Blood Sugar Average: Last 72 hrs:  (P) [id number]   · Last hemoglobin A1c: 6.7. Reports compliance with insulin at home. · Home regimen: Levemir 36U qAM, 20-25U TID with meals. · Basal insulin initially held with NPO status, BG fluctuating throughout admission  · Decreased insulin regimen while inpatient with Levemir 8U qAM, Humalog 15U TID with meals  · Resume home insulin regimen with improved oral intake at discharge  · [facility]    (HFpEF) heart failure with preserved ejection fraction [facility])  Assessment & Plan  Wt Readings from Last 3 Encounters:   08/18/23 108 kg (237 lb 12.8 oz)   08/07/23 109 kg (240 lb)   08/03/23 108 kg (238 lb)     · Does not appear to be in acute exacerbation  · Echo (4/2023): EF 60 to 65%, mild concentric hypertrophy with G1DD. Moderate AV stenosis. · Continue Ranexa, metoprolol  · Resume torsemide upon discharge  · Plavix was on hold for planned outpatient EUS (last dose was 8/16/2023, needs to be held for 5 days pre procedure).   Plavix resumed, procedure is planning to be re-scheduled for September per discussion with GI  · Daily weights, I/O's     HTN (hypertension), benign  Assessment & Plan  · BP reviewed, overall stable  · Receiving metoprolol and clonidine in hospital  · Resume home torsemide and lisinopril on discharge     Medical Problems     Resolved Problems  Date Reviewed: 8/22/2023   None       Discharging Physician / Practitioner: Ana María Pan PA-C  PCP: Osito Savage MD  Admission Date:   Admission Orders (From admission, onward)     Ordered        08/18/23 0043  INPATIENT ADMISSION  Once                      Discharge Date: 8/22/23    Consultations During Hospital Stay:  · Gastroenterology    Procedures Performed:   · None    Significant Findings / Test Results:   · CT a/p: Findings consistent with acute pancreatitis. No evidence of fluid collection. · CXR: No acute cardiopulmonary disease. In the setting of clinically suspected/proven COVID-19, this plain film appearance does not contain findings that raise concern for viral pneumonia such as COVID-19, but does not rule out this diagnosis. · SARS Cov 2: Positive    Incidental Findings:   · None    Test Results Pending at Discharge (will require follow up): · None     Outpatient Tests Requested:  · Follow-up with GI outpatient, to obtain rescheduled outpatient EUS with FNA in September    Complications: None    Reason for Admission: Acute recurrent pancreatitis, COVID    Hospital Course:   Char Ybarra is a 82 y.o. male patient, PMH recurrent pancreatitis, CKD, HLD, DM, HFpEF, HTN, who originally presented to the hospital on 8/17/2023 due to acute recurrent pancreatitis, presented with worsening upper abdominal pain. This was similar to prior episodes of pancreatitis, recent admission in July where MRI revealed possible pancreatic ductal mass that was to be evaluated with outpatient EUS and FNA on 8/22/2023. On arrival to ED, CT scan consistent with uncomplicated acute pancreatitis with elevated lipase and mild MAO.  Patient initiated on IVFs, supportive care with NPO status, also found to be COVID-positive with mild respiratory symptoms. Patient clinically improved with lipase trending down and resolved MAO, tolerated advancing diet. Patient was treated empirically with IV remdesivir while inpatient, however weaned to room air without further need for treatment. GI evaluated patient, with COVID-positive status planned to reschedule EUS in September. Please see above list of diagnoses and related plan for additional information. Condition at Discharge: stable    Discharge Day Visit / Exam:   Subjective: Patient is seen at bedside this a.m., reports mild abdominal discomfort, although patient states he feels it is more from coughing and not like the pancreatitis pain he was having. Reports tolerating diet well, no further nausea/vomiting with normal BMs, states he feels ready for discharge home. Vitals: Blood Pressure: 124/94 (08/22/23 0810)  Pulse: 72 (08/22/23 0810)  Temperature: 98.3 °F (36.8 °C) (08/22/23 0810)  Temp Source: Oral (08/22/23 0810)  Respirations: 18 (08/22/23 0810)  Height: 5' 6" (167.6 cm) (08/18/23 0104)  Weight - Scale: 108 kg (237 lb 12.8 oz) (08/18/23 0104)  SpO2: 96 % (08/22/23 0810)  Exam:   Physical Exam  Constitutional:       General: He is not in acute distress. Appearance: He is not ill-appearing, toxic-appearing or diaphoretic. Cardiovascular:      Rate and Rhythm: Normal rate and regular rhythm. Pulses: Normal pulses. Heart sounds: Normal heart sounds. Pulmonary:      Effort: Pulmonary effort is normal. No respiratory distress. Comments: Slightly decreased breath sounds across lung fields, no overt wheezing or rales  Abdominal:      General: Bowel sounds are normal. There is no distension. Palpations: Abdomen is soft. Tenderness: There is abdominal tenderness. There is no guarding. Comments: Mild tenderness to palpation of epigastric region   Musculoskeletal:         General: No swelling or tenderness.    Skin:     General: Skin is warm. Neurological:      General: No focal deficit present. Mental Status: He is alert. Psychiatric:         Mood and Affect: Mood normal.         Behavior: Behavior normal.          Discussion with Family: Patient declined call to . Discharge instructions/Information to patient and family:   See after visit summary for information provided to patient and family. Provisions for Follow-Up Care:  See after visit summary for information related to follow-up care and any pertinent home health orders. Disposition:   Home    Planned Readmission: None     Discharge Statement:  I spent 60 minutes discharging the patient. This time was spent on the day of discharge. I had direct contact with the patient on the day of discharge. Greater than 50% of the total time was spent examining patient, answering all patient questions, arranging and discussing plan of care with patient as well as directly providing post-discharge instructions. Additional time then spent on discharge activities. Discharge Medications:  See after visit summary for reconciled discharge medications provided to patient and/or family.       **Please Note: This note may have been constructed using a voice recognition system**

## 2023-08-22 NOTE — ASSESSMENT & PLAN NOTE
Wt Readings from Last 3 Encounters:   08/18/23 108 kg (237 lb 12.8 oz)   08/07/23 109 kg (240 lb)   08/03/23 108 kg (238 lb)     · Does not appear to be in acute exacerbation  · Echo (4/2023): EF 60 to 65%, mild concentric hypertrophy with G1DD. Moderate AV stenosis. · Continue Ranexa, metoprolol  · Resume torsemide upon discharge  · Plavix was on hold for planned outpatient EUS (last dose was 8/16/2023, needs to be held for 5 days pre procedure).   Plavix resumed, procedure is planning to be re-scheduled for September per discussion with GI  · Daily weights, I/O's

## 2023-08-22 NOTE — ASSESSMENT & PLAN NOTE
· BP reviewed, overall stable  · Receiving metoprolol and clonidine in hospital  · Resume home torsemide and lisinopril on discharge

## 2023-08-22 NOTE — PLAN OF CARE
Problem: MOBILITY - ADULT  Goal: Maintain or return to baseline ADL function  Description: INTERVENTIONS:  -  Assess patient's ability to carry out ADLs; assess patient's baseline for ADL function and identify physical deficits which impact ability to perform ADLs (bathing, care of mouth/teeth, toileting, grooming, dressing, etc.)  - Assess/evaluate cause of self-care deficits   - Assess range of motion  - Assess patient's mobility; develop plan if impaired  - Assess patient's need for assistive devices and provide as appropriate  - Encourage maximum independence but intervene and supervise when necessary  - Involve family in performance of ADLs  - Assess for home care needs following discharge   - Consider OT consult to assist with ADL evaluation and planning for discharge  - Provide patient education as appropriate  Outcome: Progressing  Goal: Maintains/Returns to pre admission functional level  Description: INTERVENTIONS:  - Perform BMAT or MOVE assessment daily.   - Set and communicate daily mobility goal to care team and patient/family/caregiver. - Collaborate with rehabilitation services on mobility goals if consulted  - Perform Range of Motion 3 times a day. - Reposition patient every 2 hours.   - Dangle patient 3 times a day  - Stand patient 3 times a day  - Ambulate patient 3 times a day  - Out of bed to chair 3 times a day   - Out of bed for meals 3 times a day  - Out of bed for toileting  - Record patient progress and toleration of activity level   Outcome: Progressing     Problem: PAIN - ADULT  Goal: Verbalizes/displays adequate comfort level or baseline comfort level  Description: Interventions:  - Encourage patient to monitor pain and request assistance  - Assess pain using appropriate pain scale  - Administer analgesics based on type and severity of pain and evaluate response  - Implement non-pharmacological measures as appropriate and evaluate response  - Consider cultural and social influences on pain and pain management  - Notify physician/advanced practitioner if interventions unsuccessful or patient reports new pain  Outcome: Progressing     Problem: INFECTION - ADULT  Goal: Absence or prevention of progression during hospitalization  Description: INTERVENTIONS:  - Assess and monitor for signs and symptoms of infection  - Monitor lab/diagnostic results  - Monitor all insertion sites, i.e. indwelling lines, tubes, and drains  - Monitor endotracheal if appropriate and nasal secretions for changes in amount and color  - [place] appropriate cooling/warming therapies per order  - Administer medications as ordered  - Instruct and encourage patient and family to use good hand hygiene technique  - Identify and instruct in appropriate isolation precautions for identified infection/condition  Outcome: Progressing  Goal: Absence of fever/infection during neutropenic period  Description: INTERVENTIONS:  - Monitor WBC    Outcome: Progressing     Problem: SAFETY ADULT  Goal: Maintain or return to baseline ADL function  Description: INTERVENTIONS:  -  Assess patient's ability to carry out ADLs; assess patient's baseline for ADL function and identify physical deficits which impact ability to perform ADLs (bathing, care of mouth/teeth, toileting, grooming, dressing, etc.)  - Assess/evaluate cause of self-care deficits   - Assess range of motion  - Assess patient's mobility; develop plan if impaired  - Assess patient's need for assistive devices and provide as appropriate  - Encourage maximum independence but intervene and supervise when necessary  - Involve family in performance of ADLs  - Assess for home care needs following discharge   - Consider OT consult to assist with ADL evaluation and planning for discharge  - Provide patient education as appropriate  Outcome: Progressing  Goal: Maintains/Returns to pre admission functional level  Description: INTERVENTIONS:  - Perform BMAT or MOVE assessment daily.   - Set and communicate daily mobility goal to care team and patient/family/caregiver. - Collaborate with rehabilitation services on mobility goals if consulted  - Perform Range of Motion 3 times a day. - Reposition patient every 2 hours.   - Dangle patient 3 times a day  - Stand patient 3 times a day  - Ambulate patient 3 times a day  - Out of bed to chair 3 times a day   - Out of bed for meals 3 times a day  - Out of bed for toileting  - Record patient progress and toleration of activity level   Outcome: Progressing  Goal: Patient will remain free of falls  Description: INTERVENTIONS:  - Educate patient/family on patient safety including physical limitations  - Instruct patient to call for assistance with activity   - Consult OT/PT to assist with strengthening/mobility   - Keep Call bell within reach  - Keep bed low and locked with side rails adjusted as appropriate  - Keep care items and personal belongings within reach  - Initiate and maintain comfort rounds  - Make Fall Risk Sign visible to staff  - Offer Toileting every 2 Hours, in advance of need  - Initiate/Maintain bed alarm  - Obtain necessary fall risk management equipment:   - Apply yellow socks and bracelet for high fall risk patients  - Consider moving patient to room near nurses station  Outcome: Progressing     Problem: DISCHARGE PLANNING  Goal: Discharge to home or other facility with appropriate resources  Description: INTERVENTIONS:  - Identify barriers to discharge w/patient and caregiver  - Arrange for needed discharge resources and transportation as appropriate  - Identify discharge learning needs (meds, wound care, etc.)  - Arrange for interpretive services to assist at discharge as needed  - Refer to Case Management Department for coordinating discharge planning if the patient needs post-hospital services based on physician/advanced practitioner order or complex needs related to functional status, cognitive ability, or social support system  Outcome: Progressing     Problem: Knowledge Deficit  Goal: Patient/family/caregiver demonstrates understanding of disease process, treatment plan, medications, and discharge instructions  Description: Complete learning assessment and assess knowledge base.   Interventions:  - Provide teaching at level of understanding  - Provide teaching via preferred learning methods  Outcome: Progressing     Problem: GASTROINTESTINAL - ADULT  Goal: Minimal or absence of nausea and/or vomiting  Description: INTERVENTIONS:  - Administer IV fluids if ordered to ensure adequate hydration  - Maintain NPO status until nausea and vomiting are resolved  - Nasogastric tube if ordered  - Administer ordered antiemetic medications as needed  - Provide nonpharmacologic comfort measures as appropriate  - Advance diet as tolerated, if ordered  - Consider nutrition services referral to assist patient with adequate nutrition and appropriate food choices  Outcome: Progressing     Problem: SKIN/TISSUE INTEGRITY - ADULT  Goal: Skin Integrity remains intact(Skin Breakdown Prevention)  Description: Assess:  -Perform Omar assessment every shift  -Clean and moisturize skin every shift  -Inspect skin when repositioning, toileting, and assisting with ADLS  -Assess under medical devices  -Assess extremities for adequate circulation and sensation     Bed Management:  -Have minimal linens on bed & keep smooth, unwrinkled  -Change linens as needed when moist or perspiring  -Avoid sitting or lying in one position for more than 2 hours while in bed  -Keep HOB at 30 degrees     Toileting:  -Offer bedside commode  -Assess for incontinence every ***  -Use incontinent care products after each incontinent episode such as ***    Activity:  -Mobilize patient *** times a day  -Encourage activity and walks on unit  -Encourage or provide ROM exercises   -Turn and reposition patient every *** Hours  -Use appropriate equipment to lift or move patient in bed  -Instruct/ Assist with weight shifting every *** when out of bed in chair  -Consider limitation of chair time *** hour intervals    Skin Care:  -Avoid use of baby powder, tape, friction and shearing, hot water or constrictive clothing  -Relieve pressure over bony prominences using ***  -Do not massage red bony areas    Next Steps:  -Teach patient strategies to minimize risks such as ***   -Consider consults to  interdisciplinary teams such as ***  Outcome: Progressing

## 2023-08-22 NOTE — ASSESSMENT & PLAN NOTE
Patient developed symptoms of cough, runny nose during hospitalization, found to be COVID + on 8/20. Mild COVID pathway, intermittent hypoxia requiring 2L O2 initially overnight. · CXR did not reveal acute cardiopulmonary disease, no findings concerning for viral pneumonia  · Inflammatory markers: CRP 89->85. CK, procal wnl. . · D-dimer: 1.98. Low risk for PE per Wells score, no need for CTA chest.  · Maintained on SQ heparin for DVT ppx  · Desat screen: Does not qualify for home O2, stable on RA with SpO2 >95%. · IS, OOB as tolerated, supportive care  · Completed 3/5 doses IV remdesivir, no need for further treatment. Did not receive steroids, while stable on RA no need to initiate on discharge.

## 2023-08-22 NOTE — ASSESSMENT & PLAN NOTE
Lab Results   Component Value Date    EGFR 51 08/22/2023    EGFR 44 08/21/2023    EGFR 47 08/20/2023    CREATININE 1.30 08/22/2023    CREATININE 1.46 (H) 08/21/2023    CREATININE 1.39 (H) 08/20/2023     Creatinine on admission 1.95. Baseline Cr around 1.3-1.4.   · Likely prerenal in setting of acute pancreatitis  · Home torsemide, lisinopril held during admission but can be resumed at discharge  · Resolved, received IVFs with return to baseline renal function

## 2023-08-24 ENCOUNTER — TELEMEDICINE (OUTPATIENT)
Dept: FAMILY MEDICINE CLINIC | Facility: CLINIC | Age: 83
End: 2023-08-24
Payer: MEDICARE

## 2023-08-24 ENCOUNTER — RA CDI HCC (OUTPATIENT)
Dept: OTHER | Facility: HOSPITAL | Age: 83
End: 2023-08-24

## 2023-08-24 DIAGNOSIS — Z79.4 TYPE 2 DIABETES MELLITUS WITH STAGE 3B CHRONIC KIDNEY DISEASE, WITH LONG-TERM CURRENT USE OF INSULIN (HCC): ICD-10-CM

## 2023-08-24 DIAGNOSIS — R09.82 POSTNASAL DRIP: ICD-10-CM

## 2023-08-24 DIAGNOSIS — K85.90 ACUTE RECURRENT PANCREATITIS: Primary | ICD-10-CM

## 2023-08-24 DIAGNOSIS — N18.32 TYPE 2 DIABETES MELLITUS WITH STAGE 3B CHRONIC KIDNEY DISEASE, WITH LONG-TERM CURRENT USE OF INSULIN (HCC): ICD-10-CM

## 2023-08-24 DIAGNOSIS — E78.2 MIXED HYPERLIPIDEMIA: ICD-10-CM

## 2023-08-24 DIAGNOSIS — E11.22 TYPE 2 DIABETES MELLITUS WITH STAGE 3B CHRONIC KIDNEY DISEASE, WITH LONG-TERM CURRENT USE OF INSULIN (HCC): ICD-10-CM

## 2023-08-24 DIAGNOSIS — N18.32 STAGE 3B CHRONIC KIDNEY DISEASE (CKD) (HCC): ICD-10-CM

## 2023-08-24 DIAGNOSIS — I50.32 CHRONIC HEART FAILURE WITH PRESERVED EJECTION FRACTION (HCC): ICD-10-CM

## 2023-08-24 PROBLEM — R07.9 CHEST PAIN: Status: RESOLVED | Noted: 2023-07-25 | Resolved: 2023-08-24

## 2023-08-24 PROBLEM — K90.9 DIARRHEA DUE TO MALABSORPTION: Status: RESOLVED | Noted: 2023-07-09 | Resolved: 2023-08-24

## 2023-08-24 PROBLEM — R19.7 DIARRHEA DUE TO MALABSORPTION: Status: RESOLVED | Noted: 2023-07-09 | Resolved: 2023-08-24

## 2023-08-24 PROBLEM — R09.02 HYPOXIA: Status: RESOLVED | Noted: 2023-08-21 | Resolved: 2023-08-24

## 2023-08-24 PROBLEM — N17.9 ACUTE-ON-CHRONIC KIDNEY INJURY (HCC): Status: RESOLVED | Noted: 2023-08-18 | Resolved: 2023-08-24

## 2023-08-24 PROBLEM — R05.9 COUGH: Status: RESOLVED | Noted: 2023-08-20 | Resolved: 2023-08-24

## 2023-08-24 PROBLEM — U07.1 COVID: Status: RESOLVED | Noted: 2023-08-21 | Resolved: 2023-08-24

## 2023-08-24 PROBLEM — N18.9 ACUTE-ON-CHRONIC KIDNEY INJURY (HCC): Status: RESOLVED | Noted: 2023-08-18 | Resolved: 2023-08-24

## 2023-08-24 PROBLEM — N18.31 STAGE 3A CHRONIC KIDNEY DISEASE (CKD) (HCC): Status: RESOLVED | Noted: 2023-01-11 | Resolved: 2023-08-24

## 2023-08-24 PROCEDURE — 99495 TRANSJ CARE MGMT MOD F2F 14D: CPT

## 2023-08-24 RX ORDER — IPRATROPIUM BROMIDE 21 UG/1
2 SPRAY, METERED NASAL EVERY 12 HOURS
Qty: 30 ML | Refills: 2 | Status: SHIPPED | OUTPATIENT
Start: 2023-08-24

## 2023-08-24 NOTE — ASSESSMENT & PLAN NOTE
Lab Results   Component Value Date    HGBA1C 6.7 (H) 05/10/2023   Under control. Continue current medication. creatinine and GFR stable   Will need periodic BMP  Avoid NSAIDs like ibuprofen Aleve Advil etc.  Avoid high potassium diet. We will continue to monitor   We will re-evaluate at next office visit.

## 2023-08-24 NOTE — ASSESSMENT & PLAN NOTE
Wt Readings from Last 3 Encounters:   08/18/23 108 kg (237 lb 12.8 oz)   08/07/23 109 kg (240 lb)   08/03/23 108 kg (238 lb)     Under control. Continue current medication. Patient has been followed by a cardiologist  We will re-evaluate at next office visit.

## 2023-08-24 NOTE — PROGRESS NOTES
Virtual TCM Visit:    Verification of patient location:    Patient is located at Home in the following state in which I hold an active license [place]    Assessment/Plan:        Problem List Items Addressed This Visit        Digestive    Acute recurrent pancreatitis - Primary     Currently resolved. Patient is scheduled for outpatient endoscopic ultrasound with biopsy. Meanwhile we will continue to monitor            Endocrine    Type 2 diabetes mellitus with chronic kidney disease, with long-term current use of insulin (HCC)       Lab Results   Component Value Date    HGBA1C 6.7 (H) 05/10/2023   Under control. Continue current medication. creatinine and GFR stable   Will need periodic BMP  Avoid NSAIDs like ibuprofen Aleve Advil etc.  Avoid high potassium diet. We will continue to monitor   We will re-evaluate at next office visit. Cardiovascular and Mediastinum    (HFpEF) heart failure with preserved ejection fraction (HCC)     Wt Readings from Last 3 Encounters:   08/18/23 108 kg (237 lb 12.8 oz)   08/07/23 109 kg (240 lb)   08/03/23 108 kg (238 lb)     Under control. Continue current medication. Patient has been followed by a cardiologist  We will re-evaluate at next office visit. Genitourinary    Stage 3b chronic kidney disease (CKD) ([address])     Lab Results   Component Value Date    EGFR 51 08/22/2023    EGFR 44 08/21/2023    EGFR 47 08/20/2023    CREATININE 1.30 08/22/2023    CREATININE 1.46 (H) 08/21/2023    CREATININE 1.39 (H) 08/20/2023   creatinine and GFR stable   Will need periodic BMP  Avoid NSAIDs like ibuprofen Aleve Advil etc.  Avoid high potassium diet. We will continue to monitor                 Other    Mixed hyperlipidemia     Under control. Continue current medication. We will re-evaluate at next office visit.           Other Visit Diagnoses     Postnasal drip        Relevant Medications    ipratropium (ATROVENT) 0.03 % nasal spray           Reason for visit is TCM    Encounter provider Gallito Taveras MD     Provider [place] [id number] [id number] [address] [phone]    Recent Visits  No visits were found meeting these conditions. Showing recent visits within past 7 days and meeting all other requirements  Today's Visits  Date Type Provider Dept   08/24/23 Telemedicine Gallito Taveras  [facility]   Showing today's visits and meeting all other requirements  Future Appointments  No visits were found meeting these conditions. Showing future appointments within next 150 days and meeting all other requirements       After connecting through [employer], the patient was identified by name and date of birth. Luis Quiroz was informed that this is a telemedicine visit and that the visit is being conducted through the [employer]. He agrees to proceed. .  My office door was closed. No one else was in the room. He acknowledged consent and understanding of privacy and security of the video platform. The patient has agreed to participate and understands they can discontinue the visit at any time. Patient is aware this is a billable service. Transitional Care Management Review:  Luis Quiroz is a 82 y.o. male here for TCM follow up. During the TCM phone call patient stated:    TCM Call     Date and time call was made  8/22/2023  3:11 PM    Hospital care reviewed  Records reviewed    Patient was hospitialized at  [address]    Date of Admission  08/17/23    Date of discharge  08/22/23    Diagnosis  COVID    Disposition  Home    Were the patients medications reviewed and updated  Yes    Current Symptoms  Weakness; Fatigue    Weakness severity  Moderate    Fatigue severity  Moderate      TCM Call     Post hospital issues  None    Scheduled for follow up?   Yes    Did you obtain your prescribed medications  Yes    Do you need help managing your prescriptions or medications  No    Is transportation to your appointment needed  No    I have advised the patient to call PCP with any new or worsening symptoms  Joanny Duane, [place]        Subjective:     Patient ID: Ada Stockton is a 82 y.o. male. Patient requested virtual visit for transition of care patient was hospitalized [address] from August 17, 2023 to August 22, 2023 with acute pancreatitis due to recurrent. Patient also had a mild acute kidney injury received IV fluids and supportive care also was COVID-positive and was treated with IV remdesivir as patient became back to baseline patient was discharged home all the medical records from the hospital reviewed  Patient still has some postnasal drip and cough. Patient Sudafed has been stopped and wants something for congestion. No fever no chills. No abdominal pain nausea or vomiting. No lightheadedness or dizziness. No tingling numbness or weakness. No headaches. No other specific complaints. Review of Systems   Constitutional: Negative for chills and fever. HENT: Positive for postnasal drip. Negative for congestion, ear pain and sore throat. Eyes: Negative for pain and visual disturbance. Respiratory: Positive for cough. Negative for shortness of breath. Cardiovascular: Negative for chest pain and palpitations. Gastrointestinal: Negative for abdominal pain, nausea and vomiting. Endocrine: Negative for cold intolerance, heat intolerance, polydipsia, polyphagia and polyuria. Genitourinary: Negative for difficulty urinating, dysuria, frequency and hematuria. Musculoskeletal: Negative for arthralgias and back pain. Skin: Negative for color change and rash. Neurological: Negative for dizziness, seizures, syncope, light-headedness and headaches. Psychiatric/Behavioral: Negative for agitation and behavioral problems. All other systems reviewed and are negative. Objective:     There were no vitals filed for this visit. Physical Exam  Constitutional:       General: He is not in acute distress. Appearance: Normal appearance. He is not ill-appearing, toxic-appearing or diaphoretic. HENT:      Head: Normocephalic and atraumatic. Eyes:      General: No scleral icterus. Right eye: No discharge. Left eye: No discharge. Conjunctiva/sclera: Conjunctivae normal.   Pulmonary:      Effort: Pulmonary effort is normal. No respiratory distress. Neurological:      Mental Status: He is alert and oriented to person, place, and time. Mental status is at baseline. Psychiatric:         Mood and Affect: Mood normal.         Medications have been reviewed by provider in current encounter    I spent 35 minutes with the patient during this visit. Mariza Keys MD      VIRTUAL VISIT [address] verbally agrees to participate in [facility]. Pt is aware that [facility] could be limited without vital signs or the ability to perform a full hands-on physical [address] understands he or the provider may request at any time to terminate the video visit and request the patient to seek care or treatment in person.

## 2023-08-24 NOTE — ASSESSMENT & PLAN NOTE
Currently resolved. Patient is scheduled for outpatient endoscopic ultrasound with biopsy.   Meanwhile we will continue to monitor

## 2023-08-24 NOTE — ASSESSMENT & PLAN NOTE
Lab Results   Component Value Date    EGFR 51 08/22/2023    EGFR 44 08/21/2023    EGFR 47 08/20/2023    CREATININE 1.30 08/22/2023    CREATININE 1.46 (H) 08/21/2023    CREATININE 1.39 (H) 08/20/2023   creatinine and GFR stable   Will need periodic BMP  Avoid NSAIDs like ibuprofen Aleve Advil etc.  Avoid high potassium diet.   We will continue to monitor

## 2023-08-28 ENCOUNTER — HOSPITAL ENCOUNTER (INPATIENT)
Facility: HOSPITAL | Age: 83
LOS: 3 days | Discharge: HOME | DRG: 438 | End: 2023-09-01
Attending: EMERGENCY MEDICINE
Payer: MEDICARE

## 2023-08-28 DIAGNOSIS — I25.10 CORONARY ARTERY DISEASE WITHOUT ANGINA PECTORIS, UNSPECIFIED VESSEL OR LESION TYPE, UNSPECIFIED WHETHER NATIVE OR TRANSPLANTED HEART: ICD-10-CM

## 2023-08-28 DIAGNOSIS — K85.90 ACUTE PANCREATITIS: Primary | ICD-10-CM

## 2023-08-28 DIAGNOSIS — K85.90 ACUTE RECURRENT PANCREATITIS: ICD-10-CM

## 2023-08-28 PROCEDURE — 99285 EMERGENCY DEPT VISIT HI MDM: CPT

## 2023-08-29 ENCOUNTER — APPOINTMENT (EMERGENCY)
Dept: RADIOLOGY | Facility: HOSPITAL | Age: 83
DRG: 438 | End: 2023-08-29
Payer: MEDICARE

## 2023-08-29 ENCOUNTER — APPOINTMENT (EMERGENCY)
Dept: CT IMAGING | Facility: HOSPITAL | Age: 83
DRG: 438 | End: 2023-08-29
Payer: MEDICARE

## 2023-08-29 LAB
2HR DELTA HS TROPONIN: -1 NG/L
4HR DELTA HS TROPONIN: -1 NG/L
ALBUMIN SERPL BCP-MCNC: 3.3 G/DL (ref 3.5–5)
ALBUMIN SERPL BCP-MCNC: 3.7 G/DL (ref 3.5–5)
ALP SERPL-CCNC: 59 U/L (ref 34–104)
ALP SERPL-CCNC: 65 U/L (ref 34–104)
ALT SERPL W P-5'-P-CCNC: 13 U/L (ref 7–52)
ALT SERPL W P-5'-P-CCNC: 15 U/L (ref 7–52)
ANION GAP SERPL CALCULATED.3IONS-SCNC: 6 MMOL/L
ANION GAP SERPL CALCULATED.3IONS-SCNC: 7 MMOL/L
AST SERPL W P-5'-P-CCNC: 14 U/L (ref 13–39)
AST SERPL W P-5'-P-CCNC: 21 U/L (ref 13–39)
ATRIAL RATE: 57 BPM
BASOPHILS # BLD AUTO: 0.03 THOUSANDS/ÂΜL (ref 0–0.1)
BASOPHILS # BLD AUTO: 0.04 THOUSANDS/ÂΜL (ref 0–0.1)
BASOPHILS NFR BLD AUTO: 0 % (ref 0–1)
BASOPHILS NFR BLD AUTO: 1 % (ref 0–1)
BILIRUB SERPL-MCNC: 0.39 MG/DL (ref 0.2–1)
BILIRUB SERPL-MCNC: 0.4 MG/DL (ref 0.2–1)
BUN SERPL-MCNC: 25 MG/DL (ref 5–25)
BUN SERPL-MCNC: 27 MG/DL (ref 5–25)
CALCIUM ALBUM COR SERPL-MCNC: 9.1 MG/DL (ref 8.3–10.1)
CALCIUM SERPL-MCNC: 8.5 MG/DL (ref 8.4–10.2)
CALCIUM SERPL-MCNC: 8.8 MG/DL (ref 8.4–10.2)
CARDIAC TROPONIN I PNL SERPL HS: 11 NG/L
CARDIAC TROPONIN I PNL SERPL HS: 11 NG/L
CARDIAC TROPONIN I PNL SERPL HS: 12 NG/L
CHLORIDE SERPL-SCNC: 102 MMOL/L (ref 96–108)
CHLORIDE SERPL-SCNC: 103 MMOL/L (ref 96–108)
CO2 SERPL-SCNC: 28 MMOL/L (ref 21–32)
CO2 SERPL-SCNC: 30 MMOL/L (ref 21–32)
CREAT SERPL-MCNC: 1.7 MG/DL (ref 0.6–1.3)
CREAT SERPL-MCNC: 1.8 MG/DL (ref 0.6–1.3)
EOSINOPHIL # BLD AUTO: 0.26 THOUSAND/ÂΜL (ref 0–0.61)
EOSINOPHIL # BLD AUTO: 0.3 THOUSAND/ÂΜL (ref 0–0.61)
EOSINOPHIL NFR BLD AUTO: 4 % (ref 0–6)
EOSINOPHIL NFR BLD AUTO: 4 % (ref 0–6)
ERYTHROCYTE [DISTWIDTH] IN BLOOD BY AUTOMATED COUNT: 13.2 % (ref 11.6–15.1)
ERYTHROCYTE [DISTWIDTH] IN BLOOD BY AUTOMATED COUNT: 13.3 % (ref 11.6–15.1)
GFR SERPL CREATININE-BSD FRML MDRD: 34 ML/MIN/1.73SQ M
GFR SERPL CREATININE-BSD FRML MDRD: 37 ML/MIN/1.73SQ M
GLUCOSE SERPL-MCNC: 102 MG/DL (ref 65–140)
GLUCOSE SERPL-MCNC: 149 MG/DL (ref 65–140)
GLUCOSE SERPL-MCNC: 183 MG/DL (ref 65–140)
GLUCOSE SERPL-MCNC: 246 MG/DL (ref 65–140)
HCT VFR BLD AUTO: 36 % (ref 36.5–49.3)
HCT VFR BLD AUTO: 36.9 % (ref 36.5–49.3)
HGB BLD-MCNC: 11.9 G/DL (ref 12–17)
HGB BLD-MCNC: 12.4 G/DL (ref 12–17)
IMM GRANULOCYTES # BLD AUTO: 0.02 THOUSAND/UL (ref 0–0.2)
IMM GRANULOCYTES # BLD AUTO: 0.03 THOUSAND/UL (ref 0–0.2)
IMM GRANULOCYTES NFR BLD AUTO: 0 % (ref 0–2)
IMM GRANULOCYTES NFR BLD AUTO: 0 % (ref 0–2)
LIPASE SERPL-CCNC: 232 U/L (ref 11–82)
LIPASE SERPL-CCNC: 411 U/L (ref 11–82)
LYMPHOCYTES # BLD AUTO: 2.3 THOUSANDS/ÂΜL (ref 0.6–4.47)
LYMPHOCYTES # BLD AUTO: 2.53 THOUSANDS/ÂΜL (ref 0.6–4.47)
LYMPHOCYTES NFR BLD AUTO: 31 % (ref 14–44)
LYMPHOCYTES NFR BLD AUTO: 32 % (ref 14–44)
MCH RBC QN AUTO: 31.6 PG (ref 26.8–34.3)
MCH RBC QN AUTO: 31.9 PG (ref 26.8–34.3)
MCHC RBC AUTO-ENTMCNC: 33.1 G/DL (ref 31.4–37.4)
MCHC RBC AUTO-ENTMCNC: 33.6 G/DL (ref 31.4–37.4)
MCV RBC AUTO: 95 FL (ref 82–98)
MCV RBC AUTO: 96 FL (ref 82–98)
MONOCYTES # BLD AUTO: 0.78 THOUSAND/ÂΜL (ref 0.17–1.22)
MONOCYTES # BLD AUTO: 0.8 THOUSAND/ÂΜL (ref 0.17–1.22)
MONOCYTES NFR BLD AUTO: 10 % (ref 4–12)
MONOCYTES NFR BLD AUTO: 11 % (ref 4–12)
NEUTROPHILS # BLD AUTO: 3.84 THOUSANDS/ÂΜL (ref 1.85–7.62)
NEUTROPHILS # BLD AUTO: 4.52 THOUSANDS/ÂΜL (ref 1.85–7.62)
NEUTS SEG NFR BLD AUTO: 52 % (ref 43–75)
NEUTS SEG NFR BLD AUTO: 55 % (ref 43–75)
NRBC BLD AUTO-RTO: 0 /100 WBCS
NRBC BLD AUTO-RTO: 0 /100 WBCS
P AXIS: 38 DEGREES
PLATELET # BLD AUTO: 200 THOUSANDS/UL (ref 149–390)
PLATELET # BLD AUTO: 229 THOUSANDS/UL (ref 149–390)
PMV BLD AUTO: 10.2 FL (ref 8.9–12.7)
PMV BLD AUTO: 10.3 FL (ref 8.9–12.7)
POTASSIUM SERPL-SCNC: 4.2 MMOL/L (ref 3.5–5.3)
POTASSIUM SERPL-SCNC: 4.2 MMOL/L (ref 3.5–5.3)
PR INTERVAL: 241 MS
PROT SERPL-MCNC: 5.9 G/DL (ref 6.4–8.4)
PROT SERPL-MCNC: 6.4 G/DL (ref 6.4–8.4)
QRS AXIS: -64 DEGREES
QRSD INTERVAL: 168 MS
QT INTERVAL: 482 MS
QTC INTERVAL: 466 MS
RBC # BLD AUTO: 3.76 MILLION/UL (ref 3.88–5.62)
RBC # BLD AUTO: 3.89 MILLION/UL (ref 3.88–5.62)
SODIUM SERPL-SCNC: 138 MMOL/L (ref 135–147)
SODIUM SERPL-SCNC: 138 MMOL/L (ref 135–147)
T WAVE AXIS: 23 DEGREES
VENTRICULAR RATE: 56 BPM
WBC # BLD AUTO: 7.27 THOUSAND/UL (ref 4.31–10.16)
WBC # BLD AUTO: 8.18 THOUSAND/UL (ref 4.31–10.16)

## 2023-08-29 PROCEDURE — 96366 THER/PROPH/DIAG IV INF ADDON: CPT

## 2023-08-29 PROCEDURE — 84484 ASSAY OF TROPONIN QUANT: CPT | Performed by: EMERGENCY MEDICINE

## 2023-08-29 PROCEDURE — 99222 1ST HOSP IP/OBS MODERATE 55: CPT | Performed by: INTERNAL MEDICINE

## 2023-08-29 PROCEDURE — 82948 REAGENT STRIP/BLOOD GLUCOSE: CPT

## 2023-08-29 PROCEDURE — 83690 ASSAY OF LIPASE: CPT

## 2023-08-29 PROCEDURE — 85025 COMPLETE CBC W/AUTO DIFF WBC: CPT

## 2023-08-29 PROCEDURE — 83690 ASSAY OF LIPASE: CPT | Performed by: EMERGENCY MEDICINE

## 2023-08-29 PROCEDURE — 99285 EMERGENCY DEPT VISIT HI MDM: CPT | Performed by: EMERGENCY MEDICINE

## 2023-08-29 PROCEDURE — 84478 ASSAY OF TRIGLYCERIDES: CPT | Performed by: NURSE PRACTITIONER

## 2023-08-29 PROCEDURE — G1004 CDSM NDSC: HCPCS

## 2023-08-29 PROCEDURE — 96365 THER/PROPH/DIAG IV INF INIT: CPT

## 2023-08-29 PROCEDURE — 93010 ELECTROCARDIOGRAM REPORT: CPT | Performed by: INTERNAL MEDICINE

## 2023-08-29 PROCEDURE — 85025 COMPLETE CBC W/AUTO DIFF WBC: CPT | Performed by: EMERGENCY MEDICINE

## 2023-08-29 PROCEDURE — 96375 TX/PRO/DX INJ NEW DRUG ADDON: CPT

## 2023-08-29 PROCEDURE — 80053 COMPREHEN METABOLIC PANEL: CPT | Performed by: EMERGENCY MEDICINE

## 2023-08-29 PROCEDURE — 80053 COMPREHEN METABOLIC PANEL: CPT

## 2023-08-29 PROCEDURE — 36415 COLL VENOUS BLD VENIPUNCTURE: CPT | Performed by: EMERGENCY MEDICINE

## 2023-08-29 PROCEDURE — 74177 CT ABD & PELVIS W/CONTRAST: CPT

## 2023-08-29 PROCEDURE — 93005 ELECTROCARDIOGRAM TRACING: CPT

## 2023-08-29 PROCEDURE — 71045 X-RAY EXAM CHEST 1 VIEW: CPT

## 2023-08-29 RX ORDER — SODIUM CHLORIDE, SODIUM LACTATE, POTASSIUM CHLORIDE, CALCIUM CHLORIDE 600; 310; 30; 20 MG/100ML; MG/100ML; MG/100ML; MG/100ML
125 INJECTION, SOLUTION INTRAVENOUS CONTINUOUS
Status: DISCONTINUED | OUTPATIENT
Start: 2023-08-29 | End: 2023-08-31

## 2023-08-29 RX ORDER — CLONIDINE HYDROCHLORIDE 0.1 MG/1
0.1 TABLET ORAL EVERY 12 HOURS SCHEDULED
Status: DISCONTINUED | OUTPATIENT
Start: 2023-08-29 | End: 2023-09-01 | Stop reason: HOSPADM

## 2023-08-29 RX ORDER — SODIUM CHLORIDE, SODIUM GLUCONATE, SODIUM ACETATE, POTASSIUM CHLORIDE, MAGNESIUM CHLORIDE, SODIUM PHOSPHATE, DIBASIC, AND POTASSIUM PHOSPHATE .53; .5; .37; .037; .03; .012; .00082 G/100ML; G/100ML; G/100ML; G/100ML; G/100ML; G/100ML; G/100ML
1000 INJECTION, SOLUTION INTRAVENOUS ONCE
Status: COMPLETED | OUTPATIENT
Start: 2023-08-29 | End: 2023-08-29

## 2023-08-29 RX ORDER — ONDANSETRON 2 MG/ML
4 INJECTION INTRAMUSCULAR; INTRAVENOUS EVERY 6 HOURS PRN
Status: DISCONTINUED | OUTPATIENT
Start: 2023-08-29 | End: 2023-09-01 | Stop reason: HOSPADM

## 2023-08-29 RX ORDER — INSULIN LISPRO 100 [IU]/ML
1-5 INJECTION, SOLUTION INTRAVENOUS; SUBCUTANEOUS
Status: DISCONTINUED | OUTPATIENT
Start: 2023-08-29 | End: 2023-09-01 | Stop reason: HOSPADM

## 2023-08-29 RX ORDER — FLUTICASONE PROPIONATE 50 MCG
2 SPRAY, SUSPENSION (ML) NASAL DAILY
Status: DISCONTINUED | OUTPATIENT
Start: 2023-08-29 | End: 2023-09-01 | Stop reason: HOSPADM

## 2023-08-29 RX ORDER — HYDROMORPHONE HCL/PF 1 MG/ML
0.5 SYRINGE (ML) INJECTION ONCE
Status: COMPLETED | OUTPATIENT
Start: 2023-08-29 | End: 2023-08-29

## 2023-08-29 RX ORDER — CLOPIDOGREL BISULFATE 75 MG/1
75 TABLET ORAL DAILY
Status: DISCONTINUED | OUTPATIENT
Start: 2023-08-29 | End: 2023-08-30

## 2023-08-29 RX ORDER — IPRATROPIUM BROMIDE 21 UG/1
2 SPRAY, METERED NASAL EVERY 12 HOURS
Status: DISCONTINUED | OUTPATIENT
Start: 2023-08-29 | End: 2023-09-01 | Stop reason: HOSPADM

## 2023-08-29 RX ORDER — HYDROMORPHONE HCL/PF 1 MG/ML
0.5 SYRINGE (ML) INJECTION EVERY 4 HOURS PRN
Status: DISCONTINUED | OUTPATIENT
Start: 2023-08-29 | End: 2023-08-30

## 2023-08-29 RX ORDER — METOPROLOL SUCCINATE 25 MG/1
25 TABLET, EXTENDED RELEASE ORAL DAILY
Status: DISCONTINUED | OUTPATIENT
Start: 2023-08-29 | End: 2023-09-01 | Stop reason: HOSPADM

## 2023-08-29 RX ORDER — TAMSULOSIN HYDROCHLORIDE 0.4 MG/1
0.8 CAPSULE ORAL DAILY
Status: DISCONTINUED | OUTPATIENT
Start: 2023-08-29 | End: 2023-09-01 | Stop reason: HOSPADM

## 2023-08-29 RX ORDER — HEPARIN SODIUM 5000 [USP'U]/ML
5000 INJECTION, SOLUTION INTRAVENOUS; SUBCUTANEOUS EVERY 8 HOURS SCHEDULED
Status: DISCONTINUED | OUTPATIENT
Start: 2023-08-29 | End: 2023-09-01 | Stop reason: HOSPADM

## 2023-08-29 RX ORDER — ONDANSETRON 2 MG/ML
4 INJECTION INTRAMUSCULAR; INTRAVENOUS ONCE
Status: COMPLETED | OUTPATIENT
Start: 2023-08-29 | End: 2023-08-29

## 2023-08-29 RX ADMIN — INSULIN LISPRO 1 UNITS: 100 INJECTION, SOLUTION INTRAVENOUS; SUBCUTANEOUS at 21:57

## 2023-08-29 RX ADMIN — HYDROMORPHONE HYDROCHLORIDE 0.5 MG: 1 INJECTION, SOLUTION INTRAMUSCULAR; INTRAVENOUS; SUBCUTANEOUS at 00:29

## 2023-08-29 RX ADMIN — METOPROLOL SUCCINATE 25 MG: 25 TABLET, FILM COATED, EXTENDED RELEASE ORAL at 09:21

## 2023-08-29 RX ADMIN — IOHEXOL 100 ML: 350 INJECTION, SOLUTION INTRAVENOUS at 01:49

## 2023-08-29 RX ADMIN — TAMSULOSIN HYDROCHLORIDE 0.8 MG: 0.4 CAPSULE ORAL at 09:20

## 2023-08-29 RX ADMIN — HEPARIN SODIUM 5000 UNITS: 5000 INJECTION INTRAVENOUS; SUBCUTANEOUS at 05:01

## 2023-08-29 RX ADMIN — MORPHINE SULFATE 2 MG: 2 INJECTION, SOLUTION INTRAMUSCULAR; INTRAVENOUS at 20:05

## 2023-08-29 RX ADMIN — FLUTICASONE PROPIONATE 2 SPRAY: 50 SPRAY, METERED NASAL at 09:20

## 2023-08-29 RX ADMIN — CLOPIDOGREL BISULFATE 75 MG: 75 TABLET ORAL at 09:20

## 2023-08-29 RX ADMIN — CLONIDINE HYDROCHLORIDE 0.1 MG: 0.1 TABLET ORAL at 09:20

## 2023-08-29 RX ADMIN — MORPHINE SULFATE 2 MG: 2 INJECTION, SOLUTION INTRAMUSCULAR; INTRAVENOUS at 12:40

## 2023-08-29 RX ADMIN — INSULIN LISPRO 2 UNITS: 100 INJECTION, SOLUTION INTRAVENOUS; SUBCUTANEOUS at 18:24

## 2023-08-29 RX ADMIN — HEPARIN SODIUM 5000 UNITS: 5000 INJECTION INTRAVENOUS; SUBCUTANEOUS at 21:56

## 2023-08-29 RX ADMIN — CLONIDINE HYDROCHLORIDE 0.1 MG: 0.1 TABLET ORAL at 20:06

## 2023-08-29 RX ADMIN — SODIUM CHLORIDE, SODIUM LACTATE, POTASSIUM CHLORIDE, AND CALCIUM CHLORIDE 150 ML/HR: .6; .31; .03; .02 INJECTION, SOLUTION INTRAVENOUS at 04:49

## 2023-08-29 RX ADMIN — HEPARIN SODIUM 5000 UNITS: 5000 INJECTION INTRAVENOUS; SUBCUTANEOUS at 15:05

## 2023-08-29 RX ADMIN — ONDANSETRON 4 MG: 2 INJECTION INTRAMUSCULAR; INTRAVENOUS at 00:29

## 2023-08-29 RX ADMIN — HYDROMORPHONE HYDROCHLORIDE 0.5 MG: 1 INJECTION, SOLUTION INTRAMUSCULAR; INTRAVENOUS; SUBCUTANEOUS at 21:56

## 2023-08-29 RX ADMIN — SODIUM CHLORIDE, SODIUM GLUCONATE, SODIUM ACETATE, POTASSIUM CHLORIDE, MAGNESIUM CHLORIDE, SODIUM PHOSPHATE, DIBASIC, AND POTASSIUM PHOSPHATE 1000 ML: .53; .5; .37; .037; .03; .012; .00082 INJECTION, SOLUTION INTRAVENOUS at 00:36

## 2023-08-29 NOTE — ASSESSMENT & PLAN NOTE
This is patient's seventh evaluation in the past 3 and half months for pancreatitis. Most recent, patient was discharged on 8/22. He woke up this morning with epigastric pain that was unresolving despite oxycodone. He has a planned EUS for 9/7. Previous MRI/MRCP showed gallstones, sludge, and pancreatic ductal mass. · Meets acute pancreatitis criteria with epigastric pain, lipase 411, and CT abdomen evidence shows uncomplicated pancreatitis without necrosis or fluid collection  · Continue clear liquid diet. · IV pain medication  · IVF  · Teresa criteria 2  · Calcium 8.8  · Patient is planned for EUS on 9/7/2023. Plavix will be held from 9/2/2023.

## 2023-08-29 NOTE — ASSESSMENT & PLAN NOTE
This is patient's seventh evaluation in the past 3 and half months for pancreatitis. Most recent, patient was discharged on 8/22. He woke up this morning with epigastric pain that was unresolving despite oxycodone. He has a planned EUS for 9/7. Previous MRI/MRCP showed gallstones, sludge, and pancreatic ductal mass.     · Meets acute pancreatitis criteria with epigastric pain, lipase 411, and CT abdomen evidence shows uncomplicated pancreatitis without necrosis or fluid collection  · N.p.o.  · IV pain medication  · IVF  · [place] criteria 2  · Calcium 8.8

## 2023-08-29 NOTE — ASSESSMENT & PLAN NOTE
Wt Readings from Last 3 Encounters:   08/29/23 107 kg (235 lb)   08/18/23 108 kg (237 lb 12.8 oz)   08/07/23 109 kg (240 lb)     · Will need to monitor volume status closely  · Currently holding torsemide and lisinopril given MAO, will continue with Plavix, metoprolol, clonidine  · Currently at baseline respiratory status  · Appears to be euvolemic  · Monitor daily I's and O's

## 2023-08-29 NOTE — ASSESSMENT & PLAN NOTE
Lab Results   Component Value Date    HGBA1C 6.7 (H) 05/10/2023       No results for input(s): "POCGLU" in the last 72 hours.     Blood Sugar Average: Last 72 hrs:     · Last admission blood sugars were very volatile and insulin needs to be adjusted shortly after starting p.o. diet  · Given n.p.o. status, will hold insulin and other diabetic medications, will likely need to be added once starting p.o. diet

## 2023-08-29 NOTE — ED NOTES
Pt O2 dropping intermittently into the low 90's, high 80's. 2L O2 applied, provider made aware.      Jazzy Pennington, [address]  08/29/23 [id number]

## 2023-08-29 NOTE — ED PROVIDER NOTES
History  Chief Complaint   Patient presents with   • Abdominal Pain     Pt presents to the ed with epigastric pain that started this morning, hx of pancreatis, oxycodone at 2030 with no relief      81 y/o male with hx of HTN, HLD, diabetes, GERD, CKD, and chronic pancreatitis presents the ED for evaluation of epigastric pain that started this morning. The patient states that his symptoms feel similar to his prior episodes of acute pancreatitis. He tried taking his prescribed oxycodone at 2030 with no significant relief. The pain is nonradiating, located in the epigastrium and mid abdomen. He denies any fever, chills, cough, dyspnea, chest pain, back pain, nausea, vomiting, diarrhea, and urinary symptoms. The patient was recently seen and evaluated with similar pain on 23 and was admitted for acute pancreatitis. Prior to Admission Medications   Prescriptions Last Dose Informant Patient Reported? Taking?    BD Pen Needle Pascale U/F 32G X 4 MM MISC  Self Yes No   Sig: USE AS INSTRUCTED WITH INSULIN PEN   Fexofenadine HCl (MUCINEX ALLERGY PO)  Self Yes No   Sig: Take by mouth   Lancets (OneTouch Delica Plus [id number]) MISC  Self Yes No   Sig: TEST GLUCOSE 3 4 TIMES/DAY   Levemir FlexTouch 100 units/mL injection pen  Self Yes No   Si Units daily In AM   Multiple Vitamins-Minerals (MULTIVITAMIN ADULT PO)  Self Yes No   Sig: Take by mouth daily   acetaminophen (TYLENOL) 325 mg tablet  Self No No   Sig: Take 2 tablets (650 mg total) by mouth every 6 (six) hours as needed for mild pain   calcium carbonate (OS-WAYLON) 600 MG tablet  Self Yes No   Sig: Take 1,200 mg by mouth daily   cholecalciferol (VITAMIN D3) 1,000 units tablet  Self Yes No   Sig: Take 2,000 Units by mouth daily   cloNIDine (CATAPRES) 0.1 mg tablet  Self No No   Sig: Take 1 tablet (0.1 mg total) by mouth every 12 (twelve) hours   clopidogrel (PLAVIX) 75 mg tablet  Self No No   Sig: Take 1 tablet (75 mg total) by mouth daily Instructed to stop prior to Surgery-Last dose 23 Do not start before 2023.    fluticasone (FLONASE) 50 mcg/act nasal spray   No No   Si sprays into each nostril daily   insulin aspart (NovoLOG FlexPen) 100 UNIT/ML injection pen  Self Yes No   Sig: Inject under the skin 3 (three) times a day with meals Sliding scale as ordered TID with meals   ipratropium (ATROVENT) 0.03 % nasal spray   No No   Si sprays into each nostril every 12 (twelve) hours   lisinopril (ZESTRIL) 40 mg tablet  Self No No   Sig: Take 1 tablet (40 mg total) by mouth daily   metoprolol succinate (TOPROL-XL) 25 mg 24 hr tablet   No No   Sig: Take 1 tablet (25 mg total) by mouth daily   omeprazole (PriLOSEC) 40 MG capsule  Self No No   Sig: Take 1 capsule (40 mg total) by mouth daily   Patient taking differently: Take 40 mg by mouth daily before breakfast   ondansetron (ZOFRAN) 4 mg tablet   No No   Sig: Take 1 tablet (4 mg total) by mouth every 8 (eight) hours as needed for nausea or vomiting   pyridoxine (VITAMIN B6) 100 mg tablet  Self Yes No   Sig: Take 100 mg by mouth daily   ranolazine (RANEXA) 500 mg 12 hr tablet  Self No No   Sig: Take 1 tablet (500 mg total) by mouth 2 (two) times a day   tamsulosin (FLOMAX) 0.4 mg  Self No No   Sig: Take 2 capsules (0.8 mg total) by mouth daily   torsemide (DEMADEX) 10 mg tablet  Self Yes No   Sig: Take 10 mg by mouth      Facility-Administered Medications: None       Past Medical History:   Diagnosis Date   • Allergic    • Arthritis    • Chronic kidney disease    • Chronic kidney disease (CKD) stage G3a/A1, moderately decreased glomerular filtration rate (GFR) between 45-59 mL/min/1.73 square meter and albuminuria creatinine ratio less than 30 mg/g (HCC)    • Colon polyp    • Diabetes mellitus (HCC)    • GERD (gastroesophageal reflux disease)    • Heart murmur    • History of echocardiogram    • HL (hearing loss)    • Hyperlipidemia    • Hypertension    • Obesity    • Pancreatitis    • Sleep apnea, obstructive        Past Surgical History:   Procedure Laterality Date   • CARDIAC CATHETERIZATION     • COLONOSCOPY  2018   • COLONOSCOPY     • EYE SURGERY     • HEMORRHOID SURGERY     • JOINT REPLACEMENT  2017    knee   • OTHER SURGICAL HISTORY      Stent    • WA LAPAROSCOPY SURG CHOLECYSTECTOMY N/A 2023    Procedure: CHOLECYSTECTOMY LAPAROSCOPIC;  Surgeon: Anais Belle MD;  Location:  MAIN OR;  Service: General   • UPPER GASTROINTESTINAL ENDOSCOPY         Family History   Problem Relation Age of Onset   • Heart disease Mother    • Heart attack Mother         46s   • Cancer Mother    • Coronary artery disease Mother    • Cancer Brother         Malignant tumor of lung     I have reviewed and agree with the history as documented. E-Cigarette/Vaping   • E-Cigarette Use Never User      E-Cigarette/Vaping Substances   • Nicotine No    • THC No    • CBD No    • Flavoring No    • Other No    • Unknown No      Social History     Tobacco Use   • Smoking status: Former     Packs/day: 2.00     Years: 15.00     Total pack years: 30.00     Types: Cigarettes, Pipe, Cigars     Quit date: 1972     Years since quittin.8     Passive exposure: Past   • Smokeless tobacco: Never   Vaping Use   • Vaping Use: Never used   Substance Use Topics   • Alcohol use: Not Currently     Alcohol/week: 5.0 standard drinks of alcohol     Types: 5 Glasses of wine per week     Comment: no alcohol since March   • Drug use: Never       Review of Systems   Constitutional: Negative for chills and fever. HENT: Negative for congestion, rhinorrhea and sore throat. Respiratory: Negative for cough and shortness of breath. Cardiovascular: Negative for chest pain and palpitations. Gastrointestinal: Positive for abdominal pain. Negative for diarrhea, nausea and vomiting. Genitourinary: Negative for dysuria and hematuria. Musculoskeletal: Negative for back pain and neck pain.    Neurological: Negative for weakness, light-headedness, numbness and headaches. All other systems reviewed and are negative. Physical Exam  Physical Exam  Vitals and nursing note reviewed. Constitutional:       General: He is not in acute distress. Appearance: Normal appearance. He is well-developed and normal weight. He is not ill-appearing or toxic-appearing. HENT:      Head: Normocephalic and atraumatic. Right Ear: External ear normal.      Left Ear: External ear normal.      Nose: Nose normal.      Mouth/Throat:      Mouth: Mucous membranes are moist.      Pharynx: Oropharynx is clear. No oropharyngeal exudate or posterior oropharyngeal erythema. Eyes:      Extraocular Movements: Extraocular movements intact. Conjunctiva/sclera: Conjunctivae normal.      Pupils: Pupils are equal, round, and reactive to light. Cardiovascular:      Rate and Rhythm: Normal rate and regular rhythm. Pulses: Normal pulses. Heart sounds: Normal heart sounds. Pulmonary:      Effort: Pulmonary effort is normal. No respiratory distress. Breath sounds: Normal breath sounds. No wheezing or rales. Abdominal:      General: Abdomen is flat. Bowel sounds are normal. There is no distension. Palpations: Abdomen is soft. Tenderness: There is abdominal tenderness in the epigastric area and periumbilical area. There is no right CVA tenderness, left CVA tenderness, guarding or rebound. Musculoskeletal:         General: No swelling or tenderness. Normal range of motion. Cervical back: Normal range of motion and neck supple. No tenderness. Skin:     General: Skin is warm and dry. Neurological:      General: No focal deficit present. Mental Status: He is alert and oriented to person, place, and time.          Vital Signs  ED Triage Vitals   Temperature Pulse Respirations Blood Pressure SpO2   08/29/23 0001 08/29/23 0001 08/29/23 0001 08/29/23 0001 08/29/23 0001   97.7 °F (36.5 °C) 58 18 (!) 165/48 98 % Temp Source Heart Rate Source Patient Position - Orthostatic VS BP Location FiO2 (%)   08/29/23 0001 08/29/23 0001 08/29/23 0001 08/29/23 0001 --   Oral Monitor Lying Right arm       Pain Score       08/29/23 0029       8           Vitals:    08/29/23 0001 08/29/23 0215 08/29/23 0402   BP: (!) 165/48 (!) 153/47 148/72   Pulse: 58 60 57   Patient Position - Orthostatic VS: Lying Sitting Lying         Visual Acuity      ED Medications  Medications   cloNIDine (CATAPRES) tablet 0.1 mg (has no administration in time range)   clopidogrel (PLAVIX) tablet 75 mg (has no administration in time range)   fluticasone (FLONASE) 50 mcg/act nasal spray 2 spray (has no administration in time range)   ipratropium (ATROVENT) 0.03 % nasal spray 2 spray (has no administration in time range)   metoprolol succinate (TOPROL-XL) 24 hr tablet 25 mg (has no administration in time range)   tamsulosin (FLOMAX) capsule 0.8 mg (has no administration in time range)   ondansetron (ZOFRAN) injection 4 mg (has no administration in time range)   heparin (porcine) subcutaneous injection 5,000 Units (has no administration in time range)   lactated ringers infusion (has no administration in time range)   HYDROmorphone (DILAUDID) injection 0.5 mg (has no administration in time range)   morphine injection 2 mg (has no administration in time range)   HYDROmorphone (DILAUDID) injection 0.5 mg (0.5 mg Intravenous Given 8/29/23 0029)   ondansetron (ZOFRAN) injection 4 mg (4 mg Intravenous Given 8/29/23 0029)   multi-electrolyte (ISOLYTE-S PH 7.4) bolus 1,000 mL (1,000 mL Intravenous New Bag 8/29/23 0036)   iohexol (OMNIPAQUE) 350 MG/ML injection (SINGLE-DOSE) 100 mL (100 mL Intravenous Given 8/29/23 0149)       Diagnostic Studies  Results Reviewed     Procedure Component Value Units Date/Time    HS Troponin I 2hr [[id number]]  (Normal) Collected: 08/29/23 0223    Lab Status: Final result Specimen: Blood from Arm, Left Updated: 08/29/23 5357     hs TnI 2hr 11 ng/L      Delta 2hr hsTnI -1 ng/L     HS Troponin I 4hr [[id number]]     Lab Status: No result Specimen: Blood     Comprehensive metabolic panel [[id number]]  (Abnormal) Collected: 08/29/23 0028    Lab Status: Final result Specimen: Blood from Arm, Left Updated: 08/29/23 0125     Sodium 138 mmol/L      Potassium 4.2 mmol/L      Chloride 103 mmol/L      CO2 28 mmol/L      ANION GAP 7 mmol/L      BUN 27 mg/dL      Creatinine 1.80 mg/dL      Glucose 102 mg/dL      Calcium 8.8 mg/dL      AST 21 U/L      ALT 15 U/L      Alkaline Phosphatase 65 U/L      Total Protein 6.4 g/dL      Albumin 3.7 g/dL      Total Bilirubin 0.39 mg/dL      eGFR 34 ml/min/1.73sq m     Narrative:      [place] guidelines for Chronic Kidney Disease (CKD):   •  Stage 1 with normal or high GFR (GFR > 90 mL/min/1.73 square meters)  •  Stage 2 Mild CKD (GFR = 60-89 mL/min/1.73 square meters)  •  Stage 3A Moderate CKD (GFR = 45-59 mL/min/1.73 square meters)  •  Stage 3B Moderate CKD (GFR = 30-44 mL/min/1.73 square meters)  •  Stage 4 Severe CKD (GFR = 15-29 mL/min/1.73 square meters)  •  Stage 5 End Stage CKD (GFR <15 mL/min/1.73 square meters)  Note: GFR calculation is accurate only with a steady state creatinine    Lipase [[id number]]  (Abnormal) Collected: 08/29/23 0028    Lab Status: Final result Specimen: Blood from Arm, Left Updated: 08/29/23 0125     Lipase 411 u/L     HS Troponin 0hr (reflex protocol) [[id number]]  (Normal) Collected: 08/29/23 0028    Lab Status: Final result Specimen: Blood from Arm, Left Updated: 08/29/23 0113     hs TnI 0hr 12 ng/L     CBC and differential [[id number]] Collected: 08/29/23 0028    Lab Status: Final result Specimen: Blood from Arm, Left Updated: 08/29/23 0056     WBC 8.18 Thousand/uL      RBC 3.89 Million/uL      Hemoglobin 12.4 g/dL      Hematocrit 36.9 %      MCV 95 fL      MCH 31.9 pg      MCHC 33.6 g/dL      RDW 13.3 %      MPV 10.3 fL      Platelets 835 Thousands/uL      nRBC 0 /100 WBCs      Neutrophils Relative 55 %      Immat GRANS % 0 %      Lymphocytes Relative 31 %      Monocytes Relative 10 %      Eosinophils Relative 4 %      Basophils Relative 0 %      Neutrophils Absolute 4.52 Thousands/µL      Immature Grans Absolute 0.02 Thousand/uL      Lymphocytes Absolute 2.53 Thousands/µL      Monocytes Absolute 0.78 Thousand/µL      Eosinophils Absolute 0.30 Thousand/µL      Basophils Absolute 0.03 Thousands/µL                  XR chest 1 view portable   ED Interpretation by Mehrdad Correa MD (08/29 8201)   No acute cardiopulmonary process on my interpretation, stable appearance compared to chest radiograph from 8/21/2023. CT abdomen pelvis with contrast    (Results Pending)              Procedures  Procedures         ED Course  ED Course as of 08/29/23 0431   Tue Aug 29, 2023   0010 Procedure Note: EKG  Date/Time: 08/29/23 12:04 AM   Interpreted by: Mehrdad Correa MD  Indications / Diagnosis: Epigastric pain  ECG reviewed by me, the ED Physician: yes   The EKG demonstrates:  Rhythm: sinus bradycardia 56 bpm  Intervals: first degree AVB  Axis: left axis deviation  QRS/Blocks: RBBB, LAFB  ST Changes: Nonspecific ST-T wave changes. No STEMI.   0101 WBC: 8.18   0101 Hemoglobin: 12.4   0101 Platelet Count: 869   0115 hs TnI 0hr: 12   0147 Lipase(!): 411   0147 Sodium: 138   0147 Potassium: 4.2   0147 Chloride: 103   0147 CO2: 28   0147 Anion Gap: 7   0147 BUN(!): 27   0147 Creatinine(!): 1.80   0147 Glucose, Random: 102   0147 AST: 21   0147 ALT: 15   0147 Alkaline Phosphatase: 72   0304 Delta 2hr hsTnI: -1   0304 hs TnI 2hr: 11   0320 CT abdomen pelvis with contrast  CT report per [facility]:   "1.  Pancreatic edema consistent with acute pancreatitis. No evidence of necrosis or fluid collection identified. 2.  Coronary atherosclerosis  3.   Dense calcification of the aortic valve this can be seen with aortic stenosis, collision with echocardiography may be helpful."             HEART Risk Score Flowsheet Row Most Recent Value   Heart Score Risk Calculator    History 0 Filed at: 08/29/2023 0430   ECG 1 Filed at: 08/29/2023 0430   Age 2 Filed at: 08/29/2023 0430   Risk Factors 1 Filed at: 08/29/2023 0430   Troponin 0 Filed at: 08/29/2023 0430   HEART Score 4 Filed at: 08/29/2023 0430                        SBIRT 20yo+    Flowsheet Row Most Recent Value   Initial Alcohol Screen: US AUDIT-C     1. How often do you have a drink containing alcohol? 0 Filed at: 08/29/2023 0001   2. How many drinks containing alcohol do you have on a typical day you are drinking? 0 Filed at: 08/29/2023 0001   3a. Male UNDER 65: How often do you have five or more drinks on one occasion? 0 Filed at: 08/29/2023 0001   3b. FEMALE Any Age, or MALE 65+: How often do you have 4 or more drinks on one occassion? 0 Filed at: 08/29/2023 0001   Audit-C Score 0 Filed at: 08/29/2023 0001   ALEA: How many times in the past year have you. .. Used an illegal drug or used a prescription medication for non-medical reasons? Never Filed at: 08/29/2023 0001                    Medical Decision Making  83 y/o male with hx of HTN, HLD, diabetes, GERD, CKD, and chronic pancreatitis presents the ED for evaluation of epigastric pain that started this morning. The patient states that his symptoms feel similar to his prior episodes of acute pancreatitis. He tried taking his prescribed oxycodone at 2030 with no significant relief. The pain is nonradiating, located in the epigastrium and mid abdomen. He denies any fever, chills, cough, dyspnea, chest pain, back pain, nausea, vomiting, diarrhea, and urinary symptoms. The patient was recently seen and evaluated with similar pain on 8/17/23 and was admitted for acute pancreatitis. Vital signs reviewed. On exam the patient is overall nontoxic-appearing, no acute distress. Heart with regular rate and rhythm, lungs are clear to auscultation bilaterally.   Abdomen is soft, nondistended, with mild epigastric and periumbilical tenderness to palpation. No guarding or rebound. No CVA tenderness. See physical exam documentation for remainder of exam findings. Differential diagnosis includes but is not limited to acute pancreatitis, pancreatic abscess, ACS, biliary disease, and/or gastritis. Will evaluate with CBC, chemistry, lipase, troponin, ECG, chest x-ray, and CT abdomen/pelvis with IV contrast.  Electronic medical record states prior allergic reaction to IV contrast, however listed reaction is "feeling warm" which is an expected side effect of IV contrast administration. I discussed with the patient and he states that this reaction occurred several years ago and he did not experience any pruritus, urticaria, throat swelling, or respiratory distress. IV contrast removed from allergy list, will monitor closely as patient receives CT scan and IV contrast.  IV fluids ordered as well as IV analgesic medication. ECG with no acute ST abnormality or STEMI on my interpretation. CXR with no acute cardiopulmonary process on my interpretation, stable appearance compared to chest radiograph from 8/21/2023. Elevated lipase noted on labs. Delta troponin negative. CT shows evidence of acute pancreatitis with no evidence of necrosis or fluid collection. Discussed findings and indications for admission with the patient and he understands and agrees. Case discussed with hospitalist for admission. Amount and/or Complexity of Data Reviewed  Labs: ordered. Decision-making details documented in ED Course. Radiology: ordered. Decision-making details documented in ED Course. Risk  Prescription drug management. Decision regarding hospitalization.           Disposition  Final diagnoses:   Acute pancreatitis     Time reflects when diagnosis was documented in both MDM as applicable and the Disposition within this note     Time User Action Codes Description Comment    8/29/2023  3:24 AM Kate Saldivar Add [K85.90] Acute pancreatitis       ED Disposition     ED Disposition   Admit    Condition   Stable    Date/Time   Tue Aug 29, 2023  3:24 AM    Comment   Case was discussed with Yvette Conti PA-C, [facility] admitting provider, and the patient's admission status was agreed to be Admission Status: inpatient status to the service of Dr. Lissa Lara. Follow-up Information    None         Current Discharge Medication List      CONTINUE these medications which have NOT CHANGED    Details   acetaminophen (TYLENOL) 325 mg tablet Take 2 tablets (650 mg total) by mouth every 6 (six) hours as needed for mild pain  Refills: 0    Associated Diagnoses: Pancreatitis      BD Pen Needle Pascale U/F 32G X 4 MM MISC USE AS INSTRUCTED WITH INSULIN PEN      calcium carbonate (OS-WAYLON) 600 MG tablet Take 1,200 mg by mouth daily      cholecalciferol (VITAMIN D3) 1,000 units tablet Take 2,000 Units by mouth daily      cloNIDine (CATAPRES) 0.1 mg tablet Take 1 tablet (0.1 mg total) by mouth every 12 (twelve) hours    Associated Diagnoses: Essential (primary) hypertension      clopidogrel (PLAVIX) 75 mg tablet Take 1 tablet (75 mg total) by mouth daily Instructed to stop prior to Surgery-Last dose 6/21/23 Do not start before June 29, 2023.   Refills: 0    Associated Diagnoses: Coronary artery disease without angina pectoris, unspecified vessel or lesion type, unspecified whether native or transplanted heart      Fexofenadine HCl (MUCINEX ALLERGY PO) Take by mouth      fluticasone (FLONASE) 50 mcg/act nasal spray 2 sprays into each nostril daily  Qty: 48 mL, Refills: 0    Associated Diagnoses: Seasonal allergic rhinitis due to pollen      insulin aspart (NovoLOG FlexPen) 100 UNIT/ML injection pen Inject under the skin 3 (three) times a day with meals Sliding scale as ordered TID with meals      ipratropium (ATROVENT) 0.03 % nasal spray 2 sprays into each nostril every 12 (twelve) hours  Qty: 30 mL, Refills: 2    Associated Diagnoses: Postnasal drip Lancets (OneTouch Delica Plus [id number]) MISC TEST GLUCOSE 3 4 TIMES/DAY      Levemir FlexTouch 100 units/mL injection pen 36 Units daily In AM      lisinopril (ZESTRIL) 40 mg tablet Take 1 tablet (40 mg total) by mouth daily  Qty: 90 tablet, Refills: 1    Associated Diagnoses: Essential (primary) hypertension      metoprolol succinate (TOPROL-XL) 25 mg 24 hr tablet Take 1 tablet (25 mg total) by mouth daily  Qty: 90 tablet, Refills: 0    Associated Diagnoses: Coronary artery disease involving native coronary artery of native heart without angina pectoris      Multiple Vitamins-Minerals (MULTIVITAMIN ADULT PO) Take by mouth daily      omeprazole (PriLOSEC) 40 MG capsule Take 1 capsule (40 mg total) by mouth daily  Qty: 180 capsule, Refills: 0    Associated Diagnoses: GERD without esophagitis      ondansetron (ZOFRAN) 4 mg tablet Take 1 tablet (4 mg total) by mouth every 8 (eight) hours as needed for nausea or vomiting  Qty: 20 tablet, Refills: 0    Associated Diagnoses: Acute pancreatitis      pyridoxine (VITAMIN B6) 100 mg tablet Take 100 mg by mouth daily      ranolazine (RANEXA) 500 mg 12 hr tablet Take 1 tablet (500 mg total) by mouth 2 (two) times a day  Qty: 180 tablet, Refills: 0    Associated Diagnoses: Coronary artery disease involving native coronary artery of native heart without angina pectoris      tamsulosin (FLOMAX) 0.4 mg Take 2 capsules (0.8 mg total) by mouth daily  Qty: 180 capsule, Refills: 1    Associated Diagnoses: Benign prostatic hyperplasia without lower urinary tract symptoms      torsemide (DEMADEX) 10 mg tablet Take 10 mg by mouth             No discharge procedures on file.     PDMP Review       Value Time User    PDMP Reviewed  Yes 7/10/2023  9:21 AM Geremias Brody MD          ED Provider  Electronically Signed by           Kate Saldivar MD  08/29/23 0672

## 2023-08-29 NOTE — ASSESSMENT & PLAN NOTE
· BP on admission 153/47  · Given MAO will hold patient's torsemide and lisinopril, will continue with metoprolol and clonidine  · Continue to trend vitals

## 2023-08-29 NOTE — H&P
[address] [facility]  H&P  Name: Lonnie Smith 82 y.o. male I MRN: [id number]  Unit/Bed#: -01 I Date of Admission: 8/28/2023   Date of Service: 8/29/2023 I Hospital Day: 0      Assessment/Plan   * Acute recurrent pancreatitis  Assessment & Plan  This is patient's seventh evaluation in the past 3 and half months for pancreatitis. Most recent, patient was discharged on 8/22. He woke up this morning with epigastric pain that was unresolving despite oxycodone. He has a planned EUS for 9/7. Previous MRI/MRCP showed gallstones, sludge, and pancreatic ductal mass. · Meets acute pancreatitis criteria with epigastric pain, lipase 411, and CT abdomen evidence shows uncomplicated pancreatitis without necrosis or fluid collection  · N.p.o.  · IV pain medication  · IVF  · [place] criteria 2  · Calcium 8.8    COVID-19  Assessment & Plan  · Tested positive for COVID-19 8/20  · Contact and droplet isolation  · Intermittently requiring 2 L of O2 while sleeping, room air while awake. This is patient's baseline  · Last hospitalization was given 3 doses of remdesivir and responded well without steroids  · Continue to monitor vitals    Acute-on-chronic kidney injury [facility])  Assessment & Plan  Lab Results   Component Value Date    EGFR 34 08/29/2023    EGFR 51 08/22/2023    EGFR 44 08/21/2023    CREATININE 1.80 (H) 08/29/2023    CREATININE 1.30 08/22/2023    CREATININE 1.46 (H) 08/21/2023     · Baseline creatinine between 1.3 and 1.4, creatinine today 1.8  · We will give aggressive IVF  · Trend BMP  · Hold torsemide and lisinopril    Type 2 diabetes mellitus with chronic kidney disease, with long-term current use of insulin ([address])  Assessment & Plan  Lab Results   Component Value Date    HGBA1C 6.7 (H) 05/10/2023       No results for input(s): "POCGLU" in the last 72 hours.     Blood Sugar Average: Last 72 hrs:     · Last admission blood sugars were very volatile and insulin needs to be adjusted shortly after starting p.o. diet  · Given n.p.o. status, will hold insulin and other diabetic medications, will likely need to be added once starting p.o. diet    (HFpEF) heart failure with preserved ejection fraction (HCC)  Assessment & Plan  Wt Readings from Last 3 Encounters:   08/29/23 107 kg (235 lb)   08/18/23 108 kg (237 lb 12.8 oz)   08/07/23 109 kg (240 lb)     · Will need to monitor volume status closely  · Currently holding torsemide and lisinopril given MAO, will continue with Plavix, metoprolol, clonidine  · Currently at baseline respiratory status  · Appears to be euvolemic  · Monitor daily I's and O's        HTN (hypertension), benign  Assessment & Plan  · BP on admission 153/47  · Given MAO will hold patient's torsemide and lisinopril, will continue with metoprolol and clonidine  · Continue to trend vitals         VTE Pharmacologic Prophylaxis: VTE Score: 4 Moderate Risk (Score 3-4) - Pharmacological DVT Prophylaxis Ordered: heparin. Code Status: Level 1 - Full Code   Discussion with family: Patient declined call to . Anticipated Length of Stay: Patient will be admitted on an inpatient basis with an anticipated length of stay of greater than 2 midnights secondary to Pancreatitis. Total Time Spent on Date of Encounter in care of patient: 65 minutes This time was spent on one or more of the following: performing physical exam; counseling and coordination of care; obtaining or reviewing history; documenting in the medical record; reviewing/ordering tests, medications or procedures; communicating with other healthcare professionals and discussing with patient's family/caregivers. Chief Complaint: Pancreatitis    History of Present Illness:  Sheldon Persaud is a 82 y.o. male with a PMH of recurrent pancreatitis, CKD, HFpEF who presents with acute pancreatitis. This is patient's seventh evaluation in the past 3 and half months for pancreatitis. Most recent, patient was discharged on 8/22.   He woke up this morning with epigastric pain that was unresolving despite oxycodone. He has a planned EUS for 9/7. Previous MRI/MRCP showed gallstones, sludge, and pancreatic ductal mass. Review of Systems:  Review of Systems   Constitutional: Negative for chills and fever. HENT: Negative for ear pain and sore throat. Eyes: Negative for photophobia, pain and visual disturbance. Respiratory: Negative for cough, chest tightness, shortness of breath and wheezing. Cardiovascular: Negative for chest pain and palpitations. Gastrointestinal: Positive for abdominal pain. Negative for constipation, diarrhea, nausea and vomiting. Genitourinary: Negative for dysuria and hematuria. Musculoskeletal: Negative for arthralgias and back pain. Skin: Negative for color change, pallor, rash and wound. Neurological: Negative for seizures and syncope. All other systems reviewed and are negative.       Past Medical and Surgical History:   Past Medical History:   Diagnosis Date   • Allergic    • Arthritis    • Chronic kidney disease 2021   • Chronic kidney disease (CKD) stage G3a/A1, moderately decreased glomerular filtration rate (GFR) between 45-59 mL/min/1.73 square meter and albuminuria creatinine ratio less than 30 mg/g (HCC)    • Colon polyp    • Diabetes mellitus (HCC)    • GERD (gastroesophageal reflux disease)    • Heart murmur    • History of echocardiogram    • HL (hearing loss)    • Hyperlipidemia    • Hypertension    • Obesity    • Pancreatitis    • Sleep apnea, obstructive        Past Surgical History:   Procedure Laterality Date   • CARDIAC CATHETERIZATION     • COLONOSCOPY  03/09/2018   • COLONOSCOPY     • EYE SURGERY     • HEMORRHOID SURGERY     • JOINT REPLACEMENT  2017    knee   • OTHER SURGICAL HISTORY      Stent    • [place] LAPAROSCOPY SURG CHOLECYSTECTOMY N/A 6/28/2023    Procedure: CHOLECYSTECTOMY LAPAROSCOPIC;  Surgeon: Mannie Beck MD;  Location:  MAIN OR;  Service: General   • UPPER GASTROINTESTINAL ENDOSCOPY         Meds/Allergies:  Prior to Admission medications    Medication Sig Start Date End Date Taking?  Authorizing Provider   acetaminophen (TYLENOL) 325 mg tablet Take 2 tablets (650 mg total) by mouth every 6 (six) hours as needed for mild pain 6/22/23   Dorothea Hare MD   BD Pen Needle Pascale U/F 32G X 4 MM MISC USE AS INSTRUCTED WITH INSULIN PEN 8/27/20   Historical Provider, MD   calcium carbonate (OS-WAYLON) 600 MG tablet Take 1,200 mg by mouth daily    Historical Provider, MD   cholecalciferol (VITAMIN D3) 1,000 units tablet Take 2,000 Units by mouth daily    Historical Provider, MD   cloNIDine (CATAPRES) 0.1 mg tablet Take 1 tablet (0.1 mg total) by mouth every 12 (twelve) hours 6/22/23   Dorothea Hare MD   clopidogrel (PLAVIX) 75 mg tablet Take 1 tablet (75 mg total) by mouth daily Instructed to stop prior to Surgery-Last dose 6/21/23 Do not start before June 29, 2023. 6/29/23   Hadley Mathis PA-C   Fexofenadine HCl [facility] PO) Take by mouth    Historical Provider, MD   fluticasone Mirtha Martínez) 50 mcg/act nasal spray 2 sprays into each nostril daily 8/15/23   Lester Alfredo MD   insulin aspart (NovoLOG FlexPen) 100 UNIT/ML injection pen Inject under the skin 3 (three) times a day with meals Sliding scale as ordered TID with meals    Historical Provider, MD   ipratropium (ATROVENT) 0.03 % nasal spray 2 sprays into each nostril every 12 (twelve) hours 8/24/23   Lester Alfredo MD   Lancets (OneTouch Delica Plus NRAWAB70X) MISC TEST GLUCOSE 3 4 TIMES/DAY 8/28/20   Historical Provider, MD   Levemir FlexTouch 100 units/mL injection pen 36 Units daily In AM 8/27/20   Historical Provider, MD   lisinopril (ZESTRIL) 40 mg tablet Take 1 tablet (40 mg total) by mouth daily 4/3/23   Lester Alfredo MD   metoprolol succinate (TOPROL-XL) 25 mg 24 hr tablet Take 1 tablet (25 mg total) by mouth daily 8/15/23   Lester Alfredo MD   Multiple Vitamins-Minerals (MULTIVITAMIN ADULT PO) Take by mouth daily    Historical Provider, MD   omeprazole (PriLOSEC) 40 MG capsule Take 1 capsule (40 mg total) by mouth daily  Patient taking differently: Take 40 mg by mouth daily before breakfast 5/15/23   Elsi Godfrey MD   ondansetron (ZOFRAN) 4 mg tablet Take 1 tablet (4 mg total) by mouth every 8 (eight) hours as needed for nausea or vomiting 23   Gavin Saleh PA-C   pyridoxine (VITAMIN B6) 100 mg tablet Take 100 mg by mouth daily    Historical Provider, MD   ranolazine (RANEXA) 500 mg 12 hr tablet Take 1 tablet (500 mg total) by mouth 2 (two) times a day 23   Felipe Sal MD   tamsulosin [facility]) 0.4 mg Take 2 capsules (0.8 mg total) by mouth daily 4/3/23   Felipe Sal MD   torsemide [facility]) 10 mg tablet Take 10 mg by mouth 23  Historical Provider, MD     I have reviewed home medications with patient personally. Allergies:    Allergies   Allergen Reactions   • Januvia [Sitagliptin] Other (See Comments)     pancreatitis   • Semaglutide Other (See Comments)     pancreatits   • Simvastatin Myalgia   • Trulicity [Dulaglutide] Other (See Comments)     Pancreatitis   • Wound Dressing Adhesive Other (See Comments)     Burning sensation       Social History:  Marital Status: /Civil Union   Occupation: N/a  Patient Pre-hospital Living Situation: Home  Patient Pre-hospital Level of Mobility: walks  Patient Pre-hospital Diet Restrictions: Diabetic  Substance Use History:   Social History     Substance and Sexual Activity   Alcohol Use Not Currently   • Alcohol/week: 5.0 standard drinks of alcohol   • Types: 5 Glasses of wine per week    Comment: no alcohol since March     Social History     Tobacco Use   Smoking Status Former   • Packs/day: 2.00   • Years: 15.00   • Total pack years: 30.00   • Types: Cigarettes, Pipe, Cigars   • Quit date: 1972   • Years since quittin.8   • Passive exposure: Past   Smokeless Tobacco Never     Social History     Substance and Sexual Activity   Drug Use Never       Family History:  Family History   Problem Relation Age of Onset   • Heart disease Mother    • Heart attack Mother         46s   • Cancer Mother    • Coronary artery disease Mother    • Cancer Brother         Malignant tumor of lung       Physical Exam:     Vitals:   Blood Pressure: (!) 153/47 (08/29/23 0215)  Pulse: 60 (08/29/23 0215)  Temperature: 97.7 °F (36.5 °C) (08/29/23 0001)  Temp Source: Oral (08/29/23 0001)  Respirations: 13 (08/29/23 0215)  Weight - Scale: 107 kg (235 lb) (08/29/23 0402)  SpO2: 100 % (08/29/23 0215)    Physical Exam  Vitals and nursing note reviewed. Constitutional:       Appearance: He is obese. HENT:      Head: Normocephalic. Nose: Nose normal.      Mouth/Throat:      Mouth: Mucous membranes are moist.      Pharynx: Oropharynx is clear. Eyes:      General: No scleral icterus. Conjunctiva/sclera: Conjunctivae normal.      Pupils: Pupils are equal, round, and reactive to light. Cardiovascular:      Rate and Rhythm: Normal rate and regular rhythm. Heart sounds: Murmur heard. No friction rub. No gallop. Pulmonary:      Effort: Pulmonary effort is normal. No respiratory distress. Breath sounds: Normal breath sounds. No stridor. No wheezing, rhonchi or rales. Abdominal:      General: Abdomen is flat. There is no distension. Palpations: Abdomen is soft. There is no mass. Tenderness: There is abdominal tenderness ( Focal to epigastric). There is no right CVA tenderness, left CVA tenderness, guarding or rebound. Hernia: No hernia is present. Musculoskeletal:         General: Normal range of motion. Cervical back: Normal range of motion and neck supple. Right lower leg: No edema. Left lower leg: No edema. Lymphadenopathy:      Cervical: No cervical adenopathy. Skin:     General: Skin is warm. Coloration: Skin is not jaundiced or pale. Findings: No bruising, erythema or lesion. Neurological:      General: No focal deficit present. Mental Status: He is alert and oriented to person, place, and time. Mental status is at baseline. Cranial Nerves: No cranial nerve deficit. Motor: No weakness. Psychiatric:         Mood and Affect: Mood normal.         Behavior: Behavior normal.         Thought Content: Thought content normal.          Additional Data:     Lab Results:  Results from last 7 days   Lab Units 08/29/23  0028   WBC Thousand/uL 8.18   HEMOGLOBIN g/dL 12.4   HEMATOCRIT % 36.9   PLATELETS Thousands/uL 229   NEUTROS PCT % 55   LYMPHS PCT % 31   MONOS PCT % 10   EOS PCT % 4     Results from last 7 days   Lab Units 08/29/23  0028   SODIUM mmol/L 138   POTASSIUM mmol/L 4.2   CHLORIDE mmol/L 103   CO2 mmol/L 28   BUN mg/dL 27*   CREATININE mg/dL 1.80*   ANION GAP mmol/L 7   CALCIUM mg/dL 8.8   ALBUMIN g/dL 3.7   TOTAL BILIRUBIN mg/dL 0.39   ALK PHOS U/L 65   ALT U/L 15   AST U/L 21   GLUCOSE RANDOM mg/dL 102         Results from last 7 days   Lab Units 08/22/23  1058 08/22/23  0711   POC GLUCOSE mg/dl 392* 218*               Lines/Drains:  Invasive Devices     Peripheral Intravenous Line  Duration           Peripheral IV 08/29/23 Left Antecubital <1 day                    Imaging: Personally reviewed the following imaging: abdominal/pelvic CT  XR chest 1 view portable    (Results Pending)   CT abdomen pelvis with contrast    (Results Pending)           ** Please Note: This note has been constructed using a voice recognition system.  **

## 2023-08-29 NOTE — ASSESSMENT & PLAN NOTE
Lab Results   Component Value Date    EGFR 34 08/29/2023    EGFR 51 08/22/2023    EGFR 44 08/21/2023    CREATININE 1.80 (H) 08/29/2023    CREATININE 1.30 08/22/2023    CREATININE 1.46 (H) 08/21/2023     · Baseline creatinine between 1.3 and 1.4, creatinine today 1.8  · We will give aggressive IVF  · Trend BMP  · Hold torsemide and lisinopril

## 2023-08-29 NOTE — ASSESSMENT & PLAN NOTE
· Tested positive for COVID-19 8/20  · Contact and droplet isolation  · Intermittently requiring 2 L of O2 while sleeping, room air while awake.   This is patient's baseline  · Last hospitalization was given 3 doses of remdesivir and responded well without steroids  · Continue to monitor vitals

## 2023-08-29 NOTE — PLAN OF CARE
Problem: RESPIRATORY - ADULT  Goal: Achieves optimal ventilation and oxygenation  Description: INTERVENTIONS:  - Assess for changes in respiratory status  - Assess for changes in mentation and behavior  - Position to facilitate oxygenation and minimize respiratory effort  - Oxygen administered by appropriate delivery if ordered  - Initiate smoking cessation education as indicated  - Encourage broncho-pulmonary hygiene including cough, deep breathe, Incentive Spirometry  - Assess the need for suctioning and aspirate as needed  - Assess and instruct to report SOB or any respiratory difficulty  - Respiratory Therapy support as indicated  Outcome: Progressing     Problem: GASTROINTESTINAL - ADULT  Goal: Minimal or absence of nausea and/or vomiting  Description: INTERVENTIONS:  - Administer IV fluids if ordered to ensure adequate hydration  - Administer ordered antiemetic medications as needed  - Provide nonpharmacologic comfort measures as appropriate  - Advance diet as tolerated, if ordered  - Consider nutrition services referral to assist patient with adequate nutrition and appropriate food choices  Outcome: Progressing  Goal: Maintains or returns to baseline bowel function  Description: INTERVENTIONS:  - Assess bowel function  - Encourage oral fluids to ensure adequate hydration  - Administer IV fluids if ordered to ensure adequate hydration  - Administer ordered medications as needed  - Encourage mobilization and activity  - Consider nutritional services referral to assist patient with adequate nutrition and appropriate food choices  Outcome: Progressing  Goal: Maintains adequate nutritional intake  Description: INTERVENTIONS:  - Monitor percentage of each meal consumed  - Identify factors contributing to decreased intake, treat as appropriate  - Assist with meals as needed  - Monitor I&O, weight, and lab values if indicated  - Obtain nutrition services referral as needed  Outcome: Progressing     Problem: MOBILITY - ADULT  Goal: Maintain or return to baseline ADL function  Description: INTERVENTIONS:  -  Assess patient's ability to carry out ADLs; assess patient's baseline for ADL function and identify physical deficits which impact ability to perform ADLs (bathing, care of mouth/teeth, toileting, grooming, dressing, etc.)  - Assess/evaluate cause of self-care deficits   - Assess range of motion  - Assess patient's mobility; develop plan if impaired  - Assess patient's need for assistive devices and provide as appropriate  - Encourage maximum independence but intervene and supervise when necessary  - Involve family in performance of ADLs  - Assess for home care needs following discharge   - Consider OT consult to assist with ADL evaluation and planning for discharge  - Provide patient education as appropriate  Outcome: Progressing  Goal: Maintains/Returns to pre admission functional level  Description: INTERVENTIONS:  - Perform BMAT or MOVE assessment daily.   - Set and communicate daily mobility goal to care team and patient/family/caregiver. - Collaborate with rehabilitation services on mobility goals if consulted  - Perform Range of Motion  3  times a day.   - Dangle patient 3  times a day  - Stand patient  3  times a day  - Ambulate patient  3 times a day  - Out of bed to chair  3  times a day   - Out of bed for meals  3  times a day  - Out of bed for toileting  - Record patient progress and toleration of activity level   Outcome: Progressing

## 2023-08-30 LAB
ALBUMIN SERPL BCP-MCNC: 3.1 G/DL (ref 3.5–5)
ALP SERPL-CCNC: 51 U/L (ref 34–104)
ALT SERPL W P-5'-P-CCNC: 12 U/L (ref 7–52)
ANION GAP SERPL CALCULATED.3IONS-SCNC: 6 MMOL/L
AST SERPL W P-5'-P-CCNC: 12 U/L (ref 13–39)
BILIRUB SERPL-MCNC: 0.35 MG/DL (ref 0.2–1)
BUN SERPL-MCNC: 15 MG/DL (ref 5–25)
CALCIUM ALBUM COR SERPL-MCNC: 9 MG/DL (ref 8.3–10.1)
CALCIUM SERPL-MCNC: 8.3 MG/DL (ref 8.4–10.2)
CHLORIDE SERPL-SCNC: 103 MMOL/L (ref 96–108)
CO2 SERPL-SCNC: 29 MMOL/L (ref 21–32)
CREAT SERPL-MCNC: 1.32 MG/DL (ref 0.6–1.3)
CRP SERPL QL: 11.5 MG/L
ERYTHROCYTE [DISTWIDTH] IN BLOOD BY AUTOMATED COUNT: 12.9 % (ref 11.6–15.1)
GFR SERPL CREATININE-BSD FRML MDRD: 50 ML/MIN/1.73SQ M
GLUCOSE SERPL-MCNC: 198 MG/DL (ref 65–140)
GLUCOSE SERPL-MCNC: 202 MG/DL (ref 65–140)
GLUCOSE SERPL-MCNC: 204 MG/DL (ref 65–140)
GLUCOSE SERPL-MCNC: 214 MG/DL (ref 65–140)
GLUCOSE SERPL-MCNC: 233 MG/DL (ref 65–140)
HCT VFR BLD AUTO: 35.4 % (ref 36.5–49.3)
HGB BLD-MCNC: 11.9 G/DL (ref 12–17)
MCH RBC QN AUTO: 32.2 PG (ref 26.8–34.3)
MCHC RBC AUTO-ENTMCNC: 33.6 G/DL (ref 31.4–37.4)
MCV RBC AUTO: 96 FL (ref 82–98)
PLATELET # BLD AUTO: 187 THOUSANDS/UL (ref 149–390)
PMV BLD AUTO: 10.1 FL (ref 8.9–12.7)
POTASSIUM SERPL-SCNC: 4.3 MMOL/L (ref 3.5–5.3)
PROT SERPL-MCNC: 5.6 G/DL (ref 6.4–8.4)
RBC # BLD AUTO: 3.7 MILLION/UL (ref 3.88–5.62)
SODIUM SERPL-SCNC: 138 MMOL/L (ref 135–147)
TRIGL SERPL-MCNC: 112 MG/DL
WBC # BLD AUTO: 8.03 THOUSAND/UL (ref 4.31–10.16)

## 2023-08-30 PROCEDURE — 86140 C-REACTIVE PROTEIN: CPT | Performed by: NURSE PRACTITIONER

## 2023-08-30 PROCEDURE — 80053 COMPREHEN METABOLIC PANEL: CPT | Performed by: NURSE PRACTITIONER

## 2023-08-30 PROCEDURE — 82948 REAGENT STRIP/BLOOD GLUCOSE: CPT

## 2023-08-30 PROCEDURE — 85027 COMPLETE CBC AUTOMATED: CPT | Performed by: NURSE PRACTITIONER

## 2023-08-30 PROCEDURE — 99232 SBSQ HOSP IP/OBS MODERATE 35: CPT | Performed by: INTERNAL MEDICINE

## 2023-08-30 PROCEDURE — 99222 1ST HOSP IP/OBS MODERATE 55: CPT | Performed by: INTERNAL MEDICINE

## 2023-08-30 RX ORDER — CLOPIDOGREL BISULFATE 75 MG/1
75 TABLET ORAL DAILY
Status: DISCONTINUED | OUTPATIENT
Start: 2023-08-31 | End: 2023-08-31

## 2023-08-30 RX ORDER — HYDROMORPHONE HCL IN WATER/PF 6 MG/30 ML
0.2 PATIENT CONTROLLED ANALGESIA SYRINGE INTRAVENOUS EVERY 4 HOURS PRN
Status: DISCONTINUED | OUTPATIENT
Start: 2023-08-30 | End: 2023-09-01 | Stop reason: HOSPADM

## 2023-08-30 RX ADMIN — HYDROMORPHONE HYDROCHLORIDE 0.5 MG: 1 INJECTION, SOLUTION INTRAMUSCULAR; INTRAVENOUS; SUBCUTANEOUS at 10:13

## 2023-08-30 RX ADMIN — INSULIN LISPRO 1 UNITS: 100 INJECTION, SOLUTION INTRAVENOUS; SUBCUTANEOUS at 08:44

## 2023-08-30 RX ADMIN — HYDROMORPHONE HYDROCHLORIDE 0.5 MG: 1 INJECTION, SOLUTION INTRAMUSCULAR; INTRAVENOUS; SUBCUTANEOUS at 05:45

## 2023-08-30 RX ADMIN — INSULIN LISPRO 1 UNITS: 100 INJECTION, SOLUTION INTRAVENOUS; SUBCUTANEOUS at 11:40

## 2023-08-30 RX ADMIN — HEPARIN SODIUM 5000 UNITS: 5000 INJECTION INTRAVENOUS; SUBCUTANEOUS at 13:40

## 2023-08-30 RX ADMIN — TAMSULOSIN HYDROCHLORIDE 0.8 MG: 0.4 CAPSULE ORAL at 08:44

## 2023-08-30 RX ADMIN — HEPARIN SODIUM 5000 UNITS: 5000 INJECTION INTRAVENOUS; SUBCUTANEOUS at 05:44

## 2023-08-30 RX ADMIN — SODIUM CHLORIDE, SODIUM LACTATE, POTASSIUM CHLORIDE, AND CALCIUM CHLORIDE 150 ML/HR: .6; .31; .03; .02 INJECTION, SOLUTION INTRAVENOUS at 00:51

## 2023-08-30 RX ADMIN — SODIUM CHLORIDE, SODIUM LACTATE, POTASSIUM CHLORIDE, AND CALCIUM CHLORIDE 125 ML/HR: .6; .31; .03; .02 INJECTION, SOLUTION INTRAVENOUS at 20:49

## 2023-08-30 RX ADMIN — INSULIN LISPRO 2 UNITS: 100 INJECTION, SOLUTION INTRAVENOUS; SUBCUTANEOUS at 16:59

## 2023-08-30 RX ADMIN — CLONIDINE HYDROCHLORIDE 0.1 MG: 0.1 TABLET ORAL at 08:44

## 2023-08-30 RX ADMIN — HYDROMORPHONE HYDROCHLORIDE 0.5 MG: 1 INJECTION, SOLUTION INTRAMUSCULAR; INTRAVENOUS; SUBCUTANEOUS at 02:06

## 2023-08-30 RX ADMIN — METOPROLOL SUCCINATE 25 MG: 25 TABLET, FILM COATED, EXTENDED RELEASE ORAL at 08:44

## 2023-08-30 RX ADMIN — INSULIN LISPRO 2 UNITS: 100 INJECTION, SOLUTION INTRAVENOUS; SUBCUTANEOUS at 21:01

## 2023-08-30 RX ADMIN — FLUTICASONE PROPIONATE 2 SPRAY: 50 SPRAY, METERED NASAL at 08:44

## 2023-08-30 RX ADMIN — CLONIDINE HYDROCHLORIDE 0.1 MG: 0.1 TABLET ORAL at 20:49

## 2023-08-30 RX ADMIN — HEPARIN SODIUM 5000 UNITS: 5000 INJECTION INTRAVENOUS; SUBCUTANEOUS at 21:01

## 2023-08-30 RX ADMIN — CLOPIDOGREL BISULFATE 75 MG: 75 TABLET ORAL at 08:44

## 2023-08-30 RX ADMIN — SODIUM CHLORIDE, SODIUM LACTATE, POTASSIUM CHLORIDE, AND CALCIUM CHLORIDE 150 ML/HR: .6; .31; .03; .02 INJECTION, SOLUTION INTRAVENOUS at 05:44

## 2023-08-30 NOTE — PLAN OF CARE
Problem: RESPIRATORY - ADULT  Goal: Achieves optimal ventilation and oxygenation  Description: INTERVENTIONS:  - Assess for changes in respiratory status  - Assess for changes in mentation and behavior  - Position to facilitate oxygenation and minimize respiratory effort  - Oxygen administered by appropriate delivery if ordered  - Initiate smoking cessation education as indicated  - Encourage broncho-pulmonary hygiene including cough, deep breathe, Incentive Spirometry  - Assess the need for suctioning and aspirate as needed  - Assess and instruct to report SOB or any respiratory difficulty  - Respiratory Therapy support as indicated  Outcome: Progressing     Problem: GASTROINTESTINAL - ADULT  Goal: Minimal or absence of nausea and/or vomiting  Description: INTERVENTIONS:  - Administer IV fluids if ordered to ensure adequate hydration  - Maintain NPO status until nausea and vomiting are resolved  - Nasogastric tube if ordered  - Administer ordered antiemetic medications as needed  - Provide nonpharmacologic comfort measures as appropriate  - Advance diet as tolerated, if ordered  - Consider nutrition services referral to assist patient with adequate nutrition and appropriate food choices  Outcome: Progressing     Problem: PAIN - ADULT  Goal: Verbalizes/displays adequate comfort level or baseline comfort level  Description: Interventions:  - Encourage patient to monitor pain and request assistance  - Assess pain using appropriate pain scale  - Administer analgesics based on type and severity of pain and evaluate response  - Implement non-pharmacological measures as appropriate and evaluate response  - Consider cultural and social influences on pain and pain management  - Notify physician/advanced practitioner if interventions unsuccessful or patient reports new pain  Outcome: Progressing     Problem: INFECTION - ADULT  Goal: Absence or prevention of progression during hospitalization  Description: INTERVENTIONS:  - Assess and monitor for signs and symptoms of infection  - Monitor lab/diagnostic results  - Monitor all insertion sites, i.e. indwelling lines, tubes, and drains  - Monitor endotracheal if appropriate and nasal secretions for changes in amount and color  - [place] appropriate cooling/warming therapies per order  - Administer medications as ordered  - Instruct and encourage patient and family to use good hand hygiene technique  - Identify and instruct in appropriate isolation precautions for identified infection/condition  Outcome: Progressing

## 2023-08-30 NOTE — CONSULTS
Consultation - [facility]  Lovefernie John 82 y.o. male MRN: [id number]  Unit/Bed#: -01 Encounter: [id number]        Inpatient consult to gastroenterology  Consult performed by: GERSON Anne  Consult ordered by: Dimitri Schultz MD          Reason for Consult / Principal Problem:     Acute recurrent pancreatitis      ASSESSMENT AND PLAN:      61-year-old male with a PMH of HFpEF, CAD on plavix, cardiac murmur, CKD, T2DM, HLD, HTN, recurrent pancreatitis, cholecystectomy and recent admission for pancreatitis/COVID 8/17-8/22 who presented to [address] [place] [address] on 8/28 with severe epigastric pain, Lipase 411, CTAP notable for peripancreatic edema c/w acute pancreatitis. No peripancreatic fluid collection.      1. Acute recurrent pancreatitis: Etiology of recurrent pancreatitis unclear cannot exclude pancreatic ductal stricture or malignancy. No evidence of autoimmune pancreatitis on previous work-up. He is at risk of chronic pancreatitis given multiple recurrent episodes of pancreatitis. Recent MRI/MRCP 7/25 notable for transition/narrowing of the duct at the junction of the pancreatic head/neck unchanged from prior study and a questionable 7mm nodule along the undersurface of the junction of the head/neck with some suspected diffusion restriction in this area & EUS recommended to exclude a small pancreatic mass. If negative f/u MRI/MRCP in no more than 8 weeks would be advised. Patient was originally scheduled  For EUS 8/22 but then was hospitalized with COVID/pancreatitis. Pt continues w/ severe epigastric pain only responsive to dilaudid.  Denies any SOB, fevers, difficulty urinating.     -Continue clear liquids as tolerated, pt only taking small sips  -Continue IVF LR at 150ml/hr, monitor respiratory status/urinary output d/t hx CHF  -Continue supportive care with IV pain medication as needed    -Monitor CBC, CMP, abdominal exam. CRP, triglycerides added to AM labs  -Patient is scheduled for EUS w/ FNA on 9/7 (2 weeks after COVID) with Dr. Lalo Treviño and Plavix needs to be held for 5 days prior to procedure. Thank you for the consultation. Case will be discussed with Dr. Maximino Banks    ______________________________________________________________________    HPI:  Kishore Lopez is a 82 y.o. male with a PMH of HFpEF, CAD on plavix, cardiac murmur, CKD, T2DM, HLD, HTN, recurrent pancreatitis, and recent admission for pancreatitis/COVID 8/17-8/22 who presented to [facility] on 8/28 with severe epigastric pain. He reports pain was completely resolved after most recent hospitalization and he was following a low-fat diet at home. Pain started again day prior to admission and progressively worsened and was unresponsive to oxycodone so he presented to the ER. In the ED, Lipase 411, CTAP notable for peripancreatic edema c/w acute pancreatitis. No peripancreatic fluid collection. Afebrile with no leukocytosis. Renal function at prior baseline. He was given 1 L fluid bolus and started on LR at 150 an hour. This morning, patient continues with severe abdominal pain primarily in the epigastric area with no radiation. He denies any nausea or vomiting and is tolerating small amounts of clear liquids. He reports Dilaudid is working better for his pain and morphine is less effective. He is currently on 2 to 3 L nasal cannula, denies any shortness of breath or cough. Feels he has recovered well from COVID infection. Remains on isolation precautions. He has upcoming endoscopic ultrasound with FNA scheduled on 9/7 with Dr. Lalo Treviño. Continues to deny any alcohol/tobacco use. Denies family hx pancreatic malignancy, denies unintended weight loss. REVIEW OF SYSTEMS:    CONSTITUTIONAL: Denies any fever, chills, rigors, and weight loss. HEENT: No earache or tinnitus. Denies hearing loss or visual disturbances.   CARDIOVASCULAR: No chest pain or palpitations. RESPIRATORY: Denies any cough, hemoptysis, shortness of breath or dyspnea on exertion. GASTROINTESTINAL: As noted in the History of Present Illness. GENITOURINARY: No problems with urination. Denies any hematuria or dysuria. NEUROLOGIC: No dizziness or vertigo, denies headaches. MUSCULOSKELETAL: Denies any muscle or joint pain. SKIN: Denies skin rashes or itching. ENDOCRINE: Denies excessive thirst. Denies intolerance to heat or cold. PSYCHOSOCIAL: Denies depression or anxiety. Denies any recent memory loss.        Historical Information   Past Medical History:   Diagnosis Date   • Allergic    • Arthritis    • Chronic kidney disease 2021   • Chronic kidney disease (CKD) stage G3a/A1, moderately decreased glomerular filtration rate (GFR) between 45-59 mL/min/1.73 square meter and albuminuria creatinine ratio less than 30 mg/g (HCC)    • Colon polyp    • Diabetes mellitus (HCC)    • GERD (gastroesophageal reflux disease)    • Heart murmur    • History of echocardiogram    • HL (hearing loss)    • Hyperlipidemia    • Hypertension    • Obesity    • Pancreatitis    • Sleep apnea, obstructive      Past Surgical History:   Procedure Laterality Date   • CARDIAC CATHETERIZATION     • COLONOSCOPY  03/09/2018   • COLONOSCOPY     • EYE SURGERY     • HEMORRHOID SURGERY     • JOINT REPLACEMENT  2017    knee   • OTHER SURGICAL HISTORY      Stent    • CA LAPAROSCOPY SURG CHOLECYSTECTOMY N/A 6/28/2023    Procedure: CHOLECYSTECTOMY LAPAROSCOPIC;  Surgeon: Conner Aggarwal MD;  Location:  MAIN OR;  Service: General   • UPPER GASTROINTESTINAL ENDOSCOPY       Social History   Social History     Substance and Sexual Activity   Alcohol Use Not Currently   • Alcohol/week: 5.0 standard drinks of alcohol   • Types: 5 Glasses of wine per week    Comment: no alcohol since March     Social History     Substance and Sexual Activity   Drug Use Never     Social History     Tobacco Use   Smoking Status Former   • Packs/day: 2.00   • Years: 15.00   • Total pack years: 30.00   • Types: Cigarettes, Pipe, Cigars   • Quit date: 1972   • Years since quittin.8   • Passive exposure: Past   Smokeless Tobacco Never     Family History   Problem Relation Age of Onset   • Heart disease Mother    • Heart attack Mother         46s   • Cancer Mother    • Coronary artery disease Mother    • Cancer Brother         Malignant tumor of lung       Meds/Allergies     Medications Prior to Admission   Medication   • acetaminophen (TYLENOL) 325 mg tablet   • BD Pen Needle Pascale U/F 32G X 4 MM MISC   • calcium carbonate (OS-WAYLON) 600 MG tablet   • cholecalciferol (VITAMIN D3) 1,000 units tablet   • cloNIDine (CATAPRES) 0.1 mg tablet   • clopidogrel (PLAVIX) 75 mg tablet   • Fexofenadine HCl (MUCINEX ALLERGY PO)   • fluticasone (FLONASE) 50 mcg/act nasal spray   • insulin aspart (NovoLOG FlexPen) 100 UNIT/ML injection pen   • ipratropium (ATROVENT) 0.03 % nasal spray   • Lancets (OneTouch Delica Plus [id number]) MISC   • Levemir FlexTouch 100 units/mL injection pen   • lisinopril (ZESTRIL) 40 mg tablet   • metoprolol succinate (TOPROL-XL) 25 mg 24 hr tablet   • Multiple Vitamins-Minerals (MULTIVITAMIN ADULT PO)   • omeprazole (PriLOSEC) 40 MG capsule   • ondansetron (ZOFRAN) 4 mg tablet   • pyridoxine (VITAMIN B6) 100 mg tablet   • ranolazine (RANEXA) 500 mg 12 hr tablet   • tamsulosin (FLOMAX) 0.4 mg   • torsemide (DEMADEX) 10 mg tablet     Current Facility-Administered Medications   Medication Dose Route Frequency   • cloNIDine (CATAPRES) tablet 0.1 mg  0.1 mg Oral Q12H LESLIE   • clopidogrel (PLAVIX) tablet 75 mg  75 mg Oral Daily   • fluticasone (FLONASE) 50 mcg/act nasal spray 2 spray  2 spray Nasal Daily   • heparin (porcine) subcutaneous injection 5,000 Units  5,000 Units Subcutaneous Q8H [facility]   • HYDROmorphone (DILAUDID) injection 0.5 mg  0.5 mg Intravenous Q4H PRN   • insulin lispro (HumaLOG) 100 units/mL subcutaneous injection 1-5 Units  1-5 Units Subcutaneous TID AC   • insulin lispro (HumaLOG) 100 units/mL subcutaneous injection 1-5 Units  1-5 Units Subcutaneous HS   • ipratropium (ATROVENT) 0.03 % nasal spray 2 spray  2 spray Nasal Q12H   • lactated ringers infusion  150 mL/hr Intravenous Continuous   • metoprolol succinate (TOPROL-XL) 24 hr tablet 25 mg  25 mg Oral Daily   • morphine injection 2 mg  2 mg Intravenous Q4H PRN   • ondansetron (ZOFRAN) injection 4 mg  4 mg Intravenous Q6H PRN   • tamsulosin (FLOMAX) capsule 0.8 mg  0.8 mg Oral Daily       Allergies   Allergen Reactions   • Januvia [Sitagliptin] Other (See Comments)     pancreatitis   • Semaglutide Other (See Comments)     pancreatits   • Simvastatin Myalgia   • Trulicity [Dulaglutide] Other (See Comments)     Pancreatitis   • Wound Dressing Adhesive Other (See Comments)     Burning sensation           Objective     Blood pressure 152/68, pulse 72, temperature 98 °F (36.7 °C), temperature source Oral, resp. rate 20, height 5' 6" (1.676 m), weight 107 kg (235 lb), SpO2 98 %. Body mass index is 37.93 kg/m². Intake/Output Summary (Last 24 hours) at 8/30/2023 0847  Last data filed at 8/30/2023 0544  Gross per 24 hour   Intake 2268.5 ml   Output 1950 ml   Net 318.5 ml         PHYSICAL EXAM:      General Appearance:   Alert, cooperative, no distress   HEENT:   Normocephalic, atraumatic, anicteric.     Neck:  Supple, symmetrical, trachea midline   Lungs:    Decreased to auscultation bilaterally; no rales, rhonchi or wheezing; respirations unlabored ; 3 L O2   Heart[de-identified]   Regular rate and rhythm; no murmur, rub, or gallop.    Abdomen:   Soft, epigastric pain present, non-distended; normal bowel sounds; no masses, no organomegaly    Genitalia:   Deferred    Rectal:   Deferred    Extremities:  No cyanosis, clubbing or edema    Pulses:  2+ and symmetric all extremities    Skin:  No jaundice, rashes, or lesions    Lymph nodes:  No palpable cervical lymphadenopathy        Lab Results:   Admission on 08/28/2023   Component Date Value   • WBC 08/29/2023 8. 18    • RBC 08/29/2023 3.89    • Hemoglobin 08/29/2023 12.4    • Hematocrit 08/29/2023 36.9    • MCV 08/29/2023 95    • MCH 08/29/2023 31.9    • MCHC 08/29/2023 33.6    • RDW 08/29/2023 13.3    • MPV 08/29/2023 10.3    • Platelets 66/52/4492 229    • nRBC 08/29/2023 0    • Neutrophils Relative 08/29/2023 55    • Immat GRANS % 08/29/2023 0    • Lymphocytes Relative 08/29/2023 31    • Monocytes Relative 08/29/2023 10    • Eosinophils Relative 08/29/2023 4    • Basophils Relative 08/29/2023 0    • Neutrophils Absolute 08/29/2023 4.52    • Immature Grans Absolute 08/29/2023 0.02    • Lymphocytes Absolute 08/29/2023 2.53    • Monocytes Absolute 08/29/2023 0.78    • Eosinophils Absolute 08/29/2023 0.30    • Basophils Absolute 08/29/2023 0.03    • Sodium 08/29/2023 138    • Potassium 08/29/2023 4.2    • Chloride 08/29/2023 103    • CO2 08/29/2023 28    • ANION GAP 08/29/2023 7    • BUN 08/29/2023 27 (H)    • Creatinine 08/29/2023 1.80 (H)    • Glucose 08/29/2023 102    • Calcium 08/29/2023 8.8    • AST 08/29/2023 21    • ALT 08/29/2023 15    • Alkaline Phosphatase 08/29/2023 65    • Total Protein 08/29/2023 6.4    • Albumin 08/29/2023 3.7    • Total Bilirubin 08/29/2023 0.39    • eGFR 08/29/2023 34    • Lipase 08/29/2023 411 (H)    • hs TnI 0hr 08/29/2023 12    • hs TnI 2hr 08/29/2023 11    • Delta 2hr hsTnI 08/29/2023 -1    • hs TnI 4hr 08/29/2023 11    • Delta 4hr hsTnI 08/29/2023 -1    • Sodium 08/29/2023 138    • Potassium 08/29/2023 4.2    • Chloride 08/29/2023 102    • CO2 08/29/2023 30    • ANION GAP 08/29/2023 6    • BUN 08/29/2023 25    • Creatinine 08/29/2023 1.70 (H)    • Glucose 08/29/2023 149 (H)    • Calcium 08/29/2023 8.5    • Corrected Calcium 08/29/2023 9.1    • AST 08/29/2023 14    • ALT 08/29/2023 13    • Alkaline Phosphatase 08/29/2023 59    • Total Protein 08/29/2023 5.9 (L)    • Albumin 08/29/2023 3.3 (L)    • Total Bilirubin 08/29/2023 0.40    • eGFR 08/29/2023 37    • Lipase 08/29/2023 232 (H)    • WBC 08/29/2023 7.27    • RBC 08/29/2023 3.76 (L)    • Hemoglobin 08/29/2023 11.9 (L)    • Hematocrit 08/29/2023 36.0 (L)    • MCV 08/29/2023 96    • MCH 08/29/2023 31.6    • MCHC 08/29/2023 33.1    • RDW 08/29/2023 13.2    • MPV 08/29/2023 10.2    • Platelets 44/63/1156 200    • nRBC 08/29/2023 0    • Neutrophils Relative 08/29/2023 52    • Immat GRANS % 08/29/2023 0    • Lymphocytes Relative 08/29/2023 32    • Monocytes Relative 08/29/2023 11    • Eosinophils Relative 08/29/2023 4    • Basophils Relative 08/29/2023 1    • Neutrophils Absolute 08/29/2023 3.84    • Immature Grans Absolute 08/29/2023 0.03    • Lymphocytes Absolute 08/29/2023 2.30    • Monocytes Absolute 08/29/2023 0.80    • Eosinophils Absolute 08/29/2023 0.26    • Basophils Absolute 08/29/2023 0.04    • Ventricular Rate 08/29/2023 56    • Atrial Rate 08/29/2023 57    • ME Interval 08/29/2023 241    • QRSD Interval 08/29/2023 168    • QT Interval 08/29/2023 482    • QTC Interval 08/29/2023 466    • P Axis 08/29/2023 38    • QRS [place] 08/29/2023 -64    • T Wave Axis 08/29/2023 23    • POC Glucose 08/29/2023 246 (H)    • POC Glucose 08/29/2023 183 (H)    • POC Glucose 08/30/2023 198 (H)        Imaging Studies: I have personally reviewed pertinent imaging studies.

## 2023-08-30 NOTE — ASSESSMENT & PLAN NOTE
Lab Results   Component Value Date    EGFR 50 08/30/2023    EGFR 37 08/29/2023    EGFR 34 08/29/2023    CREATININE 1.32 (H) 08/30/2023    CREATININE 1.70 (H) 08/29/2023    CREATININE 1.80 (H) 08/29/2023     · Baseline creatinine between 1.3 and 1.4, creatinine today 1.8  · We will give aggressive IVF  · Trend BMP  · Hold torsemide and lisinopril

## 2023-08-30 NOTE — CASE MANAGEMENT
Case Management Assessment    Patient name Arlin Gains  Location /-71 MRN [id number]  : 1940 Date 2023       Current Admission Date: 2023  Current Admission Diagnosis:Acute recurrent pancreatitis   Patient Active Problem List    Diagnosis Date Noted   • Stage 3b chronic kidney disease (CKD) ([address]) 2023   • COVID-19 2023   • Acute-on-chronic kidney injury ([address]) 2023   • Platelets decreased ([address]) 2023   • Left arm swelling 2023   • Acute recurrent pancreatitis 2023   • Epigastric pain 2023   • Moderate aortic stenosis 2023   • Venous stasis dermatitis of both lower extremities 2022   • Type 2 diabetes mellitus with chronic kidney disease, with long-term current use of insulin ([address]) 2022   • History of recent hospitalization 2022   • GERD without esophagitis 2022   • Mixed hyperlipidemia 2022   • Coronary artery disease involving native coronary artery without angina pectoris 2022   • ED (erectile dysfunction) 2022   • Obesity, morbid (HCC)    • PAULA (obstructive sleep apnea) 2020   • HTN (hypertension), benign 2020   • (HFpEF) heart failure with preserved ejection fraction ([address]) 2020      LOS (days): 1  Geometric Mean LOS (GMLOS) (days): 4.70  Days to GMLOS:3.4     OBJECTIVE:  PATIENT READMITTED TO HOSPITAL  Risk of Unplanned Readmission Score: 29.35         Current admission status: Inpatient       Preferred Pharmacy:   [address] [place] [address] [place], [address]  Phone: [phone] Fax: [phone]    Primary Care Provider: Reg Jefferson MD    Primary Insurance: MEDICARE  Secondary Insurance: BLUE CROSS    ASSESSMENT:  [employer] Proxies    There are no active Health Care Proxies on file.                  Readmission Root Cause  30 Day Readmission: Yes  Who directed you to return to the hospital?: Self  Did you understand whom to contact if you had questions or problems?: Yes  Did you get your prescriptions before you left the hospital?: Yes  Were you able to get your prescriptions filled when you left the hospital?: Yes  Did you take your medications as prescribed?: Yes  Were you able to get to your follow-up appointments?: No  Reason[de-identified] Readmitted prior to appointment  During previous admission, was a post-acute recommendation made?: No  Patient was readmitted due to: pancreatitis  Action Plan: covid restrictions, GI consults, pain control, home with services. Patient Information  Admitted from[de-identified] [place]  Mental Status: Alert  During Assessment patient was accompanied by: Not accompanied during assessment  Assessment information provided by[de-identified] Patient  Support Systems: Spouse/significant other  Home entry access options.  Select all that apply.: No steps to enter home  Type of Current Residence: Apartment  Floor Level: 3 (elevator)  Upon entering residence, is there a bedroom on the main floor (no further steps)?: Yes  Upon entering residence, is there a bathroom on the main floor (no further steps)?: Yes  In the last 12 months, was there a time when you were not able to pay the mortgage or rent on time?: No  In the last 12 months, was there a time when you did not have a steady place to sleep or slept in a shelter (including now)?: No  Homeless/housing insecurity resource given?: N/A  Living Arrangements: Lives w/ Spouse/significant other    Activities of Daily Living Prior to Admission  Functional Status: Independent  Completes ADLs independently?: Yes  Ambulates independently?: Yes  Does patient use assisted devices?: No  Does patient currently own DME?: No  Does patient have a history of Outpatient Therapy (PT/OT)?: Yes  Does the patient have a history of Short-Term Rehab?: Yes  Does patient have a history of HHC?: No  Does patient currently have [facility]?: No         Patient Information Continued  Income Source: Pension/nursing home  Does patient have prescription coverage?: Yes  Within the past 12 months, you worried that your food would run out before you got the money to buy more.: Never true  Within the past 12 months, the food you bought just didn't last and you didn't have money to get more.: Never true  Food insecurity resource given?: N/A  Does patient receive dialysis treatments?: No  Does patient have a history of substance abuse?: No  Does patient have a history of Mental Health Diagnosis?: No         Means of Transportation  In the past 12 months, has lack of transportation kept you from medical appointments or from getting medications?: No  In the past 12 months, has lack of transportation kept you from meetings, work, or from getting things needed for daily living?: No  Was application for public transport provided?: N/A

## 2023-08-30 NOTE — PROGRESS NOTES
[address]  Progress Note  Name: Barbara Mahmood  MRN: [id number]  Unit/Bed#: -01 I Date of Admission: 8/28/2023   Date of Service: 8/30/2023 I Hospital Day: 1    Assessment/Plan   * Acute recurrent pancreatitis  Assessment & Plan  This is patient's seventh evaluation in the past 3 and half months for pancreatitis. Most recent, patient was discharged on 8/22. He woke up this morning with epigastric pain that was unresolving despite oxycodone. He has a planned EUS for 9/7. Previous MRI/MRCP showed gallstones, sludge, and pancreatic ductal mass. · Meets acute pancreatitis criteria with epigastric pain, lipase 411, and CT abdomen evidence shows uncomplicated pancreatitis without necrosis or fluid collection  · Continue clear liquid diet. · IV pain medication  · IVF  · [place] criteria 2  · Calcium 8.8  · Patient is planned for EUS on 9/7/2023. Plavix will be held from 9/2/2023. COVID-19  Assessment & Plan  · Tested positive for COVID-19 8/20  · Contact and droplet isolation  · Intermittently requiring 2 L of O2 while sleeping, room air while awake.   This is patient's baseline  · Last hospitalization was given 3 doses of remdesivir and responded well without steroids  · Continue to monitor vitals    Acute-on-chronic kidney injury [facility])  Assessment & Plan  Lab Results   Component Value Date    EGFR 50 08/30/2023    EGFR 37 08/29/2023    EGFR 34 08/29/2023    CREATININE 1.32 (H) 08/30/2023    CREATININE 1.70 (H) 08/29/2023    CREATININE 1.80 (H) 08/29/2023     · Baseline creatinine between 1.3 and 1.4, creatinine today 1.8  · We will give aggressive IVF  · Trend BMP  · Hold torsemide and lisinopril    Type 2 diabetes mellitus with chronic kidney disease, with long-term current use of insulin [facility])  Assessment & Plan  Lab Results   Component Value Date    HGBA1C 6.7 (H) 05/10/2023       Recent Labs     08/29/23  2201 08/30/23  0714 08/30/23  1117 08/30/23  1634   POCGLU 183* 198* 204* 233*       Blood Sugar Average: Last 72 hrs:  (P) 212.8   · Last admission blood sugars were very volatile and insulin needs to be adjusted shortly after starting p.o. diet  · Given n.p.o. status, will hold insulin and other diabetic medications, will likely need to be added once starting p.o. diet    (HFpEF) heart failure with preserved ejection fraction (HCC)  Assessment & Plan  Wt Readings from Last 3 Encounters:   08/29/23 107 kg (235 lb)   08/18/23 108 kg (237 lb 12.8 oz)   08/07/23 109 kg (240 lb)     · Will need to monitor volume status closely  · Currently holding torsemide and lisinopril given MAO, will continue with Plavix, metoprolol, clonidine  · Currently at baseline respiratory status  · Appears to be euvolemic  · Monitor daily I's and O's        HTN (hypertension), benign  Assessment & Plan  · BP on admission 153/47  · Given MAO will hold patient's torsemide and lisinopril, will continue with metoprolol and clonidine  · Continue to trend vitals           VTE Pharmacologic Prophylaxis: VTE Score: 4 Moderate Risk (Score 3-4) - Pharmacological DVT Prophylaxis Ordered: heparin. Patient Centered Rounds: I performed bedside rounds with nursing staff today. Discussions with Specialists or Other Care Team Provider: staff    Education and Discussions with Family / Patient: Updated  (wife) via phone. Total Time Spent on Date of Encounter in care of patient: 45 minutes This time was spent on one or more of the following: performing physical exam; counseling and coordination of care; obtaining or reviewing history; documenting in the medical record; reviewing/ordering tests, medications or procedures; communicating with other healthcare professionals and discussing with patient's family/caregivers.     Current Length of Stay: 1 day(s)  Current Patient Status: Inpatient   Certification Statement: The patient will continue to require additional inpatient hospital stay due to acute pancreatitis   Discharge Plan: Anticipate discharge in 24-48 hrs to home. Code Status: Level 1 - Full Code    Subjective:   Pt is feeling better. Abdominal pain is improving. Epigastric pain present needing IV pain medication. Currently on clear liquid diet and tolerating well . Objective:     Vitals:   Temp (24hrs), Av.9 °F (36.6 °C), Min:97.7 °F (36.5 °C), Max:98 °F (36.7 °C)    Temp:  [97.7 °F (36.5 °C)-98 °F (36.7 °C)] 97.9 °F (36.6 °C)  HR:  [55-72] 55  Resp:  [18-20] 20  BP: (144-152)/(65-69) 149/66  SpO2:  [97 %-99 %] 99 %  Body mass index is 37.93 kg/m². Input and Output Summary (last 24 hours): Intake/Output Summary (Last 24 hours) at 2023 1754  Last data filed at 2023 0544  Gross per 24 hour   Intake 2268.5 ml   Output 700 ml   Net 1568.5 ml       Physical Exam:   Physical Exam   HEENT-PERRLA, moist oral mucosa  Neck-supple, no JVD elevation   Respiratory-equal air entry bilaterally, no rales or rhonchi  Cardiovascular system-S1, S2 heard, no murmur or gallops or rubs  Abdomen-soft, epigastric mass present  r, no guarding or rigidity, bowel sounds heard  Extremities-no pedal edema  Peripheral pulses palpable  Musculoskeletal-no contractures  Central nervous system-no acute focal neurological deficit ,no sensory or motor deficit noted.   Skin-no rash noted        Additional Data:     Labs:  Results from last 7 days   Lab Units 23  1052 23  0447   WBC Thousand/uL 8.03 7.27   HEMOGLOBIN g/dL 11.9* 11.9*   HEMATOCRIT % 35.4* 36.0*   PLATELETS Thousands/uL 187 200   NEUTROS PCT %  --  52   LYMPHS PCT %  --  32   MONOS PCT %  --  11   EOS PCT %  --  4     Results from last 7 days   Lab Units 23  1052   SODIUM mmol/L 138   POTASSIUM mmol/L 4.3   CHLORIDE mmol/L 103   CO2 mmol/L 29   BUN mg/dL 15   CREATININE mg/dL 1.32*   ANION GAP mmol/L 6   CALCIUM mg/dL 8.3*   ALBUMIN g/dL 3.1*   TOTAL BILIRUBIN mg/dL 0.35   ALK PHOS U/L 51   ALT U/L 12   AST U/L 12*   GLUCOSE RANDOM mg/dL 202*         Results from last 7 days   Lab Units 08/30/23  1634 08/30/23  1117 08/30/23  0714 08/29/23  2201 08/29/23  1815   POC GLUCOSE mg/dl 233* 204* 198* 183* 246*               Lines/Drains:  Invasive Devices     Peripheral Intravenous Line  Duration           Peripheral IV 08/29/23 Distal;Dorsal (posterior); Right Forearm <1 day                      Imaging: Personally reviewed the following imaging: abdominal/pelvic CT    Recent Cultures (last 7 days):         Last 24 Hours Medication List:   Current Facility-Administered Medications   Medication Dose Route Frequency Provider Last Rate   • cloNIDine  0.1 mg Oral Q12H [facility] Domenic Epperson PA-C     • [START ON 8/31/2023] clopidogrel  75 mg Oral Daily Dior Duran MD     • fluticasone  2 spray Nasal Daily Domenic Epperson PA-C     • heparin (porcine)  5,000 Units Subcutaneous [facility] Domenic Epperson PA-C     • HYDROmorphone  0.2 mg Intravenous Q4H PRN Dior Duran MD     • insulin lispro  1-5 Units Subcutaneous TID AC Rosibel Rogers MD     • insulin lispro  1-5 Units Subcutaneous HS Rosibel Rogers MD     • ipratropium  2 spray Nasal Q12H Domenic Epperson PA-C     • lactated ringers  125 mL/hr Intravenous Continuous Dior Duran  mL/hr (08/30/23 0544)   • metoprolol succinate  25 mg Oral Daily Domenic Epperson PA-C     • ondansetron  4 mg Intravenous Q6H PRN Domenic Epperson PA-C     • tamsulosin  0.8 mg Oral Daily Domenic Epperson PA-C          Today, Patient Was Seen By: Dior Duran MD    **Please Note: This note may have been constructed using a voice recognition system. **

## 2023-08-30 NOTE — ASSESSMENT & PLAN NOTE
Lab Results   Component Value Date    HGBA1C 6.7 (H) 05/10/2023       Recent Labs     08/29/23  2201 08/30/23  0714 08/30/23  1117 08/30/23  1634   POCGLU 183* 198* 204* 233*       Blood Sugar Average: Last 72 hrs:  (P) 212.8   · Last admission blood sugars were very volatile and insulin needs to be adjusted shortly after starting p.o. diet  · Given n.p.o. status, will hold insulin and other diabetic medications, will likely need to be added once starting p.o. diet

## 2023-08-31 ENCOUNTER — TELEPHONE (OUTPATIENT)
Dept: GASTROENTEROLOGY | Facility: CLINIC | Age: 83
End: 2023-08-31

## 2023-08-31 LAB
ANION GAP SERPL CALCULATED.3IONS-SCNC: 7 MMOL/L
BUN SERPL-MCNC: 14 MG/DL (ref 5–25)
CALCIUM SERPL-MCNC: 8.7 MG/DL (ref 8.4–10.2)
CHLORIDE SERPL-SCNC: 103 MMOL/L (ref 96–108)
CO2 SERPL-SCNC: 31 MMOL/L (ref 21–32)
CREAT SERPL-MCNC: 1.23 MG/DL (ref 0.6–1.3)
ERYTHROCYTE [DISTWIDTH] IN BLOOD BY AUTOMATED COUNT: 12.9 % (ref 11.6–15.1)
GFR SERPL CREATININE-BSD FRML MDRD: 55 ML/MIN/1.73SQ M
GLUCOSE SERPL-MCNC: 186 MG/DL (ref 65–140)
GLUCOSE SERPL-MCNC: 207 MG/DL (ref 65–140)
GLUCOSE SERPL-MCNC: 254 MG/DL (ref 65–140)
GLUCOSE SERPL-MCNC: 271 MG/DL (ref 65–140)
GLUCOSE SERPL-MCNC: 290 MG/DL (ref 65–140)
HCT VFR BLD AUTO: 34 % (ref 36.5–49.3)
HGB BLD-MCNC: 10.9 G/DL (ref 12–17)
MCH RBC QN AUTO: 31.4 PG (ref 26.8–34.3)
MCHC RBC AUTO-ENTMCNC: 32.1 G/DL (ref 31.4–37.4)
MCV RBC AUTO: 98 FL (ref 82–98)
PLATELET # BLD AUTO: 185 THOUSANDS/UL (ref 149–390)
PMV BLD AUTO: 10.4 FL (ref 8.9–12.7)
POTASSIUM SERPL-SCNC: 4.1 MMOL/L (ref 3.5–5.3)
RBC # BLD AUTO: 3.47 MILLION/UL (ref 3.88–5.62)
SODIUM SERPL-SCNC: 141 MMOL/L (ref 135–147)
WBC # BLD AUTO: 5.83 THOUSAND/UL (ref 4.31–10.16)

## 2023-08-31 PROCEDURE — 99232 SBSQ HOSP IP/OBS MODERATE 35: CPT | Performed by: INTERNAL MEDICINE

## 2023-08-31 PROCEDURE — 99232 SBSQ HOSP IP/OBS MODERATE 35: CPT | Performed by: PHYSICIAN ASSISTANT

## 2023-08-31 PROCEDURE — 85027 COMPLETE CBC AUTOMATED: CPT | Performed by: INTERNAL MEDICINE

## 2023-08-31 PROCEDURE — 80048 BASIC METABOLIC PNL TOTAL CA: CPT | Performed by: INTERNAL MEDICINE

## 2023-08-31 PROCEDURE — 82948 REAGENT STRIP/BLOOD GLUCOSE: CPT

## 2023-08-31 RX ORDER — GUAIFENESIN 600 MG/1
600 TABLET, EXTENDED RELEASE ORAL EVERY 12 HOURS SCHEDULED
Status: DISCONTINUED | OUTPATIENT
Start: 2023-08-31 | End: 2023-09-01 | Stop reason: HOSPADM

## 2023-08-31 RX ORDER — CLOPIDOGREL BISULFATE 75 MG/1
75 TABLET ORAL DAILY
Status: DISCONTINUED | OUTPATIENT
Start: 2023-09-01 | End: 2023-09-01 | Stop reason: HOSPADM

## 2023-08-31 RX ADMIN — INSULIN LISPRO 3 UNITS: 100 INJECTION, SOLUTION INTRAVENOUS; SUBCUTANEOUS at 11:35

## 2023-08-31 RX ADMIN — TAMSULOSIN HYDROCHLORIDE 0.8 MG: 0.4 CAPSULE ORAL at 08:07

## 2023-08-31 RX ADMIN — HEPARIN SODIUM 5000 UNITS: 5000 INJECTION INTRAVENOUS; SUBCUTANEOUS at 05:11

## 2023-08-31 RX ADMIN — CLONIDINE HYDROCHLORIDE 0.1 MG: 0.1 TABLET ORAL at 08:07

## 2023-08-31 RX ADMIN — CLOPIDOGREL BISULFATE 75 MG: 75 TABLET ORAL at 08:07

## 2023-08-31 RX ADMIN — CLONIDINE HYDROCHLORIDE 0.1 MG: 0.1 TABLET ORAL at 21:18

## 2023-08-31 RX ADMIN — INSULIN LISPRO 2 UNITS: 100 INJECTION, SOLUTION INTRAVENOUS; SUBCUTANEOUS at 16:33

## 2023-08-31 RX ADMIN — GUAIFENESIN 600 MG: 600 TABLET, EXTENDED RELEASE ORAL at 21:18

## 2023-08-31 RX ADMIN — FLUTICASONE PROPIONATE 2 SPRAY: 50 SPRAY, METERED NASAL at 08:07

## 2023-08-31 RX ADMIN — INSULIN LISPRO 3 UNITS: 100 INJECTION, SOLUTION INTRAVENOUS; SUBCUTANEOUS at 21:18

## 2023-08-31 RX ADMIN — METOPROLOL SUCCINATE 25 MG: 25 TABLET, FILM COATED, EXTENDED RELEASE ORAL at 08:07

## 2023-08-31 RX ADMIN — HEPARIN SODIUM 5000 UNITS: 5000 INJECTION INTRAVENOUS; SUBCUTANEOUS at 21:18

## 2023-08-31 RX ADMIN — HEPARIN SODIUM 5000 UNITS: 5000 INJECTION INTRAVENOUS; SUBCUTANEOUS at 13:44

## 2023-08-31 RX ADMIN — INSULIN LISPRO 1 UNITS: 100 INJECTION, SOLUTION INTRAVENOUS; SUBCUTANEOUS at 08:07

## 2023-08-31 RX ADMIN — SODIUM CHLORIDE, SODIUM LACTATE, POTASSIUM CHLORIDE, AND CALCIUM CHLORIDE 125 ML/HR: .6; .31; .03; .02 INJECTION, SOLUTION INTRAVENOUS at 05:10

## 2023-08-31 RX ADMIN — GUAIFENESIN 600 MG: 600 TABLET, EXTENDED RELEASE ORAL at 13:44

## 2023-08-31 NOTE — ASSESSMENT & PLAN NOTE
Lab Results   Component Value Date    EGFR 55 08/31/2023    EGFR 50 08/30/2023    EGFR 37 08/29/2023    CREATININE 1.23 08/31/2023    CREATININE 1.32 (H) 08/30/2023    CREATININE 1.70 (H) 08/29/2023     · Baseline creatinine between 1.3 and 1.4  · Creatinine on admission noted to be 1.8  · We will give aggressive IVF  · Trend BMP  · Hold torsemide and lisinopril

## 2023-08-31 NOTE — PROGRESS NOTES
[id number] [facility]  Progress Note  Name: Yaya Guzmán  MRN: [id number]  Unit/Bed#: -01 I Date of Admission: 8/28/2023   Date of Service: 8/31/2023 I Hospital Day: 2    Assessment/Plan   * Acute recurrent pancreatitis  Assessment & Plan  · Per record review and patient report, this is patient's seventh evaluation in the past 3 and half months for pancreatitis. Most recent, patient was discharged on 8/22. · He woke up the morning of this admission with epigastric pain that was unresolving despite oxycodone. He had a planned EUS for 9/7. Previous MRI/MRCP showed gallstones, sludge, and pancreatic ductal mass. · Met acute pancreatitis criteria with epigastric pain, lipase 411, and CT abdomen evidence shows uncomplicated pancreatitis without necrosis or fluid collection  · Continue pain control  · Continue IVF  · Patient reports tolerating CLD and requests that this be advanced - OK to advance to lo-fat today per GI  · Appreciate ongoing GI recommendations   · Patient was planned for EUS on 9/7/2023, however per GI this will now be rescheduled.  OK to continue Plavix at this time per GI    Acute-on-chronic kidney injury [facility])  Assessment & Plan  Lab Results   Component Value Date    EGFR 55 08/31/2023    EGFR 50 08/30/2023    EGFR 37 08/29/2023    CREATININE 1.23 08/31/2023    CREATININE 1.32 (H) 08/30/2023    CREATININE 1.70 (H) 08/29/2023     · Baseline creatinine between 1.3 and 1.4  · Creatinine on admission noted to be 1.8  · We will give aggressive IVF  · Trend BMP  · Hold torsemide and lisinopril    (HFpEF) heart failure with preserved ejection fraction (HCC)  Assessment & Plan  Wt Readings from Last 3 Encounters:   08/29/23 107 kg (235 lb)   08/18/23 108 kg (237 lb 12.8 oz)   08/07/23 109 kg (240 lb)     · Will need to monitor volume status closely  · Currently holding torsemide and lisinopril given MAO, will continue with Plavix, metoprolol, clonidine  · Currently at baseline respiratory status  · Appears to be euvolemic  · Monitor daily I's and O's        COVID-19  Assessment & Plan  · Tested positive for COVID-19 8/20, was taken off isolation prior  · Intermittently requiring 2 L of O2 while sleeping, room air while awake. This is patient's baseline  · Last hospitalization was given 3 doses of remdesivir and responded well without steroids  · Continue to monitor vitals    HTN (hypertension), benign  Assessment & Plan  · /65  · Given MAO patient's torsemide and lisinopril are on hold  · Continue with metoprolol and clonidine  · Continue to trend vitals    Type 2 diabetes mellitus with chronic kidney disease, with long-term current use of insulin [facility])  Assessment & Plan  Lab Results   Component Value Date    HGBA1C 6.7 (H) 05/10/2023       Recent Labs     08/30/23  1117 08/30/23  1634 08/30/23  2055 08/31/23  0724   POCGLU 204* 233* 214* 207*       Blood Sugar Average: Last 72 hrs:  (P) 212.4936780990372845   · Last admission blood sugars were very volatile and insulin needs to be adjusted shortly after starting p.o. diet  · Continue SSI for now and adjust as indicated            VTE Pharmacologic Prophylaxis: VTE Score: 4 Moderate Risk (Score 3-4) - Pharmacological DVT Prophylaxis Ordered: heparin. Patient Centered Rounds: I performed bedside rounds with nursing staff today. Discussions with Specialists or Other Care Team Provider: GI AP    Education and Discussions with Family / Patient: Patient declined call to . Total Time Spent on Date of Encounter in care of patient: 25 minutes This time was spent on one or more of the following: performing physical exam; counseling and coordination of care; obtaining or reviewing history; documenting in the medical record; reviewing/ordering tests, medications or procedures; communicating with other healthcare professionals and discussing with patient's family/caregivers.     Current Length of Stay: 2 day(s)  Current Patient Status: Inpatient   Certification Statement: The patient will continue to require additional inpatient hospital stay due to pancreatitis, pain control, IVF  Discharge Plan: Anticipate discharge in 24-48 hrs to home. Code Status: Level 1 - Full Code    Subjective:   Mr. Lan Doherty reports feeling well today. He reports epigastric pain has resolved. He reports he does not want CLD any more and wants his diet advanced     Objective:     Vitals:   Temp (24hrs), Av.1 °F (36.7 °C), Min:97.9 °F (36.6 °C), Max:98.4 °F (36.9 °C)    Temp:  [97.9 °F (36.6 °C)-98.4 °F (36.9 °C)] 98.4 °F (36.9 °C)  HR:  [55-67] 67  Resp:  [15-20] 15  BP: (109-149)/(64-90) 109/65  SpO2:  [94 %-99 %] 94 %  Body mass index is 37.93 kg/m². Input and Output Summary (last 24 hours): Intake/Output Summary (Last 24 hours) at 2023 0958  Last data filed at 2023 0601  Gross per 24 hour   Intake 2650 ml   Output 2525 ml   Net 125 ml       Physical Exam:   Physical Exam  Vitals reviewed. Constitutional:       Appearance: He is obese. Comments: Patient seen sitting in bed, NAD   Cardiovascular:      Rate and Rhythm: Normal rate and regular rhythm. Pulmonary:      Effort: Pulmonary effort is normal. No respiratory distress. Breath sounds: Normal breath sounds. Abdominal:      General: Bowel sounds are normal.      Palpations: Abdomen is soft. Tenderness: There is no abdominal tenderness. Musculoskeletal:      Right lower leg: No edema. Left lower leg: No edema. Skin:     General: Skin is warm. Neurological:      Mental Status: He is alert and oriented to person, place, and time.    Psychiatric:         Mood and Affect: Mood normal.         Behavior: Behavior normal.          Additional Data:     Labs:  Results from last 7 days   Lab Units 23  0513 23  1052 23  0447   WBC Thousand/uL 5.83   < > 7.27   HEMOGLOBIN g/dL 10.9*   < > 11.9*   HEMATOCRIT % 34.0*   < > 36.0*   PLATELETS Thousands/uL 185   < > 200   NEUTROS PCT %  --   --  52   LYMPHS PCT %  --   --  32   MONOS PCT %  --   --  11   EOS PCT %  --   --  4    < > = values in this interval not displayed. Results from last 7 days   Lab Units 08/31/23  0513 08/30/23  1052   SODIUM mmol/L 141 138   POTASSIUM mmol/L 4.1 4.3   CHLORIDE mmol/L 103 103   CO2 mmol/L 31 29   BUN mg/dL 14 15   CREATININE mg/dL 1.23 1.32*   ANION GAP mmol/L 7 6   CALCIUM mg/dL 8.7 8.3*   ALBUMIN g/dL  --  3.1*   TOTAL BILIRUBIN mg/dL  --  0.35   ALK PHOS U/L  --  51   ALT U/L  --  12   AST U/L  --  12*   GLUCOSE RANDOM mg/dL 186* 202*         Results from last 7 days   Lab Units 08/31/23  0724 08/30/23  2055 08/30/23  1634 08/30/23  1117 08/30/23  0714 08/29/23  2201 08/29/23  1815   POC GLUCOSE mg/dl 207* 214* 233* 204* 198* 183* 246*               Lines/Drains:  Invasive Devices     Peripheral Intravenous Line  Duration           Peripheral IV 08/29/23 Distal;Dorsal (posterior); Right Forearm 1 day                      Imaging: Reviewed radiology reports from this admission including: abdominal/pelvic CT    Recent Cultures (last 7 days):         Last 24 Hours Medication List:   Current Facility-Administered Medications   Medication Dose Route Frequency Provider Last Rate   • cloNIDine  0.1 mg Oral Q12H [address] Shavonne Epperson PA-C     • [START ON 9/1/2023] clopidogrel  75 mg Oral Daily Ching Haskins PA-C     • fluticasone  2 spray Nasal Daily Shavonne Epperson PA-C     • heparin (porcine)  5,000 Units Subcutaneous [facility] [address] Shavonne Epperson PA-C     • HYDROmorphone  0.2 mg Intravenous Q4H PRN Isadora Tarango MD     • insulin lispro  1-5 Units Subcutaneous TID AC Rosibel Petty MD     • insulin lispro  1-5 Units Subcutaneous HS Rosibel Petty MD     • ipratropium  2 spray Nasal Q12H Shavonne Epperson PA-C     • lactated ringers  125 mL/hr Intravenous Continuous Isadora Tarango  mL/hr (08/31/23 3286) • metoprolol succinate  25 mg Oral Daily Christy Epperson PA-C     • ondansetron  4 mg Intravenous Q6H PRN Christy Epperson PA-C     • tamsulosin  0.8 mg Oral Daily Tati Prado          Today, Patient Was Seen By: Camila Luna PA-C    **Please Note: This note may have been constructed using a voice recognition system. **

## 2023-08-31 NOTE — ASSESSMENT & PLAN NOTE
· Per record review and patient report, this is patient's seventh evaluation in the past 3 and half months for pancreatitis. Most recent, patient was discharged on 8/22. · He woke up the morning of this admission with epigastric pain that was unresolving despite oxycodone. He had a planned EUS for 9/7. Previous MRI/MRCP showed gallstones, sludge, and pancreatic ductal mass. · Met acute pancreatitis criteria with epigastric pain, lipase 411, and CT abdomen evidence shows uncomplicated pancreatitis without necrosis or fluid collection  · Continue pain control  · Continue IVF  · Patient reports tolerating CLD and requests that this be advanced - OK to advance to lo-fat today per GI  · Appreciate ongoing GI recommendations   · Patient was planned for EUS on 9/7/2023, however per GI this will now be rescheduled.  OK to continue Plavix at this time per GI

## 2023-08-31 NOTE — TELEPHONE ENCOUNTER
Pt is current in hospital for pancreatis and [facility] note Dr. Imani Nguyen informed to cancel EUS on 9/7/23. I called and spoke to wife and informed of this. She asked if could be done as in patient and I informed Woodrow Smith whom will speak to pt and explain why can not be done. I will call the hospital to inform of cancellation and will call pt to reschedule once his is discharged to reschedule. Spoke to Woodrow Smith whom was asking for new date to notify pt. I rescheduled pt for 9/26/23 with Dr. Nirmala Monte. I will call pt to inform as well when he is discharged from hospital.  PLEASE SEE ABOVE.

## 2023-08-31 NOTE — PROGRESS NOTES
Progress Note- Afton Cushing 82 y.o. male MRN: [id number]    Unit/Bed#: -01 Encounter: [id number]      Assessment and Plan:    82-year-old male with a PMH of HFpEF, CAD on plavix, cardiac murmur, CKD, T2DM, HLD, HTN, recurrent pancreatitis, cholecystectomy and recent admission for pancreatitis/COVID 8/17-8/22 who presented to [address] [place] [address] on 8/28 with severe epigastric pain, Lipase 411, CTAP notable for peripancreatic edema c/w acute pancreatitis. No peripancreatic fluid collection.      1.  Acute recurrent pancreatitis: Etiology of recurrent pancreatitis unclear cannot exclude pancreatic ductal stricture or malignancy. No evidence of autoimmune pancreatitis on previous work-up. He is also at risk of chronic pancreatitis given multiple recurrent episodes of pancreatitis. Patient denies any further abdominal pain. CRP rechecked yesterday 11.5 down from 85 last admission, renal function improved. He is requesting diet upgrade.    -Recent MRI/MRCP 7/25 notable for transition/narrowing of the duct at the junction of the pancreatic head/neck unchanged from prior study and a questionable 7mm nodule along the undersurface of the junction of the head/neck with some suspected diffusion restriction in this area & EUS recommended to exclude a small pancreatic mass. Patient was originally scheduled  for EUS 8/22 but then was hospitalized with COVID/pancreatitis (COVID + 8/20).     -Advance to low fat diet  -Ok to decrease or discontinue fluids if tolerating oral intake  -Continue supportive care with pain control as needed  -Monitor CBC, CMP, abdominal exam  -Will reschedule EUS w/ FNA for 9/26 to allow acute inflammatory process to resolve to reduce post procedural complications  -If EUS FNA negative f/u MRI/MRCP in no more than 8 weeks  -Patient may ultimately need referral to a tertiary facility pending course for PD stent  -If patient tolerates diet and pain remains improved, then can likely discharge home tomorrow    ______________________________________________________________________    Subjective:     Pt sitting in bed. Appears much more comfortable today. Denies any abdominal pain and ready for diet to be advanced. Denies SOB, N/V, LE edema. Urinating well.      Medication Administration - last 24 hours from 08/30/2023 0841 to 08/31/2023 0841       Date/Time Order Dose Route Action Action by     08/31/2023 0807 EDT cloNIDine (CATAPRES) tablet 0.1 mg 0.1 mg Oral Given Marlene Ice, RN     08/30/2023 2049 EDT cloNIDine (CATAPRES) tablet 0.1 mg 0.1 mg Oral Given Kan Montemayor, RN     08/30/2023 7983 EDT cloNIDine (CATAPRES) tablet 0.1 mg 0.1 mg Oral Given Marlene Ice, RN     08/30/2023 5514 EDT clopidogrel (PLAVIX) tablet 75 mg 75 mg Oral Given Marlene Ice, RN     08/31/2023 1923 EDT fluticasone (FLONASE) 50 mcg/act nasal spray 2 spray 2 spray Nasal Given Marlene Ice, RN     08/30/2023 6996 EDT fluticasone (FLONASE) 50 mcg/act nasal spray 2 spray 2 spray Nasal Given Marlene Ice, RN     08/31/2023 0330 EDT ipratropium (ATROVENT) 0.03 % nasal spray 2 spray 2 spray Nasal Not Given Kan Montemayor, RN     08/30/2023 1819 EDT ipratropium (ATROVENT) 0.03 % nasal spray 2 spray 2 spray Nasal Not Given Dick Felder, RN     08/31/2023 8097 EDT metoprolol succinate (TOPROL-XL) 24 hr tablet 25 mg 25 mg Oral Given Marlene Ice, RN     08/30/2023 7309 EDT metoprolol succinate (TOPROL-XL) 24 hr tablet 25 mg 25 mg Oral Given Marlene Ice, RN     08/31/2023 5689 EDT tamsulosin (FLOMAX) capsule 0.8 mg 0.8 mg Oral Given Marlene Ice, SUNDEEP     08/30/2023 7028 EDT tamsulosin (FLOMAX) capsule 0.8 mg 0.8 mg Oral Given Marlene Claudia, RN     08/31/2023 0058 EDT heparin (porcine) subcutaneous injection 5,000 Units 5,000 Units Subcutaneous Given Kan Montemayor RN     08/30/2023 2101 EDT heparin (porcine) subcutaneous injection 5,000 Units 5,000 Units Subcutaneous Given Kan Montemayor RN 08/30/2023 1340 EDT heparin (porcine) subcutaneous injection 5,000 Units 5,000 Units Subcutaneous Given Jacky Meckel, RN     08/31/2023 0510 EDT lactated ringers infusion 125 mL/hr Intravenous New Bag Zakia Deshpande, SUNDEEP     08/30/2023 2049 EDT lactated ringers infusion 125 mL/hr Intravenous New Bag Zakia Deshpande, SUNDEEP     08/30/2023 1755 EDT lactated ringers infusion 125 mL/hr Intravenous Rate/Dose Change Jacky Meckel, RN     08/30/2023 1013 EDT HYDROmorphone (DILAUDID) injection 0.5 mg 0.5 mg Intravenous Given Jacky Meckel, RN     08/31/2023 8489 EDT insulin lispro (HumaLOG) 100 units/mL subcutaneous injection 1-5 Units 1 Units Subcutaneous Given Jacky Meckel, RN     08/30/2023 1659 EDT insulin lispro (HumaLOG) 100 units/mL subcutaneous injection 1-5 Units 2 Units Subcutaneous Given Jacky Meckel, RN     08/30/2023 1140 EDT insulin lispro (HumaLOG) 100 units/mL subcutaneous injection 1-5 Units 1 Units Subcutaneous Given Jacky Meckel, RN     08/30/2023 9874 EDT insulin lispro (HumaLOG) 100 units/mL subcutaneous injection 1-5 Units 1 Units Subcutaneous Given Jacky Meckel, RN     08/30/2023 2101 EDT insulin lispro (HumaLOG) 100 units/mL subcutaneous injection 1-5 Units 2 Units Subcutaneous Given Zakia Deshpande RN     08/31/2023 3201 EDT clopidogrel (PLAVIX) tablet 75 mg 75 mg Oral Given Jacky Meckel, RN          Objective:     Vitals: Blood pressure 109/65, pulse 67, temperature 98.4 °F (36.9 °C), temperature source Oral, resp. rate 15, height 5' 6" (1.676 m), weight 107 kg (235 lb), SpO2 94 %. ,Body mass index is 37.93 kg/m².       Intake/Output Summary (Last 24 hours) at 8/31/2023 0841  Last data filed at 8/31/2023 0601  Gross per 24 hour   Intake 2650 ml   Output 2525 ml   Net 125 ml       Physical Exam:   General Appearance: Awake and alert, in no acute distress  Abdomen: Soft, non-tender, non-distended; bowel sounds normal; no masses or no organomegaly    Invasive Devices     Peripheral Intravenous Line  Duration           Peripheral IV 08/29/23 Distal;Dorsal (posterior); Right Forearm 1 day                Lab Results:  Admission on 08/28/2023   Component Date Value   • WBC 08/29/2023 8. 18    • RBC 08/29/2023 3.89    • Hemoglobin 08/29/2023 12.4    • Hematocrit 08/29/2023 36.9    • MCV 08/29/2023 95    • MCH 08/29/2023 31.9    • MCHC 08/29/2023 33.6    • RDW 08/29/2023 13.3    • MPV 08/29/2023 10.3    • Platelets 76/70/7043 229    • nRBC 08/29/2023 0    • Neutrophils Relative 08/29/2023 55    • Immat GRANS % 08/29/2023 0    • Lymphocytes Relative 08/29/2023 31    • Monocytes Relative 08/29/2023 10    • Eosinophils Relative 08/29/2023 4    • Basophils Relative 08/29/2023 0    • Neutrophils Absolute 08/29/2023 4.52    • Immature Grans Absolute 08/29/2023 0.02    • Lymphocytes Absolute 08/29/2023 2.53    • Monocytes Absolute 08/29/2023 0.78    • Eosinophils Absolute 08/29/2023 0.30    • Basophils Absolute 08/29/2023 0.03    • Sodium 08/29/2023 138    • Potassium 08/29/2023 4.2    • Chloride 08/29/2023 103    • CO2 08/29/2023 28    • ANION GAP 08/29/2023 7    • BUN 08/29/2023 27 (H)    • Creatinine 08/29/2023 1.80 (H)    • Glucose 08/29/2023 102    • Calcium 08/29/2023 8.8    • AST 08/29/2023 21    • ALT 08/29/2023 15    • Alkaline Phosphatase 08/29/2023 65    • Total Protein 08/29/2023 6.4    • Albumin 08/29/2023 3.7    • Total Bilirubin 08/29/2023 0.39    • eGFR 08/29/2023 34    • Lipase 08/29/2023 411 (H)    • hs TnI 0hr 08/29/2023 12    • hs TnI 2hr 08/29/2023 11    • Delta 2hr hsTnI 08/29/2023 -1    • hs TnI 4hr 08/29/2023 11    • Delta 4hr hsTnI 08/29/2023 -1    • Sodium 08/29/2023 138    • Potassium 08/29/2023 4.2    • Chloride 08/29/2023 102    • CO2 08/29/2023 30    • ANION GAP 08/29/2023 6    • BUN 08/29/2023 25    • Creatinine 08/29/2023 1.70 (H)    • Glucose 08/29/2023 149 (H)    • Calcium 08/29/2023 8.5    • Corrected Calcium 08/29/2023 9.1    • AST 08/29/2023 14    • ALT 08/29/2023 13    • Alkaline Phosphatase 08/29/2023 59    • Total Protein 08/29/2023 5.9 (L)    • Albumin 08/29/2023 3.3 (L)    • Total Bilirubin 08/29/2023 0.40    • eGFR 08/29/2023 37    • Lipase 08/29/2023 232 (H)    • WBC 08/29/2023 7.27    • RBC 08/29/2023 3.76 (L)    • Hemoglobin 08/29/2023 11.9 (L)    • Hematocrit 08/29/2023 36.0 (L)    • MCV 08/29/2023 96    • MCH 08/29/2023 31.6    • MCHC 08/29/2023 33.1    • RDW 08/29/2023 13.2    • MPV 08/29/2023 10.2    • Platelets 86/37/3872 200    • nRBC 08/29/2023 0    • Neutrophils Relative 08/29/2023 52    • Immat GRANS % 08/29/2023 0    • Lymphocytes Relative 08/29/2023 32    • Monocytes Relative 08/29/2023 11    • Eosinophils Relative 08/29/2023 4    • Basophils Relative 08/29/2023 1    • Neutrophils Absolute 08/29/2023 3.84    • Immature Grans Absolute 08/29/2023 0.03    • Lymphocytes Absolute 08/29/2023 2.30    • Monocytes Absolute 08/29/2023 0.80    • Eosinophils Absolute 08/29/2023 0.26    • Basophils Absolute 08/29/2023 0.04    • Ventricular Rate 08/29/2023 56    • Atrial Rate 08/29/2023 57    • WA Interval 08/29/2023 241    • QRSD Interval 08/29/2023 168    • QT Interval 08/29/2023 482    • QTC Interval 08/29/2023 466    • P Axis 08/29/2023 38    • QRS [place] 08/29/2023 -64    • T Wave Axis 08/29/2023 23    • POC Glucose 08/29/2023 246 (H)    • POC Glucose 08/29/2023 183 (H)    • POC Glucose 08/30/2023 198 (H)    • WBC 08/30/2023 8.03    • RBC 08/30/2023 3.70 (L)    • Hemoglobin 08/30/2023 11.9 (L)    • Hematocrit 08/30/2023 35.4 (L)    • MCV 08/30/2023 96    • MCH 08/30/2023 32.2    • MCHC 08/30/2023 33.6    • RDW 08/30/2023 12.9    • Platelets 28/47/9137 187    • MPV 08/30/2023 10.1    • Sodium 08/30/2023 138    • Potassium 08/30/2023 4.3    • Chloride 08/30/2023 103    • CO2 08/30/2023 29    • ANION GAP 08/30/2023 6    • BUN 08/30/2023 15    • Creatinine 08/30/2023 1.32 (H)    • Glucose 08/30/2023 202 (H)    • Calcium 08/30/2023 8.3 (L)    • Corrected Calcium 08/30/2023 9.0 • AST 08/30/2023 12 (L)    • ALT 08/30/2023 12    • Alkaline Phosphatase 08/30/2023 51    • Total Protein 08/30/2023 5.6 (L)    • Albumin 08/30/2023 3.1 (L)    • Total Bilirubin 08/30/2023 0.35    • eGFR 08/30/2023 50    • CRP 08/30/2023 11.5 (H)    • Triglycerides 08/29/2023 112    • POC Glucose 08/30/2023 204 (H)    • POC Glucose 08/30/2023 233 (H)    • POC Glucose 08/30/2023 214 (H)    • WBC 08/31/2023 5.83    • RBC 08/31/2023 3.47 (L)    • Hemoglobin 08/31/2023 10.9 (L)    • Hematocrit 08/31/2023 34.0 (L)    • MCV 08/31/2023 98    • MCH 08/31/2023 31.4    • MCHC 08/31/2023 32.1    • RDW 08/31/2023 12.9    • Platelets 93/58/8305 185    • MPV 08/31/2023 10.4    • Sodium 08/31/2023 141    • Potassium 08/31/2023 4.1    • Chloride 08/31/2023 103    • CO2 08/31/2023 31    • ANION GAP 08/31/2023 7    • BUN 08/31/2023 14    • Creatinine 08/31/2023 1.23    • Glucose 08/31/2023 186 (H)    • Calcium 08/31/2023 8.7    • eGFR 08/31/2023 55    • POC Glucose 08/31/2023 207 (H)        Imaging Studies: I have personally reviewed pertinent imaging studies.

## 2023-08-31 NOTE — ASSESSMENT & PLAN NOTE
Lab Results   Component Value Date    HGBA1C 6.7 (H) 05/10/2023       Recent Labs     08/30/23  1117 08/30/23  1634 08/30/23 2055 08/31/23  0724   POCGLU 204* 233* 214* 207*       Blood Sugar Average: Last 72 hrs:  (P) [id number]   · Last admission blood sugars were very volatile and insulin needs to be adjusted shortly after starting p.o. diet  · Continue SSI for now and adjust as indicated

## 2023-08-31 NOTE — TELEPHONE ENCOUNTER
Called and spoke to Candi in the lab and informed of cancelled 9/7/23 EUS w/ fna & cytology and that was r/s to 9/26/23 at 11:30am. Will watch for pt to be discharged to call him to inform of new date.

## 2023-08-31 NOTE — PLAN OF CARE
Problem: RESPIRATORY - ADULT  Goal: Achieves optimal ventilation and oxygenation  Description: INTERVENTIONS:  - Assess for changes in respiratory status  - Assess for changes in mentation and behavior  - Position to facilitate oxygenation and minimize respiratory effort  - Oxygen administered by appropriate delivery if ordered  - Initiate smoking cessation education as indicated  - Encourage broncho-pulmonary hygiene including cough, deep breathe, Incentive Spirometry  - Assess the need for suctioning and aspirate as needed  - Assess and instruct to report SOB or any respiratory difficulty  - Respiratory Therapy support as indicated  Outcome: Progressing     Problem: GASTROINTESTINAL - ADULT  Goal: Minimal or absence of nausea and/or vomiting  Description: INTERVENTIONS:  - Administer IV fluids if ordered to ensure adequate hydration  - Maintain NPO status until nausea and vomiting are resolved  - Nasogastric tube if ordered  - Administer ordered antiemetic medications as needed  - Provide nonpharmacologic comfort measures as appropriate  - Advance diet as tolerated, if ordered  - Consider nutrition services referral to assist patient with adequate nutrition and appropriate food choices  Outcome: Progressing     Problem: GASTROINTESTINAL - ADULT  Goal: Maintains adequate nutritional intake  Description: INTERVENTIONS:  - Monitor percentage of each meal consumed  - Identify factors contributing to decreased intake, treat as appropriate  - Assist with meals as needed  - Monitor I&O, weight, and lab values if indicated  - Obtain nutrition services referral as needed  Outcome: Progressing     Problem: MOBILITY - ADULT  Goal: Maintain or return to baseline ADL function  Description: INTERVENTIONS:  -  Assess patient's ability to carry out ADLs; assess patient's baseline for ADL function and identify physical deficits which impact ability to perform ADLs (bathing, care of mouth/teeth, toileting, grooming, dressing, etc.)  - Assess/evaluate cause of self-care deficits   - Assess range of motion  - Assess patient's mobility; develop plan if impaired  - Assess patient's need for assistive devices and provide as appropriate  - Encourage maximum independence but intervene and supervise when necessary  - Involve family in performance of ADLs  - Assess for home care needs following discharge   - Consider OT consult to assist with ADL evaluation and planning for discharge  - Provide patient education as appropriate  Outcome: Progressing     Problem: PAIN - ADULT  Goal: Verbalizes/displays adequate comfort level or baseline comfort level  Description: Interventions:  - Encourage patient to monitor pain and request assistance  - Assess pain using appropriate pain scale  - Administer analgesics based on type and severity of pain and evaluate response  - Implement non-pharmacological measures as appropriate and evaluate response  - Consider cultural and social influences on pain and pain management  - Notify physician/advanced practitioner if interventions unsuccessful or patient reports new pain  Outcome: Progressing     Problem: INFECTION - ADULT  Goal: Absence or prevention of progression during hospitalization  Description: INTERVENTIONS:  - Assess and monitor for signs and symptoms of infection  - Monitor lab/diagnostic results  - Monitor all insertion sites, i.e. indwelling lines, tubes, and drains  - Monitor endotracheal if appropriate and nasal secretions for changes in amount and color  - [place] appropriate cooling/warming therapies per order  - Administer medications as ordered  - Instruct and encourage patient and family to use good hand hygiene technique  - Identify and instruct in appropriate isolation precautions for identified infection/condition  Outcome: Progressing

## 2023-08-31 NOTE — ASSESSMENT & PLAN NOTE
· Tested positive for COVID-19 8/20, was taken off isolation prior  · Intermittently requiring 2 L of O2 while sleeping, room air while awake.   This is patient's baseline  · Last hospitalization was given 3 doses of remdesivir and responded well without steroids  · Continue to monitor vitals

## 2023-09-01 VITALS
HEART RATE: 59 BPM | OXYGEN SATURATION: 98 % | WEIGHT: 235 LBS | DIASTOLIC BLOOD PRESSURE: 74 MMHG | HEIGHT: 66 IN | TEMPERATURE: 98.6 F | SYSTOLIC BLOOD PRESSURE: 133 MMHG | BODY MASS INDEX: 37.77 KG/M2 | RESPIRATION RATE: 16 BRPM

## 2023-09-01 LAB
ALBUMIN SERPL BCP-MCNC: 3.1 G/DL (ref 3.5–5)
ALP SERPL-CCNC: 53 U/L (ref 34–104)
ALT SERPL W P-5'-P-CCNC: 12 U/L (ref 7–52)
ANION GAP SERPL CALCULATED.3IONS-SCNC: 7 MMOL/L
AST SERPL W P-5'-P-CCNC: 13 U/L (ref 13–39)
BILIRUB SERPL-MCNC: 0.37 MG/DL (ref 0.2–1)
BUN SERPL-MCNC: 18 MG/DL (ref 5–25)
CALCIUM ALBUM COR SERPL-MCNC: 9.2 MG/DL (ref 8.3–10.1)
CALCIUM SERPL-MCNC: 8.5 MG/DL (ref 8.4–10.2)
CHLORIDE SERPL-SCNC: 104 MMOL/L (ref 96–108)
CO2 SERPL-SCNC: 30 MMOL/L (ref 21–32)
CREAT SERPL-MCNC: 1.28 MG/DL (ref 0.6–1.3)
ERYTHROCYTE [DISTWIDTH] IN BLOOD BY AUTOMATED COUNT: 12.8 % (ref 11.6–15.1)
GFR SERPL CREATININE-BSD FRML MDRD: 52 ML/MIN/1.73SQ M
GLUCOSE SERPL-MCNC: 201 MG/DL (ref 65–140)
GLUCOSE SERPL-MCNC: 236 MG/DL (ref 65–140)
GLUCOSE SERPL-MCNC: 414 MG/DL (ref 65–140)
HCT VFR BLD AUTO: 34.9 % (ref 36.5–49.3)
HGB BLD-MCNC: 11.8 G/DL (ref 12–17)
MCH RBC QN AUTO: 32.2 PG (ref 26.8–34.3)
MCHC RBC AUTO-ENTMCNC: 33.8 G/DL (ref 31.4–37.4)
MCV RBC AUTO: 95 FL (ref 82–98)
PLATELET # BLD AUTO: 191 THOUSANDS/UL (ref 149–390)
PMV BLD AUTO: 10.5 FL (ref 8.9–12.7)
POTASSIUM SERPL-SCNC: 4.1 MMOL/L (ref 3.5–5.3)
PROT SERPL-MCNC: 5.5 G/DL (ref 6.4–8.4)
RBC # BLD AUTO: 3.67 MILLION/UL (ref 3.88–5.62)
SODIUM SERPL-SCNC: 141 MMOL/L (ref 135–147)
WBC # BLD AUTO: 6.12 THOUSAND/UL (ref 4.31–10.16)

## 2023-09-01 PROCEDURE — 99231 SBSQ HOSP IP/OBS SF/LOW 25: CPT | Performed by: INTERNAL MEDICINE

## 2023-09-01 PROCEDURE — 80053 COMPREHEN METABOLIC PANEL: CPT | Performed by: PHYSICIAN ASSISTANT

## 2023-09-01 PROCEDURE — 82948 REAGENT STRIP/BLOOD GLUCOSE: CPT

## 2023-09-01 PROCEDURE — 85027 COMPLETE CBC AUTOMATED: CPT | Performed by: PHYSICIAN ASSISTANT

## 2023-09-01 PROCEDURE — 99239 HOSP IP/OBS DSCHRG MGMT >30: CPT | Performed by: INTERNAL MEDICINE

## 2023-09-01 RX ADMIN — INSULIN LISPRO 2 UNITS: 100 INJECTION, SOLUTION INTRAVENOUS; SUBCUTANEOUS at 08:22

## 2023-09-01 RX ADMIN — METOPROLOL SUCCINATE 25 MG: 25 TABLET, FILM COATED, EXTENDED RELEASE ORAL at 08:25

## 2023-09-01 RX ADMIN — TAMSULOSIN HYDROCHLORIDE 0.8 MG: 0.4 CAPSULE ORAL at 08:25

## 2023-09-01 RX ADMIN — FLUTICASONE PROPIONATE 2 SPRAY: 50 SPRAY, METERED NASAL at 08:22

## 2023-09-01 RX ADMIN — CLONIDINE HYDROCHLORIDE 0.1 MG: 0.1 TABLET ORAL at 08:25

## 2023-09-01 RX ADMIN — CLOPIDOGREL BISULFATE 75 MG: 75 TABLET ORAL at 08:25

## 2023-09-01 RX ADMIN — GUAIFENESIN 600 MG: 600 TABLET, EXTENDED RELEASE ORAL at 08:25

## 2023-09-01 RX ADMIN — HEPARIN SODIUM 5000 UNITS: 5000 INJECTION INTRAVENOUS; SUBCUTANEOUS at 05:00

## 2023-09-01 RX ADMIN — INSULIN LISPRO 5 UNITS: 100 INJECTION, SOLUTION INTRAVENOUS; SUBCUTANEOUS at 12:43

## 2023-09-01 NOTE — PROGRESS NOTES
Progress Note- Lobo Juárez 82 y.o. male MRN: [id number]    Unit/Bed#: -01 Encounter: [id number]      Assessment and Plan:    74-year-old male with a PMH of HFpEF, CAD on plavix, cardiac murmur, CKD, T2DM, HLD, HTN, recurrent pancreatitis, cholecystectomy and recent admission for pancreatitis/COVID 8/17-8/22 who presented to [address] [place] [address] on 8/28 with severe epigastric pain, Lipase 411, CTAP notable for peripancreatic edema c/w acute pancreatitis. No peripancreatic fluid collection.      1.  Acute recurrent pancreatitis: Etiology of recurrent pancreatitis unclear cannot exclude pancreatic ductal stricture or malignancy. No evidence of autoimmune pancreatitis on previous work-up. He is also at risk of chronic pancreatitis given multiple recurrent episodes of pancreatitis. Patient denies any further abdominal pain. CRP rechecked 11.5 down from 85 last admission, renal function improved. Continues with no abdominal pain with upgraded diet.     -Recent MRI/MRCP 7/25 notable for transition/narrowing of the duct at the junction of the pancreatic head/neck unchanged from prior study and a questionable 7mm nodule along the undersurface of the junction of the head/neck with some suspected diffusion restriction in this area & EUS recommended to exclude a small pancreatic mass. Patient was originally scheduled  for EUS 8/22 but then was hospitalized with COVID/pancreatitis (COVID + 8/20).    -Continue low fat diet at home. Instructed patient to follow strict low fat diet at home.  If he develops abdominal pain he should go on clear liquids for 24-48 hours, if pain doesn't resolve or becomes severe then present to the ED.  -Nutrition consult requested for education regarding low fat diet  -Continue supportive care with pain control as needed  -EUS w/ FNA rescheduled for 9/26 to allow acute inflammatory process to resolve to reduce post procedural complications  -If EUS FNA negative f/u MRI/MRCP in no more than 8 weeks  -Patient may ultimately need referral to a tertiary facility pending course for PD stent  -Ok to discharge from GI standpoint    ______________________________________________________________________    Subjective:     Pt continues with no abdominal pain, tolerating diet.     Medication Administration - last 24 hours from 08/31/2023 0951 to 09/01/2023 1617       Date/Time Order Dose Route Action Action by     09/01/2023 0825 EDT cloNIDine (CATAPRES) tablet 0.1 mg 0.1 mg Oral Given Georgiana Luke, RN     08/31/2023 2118 EDT cloNIDine (CATAPRES) tablet 0.1 mg 0.1 mg Oral Given Darrell Rg, RN     09/01/2023 4535 EDT fluticasone (FLONASE) 50 mcg/act nasal spray 2 spray 2 spray Nasal Given Loreda Marly, RN     09/01/2023 0447 EDT ipratropium (ATROVENT) 0.03 % nasal spray 2 spray 2 spray Nasal Not Given Quinna Arcenio, RN     08/31/2023 1539 EDT ipratropium (ATROVENT) 0.03 % nasal spray 2 spray 2 spray Nasal Not Given Loreda Marly, RN     09/01/2023 0825 EDT metoprolol succinate (TOPROL-XL) 24 hr tablet 25 mg 25 mg Oral Given Georgiana Luke, RN     09/01/2023 0825 EDT tamsulosin (FLOMAX) capsule 0.8 mg 0.8 mg Oral Given Lortaylor Luke, RN     09/01/2023 0500 EDT heparin (porcine) subcutaneous injection 5,000 Units 5,000 Units Subcutaneous Given Avinashcia Arcenio, RN     08/31/2023 2118 EDT heparin (porcine) subcutaneous injection 5,000 Units 5,000 Units Subcutaneous Given Darcia Arcenio, RN     08/31/2023 1344 EDT heparin (porcine) subcutaneous injection 5,000 Units 5,000 Units Subcutaneous Given Lortaylor Luke, RN     08/31/2023 1024 EDT lactated ringers infusion 0 mL/hr Intravenous Stopped Georgiana Luke RN     09/01/2023 0024 EDT insulin lispro (HumaLOG) 100 units/mL subcutaneous injection 1-5 Units 2 Units Subcutaneous Given Georgiana Luke RN     08/31/2023 1633 EDT insulin lispro (HumaLOG) 100 units/mL subcutaneous injection 1-5 Units 2 Units Subcutaneous Given Georgiana Luke RN 08/31/2023 1135 EDT insulin lispro (HumaLOG) 100 units/mL subcutaneous injection 1-5 Units 3 Units Subcutaneous Given Jacky Meckel, RN     08/31/2023 2118 EDT insulin lispro (HumaLOG) 100 units/mL subcutaneous injection 1-5 Units 3 Units Subcutaneous Given Prem Garner RN     09/01/2023 0825 EDT clopidogrel (PLAVIX) tablet 75 mg 75 mg Oral Given Jacky Meckel, RN     09/01/2023 0825 EDT guaiFENesin (MUCINEX) 12 hr tablet 600 mg 600 mg Oral Given Jacky Meckel, RN     08/31/2023 2118 EDT guaiFENesin (MUCINEX) 12 hr tablet 600 mg 600 mg Oral Given Prem Garner RN     08/31/2023 1344 EDT guaiFENesin (MUCINEX) 12 hr tablet 600 mg 600 mg Oral Given Jacky Meckel, RN          Objective:     Vitals: Blood pressure 133/74, pulse 59, temperature 98.6 °F (37 °C), temperature source Tympanic, resp. rate 16, height 5' 6" (1.676 m), weight 107 kg (235 lb), SpO2 98 %. ,Body mass index is 37.93 kg/m². Intake/Output Summary (Last 24 hours) at 9/1/2023 0951  Last data filed at 9/1/2023 0456  Gross per 24 hour   Intake 300 ml   Output 1100 ml   Net -800 ml       Physical Exam:   General Appearance: Awake and alert, in no acute distress  Abdomen: Soft, non-tender, non-distended; bowel sounds normal; no masses or no organomegaly    Invasive Devices     Peripheral Intravenous Line  Duration           Peripheral IV 08/29/23 Distal;Dorsal (posterior); Right Forearm 2 days                Lab Results:  Admission on 08/28/2023   Component Date Value   • WBC 08/29/2023 8. 18    • RBC 08/29/2023 3.89    • Hemoglobin 08/29/2023 12.4    • Hematocrit 08/29/2023 36.9    • MCV 08/29/2023 95    • MCH 08/29/2023 31.9    • MCHC 08/29/2023 33.6    • RDW 08/29/2023 13.3    • MPV 08/29/2023 10.3    • Platelets 26/09/3617 229    • nRBC 08/29/2023 0    • Neutrophils Relative 08/29/2023 55    • Immat GRANS % 08/29/2023 0    • Lymphocytes Relative 08/29/2023 31    • Monocytes Relative 08/29/2023 10    • Eosinophils Relative 08/29/2023 4    • Basophils Relative 08/29/2023 0    • Neutrophils Absolute 08/29/2023 4.52    • Immature Grans Absolute 08/29/2023 0.02    • Lymphocytes Absolute 08/29/2023 2.53    • Monocytes Absolute 08/29/2023 0.78    • Eosinophils Absolute 08/29/2023 0.30    • Basophils Absolute 08/29/2023 0.03    • Sodium 08/29/2023 138    • Potassium 08/29/2023 4.2    • Chloride 08/29/2023 103    • CO2 08/29/2023 28    • ANION GAP 08/29/2023 7    • BUN 08/29/2023 27 (H)    • Creatinine 08/29/2023 1.80 (H)    • Glucose 08/29/2023 102    • Calcium 08/29/2023 8.8    • AST 08/29/2023 21    • ALT 08/29/2023 15    • Alkaline Phosphatase 08/29/2023 65    • Total Protein 08/29/2023 6.4    • Albumin 08/29/2023 3.7    • Total Bilirubin 08/29/2023 0.39    • eGFR 08/29/2023 34    • Lipase 08/29/2023 411 (H)    • hs TnI 0hr 08/29/2023 12    • hs TnI 2hr 08/29/2023 11    • Delta 2hr hsTnI 08/29/2023 -1    • hs TnI 4hr 08/29/2023 11    • Delta 4hr hsTnI 08/29/2023 -1    • Sodium 08/29/2023 138    • Potassium 08/29/2023 4.2    • Chloride 08/29/2023 102    • CO2 08/29/2023 30    • ANION GAP 08/29/2023 6    • BUN 08/29/2023 25    • Creatinine 08/29/2023 1.70 (H)    • Glucose 08/29/2023 149 (H)    • Calcium 08/29/2023 8.5    • Corrected Calcium 08/29/2023 9.1    • AST 08/29/2023 14    • ALT 08/29/2023 13    • Alkaline Phosphatase 08/29/2023 59    • Total Protein 08/29/2023 5.9 (L)    • Albumin 08/29/2023 3.3 (L)    • Total Bilirubin 08/29/2023 0.40    • eGFR 08/29/2023 37    • Lipase 08/29/2023 232 (H)    • WBC 08/29/2023 7.27    • RBC 08/29/2023 3.76 (L)    • Hemoglobin 08/29/2023 11.9 (L)    • Hematocrit 08/29/2023 36.0 (L)    • MCV 08/29/2023 96    • MCH 08/29/2023 31.6    • MCHC 08/29/2023 33.1    • RDW 08/29/2023 13.2    • MPV 08/29/2023 10.2    • Platelets 71/57/6404 200    • nRBC 08/29/2023 0    • Neutrophils Relative 08/29/2023 52    • Immat GRANS % 08/29/2023 0    • Lymphocytes Relative 08/29/2023 32    • Monocytes Relative 08/29/2023 11    • Eosinophils Relative 08/29/2023 4    • Basophils Relative 08/29/2023 1    • Neutrophils Absolute 08/29/2023 3.84    • Immature Grans Absolute 08/29/2023 0.03    • Lymphocytes Absolute 08/29/2023 2.30    • Monocytes Absolute 08/29/2023 0.80    • Eosinophils Absolute 08/29/2023 0.26    • Basophils Absolute 08/29/2023 0.04    • Ventricular Rate 08/29/2023 56    • Atrial Rate 08/29/2023 57    • IL Interval 08/29/2023 241    • QRSD Interval 08/29/2023 168    • QT Interval 08/29/2023 482    • QTC Interval 08/29/2023 466    • P Axis 08/29/2023 38    • QRS [place] 08/29/2023 -64    • T Wave Axis 08/29/2023 23    • POC Glucose 08/29/2023 246 (H)    • POC Glucose 08/29/2023 183 (H)    • POC Glucose 08/30/2023 198 (H)    • WBC 08/30/2023 8.03    • RBC 08/30/2023 3.70 (L)    • Hemoglobin 08/30/2023 11.9 (L)    • Hematocrit 08/30/2023 35.4 (L)    • MCV 08/30/2023 96    • MCH 08/30/2023 32.2    • MCHC 08/30/2023 33.6    • RDW 08/30/2023 12.9    • Platelets 73/29/2982 187    • MPV 08/30/2023 10.1    • Sodium 08/30/2023 138    • Potassium 08/30/2023 4.3    • Chloride 08/30/2023 103    • CO2 08/30/2023 29    • ANION GAP 08/30/2023 6    • BUN 08/30/2023 15    • Creatinine 08/30/2023 1.32 (H)    • Glucose 08/30/2023 202 (H)    • Calcium 08/30/2023 8.3 (L)    • Corrected Calcium 08/30/2023 9.0    • AST 08/30/2023 12 (L)    • ALT 08/30/2023 12    • Alkaline Phosphatase 08/30/2023 51    • Total Protein 08/30/2023 5.6 (L)    • Albumin 08/30/2023 3.1 (L)    • Total Bilirubin 08/30/2023 0.35    • eGFR 08/30/2023 50    • CRP 08/30/2023 11.5 (H)    • Triglycerides 08/29/2023 112    • POC Glucose 08/30/2023 204 (H)    • POC Glucose 08/30/2023 233 (H)    • POC Glucose 08/30/2023 214 (H)    • WBC 08/31/2023 5.83    • RBC 08/31/2023 3.47 (L)    • Hemoglobin 08/31/2023 10.9 (L)    • Hematocrit 08/31/2023 34.0 (L)    • MCV 08/31/2023 98    • MCH 08/31/2023 31.4    • MCHC 08/31/2023 32.1    • RDW 08/31/2023 12.9    • Platelets 41/32/2677 185    • MPV 08/31/2023 10.4    • Sodium 08/31/2023 141    • Potassium 08/31/2023 4.1    • Chloride 08/31/2023 103    • CO2 08/31/2023 31    • ANION GAP 08/31/2023 7    • BUN 08/31/2023 14    • Creatinine 08/31/2023 1.23    • Glucose 08/31/2023 186 (H)    • Calcium 08/31/2023 8.7    • eGFR 08/31/2023 55    • POC Glucose 08/31/2023 207 (H)    • POC Glucose 08/31/2023 290 (H)    • POC Glucose 08/31/2023 254 (H)    • POC Glucose 08/31/2023 271 (H)    • Sodium 09/01/2023 141    • Potassium 09/01/2023 4.1    • Chloride 09/01/2023 104    • CO2 09/01/2023 30    • ANION GAP 09/01/2023 7    • BUN 09/01/2023 18    • Creatinine 09/01/2023 1.28    • Glucose 09/01/2023 201 (H)    • Calcium 09/01/2023 8.5    • Corrected Calcium 09/01/2023 9.2    • AST 09/01/2023 13    • ALT 09/01/2023 12    • Alkaline Phosphatase 09/01/2023 53    • Total Protein 09/01/2023 5.5 (L)    • Albumin 09/01/2023 3.1 (L)    • Total Bilirubin 09/01/2023 0.37    • eGFR 09/01/2023 52    • WBC 09/01/2023 6.12    • RBC 09/01/2023 3.67 (L)    • Hemoglobin 09/01/2023 11.8 (L)    • Hematocrit 09/01/2023 34.9 (L)    • MCV 09/01/2023 95    • MCH 09/01/2023 32.2    • MCHC 09/01/2023 33.8    • RDW 09/01/2023 12.8    • Platelets 64/60/5013 191    • MPV 09/01/2023 10.5    • POC Glucose 09/01/2023 236 (H)        Imaging Studies: I have personally reviewed pertinent imaging studies.

## 2023-09-01 NOTE — ASSESSMENT & PLAN NOTE
Lab Results   Component Value Date    EGFR 52 09/01/2023    EGFR 55 08/31/2023    EGFR 50 08/30/2023    CREATININE 1.28 09/01/2023    CREATININE 1.23 08/31/2023    CREATININE 1.32 (H) 08/30/2023     · Baseline creatinine between 1.3 and 1.4  · Creatinine on admission noted to be 1.8  · We will give aggressive IVF  · Trend BMP  · Hold torsemide and lisinopril

## 2023-09-01 NOTE — DISCHARGE INSTR - AVS FIRST PAGE
Dear Katerina Bee,     It was our pleasure to care for you here at [facility]. It is our hope that we were always able to exceed the expected standards for your care during your stay. You were hospitalized due to acute pancreatitis . You were cared for on the medsur floor under the service of Adonay Francis MD with the [facility] who covers for your primary care physician (PCP), Sofy Foley MD, while you were hospitalized. If you have any questions or concerns related to this hospitalization, you may contact us at [phone]. For follow up as well as medication refills, we recommend that you follow up with your primary care physician. A registered nurse will reach out to you by phone within a few days after your discharge to answer any additional questions that you may have after going home. However, at this time we provide for you here, the most important instructions / recommendations at discharge:     Notable Medication Adjustments -     Testing Required after Discharge -   EUS on 9/26/23  ** Please contact your PCP to request testing orders for any of the testing recommended here   Important follow up information -   Continue low fat diet at home. Instructed patient to follow strict low fat diet at home.  If he develops abdominal pain he should go on clear liquids for 24-48 hours, if pain doesn't resolve or becomes severe then present to the ED.  -Nutrition consult requested for education regarding low fat diet  -Continue supportive care with pain control as needed  -EUS w/ FNA rescheduled for 9/26 to allow acute inflammatory process to resolve to reduce post procedural complications  -If EUS FNA negative f/u MRI/MRCP in no more than 8 weeks  -Patient may ultimately need referral to a tertiary facility pending course for PD stent  Please review this entire after visit summary as additional general instructions including medication list, appointments, activity, diet, any pertinent wound care, and other additional recommendations from your care team that may be provided for you.       Sincerely,     Uma Gan MD

## 2023-09-01 NOTE — ASSESSMENT & PLAN NOTE
· /67  · Given MAO patient's torsemide and lisinopril are on hold  · Continue with metoprolol and clonidine  · Continue to trend vitals

## 2023-09-01 NOTE — ASSESSMENT & PLAN NOTE
Lab Results   Component Value Date    HGBA1C 6.7 (H) 05/10/2023       Recent Labs     08/31/23  1057 08/31/23  1514 08/31/23 2037 09/01/23  0720   POCGLU 290* 254* 271* 236*       Blood Sugar Average: Last 72 hrs:  (P) 403.4411718130211754   · Last admission blood sugars were very volatile and insulin needs to be adjusted shortly after starting p.o. diet  · Continue SSI for now and adjust as indicated

## 2023-09-01 NOTE — DISCHARGE SUMMARY
[address] [facility]  Discharge- [facility] 1940, 82 y.o. male MRN: [id number]  Unit/Bed#: [facility] [place] Encounter: [id number]  Primary Care Provider: Yash Tucker MD   Date and time admitted to hospital: 8/28/2023 11:57 PM    * Acute recurrent pancreatitis  Assessment & Plan  · Per record review and patient report, this is patient's seventh evaluation in the past 3 and half months for pancreatitis. Most recent, patient was discharged on 8/22. · He woke up the morning of this admission with epigastric pain that was unresolving despite oxycodone. He had a planned EUS for 9/7. Previous MRI/MRCP showed gallstones, sludge, and pancreatic ductal mass. · Met acute pancreatitis criteria with epigastric pain, lipase 411, and CT abdomen evidence shows uncomplicated pancreatitis without necrosis or fluid collection  · Continue pain control  · Continue IVF  · Patient reports tolerating CLD and requests that this be advanced - OK to advance to lo-fat today per GI  · Appreciate ongoing GI recommendations   · Patient was planned for EUS on 9/7/2023, however per GI this will now be rescheduled. OK to continue Plavix at this time per GI    COVID-19  Assessment & Plan  · Tested positive for COVID-19 8/20, was taken off isolation prior  · Intermittently requiring 2 L of O2 while sleeping, room air while awake.   This is patient's baseline  · Last hospitalization was given 3 doses of remdesivir and responded well without steroids  · Continue to monitor vitals    Acute-on-chronic kidney injury [facility])  Assessment & Plan  Lab Results   Component Value Date    EGFR 52 09/01/2023    EGFR 55 08/31/2023    EGFR 50 08/30/2023    CREATININE 1.28 09/01/2023    CREATININE 1.23 08/31/2023    CREATININE 1.32 (H) 08/30/2023     · Baseline creatinine between 1.3 and 1.4  · Creatinine on admission noted to be 1.8  · We will give aggressive IVF  · Trend BMP  · Hold torsemide and lisinopril    Type 2 diabetes mellitus with chronic kidney disease, with long-term current use of insulin [facility])  Assessment & Plan  Lab Results   Component Value Date    HGBA1C 6.7 (H) 05/10/2023       Recent Labs     08/31/23  1057 08/31/23  1514 08/31/23  2037 09/01/23  0720   POCGLU 290* 254* 271* 236*       Blood Sugar Average: Last 72 hrs:  (P) 801.3222505397827828   · Last admission blood sugars were very volatile and insulin needs to be adjusted shortly after starting p.o. diet  · Continue SSI for now and adjust as indicated     (HFpEF) heart failure with preserved ejection fraction (HCC)  Assessment & Plan  Wt Readings from Last 3 Encounters:   08/29/23 107 kg (235 lb)   08/18/23 108 kg (237 lb 12.8 oz)   08/07/23 109 kg (240 lb)     · Will need to monitor volume status closely  · Currently holding torsemide and lisinopril given MAO, will continue with Plavix, metoprolol, clonidine  · Currently at baseline respiratory status  · Appears to be euvolemic  · Monitor daily I's and O's        HTN (hypertension), benign  Assessment & Plan  · /65  · Given MAO patient's torsemide and lisinopril are on hold  · Continue with metoprolol and clonidine  · Continue to trend vitals              Medical Problems     Resolved Problems  Date Reviewed: 9/1/2023   None         Admission Date:   Admission Orders (From admission, onward)     Ordered        08/29/23 0324  INPATIENT ADMISSION  Once                        Admitting Diagnosis: Acute pancreatitis [K85.90]  Abdominal pain [R10.9]      Procedures Performed: No orders of the defined types were placed in this encounter. Summary of Hospital Course:   Patient is a 79-year-old male with history of CHF with preserved EF, morbid obesity, CAD on Plavix, cardiac murmur, CKD stage III, diabetes mellitus type 2, hyperlipidemia and hypertension, recurrent pancreatitis, history of cholecystectomy admitted with epigastric pain.   Patient was recently discharged from the hospital for pancreatitis and COVID-19 on 8/17 to 8/22. Presented to [address] [place] [address] on 8/28 with severe epigastric pain with elevated lipase level of 411, CT abdomen showed peripancreatic edema consistent with acute pancreatitis and no peripancreatic fluid noted. Patient was given aggressive IV hydration and pain management and kept n.p.o. and started on clear liquid and advanced as tolerated. GI input was obtained. Patient had a recent MRI/MRCP on 7/25 notable for transition and narrowing of the duct at the junction of the pancreatic head and neck unchanged from prior study and a questionable 7 mm nodule along the undersurface of the junction of the head and neck with some suspected diffusion restriction and recommended EUS to exclude a small pancreatic mass. Patient was scheduled for EUS on 8/22 however he tested positive for COVID-19 and then currently EUS with FNA is rescheduled on 9/26 to allow acute inflammatory process to resolve to reduce post procedural complications. Patient is high risk for readmission. Patient advised to strictly follow a low-fat diet at home. Also patient has few tablets of oxycodone for pain  Significant Findings, Care, Treatment and Services Provided: GI consult     Complications: nil    Condition at Discharge: fair         Discharge instructions/Information to patient and family:   See after visit summary for information provided to patient and family. Provisions for Follow-Up Care:  See after visit summary for information related to follow-up care and any pertinent home health orders. PCP: Gallito Taveras MD    Disposition: Home    Planned Readmission: No    Discharge Statement   I spent 45 minutes discharging the patient. This time was spent on the day of discharge. I had direct contact with the patient on the day of discharge. Additional documentation is required if more than 30 minutes were spent on discharge.      Discharge Medications:  See after visit summary for reconciled discharge medications provided to patient and family.

## 2023-09-01 NOTE — PLAN OF CARE
Problem: RESPIRATORY - ADULT  Goal: Achieves optimal ventilation and oxygenation  Description: INTERVENTIONS:  - Assess for changes in respiratory status  - Assess for changes in mentation and behavior  - Position to facilitate oxygenation and minimize respiratory effort  - Oxygen administered by appropriate delivery if ordered  - Initiate smoking cessation education as indicated  - Encourage broncho-pulmonary hygiene including cough, deep breathe, Incentive Spirometry  - Assess the need for suctioning and aspirate as needed  - Assess and instruct to report SOB or any respiratory difficulty  - Respiratory Therapy support as indicated  Outcome: Progressing     Problem: PAIN - ADULT  Goal: Verbalizes/displays adequate comfort level or baseline comfort level  Description: Interventions:  - Encourage patient to monitor pain and request assistance  - Assess pain using appropriate pain scale  - Administer analgesics based on type and severity of pain and evaluate response  - Implement non-pharmacological measures as appropriate and evaluate response  - Consider cultural and social influences on pain and pain management  - Notify physician/advanced practitioner if interventions unsuccessful or patient reports new pain  Outcome: Progressing     Problem: INFECTION - ADULT  Goal: Absence or prevention of progression during hospitalization  Description: INTERVENTIONS:  - Assess and monitor for signs and symptoms of infection  - Monitor lab/diagnostic results  - Monitor all insertion sites, i.e. indwelling lines, tubes, and drains  - Monitor endotracheal if appropriate and nasal secretions for changes in amount and color  - Berthold appropriate cooling/warming therapies per order  - Administer medications as ordered  - Instruct and encourage patient and family to use good hand hygiene technique  - Identify and instruct in appropriate isolation precautions for identified infection/condition  Outcome: Progressing

## 2023-09-01 NOTE — PHYSICAL THERAPY NOTE
Physical Therapy Screen    Patient Name: Dominick Castillo    DXCQF'R Date: 9/1/2023     Problem List  Principal Problem:    Acute recurrent pancreatitis  Active Problems:    HTN (hypertension), benign    (HFpEF) heart failure with preserved ejection fraction (HCC)    Type 2 diabetes mellitus with chronic kidney disease, with long-term current use of insulin ([address])    Acute-on-chronic kidney injury ([address])    COVID-19       Past Medical History  Past Medical History:   Diagnosis Date    Allergic     Arthritis     Chronic kidney disease 2021    Chronic kidney disease (CKD) stage G3a/A1, moderately decreased glomerular filtration rate (GFR) between 45-59 mL/min/1.73 square meter and albuminuria creatinine ratio less than 30 mg/g (HCC)     Colon polyp     Diabetes mellitus (HCC)     GERD (gastroesophageal reflux disease)     Heart murmur     History of echocardiogram     HL (hearing loss)     Hyperlipidemia     Hypertension     Obesity     Pancreatitis     Sleep apnea, obstructive         Past Surgical History  Past Surgical History:   Procedure Laterality Date    CARDIAC CATHETERIZATION      COLONOSCOPY  03/09/2018    COLONOSCOPY      EYE SURGERY      HEMORRHOID SURGERY      JOINT REPLACEMENT  2017    knee    OTHER SURGICAL HISTORY      Stent     CT LAPAROSCOPY SURG CHOLECYSTECTOMY N/A 6/28/2023    Procedure: CHOLECYSTECTOMY LAPAROSCOPIC;  Surgeon: Hugo Nava MD;  Location: [facility];  Service: General    UPPER GASTROINTESTINAL ENDOSCOPY             09/01/23 0949   Note Type   Note type Screen   Additional Comments PT consult received and chart reviewed. Pt admitted 8/28/2023 with abdominal pain. Pt see ambualting up/down entirety of the hallways multiple times independently without distress and performing functional activities around room. SUNDEEP Franco present to confirm pt has been independent and having no physical needs during this admission.  Pt reports no concerns at this time as he is performing at baseline for mobility and participation in ADL/self-care tasks. No skilled PT needs at this time. Will d/c patient from [address] [place] [address]. Please reconsult with any new, acute PT needs.      .sign

## 2023-09-01 NOTE — OCCUPATIONAL THERAPY NOTE
Occupational Therapy Screen Note     Patient Name: Jj Elkins  Today's Date: 9/1/2023  Problem List  Principal Problem:    Acute recurrent pancreatitis  Active Problems:    HTN (hypertension), benign    (HFpEF) heart failure with preserved ejection fraction (HCC)    Type 2 diabetes mellitus with chronic kidney disease, with long-term current use of insulin ([address])    Acute-on-chronic kidney injury ([address])    COVID-19     09/01/23 0823   OT Last Visit   OT Visit Date 09/01/23   Note Type   Note type Screen   Additional Comments OT consult received and chart reviewed. Pt admitted 8/28/2023 with abdominal pain. Made contact with patient who reports ambulating independently and completing self-care tasks without assistance during hospitalization. RN Macy Rucker present to confirm. Pt reports no concerns at this time as he is performing at baseline for mobility and participation in ADL/self-care tasks. No skilled OT needs at this time. Will d/c patient from [address] [place] [address]. Please reconsult with any new, acute OT needs.      Martha Grimes, [address] [facility] OTR/L   [place] Licensure# [id number]

## 2023-09-05 ENCOUNTER — TRANSITIONAL CARE MANAGEMENT (OUTPATIENT)
Dept: FAMILY MEDICINE CLINIC | Facility: CLINIC | Age: 83
End: 2023-09-05

## 2023-09-05 NOTE — TELEPHONE ENCOUNTER
Called and informed patient that Dr. Catherine Mays approved him holding the plavix 5 days prior to his 8/22 EUS.
Procedure: EUS  Scheduled date of procedure (as of today):08/22/23  Physician performing procedure:Dr. Suhail Pelaez  Location of procedure:  Instructions reviewed with patient by: ti  Clearances: Dr. Anish Carson to hold Plavix 5 days. Will f/u next week if not received back.
toothache

## 2023-09-07 NOTE — TELEPHONE ENCOUNTER
Pt called me back and he informed that all of his questions were answered by Dr. Tae Gonzalez yesterday.

## 2023-09-20 ENCOUNTER — ESTABLISHED COMPREHENSIVE EXAM (OUTPATIENT)
Dept: URBAN - METROPOLITAN AREA CLINIC 6 | Facility: CLINIC | Age: 83
End: 2023-09-20

## 2023-09-20 DIAGNOSIS — E11.3291: ICD-10-CM

## 2023-09-20 DIAGNOSIS — H35.363: ICD-10-CM

## 2023-09-20 DIAGNOSIS — E11.3212: ICD-10-CM

## 2023-09-20 DIAGNOSIS — H04.123: ICD-10-CM

## 2023-09-20 DIAGNOSIS — H43.811: ICD-10-CM

## 2023-09-20 LAB
LEFT EYE DIABETIC RETINOPATHY: POSITIVE
RIGHT EYE DIABETIC RETINOPATHY: POSITIVE

## 2023-09-20 PROCEDURE — 92014 COMPRE OPH EXAM EST PT 1/>: CPT

## 2023-09-20 PROCEDURE — 92134 CPTRZ OPH DX IMG PST SGM RTA: CPT

## 2023-09-20 ASSESSMENT — VISUAL ACUITY
OS_CC: 20/50
OD_CC: 20/40

## 2023-09-20 ASSESSMENT — TONOMETRY
OS_IOP_MMHG: 5
OD_IOP_MMHG: 7

## 2023-09-22 ENCOUNTER — TELEPHONE (OUTPATIENT)
Dept: GASTROENTEROLOGY | Facility: CLINIC | Age: 83
End: 2023-09-22

## 2023-09-22 NOTE — TELEPHONE ENCOUNTER
Spoke to pt confirming pt's EUS scheduled on 9/26/23 at [facility] with Dr. Luz Maria Ortega. Informed Eh would be calling the day prior with the arrival time. Pt is holding his Plavix and is aware of instructions. Pt did not have any questions.

## 2023-09-23 ENCOUNTER — OFFICE VISIT (OUTPATIENT)
Dept: URGENT CARE | Facility: CLINIC | Age: 83
End: 2023-09-23
Payer: MEDICARE

## 2023-09-23 VITALS
SYSTOLIC BLOOD PRESSURE: 154 MMHG | TEMPERATURE: 97.7 F | WEIGHT: 228 LBS | HEART RATE: 98 BPM | OXYGEN SATURATION: 98 % | HEIGHT: 66 IN | RESPIRATION RATE: 18 BRPM | DIASTOLIC BLOOD PRESSURE: 70 MMHG | BODY MASS INDEX: 36.64 KG/M2

## 2023-09-23 DIAGNOSIS — L23.9 ALLERGIC CONTACT DERMATITIS, UNSPECIFIED TRIGGER: Primary | ICD-10-CM

## 2023-09-23 PROCEDURE — G0463 HOSPITAL OUTPT CLINIC VISIT: HCPCS | Performed by: NURSE PRACTITIONER

## 2023-09-23 PROCEDURE — 99213 OFFICE O/P EST LOW 20 MIN: CPT | Performed by: NURSE PRACTITIONER

## 2023-09-23 RX ORDER — TRIAMCINOLONE ACETONIDE 1 MG/G
CREAM TOPICAL 2 TIMES DAILY
Qty: 80 G | Refills: 0 | Status: SHIPPED | OUTPATIENT
Start: 2023-09-23

## 2023-09-23 NOTE — PATIENT INSTRUCTIONS
--Apply Rx topical steroid as prescribed sparingly to affected areas. Do not use for more than 2 weeks continuously. --Consider also OTC Sarna lotion  --Avoid known triggers  --Follow-up with PCP if no resolution/worsening over the next 2 weeks. Consider dermatologist referral if needed. The patient is a 82y Male complaining of

## 2023-09-23 NOTE — PROGRESS NOTES
[facility]        NAME: Jose Luis Davies is a 82 y.o. male  : 1940    MRN: [id number]  DATE: 2023  TIME: 8:39 AM    Assessment and Plan   Allergic contact dermatitis, unspecified trigger [L23.9]  1. Allergic contact dermatitis, unspecified trigger  triamcinolone (KENALOG) 0.1 % cream            Patient Instructions     --Apply Rx topical steroid as prescribed sparingly to affected areas. Do not use for more than 2 weeks continuously. --Consider also OTC Sarna lotion  --Avoid known triggers  --Follow-up with PCP if no resolution/worsening over the next 2 weeks. Consider dermatologist referral if needed. Chief Complaint     Chief Complaint   Patient presents with   • Rash     Bilaterally on arms and legs for the last month. History of Present Illness       Here with complaints of mildly itchy rash on ankles and arms x 1 month. Started after using compression stockings on legs while in hospital a month ago for pancreatitis. No other known triggers including soaps, detergents, other. Tried [place] cream, but not helping. No rash/itching elsewhere including face, trunk, hands. Review of Systems   Review of Systems   Constitutional: Negative for fever. Skin: Positive for rash.          Current Medications       Current Outpatient Medications:   •  triamcinolone (KENALOG) 0.1 % cream, Apply topically 2 (two) times a day, Disp: 80 g, Rfl: 0  •  BD Pen Needle Pascale U/F 32G X 4 MM MISC, USE AS INSTRUCTED WITH INSULIN PEN, Disp: , Rfl:   •  cloNIDine (CATAPRES) 0.1 mg tablet, Take 1 tablet (0.1 mg total) by mouth every 12 (twelve) hours, Disp: , Rfl:   •  clopidogrel (PLAVIX) 75 mg tablet, Take 1 tablet (75 mg total) by mouth daily Instructed to stop prior to Surgery-Last dose 23 Do not start before 2023., Disp: , Rfl: 0  •  Fexofenadine HCl (MUCINEX ALLERGY PO), Take by mouth, Disp: , Rfl:   •  fluticasone (FLONASE) 50 mcg/act nasal spray, 2 sprays into each nostril daily, Disp: 48 mL, Rfl: 0  •  insulin aspart (NovoLOG FlexPen) 100 UNIT/ML injection pen, Inject under the skin 3 (three) times a day with meals Sliding scale as ordered TID with meals, Disp: , Rfl:   •  ipratropium (ATROVENT) 0.03 % nasal spray, 2 sprays into each nostril every 12 (twelve) hours, Disp: 30 mL, Rfl: 2  •  Lancets (OneTouch Delica Plus [id number]) MISC, TEST GLUCOSE 3 4 TIMES/DAY, Disp: , Rfl:   •  Levemir FlexTouch 100 units/mL injection pen, 36 Units daily In AM, Disp: , Rfl:   •  lisinopril (ZESTRIL) 40 mg tablet, Take 1 tablet (40 mg total) by mouth daily, Disp: 90 tablet, Rfl: 1  •  metoprolol succinate (TOPROL-XL) 25 mg 24 hr tablet, Take 1 tablet (25 mg total) by mouth daily, Disp: 90 tablet, Rfl: 0  •  Multiple Vitamins-Minerals (MULTIVITAMIN ADULT PO), Take by mouth daily, Disp: , Rfl:   •  omeprazole (PriLOSEC) 40 MG capsule, Take 1 capsule (40 mg total) by mouth daily (Patient taking differently: Take 40 mg by mouth daily before breakfast), Disp: 180 capsule, Rfl: 0  •  ondansetron (ZOFRAN) 4 mg tablet, Take 1 tablet (4 mg total) by mouth every 8 (eight) hours as needed for nausea or vomiting, Disp: 20 tablet, Rfl: 0  •  pyridoxine (VITAMIN B6) 100 mg tablet, Take 100 mg by mouth daily, Disp: , Rfl:   •  ranolazine (RANEXA) 500 mg 12 hr tablet, Take 1 tablet (500 mg total) by mouth 2 (two) times a day, Disp: 180 tablet, Rfl: 0  •  tamsulosin (FLOMAX) 0.4 mg, Take 2 capsules (0.8 mg total) by mouth daily, Disp: 180 capsule, Rfl: 1  •  torsemide (DEMADEX) 10 mg tablet, Take 10 mg by mouth, Disp: , Rfl:     Current Allergies     Allergies as of 09/23/2023 - Reviewed 09/23/2023   Allergen Reaction Noted   • Januvia [sitagliptin] Other (See Comments) 05/17/2022   • Semaglutide Other (See Comments) 06/28/2023   • Simvastatin Myalgia 61/06/7804   • Trulicity [dulaglutide] Other (See Comments) 06/21/2023   • Wound dressing adhesive Other (See Comments) 07/12/2022 The following portions of the patient's history were reviewed and updated as appropriate: allergies, current medications, past family history, past medical history, past social history, past surgical history and problem list.     Past Medical History:   Diagnosis Date   • Allergic    • Arthritis    • Chronic kidney disease 2021   • Chronic kidney disease (CKD) stage G3a/A1, moderately decreased glomerular filtration rate (GFR) between 45-59 mL/min/1.73 square meter and albuminuria creatinine ratio less than 30 mg/g (HCC)    • Colon polyp    • Diabetes mellitus ([address])    • GERD (gastroesophageal reflux disease)    • Heart murmur    • History of echocardiogram    • HL (hearing loss)    • Hyperlipidemia    • Hypertension    • Obesity    • Pancreatitis    • Sleep apnea, obstructive        Past Surgical History:   Procedure Laterality Date   • CARDIAC CATHETERIZATION     • COLONOSCOPY  03/09/2018   • COLONOSCOPY     • EYE SURGERY     • HEMORRHOID SURGERY     • JOINT REPLACEMENT  2017    knee   • OTHER SURGICAL HISTORY      Stent    • [facility] LAPAROSCOPY SURG CHOLECYSTECTOMY N/A 6/28/2023    Procedure: CHOLECYSTECTOMY LAPAROSCOPIC;  Surgeon: Reji Esteves MD;  Location:  MAIN OR;  Service: General   • UPPER GASTROINTESTINAL ENDOSCOPY         Family History   Problem Relation Age of Onset   • Heart disease Mother    • Heart attack Mother         46s   • Cancer Mother    • Coronary artery disease Mother    • Cancer Brother         Malignant tumor of lung         Medications have been verified. Objective   /70   Pulse 98   Temp 97.7 °F (36.5 °C)   Resp 18   Ht 5' 6" (1.676 m)   Wt 103 kg (228 lb)   SpO2 98%   BMI 36.80 kg/m²   No LMP for male patient. Physical Exam     Physical Exam  Pulmonary:      Effort: Pulmonary effort is normal.   Skin:     Findings: Rash present. Comments: Ankles and forearms with very faint, pink, maculopapular rash.   Numerous lentigines and other age/diabetes related skin changes in these areas, in addition. Neurological:      Mental Status: He is alert.    Psychiatric:         Mood and Affect: Mood normal.

## 2023-09-26 ENCOUNTER — TELEPHONE (OUTPATIENT)
Age: 83
End: 2023-09-26

## 2023-09-26 ENCOUNTER — ANESTHESIA EVENT (OUTPATIENT)
Dept: GASTROENTEROLOGY | Facility: HOSPITAL | Age: 83
End: 2023-09-26

## 2023-09-26 ENCOUNTER — ANESTHESIA (OUTPATIENT)
Dept: GASTROENTEROLOGY | Facility: HOSPITAL | Age: 83
End: 2023-09-26

## 2023-09-26 ENCOUNTER — HOSPITAL ENCOUNTER (OUTPATIENT)
Dept: GASTROENTEROLOGY | Facility: HOSPITAL | Age: 83
Setting detail: OUTPATIENT SURGERY
Discharge: HOME | End: 2023-09-26
Payer: MEDICARE

## 2023-09-26 VITALS
SYSTOLIC BLOOD PRESSURE: 153 MMHG | WEIGHT: 231.6 LBS | RESPIRATION RATE: 16 BRPM | TEMPERATURE: 97.5 F | HEIGHT: 66 IN | OXYGEN SATURATION: 99 % | BODY MASS INDEX: 37.22 KG/M2 | DIASTOLIC BLOOD PRESSURE: 69 MMHG | HEART RATE: 55 BPM

## 2023-09-26 DIAGNOSIS — Q45.3 PANCREATIC DUCTAL ABNORMALITY: ICD-10-CM

## 2023-09-26 DIAGNOSIS — K85.90 ACUTE RECURRENT PANCREATITIS: ICD-10-CM

## 2023-09-26 LAB
ATRIAL RATE: 50 BPM
GLUCOSE SERPL-MCNC: 208 MG/DL (ref 65–140)
GLUCOSE SERPL-MCNC: 212 MG/DL (ref 65–140)
P AXIS: 39 DEGREES
PR INTERVAL: 249 MS
QRS AXIS: -57 DEGREES
QRSD INTERVAL: 158 MS
QT INTERVAL: 486 MS
QTC INTERVAL: 444 MS
T WAVE AXIS: 13 DEGREES
VENTRICULAR RATE: 50 BPM

## 2023-09-26 PROCEDURE — 93010 ELECTROCARDIOGRAM REPORT: CPT | Performed by: INTERNAL MEDICINE

## 2023-09-26 PROCEDURE — 88112 CYTOPATH CELL ENHANCE TECH: CPT | Performed by: PATHOLOGY

## 2023-09-26 PROCEDURE — 88305 TISSUE EXAM BY PATHOLOGIST: CPT | Performed by: PATHOLOGY

## 2023-09-26 PROCEDURE — 43242 EGD US FINE NEEDLE BX/ASPIR: CPT | Performed by: INTERNAL MEDICINE

## 2023-09-26 PROCEDURE — 43239 EGD BIOPSY SINGLE/MULTIPLE: CPT | Performed by: INTERNAL MEDICINE

## 2023-09-26 PROCEDURE — 82948 REAGENT STRIP/BLOOD GLUCOSE: CPT

## 2023-09-26 PROCEDURE — 88305 TISSUE EXAM BY PATHOLOGIST: CPT | Performed by: STUDENT IN AN ORGANIZED HEALTH CARE EDUCATION/TRAINING PROGRAM

## 2023-09-26 PROCEDURE — 93005 ELECTROCARDIOGRAM TRACING: CPT

## 2023-09-26 RX ORDER — FENTANYL CITRATE 50 UG/ML
INJECTION, SOLUTION INTRAMUSCULAR; INTRAVENOUS AS NEEDED
Status: DISCONTINUED | OUTPATIENT
Start: 2023-09-26 | End: 2023-09-26

## 2023-09-26 RX ORDER — PROPOFOL 10 MG/ML
INJECTION, EMULSION INTRAVENOUS CONTINUOUS PRN
Status: DISCONTINUED | OUTPATIENT
Start: 2023-09-26 | End: 2023-09-26

## 2023-09-26 RX ORDER — SODIUM CHLORIDE, SODIUM LACTATE, POTASSIUM CHLORIDE, CALCIUM CHLORIDE 600; 310; 30; 20 MG/100ML; MG/100ML; MG/100ML; MG/100ML
INJECTION, SOLUTION INTRAVENOUS CONTINUOUS PRN
Status: DISCONTINUED | OUTPATIENT
Start: 2023-09-26 | End: 2023-09-26

## 2023-09-26 RX ORDER — LIDOCAINE HYDROCHLORIDE 20 MG/ML
INJECTION, SOLUTION EPIDURAL; INFILTRATION; INTRACAUDAL; PERINEURAL AS NEEDED
Status: DISCONTINUED | OUTPATIENT
Start: 2023-09-26 | End: 2023-09-26

## 2023-09-26 RX ORDER — PROPOFOL 10 MG/ML
INJECTION, EMULSION INTRAVENOUS AS NEEDED
Status: DISCONTINUED | OUTPATIENT
Start: 2023-09-26 | End: 2023-09-26

## 2023-09-26 RX ADMIN — FENTANYL CITRATE 25 MCG: 50 INJECTION, SOLUTION INTRAMUSCULAR; INTRAVENOUS at 09:02

## 2023-09-26 RX ADMIN — PROPOFOL 50 MG: 10 INJECTION, EMULSION INTRAVENOUS at 08:36

## 2023-09-26 RX ADMIN — FENTANYL CITRATE 25 MCG: 50 INJECTION, SOLUTION INTRAMUSCULAR; INTRAVENOUS at 08:55

## 2023-09-26 RX ADMIN — TOPICAL ANESTHETIC 1 SPRAY: 200 SPRAY DENTAL; PERIODONTAL at 08:31

## 2023-09-26 RX ADMIN — PROPOFOL 20 MG: 10 INJECTION, EMULSION INTRAVENOUS at 08:56

## 2023-09-26 RX ADMIN — PROPOFOL 50 MG: 10 INJECTION, EMULSION INTRAVENOUS at 08:45

## 2023-09-26 RX ADMIN — PROPOFOL 80 MCG/KG/MIN: 10 INJECTION, EMULSION INTRAVENOUS at 09:02

## 2023-09-26 RX ADMIN — INSULIN HUMAN 5 UNITS: 100 INJECTION, SOLUTION PARENTERAL at 08:24

## 2023-09-26 RX ADMIN — PROPOFOL 20 MG: 10 INJECTION, EMULSION INTRAVENOUS at 08:38

## 2023-09-26 RX ADMIN — SODIUM CHLORIDE, SODIUM LACTATE, POTASSIUM CHLORIDE, AND CALCIUM CHLORIDE: .6; .31; .03; .02 INJECTION, SOLUTION INTRAVENOUS at 08:29

## 2023-09-26 RX ADMIN — PROPOFOL 20 MG: 10 INJECTION, EMULSION INTRAVENOUS at 08:54

## 2023-09-26 RX ADMIN — FENTANYL CITRATE 50 MCG: 50 INJECTION, SOLUTION INTRAMUSCULAR; INTRAVENOUS at 08:31

## 2023-09-26 RX ADMIN — PROPOFOL 20 MG: 10 INJECTION, EMULSION INTRAVENOUS at 08:52

## 2023-09-26 RX ADMIN — PROPOFOL 20 MG: 10 INJECTION, EMULSION INTRAVENOUS at 08:48

## 2023-09-26 RX ADMIN — PROPOFOL 20 MG: 10 INJECTION, EMULSION INTRAVENOUS at 08:42

## 2023-09-26 RX ADMIN — PROPOFOL 20 MG: 10 INJECTION, EMULSION INTRAVENOUS at 08:40

## 2023-09-26 RX ADMIN — PROPOFOL 20 MG: 10 INJECTION, EMULSION INTRAVENOUS at 08:50

## 2023-09-26 RX ADMIN — LIDOCAINE HYDROCHLORIDE 60 MG: 20 INJECTION, SOLUTION EPIDURAL; INFILTRATION; INTRACAUDAL; PERINEURAL at 08:36

## 2023-09-26 RX ADMIN — PROPOFOL 20 MG: 10 INJECTION, EMULSION INTRAVENOUS at 09:02

## 2023-09-26 RX ADMIN — PROPOFOL 20 MG: 10 INJECTION, EMULSION INTRAVENOUS at 08:58

## 2023-09-26 NOTE — H&P
History and Physical - [facility]  Pura Harman 82 y.o. male MRN: [id number]                  HPI: Pura Harman is a 82y.o. 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     Historical Information   Past Medical History:   Diagnosis Date   • Allergic    • Arthritis    • Chronic kidney disease    • Chronic kidney disease (CKD) stage G3a/A1, moderately decreased glomerular filtration rate (GFR) between 45-59 mL/min/1.73 square meter and albuminuria creatinine ratio less than 30 mg/g (HCC)    • Colon polyp    • Diabetes mellitus (HCC)    • GERD (gastroesophageal reflux disease)    • Heart murmur    • History of echocardiogram    • HL (hearing loss)    • Hyperlipidemia    • Hypertension    • Obesity    • Pancreatitis    • Sleep apnea, obstructive      Past Surgical History:   Procedure Laterality Date   • CARDIAC CATHETERIZATION     • CHOLECYSTECTOMY     • COLONOSCOPY  2018   • COLONOSCOPY     • EYE SURGERY     • HEMORRHOID SURGERY     • JOINT REPLACEMENT      knee   • OTHER SURGICAL HISTORY      Stent    • [place] LAPAROSCOPY SURG CHOLECYSTECTOMY N/A 2023    Procedure: CHOLECYSTECTOMY LAPAROSCOPIC;  Surgeon: John Regan MD;  Location: [facility];  Service: General   • UPPER GASTROINTESTINAL ENDOSCOPY       Social History   Social History     Substance and Sexual Activity   Alcohol Use Not Currently   • Alcohol/week: 5.0 standard drinks of alcohol   • Types: 5 Glasses of wine per week    Comment: no alcohol since March     Social History     Substance and Sexual Activity   Drug Use Never     Social History     Tobacco Use   Smoking Status Former   • Packs/day: 2.00   • Years: 15.00   • Total pack years: 30.00   • Types: Cigarettes, Pipe, Cigars   • Quit date: 1972   • Years since quittin.9   • Passive exposure: Past   Smokeless Tobacco Never     Family History   Problem Relation Age of Onset   • Heart disease Mother    • Heart attack Mother         46s   • Cancer Mother    • Coronary artery disease Mother    • Cancer Brother         Malignant tumor of lung       Meds/Allergies     (Not in a hospital admission)      Allergies   Allergen Reactions   • Januvia [Sitagliptin] Other (See Comments)     pancreatitis   • Semaglutide Other (See Comments)     pancreatits   • Simvastatin Myalgia   • Trulicity [Dulaglutide] Other (See Comments)     Pancreatitis   • Wound Dressing Adhesive Other (See Comments)     Burning sensation       Objective     /71   Pulse 65   Temp (!) 97 °F (36.1 °C) (Temporal)   Resp 13   Ht 5' 6" (1.676 m)   Wt 105 kg (231 lb 9.6 oz)   SpO2 96%   BMI 37.38 kg/m²       PHYSICAL EXAM    Gen: NAD  CV: RRR  CHEST: Clear  ABD: soft, NT/ND  EXT: no edema      ASSESSMENT/PLAN:  This is a 82y.o. year old male here for EUS EGD, and he is stable and optimized for his procedure.

## 2023-09-26 NOTE — ANESTHESIA POSTPROCEDURE EVALUATION
Post-Op Assessment Note    CV Status:  Stable    Pain management: adequate     Mental Status:  Sleepy and lethargic   Hydration Status:  Stable   PONV Controlled:  None   Airway Patency:  Patent      Post Op Vitals Reviewed: Yes      Staff: CRNA         No notable events documented.     BP      Temp     Pulse     Resp      SpO2

## 2023-09-26 NOTE — ANESTHESIA PREPROCEDURE EVALUATION
Procedure:  ENDOSCOPIC ULTRASOUND (UPPER)    Relevant Problems   CARDIO   (+) Coronary artery disease involving native coronary artery without angina pectoris   (+) HTN (hypertension), benign   (+) Mixed hyperlipidemia   (+) Moderate aortic stenosis      ENDO   (+) Type 2 diabetes mellitus with chronic kidney disease, with long-term current use of insulin (HCC)      GI/HEPATIC   (+) Acute recurrent pancreatitis   (+) GERD without esophagitis      /RENAL   (+) Acute-on-chronic kidney injury    (+) Stage 3b chronic kidney disease (CKD) (HCC)      PULMONARY   (+) PAULA (obstructive sleep apnea)        Physical Exam    Airway    Mallampati score: III  TM Distance: >3 FB  Neck ROM: full     Dental   upper dentures,     Cardiovascular  Murmur, Cardiovascular exam normal    Pulmonary  Pulmonary exam normal     Other Findings    This result has an attachment that is not available. •  Left Ventricle: There is mild concentric hypertrophy. Systolic function   is normal with an ejection fraction of 60-65%. •  Left Atrium: Left atrium cavity is moderately dilated. •  Aortic Valve: The aortic valve is trileaflet. The leaflets are   calcified. There is mild regurgitation. There is moderate stenosis. •  Tricuspid Valve: There is trace regurgitation. •  Pulmonic Valve: There is mild regurgitation. •  Ascending Aorta: The aortic root is mildly dilated. Anesthesia Plan  ASA Score- 3     Anesthesia Type- IV sedation with anesthesia with ASA Monitors. Additional Monitors:   Airway Plan:           Plan Factors-Exercise tolerance (METS): >4 METS. Chart reviewed. Patient summary reviewed. Patient is not a current smoker. Induction- intravenous. Postoperative Plan-     Informed Consent- Anesthetic plan and risks discussed with patient. I personally reviewed this patient with the CRNA. Discussed and agreed on the Anesthesia Plan with the CRNA. Shawn Speaker

## 2023-09-26 NOTE — ANESTHESIA POSTPROCEDURE EVALUATION
Post-Op Assessment Note    No notable events documented. BP      Temp      Pulse     Resp      SpO2          After procedure patient noted to have irregular rhythm on monitor. Upon examination of EKG strip, appeared to have occasional p waves without QRS complexes following, resembling Mobitz type 2 heart block. 12 lead EKG was then performed however by that time the irregularity was not seen anymore on EKG nor the monitor and pt was in sinus rhythm. Pt asymptomatic throughout.  Advised to follow up with his cardiologist.

## 2023-09-26 NOTE — TELEPHONE ENCOUNTER
Patient called and stated he had his pancreas procedure and everything seemed to be ok. He also went to [facility] on 9/20 and requested for the progress note to be faxed to the office. Patient is scheduled to see Dr. Jeff Roldan on 10/5.

## 2023-09-28 PROCEDURE — 88305 TISSUE EXAM BY PATHOLOGIST: CPT | Performed by: PATHOLOGY

## 2023-09-28 PROCEDURE — 88112 CYTOPATH CELL ENHANCE TECH: CPT | Performed by: PATHOLOGY

## 2023-09-28 PROCEDURE — 88305 TISSUE EXAM BY PATHOLOGIST: CPT | Performed by: STUDENT IN AN ORGANIZED HEALTH CARE EDUCATION/TRAINING PROGRAM

## 2023-10-05 ENCOUNTER — OFFICE VISIT (OUTPATIENT)
Dept: FAMILY MEDICINE CLINIC | Facility: CLINIC | Age: 83
End: 2023-10-05
Payer: MEDICARE

## 2023-10-05 ENCOUNTER — NURSE TRIAGE (OUTPATIENT)
Age: 83
End: 2023-10-05

## 2023-10-05 VITALS
WEIGHT: 238.2 LBS | BODY MASS INDEX: 38.28 KG/M2 | TEMPERATURE: 98.5 F | DIASTOLIC BLOOD PRESSURE: 70 MMHG | HEIGHT: 66 IN | RESPIRATION RATE: 16 BRPM | SYSTOLIC BLOOD PRESSURE: 138 MMHG | OXYGEN SATURATION: 98 % | HEART RATE: 64 BPM

## 2023-10-05 DIAGNOSIS — I10 HTN (HYPERTENSION), BENIGN: ICD-10-CM

## 2023-10-05 DIAGNOSIS — I25.10 CORONARY ARTERY DISEASE INVOLVING NATIVE CORONARY ARTERY OF NATIVE HEART WITHOUT ANGINA PECTORIS: ICD-10-CM

## 2023-10-05 DIAGNOSIS — N18.32 TYPE 2 DIABETES MELLITUS WITH STAGE 3B CHRONIC KIDNEY DISEASE, WITH LONG-TERM CURRENT USE OF INSULIN (HCC): Primary | ICD-10-CM

## 2023-10-05 DIAGNOSIS — E78.2 MIXED HYPERLIPIDEMIA: ICD-10-CM

## 2023-10-05 DIAGNOSIS — I25.10 CORONARY ARTERY DISEASE WITHOUT ANGINA PECTORIS, UNSPECIFIED VESSEL OR LESION TYPE, UNSPECIFIED WHETHER NATIVE OR TRANSPLANTED HEART: ICD-10-CM

## 2023-10-05 DIAGNOSIS — Z23 NEED FOR INFLUENZA VACCINATION: ICD-10-CM

## 2023-10-05 DIAGNOSIS — E11.22 TYPE 2 DIABETES MELLITUS WITH STAGE 3B CHRONIC KIDNEY DISEASE, WITH LONG-TERM CURRENT USE OF INSULIN (HCC): Primary | ICD-10-CM

## 2023-10-05 DIAGNOSIS — G47.33 OSA (OBSTRUCTIVE SLEEP APNEA): ICD-10-CM

## 2023-10-05 DIAGNOSIS — I50.32 CHRONIC HEART FAILURE WITH PRESERVED EJECTION FRACTION (HCC): ICD-10-CM

## 2023-10-05 DIAGNOSIS — Z79.4 TYPE 2 DIABETES MELLITUS WITH STAGE 3B CHRONIC KIDNEY DISEASE, WITH LONG-TERM CURRENT USE OF INSULIN (HCC): Primary | ICD-10-CM

## 2023-10-05 PROBLEM — K85.90 ACUTE RECURRENT PANCREATITIS: Status: RESOLVED | Noted: 2023-06-09 | Resolved: 2023-10-05

## 2023-10-05 PROBLEM — N18.9 ACUTE-ON-CHRONIC KIDNEY INJURY: Status: RESOLVED | Noted: 2023-08-18 | Resolved: 2023-10-05

## 2023-10-05 PROBLEM — R10.13 EPIGASTRIC PAIN: Status: RESOLVED | Noted: 2023-05-16 | Resolved: 2023-10-05

## 2023-10-05 PROBLEM — N17.9 ACUTE-ON-CHRONIC KIDNEY INJURY: Status: RESOLVED | Noted: 2023-08-18 | Resolved: 2023-10-05

## 2023-10-05 PROBLEM — U07.1 COVID-19: Status: RESOLVED | Noted: 2023-08-21 | Resolved: 2023-10-05

## 2023-10-05 PROBLEM — M79.89 LEFT ARM SWELLING: Status: RESOLVED | Noted: 2023-06-11 | Resolved: 2023-10-05

## 2023-10-05 PROBLEM — D69.6 PLATELETS DECREASED (HCC): Status: RESOLVED | Noted: 2023-06-16 | Resolved: 2023-10-05

## 2023-10-05 PROCEDURE — 99214 OFFICE O/P EST MOD 30 MIN: CPT

## 2023-10-05 PROCEDURE — G0008 ADMIN INFLUENZA VIRUS VAC: HCPCS

## 2023-10-05 PROCEDURE — 90662 IIV NO PRSV INCREASED AG IM: CPT

## 2023-10-05 RX ORDER — TORSEMIDE 10 MG/1
10 TABLET ORAL DAILY
Qty: 90 TABLET | Refills: 0 | Status: SHIPPED | OUTPATIENT
Start: 2023-10-05

## 2023-10-05 RX ORDER — CLOPIDOGREL BISULFATE 75 MG/1
75 TABLET ORAL DAILY
Qty: 90 TABLET | Refills: 0 | Status: SHIPPED | OUTPATIENT
Start: 2023-10-05

## 2023-10-05 NOTE — PROGRESS NOTES
Assessment/Plan:         Problem List Items Addressed This Visit        Endocrine    Type 2 diabetes mellitus with chronic kidney disease, with long-term current use of insulin ([facility]) - Primary    Relevant Medications    torsemide (DEMADEX) 10 mg tablet       Respiratory    PAULA (obstructive sleep apnea)       Cardiovascular and Mediastinum    HTN (hypertension), benign    Relevant Medications    torsemide (DEMADEX) 10 mg tablet    (HFpEF) heart failure with preserved ejection fraction (HCC)    Relevant Medications    clopidogrel (PLAVIX) 75 mg tablet    torsemide (DEMADEX) 10 mg tablet    Coronary artery disease involving native coronary artery without angina pectoris    Relevant Medications    clopidogrel (PLAVIX) 75 mg tablet       Other    Mixed hyperlipidemia   Other Visit Diagnoses     Coronary artery disease without angina pectoris, unspecified vessel or lesion type, unspecified whether native or transplanted heart        Relevant Medications    clopidogrel (PLAVIX) 75 mg tablet            Subjective:      Patient ID: Jorge L Kevin is a 82 y.o. male. Patient here for review of chronic medical problems and  the labs and imaging if it is applicable. Currently has no specific complaints other than mentioned in the review of systems  Denies chest pain, SOB, cough, abdominal pain, nausea, vomiting, fever, chills, lightheadedness, dizziness,headache, tingling or numbness. No bowel or bladder problem.         The following portions of the patient's history were reviewed and updated as appropriate:   Past Medical History:  He has a past medical history of Allergic, Arthritis, Chronic kidney disease (2021), Chronic kidney disease (CKD) stage G3a/A1, moderately decreased glomerular filtration rate (GFR) between 45-59 mL/min/1.73 square meter and albuminuria creatinine ratio less than 30 mg/g ([facility]), Colon polyp, Diabetes mellitus ([facility]), GERD (gastroesophageal reflux disease), Heart murmur, History of echocardiogram, HL (hearing loss), Hyperlipidemia, Hypertension, Obesity, Pancreatitis, and Sleep apnea, obstructive. ,  _______________________________________________________________________  Medical Problems:  does not have any pertinent problems on file.,  _______________________________________________________________________  Past Surgical History:   has a past surgical history that includes Cardiac catheterization; Other surgical history; Joint replacement (2017); Colonoscopy (03/09/2018); Eye surgery; Colonoscopy; Upper gastrointestinal endoscopy; Hemorrhoid surgery; pr laparoscopy surg cholecystectomy (N/A, 06/28/2023); and Cholecystectomy. ,  _______________________________________________________________________  Family History:  family history includes Cancer in his brother and mother; Coronary artery disease in his mother; Heart attack in his mother; Heart disease in his mother.,  _______________________________________________________________________  Social History:   reports that he quit smoking about 50 years ago. His smoking use included cigarettes, pipe, and cigars. He has a 30.00 pack-year smoking history. He has been exposed to tobacco smoke. He has never used smokeless tobacco. He reports that he does not currently use alcohol after a past usage of about 5.0 standard drinks of alcohol per week. He reports that he does not use drugs. ,  _______________________________________________________________________  Allergies:  is allergic to [place] and [place] [sitagliptin], semaglutide, simvastatin, trulicity [dulaglutide], and wound dressing adhesive. .  _______________________________________________________________________  Current Outpatient Medications   Medication Sig Dispense Refill   • BD Pen Needle Pascale U/F 32G X 4 MM MISC USE AS INSTRUCTED WITH INSULIN PEN     • cloNIDine (CATAPRES) 0.1 mg tablet Take 1 tablet (0.1 mg total) by mouth every 12 (twelve) hours     • clopidogrel (PLAVIX) 75 mg tablet Take 1 tablet (75 mg total) by mouth daily Instructed to stop prior to Surgery-Last dose 6/21/23 90 tablet 0   • Fexofenadine HCl (MUCINEX ALLERGY PO) Take by mouth     • fluticasone (FLONASE) 50 mcg/act nasal spray 2 sprays into each nostril daily 48 mL 0   • insulin aspart (NovoLOG FlexPen) 100 UNIT/ML injection pen Inject under the skin 3 (three) times a day with meals Sliding scale as ordered TID with meals     • ipratropium (ATROVENT) 0.03 % nasal spray 2 sprays into each nostril every 12 (twelve) hours 30 mL 2   • Lancets (OneTouch Delica Plus [id number]) MISC TEST GLUCOSE 3 4 TIMES/DAY     • Levemir FlexTouch 100 units/mL injection pen 36 Units daily In AM     • lisinopril (ZESTRIL) 40 mg tablet Take 1 tablet (40 mg total) by mouth daily 90 tablet 1   • metoprolol succinate (TOPROL-XL) 25 mg 24 hr tablet Take 1 tablet (25 mg total) by mouth daily 90 tablet 0   • Multiple Vitamins-Minerals (MULTIVITAMIN ADULT PO) Take by mouth daily     • omeprazole (PriLOSEC) 40 MG capsule Take 1 capsule (40 mg total) by mouth daily (Patient taking differently: Take 40 mg by mouth daily before breakfast) 180 capsule 0   • pyridoxine (VITAMIN B6) 100 mg tablet Take 100 mg by mouth daily     • ranolazine (RANEXA) 500 mg 12 hr tablet Take 1 tablet (500 mg total) by mouth 2 (two) times a day 180 tablet 0   • tamsulosin (FLOMAX) 0.4 mg Take 2 capsules (0.8 mg total) by mouth daily 180 capsule 1   • torsemide (DEMADEX) 10 mg tablet Take 1 tablet (10 mg total) by mouth daily 90 tablet 0   • triamcinolone (KENALOG) 0.1 % cream Apply topically 2 (two) times a day 80 g 0   • ondansetron (ZOFRAN) 4 mg tablet Take 1 tablet (4 mg total) by mouth every 8 (eight) hours as needed for nausea or vomiting (Patient not taking: Reported on 10/5/2023) 20 tablet 0     No current facility-administered medications for this visit.     _______________________________________________________________________  Review of Systems   Constitutional: Negative for chills and fever. HENT: Negative for congestion, ear pain, postnasal drip and sore throat. Eyes: Negative for pain and visual disturbance. Respiratory: Negative for cough and shortness of breath. Cardiovascular: Negative for chest pain and palpitations. Gastrointestinal: Negative for abdominal pain, nausea and vomiting. Endocrine: Negative for cold intolerance, heat intolerance, polydipsia, polyphagia and polyuria. Genitourinary: Negative for difficulty urinating, dysuria, frequency and hematuria. Musculoskeletal: Negative for arthralgias and back pain. Skin: Negative for color change and rash. Neurological: Negative for dizziness, seizures, syncope, light-headedness and headaches. Psychiatric/Behavioral: Negative for agitation and behavioral problems. All other systems reviewed and are negative. Objective:  Vitals:    10/05/23 1337   BP: 138/70   BP Location: Right arm   Patient Position: Sitting   Cuff Size: Standard   Pulse: 64   Resp: 16   Temp: 98.5 °F (36.9 °C)   TempSrc: Tympanic   SpO2: 98%   Weight: 108 kg (238 lb 3.2 oz)   Height: 5' 6" (1.676 m)     Body mass index is 38.45 kg/m². Physical Exam  Vitals and nursing note reviewed. Constitutional:       General: He is not in acute distress. Appearance: Normal appearance. He is obese. He is not ill-appearing, toxic-appearing or diaphoretic. HENT:      Head: Normocephalic and atraumatic. Right Ear: Tympanic membrane and ear canal normal.      Left Ear: Tympanic membrane and ear canal normal.      Nose: Nose normal. No congestion. Mouth/Throat:      Mouth: Mucous membranes are moist.   Eyes:      General: No scleral icterus. Right eye: No discharge. Left eye: No discharge. Extraocular Movements: Extraocular movements intact. Conjunctiva/sclera: Conjunctivae normal.      Pupils: Pupils are equal, round, and reactive to light. Cardiovascular:      Rate and Rhythm: Normal rate and regular rhythm. Pulses: no weak pulses          Dorsalis pedis pulses are 1+ on the right side and 1+ on the left side. Posterior tibial pulses are 1+ on the right side and 1+ on the left side. Heart sounds: Normal heart sounds. No murmur heard. No gallop. Pulmonary:      Effort: Pulmonary effort is normal. No respiratory distress. Breath sounds: Normal breath sounds. No wheezing, rhonchi or rales. Abdominal:      General: Abdomen is flat. Bowel sounds are normal. There is no distension. Palpations: Abdomen is soft. Tenderness: There is no abdominal tenderness. There is no right CVA tenderness, left CVA tenderness or guarding. Musculoskeletal:         General: No swelling or tenderness. Normal range of motion. Cervical back: Normal range of motion and neck supple. No rigidity. Right lower leg: Edema (1+) present. Left lower leg: Edema (1+) present. Comments: Venous stasis changes   Feet:      Right foot:      Skin integrity: Dry skin present. No ulcer, skin breakdown, erythema, warmth or callus. Left foot:      Skin integrity: Dry skin present. No ulcer, skin breakdown, erythema, warmth or callus. Lymphadenopathy:      Cervical: No cervical adenopathy. Skin:     General: Skin is warm and dry. Capillary Refill: Capillary refill takes 2 to 3 seconds. Coloration: Skin is not jaundiced. Findings: No bruising or rash. Neurological:      General: No focal deficit present. Mental Status: He is alert and oriented to person, place, and time. Mental status is at baseline. Motor: No weakness. Gait: Gait normal.   Psychiatric:         Mood and Affect: Mood normal.         Behavior: Behavior normal.       Diabetic Foot Exam    Patient's shoes and socks removed. Right Foot/Ankle   Right Foot Inspection  Skin Exam: skin normal, skin intact and dry skin.  No warmth, no callus, no erythema, no maceration, no abnormal color, no pre-ulcer, no ulcer and no callus. Toe Exam: ROM and strength within normal limits. Sensory   Vibration: intact  Proprioception: intact  Monofilament testing: intact    Vascular  Capillary refills: < 3 seconds  The right DP pulse is 1+. The right PT pulse is 1+. Left Foot/Ankle  Left Foot Inspection  Skin Exam: skin normal, skin intact and dry skin. No warmth, no erythema, no maceration, normal color, no pre-ulcer, no ulcer and no callus. Toe Exam: ROM and strength within normal limits. Sensory   Vibration: intact  Proprioception: intact  Monofilament testing: intact    Vascular  Capillary refills: < 3 seconds  The left DP pulse is 1+. The left PT pulse is 1+.      Assign Risk Category  No deformity present  No loss of protective sensation  No weak pulses  Risk: 0

## 2023-10-05 NOTE — TELEPHONE ENCOUNTER
Patient calling in to find out when stent will be placed, I scheduled OV with Dr. Nina Martinez to discuss as advised in result notes.

## 2023-10-06 ENCOUNTER — TELEPHONE (OUTPATIENT)
Dept: ADMINISTRATIVE | Facility: OTHER | Age: 83
End: 2023-10-06

## 2023-10-06 NOTE — TELEPHONE ENCOUNTER
----- Message from Zi Florian sent at 10/5/2023  1:51 PM EDT -----  Regarding: Care Gap Request  10/05/23 1:51 PM    Hello, our patient attached above has had Diabetic Eye Exam completed/performed. Please assist in updating the patient chart by making an External outreach to [address] [place] [address] located in [facility]. The date of service is 2023.     Thank you,  SIS Florian PG, RD PRIMARY CARE

## 2023-10-06 NOTE — LETTER
Diabetic Eye Exam Form    Date Requested: 10/10/23  Patient: Jose Angel Cervantes  Patient : 1940   Referring Provider: Surendra Wong MD      DIABETIC Eye Exam Date _______________________________      Type of Exam MUST be documented for Diabetic Eye Exams. Please CHECK ONE. Retinal Exam       Dilated Retinal Exam       OCT       Optomap-Iris Exam      Fundus Photography       Left Eye - Please check Retinopathy or No Retinopathy        Exam did show retinopathy    Exam did not show retinopathy       Right Eye - Please check Retinopathy or No Retinopathy       Exam did show retinopathy    Exam did not show retinopathy       Comments __________________________________________________________    Practice Providing Exam ______________________________________________    Exam Performed By (print name) _______________________________________      Provider Signature ___________________________________________________      These reports are needed for  compliance. Please fax this completed form and a copy of the Diabetic Eye Exam report to our office located at [address] [place] [address] as soon as possible via Fax [phone] attention Del Dowse: Phone [phone]  We thank you for your assistance in treating our mutual patient.

## 2023-10-06 NOTE — LETTER
Diabetic Eye Exam Form    Date Requested: 10/16/23  Patient: Sharon Torres  Patient : 1940   Referring Provider: Sumit Taveras MD      DIABETIC Eye Exam Date _______________________________      Type of Exam MUST be documented for Diabetic Eye Exams. Please CHECK ONE. Retinal Exam       Dilated Retinal Exam       OCT       Optomap-Iris Exam      Fundus Photography       Left Eye - Please check Retinopathy or No Retinopathy        Exam did show retinopathy    Exam did not show retinopathy       Right Eye - Please check Retinopathy or No Retinopathy       Exam did show retinopathy    Exam did not show retinopathy       Comments __________________________________________________________    Practice Providing Exam ______________________________________________    Exam Performed By (print name) _______________________________________      Provider Signature ___________________________________________________      These reports are needed for  compliance. Please fax this completed form and a copy of the Diabetic Eye Exam report to our office located at [address] [place] [address] as soon as possible via Fax [phone] attention Stephani Escobar: Phone [phone]  We thank you for your assistance in treating our mutual patient.

## 2023-10-10 ENCOUNTER — PATIENT MESSAGE (OUTPATIENT)
Dept: FAMILY MEDICINE CLINIC | Facility: CLINIC | Age: 83
End: 2023-10-10

## 2023-10-10 DIAGNOSIS — K21.9 GERD WITHOUT ESOPHAGITIS: Primary | ICD-10-CM

## 2023-10-10 NOTE — TELEPHONE ENCOUNTER
Upon review of the In Basket request and the patient's chart, initial outreach has been made via fax to facility. Please see Contacts section for details.      Thank you  Moe Bucio MA

## 2023-10-16 RX ORDER — PANTOPRAZOLE SODIUM 40 MG/1
40 TABLET, DELAYED RELEASE ORAL
Qty: 90 TABLET | Refills: 1 | Status: SHIPPED | OUTPATIENT
Start: 2023-10-16 | End: 2024-10-10

## 2023-10-16 NOTE — TELEPHONE ENCOUNTER
As a follow-up, a second attempt has been made for outreach via fax to facility. Please see Contacts section for details.     Thank you  Robina Kelly MA

## 2023-10-18 NOTE — TELEPHONE ENCOUNTER
Upon review of the In Basket request we were able to locate, review, and update the patient chart as requested for Diabetic Eye Exam.    Any additional questions or concerns should be emailed to the Practice Liaisons via the appropriate education email address, please do not reply via In Basket.     Thank you  Adryan Agagrwal MA

## 2023-11-02 ENCOUNTER — OFFICE VISIT (OUTPATIENT)
Dept: GASTROENTEROLOGY | Facility: CLINIC | Age: 83
End: 2023-11-02
Payer: MEDICARE

## 2023-11-02 VITALS
HEIGHT: 66 IN | BODY MASS INDEX: 38.22 KG/M2 | HEART RATE: 62 BPM | WEIGHT: 237.8 LBS | DIASTOLIC BLOOD PRESSURE: 67 MMHG | SYSTOLIC BLOOD PRESSURE: 153 MMHG

## 2023-11-02 DIAGNOSIS — Q45.3 PANCREATIC DUCTAL ABNORMALITY: ICD-10-CM

## 2023-11-02 DIAGNOSIS — K85.90 ACUTE RECURRENT PANCREATITIS: Primary | ICD-10-CM

## 2023-11-02 PROCEDURE — 99214 OFFICE O/P EST MOD 30 MIN: CPT | Performed by: NURSE PRACTITIONER

## 2023-11-02 NOTE — PROGRESS NOTES
[facility] - Outpatient Follow-up Note  Tk West 82 y.o. male MRN: [id number]  Encounter: [id number]          ASSESSMENT AND PLAN:      1. Acute recurrent pancreatitis  2. Pancreatic ductal abnormality  Pt with recurrent pancreatitis (7 hospitalizations in ) originally suspected due to his diabetes medication but reoccurred several times after stopping the medication. He doesn't drink alcohol, doesn't smoke. Triglycerides, Igg4 WNL. He did have gallstones/sludge on US in  and underwent lap dionne 23. MRI/MRCP at that time was negative for choledocholithiasis but did show narrowing of PD at the junction of the head and neck region & f/u MRI in July showed PD with similar appearance but newly noted 7mm nodule along the undersurface of the junction of the head/neck with some suspected diffusion restriction in that area and EUS was recommended. EUS 23 showed 6 mm size hypoechoic foci at the junction of pancreatic head and neck with compression of the pancreatic duct. No obvious mass was seen, s/p FNA was negative for malignancy, could be focal pancreatitis. Patient has been doing well without any recurrent episodes of pancreatitis since August. Weight is stable. He has diarrhea on rare occasion responsive to imodium. Reports he previously discussed PD stent placement with Dr. Sallye Apgar and would like to proceed with this. Will refer to advanced endoscopy team for consultation to discuss risk/benefit.          ______________________________________________________________________    SUBJECTIVE:  Tk West is a 82 y.o. male with CAD, CHF, CKD, DM, HLD, HTN, recurrent pancreatitis, cholecystectomy who presents for follow up. He hasn't been hospitalized with pancreatitis since August. Has been doing well with no abdominal pain. Denies any weight loss. Has diarrhea on occasion but not on a regular basis and takes imodium PRN for this.  Reflux is well controlled on Pantoprazole given by PCP. He doesn't smoke. Denies alcohol use. Has been following low fat diet at home. Recent EUS/EGD results/biopsies reviewed. Anxious for PD stent placement which he previously discussed with Dr. Pankaj Bourne. REVIEW OF SYSTEMS IS OTHERWISE NEGATIVE.       Historical Information   Past Medical History:   Diagnosis Date    Allergic     Arthritis     Chronic kidney disease     Chronic kidney disease (CKD) stage G3a/A1, moderately decreased glomerular filtration rate (GFR) between 45-59 mL/min/1.73 square meter and albuminuria creatinine ratio less than 30 mg/g (HCC)     Colon polyp     Diabetes mellitus (HCC)     GERD (gastroesophageal reflux disease)     Heart murmur     History of echocardiogram     HL (hearing loss)     Hyperlipidemia     Hypertension     Obesity     Pancreatitis     Sleep apnea, obstructive      Past Surgical History:   Procedure Laterality Date    CARDIAC CATHETERIZATION      CHOLECYSTECTOMY      COLONOSCOPY  2018    COLONOSCOPY      EYE SURGERY      HEMORRHOID SURGERY      JOINT REPLACEMENT  2017    knee    OTHER SURGICAL HISTORY      Stent     OH LAPAROSCOPY SURG CHOLECYSTECTOMY N/A 2023    Procedure: CHOLECYSTECTOMY LAPAROSCOPIC;  Surgeon: Pallavi Marshall MD;  Location:  MAIN OR;  Service: General    UPPER GASTROINTESTINAL ENDOSCOPY       Social History   Social History     Substance and Sexual Activity   Alcohol Use Not Currently    Alcohol/week: 5.0 standard drinks of alcohol    Types: 5 Glasses of wine per week    Comment: no alcohol since March     Social History     Substance and Sexual Activity   Drug Use Never     Social History     Tobacco Use   Smoking Status Former    Packs/day: 2.00    Years: 15.00    Total pack years: 30.00    Types: Cigarettes, Pipe, Cigars    Quit date: 1972    Years since quittin.0    Passive exposure: Past   Smokeless Tobacco Never     Family History   Problem Relation Age of Onset    Heart disease Mother Heart attack Mother         46s    Cancer Mother     Coronary artery disease Mother     Cancer Brother         Malignant tumor of lung       Meds/Allergies       Current Outpatient Medications:     BD Pen Needle Pascale U/F 32G X 4 MM MISC    cloNIDine (CATAPRES) 0.1 mg tablet    clopidogrel (PLAVIX) 75 mg tablet    Fexofenadine HCl (MUCINEX ALLERGY PO)    fluticasone (FLONASE) 50 mcg/act nasal spray    insulin aspart (NovoLOG FlexPen) 100 UNIT/ML injection pen    ipratropium (ATROVENT) 0.03 % nasal spray    Lancets (OneTouch Delica Plus [id number]) MISC    Levemir FlexTouch 100 units/mL injection pen    lisinopril (ZESTRIL) 40 mg tablet    metoprolol succinate (TOPROL-XL) 25 mg 24 hr tablet    Multiple Vitamins-Minerals (MULTIVITAMIN ADULT PO)    pantoprazole (PROTONIX) 40 mg tablet    pyridoxine (VITAMIN B6) 100 mg tablet    ranolazine (RANEXA) 500 mg 12 hr tablet    tamsulosin (FLOMAX) 0.4 mg    torsemide (DEMADEX) 10 mg tablet    triamcinolone (KENALOG) 0.1 % cream    ondansetron (ZOFRAN) 4 mg tablet    Allergies   Allergen Reactions    Januvia [Sitagliptin] Other (See Comments)     pancreatitis    Semaglutide Other (See Comments)     pancreatits    Simvastatin Myalgia    Trulicity [Dulaglutide] Other (See Comments)     Pancreatitis    Wound Dressing Adhesive Other (See Comments)     Burning sensation           Objective     Blood pressure 153/67, pulse 62, height 5' 6" (1.676 m), weight 108 kg (237 lb 12.8 oz). Body mass index is 38.38 kg/m². PHYSICAL EXAM:      General Appearance:   Alert, cooperative, no distress   HEENT:   Normocephalic, atraumatic, anicteric. Neck:  Supple, symmetrical, trachea midline   Lungs:   Clear to auscultation bilaterally; no rales, rhonchi or wheezing; respirations unlabored    Heart[de-identified]   Regular rate and rhythm; no murmur.    Abdomen:   Soft, non-tender, non-distended; normal bowel sounds; no masses, no organomegaly    Genitalia:   Deferred    Rectal:   Deferred Extremities:  No cyanosis, clubbing or edema    Skin:  No jaundice, rashes, or lesions    Lymph nodes:  No palpable cervical lymphadenopathy        Lab Results:   No visits with results within 1 Day(s) from this visit. Latest known visit with results is:   Telephone on 10/06/2023   Component Date Value    Right Eye Diabetic Retin* 09/20/2023 Positive     Left Eye Diabetic Retino* 09/20/2023 Positive          Radiology Results:   No results found.

## 2023-11-03 ENCOUNTER — ESTABLISHED COMPREHENSIVE EXAM (OUTPATIENT)
Dept: URBAN - METROPOLITAN AREA CLINIC 6 | Facility: CLINIC | Age: 83
End: 2023-11-03

## 2023-11-03 DIAGNOSIS — H35.3132: ICD-10-CM

## 2023-11-03 DIAGNOSIS — Z79.4: ICD-10-CM

## 2023-11-03 DIAGNOSIS — E11.3291: ICD-10-CM

## 2023-11-03 DIAGNOSIS — H43.391: ICD-10-CM

## 2023-11-03 DIAGNOSIS — E11.3212: ICD-10-CM

## 2023-11-03 PROCEDURE — 92134 CPTRZ OPH DX IMG PST SGM RTA: CPT

## 2023-11-03 PROCEDURE — 92014 COMPRE OPH EXAM EST PT 1/>: CPT

## 2023-11-03 PROCEDURE — 92202 OPSCPY EXTND ON/MAC DRAW: CPT

## 2023-11-03 ASSESSMENT — VISUAL ACUITY
OS_SC: 20/40
OD_SC: 20/50

## 2023-11-03 ASSESSMENT — TONOMETRY
OS_IOP_MMHG: 6
OD_IOP_MMHG: 7

## 2023-11-07 ENCOUNTER — CONSULT (OUTPATIENT)
Dept: GASTROENTEROLOGY | Facility: CLINIC | Age: 83
End: 2023-11-07
Payer: MEDICARE

## 2023-11-07 ENCOUNTER — TELEPHONE (OUTPATIENT)
Dept: GASTROENTEROLOGY | Facility: CLINIC | Age: 83
End: 2023-11-07

## 2023-11-07 VITALS
WEIGHT: 239.4 LBS | HEIGHT: 66 IN | SYSTOLIC BLOOD PRESSURE: 122 MMHG | TEMPERATURE: 97.4 F | DIASTOLIC BLOOD PRESSURE: 76 MMHG | BODY MASS INDEX: 38.47 KG/M2

## 2023-11-07 DIAGNOSIS — K85.90 ACUTE RECURRENT PANCREATITIS: Primary | ICD-10-CM

## 2023-11-07 DIAGNOSIS — Q45.3 ABNORMALITY OF PANCREATIC DUCT: ICD-10-CM

## 2023-11-07 PROCEDURE — 99214 OFFICE O/P EST MOD 30 MIN: CPT | Performed by: INTERNAL MEDICINE

## 2023-11-07 RX ORDER — PSEUDOEPHEDRINE HCL 30 MG
100 TABLET ORAL DAILY
COMMUNITY

## 2023-11-07 NOTE — TELEPHONE ENCOUNTER
Our mutual patient is scheduled for procedure:  ERCP    On: January 10 , 2024     With: Dr. Roberto Matthews MD    He/She is taking the following blood thinner: Plavix (Clopidogrel)    Can this be stopped  5 days prior to the procedure    Physician Approving clearance:  Please review and advise ASAP  Thank you

## 2023-11-07 NOTE — PATIENT INSTRUCTIONS
Scheduled date of ERCP(as of today):  01/10/2024  Physician performing ERCP: Michael Hurley  [place] [facility] [place]  Instructions reviewed with patient by: Veronica Chacko  Clearances:  PLAVIX sent for clearance    Patient is also Diabetic

## 2023-11-07 NOTE — PROGRESS NOTES
[facility] - Outpatient Consultation  Clementina Rogers 82 y.o. male MRN: [id number]  Encounter: [id number]          ASSESSMENT AND PLAN:          1. Acute recurrent pancreatitis  ERCP    IgG, IgA, IgM      2. Abnormality of pancreatic duct  ERCP    IgG, IgA, IgM        Ordered IGG subclasses to evaluate for IgG 4 disease. Recent EUS biopsy showed bland appearing pathologoy with benign ductal/acinar cells. The rationale for ERCP with IV sedation as well as all risks, benefits, and alternatives were discussed with the patient in detail. Risks including but not limited to perforation, bleeding, need for blood transfusion or emergent surgery, and missed neoplasm were reviewed in detail with the patient. The patient demonstrates full understanding and wishes to proceed. ______________________________________________________________    HPI:  Clementina Rogers is a 82y.o. year old male who presents to the office for evaluation of ERCP due to recurrentl pancreatitis. He has been havin gepisodes of recurrent pancreatitis. Had 7 hospitalizations in 2023. He had EUS on 9/26 with hyperechoic foci at head/neck of pancreas with compression of PD. Biopsy of head of pancreas revealed bland appearing benign acinar/ductal cells. Reviewed images from MRI on 7/25: transition/narrowing of pancreatic head/neck junction. Reviewed recent blood work. Did have episode of pancreatis in 2017 from diabetes medication. (3x had pancreatitis that year). None between 2017 and 2023 and now with 7 episodes this year. Gallbladder was removed this year, still had episodes of pancreatitis to follow. He is very interested in ERCP for placement of pd stent to prevent further episodes of pancreatitis. REVIEW OF SYSTEMS:    CONSTITUTIONAL: Denies any fever, chills, rigors, and weight loss. HEENT: No earache or tinnitus. Denies hearing loss or visual disturbances.   CARDIOVASCULAR: No chest pain or palpitations. RESPIRATORY: Denies any cough, hemoptysis, shortness of breath or dyspnea on exertion. GASTROINTESTINAL: As noted in the History of Present Illness. GENITOURINARY: No problems with urination. Denies any hematuria or dysuria. NEUROLOGIC: No dizziness or vertigo, denies headaches. MUSCULOSKELETAL: Denies any muscle or joint pain. SKIN: Denies skin rashes or itching. ENDOCRINE: Denies excessive thirst. Denies intolerance to heat or cold. PSYCHOSOCIAL: Denies depression or anxiety. Denies any recent memory loss.        Historical Information   Past Medical History:   Diagnosis Date    Allergic     Arthritis     Chronic kidney disease 2021    Chronic kidney disease (CKD) stage G3a/A1, moderately decreased glomerular filtration rate (GFR) between 45-59 mL/min/1.73 square meter and albuminuria creatinine ratio less than 30 mg/g (HCC)     Colon polyp     Diabetes mellitus (HCC)     GERD (gastroesophageal reflux disease)     Heart murmur     History of echocardiogram     HL (hearing loss)     Hyperlipidemia     Hypertension     Obesity     Pancreatitis     Sleep apnea, obstructive      Past Surgical History:   Procedure Laterality Date    CARDIAC CATHETERIZATION      CHOLECYSTECTOMY      COLONOSCOPY  03/09/2018    COLONOSCOPY      EYE SURGERY      HEMORRHOID SURGERY      JOINT REPLACEMENT  2017    knee    OTHER SURGICAL HISTORY      Stent     CT LAPAROSCOPY SURG CHOLECYSTECTOMY N/A 06/28/2023    Procedure: CHOLECYSTECTOMY LAPAROSCOPIC;  Surgeon: Emmalene Boxer, MD;  Location:  MAIN OR;  Service: General    UPPER GASTROINTESTINAL ENDOSCOPY       Social History   Social History     Substance and Sexual Activity   Alcohol Use Not Currently    Alcohol/week: 5.0 standard drinks of alcohol    Types: 5 Glasses of wine per week    Comment: no alcohol since March     Social History     Substance and Sexual Activity   Drug Use Never     Social History     Tobacco Use   Smoking Status Former Packs/day: 2.00    Years: 15.00    Total pack years: 30.00    Types: Cigarettes, Pipe, Cigars    Quit date: 1972    Years since quittin.0    Passive exposure: Past   Smokeless Tobacco Never     Family History   Problem Relation Age of Onset    Heart disease Mother     Heart attack Mother         46s    Cancer Mother     Coronary artery disease Mother     Cancer Brother         Malignant tumor of lung       Meds/Allergies       Current Outpatient Medications:     BD Pen Needle Pascale U/F 32G X 4 MM MISC    cloNIDine (CATAPRES) 0.1 mg tablet    clopidogrel (PLAVIX) 75 mg tablet    Docusate Sodium (DSS) 100 MG CAPS    Fexofenadine HCl (MUCINEX ALLERGY PO)    fluticasone (FLONASE) 50 mcg/act nasal spray    ipratropium (ATROVENT) 0.03 % nasal spray    Lancets (OneTouch Delica Plus [id number]) MISC    Levemir FlexTouch 100 units/mL injection pen    lisinopril (ZESTRIL) 40 mg tablet    metoprolol succinate (TOPROL-XL) 25 mg 24 hr tablet    Multiple Vitamins-Minerals (MULTIVITAMIN ADULT PO)    pantoprazole (PROTONIX) 40 mg tablet    pyridoxine (VITAMIN B6) 100 mg tablet    ranolazine (RANEXA) 500 mg 12 hr tablet    tamsulosin (FLOMAX) 0.4 mg    torsemide (DEMADEX) 10 mg tablet    triamcinolone (KENALOG) 0.1 % cream    insulin aspart (NovoLOG FlexPen) 100 UNIT/ML injection pen    ondansetron (ZOFRAN) 4 mg tablet    Allergies   Allergen Reactions    Januvia [Sitagliptin] Other (See Comments)     pancreatitis    Semaglutide Other (See Comments)     pancreatits    Simvastatin Myalgia    Trulicity [Dulaglutide] Other (See Comments)     Pancreatitis    Wound Dressing Adhesive Other (See Comments)     Burning sensation           Objective     Blood pressure 122/76, temperature (!) 97.4 °F (36.3 °C), temperature source Tympanic, height 5' 6" (1.676 m), weight 109 kg (239 lb 6.4 oz). Body mass index is 38.64 kg/m².         PHYSICAL EXAM:      General Appearance:   Alert, cooperative, no distress   HEENT:   Normocephalic, atraumatic, anicteric. Lungs:   Equal chest rise and unlabored breathing, normal cough   Heart:   No visualized JVD   Abdomen:   Soft, non-tender, non-distended; no masses, no organomegaly    Genitalia:   Deferred    Rectal:   Deferred    Extremities:  No cyanosis, clubbing or edema    Pulses:  Musculoskeletal:  2+ and symmetric  Normal range of motion visualized    Skin:  Neuro:  No jaundice, rashes, or lesions   Alert and appropriate       Lab Results:   No visits with results within 1 Day(s) from this visit. Latest known visit with results is:   Telephone on 10/06/2023   Component Date Value    Right Eye Diabetic Retin* 09/20/2023 Positive     Left Eye Diabetic Retino* 09/20/2023 Positive          Radiology Results:   No results found. Answers submitted by the patient for this visit:  Abdominal Pain Questionnaire (Submitted on 11/6/2023)  Chief Complaint: Abdominal pain  Chronicity: recurrent  Onset: more than 1 month ago  Onset quality: sudden  Frequency: rarely  Episode duration: 2 Days  Progression since onset: waxing and waning  Pain location: epigastric region  Pain - numeric: 7/10  Pain quality: sharp  Radiates to: does not radiate  anorexia: No  arthralgias: No  belching: No  constipation: No  diarrhea: No  dysuria: No  fever: No  flatus: No  frequency: No  headaches: No  hematochezia: No  hematuria: No  melena: No  myalgias: No  nausea:  No  weight loss: No  vomiting: No  Aggravated by: nothing  Relieved by: nothing  Diagnostic workup: CT scan, surgery, ultrasound

## 2023-11-16 ENCOUNTER — VBI (OUTPATIENT)
Dept: ADMINISTRATIVE | Facility: OTHER | Age: 83
End: 2023-11-16

## 2023-11-17 DIAGNOSIS — I25.10 CORONARY ARTERY DISEASE INVOLVING NATIVE CORONARY ARTERY OF NATIVE HEART WITHOUT ANGINA PECTORIS: ICD-10-CM

## 2023-11-17 NOTE — TELEPHONE ENCOUNTER
Fax received for [facility] for refill on Ranolazine er 500 mg routed to pods, pods refused so routed to PCP for review

## 2023-11-20 ENCOUNTER — TELEPHONE (OUTPATIENT)
Dept: PULMONOLOGY | Facility: CLINIC | Age: 83
End: 2023-11-20

## 2023-11-20 RX ORDER — RANOLAZINE 500 MG/1
500 TABLET, EXTENDED RELEASE ORAL 2 TIMES DAILY
Qty: 180 TABLET | Refills: 0 | Status: SHIPPED | OUTPATIENT
Start: 2023-11-20

## 2023-11-22 ENCOUNTER — TELEPHONE (OUTPATIENT)
Dept: SLEEP CENTER | Facility: CLINIC | Age: 83
End: 2023-11-22

## 2023-11-22 DIAGNOSIS — N40.0 BENIGN PROSTATIC HYPERPLASIA WITHOUT LOWER URINARY TRACT SYMPTOMS: ICD-10-CM

## 2023-11-22 DIAGNOSIS — I10 ESSENTIAL (PRIMARY) HYPERTENSION: ICD-10-CM

## 2023-11-22 RX ORDER — LISINOPRIL 40 MG/1
40 TABLET ORAL DAILY
Qty: 90 TABLET | Refills: 1 | Status: SHIPPED | OUTPATIENT
Start: 2023-11-22

## 2023-11-22 RX ORDER — TAMSULOSIN HYDROCHLORIDE 0.4 MG/1
0.8 CAPSULE ORAL DAILY
Qty: 180 CAPSULE | Refills: 1 | Status: SHIPPED | OUTPATIENT
Start: 2023-11-22

## 2023-11-22 NOTE — TELEPHONE ENCOUNTER
I called the pt. And spoke to him about his broken water chamber. This has already been addressed with [employer]. But I am following up on it.

## 2023-11-22 NOTE — TELEPHONE ENCOUNTER
Received fax from [facility] on [facility] requesting refill on Lisinopril 40 mg, routed to pods, also needs Tamsulosin HCL 0.4 mg

## 2023-11-22 NOTE — TELEPHONE ENCOUNTER
Fax from [facility] for refill on Clonidine HCL 0.1 mg routed to Dr Rachel Orlando for review

## 2023-11-28 ENCOUNTER — CLINICAL SUPPORT (OUTPATIENT)
Dept: NUTRITION | Facility: HOSPITAL | Age: 83
End: 2023-11-28
Attending: INTERNAL MEDICINE
Payer: MEDICARE

## 2023-11-28 VITALS — HEIGHT: 66 IN | BODY MASS INDEX: 38.25 KG/M2 | WEIGHT: 238 LBS

## 2023-11-28 DIAGNOSIS — N18.32 TYPE 2 DIABETES MELLITUS WITH STAGE 3B CHRONIC KIDNEY DISEASE, WITH LONG-TERM CURRENT USE OF INSULIN (HCC): Primary | ICD-10-CM

## 2023-11-28 DIAGNOSIS — Z79.4 TYPE 2 DIABETES MELLITUS WITH STAGE 3B CHRONIC KIDNEY DISEASE, WITH LONG-TERM CURRENT USE OF INSULIN (HCC): Primary | ICD-10-CM

## 2023-11-28 DIAGNOSIS — E11.22 TYPE 2 DIABETES MELLITUS WITH STAGE 3B CHRONIC KIDNEY DISEASE, WITH LONG-TERM CURRENT USE OF INSULIN (HCC): Primary | ICD-10-CM

## 2023-11-28 DIAGNOSIS — I10 ESSENTIAL (PRIMARY) HYPERTENSION: ICD-10-CM

## 2023-11-28 PROCEDURE — 97803 MED NUTRITION INDIV SUBSEQ: CPT

## 2023-11-28 RX ORDER — CLONIDINE HYDROCHLORIDE 0.1 MG/1
0.1 TABLET ORAL EVERY 12 HOURS SCHEDULED
Status: CANCELLED | OUTPATIENT
Start: 2023-11-28

## 2023-11-28 NOTE — PROGRESS NOTES
Nutrition Assessment Form    Patient Name: Katerina Bee    YOB: 1940    Sex:  Male     Assessment Date: 11/28/2023  Start Time: 11:00 am Stop Time: 11:45 pm Total Minutes: 45     Data:  Present at session: PT    Parent/Patient Concerns/reason for visit: "No one makes a diet that covers everything"   Medical Dx/Reason for Referral: E11.22, N18.30, Z79.4 (ICD-10-CM) - Type 2 diabetes mellitus with stage 3 chronic kidney disease, with long-term current use of insulin, unspecified whether stage 3a or 3b CKD ([facility])    Provider: Regan Kevin MD    MEDICARE  After this appointment pt has 1.25 hours worth of MNT for MEDICARE   Past Medical History:   Diagnosis Date    Allergic     Arthritis     Chronic kidney disease 2021    Chronic kidney disease (CKD) stage G3a/A1, moderately decreased glomerular filtration rate (GFR) between 45-59 mL/min/1.73 square meter and albuminuria creatinine ratio less than 30 mg/g (HCC)     Colon polyp     Diabetes mellitus ([facility])     GERD (gastroesophageal reflux disease)     Heart murmur     History of echocardiogram     HL (hearing loss)     Hyperlipidemia     Hypertension     Obesity     Pancreatitis     Sleep apnea, obstructive        Current Outpatient Medications   Medication Sig Dispense Refill    BD Pen Needle Pascale U/F 32G X 4 MM MISC USE AS INSTRUCTED WITH INSULIN PEN      cloNIDine (CATAPRES) 0.1 mg tablet Take 1 tablet (0.1 mg total) by mouth every 12 (twelve) hours      clopidogrel (PLAVIX) 75 mg tablet Take 1 tablet (75 mg total) by mouth daily Instructed to stop prior to Surgery-Last dose 6/21/23 90 tablet 0    Docusate Sodium (DSS) 100 MG CAPS Take 100 mg by mouth daily      Fexofenadine HCl (MUCINEX ALLERGY PO) Take by mouth      fluticasone (FLONASE) 50 mcg/act nasal spray 2 sprays into each nostril daily 48 mL 0    insulin aspart (NovoLOG FlexPen) 100 UNIT/ML injection pen Inject under the skin 3 (three) times a day with meals Sliding scale as ordered TID with meals      ipratropium (ATROVENT) 0.03 % nasal spray 2 sprays into each nostril every 12 (twelve) hours 30 mL 2    Lancets (OneTouch Delica Plus [id number]) MISC TEST GLUCOSE 3 4 TIMES/DAY      Levemir FlexTouch 100 units/mL injection pen 36 Units daily In AM      lisinopril (ZESTRIL) 40 mg tablet Take 1 tablet (40 mg total) by mouth daily 90 tablet 1    metoprolol succinate (TOPROL-XL) 25 mg 24 hr tablet Take 1 tablet (25 mg total) by mouth daily 90 tablet 0    Multiple Vitamins-Minerals (MULTIVITAMIN ADULT PO) Take by mouth daily      ondansetron (ZOFRAN) 4 mg tablet Take 1 tablet (4 mg total) by mouth every 8 (eight) hours as needed for nausea or vomiting (Patient not taking: Reported on 10/5/2023) 20 tablet 0    pantoprazole (PROTONIX) 40 mg tablet Take 1 tablet (40 mg total) by mouth daily before breakfast 90 tablet 1    pyridoxine (VITAMIN B6) 100 mg tablet Take 100 mg by mouth daily      ranolazine (RANEXA) 500 mg 12 hr tablet Take 1 tablet (500 mg total) by mouth 2 (two) times a day 180 tablet 0    tamsulosin (FLOMAX) 0.4 mg Take 2 capsules (0.8 mg total) by mouth daily 180 capsule 1    torsemide (DEMADEX) 10 mg tablet Take 1 tablet (10 mg total) by mouth daily 90 tablet 0    triamcinolone (KENALOG) 0.1 % cream Apply topically 2 (two) times a day 80 g 0     No current facility-administered medications for this visit.         Additional Meds/Supplements: Vitamin D3, Colace, Vitamin 6, Probiotic,    Special Learning Needs/barriers to learning/any new barriers N/A   Height: 5'6" HC Readings from Last 5 Encounters:   No data found for [facility], [facility].      Weight: 238# with shoes on  Wt Readings from Last 10 Encounters:   11/07/23 109 kg (239 lb 6.4 oz)   11/02/23 108 kg (237 lb 12.8 oz)   10/05/23 108 kg (238 lb 3.2 oz)   09/26/23 105 kg (231 lb 9.6 oz)   09/23/23 103 kg (228 lb)   08/29/23 107 kg (235 lb)   08/18/23 108 kg (237 lb 12.8 oz)   08/07/23 109 kg (240 lb)   08/03/23 108 kg (238 lb)   07/31/23 108 kg (238 lb) Estimated body mass index is 38.64 kg/m² as calculated from the following:    Height as of 23: 5' 6" (1.676 m). Weight as of 23: 109 kg (239 lb 6.4 oz). Recent Weight Change: []Yes     [x]No  Amount:  Stable wt since 23      Energy Needs: 1613 kcal (25 g/kg IBW 64.5 kg)  52-65 g protein (0.8-1.0 g/lkg IBW)  1613 mL fluids ( 1 mL/kcal)     Allergies   Allergen Reactions    Januvia [Sitagliptin] Other (See Comments)     pancreatitis    Semaglutide Other (See Comments)     pancreatits    Simvastatin Myalgia    Trulicity [Dulaglutide] Other (See Comments)     Pancreatitis    Wound Dressing Adhesive Other (See Comments)     Burning sensation    or intolerances NKFA   Social History     Substance and Sexual Activity   Alcohol Use Not Currently    Alcohol/week: 5.0 standard drinks of alcohol    Types: 5 Glasses of wine per week    Comment: no alcohol since March    Doesn't drink N/A   Social History     Tobacco Use   Smoking Status Former    Packs/day: 2.00    Years: 15.00    Total pack years: 30.00    Types: Cigarettes, Pipe, Cigars    Quit date: 1972    Years since quittin.1    Passive exposure: Past   Smokeless Tobacco Never       Who shops? patient   Who cooks/cooking methods/Eating out/take out habits   patient  Every Friday-[employer] with breakfast    Exercise: 30 min with a rolling walker daily     Other: ie: Sleep habits/ stress level/ work habits household-lives with ?/ food security C-pap nightly    Prior Nutritional Counseling? [x]Yes     []No  When: Years ago     Why: DM        Diet Hx:  Breakfast: 2 pieces of toast with cream cheese, 2gg   Lunch: Cheese sandwich + side         Dinner: Meat, veg, starch         Snacks: AM - juice (not daily)  PM - dessert  HS - dessert   Other Notes/ Initial Assessment:    23  Pt feels he has been doing well. Wt has been stable. To monitor Na intake pt hasn't been adding Na to foods, limits processed foods.  Pt states he likes to snack and that is something he is having a harder time monitoring and he may "overdue it". Pt feels he has the intake of fats and sodium under control. He did have questions about why his BG is high in the morning and why it often dips to the 70's-80's before lunch. Pt is taking short term and long term in the morning, at night only short term. Pt eats dinner at 5:30 and if has a snack it is no later than 7 pm.  Pt has a follow up with endocrinology next month and plans to have A1C checked and discuss insulin dosing with provider. Pt has 2 intake logs. One is the foods he eats and when and the other is typically eaten foods. Pt documents the kcal/fat/saturated fat, fiber, protein, CHO-mainly focusing now on the CHO. As per pt's log, he is consuming 1-2 CHO choices at breakfast, sometimes none if only has eggs. Discussed the recommendation for ~3-4 CHO choices per meal including breakfast and suggested he increase CHO at breakfast. Pt was concerned about doing that and wt gain. Reviewed pt's log of typically eaten foods and suggested a small piece of fruit or a Presy[employer]ian chocolate rice cake (both 1 CHO serving) either with breakfast or 1-2hrs after and see if he no longer sees that dip in BG before lunch. Pt knows he needs to work on snacking-discussed planning snacks ahead of time or portioning them out but pt feels not having as many in the house will be a better option for him. Pt states he feels he has a good handle on things overall and will let this writer know if he would like to follow up with OP MNT in the future. Reminded patient that for [facility] new referral is required for every calendar year.       _________________________________________________________________________________________________________________________________________________________________________________________  08/03/23  Pt refusing to sign medicare form-states its not necessary.     Reviewed in detail Medicare information: 1st year 3 hrs are covered, 2nd year and on 2 hrs are covered. Discussed new referral is needed every calendar year-pt challenged this saying he does not need one but he will see if his dr will give him a new one in 2024. Medical issues: HTN, Hiatal hernia, Pancreatitis, T2DM, Acid reflux, Kidney disease. Pt has to have a procedure to check on a node on his pancreas. Household: Pt & Wife. Wife has MS so it is the pt who cooks/shops. Pt with multiple recent hospital admissions. This writer met with pt during an inpatient stay at this location ~2mo ago. Pt states pancreatitis is the main issue right now, DM being the 2nd. PT states he has been told to have a low-fat diet but he isnt sure what that means, how much he can have in a day. Discussed that according to the national pancreatic association intake is ~30-50g total fat/day, 10% of total kcal from saturated fat. Also reviewed estimated needs for kcal, protein, carbohydrate, fluids. Pt states he wants to find a happy medium between eating healthy but not giving up all of the foods he loves-including dessert with every lunch and dinner. Gave pt portion book and started to go over carbohydrate counting but then pt stated he doesn't need to do that-he plans to look at labels and keep fat within the suggested range.  Much of pt's appointment was pt leading the conversation discussing his thoughts and feelings about medical care and related topics including pt feeling his prescribed insulin level is too high-he knows people who take less so why cant he, A1C has been trending upward since 10/22 but he feels as long as it is <7.0 it is fine, etc.     F/U scheduled 11/2023               Nutrition Diagnosis:   Altered Nutrition-Related Laboratory values  related to T2DM dx as evidenced by  Elevated BG & A1C       Any change or new dx since previous visit:     Nutrition Diagnosis:         Medical Nutrition Therapy Intervention:  [x]Individualized Meal Plan  1613 kcal (25 g/kg IBW 64.5 kg)  52-65 g protein (0.8-1.0 g/lkg IBW  1613 mL fluids ( 1 mL/kcal)     [x]Understanding Lab Values: A1C, CMP, CBC, BG   []Basic Pathophysiology of Disease []Food/Medication Interactions   []Food Diary [x]Exercise: recommend 150 minutes a week   [x]Lifestyle/Behavior Modification Techniques: carbohydrate counting, balanced meals, low-fat diet []Medication, Mechanism of Action   [x]Label Reading: CHO/ Na/ Fat/ other_________: Fat, CHO, Na, added sugar, saturated fat []Self Blood Glucose Monitoring   [x]Weight/BMI Goals: gain/lose/maintain: No goal wt at this time []Other -           Comprehension: []Excellent  []Very Good  [x]Good  []Fair   []Poor    Receptivity: []Excellent  []Very Good  [x]Good  []Fair   []Poor    Expected Compliance: []Excellent  []Very Good  [x]Good  []Fair   []Poor        Goal: 11/28/23  1. Consume 3 CHO choices in total at breakfast or within an hour of breakfast.   2.   3.     Goals (initial): 08/03/23  1.  Keep fat intake between 30-50 g/day   2.   3.       Labs:  CMP  Lab Results   Component Value Date    K 4.1 09/01/2023     09/01/2023    CO2 30 09/01/2023    BUN 18 09/01/2023    CREATININE 1.28 09/01/2023    GLUF 96 07/24/2023    CALCIUM 8.5 09/01/2023    CORRECTEDCA 9.2 09/01/2023    AST 13 09/01/2023    ALT 12 09/01/2023    ALKPHOS 53 09/01/2023    EGFR 52 09/01/2023       BMP  Lab Results   Component Value Date    CALCIUM 8.5 09/01/2023    K 4.1 09/01/2023    CO2 30 09/01/2023     09/01/2023    BUN 18 09/01/2023    CREATININE 1.28 09/01/2023       Lipids  No results found for: "CHOL"  Lab Results   Component Value Date    HDL 53 07/07/2023    HDL 32 (L) 06/11/2023     Lab Results   Component Value Date    LDLCALC 47 07/07/2023    LDLCALC 52 06/11/2023     Lab Results   Component Value Date    TRIG 112 08/29/2023    TRIG 118 07/07/2023    TRIG 143 06/11/2023     No results found for: "CHOLHDL"    Hemoglobin A1C  Lab Results   Component Value Date    HGBA1C 6.7 (H) 05/10/2023       Fasting Glucose  Lab Results   Component Value Date    GLUF 96 07/24/2023       Insulin     Thyroid  No results found for: "TSH", "H2VAPQT", "J3LDJJQ", "THYROIDAB"    Hepatic Function Panel  Lab Results   Component Value Date    ALT 12 09/01/2023    AST 13 09/01/2023    ALKPHOS 53 09/01/2023       Celiac Disease Antibody Panel  No results found for: "ENDOMYSIAL IGA", "GLIADIN IGA", "GLIADIN IGG", "IGA", "TISSUE TRANSGLUT AB", "TTG IGA"   Iron  No results found for: "IRON", "TIBC", "FERRITIN"         Tyesha Echeverria RD LDN  [id number]  [address]  [address]

## 2023-11-29 DIAGNOSIS — I10 ESSENTIAL (PRIMARY) HYPERTENSION: ICD-10-CM

## 2023-11-29 RX ORDER — CLONIDINE HYDROCHLORIDE 0.1 MG/1
0.1 TABLET ORAL EVERY 12 HOURS SCHEDULED
Qty: 180 TABLET | Refills: 0 | Status: SHIPPED | OUTPATIENT
Start: 2023-11-29

## 2023-12-04 ENCOUNTER — VBI (OUTPATIENT)
Dept: ADMINISTRATIVE | Facility: OTHER | Age: 83
End: 2023-12-04

## 2023-12-05 ENCOUNTER — VBI (OUTPATIENT)
Dept: ADMINISTRATIVE | Facility: OTHER | Age: 83
End: 2023-12-05

## 2023-12-08 ENCOUNTER — ESTABLISHED COMPREHENSIVE EXAM (OUTPATIENT)
Dept: URBAN - METROPOLITAN AREA CLINIC 6 | Facility: CLINIC | Age: 83
End: 2023-12-08

## 2023-12-08 DIAGNOSIS — H43.391: ICD-10-CM

## 2023-12-08 DIAGNOSIS — E11.3212: ICD-10-CM

## 2023-12-08 DIAGNOSIS — E11.3291: ICD-10-CM

## 2023-12-08 DIAGNOSIS — Z79.4: ICD-10-CM

## 2023-12-08 DIAGNOSIS — H35.3132: ICD-10-CM

## 2023-12-08 PROCEDURE — 92014 COMPRE OPH EXAM EST PT 1/>: CPT

## 2023-12-08 PROCEDURE — 92134 CPTRZ OPH DX IMG PST SGM RTA: CPT

## 2023-12-08 PROCEDURE — 92202 OPSCPY EXTND ON/MAC DRAW: CPT

## 2023-12-08 ASSESSMENT — VISUAL ACUITY
OS_SC: 20/50
OD_SC: 20/40

## 2023-12-08 ASSESSMENT — TONOMETRY
OD_IOP_MMHG: 6
OS_IOP_MMHG: 4

## 2023-12-10 ENCOUNTER — VBI (OUTPATIENT)
Dept: ADMINISTRATIVE | Facility: OTHER | Age: 83
End: 2023-12-10

## 2023-12-10 NOTE — TELEPHONE ENCOUNTER
12/10/23 12:23 PM     VB [employer] SmartForm used to document caregap status.     Salvador Townsend MA

## 2023-12-21 DIAGNOSIS — Z11.52 ENCOUNTER FOR SCREENING FOR COVID-19: Primary | ICD-10-CM

## 2023-12-21 RX ORDER — COVID-19 ANTIGEN TEST
1 KIT MISCELLANEOUS DAILY PRN
Qty: 1 KIT | Refills: 5 | Status: SHIPPED | OUTPATIENT
Start: 2023-12-21

## 2023-12-29 DIAGNOSIS — R09.82 POSTNASAL DRIP: ICD-10-CM

## 2023-12-29 RX ORDER — IPRATROPIUM BROMIDE 21 UG/1
2 SPRAY, METERED NASAL EVERY 12 HOURS
Qty: 30 ML | Refills: 2 | Status: SHIPPED | OUTPATIENT
Start: 2023-12-29

## 2024-01-02 ENCOUNTER — TELEPHONE (OUTPATIENT)
Dept: FAMILY MEDICINE CLINIC | Facility: CLINIC | Age: 84
End: 2024-01-02

## 2024-01-03 DIAGNOSIS — I50.32 CHRONIC HEART FAILURE WITH PRESERVED EJECTION FRACTION (HCC): ICD-10-CM

## 2024-01-03 RX ORDER — TORSEMIDE 10 MG/1
10 TABLET ORAL DAILY
Qty: 90 TABLET | Refills: 1 | Status: SHIPPED | OUTPATIENT
Start: 2024-01-03

## 2024-01-04 DIAGNOSIS — I25.10 CORONARY ARTERY DISEASE WITHOUT ANGINA PECTORIS, UNSPECIFIED VESSEL OR LESION TYPE, UNSPECIFIED WHETHER NATIVE OR TRANSPLANTED HEART: ICD-10-CM

## 2024-01-04 DIAGNOSIS — I25.10 CORONARY ARTERY DISEASE INVOLVING NATIVE CORONARY ARTERY OF NATIVE HEART WITHOUT ANGINA PECTORIS: ICD-10-CM

## 2024-01-04 RX ORDER — METOPROLOL SUCCINATE 25 MG/1
25 TABLET, EXTENDED RELEASE ORAL DAILY
Qty: 90 TABLET | Refills: 1 | Status: SHIPPED | OUTPATIENT
Start: 2024-01-04

## 2024-01-04 RX ORDER — CLOPIDOGREL BISULFATE 75 MG/1
75 TABLET ORAL DAILY
Qty: 90 TABLET | Refills: 1 | Status: SHIPPED | OUTPATIENT
Start: 2024-01-04

## 2024-01-10 ENCOUNTER — RA CDI HCC (OUTPATIENT)
Dept: OTHER | Facility: HOSPITAL | Age: 84
End: 2024-01-10

## 2024-01-10 ENCOUNTER — HOSPITAL ENCOUNTER (OUTPATIENT)
Dept: RADIOLOGY | Facility: HOSPITAL | Age: 84
Discharge: HOME | End: 2024-01-10
Payer: MEDICARE

## 2024-01-10 ENCOUNTER — HOSPITAL ENCOUNTER (OUTPATIENT)
Dept: GASTROENTEROLOGY | Facility: HOSPITAL | Age: 84
Setting detail: OUTPATIENT SURGERY
Discharge: HOME | End: 2024-01-10
Attending: INTERNAL MEDICINE
Payer: MEDICARE

## 2024-01-10 ENCOUNTER — ANESTHESIA EVENT (OUTPATIENT)
Dept: GASTROENTEROLOGY | Facility: HOSPITAL | Age: 84
End: 2024-01-10

## 2024-01-10 ENCOUNTER — ANESTHESIA (OUTPATIENT)
Dept: GASTROENTEROLOGY | Facility: HOSPITAL | Age: 84
End: 2024-01-10

## 2024-01-10 VITALS
DIASTOLIC BLOOD PRESSURE: 77 MMHG | HEIGHT: 66 IN | WEIGHT: 237 LBS | RESPIRATION RATE: 18 BRPM | HEART RATE: 60 BPM | BODY MASS INDEX: 38.09 KG/M2 | OXYGEN SATURATION: 93 % | SYSTOLIC BLOOD PRESSURE: 190 MMHG | TEMPERATURE: 96.9 F

## 2024-01-10 DIAGNOSIS — K85.90 ACUTE RECURRENT PANCREATITIS: ICD-10-CM

## 2024-01-10 DIAGNOSIS — Q45.3 ABNORMALITY OF PANCREATIC DUCT: ICD-10-CM

## 2024-01-10 PROCEDURE — C1769 GUIDE WIRE: HCPCS

## 2024-01-10 PROCEDURE — 74328 X-RAY BILE DUCT ENDOSCOPY: CPT

## 2024-01-10 PROCEDURE — 43264 ERCP REMOVE DUCT CALCULI: CPT | Performed by: INTERNAL MEDICINE

## 2024-01-10 PROCEDURE — 43262 ENDO CHOLANGIOPANCREATOGRAPH: CPT | Performed by: INTERNAL MEDICINE

## 2024-01-10 RX ORDER — NEOSTIGMINE METHYLSULFATE 1 MG/ML
INJECTION INTRAVENOUS AS NEEDED
Status: DISCONTINUED | OUTPATIENT
Start: 2024-01-10 | End: 2024-01-10

## 2024-01-10 RX ORDER — SODIUM CHLORIDE, SODIUM LACTATE, POTASSIUM CHLORIDE, CALCIUM CHLORIDE 600; 310; 30; 20 MG/100ML; MG/100ML; MG/100ML; MG/100ML
INJECTION, SOLUTION INTRAVENOUS CONTINUOUS PRN
Status: DISCONTINUED | OUTPATIENT
Start: 2024-01-10 | End: 2024-01-10

## 2024-01-10 RX ORDER — DEXAMETHASONE SODIUM PHOSPHATE 10 MG/ML
INJECTION, SOLUTION INTRAMUSCULAR; INTRAVENOUS AS NEEDED
Status: DISCONTINUED | OUTPATIENT
Start: 2024-01-10 | End: 2024-01-10

## 2024-01-10 RX ORDER — ONDANSETRON 2 MG/ML
INJECTION INTRAMUSCULAR; INTRAVENOUS AS NEEDED
Status: DISCONTINUED | OUTPATIENT
Start: 2024-01-10 | End: 2024-01-10

## 2024-01-10 RX ORDER — ROCURONIUM BROMIDE 10 MG/ML
INJECTION, SOLUTION INTRAVENOUS AS NEEDED
Status: DISCONTINUED | OUTPATIENT
Start: 2024-01-10 | End: 2024-01-10

## 2024-01-10 RX ORDER — SODIUM CHLORIDE, SODIUM LACTATE, POTASSIUM CHLORIDE, CALCIUM CHLORIDE 600; 310; 30; 20 MG/100ML; MG/100ML; MG/100ML; MG/100ML
100 INJECTION, SOLUTION INTRAVENOUS CONTINUOUS
Status: CANCELLED | OUTPATIENT
Start: 2024-01-10

## 2024-01-10 RX ORDER — PROPOFOL 10 MG/ML
INJECTION, EMULSION INTRAVENOUS AS NEEDED
Status: DISCONTINUED | OUTPATIENT
Start: 2024-01-10 | End: 2024-01-10

## 2024-01-10 RX ORDER — GLYCOPYRROLATE 0.2 MG/ML
INJECTION INTRAMUSCULAR; INTRAVENOUS AS NEEDED
Status: DISCONTINUED | OUTPATIENT
Start: 2024-01-10 | End: 2024-01-10

## 2024-01-10 RX ORDER — INDOMETHACIN 50 MG/1
SUPPOSITORY RECTAL AS NEEDED
Status: COMPLETED | OUTPATIENT
Start: 2024-01-10 | End: 2024-01-10

## 2024-01-10 RX ORDER — LIDOCAINE HYDROCHLORIDE 10 MG/ML
INJECTION, SOLUTION EPIDURAL; INFILTRATION; INTRACAUDAL; PERINEURAL AS NEEDED
Status: DISCONTINUED | OUTPATIENT
Start: 2024-01-10 | End: 2024-01-10

## 2024-01-10 RX ORDER — FENTANYL CITRATE 50 UG/ML
INJECTION, SOLUTION INTRAMUSCULAR; INTRAVENOUS AS NEEDED
Status: DISCONTINUED | OUTPATIENT
Start: 2024-01-10 | End: 2024-01-10

## 2024-01-10 RX ADMIN — ROCURONIUM BROMIDE 10 MG: 50 INJECTION, SOLUTION INTRAVENOUS at 08:08

## 2024-01-10 RX ADMIN — PROPOFOL 150 MG: 10 INJECTION, EMULSION INTRAVENOUS at 08:02

## 2024-01-10 RX ADMIN — INDOMETHACIN 100 MG: 50 SUPPOSITORY RECTAL at 08:15

## 2024-01-10 RX ADMIN — ROCURONIUM BROMIDE 30 MG: 50 INJECTION, SOLUTION INTRAVENOUS at 08:02

## 2024-01-10 RX ADMIN — GLYCOPYRROLATE 4 MG: 0.2 INJECTION, SOLUTION INTRAMUSCULAR; INTRAVENOUS at 09:06

## 2024-01-10 RX ADMIN — FENTANYL CITRATE 50 MCG: 50 INJECTION INTRAMUSCULAR; INTRAVENOUS at 08:15

## 2024-01-10 RX ADMIN — DEXAMETHASONE SODIUM PHOSPHATE 10 MG: 10 INJECTION, SOLUTION INTRAMUSCULAR; INTRAVENOUS at 08:02

## 2024-01-10 RX ADMIN — LIDOCAINE HYDROCHLORIDE 50 MG: 10 INJECTION, SOLUTION EPIDURAL; INFILTRATION; INTRACAUDAL; PERINEURAL at 08:02

## 2024-01-10 RX ADMIN — ONDANSETRON 4 MG: 2 INJECTION INTRAMUSCULAR; INTRAVENOUS at 08:00

## 2024-01-10 RX ADMIN — FENTANYL CITRATE 50 MCG: 50 INJECTION INTRAMUSCULAR; INTRAVENOUS at 08:00

## 2024-01-10 RX ADMIN — SODIUM CHLORIDE, SODIUM LACTATE, POTASSIUM CHLORIDE, AND CALCIUM CHLORIDE: .6; .31; .03; .02 INJECTION, SOLUTION INTRAVENOUS at 08:00

## 2024-01-10 RX ADMIN — IOHEXOL 5 ML: 240 INJECTION, SOLUTION INTRATHECAL; INTRAVASCULAR; INTRAVENOUS; ORAL at 08:20

## 2024-01-10 RX ADMIN — NEOSTIGMINE METHYLSULFATE 0.8 MG: 1 INJECTION INTRAVENOUS at 09:06

## 2024-01-10 NOTE — ANESTHESIA POSTPROCEDURE EVALUATION
Post-Op Assessment Note    CV Status:  Stable  Pain Score: 0    Pain management: adequate    Multimodal analgesia used between 6 hours prior to anesthesia start to PACU discharge    Mental Status:  Alert and awake   Hydration Status:  Euvolemic and stable   PONV Controlled:  Controlled   Airway Patency:  Patent  Two or more mitigation strategies used for obstructive sleep apnea   Post Op Vitals Reviewed: Yes      Staff: CRNA               BP (!) 188/94 (01/10/24 0919)    Temp (!) 96.7 °F (35.9 °C) (01/10/24 0919)    Pulse 89 (01/10/24 0919)   Resp 18 (01/10/24 0919)    SpO2 97 % (01/10/24 0919)

## 2024-01-10 NOTE — PROGRESS NOTES
I13.0   E11.319  HCC coding opportunities          Chart Reviewed number of suggestions sent to Provider: 2     Patients Insurance     Medicare Insurance: Medicare

## 2024-01-10 NOTE — ANESTHESIA PREPROCEDURE EVALUATION
Moderate AS, EF60%    Medical History    History Comments   Diabetes mellitus (HCC)    Hypertension    History of echocardiogram    Sleep apnea, obstructive    Chronic kidney disease (CKD) stage G3a/A1, moderately decreased glomerular filtration rate (GFR) between 45-59 mL/min/1.73 square meter and albuminuria creatinine ratio less than 30 mg/g (HCC)    GERD (gastroesophageal reflux disease)    Allergic    Arthritis    Heart murmur    Chronic kidney disease    Obesity    HL (hearing loss)    Colon polyp    Hyperlipidemia    Pancreatitis    Procedure:  ERCP    Relevant Problems   ANESTHESIA (within normal limits)      CARDIO   (+) Coronary artery disease involving native coronary artery without angina pectoris   (+) HTN (hypertension), benign   (+) Mixed hyperlipidemia   (+) Moderate aortic stenosis      ENDO   (+) Type 2 diabetes mellitus with chronic kidney disease, with long-term current use of insulin (HCC)      GI/HEPATIC   (+) GERD without esophagitis      /RENAL   (+) Stage 3b chronic kidney disease (CKD) (HCC)      PULMONARY   (+) PAULA (obstructive sleep apnea)        Physical Exam    Airway    Mallampati score: II  TM Distance: >3 FB  Neck ROM: full     Dental       Cardiovascular  Rate: normal    Pulmonary  Pulmonary exam normal     Other Findings  Per pt denies anything remaining that is loose or removeable      Anesthesia Plan  ASA Score- 3     Anesthesia Type- general with ASA Monitors.         Additional Monitors:     Airway Plan: ETT.           Plan Factors-Exercise tolerance (METS): >4 METS.    Chart reviewed.    Patient summary reviewed.    Patient is not a current smoker.              Induction- intravenous.    Postoperative Plan- Plan for postoperative opioid use.     Informed Consent- Anesthetic plan and risks discussed with patient.  I personally reviewed this patient with the CRNA. Discussed and agreed on the Anesthesia Plan with the CRNA..

## 2024-01-11 ENCOUNTER — OFFICE VISIT (OUTPATIENT)
Dept: FAMILY MEDICINE CLINIC | Facility: CLINIC | Age: 84
End: 2024-01-11
Payer: MEDICARE

## 2024-01-11 VITALS
HEIGHT: 66 IN | DIASTOLIC BLOOD PRESSURE: 72 MMHG | BODY MASS INDEX: 38.57 KG/M2 | RESPIRATION RATE: 16 BRPM | OXYGEN SATURATION: 97 % | WEIGHT: 240 LBS | SYSTOLIC BLOOD PRESSURE: 124 MMHG | HEART RATE: 50 BPM | TEMPERATURE: 96.7 F

## 2024-01-11 DIAGNOSIS — Z79.4 TYPE 2 DIABETES MELLITUS WITH STAGE 3B CHRONIC KIDNEY DISEASE, WITH LONG-TERM CURRENT USE OF INSULIN (HCC): Primary | ICD-10-CM

## 2024-01-11 DIAGNOSIS — N18.32 TYPE 2 DIABETES MELLITUS WITH STAGE 3B CHRONIC KIDNEY DISEASE, WITH LONG-TERM CURRENT USE OF INSULIN (HCC): Primary | ICD-10-CM

## 2024-01-11 DIAGNOSIS — I20.1 VASOSPASTIC ANGINA (HCC): ICD-10-CM

## 2024-01-11 DIAGNOSIS — I10 HTN (HYPERTENSION), BENIGN: ICD-10-CM

## 2024-01-11 DIAGNOSIS — E66.01 OBESITY, MORBID (HCC): ICD-10-CM

## 2024-01-11 DIAGNOSIS — I50.32 CHRONIC HEART FAILURE WITH PRESERVED EJECTION FRACTION (HCC): ICD-10-CM

## 2024-01-11 DIAGNOSIS — N18.32 STAGE 3B CHRONIC KIDNEY DISEASE (CKD) (HCC): ICD-10-CM

## 2024-01-11 DIAGNOSIS — E78.2 MIXED HYPERLIPIDEMIA: ICD-10-CM

## 2024-01-11 DIAGNOSIS — E11.22 TYPE 2 DIABETES MELLITUS WITH STAGE 3B CHRONIC KIDNEY DISEASE, WITH LONG-TERM CURRENT USE OF INSULIN (HCC): Primary | ICD-10-CM

## 2024-01-11 PROCEDURE — 99214 OFFICE O/P EST MOD 30 MIN: CPT

## 2024-01-11 RX ORDER — LISINOPRIL 20 MG/1
20 TABLET ORAL DAILY
COMMUNITY

## 2024-01-11 NOTE — PROGRESS NOTES
Assessment/Plan:         Problem List Items Addressed This Visit     HTN (hypertension), benign     Under control.  Continue lisinopril and metoprolol.  We will continue to monitor         Relevant Medications    lisinopril (ZESTRIL) 20 mg tablet    (HFpEF) heart failure with preserved ejection fraction (HCC)     Wt Readings from Last 3 Encounters:   01/11/24 109 kg (240 lb)   01/10/24 108 kg (237 lb)   11/28/23 108 kg (238 lb)     Under control.    Continue current medication.    Patient has been followed by cardiologist  We will re-evaluate at next office visit.                 Obesity, morbid (HCC)     Patient was advised to lose weight.  Potential consequences of obesity discussed with patient.  Advised to have portion control no more than 60% of meal or may consider intermittent fasting  We will continue to monitor           Type 2 diabetes mellitus with chronic kidney disease, with long-term current use of insulin ([facility]) - Primary       Lab Results   Component Value Date    HGBA1C 6.2 (H) 12/14/2023   Under control.    Continue current medication.    We will re-evaluate at next office visit.           Mixed hyperlipidemia     Under reasonable control with diet.  We will continue to monitor         Stage 3b chronic kidney disease (CKD) ([facility])     Lab Results   Component Value Date    EGFR 52 09/01/2023    EGFR 55 08/31/2023    EGFR 50 08/30/2023    CREATININE 1.28 09/01/2023    CREATININE 1.23 08/31/2023    CREATININE 1.32 (H) 08/30/2023   creatinine and GFR stable   Will need periodic BMP  Avoid NSAIDs like ibuprofen Aleve Advil etc.  Avoid high potassium diet.  We will continue to monitor            Vasospastic angina ([facility])     Has a stable angina.  Has been followed by a cardiologist.  We will continue to monitor              Subjective:      Patient ID: Jake Claros is a 83 y.o. male.    Patient here for review of chronic medical problems and  the labs and imaging if it is applicable.  Currently has no  specific complaints other than mentioned in the review of systems  Denies chest pain, SOB, cough, abdominal pain, nausea, vomiting, fever, chills, lightheadedness, dizziness,headache, tingling or numbness.No bowel or bladder problem.          The following portions of the patient's history were reviewed and updated as appropriate:   Past Medical History:  He has a past medical history of Allergic, Arthritis, Chronic kidney disease (2021), Chronic kidney disease (CKD) stage G3a/A1, moderately decreased glomerular filtration rate (GFR) between 45-59 mL/min/1.73 square meter and albuminuria creatinine ratio less than 30 mg/g (HCC), Colon polyp, Diabetes mellitus (HCC), GERD (gastroesophageal reflux disease), Heart murmur, History of echocardiogram, HL (hearing loss), Hyperlipidemia, Hypertension, Obesity, Pancreatitis, and Sleep apnea, obstructive.,  _______________________________________________________________________  Medical Problems:  does not have any pertinent problems on file.,  _______________________________________________________________________  Past Surgical History:   has a past surgical history that includes Cardiac catheterization; Other surgical history; Joint replacement (2017); Colonoscopy (03/09/2018); Eye surgery; Colonoscopy; Upper gastrointestinal endoscopy; Hemorrhoid surgery; pr laparoscopy surg cholecystectomy (N/A, 06/28/2023); and Cholecystectomy.,  _______________________________________________________________________  Family History:  family history includes Cancer in his brother and mother; Coronary artery disease in his mother; Heart attack in his mother; Heart disease in his mother.,  _______________________________________________________________________  Social History:   reports that he quit smoking about 51 years ago. His smoking use included cigarettes, pipe, and cigars. He started smoking about 66 years ago. He has a 30.0 pack-year smoking history. He has been exposed to  tobacco smoke. He has never used smokeless tobacco. He reports that he does not currently use alcohol after a past usage of about 5.0 standard drinks of alcohol per week. He reports that he does not use drugs.,  _______________________________________________________________________  Allergies:  is allergic to januvia [sitagliptin], semaglutide, simvastatin, trulicity [dulaglutide], and wound dressing adhesive..  _______________________________________________________________________  Current Outpatient Medications   Medication Sig Dispense Refill   • BD Pen Needle Pascale U/F 32G X 4 MM MISC USE AS INSTRUCTED WITH INSULIN PEN     • cloNIDine (CATAPRES) 0.1 mg tablet Take 1 tablet (0.1 mg total) by mouth every 12 (twelve) hours 180 tablet 0   • Docusate Sodium (DSS) 100 MG CAPS Take 100 mg by mouth daily     • Fexofenadine HCl (MUCINEX ALLERGY PO) Take by mouth     • fluticasone (FLONASE) 50 mcg/act nasal spray 2 sprays into each nostril daily 48 mL 0   • insulin aspart (NovoLOG FlexPen) 100 UNIT/ML injection pen Inject under the skin 3 (three) times a day with meals Sliding scale as ordered TID with meals     • ipratropium (ATROVENT) 0.03 % nasal spray SPRAY 2 SPRAYS INTO EACH NOSTRIL EVERY 12 HOURS. 30 mL 2   • Lancets (OneTouch Delica Plus [id number]) MISC TEST GLUCOSE 3 4 TIMES/DAY     • Levemir FlexTouch 100 units/mL injection pen 36 Units daily In AM     • lisinopril (ZESTRIL) 20 mg tablet Take 20 mg by mouth daily     • metoprolol succinate (TOPROL-XL) 25 mg 24 hr tablet TAKE 1 TABLET (25 MG TOTAL) BY MOUTH DAILY. 90 tablet 1   • Multiple Vitamins-Minerals (MULTIVITAMIN ADULT PO) Take by mouth daily     • pantoprazole (PROTONIX) 40 mg tablet Take 1 tablet (40 mg total) by mouth daily before breakfast 90 tablet 1   • pyridoxine (VITAMIN B6) 100 mg tablet Take 100 mg by mouth daily     • ranolazine (RANEXA) 500 mg 12 hr tablet Take 1 tablet (500 mg total) by mouth 2 (two) times a day 180 tablet 0   • tamsulosin  "(FLOMAX) 0.4 mg Take 2 capsules (0.8 mg total) by mouth daily 180 capsule 1   • torsemide (DEMADEX) 10 mg tablet TAKE 1 TABLET BY MOUTH EVERY DAY 90 tablet 1     No current facility-administered medications for this visit.     _______________________________________________________________________  Review of Systems   Constitutional:  Negative for chills and fever.   HENT:  Negative for congestion, ear pain, postnasal drip and sore throat.    Eyes:  Negative for pain and visual disturbance.   Respiratory:  Negative for cough and shortness of breath.    Cardiovascular:  Negative for chest pain and palpitations.   Gastrointestinal:  Negative for abdominal pain, nausea and vomiting.   Endocrine: Negative for cold intolerance, heat intolerance, polydipsia, polyphagia and polyuria.   Genitourinary:  Negative for difficulty urinating, dysuria, frequency and hematuria.   Musculoskeletal:  Negative for arthralgias and back pain.   Skin:  Negative for color change and rash.   Neurological:  Negative for dizziness, seizures, syncope, light-headedness and headaches.   Psychiatric/Behavioral:  Negative for agitation and behavioral problems.    All other systems reviewed and are negative.        Objective:  Vitals:    01/11/24 1426   BP: 124/72   BP Location: Right arm   Patient Position: Sitting   Cuff Size: Large   Pulse: (!) 50   Resp: 16   Temp: (!) 96.7 °F (35.9 °C)   TempSrc: Tympanic   SpO2: 97%   Weight: 109 kg (240 lb)   Height: 5' 6\" (1.676 m)     Body mass index is 38.74 kg/m².     Physical Exam  Vitals and nursing note reviewed.   Constitutional:       General: He is not in acute distress.     Appearance: Normal appearance. He is not ill-appearing, toxic-appearing or diaphoretic.   HENT:      Head: Normocephalic and atraumatic.      Nose: Nose normal. No congestion.      Mouth/Throat:      Mouth: Mucous membranes are moist.   Eyes:      General: No scleral icterus.        Right eye: No discharge.         Left eye: No " discharge.      Extraocular Movements: Extraocular movements intact.      Conjunctiva/sclera: Conjunctivae normal.      Pupils: Pupils are equal, round, and reactive to light.   Cardiovascular:      Rate and Rhythm: Normal rate and regular rhythm.      Pulses: Normal pulses.      Heart sounds: Murmur (2/6 BITA) heard.      No gallop.   Pulmonary:      Effort: Pulmonary effort is normal. No respiratory distress.      Breath sounds: Normal breath sounds. No wheezing, rhonchi or rales.   Abdominal:      General: Abdomen is flat. Bowel sounds are normal. There is no distension.      Palpations: Abdomen is soft.      Tenderness: There is no abdominal tenderness. There is no right CVA tenderness, left CVA tenderness or guarding.   Musculoskeletal:         General: No swelling or tenderness. Normal range of motion.      Cervical back: Normal range of motion and neck supple. No rigidity.      Right lower leg: Edema (minimal) present.      Left lower leg: No edema (minimal).      Comments: Chronic venous stasis changes   Lymphadenopathy:      Cervical: No cervical adenopathy.   Skin:     General: Skin is warm.      Capillary Refill: Capillary refill takes 2 to 3 seconds.      Coloration: Skin is not jaundiced.      Findings: No bruising or rash.   Neurological:      General: No focal deficit present.      Mental Status: He is alert and oriented to person, place, and time. Mental status is at baseline.      Motor: No weakness.      Gait: Gait normal.   Psychiatric:         Mood and Affect: Mood normal.         Behavior: Behavior normal.

## 2024-01-11 NOTE — ASSESSMENT & PLAN NOTE
Lab Results   Component Value Date    HGBA1C 6.2 (H) 12/14/2023   Under control.    Continue current medication.    We will re-evaluate at next office visit.

## 2024-01-11 NOTE — ASSESSMENT & PLAN NOTE
Wt Readings from Last 3 Encounters:   01/11/24 109 kg (240 lb)   01/10/24 108 kg (237 lb)   11/28/23 108 kg (238 lb)     Under control.    Continue current medication.    Patient has been followed by cardiologist  We will re-evaluate at next office visit.

## 2024-01-11 NOTE — ASSESSMENT & PLAN NOTE
Patient was advised to lose weight.  Potential consequences of obesity discussed with patient.  Advised to have portion control no more than 60% of meal or may consider intermittent fasting  We will continue to monitor

## 2024-01-11 NOTE — ASSESSMENT & PLAN NOTE
Lab Results   Component Value Date    EGFR 52 09/01/2023    EGFR 55 08/31/2023    EGFR 50 08/30/2023    CREATININE 1.28 09/01/2023    CREATININE 1.23 08/31/2023    CREATININE 1.32 (H) 08/30/2023   creatinine and GFR stable   Will need periodic BMP  Avoid NSAIDs like ibuprofen Aleve Advil etc.  Avoid high potassium diet.  We will continue to monitor

## 2024-01-13 DIAGNOSIS — I25.10 CORONARY ARTERY DISEASE INVOLVING NATIVE CORONARY ARTERY OF NATIVE HEART WITHOUT ANGINA PECTORIS: ICD-10-CM

## 2024-01-15 RX ORDER — RANOLAZINE 500 MG/1
500 TABLET, EXTENDED RELEASE ORAL 2 TIMES DAILY
Qty: 180 TABLET | Refills: 0 | Status: SHIPPED | OUTPATIENT
Start: 2024-01-15

## 2024-01-29 ENCOUNTER — APPOINTMENT (EMERGENCY)
Dept: CT IMAGING | Facility: HOSPITAL | Age: 84
End: 2024-01-29
Payer: MEDICARE

## 2024-01-29 ENCOUNTER — APPOINTMENT (EMERGENCY)
Dept: RADIOLOGY | Facility: HOSPITAL | Age: 84
End: 2024-01-29
Payer: MEDICARE

## 2024-01-29 ENCOUNTER — HOSPITAL ENCOUNTER (EMERGENCY)
Facility: HOSPITAL | Age: 84
Discharge: HOME | End: 2024-01-29
Attending: EMERGENCY MEDICINE
Payer: MEDICARE

## 2024-01-29 VITALS
TEMPERATURE: 97.8 F | OXYGEN SATURATION: 97 % | SYSTOLIC BLOOD PRESSURE: 187 MMHG | RESPIRATION RATE: 18 BRPM | WEIGHT: 248.9 LBS | HEART RATE: 48 BPM | BODY MASS INDEX: 40.17 KG/M2 | DIASTOLIC BLOOD PRESSURE: 84 MMHG

## 2024-01-29 DIAGNOSIS — K85.90 PANCREATITIS: Primary | ICD-10-CM

## 2024-01-29 LAB
2HR DELTA HS TROPONIN: 0 NG/L
ALBUMIN SERPL BCP-MCNC: 3.9 G/DL (ref 3.5–5)
ALP SERPL-CCNC: 66 U/L (ref 34–104)
ALT SERPL W P-5'-P-CCNC: 13 U/L (ref 7–52)
ANION GAP SERPL CALCULATED.3IONS-SCNC: 7 MMOL/L
AST SERPL W P-5'-P-CCNC: 16 U/L (ref 13–39)
ATRIAL RATE: 49 BPM
BASOPHILS # BLD AUTO: 0.02 THOUSANDS/ÂΜL (ref 0–0.1)
BASOPHILS NFR BLD AUTO: 0 % (ref 0–1)
BILIRUB SERPL-MCNC: 0.5 MG/DL (ref 0.2–1)
BUN SERPL-MCNC: 24 MG/DL (ref 5–25)
CALCIUM SERPL-MCNC: 9.1 MG/DL (ref 8.4–10.2)
CARDIAC TROPONIN I PNL SERPL HS: 13 NG/L
CARDIAC TROPONIN I PNL SERPL HS: 13 NG/L
CHLORIDE SERPL-SCNC: 102 MMOL/L (ref 96–108)
CO2 SERPL-SCNC: 31 MMOL/L (ref 21–32)
CREAT SERPL-MCNC: 1.56 MG/DL (ref 0.6–1.3)
EOSINOPHIL # BLD AUTO: 0.12 THOUSAND/ÂΜL (ref 0–0.61)
EOSINOPHIL NFR BLD AUTO: 2 % (ref 0–6)
ERYTHROCYTE [DISTWIDTH] IN BLOOD BY AUTOMATED COUNT: 12.8 % (ref 11.6–15.1)
GFR SERPL CREATININE-BSD FRML MDRD: 41 ML/MIN/1.73SQ M
GLUCOSE SERPL-MCNC: 103 MG/DL (ref 65–140)
HCT VFR BLD AUTO: 39.6 % (ref 36.5–49.3)
HGB BLD-MCNC: 13.1 G/DL (ref 12–17)
IMM GRANULOCYTES # BLD AUTO: 0.03 THOUSAND/UL (ref 0–0.2)
IMM GRANULOCYTES NFR BLD AUTO: 0 % (ref 0–2)
LIPASE SERPL-CCNC: 184 U/L (ref 11–82)
LYMPHOCYTES # BLD AUTO: 1.63 THOUSANDS/ÂΜL (ref 0.6–4.47)
LYMPHOCYTES NFR BLD AUTO: 24 % (ref 14–44)
MCH RBC QN AUTO: 32.3 PG (ref 26.8–34.3)
MCHC RBC AUTO-ENTMCNC: 33.1 G/DL (ref 31.4–37.4)
MCV RBC AUTO: 98 FL (ref 82–98)
MONOCYTES # BLD AUTO: 0.65 THOUSAND/ÂΜL (ref 0.17–1.22)
MONOCYTES NFR BLD AUTO: 10 % (ref 4–12)
NEUTROPHILS # BLD AUTO: 4.41 THOUSANDS/ÂΜL (ref 1.85–7.62)
NEUTS SEG NFR BLD AUTO: 64 % (ref 43–75)
NRBC BLD AUTO-RTO: 0 /100 WBCS
P AXIS: 59 DEGREES
PLATELET # BLD AUTO: 168 THOUSANDS/UL (ref 149–390)
PMV BLD AUTO: 10.2 FL (ref 8.9–12.7)
POTASSIUM SERPL-SCNC: 3.8 MMOL/L (ref 3.5–5.3)
PR INTERVAL: 246 MS
PROT SERPL-MCNC: 7.1 G/DL (ref 6.4–8.4)
QRS AXIS: -55 DEGREES
QRSD INTERVAL: 158 MS
QT INTERVAL: 510 MS
QTC INTERVAL: 460 MS
RBC # BLD AUTO: 4.05 MILLION/UL (ref 3.88–5.62)
SODIUM SERPL-SCNC: 140 MMOL/L (ref 135–147)
T WAVE AXIS: 24 DEGREES
VENTRICULAR RATE: 49 BPM
WBC # BLD AUTO: 6.86 THOUSAND/UL (ref 4.31–10.16)

## 2024-01-29 PROCEDURE — 83690 ASSAY OF LIPASE: CPT | Performed by: EMERGENCY MEDICINE

## 2024-01-29 PROCEDURE — 36415 COLL VENOUS BLD VENIPUNCTURE: CPT | Performed by: EMERGENCY MEDICINE

## 2024-01-29 PROCEDURE — 85025 COMPLETE CBC W/AUTO DIFF WBC: CPT | Performed by: EMERGENCY MEDICINE

## 2024-01-29 PROCEDURE — 71045 X-RAY EXAM CHEST 1 VIEW: CPT

## 2024-01-29 PROCEDURE — 84484 ASSAY OF TROPONIN QUANT: CPT | Performed by: EMERGENCY MEDICINE

## 2024-01-29 PROCEDURE — 93005 ELECTROCARDIOGRAM TRACING: CPT

## 2024-01-29 PROCEDURE — G1004 CDSM NDSC: HCPCS

## 2024-01-29 PROCEDURE — 99285 EMERGENCY DEPT VISIT HI MDM: CPT | Performed by: EMERGENCY MEDICINE

## 2024-01-29 PROCEDURE — 99285 EMERGENCY DEPT VISIT HI MDM: CPT

## 2024-01-29 PROCEDURE — 96361 HYDRATE IV INFUSION ADD-ON: CPT

## 2024-01-29 PROCEDURE — 80053 COMPREHEN METABOLIC PANEL: CPT | Performed by: EMERGENCY MEDICINE

## 2024-01-29 PROCEDURE — 96360 HYDRATION IV INFUSION INIT: CPT

## 2024-01-29 PROCEDURE — 74177 CT ABD & PELVIS W/CONTRAST: CPT

## 2024-01-29 RX ORDER — SODIUM CHLORIDE 9 MG/ML
150 INJECTION, SOLUTION INTRAVENOUS CONTINUOUS
Status: DISCONTINUED | OUTPATIENT
Start: 2024-01-29 | End: 2024-01-29 | Stop reason: HOSPADM

## 2024-01-29 RX ORDER — OXYCODONE HYDROCHLORIDE 5 MG/1
5 TABLET ORAL EVERY 4 HOURS PRN
Qty: 15 TABLET | Refills: 0 | Status: SHIPPED | OUTPATIENT
Start: 2024-01-29 | End: 2024-02-08

## 2024-01-29 RX ORDER — ONDANSETRON 4 MG/1
4 TABLET, ORALLY DISINTEGRATING ORAL EVERY 8 HOURS PRN
Qty: 20 TABLET | Refills: 0 | Status: SHIPPED | OUTPATIENT
Start: 2024-01-29 | End: 2024-02-08

## 2024-01-29 RX ADMIN — IOHEXOL 100 ML: 350 INJECTION, SOLUTION INTRAVENOUS at 13:55

## 2024-01-29 RX ADMIN — SODIUM CHLORIDE 150 ML/HR: 0.9 INJECTION, SOLUTION INTRAVENOUS at 14:29

## 2024-01-29 NOTE — ED PROVIDER NOTES
History  Chief Complaint   Patient presents with    Chest Pain     Per EMS, pt reports CP starting in the morning. Hx of pancreatitis. Received one nitro.     83-year-old male comes in for evaluation of chest pain.  Patient states earlier today he began to have mid epigastric pain.  Patient states he has had pain like this before in the past that was related to having pancreatitis.  Patient states he had multiple admissions over the past year for pancreatitis at [facility].  Patient was unsure if it was his heart or if his pancreatitis is acting up.  He was given 1 sublingual nitro en route and his pain is now resolved.  Patient denies any fever chills chest pain shortness of breath patient is also currently taking amoxicillin for a sinus infection.      History provided by:  Patient and medical records   used: No    Chest Pain  Pain location:  Epigastric  Pain quality: stabbing    Pain radiates to:  Does not radiate  Pain radiates to the back: no    Pain severity:  Severe  Onset quality:  Sudden  Duration:  4 hours  Timing:  Constant  Progression:  Resolved (After nitroglycerin en route via EMS)  Chronicity:  New  Relieved by:  Nitroglycerin  Associated symptoms: no anorexia, no back pain, no fever, no palpitations and no shortness of breath    Risk factors: diabetes mellitus and hypertension        Prior to Admission Medications   Prescriptions Last Dose Informant Patient Reported? Taking?   BD Pen Needle Pascale U/F 32G X 4 MM MISC  Self Yes No   Sig: USE AS INSTRUCTED WITH INSULIN PEN   Docusate Sodium (DSS) 100 MG CAPS  Self Yes No   Sig: Take 100 mg by mouth daily   Fexofenadine HCl (MUCINEX ALLERGY PO)  Self Yes No   Sig: Take by mouth   Lancets (OneTouch Delica Plus [id number]) MISC  Self Yes No   Sig: TEST GLUCOSE 3 4 TIMES/DAY   Levemir FlexTouch 100 units/mL injection pen  Self Yes No   Si Units daily In AM   Multiple Vitamins-Minerals (MULTIVITAMIN ADULT PO)  Self Yes No   Sig:  Take by mouth daily   cloNIDine (CATAPRES) 0.1 mg tablet   No No   Sig: Take 1 tablet (0.1 mg total) by mouth every 12 (twelve) hours   fluticasone (FLONASE) 50 mcg/act nasal spray  Self No No   Si sprays into each nostril daily   insulin aspart (NovoLOG FlexPen) 100 UNIT/ML injection pen  Self Yes No   Sig: Inject under the skin 3 (three) times a day with meals Sliding scale as ordered TID with meals   ipratropium (ATROVENT) 0.03 % nasal spray   No No   Sig: SPRAY 2 SPRAYS INTO EACH NOSTRIL EVERY 12 HOURS.   lisinopril (ZESTRIL) 20 mg tablet   Yes No   Sig: Take 20 mg by mouth daily   metoprolol succinate (TOPROL-XL) 25 mg 24 hr tablet   No No   Sig: TAKE 1 TABLET (25 MG TOTAL) BY MOUTH DAILY.   pantoprazole (PROTONIX) 40 mg tablet  Self No No   Sig: Take 1 tablet (40 mg total) by mouth daily before breakfast   pyridoxine (VITAMIN B6) 100 mg tablet  Self Yes No   Sig: Take 100 mg by mouth daily   ranolazine (RANEXA) 500 mg 12 hr tablet   No No   Sig: TAKE 1 TABLET BY MOUTH TWICE A DAY   tamsulosin (FLOMAX) 0.4 mg   No No   Sig: Take 2 capsules (0.8 mg total) by mouth daily   torsemide (DEMADEX) 10 mg tablet   No No   Sig: TAKE 1 TABLET BY MOUTH EVERY DAY      Facility-Administered Medications: None       Past Medical History:   Diagnosis Date    Allergic     Arthritis     Chronic kidney disease     Chronic kidney disease (CKD) stage G3a/A1, moderately decreased glomerular filtration rate (GFR) between 45-59 mL/min/1.73 square meter and albuminuria creatinine ratio less than 30 mg/g (HCC)     Colon polyp     Diabetes mellitus (HCC)     GERD (gastroesophageal reflux disease)     Heart murmur     History of echocardiogram     HL (hearing loss)     Hyperlipidemia     Hypertension     Obesity     Pancreatitis     Sleep apnea, obstructive        Past Surgical History:   Procedure Laterality Date    CARDIAC CATHETERIZATION      CHOLECYSTECTOMY      COLONOSCOPY  2018    COLONOSCOPY      EYE SURGERY       HEMORRHOID SURGERY      JOINT REPLACEMENT  2017    knee    OTHER SURGICAL HISTORY      Stent     AK LAPAROSCOPY SURG CHOLECYSTECTOMY N/A 2023    Procedure: CHOLECYSTECTOMY LAPAROSCOPIC;  Surgeon: Alirio Hong MD;  Location:  MAIN OR;  Service: General    UPPER GASTROINTESTINAL ENDOSCOPY         Family History   Problem Relation Age of Onset    Heart disease Mother     Heart attack Mother         50s    Cancer Mother     Coronary artery disease Mother     Cancer Brother         Malignant tumor of lung     I have reviewed and agree with the history as documented.    E-Cigarette/Vaping    E-Cigarette Use Never User      E-Cigarette/Vaping Substances    Nicotine No     THC No     CBD No     Flavoring No     Other No     Unknown No      Social History     Tobacco Use    Smoking status: Former     Current packs/day: 0.00     Average packs/day: 2.0 packs/day for 15.0 years (30.0 ttl pk-yrs)     Types: Cigarettes, Pipe, Cigars     Start date: 1957     Quit date: 1972     Years since quittin.2     Passive exposure: Past    Smokeless tobacco: Never   Vaping Use    Vaping status: Never Used   Substance Use Topics    Alcohol use: Not Currently     Alcohol/week: 5.0 standard drinks of alcohol     Types: 5 Glasses of wine per week     Comment: no alcohol since March    Drug use: Never       Review of Systems   Constitutional:  Negative for fever.   Respiratory:  Negative for shortness of breath.    Cardiovascular:  Positive for chest pain. Negative for palpitations.   Gastrointestinal:  Negative for anorexia.   Musculoskeletal:  Negative for back pain.       Physical Exam  Physical Exam  Vitals and nursing note reviewed.   Constitutional:       Appearance: He is well-developed. He is not toxic-appearing.   HENT:      Head: Normocephalic and atraumatic.      Right Ear: Tympanic membrane normal.      Left Ear: Tympanic membrane normal.      Nose: Nose normal.   Eyes:      General: Lids are normal.       Conjunctiva/sclera: Conjunctivae normal.      Pupils: Pupils are equal, round, and reactive to light.   Neck:      Vascular: No carotid bruit or JVD.      Trachea: Trachea normal.   Cardiovascular:      Rate and Rhythm: Normal rate and regular rhythm. No extrasystoles are present.     Heart sounds: Normal heart sounds.   Pulmonary:      Effort: Pulmonary effort is normal.      Breath sounds: No decreased breath sounds, wheezing, rhonchi or rales.   Chest:      Chest wall: No deformity or tenderness.   Abdominal:      General: Bowel sounds are normal.      Palpations: Abdomen is soft.      Tenderness: There is abdominal tenderness (Mild) in the epigastric area. There is no guarding or rebound.   Musculoskeletal:      Right shoulder: No swelling, deformity or tenderness. Normal range of motion.      Cervical back: Normal range of motion and neck supple. No deformity, tenderness or bony tenderness.   Lymphadenopathy:      Cervical: No cervical adenopathy.   Skin:     General: Skin is warm and dry.   Neurological:      Mental Status: He is alert and oriented to person, place, and time.      Cranial Nerves: No cranial nerve deficit.      Sensory: No sensory deficit.      Deep Tendon Reflexes: Reflexes are normal and symmetric.   Psychiatric:         Speech: Speech normal.         Behavior: Behavior normal.         Thought Content: Thought content normal.         Judgment: Judgment normal.         Vital Signs  ED Triage Vitals   Temperature Pulse Respirations Blood Pressure SpO2   01/29/24 1247 01/29/24 1247 01/29/24 1247 01/29/24 1300 01/29/24 1247   97.8 °F (36.6 °C) (!) 50 18 (!) 186/84 95 %      Temp Source Heart Rate Source Patient Position - Orthostatic VS BP Location FiO2 (%)   01/29/24 1247 01/29/24 1247 01/29/24 1247 01/29/24 1247 --   Oral Monitor Lying Right arm       Pain Score       01/29/24 1247       2           Vitals:    01/29/24 1247 01/29/24 1300 01/29/24 1400 01/29/24 1615   BP:  (!) 186/84  (!)  187/84   Pulse: (!) 50 (!) 48 (!) 48 (!) 48   Patient Position - Orthostatic VS: Lying Lying           Visual Acuity      ED Medications  Medications   iohexol (OMNIPAQUE) 350 MG/ML injection (MULTI-DOSE) 100 mL (100 mL Intravenous Given 1/29/24 1355)       Diagnostic Studies  Results Reviewed       Procedure Component Value Units Date/Time    HS Troponin I 2hr [[id number]]  (Normal) Collected: 01/29/24 1511    Lab Status: Final result Specimen: Blood from Arm, Left Updated: 01/29/24 1620     hs TnI 2hr 13 ng/L      Delta 2hr hsTnI 0 ng/L     HS Troponin 0hr (reflex protocol) [[id number]]  (Normal) Collected: 01/29/24 1258    Lab Status: Final result Specimen: Blood from Arm, Left Updated: 01/29/24 1327     hs TnI 0hr 13 ng/L     Comprehensive metabolic panel [[id number]]  (Abnormal) Collected: 01/29/24 1258    Lab Status: Final result Specimen: Blood from Arm, Left Updated: 01/29/24 1322     Sodium 140 mmol/L      Potassium 3.8 mmol/L      Chloride 102 mmol/L      CO2 31 mmol/L      ANION GAP 7 mmol/L      BUN 24 mg/dL      Creatinine 1.56 mg/dL      Glucose 103 mg/dL      Calcium 9.1 mg/dL      AST 16 U/L      ALT 13 U/L      Alkaline Phosphatase 66 U/L      Total Protein 7.1 g/dL      Albumin 3.9 g/dL      Total Bilirubin 0.50 mg/dL      eGFR 41 ml/min/1.73sq m     Narrative:      National Kidney Disease Foundation guidelines for Chronic Kidney Disease (CKD):     Stage 1 with normal or high GFR (GFR > 90 mL/min/1.73 square meters)    Stage 2 Mild CKD (GFR = 60-89 mL/min/1.73 square meters)    Stage 3A Moderate CKD (GFR = 45-59 mL/min/1.73 square meters)    Stage 3B Moderate CKD (GFR = 30-44 mL/min/1.73 square meters)    Stage 4 Severe CKD (GFR = 15-29 mL/min/1.73 square meters)    Stage 5 End Stage CKD (GFR <15 mL/min/1.73 square meters)  Note: GFR calculation is accurate only with a steady state creatinine    Lipase [[id number]]  (Abnormal) Collected: 01/29/24 1258    Lab Status: Final result Specimen: Blood from  "Arm, Left Updated: 01/29/24 1322     Lipase 184 u/L     CBC and differential [[id number]] Collected: 01/29/24 1258    Lab Status: Final result Specimen: Blood from Arm, Left Updated: 01/29/24 1309     WBC 6.86 Thousand/uL      RBC 4.05 Million/uL      Hemoglobin 13.1 g/dL      Hematocrit 39.6 %      MCV 98 fL      MCH 32.3 pg      MCHC 33.1 g/dL      RDW 12.8 %      MPV 10.2 fL      Platelets 168 Thousands/uL      nRBC 0 /100 WBCs      Neutrophils Relative 64 %      Immat GRANS % 0 %      Lymphocytes Relative 24 %      Monocytes Relative 10 %      Eosinophils Relative 2 %      Basophils Relative 0 %      Neutrophils Absolute 4.41 Thousands/µL      Immature Grans Absolute 0.03 Thousand/uL      Lymphocytes Absolute 1.63 Thousands/µL      Monocytes Absolute 0.65 Thousand/µL      Eosinophils Absolute 0.12 Thousand/µL      Basophils Absolute 0.02 Thousands/µL                    CT abdomen pelvis with contrast   Final Result by Garrett Medrano MD (01/29 1430)      -Findings consistent with acute interstitial pancreatitis. No peripancreatic fluid collection.      -Prostamegaly. Correlate with PSA levels.         This examination was marked \"immediate notification\" in Epic in order to begin the standard process by which the radiology reading room liaison alerts the referring practitioner.         Workstation performed: [id number]         XR chest 1 view portable    (Results Pending)              Procedures  ECG 12 Lead Documentation Only    Date/Time: 1/29/2024 1:03 PM    Performed by: Phuong Vergara DO  Authorized by: Phuong Vergara DO    Patient location:  ED  Previous ECG:     Previous ECG:  Compared to current    Similarity:  No change  Rate:     ECG rate:  49  Rhythm:     Rhythm: sinus bradycardia    Conduction:     Conduction: abnormal      Abnormal conduction: complete RBBB and LAFB             ED Course             HEART Risk Score      Flowsheet Row Most Recent Value   Heart Score Risk Calculator    History 0 " Filed at: 01/29/2024 1641   ECG 1 Filed at: 01/29/2024 1641   Age 2 Filed at: 01/29/2024 1641   Risk Factors 2 Filed at: 01/29/2024 1641   Troponin 1 Filed at: 01/29/2024 1641   HEART Score 6 Filed at: 01/29/2024 1641                          SBIRT 22yo+      Flowsheet Row Most Recent Value   Initial Alcohol Screen: US AUDIT-C     1. How often do you have a drink containing alcohol? 0 Filed at: 01/29/2024 1418   2. How many drinks containing alcohol do you have on a typical day you are drinking?  0 Filed at: 01/29/2024 1418   3a. Male UNDER 65: How often do you have five or more drinks on one occasion? 0 Filed at: 01/29/2024 1418   3b. FEMALE Any Age, or MALE 65+: How often do you have 4 or more drinks on one occassion? 0 Filed at: 01/29/2024 1418   Audit-C Score 0 Filed at: 01/29/2024 1418   ALEA: How many times in the past year have you...    Used an illegal drug or used a prescription medication for non-medical reasons? Never Filed at: 01/29/2024 1418                      Medical Decision Making  Differential diagnosis includes but is not limited to ACS, pancreatitis, electrolyte abnormality, arrhythmia, pneumothorax, hemothorax,    Problems Addressed:  Pancreatitis: acute illness or injury     Details: Patient has had multiple bouts of pancreatitis.  He would prefer to go home with antinausea medication and pain medication and use a low residual diet at home.  He does not want to stay in the hospital.    Amount and/or Complexity of Data Reviewed  Independent Historian: spouse  External Data Reviewed: labs, ECG and notes.     Details: Reviewed past ED visits for pancreatitis.  Reviewed previous EKG reviewed previous labs done at these visits for pancreatitis  Labs: ordered. Decision-making details documented in ED Course.  Radiology: ordered. Decision-making details documented in ED Course.  ECG/medicine tests: ordered and independent interpretation performed. Decision-making details documented in ED  Course.    Risk  Prescription drug management.  Decision regarding hospitalization.  Risk Details: Discussed with patient coming into the hospital for acute pancreatitis.  Patient states that he has had that done this multiple times and this is not a bad episode.  He states he is actually pain-free at this time.  Patient would prefer to go home with pain medication antinausea medication and do a low residual diet.  Patient states he will return if he is unable to control his pain at home or begins to vomit or with any other concerns.  Patient is reliable and I feel comfortable sending him home.  Patient also has reliable family members that will make him return to the emergency department if he becomes more critically ill.             Disposition  Final diagnoses:   Pancreatitis     Time reflects when diagnosis was documented in both MDM as applicable and the Disposition within this note       Time User Action Codes Description Comment    1/29/2024  4:42 PM Phuong Vergara [K85.90] Pancreatitis           ED Disposition       ED Disposition   Discharge    Condition   Stable    Date/Time   Mon Jan 29, 2024 1645    Comment   Jake Claros discharge to home/self care.                   Follow-up Information       Follow up With Specialties Details Why Contact Info    Ashwin Leyva MD Internal Medicine Schedule an appointment as soon as possible for a visit   [address] [place] [address]  [phone]              Discharge Medication List as of 1/29/2024  4:46 PM        START taking these medications    Details   ondansetron (ZOFRAN-ODT) 4 mg disintegrating tablet Take 1 tablet (4 mg total) by mouth every 8 (eight) hours as needed for nausea or vomiting for up to 7 days, Starting Mon 1/29/2024, Until Mon 2/5/2024 at 2359, Normal      oxyCODONE (Roxicodone) 5 immediate release tablet Take 1 tablet (5 mg total) by mouth every 4 (four) hours as needed for moderate pain for up to 10 days Max  Daily Amount: 30 mg, Starting Mon 1/29/2024, Until Thu 2/8/2024 at 2359, Normal           CONTINUE these medications which have NOT CHANGED    Details   BD Pen Needle Pascale U/F 32G X 4 MM MISC USE AS INSTRUCTED WITH INSULIN PEN, Historical Med      cloNIDine (CATAPRES) 0.1 mg tablet Take 1 tablet (0.1 mg total) by mouth every 12 (twelve) hours, Starting Wed 11/29/2023, Normal      Docusate Sodium (DSS) 100 MG CAPS Take 100 mg by mouth daily, Historical Med      Fexofenadine HCl (MUCINEX ALLERGY PO) Take by mouth, Historical Med      fluticasone (FLONASE) 50 mcg/act nasal spray 2 sprays into each nostril daily, Starting Tue 8/15/2023, Normal      insulin aspart (NovoLOG FlexPen) 100 UNIT/ML injection pen Inject under the skin 3 (three) times a day with meals Sliding scale as ordered TID with meals, Historical Med      ipratropium (ATROVENT) 0.03 % nasal spray SPRAY 2 SPRAYS INTO EACH NOSTRIL EVERY 12 HOURS., Starting Fri 12/29/2023, Normal      Lancets (OneTouch Delica Plus [id number]) MISC TEST GLUCOSE 3 4 TIMES/DAY, Historical Med      Levemir FlexTouch 100 units/mL injection pen 36 Units daily In AM, Starting Thu 8/27/2020, Historical Med      lisinopril (ZESTRIL) 20 mg tablet Take 20 mg by mouth daily, Historical Med      metoprolol succinate (TOPROL-XL) 25 mg 24 hr tablet TAKE 1 TABLET (25 MG TOTAL) BY MOUTH DAILY., Starting Thu 1/4/2024, Normal      Multiple Vitamins-Minerals (MULTIVITAMIN ADULT PO) Take by mouth daily, Historical Med      pantoprazole (PROTONIX) 40 mg tablet Take 1 tablet (40 mg total) by mouth daily before breakfast, Starting Mon 10/16/2023, Until Thu 10/10/2024, Normal      pyridoxine (VITAMIN B6) 100 mg tablet Take 100 mg by mouth daily, Historical Med      ranolazine (RANEXA) 500 mg 12 hr tablet TAKE 1 TABLET BY MOUTH TWICE A DAY, Starting Mon 1/15/2024, Normal      tamsulosin (FLOMAX) 0.4 mg Take 2 capsules (0.8 mg total) by mouth daily, Starting Wed 11/22/2023, Normal      torsemide  (DEMADEX) 10 mg tablet TAKE 1 TABLET BY MOUTH EVERY DAY, Starting Wed 1/3/2024, Normal             No discharge procedures on file.    PDMP Review         Value Time User    PDMP Reviewed  Yes 7/10/2023  9:21 AM Myrtle Das MD            ED Provider  Electronically Signed by             Phuong Vergara DO  01/29/24 1943

## 2024-01-30 ENCOUNTER — TELEPHONE (OUTPATIENT)
Dept: FAMILY MEDICINE CLINIC | Facility: CLINIC | Age: 84
End: 2024-01-30

## 2024-01-30 ENCOUNTER — TELEPHONE (OUTPATIENT)
Age: 84
End: 2024-01-30

## 2024-01-30 NOTE — TELEPHONE ENCOUNTER
Lmtc to see how patient is feeling and if he wants to be seen for a hospital follow up or wait at his upcoming apt 02/08 ?

## 2024-01-30 NOTE — TELEPHONE ENCOUNTER
Patient returning our call. Message was left from [facility] office. Jake is a patient at  [facility].  I offered to schedule a follow up for the patient in Dr. Silverman's office, however he declined and stated he would take care of that.

## 2024-01-30 NOTE — TELEPHONE ENCOUNTER
Pt called to let us know he was in the ED yesterday for pancreatitis. Pt declined a f/u appt and will be in on 2/8/24 for his pre-op clearance.

## 2024-02-06 RX ORDER — ALLOPURINOL 100 MG/1
100 TABLET ORAL DAILY
COMMUNITY
Start: 2023-12-20

## 2024-02-07 ENCOUNTER — NURSE TRIAGE (OUTPATIENT)
Age: 84
End: 2024-02-07

## 2024-02-07 RX ORDER — INSULIN DEGLUDEC INJECTION 100 U/ML
20 INJECTION, SOLUTION SUBCUTANEOUS 2 TIMES DAILY
COMMUNITY
Start: 2023-12-20

## 2024-02-07 NOTE — TELEPHONE ENCOUNTER
"Reason for Disposition   Nursing judgment    Answer Assessment - Initial Assessment Questions  1. REASON FOR CALL or QUESTION: \"What is your reason for calling today?\" or \"How can I best help you?\" or \"What question do you have that I can help answer?\"          Pt. Has questions and would like Dr. Davis to call him at his earliest convenience    Protocols used: Information Only Call - No Triage-ADULT-[place]    "

## 2024-02-08 ENCOUNTER — OFFICE VISIT (OUTPATIENT)
Dept: FAMILY MEDICINE CLINIC | Facility: CLINIC | Age: 84
End: 2024-02-08
Payer: MEDICARE

## 2024-02-08 VITALS
BODY MASS INDEX: 38.25 KG/M2 | RESPIRATION RATE: 18 BRPM | DIASTOLIC BLOOD PRESSURE: 60 MMHG | HEART RATE: 57 BPM | HEIGHT: 66 IN | TEMPERATURE: 98 F | SYSTOLIC BLOOD PRESSURE: 122 MMHG | OXYGEN SATURATION: 98 % | WEIGHT: 238 LBS

## 2024-02-08 DIAGNOSIS — N18.32 STAGE 3B CHRONIC KIDNEY DISEASE (CKD) (HCC): ICD-10-CM

## 2024-02-08 DIAGNOSIS — E11.22 TYPE 2 DIABETES MELLITUS WITH STAGE 3B CHRONIC KIDNEY DISEASE, WITH LONG-TERM CURRENT USE OF INSULIN (HCC): ICD-10-CM

## 2024-02-08 DIAGNOSIS — I10 HTN (HYPERTENSION), BENIGN: ICD-10-CM

## 2024-02-08 DIAGNOSIS — Z01.818 PREOPERATIVE CLEARANCE: Primary | ICD-10-CM

## 2024-02-08 DIAGNOSIS — E66.01 OBESITY, MORBID (HCC): ICD-10-CM

## 2024-02-08 DIAGNOSIS — E78.2 MIXED HYPERLIPIDEMIA: ICD-10-CM

## 2024-02-08 DIAGNOSIS — I50.32 CHRONIC HEART FAILURE WITH PRESERVED EJECTION FRACTION (HCC): ICD-10-CM

## 2024-02-08 DIAGNOSIS — Z79.4 TYPE 2 DIABETES MELLITUS WITH STAGE 3B CHRONIC KIDNEY DISEASE, WITH LONG-TERM CURRENT USE OF INSULIN (HCC): ICD-10-CM

## 2024-02-08 DIAGNOSIS — I25.118 CORONARY ARTERY DISEASE OF NATIVE ARTERY OF NATIVE HEART WITH STABLE ANGINA PECTORIS (HCC): ICD-10-CM

## 2024-02-08 DIAGNOSIS — N18.32 TYPE 2 DIABETES MELLITUS WITH STAGE 3B CHRONIC KIDNEY DISEASE, WITH LONG-TERM CURRENT USE OF INSULIN (HCC): ICD-10-CM

## 2024-02-08 PROCEDURE — 99214 OFFICE O/P EST MOD 30 MIN: CPT

## 2024-02-08 NOTE — ASSESSMENT & PLAN NOTE
Lab Results   Component Value Date    EGFR 41 (L) 02/07/2024    EGFR 41 01/29/2024    EGFR 37 (L) 12/14/2023    CREATININE 1.66 (H) 02/07/2024    CREATININE 1.56 (H) 01/29/2024    CREATININE 1.80 (H) 12/14/2023   creatinine and GFR stable   Will need periodic BMP  Avoid NSAIDs like ibuprofen Aleve Advil etc.  Avoid high potassium diet.  We will continue to monitor

## 2024-02-08 NOTE — ASSESSMENT & PLAN NOTE
Under control.    Continue current medication.    Patient has been followed by cardiologist  We will re-evaluate at next office visit.

## 2024-02-08 NOTE — ASSESSMENT & PLAN NOTE
Higher than average risk for surgery.  Cardiology clearance reviewed  No contraindication for surgery.  Continue all medication postoperatively.  Call for any perioperative problems

## 2024-02-08 NOTE — ASSESSMENT & PLAN NOTE
Wt Readings from Last 3 Encounters:   02/08/24 108 kg (238 lb)   01/29/24 113 kg (248 lb 14.4 oz)   01/11/24 109 kg (240 lb)     Under control.    Continue current medication.    Patient has been followed by cardiologist  We will re-evaluate at next office visit.

## 2024-02-08 NOTE — PROGRESS NOTES
Assessment/Plan:         Problem List Items Addressed This Visit     HTN (hypertension), benign     Under control.  Continue lisinopril, metoprolol and clonidine.  We will continue to monitor         (HFpEF) heart failure with preserved ejection fraction (HCC)     Wt Readings from Last 3 Encounters:   02/08/24 108 kg (238 lb)   01/29/24 113 kg (248 lb 14.4 oz)   01/11/24 109 kg (240 lb)     Under control.    Continue current medication.    Patient has been followed by cardiologist  We will re-evaluate at next office visit.                 Obesity, morbid (HCC)     Patient was advised to lose weight.  Potential consequences of obesity discussed with patient.  Advised to have portion control no more than 60% of meal or may consider intermittent fasting  We will continue to monitor           Type 2 diabetes mellitus with chronic kidney disease, with long-term current use of insulin ([facility])       Lab Results   Component Value Date    HGBA1C 6.2 (H) 12/14/2023   Under control.    Continue current medication.    We will re-evaluate at next office visit.             Relevant Medications    Tresiba FlexTouch 100 units/mL injection pen    Mixed hyperlipidemia     Under control.  Continue fenofibrate.  We will continue to monitor         Coronary artery disease of native artery of native heart with stable angina pectoris (HCC)     Under control.    Continue current medication.    Patient has been followed by cardiologist  We will re-evaluate at next office visit.           Stage 3b chronic kidney disease (CKD) ([facility])     Lab Results   Component Value Date    EGFR 41 (L) 02/07/2024    EGFR 41 01/29/2024    EGFR 37 (L) 12/14/2023    CREATININE 1.66 (H) 02/07/2024    CREATININE 1.56 (H) 01/29/2024    CREATININE 1.80 (H) 12/14/2023   creatinine and GFR stable   Will need periodic BMP  Avoid NSAIDs like ibuprofen Aleve Advil etc.  Avoid high potassium diet.  We will continue to monitor            Preoperative clearance - Primary      Higher than average risk for surgery.  Cardiology clearance reviewed  No contraindication for surgery.  Continue all medication postoperatively.  Call for any perioperative problems                Subjective:      Patient ID: Jake Claros is a 83 y.o. male.    Patient here for preoperative clearance as going for right total knee replacement and review of chronic medical problems and  the labs and imaging if it is applicable.  Currently has no specific complaints other than mentioned in the review of systems  Denies chest pain, SOB, cough, abdominal pain, nausea, vomiting, fever, chills, lightheadedness, dizziness,headache, tingling or numbness.No bowel or bladder problem.          The following portions of the patient's history were reviewed and updated as appropriate:   Past Medical History:  He has a past medical history of Allergic, Arthritis, Chronic kidney disease (2021), Chronic kidney disease (CKD) stage G3a/A1, moderately decreased glomerular filtration rate (GFR) between 45-59 mL/min/1.73 square meter and albuminuria creatinine ratio less than 30 mg/g (HCC), Colon polyp, Diabetes mellitus (HCC), GERD (gastroesophageal reflux disease), Heart murmur, History of echocardiogram, HL (hearing loss), Hyperlipidemia, Hypertension, Obesity, Pancreatitis, and Sleep apnea, obstructive.,  _______________________________________________________________________  Medical Problems:  does not have any pertinent problems on file.,  _______________________________________________________________________  Past Surgical History:   has a past surgical history that includes Cardiac catheterization; Other surgical history; Joint replacement (2017); Colonoscopy (03/09/2018); Eye surgery; Colonoscopy; Upper gastrointestinal endoscopy; Hemorrhoid surgery; pr laparoscopy surg cholecystectomy (N/A, 06/28/2023); and Cholecystectomy.,  _______________________________________________________________________  Family History:  family  history includes Cancer in his brother and mother; Coronary artery disease in his mother; Heart attack in his mother; Heart disease in his mother.,  _______________________________________________________________________  Social History:   reports that he quit smoking about 51 years ago. His smoking use included cigarettes, pipe, and cigars. He started smoking about 66 years ago. He has a 30.0 pack-year smoking history. He has been exposed to tobacco smoke. He has never used smokeless tobacco. He reports that he does not currently use alcohol after a past usage of about 5.0 standard drinks of alcohol per week. He reports that he does not use drugs.,  _______________________________________________________________________  Allergies:  is allergic to januvia [sitagliptin], semaglutide, simvastatin, trulicity [dulaglutide], and wound dressing adhesive..  _______________________________________________________________________  Current Outpatient Medications   Medication Sig Dispense Refill   • allopurinol (ZYLOPRIM) 100 mg tablet Take 100 mg by mouth daily     • BD Pen Needle Pascale U/F 32G X 4 MM MISC USE AS INSTRUCTED WITH INSULIN PEN     • cloNIDine (CATAPRES) 0.1 mg tablet Take 1 tablet (0.1 mg total) by mouth every 12 (twelve) hours 180 tablet 0   • Docusate Sodium (DSS) 100 MG CAPS Take 100 mg by mouth daily     • Fexofenadine HCl (MUCINEX ALLERGY PO) Take by mouth     • fluticasone (FLONASE) 50 mcg/act nasal spray 2 sprays into each nostril daily 48 mL 0   • insulin aspart (NovoLOG FlexPen) 100 UNIT/ML injection pen Inject under the skin 3 (three) times a day with meals Sliding scale as ordered TID with meals     • ipratropium (ATROVENT) 0.03 % nasal spray SPRAY 2 SPRAYS INTO EACH NOSTRIL EVERY 12 HOURS. 30 mL 2   • Lancets (OneTouch Delica Plus [id number]) MISC TEST GLUCOSE 3 4 TIMES/DAY     • Levemir FlexTouch 100 units/mL injection pen 36 Units daily In AM     • lisinopril (ZESTRIL) 20 mg tablet Take 20 mg by  mouth daily     • metoprolol succinate (TOPROL-XL) 25 mg 24 hr tablet TAKE 1 TABLET (25 MG TOTAL) BY MOUTH DAILY. 90 tablet 1   • Multiple Vitamins-Minerals (MULTIVITAMIN ADULT PO) Take by mouth daily     • ondansetron (ZOFRAN-ODT) 4 mg disintegrating tablet Take 1 tablet (4 mg total) by mouth every 8 (eight) hours as needed for nausea or vomiting for up to 7 days 20 tablet 0   • oxyCODONE (Roxicodone) 5 immediate release tablet Take 1 tablet (5 mg total) by mouth every 4 (four) hours as needed for moderate pain for up to 10 days Max Daily Amount: 30 mg 15 tablet 0   • pantoprazole (PROTONIX) 40 mg tablet Take 1 tablet (40 mg total) by mouth daily before breakfast 90 tablet 1   • pyridoxine (VITAMIN B6) 100 mg tablet Take 100 mg by mouth daily     • ranolazine (RANEXA) 500 mg 12 hr tablet TAKE 1 TABLET BY MOUTH TWICE A  tablet 0   • tamsulosin (FLOMAX) 0.4 mg Take 2 capsules (0.8 mg total) by mouth daily 180 capsule 1   • torsemide (DEMADEX) 10 mg tablet TAKE 1 TABLET BY MOUTH EVERY DAY 90 tablet 1   • Tresiba FlexTouch 100 units/mL injection pen Inject 20 Units under the skin 2 (two) times a day       No current facility-administered medications for this visit.     _______________________________________________________________________  Review of Systems   Constitutional:  Negative for chills and fever.   HENT:  Negative for congestion, ear pain, postnasal drip and sore throat.    Eyes:  Negative for pain and visual disturbance.   Respiratory:  Negative for cough and shortness of breath.    Cardiovascular:  Positive for chest pain (Stable angina). Negative for palpitations.   Gastrointestinal:  Negative for abdominal pain, nausea and vomiting.   Endocrine: Negative for cold intolerance, heat intolerance, polydipsia, polyphagia and polyuria.   Genitourinary:  Negative for difficulty urinating, dysuria, frequency and hematuria.   Musculoskeletal:  Negative for arthralgias and back pain.   Skin:  Negative for  "color change and rash.   Neurological:  Negative for dizziness, seizures, syncope, light-headedness and headaches.   Psychiatric/Behavioral:  Negative for agitation and behavioral problems.    All other systems reviewed and are negative.        Objective:  Vitals:    02/08/24 0946   BP: 122/60   BP Location: Left arm   Patient Position: Sitting   Cuff Size: Standard   Pulse: 57   Resp: 18   Temp: 98 °F (36.7 °C)   TempSrc: Tympanic   SpO2: 98%   Weight: 108 kg (238 lb)   Height: 5' 6\" (1.676 m)     Body mass index is 38.41 kg/m².     Physical Exam  Vitals and nursing note reviewed.   Constitutional:       General: He is not in acute distress.     Appearance: Normal appearance. He is obese. He is not ill-appearing, toxic-appearing or diaphoretic.   HENT:      Head: Normocephalic and atraumatic.      Nose: Nose normal. No congestion.      Mouth/Throat:      Mouth: Mucous membranes are moist.   Eyes:      General: No scleral icterus.        Right eye: No discharge.         Left eye: No discharge.      Extraocular Movements: Extraocular movements intact.      Conjunctiva/sclera: Conjunctivae normal.      Pupils: Pupils are equal, round, and reactive to light.   Cardiovascular:      Rate and Rhythm: Normal rate and regular rhythm.      Pulses: Normal pulses.      Heart sounds: Murmur (2/6 BITA) heard.      No gallop.   Pulmonary:      Effort: Pulmonary effort is normal. No respiratory distress.      Breath sounds: Normal breath sounds. No wheezing, rhonchi or rales.   Abdominal:      General: Abdomen is flat. Bowel sounds are normal. There is no distension.      Palpations: Abdomen is soft.      Tenderness: There is no abdominal tenderness. There is no right CVA tenderness, left CVA tenderness or guarding.   Musculoskeletal:         General: No swelling or tenderness. Normal range of motion.      Cervical back: Normal range of motion and neck supple. No rigidity.      Right lower leg: Edema (minimal) present.      Left " lower leg: Edema (minimal) present.      Comments: Chronic venous stasis changes   Lymphadenopathy:      Cervical: No cervical adenopathy.   Skin:     General: Skin is warm.      Capillary Refill: Capillary refill takes 2 to 3 seconds.      Coloration: Skin is not jaundiced.      Findings: No bruising or rash.   Neurological:      General: No focal deficit present.      Mental Status: He is alert and oriented to person, place, and time. Mental status is at baseline.      Motor: No weakness.      Gait: Gait normal.   Psychiatric:         Mood and Affect: Mood normal.         Behavior: Behavior normal.

## 2024-02-14 ENCOUNTER — TELEPHONE (OUTPATIENT)
Age: 84
End: 2024-02-14

## 2024-02-14 DIAGNOSIS — I10 HYPERTENSION, UNSPECIFIED TYPE: Primary | ICD-10-CM

## 2024-02-14 RX ORDER — LISINOPRIL 20 MG/1
20 TABLET ORAL DAILY
Qty: 90 TABLET | Refills: 1 | Status: SHIPPED | OUTPATIENT
Start: 2024-02-14

## 2024-02-14 NOTE — TELEPHONE ENCOUNTER
Reason for call: Famotidine not on active med list. Please send to office   [x] Refill   [] Prior Auth  [] Other:     Office:   [x] PCP/Provider - Dr Leyva  [] Specialty/Provider -     Medication:   Lisinopril 20 mg daily  Famotidine 40 mg daily    Dose/Frequency: see christian    Quantity: 90D supply    Pharmacy: [facility] on file    Does the patient have enough for 3 days?   [x] Yes   [] No - Send as HP to POD

## 2024-02-15 ENCOUNTER — TELEPHONE (OUTPATIENT)
Age: 84
End: 2024-02-15

## 2024-02-15 NOTE — TELEPHONE ENCOUNTER
I called and spoke to patient. He states he has been taking famotidine 40 mg daily at bedtime. I advised him I didn't see it in his medication list. He is going to check the bottle when he gets home to see who ordered it and I told him I will call him back tomorrow morning at 10 am per his request to see what provider ordered it. He verbalized understanding.

## 2024-02-15 NOTE — TELEPHONE ENCOUNTER
Jake called, he's aware the scripts won't be taken care of until the providers are back in office, he does need to add the famotidine 40 mg, 90 D supply as well.

## 2024-02-15 NOTE — TELEPHONE ENCOUNTER
Patients GI provider:       Number to return call: ( [phone]    Reason for call: Pt calling requesting call back from dr. Larson. Pt wants new selma prescribed was told will need appt. Pt wants phone call 1st.     Scheduled procedure/appointment date if applicable: Apt/procedure

## 2024-02-16 DIAGNOSIS — K21.9 GERD WITHOUT ESOPHAGITIS: ICD-10-CM

## 2024-02-16 RX ORDER — PANTOPRAZOLE SODIUM 40 MG/1
40 TABLET, DELAYED RELEASE ORAL
Qty: 90 TABLET | Refills: 3 | Status: SHIPPED | OUTPATIENT
Start: 2024-02-16 | End: 2025-02-10

## 2024-02-16 NOTE — TELEPHONE ENCOUNTER
I called and spoke to patient. He states he is taking Famotidine 40 mg daily at bedtime. It was previously ordered by PCP. Patient is requesting a refill to be sent to pharmacy [facility].    Scheduling- please call patient to schedule ov for continued medication refills. Thank you

## 2024-02-19 ENCOUNTER — TELEPHONE (OUTPATIENT)
Age: 84
End: 2024-02-19

## 2024-02-19 NOTE — TELEPHONE ENCOUNTER
Patients GI provider:  Dr. Davis    Number to return call: [phone]    Reason for call: Pt calling to set up appt with Dr. Davis. Pt needs a follow-up for med refill, no appt available before June. Pt only wants to see Dr. Davis.     Scheduled procedure/appointment date if applicable: N/A

## 2024-02-25 DIAGNOSIS — I10 ESSENTIAL (PRIMARY) HYPERTENSION: Primary | ICD-10-CM

## 2024-02-25 RX ORDER — CLONIDINE HYDROCHLORIDE 0.1 MG/1
0.1 TABLET ORAL EVERY 12 HOURS
Qty: 90 TABLET | Refills: 1 | Status: SHIPPED | OUTPATIENT
Start: 2024-02-25

## 2024-02-27 ENCOUNTER — TELEPHONE (OUTPATIENT)
Dept: FAMILY MEDICINE CLINIC | Facility: CLINIC | Age: 84
End: 2024-02-27

## 2024-02-27 NOTE — TELEPHONE ENCOUNTER
Patient was on the schedule for a med clearance due to surgery being postponed.He did not have a new surgery date so called and spoke to his wife. I explained we cancelled the appointment because without knowing the new date clearance is good for 30 days. As soon as he gets notified of the new surgery date we will get him on the schedule. His wife understood and will let him know as he was not at home.

## 2024-03-07 ENCOUNTER — RA CDI HCC (OUTPATIENT)
Dept: OTHER | Facility: HOSPITAL | Age: 84
End: 2024-03-07

## 2024-03-11 ENCOUNTER — CONSULT (OUTPATIENT)
Dept: FAMILY MEDICINE CLINIC | Facility: CLINIC | Age: 84
End: 2024-03-11
Payer: MEDICARE

## 2024-03-11 ENCOUNTER — TELEPHONE (OUTPATIENT)
Age: 84
End: 2024-03-11

## 2024-03-11 VITALS
BODY MASS INDEX: 39.34 KG/M2 | OXYGEN SATURATION: 91 % | HEIGHT: 66 IN | TEMPERATURE: 98.2 F | SYSTOLIC BLOOD PRESSURE: 136 MMHG | WEIGHT: 244.8 LBS | RESPIRATION RATE: 16 BRPM | HEART RATE: 60 BPM | DIASTOLIC BLOOD PRESSURE: 84 MMHG

## 2024-03-11 DIAGNOSIS — I25.118 CORONARY ARTERY DISEASE OF NATIVE ARTERY OF NATIVE HEART WITH STABLE ANGINA PECTORIS (HCC): ICD-10-CM

## 2024-03-11 DIAGNOSIS — E11.22 TYPE 2 DIABETES MELLITUS WITH STAGE 3B CHRONIC KIDNEY DISEASE, WITH LONG-TERM CURRENT USE OF INSULIN (HCC): ICD-10-CM

## 2024-03-11 DIAGNOSIS — Z79.4 TYPE 2 DIABETES MELLITUS WITH STAGE 3B CHRONIC KIDNEY DISEASE, WITH LONG-TERM CURRENT USE OF INSULIN (HCC): ICD-10-CM

## 2024-03-11 DIAGNOSIS — Z01.818 PREOPERATIVE CLEARANCE: Primary | ICD-10-CM

## 2024-03-11 DIAGNOSIS — I50.32 CHRONIC HEART FAILURE WITH PRESERVED EJECTION FRACTION (HCC): ICD-10-CM

## 2024-03-11 DIAGNOSIS — N18.32 TYPE 2 DIABETES MELLITUS WITH STAGE 3B CHRONIC KIDNEY DISEASE, WITH LONG-TERM CURRENT USE OF INSULIN (HCC): ICD-10-CM

## 2024-03-11 DIAGNOSIS — I10 HTN (HYPERTENSION), BENIGN: ICD-10-CM

## 2024-03-11 DIAGNOSIS — E78.2 MIXED HYPERLIPIDEMIA: ICD-10-CM

## 2024-03-11 DIAGNOSIS — N18.32 STAGE 3B CHRONIC KIDNEY DISEASE (CKD) (HCC): ICD-10-CM

## 2024-03-11 PROCEDURE — G2211 COMPLEX E/M VISIT ADD ON: HCPCS

## 2024-03-11 PROCEDURE — 99215 OFFICE O/P EST HI 40 MIN: CPT

## 2024-03-11 NOTE — ASSESSMENT & PLAN NOTE
Higher than average risk for surgery  Patient did get clearance from his cardiologist.  Hold Levemir evening dosage as directed by your endocrinologist.  Patient will need clearance from his gastroenterologist.  No contraindication for surgery.  Continue all medication postoperatively.  Call for any perioperative problems

## 2024-03-11 NOTE — ASSESSMENT & PLAN NOTE
Lab Results   Component Value Date    EGFR 43 (L) 03/08/2024    EGFR 46 (L) 02/25/2024    EGFR 41 (L) 02/07/2024    CREATININE 1.59 (H) 03/08/2024    CREATININE 1.52 (H) 02/25/2024    CREATININE 1.66 (H) 02/07/2024   creatinine and GFR stable   Will need periodic BMP  Avoid NSAIDs like ibuprofen Aleve Advil etc.  Avoid high potassium diet.  We will continue to monitor

## 2024-03-11 NOTE — TELEPHONE ENCOUNTER
Patients GI provider:  Dr. Davis    Number to return call: [phone]    Reason for call:Pt is scheduled knee replacement surgery on 03/21/24 and is seeing Dr Davis on 03/20/24. He needs a letter or note to go to Dr Malik stating that it is ok for him to have the surgery. He would like the note to go to Dr Malik ASAP. Pt will be happy to pick it up. Please reach out to pt when completed. Pt states he needs it sooner than later.    Scheduled procedure/appointment date if applicable: Appt 03/20/24

## 2024-03-11 NOTE — ASSESSMENT & PLAN NOTE
Under control.  Continue clonidine, lisinopril, metoprolol and torsemide.  We will continue to monitor

## 2024-03-11 NOTE — ASSESSMENT & PLAN NOTE
Wt Readings from Last 3 Encounters:   03/11/24 111 kg (244 lb 12.8 oz)   02/08/24 108 kg (238 lb)   01/29/24 113 kg (248 lb 14.4 oz)     Under control.    Continue current medication.    Patient has been followed by cardiologist  We will re-evaluate at next office visit.

## 2024-03-11 NOTE — PROGRESS NOTES
Assessment/Plan:         Problem List Items Addressed This Visit     HTN (hypertension), benign     Under control.  Continue clonidine, lisinopril, metoprolol and torsemide.  We will continue to monitor         (HFpEF) heart failure with preserved ejection fraction (HCC)     Wt Readings from Last 3 Encounters:   03/11/24 111 kg (244 lb 12.8 oz)   02/08/24 108 kg (238 lb)   01/29/24 113 kg (248 lb 14.4 oz)     Under control.    Continue current medication.    Patient has been followed by cardiologist  We will re-evaluate at next office visit.                 Type 2 diabetes mellitus with chronic kidney disease, with long-term current use of insulin ([facility])       Lab Results   Component Value Date    HGBA1C 6.2 (H) 12/14/2023   Under control.    Continue current medication.    We will re-evaluate at next office visit.           Mixed hyperlipidemia     Under reasonable control.  We will continue to monitor         Coronary artery disease of native artery of native heart with stable angina pectoris (HCC)     Under control.    Continue current medication.    Patient has been followed by cardiologist  We will re-evaluate at next office visit.           Stage 3b chronic kidney disease (CKD) ([facility])     Lab Results   Component Value Date    EGFR 43 (L) 03/08/2024    EGFR 46 (L) 02/25/2024    EGFR 41 (L) 02/07/2024    CREATININE 1.59 (H) 03/08/2024    CREATININE 1.52 (H) 02/25/2024    CREATININE 1.66 (H) 02/07/2024   creatinine and GFR stable   Will need periodic BMP  Avoid NSAIDs like ibuprofen Aleve Advil etc.  Avoid high potassium diet.  We will continue to monitor            Preoperative clearance - Primary     Higher than average risk for surgery  Patient did get clearance from his cardiologist.  Hold Levemir evening dosage as directed by your endocrinologist.  Patient will need clearance from his gastroenterologist.  No contraindication for surgery.  Continue all medication postoperatively.  Call for any perioperative  problems                Subjective:      Patient ID: Jake Claros is a 83 y.o. male.    Patient here for preoperative evaluation is going for right total knee replacement and review of chronic medical problems and  the labs and imaging if it is applicable.  Currently has no specific complaints other than mentioned in the review of systems  Denies chest pain, SOB, cough, abdominal pain, nausea, vomiting, fever, chills, lightheadedness, dizziness,headache, tingling or numbness.No bowel or bladder problem.          The following portions of the patient's history were reviewed and updated as appropriate:   Past Medical History:  He has a past medical history of Allergic, Arthritis, Chronic kidney disease (2021), Chronic kidney disease (CKD) stage G3a/A1, moderately decreased glomerular filtration rate (GFR) between 45-59 mL/min/1.73 square meter and albuminuria creatinine ratio less than 30 mg/g (HCC), Colon polyp, Diabetes mellitus (HCC), GERD (gastroesophageal reflux disease), Heart murmur, History of echocardiogram, HL (hearing loss), Hyperlipidemia, Hypertension, Obesity, Pancreatitis, and Sleep apnea, obstructive.,  _______________________________________________________________________  Medical Problems:  does not have any pertinent problems on file.,  _______________________________________________________________________  Past Surgical History:   has a past surgical history that includes Cardiac catheterization; Other surgical history; Joint replacement (2017); Colonoscopy (03/09/2018); Eye surgery; Colonoscopy; Upper gastrointestinal endoscopy; Hemorrhoid surgery; pr laparoscopy surg cholecystectomy (N/A, 06/28/2023); and Cholecystectomy.,  _______________________________________________________________________  Family History:  family history includes Cancer in his brother and mother; Coronary artery disease in his mother; Heart attack in his mother; Heart disease in his  mother.,  _______________________________________________________________________  Social History:   reports that he quit smoking about 51 years ago. His smoking use included cigarettes, pipe, and cigars. He started smoking about 66 years ago. He has a 30 pack-year smoking history. He has been exposed to tobacco smoke. He has never used smokeless tobacco. He reports that he does not currently use alcohol after a past usage of about 5.0 standard drinks of alcohol per week. He reports that he does not use drugs.,  _______________________________________________________________________  Allergies:  is allergic to januvia [sitagliptin], semaglutide, simvastatin, trulicity [dulaglutide], wound dressing adhesive, iodinated contrast media, medical tape, and molds & smuts..  _______________________________________________________________________  Current Outpatient Medications   Medication Sig Dispense Refill   • allopurinol (ZYLOPRIM) 100 mg tablet Take 100 mg by mouth daily     • BD Pen Needle Pascale U/F 32G X 4 MM MISC USE AS INSTRUCTED WITH INSULIN PEN     • cloNIDine (CATAPRES) 0.1 mg tablet TAKE 1 TABLET BY MOUTH EVERY 12 HOURS. 90 tablet 1   • Docusate Sodium (DSS) 100 MG CAPS Take 100 mg by mouth daily     • Fexofenadine HCl (MUCINEX ALLERGY PO) Take by mouth     • fluticasone (FLONASE) 50 mcg/act nasal spray 2 sprays into each nostril daily 48 mL 0   • insulin aspart (NovoLOG FlexPen) 100 UNIT/ML injection pen Inject under the skin 3 (three) times a day with meals Sliding scale as ordered TID with meals     • ipratropium (ATROVENT) 0.03 % nasal spray SPRAY 2 SPRAYS INTO EACH NOSTRIL EVERY 12 HOURS. 30 mL 2   • Lancets (OneTouch Delica Plus [id number]) MISC TEST GLUCOSE 3 4 TIMES/DAY     • Levemir FlexTouch 100 units/mL injection pen 36 Units daily In AM     • lisinopril (ZESTRIL) 20 mg tablet Take 1 tablet (20 mg total) by mouth daily 90 tablet 1   • metoprolol succinate (TOPROL-XL) 25 mg 24 hr tablet TAKE 1 TABLET  (25 MG TOTAL) BY MOUTH DAILY. 90 tablet 1   • Multiple Vitamins-Minerals (MULTIVITAMIN ADULT PO) Take by mouth daily     • ondansetron (ZOFRAN-ODT) 4 mg disintegrating tablet Take 1 tablet (4 mg total) by mouth every 8 (eight) hours as needed for nausea or vomiting for up to 7 days 20 tablet 0   • pantoprazole (PROTONIX) 40 mg tablet Take 1 tablet (40 mg total) by mouth daily before breakfast 90 tablet 3   • pyridoxine (VITAMIN B6) 100 mg tablet Take 100 mg by mouth daily     • ranolazine (RANEXA) 500 mg 12 hr tablet TAKE 1 TABLET BY MOUTH TWICE A  tablet 0   • tamsulosin (FLOMAX) 0.4 mg Take 2 capsules (0.8 mg total) by mouth daily 180 capsule 1   • torsemide (DEMADEX) 10 mg tablet TAKE 1 TABLET BY MOUTH EVERY DAY 90 tablet 1   • Tresiba FlexTouch 100 units/mL injection pen Inject 20 Units under the skin 2 (two) times a day       No current facility-administered medications for this visit.     _______________________________________________________________________  Review of Systems   Constitutional:  Negative for chills and fever.   HENT:  Negative for congestion, ear pain, postnasal drip and sore throat.    Eyes:  Negative for pain and visual disturbance.   Respiratory:  Negative for cough and shortness of breath.    Cardiovascular:  Negative for chest pain and palpitations.   Gastrointestinal:  Positive for abdominal pain (Off-and-on epigastric). Negative for nausea and vomiting.   Endocrine: Negative for cold intolerance, heat intolerance, polydipsia, polyphagia and polyuria.   Genitourinary:  Negative for difficulty urinating, dysuria, frequency and hematuria.   Musculoskeletal:  Positive for arthralgias (Right knee). Negative for back pain.   Skin:  Negative for color change and rash.   Neurological:  Negative for dizziness, seizures, syncope, light-headedness and headaches.   Psychiatric/Behavioral:  Negative for agitation and behavioral problems.    All other systems reviewed and are negative.     "    Objective:  Vitals:    03/11/24 1448   BP: 136/84   BP Location: Left arm   Patient Position: Sitting   Cuff Size: Standard   Pulse: 60   Resp: 16   Temp: 98.2 °F (36.8 °C)   TempSrc: Tympanic   SpO2: 91%   Weight: 111 kg (244 lb 12.8 oz)   Height: 5' 6\" (1.676 m)     Body mass index is 39.51 kg/m².     Physical Exam  Vitals and nursing note reviewed.   Constitutional:       General: He is not in acute distress.     Appearance: Normal appearance. He is obese. He is not ill-appearing, toxic-appearing or diaphoretic.   HENT:      Head: Normocephalic and atraumatic.      Nose: Nose normal. No congestion.      Mouth/Throat:      Mouth: Mucous membranes are moist.   Eyes:      General: No scleral icterus.        Right eye: No discharge.         Left eye: No discharge.      Extraocular Movements: Extraocular movements intact.      Conjunctiva/sclera: Conjunctivae normal.      Pupils: Pupils are equal, round, and reactive to light.   Cardiovascular:      Rate and Rhythm: Normal rate and regular rhythm.      Pulses: Normal pulses.      Heart sounds: Murmur (2/6 BITA) heard.      No gallop.   Pulmonary:      Effort: Pulmonary effort is normal. No respiratory distress.      Breath sounds: Normal breath sounds. No wheezing, rhonchi or rales.   Abdominal:      General: Abdomen is flat. Bowel sounds are normal. There is no distension.      Palpations: Abdomen is soft.      Tenderness: There is no abdominal tenderness. There is no right CVA tenderness, left CVA tenderness or guarding.   Musculoskeletal:         General: No swelling or tenderness. Normal range of motion.      Cervical back: Normal range of motion and neck supple. No rigidity.      Right lower leg: Edema (minimal) present.      Left lower leg: Edema (minimal) present.      Comments: Chronic venous stasis changes   Lymphadenopathy:      Cervical: No cervical adenopathy.   Skin:     General: Skin is warm.      Capillary Refill: Capillary refill takes 2 to 3 " seconds.      Coloration: Skin is not jaundiced.      Findings: No bruising or rash.   Neurological:      General: No focal deficit present.      Mental Status: He is alert and oriented to person, place, and time. Mental status is at baseline.      Motor: No weakness.      Gait: Gait normal.   Psychiatric:         Mood and Affect: Mood normal.         Behavior: Behavior normal.

## 2024-03-20 ENCOUNTER — OFFICE VISIT (OUTPATIENT)
Dept: GASTROENTEROLOGY | Facility: CLINIC | Age: 84
End: 2024-03-20
Payer: MEDICARE

## 2024-03-20 VITALS
HEART RATE: 51 BPM | DIASTOLIC BLOOD PRESSURE: 74 MMHG | WEIGHT: 245 LBS | HEIGHT: 66 IN | BODY MASS INDEX: 39.37 KG/M2 | SYSTOLIC BLOOD PRESSURE: 176 MMHG

## 2024-03-20 DIAGNOSIS — K85.90 ACUTE RECURRENT PANCREATITIS: Primary | ICD-10-CM

## 2024-03-20 DIAGNOSIS — Z90.49 HISTORY OF CHOLECYSTECTOMY: ICD-10-CM

## 2024-03-20 DIAGNOSIS — K21.9 GERD WITHOUT ESOPHAGITIS: ICD-10-CM

## 2024-03-20 DIAGNOSIS — Q45.3 ABNORMALITY OF PANCREATIC DUCT: ICD-10-CM

## 2024-03-20 PROCEDURE — 99214 OFFICE O/P EST MOD 30 MIN: CPT | Performed by: INTERNAL MEDICINE

## 2024-03-20 NOTE — PROGRESS NOTES
[facility] [place] - Outpatient Follow-up Note  Jake Claros 83 y.o. male MRN: [id number]  Encounter: [id number]          ASSESSMENT AND PLAN:      1. Acute recurrent pancreatitis    2. Abnormality of pancreatic duct    3. History of cholecystectomy    4. GERD without esophagitis      Patient has had recurrent hospitalizations for pancreatitis, 7 in 2023, unclear etiology extensive evaluation including ERCP sphincterotomy gallbladder surgery unrevealing.  Pancreatic ductal cannulation unsuccessful.  I spoke Dr. Wright who preferred outpatient goes to [place] for ERP, patient does not want to go there.  He prefers Dr. Wright gives another try.  He is going for knee surgery tomorrow, will let him recover there couple months then may plan ERP.  Patient in agreement with this plan, he understands complication of pancreatitis, if any problems sooner he will contact us.  ______________________________________________________________________    SUBJECTIVE:      Patient has a history of recurrent pancreatitis, has required several hospitalizations, extensive evaluation has been done for etiology, has had gallbladder surgery done last year in June still has had recurrent pancreatitis after that.  MRI MRCP had suggested some narrowing of the pancreatic duct with a suggestion of a nodule, EUS done in September FNA negative for any significant findings thought to have focal pancreatitis.  ERCP attempted by Dr. Wright at [place], unsuccessful for any pancreatic duct cannulation.  Patient has had some abdominal pain since and has taken symptomatic treatment.  Denies dysphagia coughing choking spells appetite is fair weight stable bowels are regular, doing going for knee surgery tomorrow.      REVIEW OF SYSTEMS IS OTHERWISE NEGATIVE.      Historical Information   Past Medical History:   Diagnosis Date   • Allergic    • Arthritis    • Chronic kidney disease 2021   • Chronic kidney disease (CKD) stage  G3a/A1, moderately decreased glomerular filtration rate (GFR) between 45-59 mL/min/1.73 square meter and albuminuria creatinine ratio less than 30 mg/g (HCC)    • Colon polyp    • Diabetes mellitus (HCC)    • GERD (gastroesophageal reflux disease)    • Heart murmur    • History of echocardiogram    • HL (hearing loss)    • Hyperlipidemia    • Hypertension    • Obesity    • Pancreatitis    • Sleep apnea, obstructive      Past Surgical History:   Procedure Laterality Date   • CARDIAC CATHETERIZATION     • CHOLECYSTECTOMY     • COLONOSCOPY  2018   • COLONOSCOPY     • EYE SURGERY     • HEMORRHOID SURGERY     • JOINT REPLACEMENT  2017    knee   • OTHER SURGICAL HISTORY      Stent    • VT LAPAROSCOPY SURG CHOLECYSTECTOMY N/A 2023    Procedure: CHOLECYSTECTOMY LAPAROSCOPIC;  Surgeon: Alirio Hong MD;  Location:  MAIN OR;  Service: General   • UPPER GASTROINTESTINAL ENDOSCOPY       Social History   Social History     Substance and Sexual Activity   Alcohol Use Not Currently   • Alcohol/week: 5.0 standard drinks of alcohol   • Types: 5 Glasses of wine per week    Comment: no alcohol since March     Social History     Substance and Sexual Activity   Drug Use Never     Social History     Tobacco Use   Smoking Status Former   • Current packs/day: 0.00   • Average packs/day: 2.0 packs/day for 15.0 years (30.0 ttl pk-yrs)   • Types: Cigarettes, Pipe, Cigars   • Start date: 1957   • Quit date: 1972   • Years since quittin.4   • Passive exposure: Past   Smokeless Tobacco Never     Family History   Problem Relation Age of Onset   • Heart disease Mother    • Heart attack Mother         50s   • Cancer Mother    • Coronary artery disease Mother    • Cancer Brother         Malignant tumor of lung       Meds/Allergies       Current Outpatient Medications:   •  allopurinol (ZYLOPRIM) 100 mg tablet  •  BD Pen Needle Pascale U/F 32G X 4 MM MISC  •  cloNIDine (CATAPRES) 0.1 mg tablet  •  Docusate Sodium  "(DSS) 100 MG CAPS  •  Fexofenadine HCl (MUCINEX ALLERGY PO)  •  fluticasone (FLONASE) 50 mcg/act nasal spray  •  insulin aspart (NovoLOG FlexPen) 100 UNIT/ML injection pen  •  ipratropium (ATROVENT) 0.03 % nasal spray  •  Lancets (OneTouch Delica Plus [id number]) MISC  •  Levemir FlexTouch 100 units/mL injection pen  •  lisinopril (ZESTRIL) 20 mg tablet  •  metoprolol succinate (TOPROL-XL) 25 mg 24 hr tablet  •  Multiple Vitamins-Minerals (MULTIVITAMIN ADULT PO)  •  pantoprazole (PROTONIX) 40 mg tablet  •  pyridoxine (VITAMIN B6) 100 mg tablet  •  ranolazine (RANEXA) 500 mg 12 hr tablet  •  tamsulosin (FLOMAX) 0.4 mg  •  torsemide (DEMADEX) 10 mg tablet  •  Tresiba FlexTouch 100 units/mL injection pen  •  ondansetron (ZOFRAN-ODT) 4 mg disintegrating tablet    Allergies   Allergen Reactions   • Januvia [Sitagliptin] Other (See Comments)     pancreatitis   • Semaglutide Other (See Comments)     pancreatits   • Simvastatin Myalgia   • Trulicity [Dulaglutide] Other (See Comments)     Pancreatitis   • Wound Dressing Adhesive Other (See Comments)     Burning sensation   • Iodinated Contrast Media Other (See Comments)     Per pt, \"got real hot feeling\"    Getting warmer and warmer   • Medical Tape Rash     \"A burn on the skin\" , mackenzie on sensitive areas.    Does ok w/ surgical glue   • Molds & Smuts Allergic Rhinitis     Nasal sympt           Objective     Blood pressure (!) 176/74, pulse (!) 51, height 5' 6\" (1.676 m), weight 111 kg (245 lb). Body mass index is 39.54 kg/m².      PHYSICAL EXAM:      General Appearance:   Alert, cooperative, no distress   HEENT:   Normocephalic, atraumatic, anicteric.     Neck:  Supple, symmetrical, trachea midline   Lungs:   Clear to auscultation bilaterally; no rales, rhonchi or wheezing; respirations unlabored    Heart::   Regular rate and rhythm; no murmur.   Abdomen:   Soft, non-tender, non-distended; normal bowel sounds; no masses, no organomegaly    Genitalia:   Deferred    Rectal:   " Deferred    Extremities:  No cyanosis, clubbing or edema    Skin:  No jaundice, rashes, or lesions    Lymph nodes:  No palpable cervical lymphadenopathy        Lab Results:   No visits with results within 1 Day(s) from this visit.   Latest known visit with results is:   Admission on 01/29/2024, Discharged on 01/29/2024   Component Date Value   • WBC 01/29/2024 6.86    • RBC 01/29/2024 4.05    • Hemoglobin 01/29/2024 13.1    • Hematocrit 01/29/2024 39.6    • MCV 01/29/2024 98    • MCH 01/29/2024 32.3    • MCHC 01/29/2024 33.1    • RDW 01/29/2024 12.8    • MPV 01/29/2024 10.2    • Platelets 01/29/2024 168    • nRBC 01/29/2024 0    • Neutrophils Relative 01/29/2024 64    • Immature Grans % 01/29/2024 0    • Lymphocytes Relative 01/29/2024 24    • Monocytes Relative 01/29/2024 10    • Eosinophils Relative 01/29/2024 2    • Basophils Relative 01/29/2024 0    • Neutrophils Absolute 01/29/2024 4.41    • Absolute Immature Grans 01/29/2024 0.03    • Absolute Lymphocytes 01/29/2024 1.63    • Absolute Monocytes 01/29/2024 0.65    • Eosinophils Absolute 01/29/2024 0.12    • Basophils Absolute 01/29/2024 0.02    • Sodium 01/29/2024 140    • Potassium 01/29/2024 3.8    • Chloride 01/29/2024 102    • CO2 01/29/2024 31    • ANION GAP 01/29/2024 7    • BUN 01/29/2024 24    • Creatinine 01/29/2024 1.56 (H)    • Glucose 01/29/2024 103    • Calcium 01/29/2024 9.1    • AST 01/29/2024 16    • ALT 01/29/2024 13    • Alkaline Phosphatase 01/29/2024 66    • Total Protein 01/29/2024 7.1    • Albumin 01/29/2024 3.9    • Total Bilirubin 01/29/2024 0.50    • eGFR 01/29/2024 41    • Lipase 01/29/2024 184 (H)    • hs TnI 0hr 01/29/2024 13    • Ventricular Rate 01/29/2024 49    • Atrial Rate 01/29/2024 49    • NV Interval 01/29/2024 246    • QRSD Interval 01/29/2024 158    • QT Interval 01/29/2024 510    • QTC Interval 01/29/2024 460    • P Axis 01/29/2024 59    • QRS Axis 01/29/2024 -55    • T Wave Axis 01/29/2024 24    • hs TnI 2hr 01/29/2024 13     • Delta 2hr hsTnI 01/29/2024 0          Radiology Results:   CT renal stone study abdomen pelvis wo contrast    Result Date: 2/25/2024  Narrative: HISTORY: Abdominal pain, acute, nonlocalized hx of pancreatitis. . MRN:  [id number]     DATE OF SERVICE:  2/25/2024 2:55 AM                       EXAM DESCRIPTION:  CT ABDOMEN PELVIS WO ANY CONTRAST (IV OR ORAL)   COMPARISON:  No relevant recent priors. TECHNIQUE: Using helical technique, axial images were obtained through the abdomen and pelvis without IV contrast.  Coronal and sagittal reformations were performed. FINDINGS: Lung bases: The visualized lower lobes are unremarkable. Liver: No focal suspicious liver lesions identified. . Bile Ducts/Gallbladder: Post cholecystectomy changes. Spleen: Within normal limits. . Pancreas: Mild peripancreatic edema and stranding consistent with acute pancreatitis. Adrenal glands: Both adrenals are unremarkable. Kidneys/Ureters: Left renal cyst measuring up to 5.2 cm. Vessels: No evidence of abnormal aortic aneurysm.. Bowel/Mesentery: No bowel obstruction.. No evidence of appendicitis. Colonic diverticulosis without evidence of acute diverticulitis. Abdominal Wall/Soft Tissues:  Unremarkable. Pelvis: No gross evidence of loculated fluid collections. Lymph nodes: Limited evaluation secondary to technique. No evidence of retroperitoneal lymphadenopathy.. Urinary Bladder:  Unremarkable.. Bones: No focal suspicious abnormalities.     Impression: IMPRESSION: 1. Acute pancreatitis. 2. Colonic diverticulosis without evidence of acute diverticulitis. Workstation:[id number]

## 2024-03-25 ENCOUNTER — TELEPHONE (OUTPATIENT)
Dept: FAMILY MEDICINE CLINIC | Facility: CLINIC | Age: 84
End: 2024-03-25

## 2024-03-25 NOTE — TELEPHONE ENCOUNTER
Called patient regarding his RTKR and offered him a follow up appointment, he states PT is taking a lot out of him right now and refused a follow up at this time. Advised that he needs anything to give the office a call

## 2024-03-26 ENCOUNTER — OFFICE VISIT (OUTPATIENT)
Dept: PULMONOLOGY | Facility: CLINIC | Age: 84
End: 2024-03-26
Payer: MEDICARE

## 2024-03-26 VITALS
HEIGHT: 66 IN | SYSTOLIC BLOOD PRESSURE: 150 MMHG | OXYGEN SATURATION: 97 % | WEIGHT: 240 LBS | DIASTOLIC BLOOD PRESSURE: 76 MMHG | BODY MASS INDEX: 38.57 KG/M2 | HEART RATE: 82 BPM | TEMPERATURE: 99.3 F

## 2024-03-26 DIAGNOSIS — J32.9 RECURRENT SINUSITIS: ICD-10-CM

## 2024-03-26 DIAGNOSIS — G47.33 OSA (OBSTRUCTIVE SLEEP APNEA): Primary | ICD-10-CM

## 2024-03-26 PROCEDURE — 99213 OFFICE O/P EST LOW 20 MIN: CPT | Performed by: INTERNAL MEDICINE

## 2024-03-26 RX ORDER — OXYCODONE HYDROCHLORIDE 5 MG/1
TABLET ORAL
COMMUNITY
Start: 2024-03-21 | End: 2024-03-31

## 2024-03-26 RX ORDER — METHOCARBAMOL 500 MG/1
500 TABLET, FILM COATED ORAL 4 TIMES DAILY PRN
COMMUNITY
Start: 2024-03-21 | End: 2024-03-31

## 2024-03-26 NOTE — PROGRESS NOTES
Answers submitted by the patient for this visit:  Pulmonology Questionnaire (Submitted on 3/19/2024)  Chief Complaint: Primary symptoms  Do you have shortness of breath that occurs with effort or exertion?: No  Do you have ear congestion?: No  Do you have heartburn?: No  Do you have fatigue?: No  Do you have nasal congestion?: Yes  Do you have shortness of breath when lying flat?: No  Do you have shortness of breath when you wake up?: No  Do you have sweats?: No  Have you experienced weight loss?: No  Assessment/Plan:    PAULA (obstructive sleep apnea)  Had been using CPAP effectively until he had a knee replacement last week.  Has not been able to use it since because he is not able to sleep in bed.  Previously had been doing well with this with restful sleep and only an afternoon nap.  No trouble with headache or nocturia.  No other technical problems.  Had received a new machine since the last time we spoke 14 months ago.  However still is having recurrent sinus infections and patient states 2 more infections since we last spoke 14 months ago.  See comments under recurrent sinusitis.    Recurrent sinusitis  Patient states 2 more episodes of acute sinusitis since the last time we spoke 14 months ago.  He now may be having problems with his tear duct with watering of his eye.  We decided after discussion to have a CAT scan of the sinuses done and meet with ENT.  He intends to get this done in about 3 months as he is now recovering from recent knee replacement.  There was concern the last time we spoke that he had a recalled [employer] CPAP machine but this has been replaced and he still is having sinus issues.     Diagnoses and all orders for this visit:    PAULA (obstructive sleep apnea)    Recurrent sinusitis  -     CT sinus wo contrast; Future  -     Ambulatory Referral to Otolaryngology; Future    Other orders  -     PSE-DM-APAP & PSE-DM-CPM-APAP (Tylenol Cold & Flu Day/Night) 30- & 30- 15-2-500MG  TBPK; Take 500 mg by mouth Three times a day  -     methocarbamol (ROBAXIN) 500 mg tablet; Take 500 mg by mouth 4 (four) times a day as needed Pt doesn't know what this is  -     oxyCODONE (ROXICODONE) 5 immediate release tablet; Take by mouth          Subjective:      Patient ID: Jake Claros is a 83 y.o. male.    This patient returns for reevaluation of obstructive sleep apnea.  He had been using his CPAP religiously until he had knee replacement surgery last week.  Has not been able to use it since since he is not able to sleep in bed.  Previously had been free of sleep apnea symptoms when using this on there were no technical problems.  Did have a replacement machine since the last time we spoke.  However note that he is still having episodes of recurrent sinusitis.  2 more episodes requiring antibiotic therapy since we spoke in January 2023.  Now is having drainage from his right eye also.  We decided after discussion to have a CT of the sinus and have an ENT evaluation once he is recovered from knee surgery.  I advised him if possible to move his  CPAP machine to his recliner chair so that he might be able to continue to use this.  I am concerned that his blood pressure is up somewhat today.    primary symptoms  Associated symptoms include congestion. Pertinent negatives include no chest pain, fever, headaches, myalgias or sore throat.       The following portions of the patient's history were reviewed and updated as appropriate: allergies, current medications, past family history, past medical history, past social history, past surgical history and problem list.    Review of Systems   Constitutional:  Positive for activity change. Negative for appetite change, fever and unexpected weight change.   HENT:  Positive for congestion, postnasal drip, rhinorrhea, sinus pain and sneezing. Negative for ear pain, sore throat and trouble swallowing.    Respiratory:  Positive for apnea.    Cardiovascular:  Positive  "for leg swelling. Negative for chest pain and palpitations.        Recent knee surgery   Genitourinary:  Negative for enuresis.   Musculoskeletal:  Negative for myalgias.   Neurological:  Negative for headaches.         Objective:      /88 (BP Location: Left arm, Patient Position: Sitting, Cuff Size: Standard)   Pulse 82   Temp 99.3 °F (37.4 °C) (Tympanic)   Ht 5' 6\" (1.676 m)   Wt 109 kg (240 lb) Comment: pt provided  SpO2 97%   BMI 38.74 kg/m²          Physical Exam    "

## 2024-03-26 NOTE — ASSESSMENT & PLAN NOTE
Patient states 2 more episodes of acute sinusitis since the last time we spoke 14 months ago.  He now may be having problems with his tear duct with watering of his eye.  We decided after discussion to have a CAT scan of the sinuses done and meet with ENT.  He intends to get this done in about 3 months as he is now recovering from recent knee replacement.  There was concern the last time we spoke that he had a recalled [employer] CPAP machine but this has been replaced and he still is having sinus issues.

## 2024-03-26 NOTE — ASSESSMENT & PLAN NOTE
Had been using CPAP effectively until he had a knee replacement last week.  Has not been able to use it since because he is not able to sleep in bed.  Previously had been doing well with this with restful sleep and only an afternoon nap.  No trouble with headache or nocturia.  No other technical problems.  Had received a new machine since the last time we spoke 14 months ago.  However still is having recurrent sinus infections and patient states 2 more infections since we last spoke 14 months ago.  See comments under recurrent sinusitis.

## 2024-03-28 NOTE — ASSESSMENT & PLAN NOTE
Discharge Planning.     The SW received a message form Amira from [employer] yesterday informing the SW that the patient does not qualify for [employer] and the patient's family will have to pay out of pocket. Amira requested specific information to be documented for the patient to qualify for Tubefeeds at home.     The SW perfect served the Dr. Sheets with the required documentation needed for the patient to qualify. Dr. Sheets updated his documentation.    The SW then called and left a Message for Quang [phone] who is covering [employer]ira requesting the patient's T[employer] benefits be re-ran to see if the patient will qualify now. The SW at this time is waiting on a return call.     Electronically signed by EDDIE Gomez on 3/28/2024 at 8:35 AM     Update:    The SW received a call from Quang [phone]. Who informed the SW that the patient's Tubefeed is 100% Covered by insurance. Quang asked if the patient can discharge home with a can or two of Tubefeed today. The SW asked the MD lu stated he is agreeable to that. Quang stated the Tubefeed supplies should be delivered to the patient's home tonight.    Electronically signed by EDDIE Gomez on 3/28/2024 at 10:22 AM    · /65  · Given MAO patient's torsemide and lisinopril are on hold  · Continue with metoprolol and clonidine  · Continue to trend vitals

## 2024-03-29 ENCOUNTER — OFFICE VISIT (OUTPATIENT)
Dept: FAMILY MEDICINE CLINIC | Facility: CLINIC | Age: 84
End: 2024-03-29
Payer: MEDICARE

## 2024-03-29 ENCOUNTER — TELEPHONE (OUTPATIENT)
Dept: FAMILY MEDICINE CLINIC | Facility: CLINIC | Age: 84
End: 2024-03-29

## 2024-03-29 ENCOUNTER — HOSPITAL ENCOUNTER (OUTPATIENT)
Dept: VASCULAR ULTRASOUND | Facility: HOSPITAL | Age: 84
Discharge: HOME | End: 2024-03-29
Attending: INTERNAL MEDICINE
Payer: MEDICARE

## 2024-03-29 VITALS
TEMPERATURE: 98.8 F | DIASTOLIC BLOOD PRESSURE: 60 MMHG | RESPIRATION RATE: 16 BRPM | SYSTOLIC BLOOD PRESSURE: 144 MMHG | BODY MASS INDEX: 38.74 KG/M2 | HEIGHT: 66 IN | OXYGEN SATURATION: 98 % | HEART RATE: 84 BPM

## 2024-03-29 DIAGNOSIS — M79.89 PAIN AND SWELLING OF RIGHT LOWER LEG: ICD-10-CM

## 2024-03-29 DIAGNOSIS — M79.661 PAIN AND SWELLING OF RIGHT LOWER LEG: Primary | ICD-10-CM

## 2024-03-29 DIAGNOSIS — M79.89 PAIN AND SWELLING OF RIGHT LOWER LEG: Primary | ICD-10-CM

## 2024-03-29 DIAGNOSIS — Z96.651 S/P TKR (TOTAL KNEE REPLACEMENT), RIGHT: ICD-10-CM

## 2024-03-29 DIAGNOSIS — M79.661 PAIN AND SWELLING OF RIGHT LOWER LEG: ICD-10-CM

## 2024-03-29 PROCEDURE — G2211 COMPLEX E/M VISIT ADD ON: HCPCS | Performed by: INTERNAL MEDICINE

## 2024-03-29 PROCEDURE — 99213 OFFICE O/P EST LOW 20 MIN: CPT | Performed by: INTERNAL MEDICINE

## 2024-03-29 PROCEDURE — 93971 EXTREMITY STUDY: CPT

## 2024-03-29 PROCEDURE — 93971 EXTREMITY STUDY: CPT | Performed by: SURGERY

## 2024-03-29 NOTE — PROGRESS NOTES
Assessment/Plan:       Problem List Items Addressed This Visit       S/P TKR (total knee replacement), right     I called Dr. Malik's office and spoke with the nurse, Samantha, to clarify his instructions with the dressing  I passed on the message that he is able to remove the Tegaderm and gauze 5 to 7 days after surgery but to leave the underlying mesh  He will follow-up with PT and have his stitches removed at 14 days reportedly  Continue current pain regimen and try to work with therapy as best as he can         Pain and swelling of right lower leg - Primary     He states that he was started on Eliquis but is unsure of the dose he is taking  He has had leg pain and swelling since his surgery but it recently has gotten worse along with pain in the back of his leg so my concern would be potential DVT though being on Eliquis his risk is lower  I will order a stat duplex for further evaluation  If negative, needs to keep leg elevated and wear compression socks as much as he can         Relevant Orders    VAS VENOUS DUPLEX -LOWER LIMB UNILATERAL         Subjective:     Chief Complaint   Patient presents with    Leg Pain          Patient ID: Jake Claros is a 83 y.o. male who is here with right leg pain and swelling.  He had a total knee replacement done 8 days ago with Dr. Cheikh Malik.  He is very frustrated with the lack of follow-up.  States that he did not see the surgeon before or after the surgery and his follow-up appointment is also not with him.  He is unclear on what he supposed to do with the dressing.  He thinks he should be able to take off the Tegaderm and gauze but was not sure when this is possible.  States that he is having significant pain and overall just does not feel great.  He is taking oxycodone once a day at night but is still in significant pain during the day.  He is trying to do therapy exercises at home but is limited because of the pain.  He notes that he has had leg swelling since his  "surgery as expected but pain and swelling has gotten worse in the last couple of days.  He is taking Eliquis at a low dose he believes.  No prior history of blood clots.        Patient's past medical history, surgical history, family history, medications, allergies and social history reviewed and updated    Review of Systems   Constitutional:  Negative for chills and fever.   HENT:  Negative for congestion and sore throat.    Respiratory:  Negative for cough.    Cardiovascular:  Positive for leg swelling. Negative for chest pain.   Gastrointestinal:  Negative for abdominal pain, blood in stool and diarrhea.   Genitourinary:  Negative for dysuria and frequency.   Musculoskeletal:  Positive for arthralgias and gait problem.   Neurological:  Negative for headaches.         All other ROS negative.     Objective:    Vitals:    03/29/24 1423   BP: 144/60   Pulse: 84   Resp: 16   Temp: 98.8 °F (37.1 °C)   SpO2: 98%          Physical Exam  Vitals reviewed.   HENT:      Right Ear: External ear normal.      Left Ear: External ear normal.      Mouth/Throat:      Pharynx: Oropharynx is clear.   Eyes:      Conjunctiva/sclera: Conjunctivae normal.   Pulmonary:      Effort: No respiratory distress.   Musculoskeletal:      Right lower leg: Edema (with some calf tenderness, no erthyema) present.   Skin:     Findings: No rash.      Comments: Dressing in place and appears clean   Neurological:      Mental Status: He is alert.      Gait: Gait abnormal.               Portions of the record may have been created with voice recognition software.  Occasional wrong word or \"sound a like\" substitutions may have occurred due to the inherent limitations of voice recognition software.  Read the chart carefully and recognize, using context, where substitutions have occurred.     Vero Leyva MD  Internal Medicine and Pediatrics  "

## 2024-03-29 NOTE — ASSESSMENT & PLAN NOTE
I called Dr. Malik's office and spoke with the nurse, Samantha, to clarify his instructions with the dressing  I passed on the message that he is able to remove the Tegaderm and gauze 5 to 7 days after surgery but to leave the underlying mesh  He will follow-up with PT and have his stitches removed at 14 days reportedly  Continue current pain regimen and try to work with therapy as best as he can

## 2024-03-29 NOTE — TELEPHONE ENCOUNTER
Called and left voice message as per Dr JAVED, no DVT, keep leg elevated and use stocking and swelling should subside in a week or two

## 2024-03-31 ENCOUNTER — HOSPITAL ENCOUNTER (EMERGENCY)
Facility: HOSPITAL | Age: 84
Discharge: HOME | End: 2024-03-31
Attending: EMERGENCY MEDICINE
Payer: MEDICARE

## 2024-03-31 ENCOUNTER — APPOINTMENT (EMERGENCY)
Dept: CT IMAGING | Facility: HOSPITAL | Age: 84
End: 2024-03-31
Payer: MEDICARE

## 2024-03-31 VITALS
BODY MASS INDEX: 39.97 KG/M2 | RESPIRATION RATE: 22 BRPM | DIASTOLIC BLOOD PRESSURE: 81 MMHG | WEIGHT: 248.68 LBS | HEART RATE: 66 BPM | OXYGEN SATURATION: 96 % | HEIGHT: 66 IN | TEMPERATURE: 99.2 F | SYSTOLIC BLOOD PRESSURE: 185 MMHG

## 2024-03-31 DIAGNOSIS — L98.9 SCALP LESION: ICD-10-CM

## 2024-03-31 DIAGNOSIS — R10.13 EPIGASTRIC PAIN: ICD-10-CM

## 2024-03-31 DIAGNOSIS — K85.90 PANCREATITIS: Primary | ICD-10-CM

## 2024-03-31 LAB
2HR DELTA HS TROPONIN: 2 NG/L
ALBUMIN SERPL BCP-MCNC: 3.3 G/DL (ref 3.5–5)
ALP SERPL-CCNC: 72 U/L (ref 34–104)
ALT SERPL W P-5'-P-CCNC: 14 U/L (ref 7–52)
ANION GAP SERPL CALCULATED.3IONS-SCNC: 8 MMOL/L (ref 4–13)
AST SERPL W P-5'-P-CCNC: 26 U/L (ref 13–39)
ATRIAL RATE: 66 BPM
BASOPHILS # BLD AUTO: 0.03 THOUSANDS/ÂΜL (ref 0–0.1)
BASOPHILS NFR BLD AUTO: 0 % (ref 0–1)
BILIRUB SERPL-MCNC: 0.51 MG/DL (ref 0.2–1)
BUN SERPL-MCNC: 33 MG/DL (ref 5–25)
CALCIUM ALBUM COR SERPL-MCNC: 9.2 MG/DL (ref 8.3–10.1)
CALCIUM SERPL-MCNC: 8.6 MG/DL (ref 8.4–10.2)
CARDIAC TROPONIN I PNL SERPL HS: 16 NG/L
CARDIAC TROPONIN I PNL SERPL HS: 18 NG/L
CHLORIDE SERPL-SCNC: 104 MMOL/L (ref 96–108)
CO2 SERPL-SCNC: 29 MMOL/L (ref 21–32)
CREAT SERPL-MCNC: 1.6 MG/DL (ref 0.6–1.3)
EOSINOPHIL # BLD AUTO: 0.18 THOUSAND/ÂΜL (ref 0–0.61)
EOSINOPHIL NFR BLD AUTO: 2 % (ref 0–6)
ERYTHROCYTE [DISTWIDTH] IN BLOOD BY AUTOMATED COUNT: 13.8 % (ref 11.6–15.1)
GFR SERPL CREATININE-BSD FRML MDRD: 39 ML/MIN/1.73SQ M
GLUCOSE SERPL-MCNC: 155 MG/DL (ref 65–140)
HCT VFR BLD AUTO: 34.3 % (ref 36.5–49.3)
HGB BLD-MCNC: 11.3 G/DL (ref 12–17)
IMM GRANULOCYTES # BLD AUTO: 0.02 THOUSAND/UL (ref 0–0.2)
IMM GRANULOCYTES NFR BLD AUTO: 0 % (ref 0–2)
LIPASE SERPL-CCNC: 86 U/L (ref 11–82)
LYMPHOCYTES # BLD AUTO: 2.09 THOUSANDS/ÂΜL (ref 0.6–4.47)
LYMPHOCYTES NFR BLD AUTO: 27 % (ref 14–44)
MCH RBC QN AUTO: 32 PG (ref 26.8–34.3)
MCHC RBC AUTO-ENTMCNC: 32.9 G/DL (ref 31.4–37.4)
MCV RBC AUTO: 97 FL (ref 82–98)
MONOCYTES # BLD AUTO: 0.73 THOUSAND/ÂΜL (ref 0.17–1.22)
MONOCYTES NFR BLD AUTO: 9 % (ref 4–12)
NEUTROPHILS # BLD AUTO: 4.68 THOUSANDS/ÂΜL (ref 1.85–7.62)
NEUTS SEG NFR BLD AUTO: 62 % (ref 43–75)
NRBC BLD AUTO-RTO: 0 /100 WBCS
P AXIS: 62 DEGREES
PLATELET # BLD AUTO: 302 THOUSANDS/UL (ref 149–390)
PMV BLD AUTO: 9.9 FL (ref 8.9–12.7)
POTASSIUM SERPL-SCNC: 4.5 MMOL/L (ref 3.5–5.3)
PR INTERVAL: 218 MS
PROT SERPL-MCNC: 6.5 G/DL (ref 6.4–8.4)
QRS AXIS: -54 DEGREES
QRSD INTERVAL: 170 MS
QT INTERVAL: 442 MS
QTC INTERVAL: 463 MS
RBC # BLD AUTO: 3.53 MILLION/UL (ref 3.88–5.62)
SODIUM SERPL-SCNC: 141 MMOL/L (ref 135–147)
T WAVE AXIS: 30 DEGREES
VENTRICULAR RATE: 66 BPM
WBC # BLD AUTO: 7.73 THOUSAND/UL (ref 4.31–10.16)

## 2024-03-31 PROCEDURE — 96360 HYDRATION IV INFUSION INIT: CPT

## 2024-03-31 PROCEDURE — 36415 COLL VENOUS BLD VENIPUNCTURE: CPT

## 2024-03-31 PROCEDURE — 96361 HYDRATE IV INFUSION ADD-ON: CPT

## 2024-03-31 PROCEDURE — 84484 ASSAY OF TROPONIN QUANT: CPT | Performed by: PHARMACIST

## 2024-03-31 PROCEDURE — 85025 COMPLETE CBC W/AUTO DIFF WBC: CPT

## 2024-03-31 PROCEDURE — 99284 EMERGENCY DEPT VISIT MOD MDM: CPT

## 2024-03-31 PROCEDURE — 99285 EMERGENCY DEPT VISIT HI MDM: CPT | Performed by: EMERGENCY MEDICINE

## 2024-03-31 PROCEDURE — 83690 ASSAY OF LIPASE: CPT

## 2024-03-31 PROCEDURE — 80053 COMPREHEN METABOLIC PANEL: CPT

## 2024-03-31 PROCEDURE — 71275 CT ANGIOGRAPHY CHEST: CPT

## 2024-03-31 PROCEDURE — 74177 CT ABD & PELVIS W/CONTRAST: CPT

## 2024-03-31 PROCEDURE — 93010 ELECTROCARDIOGRAM REPORT: CPT | Performed by: INTERNAL MEDICINE

## 2024-03-31 PROCEDURE — 93005 ELECTROCARDIOGRAM TRACING: CPT

## 2024-03-31 RX ORDER — SODIUM CHLORIDE 9 MG/ML
150 INJECTION, SOLUTION INTRAVENOUS CONTINUOUS
Status: DISCONTINUED | OUTPATIENT
Start: 2024-03-31 | End: 2024-04-01 | Stop reason: HOSPADM

## 2024-03-31 RX ADMIN — SODIUM CHLORIDE 150 ML/HR: 0.9 INJECTION, SOLUTION INTRAVENOUS at 18:31

## 2024-03-31 RX ADMIN — IOHEXOL 100 ML: 350 INJECTION, SOLUTION INTRAVENOUS at 20:00

## 2024-03-31 NOTE — ED NOTES
"Noted that pt has reported allergy to Iv contrast. Reaction noted to be \"Feeling of getting warmer and warmer\" Provider asked about safety concerns and provider cleared pt for CT with contrast. Pt asked about reaction and states it was a long time ago and the tech stopped the CT at the time. Has had multiple CT's with contrast since without issues and pt says its ok to move forward with the CT. Pt educated on expected response to CT contrast and verbalizes understand.     Ana Paula Portillo RN  03/31/24 1933    "

## 2024-03-31 NOTE — ED PROVIDER NOTES
"History  Chief Complaint   Patient presents with    Abdominal Pain     epigastric pain started today, history of same with Dx pancreatitis.      82 YO M with PMH HTN, diabetes, HLD, CKD, CAD, HFpEF, PAULA, recurrent pancreatitis (last on 24) who presents with acute onset sharp, non-radiating, intermittent epigastric pain rated 9/10 since 24 hours ago. Has been able to tolerate PO intake, last meal at lunch time. No specific aggravating factors. He had R TKR on 3/21 and takes oxycodone prn, which has helped with both his knee and epigastric pain. Pain lasts for 30 minutes at a time and resolves either spontaneously or with the oxycodone. He is currently pain/symptom free, last dose of oxycodone 4 hours prior to arrival. Denies HA, fever, chills, CP, SOB, N/V/D, dysuria, blood in stool. Denies alcohol, tobacco, illicit substance use. Had lap dionne on 23.     Per GI Dr. Davis's note in 3/20/24: \"Patient has had recurrent hospitalizations for pancreatitis, 7 in , unclear etiology extensive evaluation including ERCP sphincterotomy gallbladder surgery unrevealing.  Pancreatic ductal cannulation unsuccessful.\"      Abdominal Pain  Associated symptoms: no chest pain, no chills, no diarrhea, no dysuria, no fever, no hematuria, no nausea, no shortness of breath and no vomiting        Prior to Admission Medications   Prescriptions Last Dose Informant Patient Reported? Taking?   BD Pen Needle Pascale U/F 32G X 4 MM MISC  Self Yes No   Sig: USE AS INSTRUCTED WITH INSULIN PEN   Docusate Sodium (DSS) 100 MG CAPS  Self Yes No   Sig: Take 100 mg by mouth daily   Fexofenadine HCl (MUCINEX ALLERGY PO)  Self Yes No   Sig: Take by mouth   Lancets (OneTouch Delica Plus [id number]) MISC  Self Yes No   Sig: TEST GLUCOSE 3 4 TIMES/DAY   Levemir FlexTouch 100 units/mL injection pen  Self Yes No   Si Units daily In AM   Multiple Vitamins-Minerals (MULTIVITAMIN ADULT PO)  Self Yes No   Sig: Take by mouth daily   PSE-DM-APAP & " PSE-DM-CPM-APAP (Tylenol Cold & Flu Day/Night) 30- & 30 15-2-500MG TBPK   Yes No   Sig: Take 500 mg by mouth Three times a day   Tresiba FlexTouch 100 units/mL injection pen  Self Yes No   Sig: Inject 20 Units under the skin 2 (two) times a day   allopurinol (ZYLOPRIM) 100 mg tablet  Self Yes No   Sig: Take 100 mg by mouth daily   cloNIDine (CATAPRES) 0.1 mg tablet  Self No No   Sig: TAKE 1 TABLET BY MOUTH EVERY 12 HOURS.   fluticasone (FLONASE) 50 mcg/act nasal spray  Self No No   Si sprays into each nostril daily   insulin aspart (NovoLOG FlexPen) 100 UNIT/ML injection pen  Self Yes No   Sig: Inject under the skin 3 (three) times a day with meals Sliding scale as ordered TID with meals   ipratropium (ATROVENT) 0.03 % nasal spray  Self No No   Sig: SPRAY 2 SPRAYS INTO EACH NOSTRIL EVERY 12 HOURS.   lisinopril (ZESTRIL) 20 mg tablet  Self No No   Sig: Take 1 tablet (20 mg total) by mouth daily   methocarbamol (ROBAXIN) 500 mg tablet   Yes No   Sig: Take 500 mg by mouth 4 (four) times a day as needed Pt doesn't know what this is   metoprolol succinate (TOPROL-XL) 25 mg 24 hr tablet  Self No No   Sig: TAKE 1 TABLET (25 MG TOTAL) BY MOUTH DAILY.   ondansetron (ZOFRAN-ODT) 4 mg disintegrating tablet   No No   Sig: Take 1 tablet (4 mg total) by mouth every 8 (eight) hours as needed for nausea or vomiting for up to 7 days   oxyCODONE (ROXICODONE) 5 immediate release tablet   Yes No   Sig: Take by mouth   pantoprazole (PROTONIX) 40 mg tablet  Self No No   Sig: Take 1 tablet (40 mg total) by mouth daily before breakfast   pyridoxine (VITAMIN B6) 100 mg tablet  Self Yes No   Sig: Take 100 mg by mouth daily   ranolazine (RANEXA) 500 mg 12 hr tablet  Self No No   Sig: TAKE 1 TABLET BY MOUTH TWICE A DAY   tamsulosin (FLOMAX) 0.4 mg  Self No No   Sig: Take 2 capsules (0.8 mg total) by mouth daily   torsemide (DEMADEX) 10 mg tablet  Self No No   Sig: TAKE 1 TABLET BY MOUTH EVERY DAY      Facility-Administered  Medications: None       Past Medical History:   Diagnosis Date    Allergic     Arthritis     Chronic kidney disease     Chronic kidney disease (CKD) stage G3a/A1, moderately decreased glomerular filtration rate (GFR) between 45-59 mL/min/1.73 square meter and albuminuria creatinine ratio less than 30 mg/g (HCC)     Colon polyp     Diabetes mellitus (HCC)     GERD (gastroesophageal reflux disease)     Heart murmur     History of echocardiogram     HL (hearing loss)     Hyperlipidemia     Hypertension     Obesity     Pancreatitis     Sleep apnea, obstructive        Past Surgical History:   Procedure Laterality Date    CARDIAC CATHETERIZATION      CHOLECYSTECTOMY      COLONOSCOPY  2018    COLONOSCOPY      EYE SURGERY      HEMORRHOID SURGERY      JOINT REPLACEMENT      knee    OTHER SURGICAL HISTORY      Stent     GA LAPAROSCOPY SURG CHOLECYSTECTOMY N/A 2023    Procedure: CHOLECYSTECTOMY LAPAROSCOPIC;  Surgeon: Alirio Hong MD;  Location:  MAIN OR;  Service: General    UPPER GASTROINTESTINAL ENDOSCOPY         Family History   Problem Relation Age of Onset    Heart disease Mother     Heart attack Mother         50s    Cancer Mother     Coronary artery disease Mother     Cancer Brother         Malignant tumor of lung     I have reviewed and agree with the history as documented.    E-Cigarette/Vaping    E-Cigarette Use Never User      E-Cigarette/Vaping Substances    Nicotine No     THC No     CBD No     Flavoring No     Other No     Unknown No      Social History     Tobacco Use    Smoking status: Former     Current packs/day: 0.00     Average packs/day: 2.0 packs/day for 15.0 years (30.0 ttl pk-yrs)     Types: Cigarettes, Pipe, Cigars     Start date: 1957     Quit date: 1972     Years since quittin.4     Passive exposure: Past    Smokeless tobacco: Never   Vaping Use    Vaping status: Never Used   Substance Use Topics    Alcohol use: Not Currently     Alcohol/week: 5.0  standard drinks of alcohol     Types: 5 Glasses of wine per week     Comment: no alcohol since March    Drug use: Never        Review of Systems   Constitutional:  Negative for chills and fever.   Respiratory:  Negative for chest tightness and shortness of breath.    Cardiovascular:  Negative for chest pain and palpitations.   Gastrointestinal:  Positive for abdominal pain. Negative for blood in stool, diarrhea, nausea and vomiting.   Genitourinary:  Negative for dysuria and hematuria.   Musculoskeletal:  Negative for back pain.   Neurological:  Negative for headaches.       Physical Exam  ED Triage Vitals   Temperature Pulse Respirations Blood Pressure SpO2   03/31/24 1757 03/31/24 1755 03/31/24 1755 03/31/24 1756 03/31/24 1755   99.2 °F (37.3 °C) 72 20 (!) 178/77 98 %      Temp Source Heart Rate Source Patient Position - Orthostatic VS BP Location FiO2 (%)   03/31/24 1756 03/31/24 1755 03/31/24 1915 03/31/24 1915 --   Oral Monitor Lying Right arm       Pain Score       03/31/24 1755       8             Orthostatic Vital Signs  Vitals:    03/31/24 1756 03/31/24 1915 03/31/24 2100 03/31/24 2130   BP: (!) 178/77 (!) 171/71 166/71 (!) 185/81   Pulse:  60 64 66   Patient Position - Orthostatic VS:  Lying Lying Lying       Physical Exam  Constitutional:       General: He is not in acute distress.     Appearance: Normal appearance. He is well-developed. He is ill-appearing (chronically). He is not toxic-appearing or diaphoretic.   HENT:      Head: Normocephalic and atraumatic.   Eyes:      General: No scleral icterus.     Conjunctiva/sclera: Conjunctivae normal.   Cardiovascular:      Rate and Rhythm: Normal rate and regular rhythm.      Pulses: Normal pulses.      Heart sounds: Murmur (2/6 systolic murmur at RUSB) heard.   Pulmonary:      Effort: No respiratory distress.      Breath sounds: No wheezing, rhonchi or rales.   Abdominal:      General: Bowel sounds are normal. There is no distension.      Palpations: Abdomen  is soft.      Tenderness: There is no abdominal tenderness. There is no guarding.   Musculoskeletal:      Cervical back: Neck supple. No tenderness.      Right lower leg: Edema (trace) present.      Left lower leg: Edema (trace) present.   Lymphadenopathy:      Cervical: No cervical adenopathy.   Skin:     General: Skin is warm.      Coloration: Skin is not jaundiced.      Findings: Lesion (1 cm round papule on L parietal scalp with the appearance of an irritated seborrheic keratosis. Scattered SKs all throughout body as well) present. No rash.   Neurological:      Mental Status: He is alert and oriented to person, place, and time. Mental status is at baseline.   Psychiatric:         Mood and Affect: Mood normal.         Behavior: Behavior normal.         ED Medications  Medications   sodium chloride 0.9 % infusion (150 mL/hr Intravenous New Bag 3/31/24 1831)   iohexol (OMNIPAQUE) 350 MG/ML injection (SINGLE-DOSE) 100 mL (100 mL Intravenous Given 3/31/24 2000)       Diagnostic Studies  Results Reviewed       Procedure Component Value Units Date/Time    HS Troponin I 2hr [[id number]]  (Normal) Collected: 03/31/24 2031    Lab Status: Final result Specimen: Blood from Arm, Right Updated: 03/31/24 2056     hs TnI 2hr 18 ng/L      Delta 2hr hsTnI 2 ng/L     HS Troponin I 4hr [[id number]]     Lab Status: No result Specimen: Blood     HS Troponin 0hr (reflex protocol) [[id number]]  (Normal) Collected: 03/31/24 1828    Lab Status: Final result Specimen: Blood from Arm, Left Updated: 03/31/24 1855     hs TnI 0hr 16 ng/L     Comprehensive metabolic panel [[id number]]  (Abnormal) Collected: 03/31/24 1812    Lab Status: Final result Specimen: Blood from Arm, Left Updated: 03/31/24 1839     Sodium 141 mmol/L      Potassium 4.5 mmol/L      Chloride 104 mmol/L      CO2 29 mmol/L      ANION GAP 8 mmol/L      BUN 33 mg/dL      Creatinine 1.60 mg/dL      Glucose 155 mg/dL      Calcium 8.6 mg/dL      Corrected Calcium 9.2 mg/dL      AST  26 U/L      ALT 14 U/L      Alkaline Phosphatase 72 U/L      Total Protein 6.5 g/dL      Albumin 3.3 g/dL      Total Bilirubin 0.51 mg/dL      eGFR 39 ml/min/1.73sq m     Narrative:      [employer] guidelines for Chronic Kidney Disease (CKD):     Stage 1 with normal or high GFR (GFR > 90 mL/min/1.73 square meters)    Stage 2 Mild CKD (GFR = 60-89 mL/min/1.73 square meters)    Stage 3A Moderate CKD (GFR = 45-59 mL/min/1.73 square meters)    Stage 3B Moderate CKD (GFR = 30-44 mL/min/1.73 square meters)    Stage 4 Severe CKD (GFR = 15-29 mL/min/1.73 square meters)    Stage 5 End Stage CKD (GFR <15 mL/min/1.73 square meters)  Note: GFR calculation is accurate only with a steady state creatinine    Lipase [[id number]]  (Abnormal) Collected: 03/31/24 1812    Lab Status: Final result Specimen: Blood from Arm, Left Updated: 03/31/24 1839     Lipase 86 u/L     CBC and differential [[id number]]  (Abnormal) Collected: 03/31/24 1812    Lab Status: Final result Specimen: Blood from Arm, Left Updated: 03/31/24 1818     WBC 7.73 Thousand/uL      RBC 3.53 Million/uL      Hemoglobin 11.3 g/dL      Hematocrit 34.3 %      MCV 97 fL      MCH 32.0 pg      MCHC 32.9 g/dL      RDW 13.8 %      MPV 9.9 fL      Platelets 302 Thousands/uL      nRBC 0 /100 WBCs      Neutrophils Relative 62 %      Immature Grans % 0 %      Lymphocytes Relative 27 %      Monocytes Relative 9 %      Eosinophils Relative 2 %      Basophils Relative 0 %      Neutrophils Absolute 4.68 Thousands/µL      Absolute Immature Grans 0.02 Thousand/uL      Absolute Lymphocytes 2.09 Thousands/µL      Absolute Monocytes 0.73 Thousand/µL      Eosinophils Absolute 0.18 Thousand/µL      Basophils Absolute 0.03 Thousands/µL                    PE Study with CT Abdomen and Pelvis with contrast   Final Result by Aniket Hardy MD (03/31 2142)      No evidence for pulmonary embolus.      No consolidation or effusion.      Findings above compatible with  acute/recurrent interstitial pancreatitis overall similar in appearance to prior study dated 1/29/2024. Recommend clinical correlation and with serum lipase. No evidence for discrete peripancreatic collection, pseudocyst,    or necrosis.      Additional stable chronic/nonemergent findings above.      Above findings discussed with Dr. Carrillo at 9:37 p.m. on 3/31/2024         Workstation performed: [id number]               Procedures  ECG 12 Lead Documentation Only    Date/Time: 3/31/2024 6:30 PM    Performed by: Vishal Carrillo MD  Authorized by: Vishal Carrillo MD    Indications / Diagnosis:  Epigastric pain  ECG reviewed by me, the ED Provider: yes    Patient location:  ED  Previous ECG:     Previous ECG:  Compared to current    Comparison ECG info:  1/29/24    Similarity:  No change  Interpretation:     Interpretation: abnormal    Rate:     ECG rate:  66    ECG rate assessment: normal    Rhythm:     Rhythm: sinus rhythm and A-V block      Rhythm comment:  W/ first degree AV block  Ectopy:     Ectopy: none    ST segments:     ST segments:  Normal  Comments:      RBBB, LAFB, 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 Appears comfortable in bed. No recurrence of abdominal pain. Declined need for pain medication at this time.             HEART Risk Score      Flowsheet Row Most Recent Value   Heart Score Risk Calculator    History 0 Filed at: 03/31/2024 2057   ECG 1 Filed at: 03/31/2024 2057   Age 2 Filed at: 03/31/2024 2057   Risk Factors 2 Filed at: 03/31/2024 2057   Troponin 1 Filed at: 03/31/2024 2057   HEART Score 6 Filed at: 03/31/2024 2057                                  Medical Decision Making  82 YO M with PMH HTN, diabetes, HLD, CKD, CAD, HFpEF, PUALA, recurrent pancreatitis (last on 1/29/24) who presents with acute onset sharp, non-radiating, intermittent epigastric pain rated 9/10 since 24 hours ago. DDX includes pancreatitis, GERD, ACS,  other    Order EKG, CMP, CBC, lipase, troponin, NSS at 150 mL/hr  EKG unchanged from prior with 1st degree AV block, RBBB, LAFB  Lipase 86, Cr 1.6 within baseline  Troponin 16--18, HEART score 6    CTA chest/abd/pelvis ordered given recent surgery: No evidence for pulmonary embolus.  No consolidation or effusion.  Findings above compatible with acute/recurrent interstitial pancreatitis overall similar in appearance to prior study dated 1/29/2024. Recommend clinical correlation and with serum lipase. No evidence for discrete peripancreatic collection, pseudocyst,   or necrosis.  Additional stable chronic/nonemergent findings above.    Patient reassessed. States his pain is well-controlled at a 1/10. He prefers to go home and treat his pain with his existing oxycodone rx. Encouraged adequate PO intake and drinking plenty of fluids. Recommended he returns to the ED for worsening symptoms. He asked about a non-painful lesion on his scalp which has been there for over a year. I offered to send a dermatology referral on discharge and he was agreeable.    Amount and/or Complexity of Data Reviewed  Labs: ordered.  Radiology: ordered.    Risk  Prescription drug management.          Disposition  Final diagnoses:   Epigastric pain   Pancreatitis   Scalp lesion     Time reflects when diagnosis was documented in both MDM as applicable and the Disposition within this note       Time User Action Codes Description Comment    3/31/2024  9:50 PM Vishal Carrillo Add [R10.13] Epigastric pain     3/31/2024  9:51 PM Vishal Carrillo Add [K85.90] Pancreatitis     3/31/2024  9:51 PM Vishal Carrillo Modify [R10.13] Epigastric pain     3/31/2024  9:51 PM Vishal Carrillo Modify [K85.90] Pancreatitis     3/31/2024  9:54 PM Vishal Carrillo Add [L98.9] Scalp lesion           ED Disposition       ED Disposition   Discharge    Condition   Stable    Date/Time   Sun Mar 31, 2024 5248    Comment   Jake Claros discharge to home/self care.                   Follow-up  Information       Follow up With Specialties Details Why Contact Info    Ashwin Leyva MD Internal Medicine Schedule an appointment as soon as possible for a visit   [address] [place] [address]  [phone]              Patient's Medications   Discharge Prescriptions    No medications on file         PDMP Review         Value Time User    PDMP Reviewed  Yes 7/10/2023  9:21 AM Myrtle Das MD             ED Provider  Attending physically available and evaluated Jake Claros. I managed the patient along with the ED Attending.    Electronically Signed by           Vishal Carrillo MD  03/31/24 9964

## 2024-04-01 ENCOUNTER — VBI (OUTPATIENT)
Dept: FAMILY MEDICINE CLINIC | Facility: CLINIC | Age: 84
End: 2024-04-01

## 2024-04-01 NOTE — ED ATTENDING ATTESTATION
3/31/2024  I, John Coffey DO, saw and evaluated the patient. I have discussed the patient with the resident/non-physician practitioner and agree with the resident's/non-physician practitioner's findings, Plan of Care, and MDM as documented in the resident's/non-physician practitioner's note, except where noted. All available labs and Radiology studies were reviewed.  I was present for key portions of any procedure(s) performed by the resident/non-physician practitioner and I was immediately available to provide assistance.       At this point I agree with the current assessment done in the Emergency Department.  I have conducted an independent evaluation of this patient a history and physical is as follows:  83-year-old male who presented to the emergency department with epigastric pain.  No tenderness on exam.  Patient had lab work that was significant for a white count of 7, a glucose of 155, and a lipase of 86.  He had a CAT scan that showed acute / recurrent pancreatitis.  The patient was tolerating p.o. without difficulty.  He required no IV pain medication in the emergency department.  He has been tolerating his pain with oxycodone at this point.  The patient was given strict return precautions and discharged with follow-up to his primary care physician.  ED Course         Critical Care Time  Procedures

## 2024-04-01 NOTE — TELEPHONE ENCOUNTER
04/01/24 10:18 AM    Patient contacted post ED visit, [facility] department spoke with patient/caregiver and outreach was successful.    Thank you.  Nini Carpenter  [facility]

## 2024-04-01 NOTE — DISCHARGE INSTRUCTIONS
Continue drinking plenty of fluids and advance your food intake as tolerated. If you develop significant pain that does not improve with your home medications or fever/chills, nausea/vomiting, please return to the ED to be re-evaluated.    Follow-up with dermatology for the spot on your scalp. We sent a referral for you.    Follow-up with your PCP and GI doctor soon after discharge to discuss your abdominal symptoms.

## 2024-04-01 NOTE — ED NOTES
Last vitals done at 9:30p. My will take his daily BP meds when he gets home     Ana Paula Portillo RN  03/31/24 9840

## 2024-04-02 DIAGNOSIS — I10 ESSENTIAL (PRIMARY) HYPERTENSION: ICD-10-CM

## 2024-04-02 RX ORDER — CLONIDINE HYDROCHLORIDE 0.1 MG/1
0.1 TABLET ORAL EVERY 12 HOURS
Qty: 180 TABLET | Refills: 1 | Status: SHIPPED | OUTPATIENT
Start: 2024-04-02

## 2024-04-13 DIAGNOSIS — I10 ESSENTIAL (PRIMARY) HYPERTENSION: ICD-10-CM

## 2024-04-13 DIAGNOSIS — I25.10 CORONARY ARTERY DISEASE INVOLVING NATIVE CORONARY ARTERY OF NATIVE HEART WITHOUT ANGINA PECTORIS: ICD-10-CM

## 2024-04-13 DIAGNOSIS — I50.32 CHRONIC HEART FAILURE WITH PRESERVED EJECTION FRACTION (HCC): ICD-10-CM

## 2024-04-15 ENCOUNTER — OFFICE VISIT (OUTPATIENT)
Dept: FAMILY MEDICINE CLINIC | Facility: CLINIC | Age: 84
End: 2024-04-15
Payer: MEDICARE

## 2024-04-15 VITALS
BODY MASS INDEX: 38.12 KG/M2 | DIASTOLIC BLOOD PRESSURE: 70 MMHG | SYSTOLIC BLOOD PRESSURE: 114 MMHG | HEART RATE: 80 BPM | OXYGEN SATURATION: 95 % | WEIGHT: 237.2 LBS | TEMPERATURE: 98 F | HEIGHT: 66 IN | RESPIRATION RATE: 16 BRPM

## 2024-04-15 DIAGNOSIS — I50.32 CHRONIC HEART FAILURE WITH PRESERVED EJECTION FRACTION (HCC): ICD-10-CM

## 2024-04-15 DIAGNOSIS — N64.4 BREAST PAIN, RIGHT: Primary | ICD-10-CM

## 2024-04-15 PROCEDURE — G2211 COMPLEX E/M VISIT ADD ON: HCPCS

## 2024-04-15 PROCEDURE — 99213 OFFICE O/P EST LOW 20 MIN: CPT

## 2024-04-15 RX ORDER — OXYCODONE HYDROCHLORIDE 5 MG/1
5 CAPSULE ORAL EVERY 4 HOURS PRN
COMMUNITY

## 2024-04-15 RX ORDER — TORSEMIDE 10 MG/1
10 TABLET ORAL DAILY
Qty: 90 TABLET | Refills: 1 | Status: SHIPPED | OUTPATIENT
Start: 2024-04-15

## 2024-04-15 RX ORDER — TORSEMIDE 10 MG/1
10 TABLET ORAL DAILY
Qty: 90 TABLET | Refills: 1 | OUTPATIENT
Start: 2024-04-15

## 2024-04-15 RX ORDER — CLOPIDOGREL BISULFATE 75 MG/1
75 TABLET ORAL DAILY
COMMUNITY
End: 2024-04-22 | Stop reason: SDUPTHER

## 2024-04-15 RX ORDER — LISINOPRIL 40 MG/1
20 TABLET ORAL DAILY
Qty: 90 TABLET | Refills: 0 | Status: SHIPPED | OUTPATIENT
Start: 2024-04-15

## 2024-04-15 RX ORDER — ROSUVASTATIN CALCIUM 20 MG/1
TABLET, COATED ORAL
COMMUNITY
Start: 2024-03-17

## 2024-04-15 RX ORDER — RANOLAZINE 500 MG/1
500 TABLET, EXTENDED RELEASE ORAL 2 TIMES DAILY
Qty: 180 TABLET | Refills: 0 | Status: SHIPPED | OUTPATIENT
Start: 2024-04-15

## 2024-04-15 NOTE — ASSESSMENT & PLAN NOTE
No tenderness and no lump right breast.  Reassured patient.  It could be secondary to oxycodone.  May use moist heat.  If does not get better or get worse get back to us otherwise follow-up as needed

## 2024-04-15 NOTE — PROGRESS NOTES
Assessment/Plan:         Problem List Items Addressed This Visit     (HFpEF) heart failure with preserved ejection fraction (HCC)    Relevant Medications    clopidogrel (PLAVIX) 75 mg tablet    torsemide (DEMADEX) 10 mg tablet    Breast pain, right - Primary     No tenderness and no lump right breast.  Reassured patient.  It could be secondary to oxycodone.  May use moist heat.  If does not get better or get worse get back to us otherwise follow-up as needed              Subjective:      Patient ID: Jake Claros is a 83 y.o. male.    Patient had a right total knee done about a month ago since then he has been taking oxycodone every 4-6 hours as needed for pain.  Also on Eliquis 2.5 mg twice daily to prevent blood clots.  Now here complaining of sensitivity of right breast.  No fever no chills no injury no other specific problem other than he is normal        The following portions of the patient's history were reviewed and updated as appropriate:   Past Medical History:  He has a past medical history of Allergic, Arthritis, Chronic kidney disease (2021), Chronic kidney disease (CKD) stage G3a/A1, moderately decreased glomerular filtration rate (GFR) between 45-59 mL/min/1.73 square meter and albuminuria creatinine ratio less than 30 mg/g (HCC), Colon polyp, Diabetes mellitus (HCC), GERD (gastroesophageal reflux disease), Heart murmur, History of echocardiogram, HL (hearing loss), Hyperlipidemia, Hypertension, Obesity, Pancreatitis, and Sleep apnea, obstructive.,  _______________________________________________________________________  Medical Problems:  does not have any pertinent problems on file.,  _______________________________________________________________________  Past Surgical History:   has a past surgical history that includes Cardiac catheterization; Other surgical history; Joint replacement (2017); Colonoscopy (03/09/2018); Eye surgery; Colonoscopy; Upper gastrointestinal endoscopy; Hemorrhoid  surgery; pr laparoscopy surg cholecystectomy (N/A, 06/28/2023); Cholecystectomy; and Knee surgery (Right).,  _______________________________________________________________________  Family History:  family history includes Cancer in his brother and mother; Coronary artery disease in his mother; Heart attack in his mother; Heart disease in his mother.,  _______________________________________________________________________  Social History:   reports that he quit smoking about 51 years ago. His smoking use included cigarettes, pipe, and cigars. He started smoking about 66 years ago. He has a 30 pack-year smoking history. He has been exposed to tobacco smoke. He has never used smokeless tobacco. He reports that he does not currently use alcohol after a past usage of about 5.0 standard drinks of alcohol per week. He reports that he does not use drugs.,  _______________________________________________________________________  Allergies:  is allergic to januvia [sitagliptin], semaglutide, simvastatin, trulicity [dulaglutide], wound dressing adhesive, iodinated contrast media, medical tape, and molds & smuts..  _______________________________________________________________________  Current Outpatient Medications   Medication Sig Dispense Refill   • allopurinol (ZYLOPRIM) 100 mg tablet Take 100 mg by mouth daily     • apixaban (Eliquis) 2.5 mg Take 2.5 mg by mouth 2 (two) times a day     • BD Pen Needle Pascale U/F 32G X 4 MM MISC USE AS INSTRUCTED WITH INSULIN PEN     • cloNIDine (CATAPRES) 0.1 mg tablet TAKE 1 TABLET BY MOUTH EVERY 12 HOURS 180 tablet 1   • clopidogrel (PLAVIX) 75 mg tablet Take 75 mg by mouth daily     • Docusate Sodium (DSS) 100 MG CAPS Take 100 mg by mouth daily     • Fexofenadine HCl (MUCINEX ALLERGY PO) Take by mouth     • fluticasone (FLONASE) 50 mcg/act nasal spray 2 sprays into each nostril daily 48 mL 0   • insulin aspart (NovoLOG FlexPen) 100 UNIT/ML injection pen Inject under the skin 3 (three)  times a day with meals Sliding scale as ordered TID with meals     • ipratropium (ATROVENT) 0.03 % nasal spray SPRAY 2 SPRAYS INTO EACH NOSTRIL EVERY 12 HOURS. 30 mL 2   • Lancets (OneTouch Delica Plus [id number]) MISC TEST GLUCOSE 3 4 TIMES/DAY     • Levemir FlexTouch 100 units/mL injection pen 36 Units daily In AM     • lisinopril (ZESTRIL) 20 mg tablet Take 1 tablet (20 mg total) by mouth daily 90 tablet 1   • lisinopril (ZESTRIL) 40 mg tablet Take 0.5 tablets (20 mg total) by mouth daily 90 tablet 0   • metoprolol succinate (TOPROL-XL) 25 mg 24 hr tablet TAKE 1 TABLET (25 MG TOTAL) BY MOUTH DAILY. 90 tablet 1   • Multiple Vitamins-Minerals (MULTIVITAMIN ADULT PO) Take by mouth daily     • ondansetron (ZOFRAN-ODT) 4 mg disintegrating tablet Take 1 tablet (4 mg total) by mouth every 8 (eight) hours as needed for nausea or vomiting for up to 7 days 20 tablet 0   • oxyCODONE (OXY-IR) 5 MG capsule Take 5 mg by mouth every 4 (four) hours as needed for moderate pain     • pantoprazole (PROTONIX) 40 mg tablet Take 1 tablet (40 mg total) by mouth daily before breakfast 90 tablet 3   • pyridoxine (VITAMIN B6) 100 mg tablet Take 100 mg by mouth daily     • ranolazine (RANEXA) 500 mg 12 hr tablet TAKE 1 TABLET BY MOUTH TWICE A  tablet 0   • rosuvastatin (CRESTOR) 20 MG tablet take 1 tablet by mouth every day at night     • tamsulosin (FLOMAX) 0.4 mg Take 2 capsules (0.8 mg total) by mouth daily 180 capsule 1   • torsemide (DEMADEX) 10 mg tablet Take 1 tablet (10 mg total) by mouth daily 90 tablet 1   • Tresiba FlexTouch 100 units/mL injection pen Inject 20 Units under the skin 2 (two) times a day       No current facility-administered medications for this visit.     _______________________________________________________________________  Review of Systems   Constitutional:  Negative for chills and fever.   HENT:  Negative for congestion, ear pain, postnasal drip and sore throat.    Eyes:  Negative for pain and visual  "disturbance.   Respiratory:  Negative for cough and shortness of breath.    Cardiovascular:  Negative for chest pain and palpitations.        Pain/sensitivity right breast   Gastrointestinal:  Negative for abdominal pain (Off-and-on epigastric), nausea and vomiting.   Endocrine: Negative for cold intolerance, heat intolerance, polydipsia, polyphagia and polyuria.   Genitourinary:  Negative for difficulty urinating, dysuria, frequency and hematuria.   Musculoskeletal:  Positive for arthralgias (Right knee). Negative for back pain.   Skin:  Negative for color change and rash.   Neurological:  Negative for dizziness, seizures, syncope, light-headedness and headaches.   Psychiatric/Behavioral:  Negative for agitation and behavioral problems.    All other systems reviewed and are negative.        Objective:  Vitals:    04/15/24 1336   BP: 114/70   BP Location: Right arm   Patient Position: Sitting   Cuff Size: Standard   Pulse: 80   Resp: 16   Temp: 98 °F (36.7 °C)   TempSrc: Temporal   SpO2: 95%   Weight: 108 kg (237 lb 3.2 oz)   Height: 5' 6\" (1.676 m)     Body mass index is 38.29 kg/m².     Physical Exam  Vitals and nursing note reviewed.   Constitutional:       General: He is not in acute distress.     Appearance: Normal appearance. He is obese. He is not ill-appearing, toxic-appearing or diaphoretic.   HENT:      Head: Normocephalic and atraumatic.      Nose: Nose normal. No congestion.      Mouth/Throat:      Mouth: Mucous membranes are moist.   Eyes:      General: No scleral icterus.        Right eye: No discharge.         Left eye: No discharge.      Extraocular Movements: Extraocular movements intact.      Conjunctiva/sclera: Conjunctivae normal.      Pupils: Pupils are equal, round, and reactive to light.   Cardiovascular:      Rate and Rhythm: Normal rate and regular rhythm.      Pulses: Normal pulses.      Heart sounds: Murmur (2/6 BITA) heard.      No gallop.      Comments: No lump or tenderness right " breast  Pulmonary:      Effort: Pulmonary effort is normal. No respiratory distress.      Breath sounds: Normal breath sounds. No wheezing, rhonchi or rales.   Abdominal:      General: Abdomen is flat. Bowel sounds are normal. There is no distension.      Palpations: Abdomen is soft.      Tenderness: There is no abdominal tenderness. There is no right CVA tenderness, left CVA tenderness or guarding.   Musculoskeletal:         General: No swelling or tenderness. Normal range of motion.      Cervical back: Normal range of motion and neck supple. No rigidity.      Right lower leg: Edema (minimal) present.      Left lower leg: Edema (minimal) present.      Comments: Chronic venous stasis changes   Lymphadenopathy:      Cervical: No cervical adenopathy.   Skin:     General: Skin is warm.      Capillary Refill: Capillary refill takes 2 to 3 seconds.      Coloration: Skin is not jaundiced.      Findings: No bruising or rash.   Neurological:      General: No focal deficit present.      Mental Status: He is alert and oriented to person, place, and time. Mental status is at baseline.      Motor: No weakness.      Gait: Gait normal.   Psychiatric:         Mood and Affect: Mood normal.         Behavior: Behavior normal.

## 2024-04-22 DIAGNOSIS — I25.118 CORONARY ARTERY DISEASE OF NATIVE ARTERY OF NATIVE HEART WITH STABLE ANGINA PECTORIS (HCC): Primary | ICD-10-CM

## 2024-04-22 RX ORDER — CLOPIDOGREL BISULFATE 75 MG/1
75 TABLET ORAL DAILY
Qty: 90 TABLET | Refills: 1 | Status: SHIPPED | OUTPATIENT
Start: 2024-04-22

## 2024-04-26 ENCOUNTER — ESTABLISHED COMPREHENSIVE EXAM (OUTPATIENT)
Dept: URBAN - METROPOLITAN AREA CLINIC 6 | Facility: CLINIC | Age: 84
End: 2024-04-26

## 2024-04-26 DIAGNOSIS — H35.3132: ICD-10-CM

## 2024-04-26 DIAGNOSIS — E11.3291: ICD-10-CM

## 2024-04-26 DIAGNOSIS — E11.3212: ICD-10-CM

## 2024-04-26 PROCEDURE — 92014 COMPRE OPH EXAM EST PT 1/>: CPT

## 2024-04-26 PROCEDURE — 92202 OPSCPY EXTND ON/MAC DRAW: CPT

## 2024-04-26 PROCEDURE — 92134 CPTRZ OPH DX IMG PST SGM RTA: CPT

## 2024-04-26 ASSESSMENT — VISUAL ACUITY
OS_SC: 20/60-2
OS_PH: 20/40
OD_SC: 20/50
OD_PH: 20/30

## 2024-04-26 ASSESSMENT — TONOMETRY
OD_IOP_MMHG: 7
OS_IOP_MMHG: 3

## 2024-05-01 PROBLEM — J32.9 RECURRENT SINUSITIS: Status: RESOLVED | Noted: 2024-03-26 | Resolved: 2024-05-01

## 2024-05-13 ENCOUNTER — OFFICE VISIT (OUTPATIENT)
Dept: FAMILY MEDICINE CLINIC | Facility: CLINIC | Age: 84
End: 2024-05-13
Payer: MEDICARE

## 2024-05-13 VITALS
BODY MASS INDEX: 38.47 KG/M2 | OXYGEN SATURATION: 97 % | TEMPERATURE: 98 F | HEIGHT: 66 IN | HEART RATE: 66 BPM | SYSTOLIC BLOOD PRESSURE: 130 MMHG | DIASTOLIC BLOOD PRESSURE: 80 MMHG | WEIGHT: 239.4 LBS | RESPIRATION RATE: 18 BRPM

## 2024-05-13 DIAGNOSIS — N18.32 TYPE 2 DIABETES MELLITUS WITH STAGE 3B CHRONIC KIDNEY DISEASE, WITH LONG-TERM CURRENT USE OF INSULIN (HCC): Primary | ICD-10-CM

## 2024-05-13 DIAGNOSIS — I25.118 CORONARY ARTERY DISEASE OF NATIVE ARTERY OF NATIVE HEART WITH STABLE ANGINA PECTORIS (HCC): ICD-10-CM

## 2024-05-13 DIAGNOSIS — E78.2 MIXED HYPERLIPIDEMIA: ICD-10-CM

## 2024-05-13 DIAGNOSIS — I10 HTN (HYPERTENSION), BENIGN: ICD-10-CM

## 2024-05-13 DIAGNOSIS — E11.22 TYPE 2 DIABETES MELLITUS WITH STAGE 3B CHRONIC KIDNEY DISEASE, WITH LONG-TERM CURRENT USE OF INSULIN (HCC): Primary | ICD-10-CM

## 2024-05-13 DIAGNOSIS — N18.32 STAGE 3B CHRONIC KIDNEY DISEASE (CKD) (HCC): ICD-10-CM

## 2024-05-13 DIAGNOSIS — I50.32 CHRONIC HEART FAILURE WITH PRESERVED EJECTION FRACTION (HCC): ICD-10-CM

## 2024-05-13 DIAGNOSIS — E66.01 OBESITY, MORBID (HCC): ICD-10-CM

## 2024-05-13 DIAGNOSIS — Z79.4 TYPE 2 DIABETES MELLITUS WITH STAGE 3B CHRONIC KIDNEY DISEASE, WITH LONG-TERM CURRENT USE OF INSULIN (HCC): Primary | ICD-10-CM

## 2024-05-13 PROCEDURE — 99214 OFFICE O/P EST MOD 30 MIN: CPT

## 2024-05-13 PROCEDURE — G2211 COMPLEX E/M VISIT ADD ON: HCPCS

## 2024-05-13 NOTE — ASSESSMENT & PLAN NOTE
Under control.  Continue clonidine, lisinopril, metoprolol and torsemide.  We will continue to monitor   Notification Instructions: Patient will be notified of biopsy results. However, patient instructed to call the office if not contacted within 2 weeks.

## 2024-05-13 NOTE — ASSESSMENT & PLAN NOTE
Lab Results   Component Value Date    EGFR 36 (L) 04/03/2024    EGFR 39 03/31/2024    EGFR 45 (L) 03/23/2024    CREATININE 1.84 (H) 04/03/2024    CREATININE 1.60 (H) 03/31/2024    CREATININE 1.55 (H) 03/23/2024   creatinine and GFR stable   Will need periodic BMP  Avoid NSAIDs like ibuprofen Aleve Advil etc.  Avoid high potassium diet.  We will continue to monitor

## 2024-05-13 NOTE — ASSESSMENT & PLAN NOTE
Wt Readings from Last 3 Encounters:   05/13/24 109 kg (239 lb 6.4 oz)   04/15/24 108 kg (237 lb 3.2 oz)   03/31/24 113 kg (248 lb 10.9 oz)     Under control.    Continue current medication.    Patient has been followed by cardiologist  We will re-evaluate at next office visit.

## 2024-05-13 NOTE — ASSESSMENT & PLAN NOTE
Lab Results   Component Value Date    HGBA1C 6.3 (H) 04/03/2024   Under control.    Continue current medication.    We will re-evaluate at next office visit.

## 2024-05-13 NOTE — PROGRESS NOTES
Assessment/Plan:         Problem List Items Addressed This Visit     HTN (hypertension), benign     Under control.  Continue clonidine, lisinopril, metoprolol and torsemide.  We will continue to monitor         (HFpEF) heart failure with preserved ejection fraction (HCC)     Wt Readings from Last 3 Encounters:   05/13/24 109 kg (239 lb 6.4 oz)   04/15/24 108 kg (237 lb 3.2 oz)   03/31/24 113 kg (248 lb 10.9 oz)     Under control.    Continue current medication.    Patient has been followed by cardiologist  We will re-evaluate at next office visit.                 Obesity, morbid (HCC)     Patient was advised to lose weight.  Potential consequences of obesity discussed with patient.  Advised to have portion control no more than 60% of meal or may consider intermittent fasting  We will continue to monitor           Type 2 diabetes mellitus with chronic kidney disease, with long-term current use of insulin (HCC) - Primary       Lab Results   Component Value Date    HGBA1C 6.3 (H) 04/03/2024   Under control.    Continue current medication.    We will re-evaluate at next office visit.           Mixed hyperlipidemia     Under reasonable control.  We will continue to monitor         Coronary artery disease of native artery of native heart with stable angina pectoris (HCC)     Under control.    Continue current medication.    Patient has been followed by cardiologist  We will re-evaluate at next office visit.           Stage 3b chronic kidney disease (CKD) ([facility])     Lab Results   Component Value Date    EGFR 36 (L) 04/03/2024    EGFR 39 03/31/2024    EGFR 45 (L) 03/23/2024    CREATININE 1.84 (H) 04/03/2024    CREATININE 1.60 (H) 03/31/2024    CREATININE 1.55 (H) 03/23/2024   creatinine and GFR stable   Will need periodic BMP  Avoid NSAIDs like ibuprofen Aleve Advil etc.  Avoid high potassium diet.  We will continue to monitor                 Subjective:      Patient ID: Jake Claros is a 83 y.o. male.    Patient here  for review of chronic medical problems and  the labs and imaging if it is applicable.  Currently has no specific complaints other than mentioned in the review of systems  Denies chest pain, SOB, cough, abdominal pain, nausea, vomiting, fever, chills, lightheadedness, dizziness,headache, tingling or numbness.No bowel or bladder problem.        The following portions of the patient's history were reviewed and updated as appropriate:   Past Medical History:  He has a past medical history of Allergic, Arthritis, Chronic kidney disease (2021), Chronic kidney disease (CKD) stage G3a/A1, moderately decreased glomerular filtration rate (GFR) between 45-59 mL/min/1.73 square meter and albuminuria creatinine ratio less than 30 mg/g (HCC), Colon polyp, Diabetes mellitus (HCC), GERD (gastroesophageal reflux disease), Heart murmur, History of echocardiogram, HL (hearing loss), Hyperlipidemia, Hypertension, Obesity, Pancreatitis, and Sleep apnea, obstructive.,  _______________________________________________________________________  Medical Problems:  does not have any pertinent problems on file.,  _______________________________________________________________________  Past Surgical History:   has a past surgical history that includes Cardiac catheterization; Other surgical history; Joint replacement (2017); Colonoscopy (03/09/2018); Eye surgery; Colonoscopy; Upper gastrointestinal endoscopy; Hemorrhoid surgery; pr laparoscopy surg cholecystectomy (N/A, 06/28/2023); Cholecystectomy; and Knee surgery (Right).,  _______________________________________________________________________  Family History:  family history includes Cancer in his brother and mother; Coronary artery disease in his mother; Heart attack in his mother; Heart disease in his mother.,  _______________________________________________________________________  Social History:   reports that he quit smoking about 51 years ago. His smoking use included cigarettes,  pipe, and cigars. He started smoking about 66 years ago. He has a 30 pack-year smoking history. He has been exposed to tobacco smoke. He has never used smokeless tobacco. He reports that he does not currently use alcohol after a past usage of about 5.0 standard drinks of alcohol per week. He reports that he does not use drugs.,  _______________________________________________________________________  Allergies:  is allergic to januvia [sitagliptin], semaglutide, simvastatin, trulicity [dulaglutide], wound dressing adhesive, iodinated contrast media, medical tape, and molds & smuts..  _______________________________________________________________________  Current Outpatient Medications   Medication Sig Dispense Refill   • allopurinol (ZYLOPRIM) 100 mg tablet Take 100 mg by mouth daily     • apixaban (Eliquis) 2.5 mg Take 2.5 mg by mouth 2 (two) times a day     • BD Pen Needle Pascale U/F 32G X 4 MM MISC USE AS INSTRUCTED WITH INSULIN PEN     • cloNIDine (CATAPRES) 0.1 mg tablet TAKE 1 TABLET BY MOUTH EVERY 12 HOURS 180 tablet 1   • clopidogrel (PLAVIX) 75 mg tablet Take 1 tablet (75 mg total) by mouth daily 90 tablet 1   • Docusate Sodium (DSS) 100 MG CAPS Take 100 mg by mouth daily     • Fexofenadine HCl (MUCINEX ALLERGY PO) Take by mouth     • fluticasone (FLONASE) 50 mcg/act nasal spray 2 sprays into each nostril daily 48 mL 0   • insulin aspart (NovoLOG FlexPen) 100 UNIT/ML injection pen Inject under the skin 3 (three) times a day with meals Sliding scale as ordered TID with meals     • ipratropium (ATROVENT) 0.03 % nasal spray SPRAY 2 SPRAYS INTO EACH NOSTRIL EVERY 12 HOURS. 30 mL 2   • Lancets (OneTouch Delica Plus [id number]) MISC TEST GLUCOSE 3 4 TIMES/DAY     • Levemir FlexTouch 100 units/mL injection pen 36 Units daily In AM     • lisinopril (ZESTRIL) 20 mg tablet Take 1 tablet (20 mg total) by mouth daily 90 tablet 1   • lisinopril (ZESTRIL) 40 mg tablet Take 0.5 tablets (20 mg total) by mouth daily 90 tablet  0   • metoprolol succinate (TOPROL-XL) 25 mg 24 hr tablet TAKE 1 TABLET (25 MG TOTAL) BY MOUTH DAILY. 90 tablet 1   • Multiple Vitamins-Minerals (MULTIVITAMIN ADULT PO) Take by mouth daily     • ondansetron (ZOFRAN-ODT) 4 mg disintegrating tablet Take 1 tablet (4 mg total) by mouth every 8 (eight) hours as needed for nausea or vomiting for up to 7 days 20 tablet 0   • pantoprazole (PROTONIX) 40 mg tablet Take 1 tablet (40 mg total) by mouth daily before breakfast 90 tablet 3   • pyridoxine (VITAMIN B6) 100 mg tablet Take 100 mg by mouth daily     • ranolazine (RANEXA) 500 mg 12 hr tablet TAKE 1 TABLET BY MOUTH TWICE A  tablet 0   • rosuvastatin (CRESTOR) 20 MG tablet take 1 tablet by mouth every day at night     • tamsulosin (FLOMAX) 0.4 mg Take 2 capsules (0.8 mg total) by mouth daily 180 capsule 1   • torsemide (DEMADEX) 10 mg tablet Take 1 tablet (10 mg total) by mouth daily 90 tablet 1   • Tresiba FlexTouch 100 units/mL injection pen Inject 20 Units under the skin 2 (two) times a day       No current facility-administered medications for this visit.     _______________________________________________________________________  Review of Systems   Constitutional:  Negative for chills and fever.   HENT:  Negative for congestion, ear pain, postnasal drip and sore throat.    Eyes:  Negative for pain and visual disturbance.   Respiratory:  Negative for cough and shortness of breath.    Cardiovascular:  Negative for chest pain and palpitations.   Gastrointestinal:  Negative for abdominal pain, nausea and vomiting.   Endocrine: Negative for cold intolerance, heat intolerance, polydipsia, polyphagia and polyuria.   Genitourinary:  Negative for difficulty urinating, dysuria, frequency and hematuria.   Musculoskeletal:  Positive for arthralgias (right knee) and gait problem. Negative for back pain.   Skin:  Negative for color change and rash.   Neurological:  Negative for dizziness, seizures, syncope,  "light-headedness and headaches.   Psychiatric/Behavioral:  Negative for agitation and behavioral problems.    All other systems reviewed and are negative.        Objective:  Vitals:    05/13/24 1449   BP: 130/80   BP Location: Left arm   Patient Position: Sitting   Cuff Size: Standard   Pulse: 66   Resp: 18   Temp: 98 °F (36.7 °C)   TempSrc: Tympanic   SpO2: 97%   Weight: 109 kg (239 lb 6.4 oz)   Height: 5' 6\" (1.676 m)     Body mass index is 38.64 kg/m².     Physical Exam  Vitals and nursing note reviewed.   Constitutional:       General: He is not in acute distress.     Appearance: Normal appearance. He is not ill-appearing, toxic-appearing or diaphoretic.   HENT:      Head: Normocephalic and atraumatic.      Nose: Nose normal. No congestion.      Mouth/Throat:      Mouth: Mucous membranes are moist.   Eyes:      General: No scleral icterus.        Right eye: No discharge.         Left eye: No discharge.      Extraocular Movements: Extraocular movements intact.      Conjunctiva/sclera: Conjunctivae normal.      Pupils: Pupils are equal, round, and reactive to light.   Cardiovascular:      Rate and Rhythm: Normal rate and regular rhythm.      Pulses: Normal pulses.      Heart sounds: Murmur (2/6 BITA) heard.      No gallop.   Pulmonary:      Effort: Pulmonary effort is normal. No respiratory distress.      Breath sounds: Normal breath sounds. No wheezing, rhonchi or rales.   Abdominal:      General: Abdomen is flat. Bowel sounds are normal. There is no distension.      Palpations: Abdomen is soft.      Tenderness: There is no abdominal tenderness. There is no right CVA tenderness, left CVA tenderness or guarding.   Musculoskeletal:         General: No swelling or tenderness. Normal range of motion.      Cervical back: Normal range of motion and neck supple. No rigidity.      Right lower leg: Edema (1-2+) present.      Left lower leg: Edema (minimal) present.      Comments: Chronic venous stasis changes "   Lymphadenopathy:      Cervical: No cervical adenopathy.   Skin:     General: Skin is warm.      Capillary Refill: Capillary refill takes 2 to 3 seconds.      Coloration: Skin is not jaundiced.      Findings: No bruising or rash.   Neurological:      General: No focal deficit present.      Mental Status: He is alert and oriented to person, place, and time. Mental status is at baseline.      Motor: No weakness.      Gait: Gait abnormal (Ambulates with cane).   Psychiatric:         Mood and Affect: Mood normal.         Behavior: Behavior normal.

## 2024-05-15 ENCOUNTER — NURSE TRIAGE (OUTPATIENT)
Age: 84
End: 2024-05-15

## 2024-05-15 DIAGNOSIS — N40.0 BENIGN PROSTATIC HYPERPLASIA WITHOUT LOWER URINARY TRACT SYMPTOMS: ICD-10-CM

## 2024-05-15 RX ORDER — TAMSULOSIN HYDROCHLORIDE 0.4 MG/1
0.8 CAPSULE ORAL DAILY
Qty: 180 CAPSULE | Refills: 1 | Status: SHIPPED | OUTPATIENT
Start: 2024-05-15 | End: 2024-05-16 | Stop reason: SDUPTHER

## 2024-05-15 NOTE — TELEPHONE ENCOUNTER
LOV 3/20/24 Dr. Davis pancreatitis, GERD, Procedure  ERCP Dr. Wright, Labs 4/3/24, Imaging CTA 3/31/24    I spoke with patient, he is scheduling visit with [facility] as noted on patient message yesterday. He continues with issues with pain with meals. Difficult to tell if related ot pancreas or hiatal hernia.    He is taking pantoprazole 40 mg daily. I reviewed GERD diet, not to eat late, elevate head of bed. Mr. Claros does not want to add any medication to his current regimen at this time but would like to schedule visit with provider. First available is September and that is unacceptable to him. I did review that he has Zofran on med list and advised he take that when nauseated.     I reviewed need for ED visit for severe pain. Please review/advise if patient can be added to your scheduled. Your next urgent appointment is in July.    Reason for Disposition   The patient has regurgitation    Answer Questions - Initial Assessment - Gerd New  When did your symptoms start: unsure since hx of pancreatitis    How often is this occurring: daily but not every meal    What medication are you currently taking: pantoprazole 40 mg daily    Have you tried any OTC medications: No    What was the outcome of taking the medication for this symptom: stayed the same    Any recent changes in your bowel habits: just coming off of knee surgery was pain medications and takes stool softener three times a week    Any new life stressors or diet changes: watches diet, nothing besides gi issues    Anything that makes this better: nothing     Have you had any recent blood work, imaging, or procedures: yes labs, imaging    Protocols used: GERD

## 2024-05-15 NOTE — TELEPHONE ENCOUNTER
Regarding: pain with every meal, occasional feeling of needing to vomit  ----- Message from Elsa ANDUJAR sent at 5/15/2024  1:48 PM EDT -----  Pt calling because is having pain with every meal, and occasional feeling of needing to vomit. He feels this could be due to his hernia. Offered pt September appt with Dr Davis, and he states he is very sick and cannot wait that long

## 2024-05-16 DIAGNOSIS — N40.0 BENIGN PROSTATIC HYPERPLASIA WITHOUT LOWER URINARY TRACT SYMPTOMS: ICD-10-CM

## 2024-05-16 RX ORDER — TAMSULOSIN HYDROCHLORIDE 0.4 MG/1
0.8 CAPSULE ORAL DAILY
Qty: 180 CAPSULE | Refills: 1 | Status: SHIPPED | OUTPATIENT
Start: 2024-05-16

## 2024-05-16 NOTE — TELEPHONE ENCOUNTER
Someone didn't look into my charts before ordering meds.  I changed pharmacies a few months ago, [facility] is the new pharmacy.   Please make sure my charts are reflecting this.  Thank you  Baljit

## 2024-05-22 NOTE — TELEPHONE ENCOUNTER
PT CALLING TO F/U ON PREVIOUS MESSAGE, CONTINUES TO HAVE INTERMITTENT SYMPTOMS, WANTS TO KNOW IF ENDOSCOPY IS WARRANTED AT THIS TIME, PT PREFERS TO SPEAK WITH DR. PAULINO ABOUT HIS PLAN OF CARE.

## 2024-05-24 ENCOUNTER — HOSPITAL ENCOUNTER (EMERGENCY)
Facility: HOSPITAL | Age: 84
Discharge: HOME | End: 2024-05-24
Attending: EMERGENCY MEDICINE
Payer: MEDICARE

## 2024-05-24 ENCOUNTER — APPOINTMENT (EMERGENCY)
Dept: RADIOLOGY | Facility: HOSPITAL | Age: 84
End: 2024-05-24
Payer: MEDICARE

## 2024-05-24 ENCOUNTER — APPOINTMENT (EMERGENCY)
Dept: CT IMAGING | Facility: HOSPITAL | Age: 84
End: 2024-05-24
Payer: MEDICARE

## 2024-05-24 VITALS
HEIGHT: 66 IN | HEART RATE: 61 BPM | TEMPERATURE: 98.4 F | WEIGHT: 235 LBS | SYSTOLIC BLOOD PRESSURE: 160 MMHG | RESPIRATION RATE: 19 BRPM | OXYGEN SATURATION: 94 % | BODY MASS INDEX: 37.77 KG/M2 | DIASTOLIC BLOOD PRESSURE: 69 MMHG

## 2024-05-24 DIAGNOSIS — K85.90 ACUTE PANCREATITIS: Primary | ICD-10-CM

## 2024-05-24 LAB
2HR DELTA HS TROPONIN: 0 NG/L
ALBUMIN SERPL BCP-MCNC: 3.7 G/DL (ref 3.5–5)
ALP SERPL-CCNC: 69 U/L (ref 34–104)
ALT SERPL W P-5'-P-CCNC: 13 U/L (ref 7–52)
ANION GAP SERPL CALCULATED.3IONS-SCNC: 8 MMOL/L (ref 4–13)
AST SERPL W P-5'-P-CCNC: 29 U/L (ref 13–39)
ATRIAL RATE: 68 BPM
BASOPHILS # BLD AUTO: 0.04 THOUSANDS/ÂΜL (ref 0–0.1)
BASOPHILS NFR BLD AUTO: 1 % (ref 0–1)
BILIRUB SERPL-MCNC: 0.45 MG/DL (ref 0.2–1)
BUN SERPL-MCNC: 25 MG/DL (ref 5–25)
CALCIUM SERPL-MCNC: 9 MG/DL (ref 8.4–10.2)
CARDIAC TROPONIN I PNL SERPL HS: 13 NG/L
CARDIAC TROPONIN I PNL SERPL HS: 13 NG/L
CHLORIDE SERPL-SCNC: 103 MMOL/L (ref 96–108)
CO2 SERPL-SCNC: 32 MMOL/L (ref 21–32)
CREAT SERPL-MCNC: 1.36 MG/DL (ref 0.6–1.3)
EOSINOPHIL # BLD AUTO: 0.17 THOUSAND/ÂΜL (ref 0–0.61)
EOSINOPHIL NFR BLD AUTO: 2 % (ref 0–6)
ERYTHROCYTE [DISTWIDTH] IN BLOOD BY AUTOMATED COUNT: 13.7 % (ref 11.6–15.1)
GFR SERPL CREATININE-BSD FRML MDRD: 48 ML/MIN/1.73SQ M
GLUCOSE SERPL-MCNC: 121 MG/DL (ref 65–140)
HCT VFR BLD AUTO: 35.7 % (ref 36.5–49.3)
HGB BLD-MCNC: 11.6 G/DL (ref 12–17)
IMM GRANULOCYTES # BLD AUTO: 0.02 THOUSAND/UL (ref 0–0.2)
IMM GRANULOCYTES NFR BLD AUTO: 0 % (ref 0–2)
LIPASE SERPL-CCNC: 279 U/L (ref 11–82)
LYMPHOCYTES # BLD AUTO: 1.77 THOUSANDS/ÂΜL (ref 0.6–4.47)
LYMPHOCYTES NFR BLD AUTO: 23 % (ref 14–44)
MCH RBC QN AUTO: 31.9 PG (ref 26.8–34.3)
MCHC RBC AUTO-ENTMCNC: 32.5 G/DL (ref 31.4–37.4)
MCV RBC AUTO: 98 FL (ref 82–98)
MONOCYTES # BLD AUTO: 0.65 THOUSAND/ÂΜL (ref 0.17–1.22)
MONOCYTES NFR BLD AUTO: 8 % (ref 4–12)
NEUTROPHILS # BLD AUTO: 5.09 THOUSANDS/ÂΜL (ref 1.85–7.62)
NEUTS SEG NFR BLD AUTO: 66 % (ref 43–75)
NRBC BLD AUTO-RTO: 0 /100 WBCS
P AXIS: 54 DEGREES
PLATELET # BLD AUTO: 216 THOUSANDS/UL (ref 149–390)
PMV BLD AUTO: 9.9 FL (ref 8.9–12.7)
POTASSIUM SERPL-SCNC: 4.5 MMOL/L (ref 3.5–5.3)
PR INTERVAL: 224 MS
PROT SERPL-MCNC: 6.6 G/DL (ref 6.4–8.4)
QRS AXIS: -55 DEGREES
QRSD INTERVAL: 154 MS
QT INTERVAL: 434 MS
QTC INTERVAL: 461 MS
RBC # BLD AUTO: 3.64 MILLION/UL (ref 3.88–5.62)
SODIUM SERPL-SCNC: 143 MMOL/L (ref 135–147)
T WAVE AXIS: 35 DEGREES
VENTRICULAR RATE: 68 BPM
WBC # BLD AUTO: 7.74 THOUSAND/UL (ref 4.31–10.16)

## 2024-05-24 PROCEDURE — 80053 COMPREHEN METABOLIC PANEL: CPT | Performed by: EMERGENCY MEDICINE

## 2024-05-24 PROCEDURE — 93005 ELECTROCARDIOGRAM TRACING: CPT

## 2024-05-24 PROCEDURE — 84484 ASSAY OF TROPONIN QUANT: CPT | Performed by: EMERGENCY MEDICINE

## 2024-05-24 PROCEDURE — 74177 CT ABD & PELVIS W/CONTRAST: CPT

## 2024-05-24 PROCEDURE — 99285 EMERGENCY DEPT VISIT HI MDM: CPT | Performed by: EMERGENCY MEDICINE

## 2024-05-24 PROCEDURE — 99285 EMERGENCY DEPT VISIT HI MDM: CPT

## 2024-05-24 PROCEDURE — 85025 COMPLETE CBC W/AUTO DIFF WBC: CPT | Performed by: EMERGENCY MEDICINE

## 2024-05-24 PROCEDURE — 71045 X-RAY EXAM CHEST 1 VIEW: CPT

## 2024-05-24 PROCEDURE — 96361 HYDRATE IV INFUSION ADD-ON: CPT

## 2024-05-24 PROCEDURE — 96360 HYDRATION IV INFUSION INIT: CPT

## 2024-05-24 PROCEDURE — 83690 ASSAY OF LIPASE: CPT | Performed by: EMERGENCY MEDICINE

## 2024-05-24 PROCEDURE — 93010 ELECTROCARDIOGRAM REPORT: CPT | Performed by: INTERNAL MEDICINE

## 2024-05-24 PROCEDURE — 36415 COLL VENOUS BLD VENIPUNCTURE: CPT | Performed by: EMERGENCY MEDICINE

## 2024-05-24 RX ADMIN — SODIUM CHLORIDE 500 ML: 0.9 INJECTION, SOLUTION INTRAVENOUS at 12:56

## 2024-05-24 RX ADMIN — IOHEXOL 100 ML: 350 INJECTION, SOLUTION INTRAVENOUS at 13:37

## 2024-05-24 NOTE — ED PROVIDER NOTES
"History  Chief Complaint   Patient presents with    Chest Pain     Ambulatory w/complaint of mid sternal chest pain x3 months; denies history of cardiac history; denies SOB; nothing taken OTC for pain PTA; admits to history of pancreatitis \"I can't tell if it's that maybe\"     83-year-old male with history of recurrent pancreatitis presents to the emergency department for evaluation of lower chest and epigastric pain.  The patient reports that over the past 3 months he has been having intermittent pain.  Patient reports that nothing has changed over the past 3 months but he states that he has been having difficulty following up with outside providers and he feels like he cannot take it any longer so came to the emergency department for further evaluation.  He did not take any medications to treat his symptoms prior to arrival.  He denies fevers, chills, nausea, vomiting, diarrhea, back pain and localized numbness, tingling or weakness.        Prior to Admission Medications   Prescriptions Last Dose Informant Patient Reported? Taking?   BD Pen Needle Pascale U/F 32G X 4 MM MISC  Self Yes No   Sig: USE AS INSTRUCTED WITH INSULIN PEN   Docusate Sodium (DSS) 100 MG CAPS  Self Yes No   Sig: Take 100 mg by mouth daily   Fexofenadine HCl (MUCINEX ALLERGY PO)  Self Yes No   Sig: Take by mouth   Lancets (OneTouch Delica Plus [id number]) MISC  Self Yes No   Sig: TEST GLUCOSE 3 4 TIMES/DAY   Levemir FlexTouch 100 units/mL injection pen  Self Yes No   Si Units daily In AM   Multiple Vitamins-Minerals (MULTIVITAMIN ADULT PO)  Self Yes No   Sig: Take by mouth daily   Tresiba FlexTouch 100 units/mL injection pen  Self Yes No   Sig: Inject 20 Units under the skin 2 (two) times a day   allopurinol (ZYLOPRIM) 100 mg tablet  Self Yes No   Sig: Take 100 mg by mouth daily   apixaban (Eliquis) 2.5 mg   Yes No   Sig: Take 2.5 mg by mouth 2 (two) times a day   cloNIDine (CATAPRES) 0.1 mg tablet   No No   Sig: TAKE 1 TABLET BY MOUTH EVERY " 12 HOURS   clopidogrel (PLAVIX) 75 mg tablet   No No   Sig: Take 1 tablet (75 mg total) by mouth daily   fluticasone (FLONASE) 50 mcg/act nasal spray  Self No No   Si sprays into each nostril daily   insulin aspart (NovoLOG FlexPen) 100 UNIT/ML injection pen  Self Yes No   Sig: Inject under the skin 3 (three) times a day with meals Sliding scale as ordered TID with meals   ipratropium (ATROVENT) 0.03 % nasal spray  Self No No   Sig: SPRAY 2 SPRAYS INTO EACH NOSTRIL EVERY 12 HOURS.   lisinopril (ZESTRIL) 20 mg tablet  Self No No   Sig: Take 1 tablet (20 mg total) by mouth daily   lisinopril (ZESTRIL) 40 mg tablet   No No   Sig: Take 0.5 tablets (20 mg total) by mouth daily   metoprolol succinate (TOPROL-XL) 25 mg 24 hr tablet  Self No No   Sig: TAKE 1 TABLET (25 MG TOTAL) BY MOUTH DAILY.   ondansetron (ZOFRAN-ODT) 4 mg disintegrating tablet   No No   Sig: Take 1 tablet (4 mg total) by mouth every 8 (eight) hours as needed for nausea or vomiting for up to 7 days   pantoprazole (PROTONIX) 40 mg tablet  Self No No   Sig: Take 1 tablet (40 mg total) by mouth daily before breakfast   pyridoxine (VITAMIN B6) 100 mg tablet  Self Yes No   Sig: Take 100 mg by mouth daily   ranolazine (RANEXA) 500 mg 12 hr tablet   No No   Sig: TAKE 1 TABLET BY MOUTH TWICE A DAY   rosuvastatin (CRESTOR) 20 MG tablet   Yes No   Sig: take 1 tablet by mouth every day at night   tamsulosin (FLOMAX) 0.4 mg   No No   Sig: Take 2 capsules (0.8 mg total) by mouth daily   torsemide (DEMADEX) 10 mg tablet   No No   Sig: Take 1 tablet (10 mg total) by mouth daily      Facility-Administered Medications: None       Past Medical History:   Diagnosis Date    Allergic     Arthritis     Chronic kidney disease     Chronic kidney disease (CKD) stage G3a/A1, moderately decreased glomerular filtration rate (GFR) between 45-59 mL/min/1.73 square meter and albuminuria creatinine ratio less than 30 mg/g (HCC)     Colon polyp     Diabetes mellitus (HCC)      GERD (gastroesophageal reflux disease)     Heart murmur     History of echocardiogram     HL (hearing loss)     Hyperlipidemia     Hypertension     Obesity     Pancreatitis     Sleep apnea, obstructive        Past Surgical History:   Procedure Laterality Date    CARDIAC CATHETERIZATION      CHOLECYSTECTOMY      COLONOSCOPY  2018    COLONOSCOPY      EYE SURGERY      HEMORRHOID SURGERY      JOINT REPLACEMENT  2017    knee    KNEE SURGERY Right     OTHER SURGICAL HISTORY      Stent     SD LAPAROSCOPY SURG CHOLECYSTECTOMY N/A 2023    Procedure: CHOLECYSTECTOMY LAPAROSCOPIC;  Surgeon: Alirio Hong MD;  Location:  MAIN OR;  Service: General    UPPER GASTROINTESTINAL ENDOSCOPY         Family History   Problem Relation Age of Onset    Heart disease Mother     Heart attack Mother         50s    Cancer Mother     Coronary artery disease Mother     Cancer Brother         Malignant tumor of lung     I have reviewed and agree with the history as documented.    E-Cigarette/Vaping    E-Cigarette Use Never User      E-Cigarette/Vaping Substances    Nicotine No     THC No     CBD No     Flavoring No     Other No     Unknown No      Social History     Tobacco Use    Smoking status: Former     Current packs/day: 0.00     Average packs/day: 2.0 packs/day for 15.0 years (30.0 ttl pk-yrs)     Types: Cigarettes, Pipe, Cigars     Start date: 1957     Quit date: 1972     Years since quittin.5     Passive exposure: Past    Smokeless tobacco: Never   Vaping Use    Vaping status: Never Used   Substance Use Topics    Alcohol use: Not Currently     Alcohol/week: 5.0 standard drinks of alcohol     Types: 5 Glasses of wine per week     Comment: no alcohol since March    Drug use: Never       Review of Systems   Constitutional:  Negative for chills and fever.   HENT:  Negative for ear pain and sore throat.    Eyes:  Negative for pain and visual disturbance.   Respiratory:  Negative for cough and shortness of  breath.    Cardiovascular:  Positive for chest pain. Negative for palpitations.   Gastrointestinal:  Positive for abdominal pain. Negative for vomiting.   Genitourinary:  Negative for dysuria and hematuria.   Musculoskeletal:  Negative for arthralgias and back pain.   Skin:  Negative for color change and rash.   Neurological:  Negative for seizures and syncope.   All other systems reviewed and are negative.      Physical Exam  Physical Exam  Vitals and nursing note reviewed.   Constitutional:       General: He is not in acute distress.     Appearance: He is well-developed.   HENT:      Head: Normocephalic and atraumatic.   Eyes:      Conjunctiva/sclera: Conjunctivae normal.   Cardiovascular:      Rate and Rhythm: Normal rate and regular rhythm.      Heart sounds: No murmur heard.  Pulmonary:      Effort: Pulmonary effort is normal. No respiratory distress.      Breath sounds: Normal breath sounds.   Abdominal:      Palpations: Abdomen is soft.      Tenderness: There is abdominal tenderness (mild) in the epigastric area. There is no guarding or rebound.   Musculoskeletal:         General: No swelling.      Cervical back: Neck supple.   Skin:     General: Skin is warm and dry.      Capillary Refill: Capillary refill takes less than 2 seconds.   Neurological:      Mental Status: He is alert.   Psychiatric:         Mood and Affect: Mood normal.         Vital Signs  ED Triage Vitals   Temperature Pulse Respirations Blood Pressure SpO2   05/24/24 1306 05/24/24 1146 05/24/24 1146 05/24/24 1148 05/24/24 1146   98.4 °F (36.9 °C) 66 18 160/69 100 %      Temp Source Heart Rate Source Patient Position - Orthostatic VS BP Location FiO2 (%)   05/24/24 1306 05/24/24 1146 05/24/24 1148 05/24/24 1148 --   Oral Monitor Sitting Left arm       Pain Score       05/24/24 1146       6           Vitals:    05/24/24 1146 05/24/24 1148 05/24/24 1300 05/24/24 1345   BP:  160/69     Pulse: 66  62 61   Patient Position - Orthostatic VS:   Sitting           Visual Acuity      ED Medications  Medications   sodium chloride 0.9 % bolus 500 mL (0 mL Intravenous Stopped 5/24/24 1436)   iohexol (OMNIPAQUE) 350 MG/ML injection (SINGLE-DOSE) 100 mL (100 mL Intravenous Given 5/24/24 1337)       Diagnostic Studies  Results Reviewed       Procedure Component Value Units Date/Time    HS Troponin I 2hr [[id number]]  (Normal) Collected: 05/24/24 1350    Lab Status: Final result Specimen: Blood from Arm, Right Updated: 05/24/24 1418     hs TnI 2hr 13 ng/L      Delta 2hr hsTnI 0 ng/L     HS Troponin 0hr (reflex protocol) [[id number]]  (Normal) Collected: 05/24/24 1211    Lab Status: Final result Specimen: Blood from Arm, Right Updated: 05/24/24 1319     hs TnI 0hr 13 ng/L     Lipase [[id number]]  (Abnormal) Collected: 05/24/24 1211    Lab Status: Final result Specimen: Blood from Arm, Right Updated: 05/24/24 1234     Lipase 279 u/L     Comprehensive metabolic panel [[id number]]  (Abnormal) Collected: 05/24/24 1211    Lab Status: Final result Specimen: Blood from Arm, Right Updated: 05/24/24 1234     Sodium 143 mmol/L      Potassium 4.5 mmol/L      Chloride 103 mmol/L      CO2 32 mmol/L      ANION GAP 8 mmol/L      BUN 25 mg/dL      Creatinine 1.36 mg/dL      Glucose 121 mg/dL      Calcium 9.0 mg/dL      AST 29 U/L      ALT 13 U/L      Alkaline Phosphatase 69 U/L      Total Protein 6.6 g/dL      Albumin 3.7 g/dL      Total Bilirubin 0.45 mg/dL      eGFR 48 ml/min/1.73sq m     Narrative:      National Kid[employer] Disease Foundation guidelines for Chronic Kidney Disease (CKD):     Stage 1 with normal or high GFR (GFR > 90 mL/min/1.73 square meters)    Stage 2 Mild CKD (GFR = 60-89 mL/min/1.73 square meters)    Stage 3A Moderate CKD (GFR = 45-59 mL/min/1.73 square meters)    Stage 3B Moderate CKD (GFR = 30-44 mL/min/1.73 square meters)    Stage 4 Severe CKD (GFR = 15-29 mL/min/1.73 square meters)    Stage 5 End Stage CKD (GFR <15 mL/min/1.73 square meters)  Note: GFR calculation  is accurate only with a steady state creatinine    CBC and differential [[id number]]  (Abnormal) Collected: 05/24/24 1211    Lab Status: Final result Specimen: Blood from Arm, Right Updated: 05/24/24 1218     WBC 7.74 Thousand/uL      RBC 3.64 Million/uL      Hemoglobin 11.6 g/dL      Hematocrit 35.7 %      MCV 98 fL      MCH 31.9 pg      MCHC 32.5 g/dL      RDW 13.7 %      MPV 9.9 fL      Platelets 216 Thousands/uL      nRBC 0 /100 WBCs      Segmented % 66 %      Immature Grans % 0 %      Lymphocytes % 23 %      Monocytes % 8 %      Eosinophils Relative 2 %      Basophils Relative 1 %      Absolute Neutrophils 5.09 Thousands/µL      Absolute Immature Grans 0.02 Thousand/uL      Absolute Lymphocytes 1.77 Thousands/µL      Absolute Monocytes 0.65 Thousand/µL      Eosinophils Absolute 0.17 Thousand/µL      Basophils Absolute 0.04 Thousands/µL                    CT abdomen pelvis with contrast   Final Result by Joey Bolaños MD (05/24 1351)      Recurrent mild interstitial edematous pancreatitis/acute pancreatitis.      No peripancreatic collection.      The study was marked in EPIC for immediate notification.         Workstation performed: [id number]         XR chest 1 view portable   ED Interpretation by Matt Alvarenga MD (05/24 1652)   No acute cardiopulmonary process      Final Result by Kyle Patel MD (05/24 1552)      No acute cardiopulmonary disease.            Resident: CAROLINA Gerard I, the attending radiologist, have reviewed the images and agree with the final report above.      Workstation performed: [id number]                    Procedures  ECG 12 Lead Documentation Only    Date/Time: 5/24/2024 2:38 PM    Performed by: Matt Alvarenga MD  Authorized by: Matt Alvarenga MD    ECG reviewed by me, the ED Provider: yes    Patient location:  ED  Previous ECG:     Previous ECG:  Compared to current    Similarity:  No change    Comparison to cardiac monitor: Yes    Interpretation:      Interpretation: abnormal    Rate:     ECG rate assessment: normal    Rhythm:     Rhythm: sinus rhythm and A-V block    Ectopy:     Ectopy: none    QRS:     QRS axis:  Normal  Conduction:     Conduction: abnormal      Abnormal conduction: complete RBBB, LAFB and bifascicular block    ST segments:     ST segments:  Normal  T waves:     T waves: normal    Other findings:     Other findings: LVH             ED Course  ED Course as of 05/24/24 1653   Fri May 24, 2024   1246 LIPASE(!): 279   1246 WBC: 7.74   1248 Patient reevaluated and updated on results of diagnostic testing so far.  Lipase elevated to 279.  Patient again offered analgesic medication but he declined stating that pain is well-controlled at this point.  Will give the patient a fluid bolus.  CT scan pending, will continue to monitor.   1356 IMPRESSION:     Recurrent mild interstitial edematous pancreatitis/acute pancreatitis.     No peripancreatic collection.     The study was marked in EPIC for immediate notification.        1424 Patient reevaluated and updated on results of all diagnostic testing.  Patient reports that pain continues to be controlled.  Discussed admitting the patient but he declines at this time.  He states that he wants to go home and will return for worsening symptoms.  Risks were discussed and the patient acknowledges understanding the risk.                               SBIRT 20yo+      Flowsheet Row Most Recent Value   Initial Alcohol Screen: US AUDIT-C     1. How often do you have a drink containing alcohol? 0 Filed at: 05/24/2024 1148   2. How many drinks containing alcohol do you have on a typical day you are drinking?  0 Filed at: 05/24/2024 1148   3b. FEMALE Any Age, or MALE 65+: How often do you have 4 or more drinks on one occassion? 0 Filed at: 05/24/2024 1148   Audit-C Score 0 Filed at: 05/24/2024 1148   ALEA: How many times in the past year have you...    Used an illegal drug or used a prescription medication for  non-medical reasons? Never Filed at: 05/24/2024 1148                      Medical Decision Making  83-year-old male presented to the emergency department for evaluation of chest/epigastric pain.  On arrival patient awake, alert, oriented and in no acute distress.  Initial vital signs show the patient was hypertensive but otherwise vital signs were within normal limits.  On exam patient with mild tenderness to palpation of his epigastric area.  Physical exam was otherwise unremarkable.  Patient offered analgesic medications but he declined.  Patient treated with a fluid bolus.  EKG was ordered to evaluate for acute ischemia/arrhythmia.  On my interpretation EKG with a sinus rhythm with no acute ischemic changes.  Chest x-ray ordered to evaluate for acute cardiopulmonary process including but not limited to pneumonia, pneumothorax, edema, effusion.  On my interpretation chest x-ray with no acute findings.  Delta troponin within normal limits.  Lipase was noted to be elevated at 279.  CT scan was ordered to evaluate for acute abdominal pathology including but not limited to obstruction, perforation, infection, abscess, pancreatitis.  CT scan was consistent with acute pancreatitis.  All diagnostic studies were discussed with the patient in detail.  Recommendation was made for the patient to be admitted to the hospital but the patient declined.  Risks were discussed and the patient acknowledged understanding the risks.  Patient stated that he would return to the emergency department for new or worsening symptoms.  Recommendation was made for the patient to follow-up with his PCP and gastroenterologist.    Patient requesting discharge and feels comfortable to go home with proper f/u. Advised to return for worsening or additional problems. Diagnostic tests were reviewed and questions answered. Diagnosis, care plan and treatment options were discussed. The patient understands instructions and will follow up as  directed.        Amount and/or Complexity of Data Reviewed  Labs: ordered. Decision-making details documented in ED Course.  Radiology: ordered and independent interpretation performed.  ECG/medicine tests: ordered and independent interpretation performed.    Risk  Prescription drug management.             Disposition  Final diagnoses:   Acute pancreatitis     Time reflects when diagnosis was documented in both MDM as applicable and the Disposition within this note       Time User Action Codes Description Comment    5/24/2024  3:47 PM Colette Matt Add [K85.90] Acute pancreatitis           ED Disposition       ED Disposition   Discharge    Condition   Stable    Date/Time   Fri May 24, 2024 1429    Comment   Jake Claros discharge to home/self care.                   Follow-up Information       Follow up With Specialties Details Why Contact Info Additional Information    Ashwin Leyva MD Internal Medicine Schedule an appointment as soon as possible for a visit   [address] [place] [address]  [phone]       [facility] Emergency Medicine Go to  If symptoms worsen [address] [place] [address] [id number] [phone] [facility], [address] [place] [id number]            Discharge Medication List as of 5/24/2024  2:30 PM        CONTINUE these medications which have NOT CHANGED    Details   allopurinol (ZYLOPRIM) 100 mg tablet Take 100 mg by mouth daily, Starting Wed 12/20/2023, Historical Med      apixaban (Eliquis) 2.5 mg Take 2.5 mg by mouth 2 (two) times a day, Historical Med      BD Pen Needle Pascale U/F 32G X 4 MM MISC USE AS INSTRUCTED WITH INSULIN PEN, Historical Med      cloNIDine (CATAPRES) 0.1 mg tablet TAKE 1 TABLET BY MOUTH EVERY 12 HOURS, Starting Tue 4/2/2024, Normal      clopidogrel (PLAVIX) 75 mg tablet Take 1 tablet (75 mg total) by mouth daily, Starting Mon 4/22/2024, Normal       Docusate Sodium (DSS) 100 MG CAPS Take 100 mg by mouth daily, Historical Med      Fexofenadine HCl (MUCINEX ALLERGY PO) Take by mouth, Historical Med      fluticasone (FLONASE) 50 mcg/act nasal spray 2 sprays into each nostril daily, Starting Tue 8/15/2023, Normal      insulin aspart (NovoLOG FlexPen) 100 UNIT/ML injection pen Inject under the skin 3 (three) times a day with meals Sliding scale as ordered TID with meals, Historical Med      ipratropium (ATROVENT) 0.03 % nasal spray SPRAY 2 SPRAYS INTO EACH NOSTRIL EVERY 12 HOURS., Starting Fri 12/29/2023, Normal      Lancets (OneTouch Delica Plus [id number]) MISC TEST GLUCOSE 3 4 TIMES/DAY, Historical Med      Levemir FlexTouch 100 units/mL injection pen 36 Units daily In AM, Starting Thu 8/27/2020, Historical Med      !! lisinopril (ZESTRIL) 20 mg tablet Take 1 tablet (20 mg total) by mouth daily, Starting Wed 2/14/2024, Normal      !! lisinopril (ZESTRIL) 40 mg tablet Take 0.5 tablets (20 mg total) by mouth daily, Starting Mon 4/15/2024, Normal      metoprolol succinate (TOPROL-XL) 25 mg 24 hr tablet TAKE 1 TABLET (25 MG TOTAL) BY MOUTH DAILY., Starting Thu 1/4/2024, Normal      Multiple Vitamins-Minerals (MULTIVITAMIN ADULT PO) Take by mouth daily, Historical Med      ondansetron (ZOFRAN-ODT) 4 mg disintegrating tablet Take 1 tablet (4 mg total) by mouth every 8 (eight) hours as needed for nausea or vomiting for up to 7 days, Starting Mon 1/29/2024, Until Mon 5/13/2024 at 2359, Normal      pantoprazole (PROTONIX) 40 mg tablet Take 1 tablet (40 mg total) by mouth daily before breakfast, Starting Fri 2/16/2024, Until Mon 2/10/2025, Normal      pyridoxine (VITAMIN B6) 100 mg tablet Take 100 mg by mouth daily, Historical Med      ranolazine (RANEXA) 500 mg 12 hr tablet TAKE 1 TABLET BY MOUTH TWICE A DAY, Starting Mon 4/15/2024, Normal      rosuvastatin (CRESTOR) 20 MG tablet take 1 tablet by mouth every day at night, Historical Med      tamsulosin (FLOMAX) 0.4 mg  Take 2 capsules (0.8 mg total) by mouth daily, Starting Thu 5/16/2024, Normal      torsemide (DEMADEX) 10 mg tablet Take 1 tablet (10 mg total) by mouth daily, Starting Mon 4/15/2024, Normal      Tresiba FlexTouch 100 units/mL injection pen Inject 20 Units under the skin 2 (two) times a day, Starting Wed 12/20/2023, Historical Med       !! - Potential duplicate medications found. Please discuss with provider.          No discharge procedures on file.    PDMP Review         Value Time User    PDMP Reviewed  Yes 7/10/2023  9:21 AM Myrtle Das MD            ED Provider  Electronically Signed by             Matt Alvarenga MD  05/24/24 1277

## 2024-05-28 ENCOUNTER — VBI (OUTPATIENT)
Dept: FAMILY MEDICINE CLINIC | Facility: CLINIC | Age: 84
End: 2024-05-28

## 2024-05-28 NOTE — TELEPHONE ENCOUNTER
05/28/24 10:58 AM    Patient contacted post ED visit, [facility] department spoke with patient/caregiver and outreach was successful.    Thank you.  AMRIT ESTEVEZ  [facility]

## 2024-06-07 ENCOUNTER — HOSPITAL ENCOUNTER (EMERGENCY)
Facility: HOSPITAL | Age: 84
Discharge: HOME | End: 2024-06-07
Attending: EMERGENCY MEDICINE
Payer: MEDICARE

## 2024-06-07 VITALS
SYSTOLIC BLOOD PRESSURE: 165 MMHG | TEMPERATURE: 98.2 F | RESPIRATION RATE: 15 BRPM | HEART RATE: 51 BPM | DIASTOLIC BLOOD PRESSURE: 77 MMHG | OXYGEN SATURATION: 95 %

## 2024-06-07 DIAGNOSIS — K86.1 ACUTE ON CHRONIC PANCREATITIS (HCC): Primary | ICD-10-CM

## 2024-06-07 DIAGNOSIS — K85.90 ACUTE ON CHRONIC PANCREATITIS (HCC): Primary | ICD-10-CM

## 2024-06-07 LAB
2HR DELTA HS TROPONIN: -1 NG/L
ALBUMIN SERPL BCP-MCNC: 3.5 G/DL (ref 3.5–5)
ALP SERPL-CCNC: 77 U/L (ref 34–104)
ALT SERPL W P-5'-P-CCNC: 13 U/L (ref 7–52)
AMYLASE SERPL-CCNC: 65 IU/L (ref 29–103)
ANION GAP SERPL CALCULATED.3IONS-SCNC: 6 MMOL/L (ref 4–13)
AST SERPL W P-5'-P-CCNC: 24 U/L (ref 13–39)
ATRIAL RATE: 57 BPM
BASOPHILS # BLD AUTO: 0.04 THOUSANDS/ÂΜL (ref 0–0.1)
BASOPHILS NFR BLD AUTO: 1 % (ref 0–1)
BILIRUB SERPL-MCNC: 0.43 MG/DL (ref 0.2–1)
BUN SERPL-MCNC: 28 MG/DL (ref 5–25)
CALCIUM SERPL-MCNC: 8.8 MG/DL (ref 8.4–10.2)
CARDIAC TROPONIN I PNL SERPL HS: 13 NG/L
CARDIAC TROPONIN I PNL SERPL HS: 14 NG/L
CHLORIDE SERPL-SCNC: 104 MMOL/L (ref 96–108)
CO2 SERPL-SCNC: 30 MMOL/L (ref 21–32)
CREAT SERPL-MCNC: 1.43 MG/DL (ref 0.6–1.3)
EOSINOPHIL # BLD AUTO: 0.17 THOUSAND/ÂΜL (ref 0–0.61)
EOSINOPHIL NFR BLD AUTO: 2 % (ref 0–6)
ERYTHROCYTE [DISTWIDTH] IN BLOOD BY AUTOMATED COUNT: 13.6 % (ref 11.6–15.1)
GFR SERPL CREATININE-BSD FRML MDRD: 45 ML/MIN/1.73SQ M
GLUCOSE SERPL-MCNC: 186 MG/DL (ref 65–140)
HCT VFR BLD AUTO: 34.8 % (ref 36.5–49.3)
HGB BLD-MCNC: 11.3 G/DL (ref 12–17)
IMM GRANULOCYTES # BLD AUTO: 0.02 THOUSAND/UL (ref 0–0.2)
IMM GRANULOCYTES NFR BLD AUTO: 0 % (ref 0–2)
LIPASE SERPL-CCNC: 162 U/L (ref 11–82)
LYMPHOCYTES # BLD AUTO: 1.79 THOUSANDS/ÂΜL (ref 0.6–4.47)
LYMPHOCYTES NFR BLD AUTO: 23 % (ref 14–44)
MCH RBC QN AUTO: 31.5 PG (ref 26.8–34.3)
MCHC RBC AUTO-ENTMCNC: 32.5 G/DL (ref 31.4–37.4)
MCV RBC AUTO: 97 FL (ref 82–98)
MONOCYTES # BLD AUTO: 0.68 THOUSAND/ÂΜL (ref 0.17–1.22)
MONOCYTES NFR BLD AUTO: 9 % (ref 4–12)
NEUTROPHILS # BLD AUTO: 5.12 THOUSANDS/ÂΜL (ref 1.85–7.62)
NEUTS SEG NFR BLD AUTO: 65 % (ref 43–75)
NRBC BLD AUTO-RTO: 0 /100 WBCS
P AXIS: 65 DEGREES
PLATELET # BLD AUTO: 197 THOUSANDS/UL (ref 149–390)
PMV BLD AUTO: 9.8 FL (ref 8.9–12.7)
POTASSIUM SERPL-SCNC: 4.2 MMOL/L (ref 3.5–5.3)
PR INTERVAL: 242 MS
PROT SERPL-MCNC: 6.2 G/DL (ref 6.4–8.4)
QRS AXIS: -53 DEGREES
QRSD INTERVAL: 162 MS
QT INTERVAL: 470 MS
QTC INTERVAL: 457 MS
RBC # BLD AUTO: 3.59 MILLION/UL (ref 3.88–5.62)
SODIUM SERPL-SCNC: 140 MMOL/L (ref 135–147)
T WAVE AXIS: 50 DEGREES
VENTRICULAR RATE: 57 BPM
WBC # BLD AUTO: 7.82 THOUSAND/UL (ref 4.31–10.16)

## 2024-06-07 PROCEDURE — 96374 THER/PROPH/DIAG INJ IV PUSH: CPT

## 2024-06-07 PROCEDURE — 80053 COMPREHEN METABOLIC PANEL: CPT | Performed by: EMERGENCY MEDICINE

## 2024-06-07 PROCEDURE — 82150 ASSAY OF AMYLASE: CPT | Performed by: EMERGENCY MEDICINE

## 2024-06-07 PROCEDURE — 99285 EMERGENCY DEPT VISIT HI MDM: CPT

## 2024-06-07 PROCEDURE — 36415 COLL VENOUS BLD VENIPUNCTURE: CPT | Performed by: EMERGENCY MEDICINE

## 2024-06-07 PROCEDURE — 83690 ASSAY OF LIPASE: CPT | Performed by: EMERGENCY MEDICINE

## 2024-06-07 PROCEDURE — 96376 TX/PRO/DX INJ SAME DRUG ADON: CPT

## 2024-06-07 PROCEDURE — 93010 ELECTROCARDIOGRAM REPORT: CPT | Performed by: INTERNAL MEDICINE

## 2024-06-07 PROCEDURE — 93005 ELECTROCARDIOGRAM TRACING: CPT

## 2024-06-07 PROCEDURE — 85025 COMPLETE CBC W/AUTO DIFF WBC: CPT | Performed by: EMERGENCY MEDICINE

## 2024-06-07 PROCEDURE — 99285 EMERGENCY DEPT VISIT HI MDM: CPT | Performed by: EMERGENCY MEDICINE

## 2024-06-07 PROCEDURE — 84484 ASSAY OF TROPONIN QUANT: CPT | Performed by: EMERGENCY MEDICINE

## 2024-06-07 RX ORDER — MORPHINE SULFATE 4 MG/ML
4 INJECTION, SOLUTION INTRAMUSCULAR; INTRAVENOUS ONCE
Status: COMPLETED | OUTPATIENT
Start: 2024-06-07 | End: 2024-06-07

## 2024-06-07 RX ORDER — ONDANSETRON 2 MG/ML
4 INJECTION INTRAMUSCULAR; INTRAVENOUS ONCE AS NEEDED
Status: DISCONTINUED | OUTPATIENT
Start: 2024-06-07 | End: 2024-06-07 | Stop reason: HOSPADM

## 2024-06-07 RX ORDER — OXYCODONE HYDROCHLORIDE 5 MG/1
5 TABLET ORAL EVERY 4 HOURS PRN
Qty: 18 TABLET | Refills: 0 | Status: SHIPPED | OUTPATIENT
Start: 2024-06-07 | End: 2024-06-10

## 2024-06-07 RX ADMIN — MORPHINE SULFATE 4 MG: 4 INJECTION INTRAVENOUS at 07:05

## 2024-06-07 RX ADMIN — MORPHINE SULFATE 4 MG: 4 INJECTION INTRAVENOUS at 06:01

## 2024-06-07 NOTE — DISCHARGE INSTRUCTIONS
Slowly progress your diet from soups to solid foods over the course of the next week.  Take a stool softener daily.    Take the oxycodone and as little dosage as you can to decrease your pain.    We discussed come back immediately if you have new or worsening abdominal pain for a CT scan or possible hospitalization.

## 2024-06-07 NOTE — ED PROVIDER NOTES
"History  Chief Complaint   Patient presents with    Chest Pain     Pt states \" Pancreatitis, again.\" States the pain started Thursday after dinner. Denies Medications PTA. Denies N/V/D/Fever     Patient complains of upper abdominal pain going to his chest that feels like his pancreatitis.  He tells me it feels exactly like numerous prior episodes of pancreatitis, and there is nothing different about it.  He feels like a burning pain.  It does not radiate.  He tells me it began yesterday and he tried treating it with food restriction but pain was unrelieved so he decided to come in.  He was last seen for pancreatitis and May.  He tells me that the pain did resolve after few days after he was discharged, but then recurred again.  He tells me he has had more than 10 episodes of pancreatitis in the past.  He has been followed by gastroenterology.  He denies shortness of breath.  He does admit to chronic leg swelling, which he tells me is unchanged from his baseline.  No pleuritic discomfort of any kind.  He is not short of breath.      Chest Pain      Prior to Admission Medications   Prescriptions Last Dose Informant Patient Reported? Taking?   BD Pen Needle Pascale U/F 32G X 4 MM MISC  Self Yes No   Sig: USE AS INSTRUCTED WITH INSULIN PEN   Docusate Sodium (DSS) 100 MG CAPS  Self Yes No   Sig: Take 100 mg by mouth daily   Fexofenadine HCl (MUCINEX ALLERGY PO)  Self Yes No   Sig: Take by mouth   Lancets (OneTouch Delica Plus [id number]) MISC  Self Yes No   Sig: TEST GLUCOSE 3 4 TIMES/DAY   Levemir FlexTouch 100 units/mL injection pen  Self Yes No   Si Units daily In AM   Multiple Vitamins-Minerals (MULTIVITAMIN ADULT PO)  Self Yes No   Sig: Take by mouth daily   Tresiba FlexTouch 100 units/mL injection pen  Self Yes No   Sig: Inject 20 Units under the skin 2 (two) times a day   allopurinol (ZYLOPRIM) 100 mg tablet  Self Yes No   Sig: Take 100 mg by mouth daily   apixaban (Eliquis) 2.5 mg   Yes No   Sig: Take 2.5 mg by " mouth 2 (two) times a day   cloNIDine (CATAPRES) 0.1 mg tablet   No No   Sig: TAKE 1 TABLET BY MOUTH EVERY 12 HOURS   clopidogrel (PLAVIX) 75 mg tablet   No No   Sig: Take 1 tablet (75 mg total) by mouth daily   fluticasone (FLONASE) 50 mcg/act nasal spray  Self No No   Si sprays into each nostril daily   insulin aspart (NovoLOG FlexPen) 100 UNIT/ML injection pen  Self Yes No   Sig: Inject under the skin 3 (three) times a day with meals Sliding scale as ordered TID with meals   ipratropium (ATROVENT) 0.03 % nasal spray  Self No No   Sig: SPRAY 2 SPRAYS INTO EACH NOSTRIL EVERY 12 HOURS.   lisinopril (ZESTRIL) 20 mg tablet  Self No No   Sig: Take 1 tablet (20 mg total) by mouth daily   lisinopril (ZESTRIL) 40 mg tablet   No No   Sig: Take 0.5 tablets (20 mg total) by mouth daily   metoprolol succinate (TOPROL-XL) 25 mg 24 hr tablet  Self No No   Sig: TAKE 1 TABLET (25 MG TOTAL) BY MOUTH DAILY.   ondansetron (ZOFRAN-ODT) 4 mg disintegrating tablet   No No   Sig: Take 1 tablet (4 mg total) by mouth every 8 (eight) hours as needed for nausea or vomiting for up to 7 days   pantoprazole (PROTONIX) 40 mg tablet  Self No No   Sig: Take 1 tablet (40 mg total) by mouth daily before breakfast   pyridoxine (VITAMIN B6) 100 mg tablet  Self Yes No   Sig: Take 100 mg by mouth daily   ranolazine (RANEXA) 500 mg 12 hr tablet   No No   Sig: TAKE 1 TABLET BY MOUTH TWICE A DAY   rosuvastatin (CRESTOR) 20 MG tablet   Yes No   Sig: take 1 tablet by mouth every day at night   tamsulosin (FLOMAX) 0.4 mg   No No   Sig: Take 2 capsules (0.8 mg total) by mouth daily   torsemide (DEMADEX) 10 mg tablet   No No   Sig: Take 1 tablet (10 mg total) by mouth daily      Facility-Administered Medications: None       Past Medical History:   Diagnosis Date    Allergic     Arthritis     Chronic kidney disease     Chronic kidney disease (CKD) stage G3a/A1, moderately decreased glomerular filtration rate (GFR) between 45-59 mL/min/1.73 square meter  and albuminuria creatinine ratio less than 30 mg/g (HCC)     Colon polyp     Diabetes mellitus (HCC)     GERD (gastroesophageal reflux disease)     Heart murmur     History of echocardiogram     HL (hearing loss)     Hyperlipidemia     Hypertension     Obesity     Pancreatitis     Sleep apnea, obstructive        Past Surgical History:   Procedure Laterality Date    CARDIAC CATHETERIZATION      CHOLECYSTECTOMY      COLONOSCOPY  2018    COLONOSCOPY      EYE SURGERY      HEMORRHOID SURGERY      JOINT REPLACEMENT  2017    knee    KNEE SURGERY Right     OTHER SURGICAL HISTORY      Stent     NH LAPAROSCOPY SURG CHOLECYSTECTOMY N/A 2023    Procedure: CHOLECYSTECTOMY LAPAROSCOPIC;  Surgeon: Alirio Hong MD;  Location:  MAIN OR;  Service: General    UPPER GASTROINTESTINAL ENDOSCOPY         Family History   Problem Relation Age of Onset    Heart disease Mother     Heart attack Mother         50s    Cancer Mother     Coronary artery disease Mother     Cancer Brother         Malignant tumor of lung     I have reviewed and agree with the history as documented.    E-Cigarette/Vaping    E-Cigarette Use Never User      E-Cigarette/Vaping Substances    Nicotine No     THC No     CBD No     Flavoring No     Other No     Unknown No      Social History     Tobacco Use    Smoking status: Former     Current packs/day: 0.00     Average packs/day: 2.0 packs/day for 15.0 years (30.0 ttl pk-yrs)     Types: Cigarettes, Pipe, Cigars     Start date: 1957     Quit date: 1972     Years since quittin.6     Passive exposure: Past    Smokeless tobacco: Never   Vaping Use    Vaping status: Never Used   Substance Use Topics    Alcohol use: Not Currently     Alcohol/week: 5.0 standard drinks of alcohol     Types: 5 Glasses of wine per week     Comment: no alcohol since March    Drug use: Never       Review of Systems   Cardiovascular:  Positive for chest pain.   All other systems reviewed and are  negative.      Physical Exam  Physical Exam  Vitals and nursing note reviewed.   Constitutional:       General: He is not in acute distress.     Appearance: He is well-developed.   HENT:      Head: Normocephalic and atraumatic.   Eyes:      Conjunctiva/sclera: Conjunctivae normal.   Cardiovascular:      Rate and Rhythm: Normal rate and regular rhythm.      Heart sounds: No murmur heard.  Pulmonary:      Effort: Pulmonary effort is normal. No respiratory distress.      Breath sounds: Normal breath sounds.   Abdominal:      Palpations: Abdomen is soft.      Tenderness: There is abdominal tenderness (Mild right upper quadrant, left upper quadrant, epigastric completely reproducing subjective complaint). There is no guarding or rebound.   Musculoskeletal:         General: No swelling.      Cervical back: Neck supple.      Right lower leg: No edema.      Left lower leg: No edema.   Skin:     General: Skin is warm and dry.      Capillary Refill: Capillary refill takes less than 2 seconds.   Neurological:      Mental Status: He is alert.   Psychiatric:         Mood and Affect: Mood normal.         Vital Signs  ED Triage Vitals [06/07/24 0546]   Temperature Pulse Respirations Blood Pressure SpO2   98.2 °F (36.8 °C) 57 16 (!) 215/87 99 %      Temp Source Heart Rate Source Patient Position - Orthostatic VS BP Location FiO2 (%)   Oral Monitor Lying Left arm --      Pain Score       9           Vitals:    06/07/24 0546 06/07/24 0555   BP: (!) 215/87 170/73   Pulse: 57 58   Patient Position - Orthostatic VS: Lying Lying         Visual Acuity      ED Medications  Medications   ondansetron (ZOFRAN) injection 4 mg (4 mg Intravenous Not Given 6/7/24 0605)   morphine injection 4 mg (4 mg Intravenous Given 6/7/24 0601)   morphine injection 4 mg (4 mg Intravenous Given 6/7/24 0705)       Diagnostic Studies  Results Reviewed       Procedure Component Value Units Date/Time    HS Troponin 0hr (reflex protocol) [[id number]]  (Normal)  Collected: 06/07/24 0556    Lab Status: Final result Specimen: Blood from Arm, Left Updated: 06/07/24 0628     hs TnI 0hr 14 ng/L     HS Troponin I 2hr [[id number]]     Lab Status: No result Specimen: Blood     Lipase [[id number]]  (Abnormal) Collected: 06/07/24 0556    Lab Status: Final result Specimen: Blood from Arm, Left Updated: 06/07/24 0622     Lipase 162 u/L     Comprehensive metabolic panel [[id number]]  (Abnormal) Collected: 06/07/24 0556    Lab Status: Final result Specimen: Blood from Arm, Left Updated: 06/07/24 0622     Sodium 140 mmol/L      Potassium 4.2 mmol/L      Chloride 104 mmol/L      CO2 30 mmol/L      ANION GAP 6 mmol/L      BUN 28 mg/dL      Creatinine 1.43 mg/dL      Glucose 186 mg/dL      Calcium 8.8 mg/dL      AST 24 U/L      ALT 13 U/L      Alkaline Phosphatase 77 U/L      Total Protein 6.2 g/dL      Albumin 3.5 g/dL      Total Bilirubin 0.43 mg/dL      eGFR 45 ml/min/1.73sq m     Narrative:      National Kidney Disease Foundation guidelines for Chronic Kidney Disease (CKD):     Stage 1 with normal or high GFR (GFR > 90 mL/min/1.73 square meters)    Stage 2 Mild CKD (GFR = 60-89 mL/min/1.73 square meters)    Stage 3A Moderate CKD (GFR = 45-59 mL/min/1.73 square meters)    Stage 3B Moderate CKD (GFR = 30-44 mL/min/1.73 square meters)    Stage 4 Severe CKD (GFR = 15-29 mL/min/1.73 square meters)    Stage 5 End Stage CKD (GFR <15 mL/min/1.73 square meters)  Note: GFR calculation is accurate only with a steady state creatinine    Amylase [[id number]]  (Normal) Collected: 06/07/24 0556    Lab Status: Final result Specimen: Blood from Arm, Left Updated: 06/07/24 0622     Amylase 65 IU/L     CBC and differential [[id number]]  (Abnormal) Collected: 06/07/24 0556    Lab Status: Final result Specimen: Blood from Arm, Left Updated: 06/07/24 0603     WBC 7.82 Thousand/uL      RBC 3.59 Million/uL      Hemoglobin 11.3 g/dL      Hematocrit 34.8 %      MCV 97 fL      MCH 31.5 pg      MCHC 32.5 g/dL      RDW  "13.6 %      MPV 9.8 fL      Platelets 197 Thousands/uL      nRBC 0 /100 WBCs      Segmented % 65 %      Immature Grans % 0 %      Lymphocytes % 23 %      Monocytes % 9 %      Eosinophils Relative 2 %      Basophils Relative 1 %      Absolute Neutrophils 5.12 Thousands/µL      Absolute Immature Grans 0.02 Thousand/uL      Absolute Lymphocytes 1.79 Thousands/µL      Absolute Monocytes 0.68 Thousand/µL      Eosinophils Absolute 0.17 Thousand/µL      Basophils Absolute 0.04 Thousands/µL                    No orders to display              Procedures  Procedures         ED Course  ED Course as of 06/07/24 0714 Fri Jun 07, 2024 0553 EKG: SB at 57 with RBBB, LAD, normal ST-t, QTc 457.  Similar to prior.  Suspect changes in V3 due to lead placement.   0712 Case signed out to Toshia Baker pending repeat evaluation.  Patient reports pain improving but not resolved.  Declines admission at this time telling me, \"I know what they do once you get admitted, and I don't think I'll go for it.  I know how to get through this.\"             HEART Risk Score      Flowsheet Row Most Recent Value   Heart Score Risk Calculator    History 0 Filed at: 06/07/2024 0711   ECG 1 Filed at: 06/07/2024 0711   Age 2 Filed at: 06/07/2024 0711   Risk Factors 2 Filed at: 06/07/2024 0711   Troponin 1 Filed at: 06/07/2024 0711   HEART Score 6 Filed at: 06/07/2024 0711                          SBIRT 20yo+      Flowsheet Row Most Recent Value   Initial Alcohol Screen: US AUDIT-C     1. How often do you have a drink containing alcohol? 0 Filed at: 06/07/2024 0548   2. How many drinks containing alcohol do you have on a typical day you are drinking?  0 Filed at: 06/07/2024 0548   3a. Male UNDER 65: How often do you have five or more drinks on one occasion? 0 Filed at: 06/07/2024 0548   Audit-C Score 0 Filed at: 06/07/2024 0548   ALEA: How many times in the past year have you...    Used an illegal drug or used a prescription medication for non-medical " reasons? Never Filed at: 06/07/2024 0548                      Medical Decision Making  Abdominal exam is nonacute.  Patient had CT for same complaints approximately 2 weeks ago without significant difference.  He has longstanding history of established pancreatitis without pseudocyst, and at this point I do not believe risks of radiation outweigh benefits.  Will medicate for pain, obtain labs, reevaluate.  MI unlikely in light of longstanding history of complaints, but given age, will obtain troponin.    Amount and/or Complexity of Data Reviewed  External Data Reviewed: radiology and notes.     Details: Reviewed CT report from May 24  Labs: ordered.  ECG/medicine tests: ordered. Decision-making details documented in ED Course.    Risk  Prescription drug management.  Decision regarding hospitalization.             Disposition  Final diagnoses:   Acute on chronic pancreatitis (HCC)     Time reflects when diagnosis was documented in both MDM as applicable and the Disposition within this note       Time User Action Codes Description Comment    6/7/2024  7:12 AM John Galvan Add [K85.90,  K86.1] Acute on chronic pancreatitis (HCC)           ED Disposition       None          Follow-up Information    None         Patient's Medications   Discharge Prescriptions    No medications on file       No discharge procedures on file.    PDMP Review         Value Time User    PDMP Reviewed  Yes 7/10/2023  9:21 AM Myrtle Das MD            ED Provider  Electronically Signed by             John Galvan MD  06/07/24 0714

## 2024-06-07 NOTE — ED CARE HANDOFF
Emergency Department Sign Out Note        Sign out and transfer of care from my colleague. See Separate Emergency Department note.     The patient, Jake Claros, was evaluated by the previous provider for abdominal pain/ chest pain w hx of chronic pancreatitis.    Epigastric pain similar as normal chronic pancreatitis. Had Ct of abd 2 weeks ago.     Workup Completed:  Labs Reviewed   CBC AND DIFFERENTIAL - Abnormal       Result Value Ref Range Status    WBC 7.82  4.31 - 10.16 Thousand/uL Final    RBC 3.59 (*) 3.88 - 5.62 Million/uL Final    Hemoglobin 11.3 (*) 12.0 - 17.0 g/dL Final    Hematocrit 34.8 (*) 36.5 - 49.3 % Final    MCV 97  82 - 98 fL Final    MCH 31.5  26.8 - 34.3 pg Final    MCHC 32.5  31.4 - 37.4 g/dL Final    RDW 13.6  11.6 - 15.1 % Final    MPV 9.8  8.9 - 12.7 fL Final    Platelets 197  149 - 390 Thousands/uL Final    nRBC 0  /100 WBCs Final    Segmented % 65  43 - 75 % Final    Immature Grans % 0  0 - 2 % Final    Lymphocytes % 23  14 - 44 % Final    Monocytes % 9  4 - 12 % Final    Eosinophils Relative 2  0 - 6 % Final    Basophils Relative 1  0 - 1 % Final    Absolute Neutrophils 5.12  1.85 - 7.62 Thousands/µL Final    Absolute Immature Grans 0.02  0.00 - 0.20 Thousand/uL Final    Absolute Lymphocytes 1.79  0.60 - 4.47 Thousands/µL Final    Absolute Monocytes 0.68  0.17 - 1.22 Thousand/µL Final    Eosinophils Absolute 0.17  0.00 - 0.61 Thousand/µL Final    Basophils Absolute 0.04  0.00 - 0.10 Thousands/µL Final   COMPREHENSIVE METABOLIC PANEL - Abnormal    Sodium 140  135 - 147 mmol/L Final    Potassium 4.2  3.5 - 5.3 mmol/L Final    Chloride 104  96 - 108 mmol/L Final    CO2 30  21 - 32 mmol/L Final    ANION GAP 6  4 - 13 mmol/L Final    BUN 28 (*) 5 - 25 mg/dL Final    Creatinine 1.43 (*) 0.60 - 1.30 mg/dL Final    Comment: Standardized to IDMS reference method    Glucose 186 (*) 65 - 140 mg/dL Final    Comment: If the patient is fasting, the ADA then defines impaired fasting glucose as >  "100 mg/dL and diabetes as > or equal to 123 mg/dL.    Calcium 8.8  8.4 - 10.2 mg/dL Final    AST 24  13 - 39 U/L Final    Comment: Slightly Hemolyzed:Results may be affected.    ALT 13  7 - 52 U/L Final    Comment: Specimen collection should occur prior to Sulfasalazine administration due to the potential for falsely depressed results.     Alkaline Phosphatase 77  34 - 104 U/L Final    Total Protein 6.2 (*) 6.4 - 8.4 g/dL Final    Albumin 3.5  3.5 - 5.0 g/dL Final    Total Bilirubin 0.43  0.20 - 1.00 mg/dL Final    Comment: Use of this assay is not recommended for patients undergoing treatment with eltrombopag due to the potential for falsely elevated results.  N-acetyl-p-benzoquinone imine (metabolite of Acetaminophen) will generate erroneously low results in samples for patients that have taken an overdose of Acetaminophen.    eGFR 45  ml/min/1.73sq m Final    Narrative:     National Kidney Disease Foundation guidelines for Chronic Kidney Disease (CKD):     Stage 1 with normal or high GFR (GFR > 90 mL/min/1.73 square meters)    Stage 2 Mild CKD (GFR = 60-89 mL/min/1.73 square meters)    Stage 3A Moderate CKD (GFR = 45-59 mL/min/1.73 square meters)    Stage 3B Moderate CKD (GFR = 30-44 mL/min/1.73 square meters)    Stage 4 Severe CKD (GFR = 15-29 mL/min/1.73 square meters)    Stage 5 End Stage CKD (GFR <15 mL/min/1.73 square meters)  Note: GFR calculation is accurate only with a steady state creatinine   LIPASE - Abnormal    Lipase 162 (*) 11 - 82 u/L Final   HS TROPONIN I 0HR - Normal    hs TnI 0hr 14  \"Refer to ACS Flowchart\"- see link ng/L Final    Comment:                                              Initial (time 0) result  If >=50 ng/L, Myocardial injury suggested ;  Type of myocardial injury and treatment strategy  to be determined.  If 5-49 ng/L, a delta result at 2 hours and or 4 hours will be needed to further evaluate.  If <4 ng/L, and chest pain has been >3 hours since onset, patient may qualify for " discharge based on the HEART score in the ED.  If <5 ng/L and <3hours since onset of chest pain, a delta result at 2 hours will be needed to further evaluate.    HS Troponin 99th Percentile URL of a Health Population=12 ng/L with a 95% Confidence Interval of 8-18 ng/L.    Second Troponin (time 2 hours)  If calculated delta >= 20 ng/L,  Myocardial injury suggested ; Type of myocardial injury and treatment strategy to be determined.  If 5-49 ng/L and the calculated delta is 5-19 ng/L, consult medical service for evaluation.  Continue evaluation for ischemia on ecg and other possible etiology and repeat hs troponin at 4 hours.  If delta is <5 ng/L at 2 hours, consider discharge based on risk stratification via the HEART score (if in ED), or LESLIE risk score in IP/Observation.    HS Troponin 99th Percentile URL of a Health Population=12 ng/L with a 95% Confidence Interval of 8-18 ng/L.   AMYLASE - Normal    Amylase 65  29 - 103 IU/L Final   HS TROPONIN I 2HR     No results found.      ED Course / Workup Pending (followup):  Pending delta troponin                                           Procedures  Medical Decision Making  Amount and/or Complexity of Data Reviewed  Labs: ordered.    Risk  Prescription drug management.            Disposition  Final diagnoses:   None     ED Disposition       None          Follow-up Information    None       Patient's Medications   Discharge Prescriptions    No medications on file     No discharge procedures on file.       ED Provider  Electronically Signed by     Marvel Baker DO  06/07/24 0707

## 2024-06-10 ENCOUNTER — VBI (OUTPATIENT)
Dept: FAMILY MEDICINE CLINIC | Facility: CLINIC | Age: 84
End: 2024-06-10

## 2024-06-10 NOTE — TELEPHONE ENCOUNTER
06/10/24 11:01 AM    Patient contacted post ED visit, [facility] department spoke with patient/caregiver and outreach was successful.    Thank you.  Cathy Castro MA  [facility]

## 2024-06-24 ENCOUNTER — APPOINTMENT (EMERGENCY)
Dept: RADIOLOGY | Facility: HOSPITAL | Age: 84
DRG: 439 | End: 2024-06-24
Payer: MEDICARE

## 2024-06-24 ENCOUNTER — APPOINTMENT (EMERGENCY)
Dept: CT IMAGING | Facility: HOSPITAL | Age: 84
DRG: 439 | End: 2024-06-24
Payer: MEDICARE

## 2024-06-24 ENCOUNTER — HOSPITAL ENCOUNTER (INPATIENT)
Facility: HOSPITAL | Age: 84
LOS: 1 days | Discharge: HOME | DRG: 439 | End: 2024-06-25
Attending: EMERGENCY MEDICINE | Admitting: INTERNAL MEDICINE
Payer: MEDICARE

## 2024-06-24 DIAGNOSIS — K85.90 ACUTE ON CHRONIC PANCREATITIS (HCC): Primary | ICD-10-CM

## 2024-06-24 DIAGNOSIS — K86.1 ACUTE ON CHRONIC PANCREATITIS (HCC): Primary | ICD-10-CM

## 2024-06-24 DIAGNOSIS — R07.9 CHEST PAIN, UNSPECIFIED: ICD-10-CM

## 2024-06-24 LAB
2HR DELTA HS TROPONIN: 0 NG/L
4HR DELTA HS TROPONIN: 2 NG/L
ALBUMIN SERPL BCG-MCNC: 3.6 G/DL (ref 3.5–5)
ALP SERPL-CCNC: 73 U/L (ref 34–104)
ALT SERPL W P-5'-P-CCNC: 12 U/L (ref 7–52)
ANION GAP SERPL CALCULATED.3IONS-SCNC: 5 MMOL/L (ref 4–13)
AST SERPL W P-5'-P-CCNC: 15 U/L (ref 13–39)
BASOPHILS # BLD AUTO: 0.05 THOUSANDS/ÂΜL (ref 0–0.1)
BASOPHILS NFR BLD AUTO: 1 % (ref 0–1)
BILIRUB SERPL-MCNC: 0.4 MG/DL (ref 0.2–1)
BNP SERPL-MCNC: 84 PG/ML (ref 0–100)
BUN SERPL-MCNC: 33 MG/DL (ref 5–25)
CALCIUM SERPL-MCNC: 8.9 MG/DL (ref 8.4–10.2)
CARDIAC TROPONIN I PNL SERPL HS: 15 NG/L
CARDIAC TROPONIN I PNL SERPL HS: 15 NG/L
CARDIAC TROPONIN I PNL SERPL HS: 17 NG/L
CHLORIDE SERPL-SCNC: 104 MMOL/L (ref 96–108)
CO2 SERPL-SCNC: 31 MMOL/L (ref 21–32)
CREAT SERPL-MCNC: 1.5 MG/DL (ref 0.6–1.3)
EOSINOPHIL # BLD AUTO: 0.16 THOUSAND/ÂΜL (ref 0–0.61)
EOSINOPHIL NFR BLD AUTO: 2 % (ref 0–6)
ERYTHROCYTE [DISTWIDTH] IN BLOOD BY AUTOMATED COUNT: 13.7 % (ref 11.6–15.1)
GFR SERPL CREATININE-BSD FRML MDRD: 43 ML/MIN/1.73SQ M
GLUCOSE SERPL-MCNC: 65 MG/DL (ref 65–140)
GLUCOSE SERPL-MCNC: 71 MG/DL (ref 65–140)
HCT VFR BLD AUTO: 34.8 % (ref 36.5–49.3)
HGB BLD-MCNC: 11.4 G/DL (ref 12–17)
IMM GRANULOCYTES # BLD AUTO: 0.03 THOUSAND/UL (ref 0–0.2)
IMM GRANULOCYTES NFR BLD AUTO: 0 % (ref 0–2)
LIPASE SERPL-CCNC: 167 U/L (ref 11–82)
LYMPHOCYTES # BLD AUTO: 2.24 THOUSANDS/ÂΜL (ref 0.6–4.47)
LYMPHOCYTES NFR BLD AUTO: 25 % (ref 14–44)
MCH RBC QN AUTO: 31.8 PG (ref 26.8–34.3)
MCHC RBC AUTO-ENTMCNC: 32.8 G/DL (ref 31.4–37.4)
MCV RBC AUTO: 97 FL (ref 82–98)
MONOCYTES # BLD AUTO: 0.74 THOUSAND/ÂΜL (ref 0.17–1.22)
MONOCYTES NFR BLD AUTO: 8 % (ref 4–12)
NEUTROPHILS # BLD AUTO: 5.63 THOUSANDS/ÂΜL (ref 1.85–7.62)
NEUTS SEG NFR BLD AUTO: 64 % (ref 43–75)
NRBC BLD AUTO-RTO: 0 /100 WBCS
PLATELET # BLD AUTO: 208 THOUSANDS/UL (ref 149–390)
PMV BLD AUTO: 10 FL (ref 8.9–12.7)
POTASSIUM SERPL-SCNC: 3.5 MMOL/L (ref 3.5–5.3)
PROT SERPL-MCNC: 6.3 G/DL (ref 6.4–8.4)
RBC # BLD AUTO: 3.59 MILLION/UL (ref 3.88–5.62)
SODIUM SERPL-SCNC: 140 MMOL/L (ref 135–147)
WBC # BLD AUTO: 8.85 THOUSAND/UL (ref 4.31–10.16)

## 2024-06-24 PROCEDURE — 93005 ELECTROCARDIOGRAM TRACING: CPT

## 2024-06-24 PROCEDURE — 84484 ASSAY OF TROPONIN QUANT: CPT

## 2024-06-24 PROCEDURE — 71045 X-RAY EXAM CHEST 1 VIEW: CPT

## 2024-06-24 PROCEDURE — 36415 COLL VENOUS BLD VENIPUNCTURE: CPT

## 2024-06-24 PROCEDURE — 82948 REAGENT STRIP/BLOOD GLUCOSE: CPT

## 2024-06-24 PROCEDURE — 80053 COMPREHEN METABOLIC PANEL: CPT

## 2024-06-24 PROCEDURE — 85025 COMPLETE CBC W/AUTO DIFF WBC: CPT

## 2024-06-24 PROCEDURE — 99285 EMERGENCY DEPT VISIT HI MDM: CPT | Performed by: EMERGENCY MEDICINE

## 2024-06-24 PROCEDURE — 83690 ASSAY OF LIPASE: CPT

## 2024-06-24 PROCEDURE — 83880 ASSAY OF NATRIURETIC PEPTIDE: CPT

## 2024-06-24 PROCEDURE — 74177 CT ABD & PELVIS W/CONTRAST: CPT

## 2024-06-24 PROCEDURE — 99222 1ST HOSP IP/OBS MODERATE 55: CPT | Performed by: PHYSICIAN ASSISTANT

## 2024-06-24 RX ORDER — ONDANSETRON 2 MG/ML
4 INJECTION INTRAMUSCULAR; INTRAVENOUS ONCE
Status: COMPLETED | OUTPATIENT
Start: 2024-06-24 | End: 2024-06-24

## 2024-06-24 RX ORDER — HYDROMORPHONE HCL/PF 1 MG/ML
0.5 SYRINGE (ML) INJECTION ONCE
Status: COMPLETED | OUTPATIENT
Start: 2024-06-24 | End: 2024-06-24

## 2024-06-24 RX ORDER — MORPHINE SULFATE 4 MG/ML
4 INJECTION, SOLUTION INTRAMUSCULAR; INTRAVENOUS ONCE
Status: COMPLETED | OUTPATIENT
Start: 2024-06-24 | End: 2024-06-24

## 2024-06-24 RX ORDER — HYDROMORPHONE HCL IN WATER/PF 6 MG/30 ML
0.2 PATIENT CONTROLLED ANALGESIA SYRINGE INTRAVENOUS ONCE
Status: COMPLETED | OUTPATIENT
Start: 2024-06-24 | End: 2024-06-24

## 2024-06-24 RX ADMIN — ONDANSETRON 4 MG: 2 INJECTION INTRAMUSCULAR; INTRAVENOUS at 22:00

## 2024-06-24 RX ADMIN — SODIUM CHLORIDE 1000 ML: 0.9 INJECTION, SOLUTION INTRAVENOUS at 22:04

## 2024-06-24 RX ADMIN — HYDROMORPHONE HYDROCHLORIDE 0.5 MG: 1 INJECTION, SOLUTION INTRAMUSCULAR; INTRAVENOUS; SUBCUTANEOUS at 20:05

## 2024-06-24 RX ADMIN — MORPHINE SULFATE 4 MG: 4 INJECTION INTRAVENOUS at 18:20

## 2024-06-24 RX ADMIN — IOHEXOL 100 ML: 350 INJECTION, SOLUTION INTRAVENOUS at 19:37

## 2024-06-24 RX ADMIN — HYDROMORPHONE HYDROCHLORIDE 0.2 MG: 0.2 INJECTION, SOLUTION INTRAMUSCULAR; INTRAVENOUS; SUBCUTANEOUS at 22:02

## 2024-06-25 ENCOUNTER — TRANSITIONAL CARE MANAGEMENT (OUTPATIENT)
Dept: FAMILY MEDICINE CLINIC | Facility: CLINIC | Age: 84
End: 2024-06-25

## 2024-06-25 VITALS
HEIGHT: 66 IN | RESPIRATION RATE: 16 BRPM | BODY MASS INDEX: 42.02 KG/M2 | WEIGHT: 261.47 LBS | TEMPERATURE: 98.4 F | SYSTOLIC BLOOD PRESSURE: 158 MMHG | OXYGEN SATURATION: 95 % | HEART RATE: 62 BPM | DIASTOLIC BLOOD PRESSURE: 68 MMHG

## 2024-06-25 PROBLEM — I16.0 HYPERTENSIVE URGENCY: Status: ACTIVE | Noted: 2020-07-09

## 2024-06-25 LAB
ANION GAP SERPL CALCULATED.3IONS-SCNC: 6 MMOL/L (ref 4–13)
ATRIAL RATE: 52 BPM
BUN SERPL-MCNC: 25 MG/DL (ref 5–25)
CALCIUM SERPL-MCNC: 8.1 MG/DL (ref 8.4–10.2)
CHLORIDE SERPL-SCNC: 106 MMOL/L (ref 96–108)
CO2 SERPL-SCNC: 27 MMOL/L (ref 21–32)
CREAT SERPL-MCNC: 1.32 MG/DL (ref 0.6–1.3)
ERYTHROCYTE [DISTWIDTH] IN BLOOD BY AUTOMATED COUNT: 13.8 % (ref 11.6–15.1)
GFR SERPL CREATININE-BSD FRML MDRD: 50 ML/MIN/1.73SQ M
GLUCOSE SERPL-MCNC: 111 MG/DL (ref 65–140)
GLUCOSE SERPL-MCNC: 136 MG/DL (ref 65–140)
GLUCOSE SERPL-MCNC: 142 MG/DL (ref 65–140)
HCT VFR BLD AUTO: 32.1 % (ref 36.5–49.3)
HGB BLD-MCNC: 10.4 G/DL (ref 12–17)
MAGNESIUM SERPL-MCNC: 1.8 MG/DL (ref 1.9–2.7)
MCH RBC QN AUTO: 32 PG (ref 26.8–34.3)
MCHC RBC AUTO-ENTMCNC: 32.4 G/DL (ref 31.4–37.4)
MCV RBC AUTO: 99 FL (ref 82–98)
P AXIS: 50 DEGREES
PLATELET # BLD AUTO: 167 THOUSANDS/UL (ref 149–390)
PLATELET # BLD AUTO: 176 THOUSANDS/UL (ref 149–390)
PMV BLD AUTO: 9.7 FL (ref 8.9–12.7)
PMV BLD AUTO: 9.9 FL (ref 8.9–12.7)
POTASSIUM SERPL-SCNC: 4.1 MMOL/L (ref 3.5–5.3)
PR INTERVAL: 250 MS
QRS AXIS: -53 DEGREES
QRSD INTERVAL: 150 MS
QT INTERVAL: 476 MS
QTC INTERVAL: 442 MS
RBC # BLD AUTO: 3.25 MILLION/UL (ref 3.88–5.62)
SODIUM SERPL-SCNC: 139 MMOL/L (ref 135–147)
T WAVE AXIS: 28 DEGREES
VENTRICULAR RATE: 52 BPM
WBC # BLD AUTO: 6.55 THOUSAND/UL (ref 4.31–10.16)

## 2024-06-25 PROCEDURE — 80048 BASIC METABOLIC PNL TOTAL CA: CPT | Performed by: PHYSICIAN ASSISTANT

## 2024-06-25 PROCEDURE — 85027 COMPLETE CBC AUTOMATED: CPT | Performed by: PHYSICIAN ASSISTANT

## 2024-06-25 PROCEDURE — 99236 HOSP IP/OBS SAME DATE HI 85: CPT | Performed by: STUDENT IN AN ORGANIZED HEALTH CARE EDUCATION/TRAINING PROGRAM

## 2024-06-25 PROCEDURE — 93010 ELECTROCARDIOGRAM REPORT: CPT | Performed by: INTERNAL MEDICINE

## 2024-06-25 PROCEDURE — 82948 REAGENT STRIP/BLOOD GLUCOSE: CPT

## 2024-06-25 PROCEDURE — 85049 AUTOMATED PLATELET COUNT: CPT | Performed by: PHYSICIAN ASSISTANT

## 2024-06-25 PROCEDURE — 36415 COLL VENOUS BLD VENIPUNCTURE: CPT | Performed by: PHYSICIAN ASSISTANT

## 2024-06-25 PROCEDURE — 83735 ASSAY OF MAGNESIUM: CPT | Performed by: PHYSICIAN ASSISTANT

## 2024-06-25 RX ORDER — METOPROLOL SUCCINATE 25 MG/1
25 TABLET, EXTENDED RELEASE ORAL DAILY
Status: DISCONTINUED | OUTPATIENT
Start: 2024-06-25 | End: 2024-06-25 | Stop reason: HOSPADM

## 2024-06-25 RX ORDER — RANOLAZINE 500 MG/1
500 TABLET, EXTENDED RELEASE ORAL 2 TIMES DAILY
Status: DISCONTINUED | OUTPATIENT
Start: 2024-06-25 | End: 2024-06-25 | Stop reason: HOSPADM

## 2024-06-25 RX ORDER — CLOPIDOGREL BISULFATE 75 MG/1
75 TABLET ORAL DAILY
Status: DISCONTINUED | OUTPATIENT
Start: 2024-06-25 | End: 2024-06-25 | Stop reason: HOSPADM

## 2024-06-25 RX ORDER — CLONIDINE HYDROCHLORIDE 0.1 MG/1
0.1 TABLET ORAL EVERY 12 HOURS
Status: DISCONTINUED | OUTPATIENT
Start: 2024-06-25 | End: 2024-06-25 | Stop reason: HOSPADM

## 2024-06-25 RX ORDER — HYDROMORPHONE HCL IN WATER/PF 6 MG/30 ML
0.2 PATIENT CONTROLLED ANALGESIA SYRINGE INTRAVENOUS EVERY 4 HOURS PRN
Status: DISCONTINUED | OUTPATIENT
Start: 2024-06-25 | End: 2024-06-25 | Stop reason: HOSPADM

## 2024-06-25 RX ORDER — TAMSULOSIN HYDROCHLORIDE 0.4 MG/1
0.8 CAPSULE ORAL DAILY
Status: DISCONTINUED | OUTPATIENT
Start: 2024-06-25 | End: 2024-06-25 | Stop reason: HOSPADM

## 2024-06-25 RX ORDER — HEPARIN SODIUM 5000 [USP'U]/ML
5000 INJECTION, SOLUTION INTRAVENOUS; SUBCUTANEOUS EVERY 8 HOURS SCHEDULED
Status: DISCONTINUED | OUTPATIENT
Start: 2024-06-25 | End: 2024-06-25 | Stop reason: HOSPADM

## 2024-06-25 RX ORDER — ATORVASTATIN CALCIUM 40 MG/1
40 TABLET, FILM COATED ORAL
Status: DISCONTINUED | OUTPATIENT
Start: 2024-06-25 | End: 2024-06-25 | Stop reason: HOSPADM

## 2024-06-25 RX ORDER — ACETAMINOPHEN 325 MG/1
650 TABLET ORAL EVERY 6 HOURS PRN
Status: DISCONTINUED | OUTPATIENT
Start: 2024-06-25 | End: 2024-06-25 | Stop reason: HOSPADM

## 2024-06-25 RX ORDER — OXYCODONE HYDROCHLORIDE 5 MG/1
5 TABLET ORAL EVERY 6 HOURS PRN
Qty: 10 TABLET | Refills: 0 | Status: SHIPPED | OUTPATIENT
Start: 2024-06-25

## 2024-06-25 RX ORDER — ALLOPURINOL 100 MG/1
100 TABLET ORAL DAILY
Status: DISCONTINUED | OUTPATIENT
Start: 2024-06-25 | End: 2024-06-25 | Stop reason: HOSPADM

## 2024-06-25 RX ORDER — INSULIN GLARGINE 100 [IU]/ML
10 INJECTION, SOLUTION SUBCUTANEOUS EVERY 12 HOURS SCHEDULED
Status: DISCONTINUED | OUTPATIENT
Start: 2024-06-25 | End: 2024-06-25 | Stop reason: HOSPADM

## 2024-06-25 RX ORDER — SODIUM CHLORIDE, SODIUM LACTATE, POTASSIUM CHLORIDE, CALCIUM CHLORIDE 600; 310; 30; 20 MG/100ML; MG/100ML; MG/100ML; MG/100ML
75 INJECTION, SOLUTION INTRAVENOUS CONTINUOUS
Status: DISCONTINUED | OUTPATIENT
Start: 2024-06-25 | End: 2024-06-25 | Stop reason: HOSPADM

## 2024-06-25 RX ORDER — INSULIN LISPRO 100 [IU]/ML
1-6 INJECTION, SOLUTION INTRAVENOUS; SUBCUTANEOUS EVERY 6 HOURS SCHEDULED
Status: DISCONTINUED | OUTPATIENT
Start: 2024-06-25 | End: 2024-06-25 | Stop reason: HOSPADM

## 2024-06-25 RX ORDER — OXYCODONE HYDROCHLORIDE 5 MG/1
5 TABLET ORAL EVERY 4 HOURS PRN
Status: DISCONTINUED | OUTPATIENT
Start: 2024-06-25 | End: 2024-06-25 | Stop reason: HOSPADM

## 2024-06-25 RX ADMIN — CLOPIDOGREL BISULFATE 75 MG: 75 TABLET ORAL at 08:51

## 2024-06-25 RX ADMIN — CLONIDINE HYDROCHLORIDE 0.1 MG: 0.1 TABLET ORAL at 08:51

## 2024-06-25 RX ADMIN — RANOLAZINE 500 MG: 500 TABLET, FILM COATED, EXTENDED RELEASE ORAL at 08:55

## 2024-06-25 RX ADMIN — ALLOPURINOL 100 MG: 100 TABLET ORAL at 08:55

## 2024-06-25 RX ADMIN — HYDROMORPHONE HYDROCHLORIDE 0.2 MG: 0.2 INJECTION, SOLUTION INTRAMUSCULAR; INTRAVENOUS; SUBCUTANEOUS at 01:04

## 2024-06-25 RX ADMIN — INSULIN GLARGINE 10 UNITS: 100 INJECTION, SOLUTION SUBCUTANEOUS at 08:52

## 2024-06-25 RX ADMIN — METOPROLOL SUCCINATE 25 MG: 25 TABLET, EXTENDED RELEASE ORAL at 08:51

## 2024-06-25 RX ADMIN — SODIUM CHLORIDE, SODIUM LACTATE, POTASSIUM CHLORIDE, AND CALCIUM CHLORIDE 75 ML/HR: .6; .31; .03; .02 INJECTION, SOLUTION INTRAVENOUS at 01:05

## 2024-06-25 RX ADMIN — HEPARIN SODIUM 5000 UNITS: 5000 INJECTION INTRAVENOUS; SUBCUTANEOUS at 06:20

## 2024-06-25 RX ADMIN — TAMSULOSIN HYDROCHLORIDE 0.8 MG: 0.4 CAPSULE ORAL at 08:52

## 2024-06-25 NOTE — ASSESSMENT & PLAN NOTE
Lab Results   Component Value Date    EGFR 43 06/24/2024    EGFR 45 06/07/2024    EGFR 48 05/24/2024    CREATININE 1.50 (H) 06/24/2024    CREATININE 1.43 (H) 06/07/2024    CREATININE 1.36 (H) 05/24/2024     Cr slightly above baseline of 1.30-1.40.   IV fluids.   Repeat BMP in AM.   Avoid nephrotoxins and hypotension.

## 2024-06-25 NOTE — ASSESSMENT & PLAN NOTE
BP elevated since presentation, suspect secondary to pain.   Continue clonidine, metoprolol and lisinopril.

## 2024-06-25 NOTE — ASSESSMENT & PLAN NOTE
"Presented with acute onset of epigastric abdominal pain, lower chest pain.   History of recurrent pancreatitis.   Troponin 15, 15, 17. No ischemic changes noted on EKG.   CT A/P: \"Inflammation associated with the pancreatitis has mildly increased in severity compared to May 24, 2024. There is no peripancreatic fluid collection.\"  GI consulted; discussed with GI at bedside, patient's symptoms are now relatively improved and he is requesting to be discharged . He has appointment with GI Dr. Liu 7/2 . He confirms that he will continue with fluids/CLD for next 1-2 days before advancing diet as tolerable and that he uses PRN pain medications. He lives with his wife close from the hospital and will call provider, or come to the ED if worsen symptoms. He feel assured that his troponin was normal and now asking to be discharged. From medicine and GI standpoint, we agree with his wished plan. We recommended staying for 24H observation for more safe plan before being discharged but he prefers to be discharged instead which is acceptable.   S/p IV fluids in the ED but with caution given his diastolic CHF and LE edema. .   Pain control. Will give PRN oxy on discharge.    "

## 2024-06-25 NOTE — ASSESSMENT & PLAN NOTE
Wt Readings from Last 3 Encounters:   05/24/24 107 kg (235 lb)   05/13/24 109 kg (239 lb 6.4 oz)   04/15/24 108 kg (237 lb 3.2 oz)     Echo 5/21/24: EF 60-65%, G1DD, moderate AS.   Hold torsemide in AM.   Monitor daily weights and intake/output.

## 2024-06-25 NOTE — ASSESSMENT & PLAN NOTE
"Presenting with worsening acute on chronic upper abdominal pain consistent with his pancreatitis however patient was concerned because the pain was \"somewhat higher and he was concerned if it is his heart\"     He has mild diastolic dysfunction and moderate aortic stenosis with murmur present on exam as well as bilateral LE edema but with hx of knee surgeries.     Troponin not elevated and EKG: Sinus bradycardia with 1st degree A-V block and RBBB, no acute changes.     His reported \"chest pain\" is resolved while in the ED , likely was referred pain from his pancreatitis vs due to the HTN urgency BP was 220/87 on arrival now well controlled.     Continue BP medications and see pancreatitis A&P     "

## 2024-06-25 NOTE — ED PROVIDER NOTES
History  Chief Complaint   Patient presents with    Chest Pain     Arrives via EMS from home with c/o chest pain started 1-2 hours ago, radiates across chest, 6/10. 1 Nitro given pta     Baljit is an 83-year-old male with history of recurrent pancreatitis, CAD stent placement, insulin-dependent diabetes,heart murmur, and hyperlipidemia who presents with chest pain.  Notes onset of burning to sharp substernal chest pain which does not radiate to the back and burning mid-epigastric pain 2 hours prior to arrival.  States that it feels similar to his usual pancreatitis pain but more cephalad in anatomical location(not usually located in his chest).  Denies fever, chills, neck or back pain, palpitations, dyspnea, cough, nausea, vomiting, diarrhea, blood in stool, dysuria, hematuria, flank pain, or calf pain.  Has chronic lower extremity edema which he feels is at baseline.  Last hospitalized for pancreatitis two weeks ago.  Had prior cholecystectomy, also has had diabetes medications changed multiple times given concern that they were causing his recurrent pancreatitis.  Has not drank alcohol in months.  No recent travel or surgeries.   Given 1 set nitroglycerin by EMS around to the ED, had no pain relief with same.        Prior to Admission Medications   Prescriptions Last Dose Informant Patient Reported? Taking?   BD Pen Needle Pascale U/F 32G X 4 MM MISC  Self Yes No   Sig: USE AS INSTRUCTED WITH INSULIN PEN   Docusate Sodium (DSS) 100 MG CAPS  Self Yes No   Sig: Take 100 mg by mouth daily   Fexofenadine HCl (MUCINEX ALLERGY PO)  Self Yes No   Sig: Take by mouth   Lancets (OneTouch Delica Plus [id number]) MISC  Self Yes No   Sig: TEST GLUCOSE 3 4 TIMES/DAY   Levemir FlexTouch 100 units/mL injection pen  Self Yes No   Si Units daily In AM   Multiple Vitamins-Minerals (MULTIVITAMIN ADULT PO)  Self Yes No   Sig: Take by mouth daily   Tresiba FlexTouch 100 units/mL injection pen  Self Yes No   Sig: Inject 20 Units under  the skin 2 (two) times a day   allopurinol (ZYLOPRIM) 100 mg tablet  Self Yes No   Sig: Take 100 mg by mouth daily   apixaban (Eliquis) 2.5 mg   Yes No   Sig: Take 2.5 mg by mouth 2 (two) times a day   cloNIDine (CATAPRES) 0.1 mg tablet   No No   Sig: TAKE 1 TABLET BY MOUTH EVERY 12 HOURS   clopidogrel (PLAVIX) 75 mg tablet   No No   Sig: Take 1 tablet (75 mg total) by mouth daily   fluticasone (FLONASE) 50 mcg/act nasal spray  Self No No   Si sprays into each nostril daily   insulin aspart (NovoLOG FlexPen) 100 UNIT/ML injection pen  Self Yes No   Sig: Inject under the skin 3 (three) times a day with meals Sliding scale as ordered TID with meals   ipratropium (ATROVENT) 0.03 % nasal spray  Self No No   Sig: SPRAY 2 SPRAYS INTO EACH NOSTRIL EVERY 12 HOURS.   lisinopril (ZESTRIL) 20 mg tablet  Self No No   Sig: Take 1 tablet (20 mg total) by mouth daily   lisinopril (ZESTRIL) 40 mg tablet   No No   Sig: Take 0.5 tablets (20 mg total) by mouth daily   metoprolol succinate (TOPROL-XL) 25 mg 24 hr tablet  Self No No   Sig: TAKE 1 TABLET (25 MG TOTAL) BY MOUTH DAILY.   ondansetron (ZOFRAN-ODT) 4 mg disintegrating tablet   No No   Sig: Take 1 tablet (4 mg total) by mouth every 8 (eight) hours as needed for nausea or vomiting for up to 7 days   pantoprazole (PROTONIX) 40 mg tablet  Self No No   Sig: Take 1 tablet (40 mg total) by mouth daily before breakfast   pyridoxine (VITAMIN B6) 100 mg tablet  Self Yes No   Sig: Take 100 mg by mouth daily   ranolazine (RANEXA) 500 mg 12 hr tablet   No No   Sig: TAKE 1 TABLET BY MOUTH TWICE A DAY   rosuvastatin (CRESTOR) 20 MG tablet   Yes No   Sig: take 1 tablet by mouth every day at night   tamsulosin (FLOMAX) 0.4 mg   No No   Sig: Take 2 capsules (0.8 mg total) by mouth daily   torsemide (DEMADEX) 10 mg tablet   No No   Sig: Take 1 tablet (10 mg total) by mouth daily      Facility-Administered Medications: None       Past Medical History:   Diagnosis Date    Allergic      Arthritis     Chronic kidney disease     Chronic kidney disease (CKD) stage G3a/A1, moderately decreased glomerular filtration rate (GFR) between 45-59 mL/min/1.73 square meter and albuminuria creatinine ratio less than 30 mg/g (HCC)     Colon polyp     Diabetes mellitus (HCC)     GERD (gastroesophageal reflux disease)     Heart murmur     History of echocardiogram     HL (hearing loss)     Hyperlipidemia     Hypertension     Obesity     Pancreatitis     Sleep apnea, obstructive        Past Surgical History:   Procedure Laterality Date    CARDIAC CATHETERIZATION      CHOLECYSTECTOMY      COLONOSCOPY  2018    COLONOSCOPY      EYE SURGERY      HEMORRHOID SURGERY      JOINT REPLACEMENT      knee    KNEE SURGERY Right     OTHER SURGICAL HISTORY      Stent     [place] LAPAROSCOPY SURG CHOLECYSTECTOMY N/A 2023    Procedure: CHOLECYSTECTOMY LAPAROSCOPIC;  Surgeon: Alirio Hong MD;  Location:  MAIN OR;  Service: General    UPPER GASTROINTESTINAL ENDOSCOPY         Family History   Problem Relation Age of Onset    Heart disease Mother     Heart attack Mother         50s    Cancer Mother     Coronary artery disease Mother     Cancer Brother         Malignant tumor of lung     I have reviewed and agree with the history as documented.    E-Cigarette/Vaping    E-Cigarette Use Never User      E-Cigarette/Vaping Substances    Nicotine No     THC No     CBD No     Flavoring No     Other No     Unknown No      Social History     Tobacco Use    Smoking status: Former     Current packs/day: 0.00     Average packs/day: 2.0 packs/day for 15.0 years (30.0 ttl pk-yrs)     Types: Cigarettes, Pipe, Cigars     Start date: 1957     Quit date: 1972     Years since quittin.6     Passive exposure: Past    Smokeless tobacco: Never   Vaping Use    Vaping status: Never Used   Substance Use Topics    Alcohol use: Not Currently     Alcohol/week: 5.0 standard drinks of alcohol     Types: 5 Glasses of wine per  week     Comment: no alcohol since March    Drug use: Never        Review of Systems   Constitutional: Negative.  Negative for chills, diaphoresis, fatigue and fever.   HENT: Negative.     Eyes: Negative.  Negative for visual disturbance.   Respiratory: Negative.  Negative for cough and shortness of breath.    Cardiovascular:  Positive for chest pain. Negative for palpitations and leg swelling.   Gastrointestinal:  Positive for abdominal pain. Negative for abdominal distention, blood in stool, constipation, diarrhea, nausea and vomiting.   Endocrine: Negative.    Genitourinary: Negative.  Negative for difficulty urinating, dysuria and hematuria.   Musculoskeletal: Negative.  Negative for arthralgias, back pain, myalgias, neck pain and neck stiffness.   Skin: Negative.  Negative for color change, pallor, rash and wound.   Allergic/Immunologic: Negative.    Neurological: Negative.  Negative for dizziness, syncope, weakness, light-headedness, numbness and headaches.   Hematological: Negative.  Negative for adenopathy. Does not bruise/bleed easily.   Psychiatric/Behavioral: Negative.     All other systems reviewed and are negative.      Physical Exam  ED Triage Vitals   Temperature Pulse Respirations Blood Pressure SpO2   06/24/24 1919 06/24/24 1810 06/24/24 1810 06/24/24 1810 06/24/24 1810   98.4 °F (36.9 °C) 56 18 (!) 220/87 99 %      Temp Source Heart Rate Source Patient Position - Orthostatic VS BP Location FiO2 (%)   06/24/24 1919 06/24/24 1810 06/24/24 1810 06/24/24 1810 --   Oral Monitor Sitting Right arm       Pain Score       06/24/24 1820       9             Orthostatic Vital Signs  Vitals:    06/24/24 1810 06/24/24 2000   BP: (!) 220/87 (!) 188/73   Pulse: 56 57   Patient Position - Orthostatic VS: Sitting        Physical Exam  Vitals and nursing note reviewed.   Constitutional:       General: He is not in acute distress.     Appearance: Normal appearance. He is not ill-appearing or diaphoretic.   HENT:       Head: Normocephalic and atraumatic.      Right Ear: External ear normal.      Left Ear: External ear normal.      Nose: Nose normal.      Mouth/Throat:      Mouth: Mucous membranes are moist.      Pharynx: Oropharynx is clear.   Eyes:      General: No scleral icterus.     Extraocular Movements: Extraocular movements intact.      Conjunctiva/sclera: Conjunctivae normal.   Cardiovascular:      Rate and Rhythm: Normal rate and regular rhythm.      Pulses:           Radial pulses are 2+ on the right side and 2+ on the left side.        Dorsalis pedis pulses are 1+ on the right side and 1+ on the left side.        Posterior tibial pulses are 1+ on the right side and 1+ on the left side.      Heart sounds: Murmur heard.      No friction rub. No gallop.   Pulmonary:      Effort: Pulmonary effort is normal. No respiratory distress.      Breath sounds: Normal breath sounds.   Chest:      Chest wall: No tenderness.   Abdominal:      General: Abdomen is flat. Bowel sounds are normal. There is no distension.      Palpations: Abdomen is soft. There is no mass.      Tenderness: There is abdominal tenderness in the epigastric area. There is guarding. There is no right CVA tenderness, left CVA tenderness or rebound.      Hernia: No hernia is present.   Musculoskeletal:         General: Normal range of motion.      Cervical back: Normal range of motion and neck supple. No tenderness.      Right lower le+ Pitting Edema present.      Left lower le+ Pitting Edema present.      Comments: No calf erythema, warmth, swelling, or tenderness to palpation bilaterally.   Lymphadenopathy:      Cervical: No cervical adenopathy.   Skin:     General: Skin is warm and dry.      Capillary Refill: Capillary refill takes less than 2 seconds.      Coloration: Skin is not pale.      Findings: No rash.   Neurological:      General: No focal deficit present.      Mental Status: He is alert and oriented to person, place, and time.      Sensory: No  sensory deficit.      Motor: No weakness.   Psychiatric:         Mood and Affect: Mood normal.         Behavior: Behavior normal.         ED Medications  Medications   ondansetron (ZOFRAN) injection 4 mg (4 mg Intravenous Not Given 6/24/24 1827)   HYDROmorphone HCl (DILAUDID) injection 0.2 mg (has no administration in time range)   sodium chloride 0.9 % bolus 1,000 mL (has no administration in time range)   morphine injection 4 mg (4 mg Intravenous Given 6/24/24 1820)   HYDROmorphone (DILAUDID) injection 0.5 mg (0.5 mg Intravenous Given 6/24/24 2005)   iohexol (OMNIPAQUE) 350 MG/ML injection (MULTI-DOSE) 100 mL (100 mL Intravenous Given 6/24/24 1937)       Diagnostic Studies  Results Reviewed       Procedure Component Value Units Date/Time    HS Troponin I 2hr [[id number]]  (Normal) Collected: 06/24/24 2039    Lab Status: Final result Specimen: Blood from Arm, Right Updated: 06/24/24 2113     hs TnI 2hr 15 ng/L      Delta 2hr hsTnI 0 ng/L     HS Troponin I 4hr [[id number]]     Lab Status: No result Specimen: Blood     B-Type Natriuretic Peptide(BNP) [[id number]]  (Normal) Collected: 06/24/24 1822    Lab Status: Final result Specimen: Blood from Arm, Right Updated: 06/24/24 1919     BNP 84 pg/mL     HS Troponin 0hr (reflex protocol) [[id number]]  (Normal) Collected: 06/24/24 1822    Lab Status: Final result Specimen: Blood from Arm, Right Updated: 06/24/24 1901     hs TnI 0hr 15 ng/L     Comprehensive metabolic panel [[id number]]  (Abnormal) Collected: 06/24/24 1822    Lab Status: Final result Specimen: Blood from Arm, Right Updated: 06/24/24 1857     Sodium 140 mmol/L      Potassium 3.5 mmol/L      Chloride 104 mmol/L      CO2 31 mmol/L      ANION GAP 5 mmol/L      BUN 33 mg/dL      Creatinine 1.50 mg/dL      Glucose 71 mg/dL      Calcium 8.9 mg/dL      AST 15 U/L      ALT 12 U/L      Alkaline Phosphatase 73 U/L      Total Protein 6.3 g/dL      Albumin 3.6 g/dL      Total Bilirubin 0.40 mg/dL      eGFR 43 ml/min/1.73sq m      Narrative:      [employer] guidelines for Chronic Kidney Disease (CKD):     Stage 1 with normal or high GFR (GFR > 90 mL/min/1.73 square meters)    Stage 2 Mild CKD (GFR = 60-89 mL/min/1.73 square meters)    Stage 3A Moderate CKD (GFR = 45-59 mL/min/1.73 square meters)    Stage 3B Moderate CKD (GFR = 30-44 mL/min/1.73 square meters)    Stage 4 Severe CKD (GFR = 15-29 mL/min/1.73 square meters)    Stage 5 End Stage CKD (GFR <15 mL/min/1.73 square meters)  Note: GFR calculation is accurate only with a steady state creatinine    Lipase [[id number]]  (Abnormal) Collected: 06/24/24 1822    Lab Status: Final result Specimen: Blood from Arm, Right Updated: 06/24/24 1857     Lipase 167 u/L     Fingerstick Glucose (POCT) [[id number]]  (Normal) Collected: 06/24/24 1853    Lab Status: Final result Specimen: Blood Updated: 06/24/24 1853     POC Glucose 65 mg/dl     CBC and differential [[id number]]  (Abnormal) Collected: 06/24/24 1822    Lab Status: Final result Specimen: Blood from Arm, Right Updated: 06/24/24 1837     WBC 8.85 Thousand/uL      RBC 3.59 Million/uL      Hemoglobin 11.4 g/dL      Hematocrit 34.8 %      MCV 97 fL      MCH 31.8 pg      MCHC 32.8 g/dL      RDW 13.7 %      MPV 10.0 fL      Platelets 208 Thousands/uL      nRBC 0 /100 WBCs      Segmented % 64 %      Immature Grans % 0 %      Lymphocytes % 25 %      Monocytes % 8 %      Eosinophils Relative 2 %      Basophils Relative 1 %      Absolute Neutrophils 5.63 Thousands/µL      Absolute Immature Grans 0.03 Thousand/uL      Absolute Lymphocytes 2.24 Thousands/µL      Absolute Monocytes 0.74 Thousand/µL      Eosinophils Absolute 0.16 Thousand/µL      Basophils Absolute 0.05 Thousands/µL                    CT abdomen pelvis with contrast   Final Result by Hardeep Church MD (06/24 2042)      Inflammation associated with the pancreatitis has mildly increased in severity compared to May 24, 2024. There is no peripancreatic fluid  collection.         Workstation performed: [id number]         XR chest 1 view portable   ED Interpretation by Mari Nova DO (06/24 1911)   No obvious acute cardiopulmonary findings as per my independent interpretation.            Procedures  ECG 12 Lead Documentation Only    Date/Time: 6/24/2024 6:09 PM    Performed by: Mari Nova DO  Authorized by: Mari Nova DO    Indications / Diagnosis:  Chest pain  ECG reviewed by me, the ED Provider: yes    Patient location:  ED  Previous ECG:     Previous ECG:  Compared to current    Comparison ECG info:  6/7/24    Similarity:  No change  Interpretation:     Interpretation: non-specific    Rate:     ECG rate:  52    ECG rate assessment: bradycardic    Rhythm:     Rhythm: sinus bradycardia    Ectopy:     Ectopy: none    QRS:     QRS axis:  Normal    QRS intervals:  Normal  Conduction:     Conduction: normal    ST segments:     ST segments:  Normal  T waves:     T waves: normal          ED Course  ED Course as of 06/24/24 2123 Mon Jun 24, 2024 1858 CBC and differential(!)  No acute abnormalities.   1900 LIPASE(!): 167  Consistent with prior.   1900 Comprehensive metabolic panel(!)  Kidney function at baseline.  No acute findings.   1903 hs TnI 0hr: 15  Consistent with prior, however will obtain 2 hour repeat.  No ischemic changes on ECG to suggest ACS.   1911 XR chest 1 view portable  No obvious acute cardiopulmonary findings as per my independent interpretation.   1920 BNP: 84  Normal.   1921 Pt with continued pain despite morphine.  Will give dilaudid, place on continuous pulse ox.  Dispo pending CT, repeat troponin.   2055 CT abdomen pelvis with contrast  IMPRESSION:     Inflammation associated with the pancreatitis has mildly increased in severity compared to May 24, 2024. There is no peripancreatic fluid collection.     2102 Pt reassessed, continues to have significant substernal chest pain and mid-epigastric pain. Will give  repeat dose of dilaudid, reassess. Continuous pulse ox in place.  Declines trial of pepcid or GI cocktail.  Disposition pending 2 hour repeat troponin.   2116 Delta 2hr hsTnI: 0   2122 Admitted to medicine for acute on chronic pancreatitis, also for chest pain workup given heart score 6.             HEART Risk Score      Flowsheet Row Most Recent Value   Heart Score Risk Calculator    History 1 Filed at: 06/24/2024 2115   ECG 0 Filed at: 06/24/2024 2115   Age 2 Filed at: 06/24/2024 2115   Risk Factors 2 Filed at: 06/24/2024 2115   Troponin 1 Filed at: 06/24/2024 2115   HEART Score 6 Filed at: 06/24/2024 2115                                  Medical Decision Making  Patient presents with symptoms as above.  Blood pressure elevated on arrival, however did improve without intervention.  Differential includes but is not limited to acute on chronic pancreatitis, gastritis, esophageal spasm, ACS, musculoskeletal pain.  Doubt PE, pneumonia, pneumothorax, dissection, UTI, nephrolithiasis, colitis, diverticulitis.  Doubt hypertensive urgency, CHF exacerbation.  Will obtain labs and imaging to evaluate further.  Will also give pain medications.  See ED course remainder of discussion.    Amount and/or Complexity of Data Reviewed  Labs: ordered. Decision-making details documented in ED Course.  Radiology: ordered and independent interpretation performed. Decision-making details documented in ED Course.    Risk  Prescription drug management.  Decision regarding hospitalization.          Disposition  Final diagnoses:   Chest pain, unspecified   Acute on chronic pancreatitis (HCC)     Time reflects when diagnosis was documented in both MDM as applicable and the Disposition within this note       Time User Action Codes Description Comment    6/24/2024  9:16 PM Mari Nova Add [R07.9] Chest pain, unspecified     6/24/2024  9:16 PM Mari Nova Add [K85.90,  K86.1] Acute on chronic pancreatitis (HCC)     6/24/2024  9:16 PM  Mari Nova Modify [R07.9] Chest pain, unspecified     6/24/2024  9:16 PM Mari Nova Modify [K85.90,  K86.1] Acute on chronic pancreatitis (HCC)           ED Disposition       ED Disposition   Admit    Condition   Stable    Date/Time   Mon Jun 24, 2024 2121    Comment   Case was discussed with SHANA Hernandez and the patient's admission status was agreed to be Admission Status: inpatient status to the service of Dr. Garrison .               Follow-up Information    None         Patient's Medications   Discharge Prescriptions    No medications on file     No discharge procedures on file.    PDMP Review         Value Time User    PDMP Reviewed  Yes 6/7/2024  9:24 AM Marvel Baker DO             ED Provider  Attending physically available and evaluated Jake Claros. I managed the patient along with the ED Attending.    Electronically Signed by           Mari Nova DO  06/24/24 2123

## 2024-06-25 NOTE — ASSESSMENT & PLAN NOTE
BP on presentation 220/87   suspect secondary to pain.   Continue clonidine, metoprolol and lisinopril + pain control   6/25 BP improved systolic 130-150's , continue BP and pain med's   On discharge home BP measures and close follow up with PCP

## 2024-06-25 NOTE — ASSESSMENT & PLAN NOTE
Wt Readings from Last 3 Encounters:   06/25/24 119 kg (261 lb 7.5 oz)   05/24/24 107 kg (235 lb)   05/13/24 109 kg (239 lb 6.4 oz)     Echo 5/21/24: EF 60-65%, G1DD, moderate AS.   Hold torsemide in AM.   Monitor daily weights and intake/output.

## 2024-06-25 NOTE — DISCHARGE SUMMARY
"Formerly [facility]  Discharge- Jake Claros 1940, 83 y.o. male MRN: [id number]  Unit/Bed#: ED-32 Encounter: [id number]  Primary Care Provider: Ashwin Leyva MD   Date and time admitted to hospital: 6/24/2024  6:02 PM    * Acute recurrent pancreatitis  Assessment & Plan  Presented with acute onset of epigastric abdominal pain, lower chest pain.   History of recurrent pancreatitis.   Troponin 15, 15, 17. No ischemic changes noted on EKG.   CT A/P: \"Inflammation associated with the pancreatitis has mildly increased in severity compared to May 24, 2024. There is no peripancreatic fluid collection.\"  GI consulted; discussed with GI at bedside, patient's symptoms are now relatively improved and he is requesting to be discharged . He has appointment with GI Dr. Liu 7/2 . He confirms that he will continue with fluids/CLD for next 1-2 days before advancing diet as tolerable and that he uses PRN pain medications. He lives with his wife close from the hospital and will call provider, or come to the ED if worsen symptoms. He feel assured that his troponin was normal and now asking to be discharged. From medicine and GI standpoint, we agree with his wished plan. We recommended staying for 24H observation for more safe plan before being discharged but he prefers to be discharged instead which is acceptable.   S/p IV fluids in the ED but with caution given his diastolic CHF and LE edema. .   Pain control. Will give PRN oxy on discharge.      Stage 3b chronic kidney disease (CKD) ([facility])  Assessment & Plan  Lab Results   Component Value Date    EGFR 43 06/24/2024    EGFR 45 06/07/2024    EGFR 48 05/24/2024    CREATININE 1.50 (H) 06/24/2024    CREATININE 1.43 (H) 06/07/2024    CREATININE 1.36 (H) 05/24/2024     Cr slightly above baseline of 1.30-1.40.   IV fluids.   Repeat BMP in AM.   Avoid nephrotoxins and hypotension.     Chest pain  Assessment & Plan  Presenting with worsening acute on chronic upper " "abdominal pain consistent with his pancreatitis however patient was concerned because the pain was \"somewhat higher and he was concerned if it is his heart\"     He has mild diastolic dysfunction and moderate aortic stenosis with murmur present on exam as well as bilateral LE edema but with hx of knee surgeries.     Troponin not elevated and EKG: Sinus bradycardia with 1st degree A-V block and RBBB, no acute changes.     His reported \"chest pain\" is resolved while in the ED , likely was referred pain from his pancreatitis vs due to the HTN urgency BP was 220/87 on arrival now well controlled.     Continue BP medications and see pancreatitis A&P       Type 2 diabetes mellitus with chronic kidney disease, with long-term current use of insulin (HCC)  Assessment & Plan  Lab Results   Component Value Date    HGBA1C 6.3 (H) 04/03/2024       Recent Labs     06/24/24  1853   POCGLU 65         Blood Sugar Average: Last 72 hrs:  (P) 65    Monitor accu-checks q6h while NPO.   Home insulin regimen is Lantus 15 units in AM and 20 units in PM.   Decrease to Lantus 10 units BID while NPO  SSI.   Hypoglycemia protocol.      (HFpEF) heart failure with preserved ejection fraction (HCC)  Assessment & Plan  Wt Readings from Last 3 Encounters:   06/25/24 119 kg (261 lb 7.5 oz)   05/24/24 107 kg (235 lb)   05/13/24 109 kg (239 lb 6.4 oz)     Echo 5/21/24: EF 60-65%, G1DD, moderate AS.   Hold torsemide in AM.   Monitor daily weights and intake/output.           Hypertensive urgency  Assessment & Plan  BP on presentation 220/87   suspect secondary to pain.   Continue clonidine, metoprolol and lisinopril + pain control   6/25 BP improved systolic 130-150's , continue BP and pain med's   On discharge home BP measures and close follow up with PCP    PAULA (obstructive sleep apnea)  Assessment & Plan  CPAP HS.         Medical Problems       Resolved Problems  Date Reviewed: 6/24/2024   None       Discharging Physician / Practitioner: Harpal Espitia, " "DO  PCP: Ashwin Leyva MD  Admission Date:   Admission Orders (From admission, onward)       Ordered        06/24/24 2122  INPATIENT ADMISSION  Once                          Discharge Date: 06/25/24    Consultations During Hospital Stay:  GI     Procedures Performed:   None     Significant Findings / Test Results:   Acute on Chronic Pancreatitis     Incidental Findings:   None     Test Results Pending at Discharge (will require follow up):   - has follow up with GI on 7/2   - PCP follow up in a week        Outpatient Tests Requested:  None     Complications:  none     Reason for Admission: upper abdominal vs chest pain, per patient \"was concerned if is heart or the pancreas\"     Hospital Course:   Jake Claros is a 83 y.o. male patient who originally presented to the hospital on 6/24/2024 due to     Arrives via EMS from home with c/o chest pain started 1-2 hours ago, radiates across chest, 6/10. 1 Nitro given pta.  Mari Nova, DO        6/24/2024  8:43 PM      Workup negative for FABIEN/MI , negative troponin and EKG with no acute changes. BP was elevated on admission thought due to the pain, improved with pain medication and BP med's continuation. Continues to have upper abdominal pain, noted his chest pain resolved and now is more consistent to his upper abdomen. Evaluated by GI while in the ED, and 6/25 morning he feels improved and \"assured that the pain is not from my heart and now only my usual not new abdominal pain from the pancreas\" and patient asked for being discharged.         Hospital Course: No notes on file    The patient, initially admitted to the hospital as inpatient, was discharged earlier than expected given the following: improvement of symptoms and patient asked for discharge which was deemed acceptable option by GI and medicine, see above .    Please see above list of diagnoses and related plan for additional information.     Condition at Discharge: stable    Discharge Day Visit " "/ Exam:   Subjective:  see above , chest pain resolved, upper abdominal pain at baseline/chronic, tolerating fluids/CLD and planning to advance slowly as tolerable with PRN pain med and continue close follow up with GI     Vitals: Blood Pressure: 158/68 (06/25/24 0851)  Pulse: 62 (06/25/24 0851)  Temperature: 98.4 °F (36.9 °C) (06/24/24 1919)  Temp Source: Oral (06/24/24 1919)  Respirations: 16 (06/25/24 0729)  Height: 5' 6\" (167.6 cm) (06/25/24 0623)  Weight - Scale: 119 kg (261 lb 7.5 oz) (06/25/24 0623)  SpO2: 95 % (06/25/24 0729)  Exam:   Physical Exam   - GEN: Appears well, alert and oriented x 3, pleasant and cooperative, in no acute distress  - HEENT: Anicteric, mucous membranes moist, PERRL and EOMI   - NECK: No lymphadenopathy, JVD or carotid bruits   - HEART: RRR, normal S1 and S2, no murmurs, clicks, gallops or rubs   - LUNGS: Clear to auscultation bilaterally; no wheezes, rales, or rhonchi  - ABDOMEN: upper abdomen tenderness to palpation ; Normal bowel sounds, soft,  no distention, no organomegaly or masses felt on exam.   - EXTREMITIES: Peripheral pulses normal; no clubbing, cyanosis; 2+ bilateral LE edema   - NEURO: No focal findings, CN II-XII are grossly intact.   - Musculoskeletal: 5/5 strength, normal ROM, no swollen or erythematous joints.   - SKIN: Normal without suspicious lesions on exposed skin      Discussion with Family: Patient declined call to .     Discharge instructions/Information to patient and family:   See after visit summary for information provided to patient and family.      Provisions for Follow-Up Care:  See after visit summary for information related to follow-up care and any pertinent home health orders.      Mobility at time of Discharge:   Basic Mobility Inpatient Raw Score: 23  JH-HLM Goal: 7: Walk 25 feet or more  JH-HLM Achieved: 8: Walk 250 feet ot more  HLM Goal achieved. Continue to encourage appropriate mobility.     Disposition:   Home    Planned " Readmission: no     Discharge Statement:  I spent 50 minutes discharging the patient. This time was spent on the day of discharge. I had direct contact with the patient on the day of discharge. Greater than 50% of the total time was spent examining patient, answering all patient questions, arranging and discussing plan of care with patient as well as directly providing post-discharge instructions.  Additional time then spent on discharge activities.    Discharge Medications:  See after visit summary for reconciled discharge medications provided to patient and/or family.      **Please Note: This note may have been constructed using a voice recognition system**

## 2024-06-25 NOTE — ASSESSMENT & PLAN NOTE
"Presented with acute onset of epigastric abdominal pain, lower chest pain.   History of recurrent pancreatitis.   Troponin 15, 15, 17. No ischemic changes noted on EKG.   CT A/P: \"Inflammation associated with the pancreatitis has mildly increased in severity compared to May 24, 2024. There is no peripancreatic fluid collection.\"  GI consulted.   Keep NPO.   IV fluids.   Pain control.   "

## 2024-06-25 NOTE — ASSESSMENT & PLAN NOTE
Lab Results   Component Value Date    HGBA1C 6.3 (H) 04/03/2024       Recent Labs     06/24/24  1853   POCGLU 65         Blood Sugar Average: Last 72 hrs:  (P) 65    Monitor accu-checks q6h while NPO.   Home insulin regimen is Lantus 15 units in AM and 20 units in PM.   Decrease to Lantus 10 units BID while NPO  SSI.   Hypoglycemia protocol.

## 2024-06-25 NOTE — H&P
"[facility]  H&P  Name: Jake Claros 83 y.o. male I MRN: [id number]  Unit/Bed#: ED-32 I Date of Admission: 6/24/2024   Date of Service: 6/25/2024 I Hospital Day: 1      Assessment & Plan   * Acute recurrent pancreatitis  Assessment & Plan  Presented with acute onset of epigastric abdominal pain, lower chest pain.   History of recurrent pancreatitis.   Troponin 15, 15, 17. No ischemic changes noted on EKG.   CT A/P: \"Inflammation associated with the pancreatitis has mildly increased in severity compared to May 24, 2024. There is no peripancreatic fluid collection.\"  GI consulted.   Keep NPO.   IV fluids.   Pain control.     Stage 3b chronic kidney disease (CKD) ([facility])  Assessment & Plan  Lab Results   Component Value Date    EGFR 43 06/24/2024    EGFR 45 06/07/2024    EGFR 48 05/24/2024    CREATININE 1.50 (H) 06/24/2024    CREATININE 1.43 (H) 06/07/2024    CREATININE 1.36 (H) 05/24/2024     Cr slightly above baseline of 1.30-1.40.   IV fluids.   Repeat BMP in AM.   Avoid nephrotoxins and hypotension.     Type 2 diabetes mellitus with chronic kidney disease, with long-term current use of insulin ([facility])  Assessment & Plan  Lab Results   Component Value Date    HGBA1C 6.3 (H) 04/03/2024       Recent Labs     06/24/24  1853   POCGLU 65         Blood Sugar Average: Last 72 hrs:  (P) 65    Monitor accu-checks q6h while NPO.   Home insulin regimen is Lantus 15 units in AM and 20 units in PM.   Decrease to Lantus 10 units BID while NPO  SSI.   Hypoglycemia protocol.      (HFpEF) heart failure with preserved ejection fraction (HCC)  Assessment & Plan  Wt Readings from Last 3 Encounters:   05/24/24 107 kg (235 lb)   05/13/24 109 kg (239 lb 6.4 oz)   04/15/24 108 kg (237 lb 3.2 oz)     Echo 5/21/24: EF 60-65%, G1DD, moderate AS.   Hold torsemide in AM.   Monitor daily weights and intake/output.           HTN (hypertension), benign  Assessment & Plan  BP elevated since presentation, suspect secondary " to pain.   Continue clonidine, metoprolol and lisinopril.     PAULA (obstructive sleep apnea)  Assessment & Plan  CPAP HS.          VTE Pharmacologic Prophylaxis: VTE Score: 4 Moderate Risk (Score 3-4) - Pharmacological DVT Prophylaxis Ordered: heparin.  Code Status: level 1 - full code  Discussion with family: Patient declined call to .     Anticipated Length of Stay: Patient will be admitted on an inpatient basis with an anticipated length of stay of greater than 2 midnights secondary to acute on chronic pancreatitis, need for GI evaluation. .    Total Time Spent on Date of Encounter in care of patient:  This time was spent on one or more of the following: performing physical exam; counseling and coordination of care; obtaining or reviewing history; documenting in the medical record; reviewing/ordering tests, medications or procedures; communicating with other healthcare professionals and discussing with patient's family/caregivers.    Chief Complaint: chest pain, epigastric pain    History of Present Illness:  Jake Claros is a 83 y.o. male with a PMH of recurrent pancreatitis, CAD, PAULA, T2DM, CKD, HLD, HTN who presents with chest pain, epigastric pain. Patient reports pain in the center of his lower chest and epigastric region since about 4:30pm. He describes the pain as a pressure sensation. He notes that his pain is not similar to his prior episodes of pancreatitis because it is located in his chest as well. He denies fevers, nausea/ vomiting, diarrhea, back pain, SOB.     Review of Systems:  Review of Systems   Constitutional:  Positive for appetite change and chills. Negative for fever.   Respiratory:  Negative for shortness of breath.    Cardiovascular:  Positive for chest pain.   Gastrointestinal:  Positive for abdominal pain. Negative for diarrhea, nausea and vomiting.   Genitourinary:  Negative for difficulty urinating, dysuria, flank pain and frequency.   All other systems reviewed and  are negative.      Past Medical and Surgical History:   Past Medical History:   Diagnosis Date    Allergic     Arthritis     Chronic kidney disease 2021    Chronic kidney disease (CKD) stage G3a/A1, moderately decreased glomerular filtration rate (GFR) between 45-59 mL/min/1.73 square meter and albuminuria creatinine ratio less than 30 mg/g (HCC)     Colon polyp     Diabetes mellitus (HCC)     GERD (gastroesophageal reflux disease)     Heart murmur     History of echocardiogram     HL (hearing loss)     Hyperlipidemia     Hypertension     Obesity     Pancreatitis     Sleep apnea, obstructive        Past Surgical History:   Procedure Laterality Date    CARDIAC CATHETERIZATION      CHOLECYSTECTOMY      COLONOSCOPY  03/09/2018    COLONOSCOPY      EYE SURGERY      HEMORRHOID SURGERY      JOINT REPLACEMENT  2017    knee    KNEE SURGERY Right     OTHER SURGICAL HISTORY      Stent     WA LAPAROSCOPY SURG CHOLECYSTECTOMY N/A 06/28/2023    Procedure: CHOLECYSTECTOMY LAPAROSCOPIC;  Surgeon: Alirio Hong MD;  Location:  MAIN OR;  Service: General    UPPER GASTROINTESTINAL ENDOSCOPY         Meds/Allergies:  Prior to Admission medications    Medication Sig Start Date End Date Taking? Authorizing Provider   allopurinol (ZYLOPRIM) 100 mg tablet Take 100 mg by mouth daily 12/20/23   Historical Provider, MD   apixaban (Eliquis) 2.5 mg Take 2.5 mg by mouth 2 (two) times a day    Historical Provider, MD   BD Pen Needle Pascale U/F 32G X 4 MM MISC USE AS INSTRUCTED WITH INSULIN PEN 8/27/20   Historical Provider, MD   cloNIDine (CATAPRES) 0.1 mg tablet TAKE 1 TABLET BY MOUTH EVERY 12 HOURS 4/2/24   Ashwin Leyva MD   clopidogrel (PLAVIX) 75 mg tablet Take 1 tablet (75 mg total) by mouth daily 4/22/24   Ashwin Leyva MD   Docusate Sodium (DSS) 100 MG CAPS Take 100 mg by mouth daily    Historical Provider, MD   Fexofenadine HCl (MUCINEX ALLERGY PO) Take by mouth    Historical Provider, MD   fluticasone (FLONASE) 50 mcg/act  nasal spray 2 sprays into each nostril daily 8/15/23   Ashwin Leyva MD   insulin aspart (NovoLOG FlexPen) 100 UNIT/ML injection pen Inject under the skin 3 (three) times a day with meals Sliding scale as ordered TID with meals    Historical Provider, MD   ipratropium (ATROVENT) 0.03 % nasal spray SPRAY 2 SPRAYS INTO EACH NOSTRIL EVERY 12 HOURS. 12/29/23   Ashwin Leyva MD   Lancets (OneTouch Delica Plus [id number]) MISC TEST GLUCOSE 3 4 TIMES/DAY 8/28/20   Historical Provider, MD   Levemir FlexTouch 100 units/mL injection pen 36 Units daily In AM 8/27/20   Historical Provider, MD   lisinopril (ZESTRIL) 20 mg tablet Take 1 tablet (20 mg total) by mouth daily 2/14/24   Ashwin Leyva MD   lisinopril (ZESTRIL) 40 mg tablet Take 0.5 tablets (20 mg total) by mouth daily 4/15/24   Ashwin Leyva MD   metoprolol succinate (TOPROL-XL) 25 mg 24 hr tablet TAKE 1 TABLET (25 MG TOTAL) BY MOUTH DAILY. 1/4/24   Vero Leyva MD   Multiple Vitamins-Minerals (MULTIVITAMIN ADULT PO) Take by mouth daily    Historical Provider, MD   ondansetron (ZOFRAN-ODT) 4 mg disintegrating tablet Take 1 tablet (4 mg total) by mouth every 8 (eight) hours as needed for nausea or vomiting for up to 7 days 1/29/24 5/13/24  Phuong Vergara DO   pantoprazole (PROTONIX) 40 mg tablet Take 1 tablet (40 mg total) by mouth daily before breakfast 2/16/24 2/10/25  Edu Davis MD   pyridoxine (VITAMIN B6) 100 mg tablet Take 100 mg by mouth daily    Historical Provider, MD   ranolazine (RANEXA) 500 mg 12 hr tablet TAKE 1 TABLET BY MOUTH TWICE A DAY 4/15/24   Ashwin Leyva MD   rosuvastatin (CRESTOR) 20 MG tablet take 1 tablet by mouth every day at night 3/17/24   Historical Provider, MD   tamsulosin (FLOMAX) 0.4 mg Take 2 capsules (0.8 mg total) by mouth daily 5/16/24   Vero Leyva MD   torsemide (DEMADEX) 10 mg tablet Take 1 tablet (10 mg total) by mouth daily 4/15/24   Ashwin Leyva MD   Tresiba FlexTouch 100 units/mL injection pen Inject  "20 Units under the skin 2 (two) times a day 23   Historical Provider, MD     I have reviewed home medications with a medical source (PCP, Pharmacy, other).    Allergies:   Allergies   Allergen Reactions    Januvia [Sitagliptin] Other (See Comments)     pancreatitis    Semaglutide Other (See Comments)     pancreatits    Simvastatin Myalgia    Trulicity [Dulaglutide] Other (See Comments)     Pancreatitis    Wound Dressing Adhesive Other (See Comments)     Burning sensation    Medical Tape Rash     \"A burn on the skin\" , mackenzie on sensitive areas.    Does ok w/ surgical glue    Molds & Smuts Allergic Rhinitis     Nasal sympt       Social History:  Marital Status: /Civil Union   Occupation:   Patient Pre-hospital Living Situation: Home  Patient Pre-hospital Level of Mobility: walks  Patient Pre-hospital Diet Restrictions:   Substance Use History:   Social History     Substance and Sexual Activity   Alcohol Use Not Currently    Alcohol/week: 5.0 standard drinks of alcohol    Types: 5 Glasses of wine per week    Comment: no alcohol since March     Social History     Tobacco Use   Smoking Status Former    Current packs/day: 0.00    Average packs/day: 2.0 packs/day for 15.0 years (30.0 ttl pk-yrs)    Types: Cigarettes, Pipe, Cigars    Start date: 1957    Quit date: 1972    Years since quittin.6    Passive exposure: Past   Smokeless Tobacco Never     Social History     Substance and Sexual Activity   Drug Use Never       Family History:  Family History   Problem Relation Age of Onset    Heart disease Mother     Heart attack Mother         50s    Cancer Mother     Coronary artery disease Mother     Cancer Brother         Malignant tumor of lung       Physical Exam:     Vitals:   Blood Pressure: (!) 188/73 (24)  Pulse: 57 (24)  Temperature: 98.4 °F (36.9 °C) (24)  Temp Source: Oral (24)  Respirations: 18 (24)  SpO2: 98 % (24)    Physical " Exam  Vitals and nursing note reviewed.   Constitutional:       General: He is not in acute distress.     Appearance: He is obese. He is not diaphoretic.   HENT:      Head: Normocephalic and atraumatic.   Eyes:      Conjunctiva/sclera: Conjunctivae normal.   Cardiovascular:      Rate and Rhythm: Normal rate and regular rhythm.      Heart sounds: Murmur heard.   Pulmonary:      Effort: Pulmonary effort is normal. No respiratory distress.      Breath sounds: Normal breath sounds.   Abdominal:      General: Bowel sounds are normal.      Palpations: Abdomen is soft.      Tenderness: There is abdominal tenderness in the right upper quadrant and epigastric area.   Musculoskeletal:      Right lower leg: Edema present.      Left lower leg: Edema present.   Skin:     General: Skin is warm and dry.      Coloration: Skin is not pale.   Neurological:      Mental Status: He is alert and oriented to person, place, and time.   Psychiatric:         Mood and Affect: Mood normal.          Additional Data:     Lab Results:  Results from last 7 days   Lab Units 06/24/24  1822   WBC Thousand/uL 8.85   HEMOGLOBIN g/dL 11.4*   HEMATOCRIT % 34.8*   PLATELETS Thousands/uL 208   SEGS PCT % 64   LYMPHO PCT % 25   MONO PCT % 8   EOS PCT % 2     Results from last 7 days   Lab Units 06/24/24  1822   SODIUM mmol/L 140   POTASSIUM mmol/L 3.5   CHLORIDE mmol/L 104   CO2 mmol/L 31   BUN mg/dL 33*   CREATININE mg/dL 1.50*   ANION GAP mmol/L 5   CALCIUM mg/dL 8.9   ALBUMIN g/dL 3.6   TOTAL BILIRUBIN mg/dL 0.40   ALK PHOS U/L 73   ALT U/L 12   AST U/L 15   GLUCOSE RANDOM mg/dL 71         Results from last 7 days   Lab Units 06/24/24  1853   POC GLUCOSE mg/dl 65     Lab Results   Component Value Date    HGBA1C 6.3 (H) 04/03/2024    HGBA1C 6.2 (H) 03/22/2024    HGBA1C 6.2 (H) 12/14/2023           Lines/Drains:  Invasive Devices       Peripheral Intravenous Line  Duration             Peripheral IV 06/24/24 Right Antecubital <1 day                         Imaging: Reviewed radiology reports from this admission including: abdominal/pelvic CT  CT abdomen pelvis with contrast   Final Result by Hardeep Church MD (06/24 2042)      Inflammation associated with the pancreatitis has mildly increased in severity compared to May 24, 2024. There is no peripancreatic fluid collection.         Workstation performed: [id number]         XR chest 1 view portable   ED Interpretation by Mari Nova DO (06/24 1911)   No obvious acute cardiopulmonary findings as per my independent interpretation.          EKG and Other Studies Reviewed on Admission:   EKG: Sinus Bradycardia. HR 52.    ** Please Note: This note has been constructed using a voice recognition system. **

## 2024-06-25 NOTE — ASSESSMENT & PLAN NOTE
Lab Results   Component Value Date    HGBA1C 6.3 (H) 04/03/2024       Recent Labs     06/24/24  1853   POCGLU 65       Blood Sugar Average: Last 72 hrs:  (P) 65    Monitor accu-checks AC and HS.   Continue home insulin regimen with Levemir 36 units in AM and SSI.   Hypoglycemia protocol.

## 2024-06-25 NOTE — RESPIRATORY THERAPY NOTE
06/25/24 0104   Respiratory Assessment   Resp Comments pt does not want to wear cpap without nasal pillows

## 2024-06-26 ENCOUNTER — HOSPITAL ENCOUNTER (OUTPATIENT)
Dept: CT IMAGING | Facility: HOSPITAL | Age: 84
Discharge: HOME | End: 2024-06-26
Attending: INTERNAL MEDICINE
Payer: MEDICARE

## 2024-06-26 DIAGNOSIS — J32.9 RECURRENT SINUSITIS: ICD-10-CM

## 2024-06-26 DIAGNOSIS — Z71.89 COMPLEX CARE COORDINATION: Primary | ICD-10-CM

## 2024-06-26 PROCEDURE — 70486 CT MAXILLOFACIAL W/O DYE: CPT

## 2024-06-26 NOTE — ED ATTENDING ATTESTATION
6/24/2024  I, Josafat Mcdowell MD, saw and evaluated the patient. I have discussed the patient with the resident/non-physician practitioner and agree with the resident's/non-physician practitioner's findings, Plan of Care, and MDM as documented in the resident's/non-physician practitioner's note, except where noted. All available labs and Radiology studies were reviewed.  I was present for key portions of any procedure(s) performed by the resident/non-physician practitioner and I was immediately available to provide assistance.       At this point I agree with the current assessment done in the Emergency Department.  I have conducted an independent evaluation of this patient a history and physical is as follows: Epigastric pain.  EKG unremarkable, troponin 15.  Will need repeat troponin.  Lipase 167.  Patient states pain feels exactly the same as previous episodes of pancreatitis.  CT scan concerning for increased inflammation consistent with pancreatitis.  Will admit to hospital service.    Results Reviewed       Procedure Component Value Units Date/Time    Basic metabolic panel [[id number]]  (Abnormal) Collected: 06/25/24 0716    Lab Status: Final result Specimen: Blood from Arm, Right Updated: 06/25/24 0746     Sodium 139 mmol/L      Potassium 4.1 mmol/L      Chloride 106 mmol/L      CO2 27 mmol/L      ANION GAP 6 mmol/L      BUN 25 mg/dL      Creatinine 1.32 mg/dL      Glucose 136 mg/dL      Calcium 8.1 mg/dL      eGFR 50 ml/min/1.73sq m     Narrative:      National Kidney Disease Foundation guidelines for Chronic Kidney Disease (CKD):     Stage 1 with normal or high GFR (GFR > 90 mL/min/1.73 square meters)    Stage 2 Mild CKD (GFR = 60-89 mL/min/1.73 square meters)    Stage 3A Moderate CKD (GFR = 45-59 mL/min/1.73 square meters)    Stage 3B Moderate CKD (GFR = 30-44 mL/min/1.73 square meters)    Stage 4 Severe CKD (GFR = 15-29 mL/min/1.73 square meters)    Stage 5 End Stage CKD (GFR <15 mL/min/1.73 square  meters)  Note: GFR calculation is accurate only with a steady state creatinine    Magnesium [[id number]]  (Abnormal) Collected: 06/25/24 0716    Lab Status: Final result Specimen: Blood from Arm, Right Updated: 06/25/24 0746     Magnesium 1.8 mg/dL     Fingerstick Glucose (POCT) [[id number]]  (Abnormal) Collected: 06/25/24 0728    Lab Status: Final result Specimen: Blood Updated: 06/25/24 0736     POC Glucose 142 mg/dl     CBC [[id number]]  (Abnormal) Collected: 06/25/24 0716    Lab Status: Final result Specimen: Blood from Arm, Right Updated: 06/25/24 0724     WBC 6.55 Thousand/uL      RBC 3.25 Million/uL      Hemoglobin 10.4 g/dL      Hematocrit 32.1 %      MCV 99 fL      MCH 32.0 pg      MCHC 32.4 g/dL      RDW 13.8 %      Platelets 167 Thousands/uL      MPV 9.7 fL     Fingerstick Glucose (POCT) [[id number]]  (Normal) Collected: 06/25/24 0200    Lab Status: Final result Specimen: Blood Updated: 06/25/24 0202     POC Glucose 111 mg/dl     Platelet count [[id number]]  (Normal) Collected: 06/25/24 0127    Lab Status: Final result Specimen: Blood from Arm, Right Updated: 06/25/24 0158     Platelets 176 Thousands/uL      MPV 9.9 fL     HS Troponin I 4hr [[id number]]  (Normal) Collected: 06/24/24 2304    Lab Status: Final result Specimen: Blood from Arm, Right Updated: 06/24/24 2342     hs TnI 4hr 17 ng/L      Delta 4hr hsTnI 2 ng/L     HS Troponin I 2hr [[id number]]  (Normal) Collected: 06/24/24 2039    Lab Status: Final result Specimen: Blood from Arm, Right Updated: 06/24/24 2113     hs TnI 2hr 15 ng/L      Delta 2hr hsTnI 0 ng/L     B-Type Natriuretic Peptide(BNP) [[id number]]  (Normal) Collected: 06/24/24 1822    Lab Status: Final result Specimen: Blood from Arm, Right Updated: 06/24/24 1919     BNP 84 pg/mL     HS Troponin 0hr (reflex protocol) [[id number]]  (Normal) Collected: 06/24/24 1822    Lab Status: Final result Specimen: Blood from Arm, Right Updated: 06/24/24 1901     hs TnI 0hr 15 ng/L     Comprehensive  metabolic panel [[id number]]  (Abnormal) Collected: 06/24/24 1822    Lab Status: Final result Specimen: Blood from Arm, Right Updated: 06/24/24 1857     Sodium 140 mmol/L      Potassium 3.5 mmol/L      Chloride 104 mmol/L      CO2 31 mmol/L      ANION GAP 5 mmol/L      BUN 33 mg/dL      Creatinine 1.50 mg/dL      Glucose 71 mg/dL      Calcium 8.9 mg/dL      AST 15 U/L      ALT 12 U/L      Alkaline Phosphatase 73 U/L      Total Protein 6.3 g/dL      Albumin 3.6 g/dL      Total Bilirubin 0.40 mg/dL      eGFR 43 ml/min/1.73sq m     Narrative:      National Kidney Disease Foundation guidelines for Chronic Kidney Disease (CKD):     Stage 1 with normal or high GFR (GFR > 90 mL/min/1.73 square meters)    Stage 2 Mild CKD (GFR = 60-89 mL/min/1.73 square meters)    Stage 3A Moderate CKD (GFR = 45-59 mL/min/1.73 square meters)    Stage 3B Moderate CKD (GFR = 30-44 mL/min/1.73 square meters)    Stage 4 Severe CKD (GFR = 15-29 mL/min/1.73 square meters)    Stage 5 End Stage CKD (GFR <15 mL/min/1.73 square meters)  Note: GFR calculation is accurate only with a steady state creatinine    Lipase [[id number]]  (Abnormal) Collected: 06/24/24 1822    Lab Status: Final result Specimen: Blood from Arm, Right Updated: 06/24/24 1857     Lipase 167 u/L     Fingerstick Glucose (POCT) [[id number]]  (Normal) Collected: 06/24/24 1853    Lab Status: Final result Specimen: Blood Updated: 06/24/24 1853     POC Glucose 65 mg/dl     CBC and differential [[id number]]  (Abnormal) Collected: 06/24/24 1822    Lab Status: Final result Specimen: Blood from Arm, Right Updated: 06/24/24 1837     WBC 8.85 Thousand/uL      RBC 3.59 Million/uL      Hemoglobin 11.4 g/dL      Hematocrit 34.8 %      MCV 97 fL      MCH 31.8 pg      MCHC 32.8 g/dL      RDW 13.7 %      MPV 10.0 fL      Platelets 208 Thousands/uL      nRBC 0 /100 WBCs      Segmented % 64 %      Immature Grans % 0 %      Lymphocytes % 25 %      Monocytes % 8 %      Eosinophils Relative 2 %       Basophils Relative 1 %      Absolute Neutrophils 5.63 Thousands/µL      Absolute Immature Grans 0.03 Thousand/uL      Absolute Lymphocytes 2.24 Thousands/µL      Absolute Monocytes 0.74 Thousand/µL      Eosinophils Absolute 0.16 Thousand/µL      Basophils Absolute 0.05 Thousands/µL           CT abdomen pelvis with contrast   Final Result by Hardeep Church MD (06/24 2042)      Inflammation associated with the pancreatitis has mildly increased in severity compared to May 24, 2024. There is no peripancreatic fluid collection.         Workstation performed: [id number]         XR chest 1 view portable   ED Interpretation by Mari Nova DO (06/24 1911)   No obvious acute cardiopulmonary findings as per my independent interpretation.      Final Result by Hardeep Church MD (06/25 0746)      No acute cardiopulmonary disease.            Workstation performed: [id number]               ED Course         Critical Care Time  Procedures

## 2024-06-27 ENCOUNTER — PATIENT OUTREACH (OUTPATIENT)
Dept: FAMILY MEDICINE CLINIC | Facility: CLINIC | Age: 84
End: 2024-06-27

## 2024-06-27 NOTE — PROGRESS NOTES
Spoke with Baljit Reports he does not have pain but body is shaky. Also states he kind a has a headache. Had to get his cane out because he is unsteady to walk. Starting to eat today but taking it easy he is drinking. Moved bowels today but was a little constipated. To see PCP on 7/2/24.   No questions or concerns about medications or discharge instructions.   Encouraged to reach out to Dr Leyva office tomorrow if he still feels shaky  Declines further outreaches at this time

## 2024-07-02 ENCOUNTER — OFFICE VISIT (OUTPATIENT)
Age: 84
End: 2024-07-02
Payer: MEDICARE

## 2024-07-02 VITALS
HEIGHT: 66 IN | WEIGHT: 241 LBS | HEART RATE: 62 BPM | RESPIRATION RATE: 18 BRPM | TEMPERATURE: 98 F | OXYGEN SATURATION: 99 % | BODY MASS INDEX: 38.73 KG/M2 | SYSTOLIC BLOOD PRESSURE: 130 MMHG | DIASTOLIC BLOOD PRESSURE: 60 MMHG

## 2024-07-02 DIAGNOSIS — K21.9 GERD WITHOUT ESOPHAGITIS: ICD-10-CM

## 2024-07-02 DIAGNOSIS — N18.32 TYPE 2 DIABETES MELLITUS WITH STAGE 3B CHRONIC KIDNEY DISEASE, WITH LONG-TERM CURRENT USE OF INSULIN (HCC): ICD-10-CM

## 2024-07-02 DIAGNOSIS — I50.32 CHRONIC HEART FAILURE WITH PRESERVED EJECTION FRACTION (HCC): ICD-10-CM

## 2024-07-02 DIAGNOSIS — N18.32 STAGE 3B CHRONIC KIDNEY DISEASE (CKD) (HCC): ICD-10-CM

## 2024-07-02 DIAGNOSIS — K85.90 ACUTE RECURRENT PANCREATITIS: Primary | ICD-10-CM

## 2024-07-02 DIAGNOSIS — Z79.4 TYPE 2 DIABETES MELLITUS WITH STAGE 3B CHRONIC KIDNEY DISEASE, WITH LONG-TERM CURRENT USE OF INSULIN (HCC): ICD-10-CM

## 2024-07-02 DIAGNOSIS — I25.118 CORONARY ARTERY DISEASE OF NATIVE ARTERY OF NATIVE HEART WITH STABLE ANGINA PECTORIS (HCC): ICD-10-CM

## 2024-07-02 DIAGNOSIS — I10 HTN (HYPERTENSION), BENIGN: ICD-10-CM

## 2024-07-02 DIAGNOSIS — E11.22 TYPE 2 DIABETES MELLITUS WITH STAGE 3B CHRONIC KIDNEY DISEASE, WITH LONG-TERM CURRENT USE OF INSULIN (HCC): ICD-10-CM

## 2024-07-02 DIAGNOSIS — E78.2 MIXED HYPERLIPIDEMIA: ICD-10-CM

## 2024-07-02 DIAGNOSIS — G47.33 OSA (OBSTRUCTIVE SLEEP APNEA): ICD-10-CM

## 2024-07-02 DIAGNOSIS — E66.01 OBESITY, MORBID (HCC): ICD-10-CM

## 2024-07-02 PROBLEM — Z01.818 PREOPERATIVE CLEARANCE: Status: RESOLVED | Noted: 2024-02-08 | Resolved: 2024-07-02

## 2024-07-02 PROBLEM — I16.0 HYPERTENSIVE URGENCY: Status: RESOLVED | Noted: 2020-07-09 | Resolved: 2024-07-02

## 2024-07-02 PROCEDURE — 99496 TRANSJ CARE MGMT HIGH F2F 7D: CPT

## 2024-07-02 RX ORDER — RANOLAZINE 500 MG/1
500 TABLET, EXTENDED RELEASE ORAL 2 TIMES DAILY
Qty: 180 TABLET | Refills: 0 | Status: SHIPPED | OUTPATIENT
Start: 2024-07-02

## 2024-07-02 RX ORDER — PANTOPRAZOLE SODIUM 40 MG/1
40 TABLET, DELAYED RELEASE ORAL
Qty: 90 TABLET | Refills: 3 | Status: SHIPPED | OUTPATIENT
Start: 2024-07-02 | End: 2025-06-27

## 2024-07-02 RX ORDER — FAMOTIDINE 40 MG/1
40 TABLET, FILM COATED ORAL DAILY
COMMUNITY
End: 2024-07-02 | Stop reason: SDUPTHER

## 2024-07-02 RX ORDER — CLOPIDOGREL BISULFATE 75 MG/1
75 TABLET ORAL DAILY
Qty: 90 TABLET | Refills: 1 | Status: SHIPPED | OUTPATIENT
Start: 2024-07-02

## 2024-07-02 RX ORDER — METHYLDOPA/HYDROCHLOROTHIAZIDE 250MG-15MG
TABLET ORAL DAILY
COMMUNITY

## 2024-07-02 RX ORDER — FAMOTIDINE 40 MG/1
40 TABLET, FILM COATED ORAL
Qty: 90 TABLET | Refills: 1 | Status: SHIPPED | OUTPATIENT
Start: 2024-07-02

## 2024-07-02 RX ORDER — TORSEMIDE 10 MG/1
10 TABLET ORAL DAILY
Qty: 90 TABLET | Refills: 1 | Status: SHIPPED | OUTPATIENT
Start: 2024-07-02

## 2024-07-02 NOTE — PROGRESS NOTES
Transition of Care Visit  Name: Jake Claros      : 1940      MRN: [id number]  Encounter Provider: Ashwin Leyva MD  Encounter Date: 2024   Encounter department: [facility]    Assessment & Plan   1. Acute recurrent pancreatitis  Assessment & Plan:  Currently stable.  Has an appointment with pancreatic specialist today.  We will continue to monitor  2. Type 2 diabetes mellitus with stage 3b chronic kidney disease, with long-term current use of insulin (HCC)  Assessment & Plan:    Lab Results   Component Value Date    HGBA1C 6.3 (H) 2024   Under control.    Continue current medication.    We will re-evaluate at next office visit.    3. Stage 3b chronic kidney disease (CKD) ([facility])  Assessment & Plan:  Lab Results   Component Value Date    EGFR 50 2024    EGFR 43 2024    EGFR 45 2024    CREATININE 1.32 (H) 2024    CREATININE 1.50 (H) 2024    CREATININE 1.43 (H) 2024   creatinine and GFR stable   Will need periodic BMP  Avoid NSAIDs like ibuprofen Aleve Advil etc.  Avoid high potassium diet.  We will continue to monitor     4. Mixed hyperlipidemia  Assessment & Plan:  Under reasonable control.  We will continue to monitor  5. Chronic heart failure with preserved ejection fraction (HCC)  Assessment & Plan:  Wt Readings from Last 3 Encounters:   24 109 kg (241 lb)   24 119 kg (261 lb 7.5 oz)   24 107 kg (235 lb)     Under control.    Continue current medication.    Patient has been followed by cardiologist  We will re-evaluate at next office visit.          Orders:  -     torsemide (DEMADEX) 10 mg tablet; Take 1 tablet (10 mg total) by mouth daily  6. HTN (hypertension), benign  Assessment & Plan:  Under control.  Continue lisinopril, metoprolol and torsemide.  We will continue to monitor  7. Obesity, morbid (HCC)  Assessment & Plan:  Patient was advised to lose weight.  Potential consequences of obesity discussed with  patient.  Advised to have portion control no more than 60% of meal or may consider intermittent fasting  We will continue to monitor    8. PAULA (obstructive sleep apnea)  Assessment & Plan:  Under control with CPAP which patient uses every night and patient wakes up refreshed.  Patient does not feel daytime sleepiness or fatigue.  He will continue evaluate every office visit.    9. Coronary artery disease of native artery of native heart with stable angina pectoris (HCC)  Assessment & Plan:  Under control.    Continue current medication.    Patient has been followed by cardiologist  We will re-evaluate at next office visit.    Orders:  -     clopidogrel (PLAVIX) 75 mg tablet; Take 1 tablet (75 mg total) by mouth daily  -     ranolazine (RANEXA) 500 mg 12 hr tablet; Take 1 tablet (500 mg total) by mouth 2 (two) times a day  10. GERD without esophagitis  Assessment & Plan:  Under control.    Continue current medication.    We will re-evaluate at next office visit.    Orders:  -     famotidine (PEPCID) 40 MG tablet; Take 1 tablet (40 mg total) by mouth daily at bedtime  -     pantoprazole (PROTONIX) 40 mg tablet; Take 1 tablet (40 mg total) by mouth daily before breakfast       History of Present Illness     Transitional Care Management Review:   Jake Claros is a 83 y.o. male here for TCM follow up.     During the TCM phone call patient stated:  TCM Call     Date and time call was made  6/25/2024  2:57 PM    Hospital care reviewed  Records reviewed    Patient was hospitialized at  [facility]    Date of Admission  06/24/24    Date of discharge  06/25/24    Diagnosis  Acute on chronic pancreatitis    Disposition  Home    Were the patients medications reviewed and updated  Yes    Current Symptoms  None    Weakness severity  Moderate    Fatigue severity  Moderate      TCM Call     Post hospital issues  None    Scheduled for follow up?  No    Did you obtain your prescribed medications  Yes    Do you need help  "managing your prescriptions or medications  No    Is transportation to your appointment needed  No    I have advised the patient to call PCP with any new or worsening symptoms  Mari Garcia MA        Patient here for his medical care management as patient was hospitalized on June 24, 2024 and discharged on June 25, 2024 with the diagnosis on acute on chronic pancreatitis as patient with chest pain patient's medical records reviewed.  Patient currently feeling okay.  Denies any chest pain, shortness of breath, abdominal pain, nausea, vomiting, fever or chills.  No lightheadedness or dizziness.  No tingling numbness or weakness.  No bowel or bladder problem.  No other new problem.      Review of Systems   Constitutional:  Negative for chills and fever.   HENT:  Negative for congestion, ear pain, postnasal drip and sore throat.    Eyes:  Negative for pain and visual disturbance.   Respiratory:  Negative for cough and shortness of breath.    Cardiovascular:  Negative for chest pain and palpitations.   Gastrointestinal:  Negative for abdominal pain, nausea and vomiting.   Endocrine: Negative for cold intolerance, heat intolerance, polydipsia, polyphagia and polyuria.   Genitourinary:  Negative for difficulty urinating, dysuria, frequency and hematuria.   Musculoskeletal:  Positive for arthralgias (right knee) and gait problem. Negative for back pain.   Skin:  Negative for color change and rash.   Neurological:  Negative for dizziness, seizures, syncope, light-headedness and headaches.   Psychiatric/Behavioral:  Negative for agitation and behavioral problems.    All other systems reviewed and are negative.    Objective     /60 (BP Location: Left arm, Patient Position: Sitting, Cuff Size: Standard)   Pulse 62   Temp 98 °F (36.7 °C) (Tympanic)   Resp 18   Ht 5' 6\" (1.676 m)   Wt 109 kg (241 lb)   SpO2 99%   BMI 38.90 kg/m²     Physical Exam  Vitals and nursing note reviewed.   Constitutional:       General: He " is not in acute distress.     Appearance: Normal appearance. He is well-developed. He is obese. He is not ill-appearing, toxic-appearing or diaphoretic.   HENT:      Head: Normocephalic and atraumatic.      Nose: Nose normal. No congestion or rhinorrhea.      Mouth/Throat:      Mouth: Mucous membranes are moist.      Pharynx: Oropharynx is clear.   Eyes:      General: No scleral icterus.        Right eye: No discharge.         Left eye: No discharge.      Extraocular Movements: Extraocular movements intact.      Conjunctiva/sclera: Conjunctivae normal.      Pupils: Pupils are equal, round, and reactive to light.   Cardiovascular:      Rate and Rhythm: Normal rate and regular rhythm.      Pulses: Normal pulses.      Heart sounds: Normal heart sounds. No murmur heard.  Pulmonary:      Effort: Pulmonary effort is normal. No respiratory distress.      Breath sounds: Normal breath sounds. No wheezing.   Abdominal:      General: Abdomen is flat. Bowel sounds are normal. There is no distension.      Palpations: Abdomen is soft.      Tenderness: There is no abdominal tenderness. There is no right CVA tenderness or left CVA tenderness.   Musculoskeletal:         General: Normal range of motion.      Cervical back: Normal range of motion and neck supple.      Right lower leg: No edema.      Left lower leg: No edema.   Skin:     General: Skin is warm and dry.      Capillary Refill: Capillary refill takes 2 to 3 seconds.      Coloration: Skin is not jaundiced.   Neurological:      General: No focal deficit present.      Mental Status: He is alert and oriented to person, place, and time. Mental status is at baseline.      Motor: No weakness.   Psychiatric:         Mood and Affect: Mood normal.         Behavior: Behavior normal.       Medications have been reviewed by provider in current encounter    Administrative Statements

## 2024-07-02 NOTE — ASSESSMENT & PLAN NOTE
Wt Readings from Last 3 Encounters:   07/02/24 109 kg (241 lb)   06/25/24 119 kg (261 lb 7.5 oz)   05/24/24 107 kg (235 lb)     Under control.    Continue current medication.    Patient has been followed by cardiologist  We will re-evaluate at next office visit.

## 2024-07-02 NOTE — ASSESSMENT & PLAN NOTE
Lab Results   Component Value Date    EGFR 50 06/25/2024    EGFR 43 06/24/2024    EGFR 45 06/07/2024    CREATININE 1.32 (H) 06/25/2024    CREATININE 1.50 (H) 06/24/2024    CREATININE 1.43 (H) 06/07/2024   creatinine and GFR stable   Will need periodic BMP  Avoid NSAIDs like ibuprofen Aleve Advil etc.  Avoid high potassium diet.  We will continue to monitor

## 2024-07-02 NOTE — ASSESSMENT & PLAN NOTE
Currently stable.  Has an appointment with pancreatic specialist today.  We will continue to monitor

## 2024-07-18 DIAGNOSIS — J30.1 SEASONAL ALLERGIC RHINITIS DUE TO POLLEN: ICD-10-CM

## 2024-07-18 NOTE — TELEPHONE ENCOUNTER
Reason for call:   [x] Refill   [] Prior Auth  [] Other:     Office:   [x] PCP/Provider - Autumn  [] Specialty/Provider -     Medication: Fluticasone nasal spray    Dose/Frequency: 2 sprays en daily    Quantity: 48    Pharmacy: [facility] - [place] Olivas - [address] [facility] [phone]     Does the patient have enough for 3 days?   [x] Yes   [] No - Send as HP to POD

## 2024-07-22 ENCOUNTER — TELEPHONE (OUTPATIENT)
Dept: PULMONOLOGY | Facility: CLINIC | Age: 84
End: 2024-07-22

## 2024-07-22 NOTE — TELEPHONE ENCOUNTER
Pt's CPAP is no longer working, has had machine for 5+ years and spoke with [facility] who said he is eligible for a new one.     Asking that we fax most recent sleep study & office notes along with new CPAP script to them at [phone].     Pt is without machine and asking that we expedite if possible.

## 2024-07-23 DIAGNOSIS — G47.33 OSA (OBSTRUCTIVE SLEEP APNEA): Primary | ICD-10-CM

## 2024-07-23 RX ORDER — FLUTICASONE PROPIONATE 50 MCG
2 SPRAY, SUSPENSION (ML) NASAL DAILY
Qty: 48 ML | Refills: 0 | Status: SHIPPED | OUTPATIENT
Start: 2024-07-23

## 2024-07-26 LAB

## 2024-07-29 LAB

## 2024-08-02 DIAGNOSIS — Z96.651 S/P TKR (TOTAL KNEE REPLACEMENT), RIGHT: Primary | ICD-10-CM

## 2024-08-02 DIAGNOSIS — R26.2 AMBULATORY DYSFUNCTION: ICD-10-CM

## 2024-08-13 ENCOUNTER — EVALUATION (OUTPATIENT)
Dept: PHYSICAL THERAPY | Facility: REHABILITATION | Age: 84
End: 2024-08-13
Payer: MEDICARE

## 2024-08-13 DIAGNOSIS — Z96.651 S/P TKR (TOTAL KNEE REPLACEMENT), RIGHT: ICD-10-CM

## 2024-08-13 DIAGNOSIS — R26.2 AMBULATORY DYSFUNCTION: ICD-10-CM

## 2024-08-13 PROCEDURE — 97110 THERAPEUTIC EXERCISES: CPT | Performed by: PHYSICAL THERAPIST

## 2024-08-13 PROCEDURE — 97161 PT EVAL LOW COMPLEX 20 MIN: CPT | Performed by: PHYSICAL THERAPIST

## 2024-08-13 NOTE — PROGRESS NOTES
PT Evaluation     Today's date: 2024  Patient name: Jake Claros  : 1940  MRN: [id number]  Referring provider: Ashwin Leyva MD  Dx:   Encounter Diagnosis     ICD-10-CM    1. S/P TKR (total knee replacement), right  Z96.651 Ambulatory referral to Physical Therapy      2. Ambulatory dysfunction  R26.2 Ambulatory referral to Physical Therapy                     Assessment  Impairments: abnormal muscle firing, abnormal or restricted ROM, activity intolerance, impaired physical strength, lacks appropriate home exercise program and pain with function  Symptom irritability: low    Assessment details: Jake Claros is a 83 y.o. male present with:   S/P TKR (total knee replacement), right  Ambulatory dysfunction    Jake has the above listed impairments and will benefit from skilled Physical Therapist management to improve deficits to return to prior level of function. Jake' range of motion is appropriate for his being 5 months out since total knee arthroplasty, his strength however is lacking with in knee extensor musculature as well as hip musculature. Will benefit from more intense focus on his strength in order to progress him and be able to negotiate steps and ambulate community distances.   Understanding of Dx/Px/POC: good     Prognosis: good    Goals  Impairment Goals  - Decrease pain 0/10  - Increase strength to 5/5 throughout    Functional Goals  - Return to Prior Level of Function  - Increase Functional Status Measure to: 65  - Patient will be independent with HEP  -Patient will be able to return to ambulating community distances without walker  - patient will be able to negotiate steps with reciprocal gait pattern    Plan  Patient would benefit from: skilled PT    Planned therapy interventions: joint mobilization, manual therapy, patient education, postural training, activity modification, abdominal trunk stabilization, body mechanics training, flexibility, functional ROM exercises,  "graded exercise, home exercise program, neuromuscular re-education, strengthening, stretching, therapeutic activities and therapeutic exercise    Frequency: 2x week  Duration in weeks: 8  Plan of Care beginning date: 2024  Plan of Care expiration date: 10/8/2024  Treatment plan discussed with: patient        Subjective Evaluation    History of Present Illness  Date of surgery: 3/20/2024  Mechanism of injury:  Jake Claros is a 83 y.o. male patient presenting with right knee pain s/p total knee arthroplasty  performed by Dr. Malik on 3/20/24.  Jake currently having difficulty with strength, notes \"no power, especially going down hill\" .  Pain is relieved or reduced with rest.  Pt would like to return to being able to walking without pain, going up and down his steps.          Objective     Palpation     Right   Hypertonic in the lateral gastrocnemius and medial gastrocnemius.   Tenderness of the lateral gastrocnemius and medial gastrocnemius.     Active Range of Motion     Right Knee   Flexion: 110 degrees with pain  Extension: 0 degrees   Extensor la degrees     Passive Range of Motion     Right Knee   Flexion: 115 degrees with pain    Strength/Myotome Testing     Right Knee   Flexion: 4+  Extension: 4  Quadriceps contraction: good                  Precautions: TKA 3/20/24, HTN, HLD, DM    Daily Treatment Diary    Manual 2024                                           Thera-Ex           Bike (endurance) nv          Quad set           SLR flx* 2x8          SLR abd* (slight knee flx) 2x8          Clams           Bridges 12x easy                     ROM           Heel slides w straps           Heel slides in chair w opp foot            Supine ext wt hangs                      Flexibility           HS stretch           Standing Gastroc stretch 5x20s          Soleus Stretch           Quad stretch                      Thera-Activity           Heel raises nv          Leg Press           Band walks       "     Step ups (involved LE up, uninvolved down) nv          Lateral Step overs nv          Step downs           Step overs           Wall slides           STS from chair * 3x8                     Goblet squats                      Neuro RE-ed           FTEO           Tandem           SL           Skaters           Sharkies                      * = on hep

## 2024-08-19 ENCOUNTER — OFFICE VISIT (OUTPATIENT)
Dept: PHYSICAL THERAPY | Facility: REHABILITATION | Age: 84
End: 2024-08-19
Payer: MEDICARE

## 2024-08-19 DIAGNOSIS — R26.2 AMBULATORY DYSFUNCTION: ICD-10-CM

## 2024-08-19 DIAGNOSIS — Z96.651 S/P TKR (TOTAL KNEE REPLACEMENT), RIGHT: Primary | ICD-10-CM

## 2024-08-19 PROCEDURE — 97110 THERAPEUTIC EXERCISES: CPT | Performed by: PHYSICAL THERAPIST

## 2024-08-19 PROCEDURE — 97530 THERAPEUTIC ACTIVITIES: CPT | Performed by: PHYSICAL THERAPIST

## 2024-08-19 NOTE — PROGRESS NOTES
"Daily Note     Today's date: 2024  Patient name: Jake Claros  : 1940  MRN: [id number]  Referring provider: Ashwin Leyva MD  Dx:   Encounter Diagnosis     ICD-10-CM    1. S/P TKR (total knee replacement), right  Z96.651       2. Ambulatory dysfunction  R26.2           Start Time: 1146  Stop Time: 1226  Total time in clinic (min): 40 minutes    Subjective: Jake reports that his thigh muscles were sore after performing HEP, but feels okay today.       Objective: See treatment diary below      Assessment: Tolerated treatment well. Patient demonstrated fatigue post treatment, exhibited good technique with therapeutic exercises, and would benefit from continued PT.  Cues for proper form and avoidance of using upper extremities as able during closed chain exercises required. Will continue to progress able.       Plan: Continue per plan of care.              Precautions: TKA 3/20/24, HTN, HLD, DM    Daily Treatment Diary    Manual 2024                                          Thera-Ex           Bike (endurance) nv Lvl 0 5'         Quad set           SLR flx* 2x8 2x8         SLR abd* (slight knee flx) 2x8 2x8         Clams           Bridges 12x easy                     ROM           Heel slides w straps           Heel slides in chair w opp foot            Supine ext wt hangs                      Flexibility           HS stretch           Standing Gastroc stretch 5x20s 3x30s         Soleus Stretch           Quad stretch                      Thera-Activity           Heel raises nv 3x10         Leg Press           Band walks           Step up w march nv 2x8 6\"         Lateral Step overs nv 2x10 6\"         Step downs           Step overs           Wall slides           STS from chair * 3x8 3x8                    Goblet squats                      Neuro RE-ed           FTEO           Tandem           SL           Skaters           Sharkies                      * = on hep            "

## 2024-08-22 ENCOUNTER — APPOINTMENT (OUTPATIENT)
Dept: PHYSICAL THERAPY | Facility: REHABILITATION | Age: 84
End: 2024-08-22
Payer: MEDICARE

## 2024-08-26 ENCOUNTER — OFFICE VISIT (OUTPATIENT)
Dept: PHYSICAL THERAPY | Facility: REHABILITATION | Age: 84
End: 2024-08-26
Payer: MEDICARE

## 2024-08-26 DIAGNOSIS — Z96.651 S/P TKR (TOTAL KNEE REPLACEMENT), RIGHT: Primary | ICD-10-CM

## 2024-08-26 DIAGNOSIS — R26.2 AMBULATORY DYSFUNCTION: ICD-10-CM

## 2024-08-26 PROCEDURE — 97110 THERAPEUTIC EXERCISES: CPT | Performed by: PHYSICAL THERAPIST

## 2024-08-26 PROCEDURE — 97530 THERAPEUTIC ACTIVITIES: CPT | Performed by: PHYSICAL THERAPIST

## 2024-08-26 NOTE — PROGRESS NOTES
"Daily Note     Today's date: 2024  Patient name: Jake Claros  : 1940  MRN: [id number]  Referring provider: Ashwin Leyva MD  Dx:   Encounter Diagnosis     ICD-10-CM    1. S/P TKR (total knee replacement), right  Z96.651       2. Ambulatory dysfunction  R26.2           Start Time: 1018  Stop Time: 1110  Total time in clinic (min): 52 minutes    Subjective: Jake reports that his knee is okay, notes pain and stiffness today. Missed last week due to acute pancreatitis flair ups.       Objective: See treatment diary below      Assessment: Tolerated treatment well. Patient demonstrated fatigue post treatment, exhibited good technique with therapeutic exercises, and would benefit from continued PT.  Needed increased rest breaks today but tolerated all exercises well.       Plan: Continue per plan of care.         Precautions: TKA 3/20/24, HTN, HLD, DM    Daily Treatment Diary    Manual 2024                                         Thera-Ex           Bike (endurance) nv Lvl 0 5' Lvl 0 5'        Quad set           SLR flx* 2x8 2x8 3x8        SLR abd* (slight knee flx) 2x8 2x8 3x8        Clams           Bridges 12x easy                     ROM           Heel slides w straps           Heel slides in chair w opp foot            Supine ext wt hangs                      Flexibility           HS stretch           Standing Gastroc stretch 5x20s 3x30s 3x30s        Soleus Stretch           Quad stretch                      Thera-Activity           Heel raises nv 3x10 3x10        Leg Press           Band walks           Step up w march nv 2x8 6\" 2x10 6\"        Lateral Step overs nv 2x10 6\" 2x10 6\"        Step negotiations at stairs   nv        Step overs           Wall slides           STS from chair * 3x8 3x8 3x8                   Goblet squats                      Neuro RE-ed           FTEO           Tandem           SL           Skaters           Sharkies                      * = on " hep

## 2024-08-29 ENCOUNTER — OFFICE VISIT (OUTPATIENT)
Dept: PHYSICAL THERAPY | Facility: REHABILITATION | Age: 84
End: 2024-08-29
Payer: MEDICARE

## 2024-08-29 DIAGNOSIS — Z96.651 S/P TKR (TOTAL KNEE REPLACEMENT), RIGHT: Primary | ICD-10-CM

## 2024-08-29 DIAGNOSIS — R26.2 AMBULATORY DYSFUNCTION: ICD-10-CM

## 2024-08-29 PROCEDURE — 97530 THERAPEUTIC ACTIVITIES: CPT | Performed by: PHYSICAL THERAPIST

## 2024-08-29 PROCEDURE — 97110 THERAPEUTIC EXERCISES: CPT | Performed by: PHYSICAL THERAPIST

## 2024-08-29 NOTE — PROGRESS NOTES
"Daily Note     Today's date: 2024  Patient name: Jake Claros  : 1940  MRN: [id number]  Referring provider: Ashwin Leyva MD  Dx:   Encounter Diagnosis     ICD-10-CM    1. S/P TKR (total knee replacement), right  Z96.651       2. Ambulatory dysfunction  R26.2                      Subjective: patient offers no new complaints. No issues after his previous session.      Objective: See treatment diary below      Assessment: Tolerated treatment well. Continued with program as outlined below. Requires short rest breaks throughout session. Patient exhibited good technique with therapeutic exercises and would benefit from continued PT      Plan: Continue per plan of care.      Precautions: TKA 3/20/24, HTN, HLD, DM    Daily Treatment Diary    Manual 2024                                        Thera-Ex           Bike (endurance) nv Lvl 0 5' Lvl 0 5' Lvl 0 5'       Quad set           SLR flx* 2x8 2x8 3x8 3x8       SLR abd* (slight knee flx) 2x8 2x8 3x8 3x8       Clams           Bridges 12x easy                     ROM           Heel slides w straps           Heel slides in chair w opp foot            Supine ext wt hangs                      Flexibility           HS stretch           Standing Gastroc stretch 5x20s 3x30s 3x30s 3x30s       Soleus Stretch           Quad stretch                      Thera-Activity           Heel raises nv 3x10 3x10 3x10       Leg Press           Band walks           Step up w march nv 2x8 6\" 2x10 6\" 2x10 6\"       Lateral Step overs nv 2x10 6\" 2x10 6\" 2x10 6\"       Step negotiations at stairs   nv 5x 6\" side       Step overs           Wall slides           STS from chair * 3x8 3x8 3x8 3x8                  Goblet squats                      Neuro RE-ed           FTEO           Tandem           SL           Skaters           Sharkies                      * = on hep                 "

## 2024-09-03 ENCOUNTER — OFFICE VISIT (OUTPATIENT)
Dept: PHYSICAL THERAPY | Facility: REHABILITATION | Age: 84
End: 2024-09-03
Payer: MEDICARE

## 2024-09-03 DIAGNOSIS — Z96.651 S/P TKR (TOTAL KNEE REPLACEMENT), RIGHT: Primary | ICD-10-CM

## 2024-09-03 DIAGNOSIS — R26.2 AMBULATORY DYSFUNCTION: ICD-10-CM

## 2024-09-03 PROCEDURE — 97110 THERAPEUTIC EXERCISES: CPT

## 2024-09-03 PROCEDURE — 97530 THERAPEUTIC ACTIVITIES: CPT

## 2024-09-03 NOTE — PROGRESS NOTES
"Daily Note     Today's date: 9/3/2024  Patient name: Jake Claros  : 1940  MRN: [id number]  Referring provider: Ashwin Leyva MD  Dx:   Encounter Diagnosis     ICD-10-CM    1. S/P TKR (total knee replacement), right  Z96.651       2. Ambulatory dysfunction  R26.2           Start Time: 1015  Stop Time: 1055  Total time in clinic (min): 40 minutes    Subjective: Reports that he is doing okay. His weekend was okay. When he overuses his knee it can be painful. When he walks for too long of periods of time he notices that it can have the tendency to buckle on him.       Objective: See treatment diary below      Assessment: Tolerated treatment well. Continued with primary therapist POC. Patient doing well, continued with emphasis on improvement of quad motor control, strength, and endurance. Able to accommodate some progressions in overall volume during today's session well with no increase in pain. Patient demonstrated fatigue post treatment, exhibited good technique with therapeutic exercises, and would benefit from continued PT. Scratched an old wound in waiting room, required bandaging - denied any allergy to adhesives.       Plan: Continue per plan of care.  Progress treatment as tolerated.       Precautions: TKA 3/20/24, HTN, HLD, DM    Daily Treatment Diary    Manual 2024 8/19 8/26 8/29 9/3                                        Thera-Ex           Bike (endurance) nv Lvl 0 5' Lvl 0 5' Lvl 0 5' Lvl 0 5'      Quad set           LAQ     2x10 #4 3\" iso and ecc       SLR flx* 2x8 2x8 3x8 3x8 3x10 ea       SLR abd* (slight knee flx) 2x8 2x8 3x8 3x8 3x10 ea       Clams           Bridges 12x easy                     ROM           Heel slides w straps           Heel slides in chair w opp foot            Supine ext wt hangs                      Flexibility           HS stretch           Standing Gastroc stretch 5x20s 3x30s 3x30s 3x30s Nv       Soleus Stretch           Quad stretch                    " "  Thera-Activity           Heel raises nv 3x10 3x10 3x10 3x10      Leg Press           Band walks           Step up w march nv 2x8 6\" 2x10 6\" 2x10 6\" 2x10 6'\"       Lateral Step overs nv 2x10 6\" 2x10 6\" 2x10 6\" 2x10 6\"       Step negotiations at stairs   nv 5x 6\" side 5x 6\" side      Step overs           Wall slides           STS from chair * 3x8 3x8 3x8 3x8 3x10                  Goblet squats                      Neuro RE-ed           FTEO           Tandem           SL           Skaters           Sharkies                      * = on hep                   "

## 2024-09-05 ENCOUNTER — APPOINTMENT (OUTPATIENT)
Dept: PHYSICAL THERAPY | Facility: REHABILITATION | Age: 84
End: 2024-09-05
Payer: MEDICARE

## 2024-09-09 ENCOUNTER — APPOINTMENT (OUTPATIENT)
Dept: PHYSICAL THERAPY | Facility: REHABILITATION | Age: 84
End: 2024-09-09
Payer: MEDICARE

## 2024-09-11 ENCOUNTER — APPOINTMENT (OUTPATIENT)
Dept: PHYSICAL THERAPY | Facility: REHABILITATION | Age: 84
End: 2024-09-11
Payer: MEDICARE

## 2024-09-12 ENCOUNTER — OFFICE VISIT (OUTPATIENT)
Age: 84
End: 2024-09-12
Payer: MEDICARE

## 2024-09-12 VITALS
HEIGHT: 66 IN | RESPIRATION RATE: 16 BRPM | SYSTOLIC BLOOD PRESSURE: 140 MMHG | HEART RATE: 66 BPM | WEIGHT: 246.8 LBS | TEMPERATURE: 97.7 F | BODY MASS INDEX: 39.66 KG/M2 | DIASTOLIC BLOOD PRESSURE: 60 MMHG | OXYGEN SATURATION: 97 %

## 2024-09-12 DIAGNOSIS — K21.9 GERD WITHOUT ESOPHAGITIS: ICD-10-CM

## 2024-09-12 DIAGNOSIS — I10 HTN (HYPERTENSION), BENIGN: ICD-10-CM

## 2024-09-12 DIAGNOSIS — I10 ESSENTIAL (PRIMARY) HYPERTENSION: ICD-10-CM

## 2024-09-12 DIAGNOSIS — E78.2 MIXED HYPERLIPIDEMIA: ICD-10-CM

## 2024-09-12 DIAGNOSIS — Z79.4 TYPE 2 DIABETES MELLITUS WITH STAGE 3B CHRONIC KIDNEY DISEASE, WITH LONG-TERM CURRENT USE OF INSULIN (HCC): Primary | ICD-10-CM

## 2024-09-12 DIAGNOSIS — E11.22 TYPE 2 DIABETES MELLITUS WITH STAGE 3B CHRONIC KIDNEY DISEASE, WITH LONG-TERM CURRENT USE OF INSULIN (HCC): Primary | ICD-10-CM

## 2024-09-12 DIAGNOSIS — N18.32 TYPE 2 DIABETES MELLITUS WITH STAGE 3B CHRONIC KIDNEY DISEASE, WITH LONG-TERM CURRENT USE OF INSULIN (HCC): Primary | ICD-10-CM

## 2024-09-12 DIAGNOSIS — Z87.39 HISTORY OF GOUT: ICD-10-CM

## 2024-09-12 DIAGNOSIS — N18.32 STAGE 3B CHRONIC KIDNEY DISEASE (CKD) (HCC): ICD-10-CM

## 2024-09-12 DIAGNOSIS — Z00.00 MEDICARE ANNUAL WELLNESS VISIT, SUBSEQUENT: ICD-10-CM

## 2024-09-12 PROCEDURE — 99214 OFFICE O/P EST MOD 30 MIN: CPT

## 2024-09-12 PROCEDURE — G0439 PPPS, SUBSEQ VISIT: HCPCS

## 2024-09-12 RX ORDER — AMLODIPINE BESYLATE 5 MG/1
5 TABLET ORAL DAILY
COMMUNITY

## 2024-09-12 RX ORDER — ROSUVASTATIN CALCIUM 20 MG/1
20 TABLET, COATED ORAL
Qty: 90 TABLET | Refills: 2 | Status: SHIPPED | OUTPATIENT
Start: 2024-09-12

## 2024-09-12 RX ORDER — LISINOPRIL 20 MG/1
20 TABLET ORAL DAILY
Qty: 90 TABLET | Refills: 1 | Status: SHIPPED | OUTPATIENT
Start: 2024-09-12

## 2024-09-12 RX ORDER — FAMOTIDINE 40 MG/1
40 TABLET, FILM COATED ORAL
Qty: 90 TABLET | Refills: 1 | Status: SHIPPED | OUTPATIENT
Start: 2024-09-12

## 2024-09-12 RX ORDER — ALLOPURINOL 100 MG/1
100 TABLET ORAL DAILY
Qty: 90 TABLET | Refills: 2 | Status: SHIPPED | OUTPATIENT
Start: 2024-09-12

## 2024-09-12 NOTE — ASSESSMENT & PLAN NOTE
Under reasonable control.  We will continue to monitor    Orders:    rosuvastatin (CRESTOR) 20 MG tablet; Take 1 tablet (20 mg total) by mouth daily at bedtime

## 2024-09-12 NOTE — PATIENT INSTRUCTIONS
Medicare Preventive Visit Patient Instructions  Thank you for completing your Welcome to Medicare Visit or Medicare Annual Wellness Visit today. Your next wellness visit will be due in one year (9/13/2025).  The screening/preventive services that you may require over the next 5-10 years are detailed below. Some tests may not apply to you based off risk factors and/or age. Screening tests ordered at today's visit but not completed yet may show as past due. Also, please note that scanned in results may not display below.  Preventive Screenings:  Service Recommendations Previous Testing/Comments   Colorectal Cancer Screening  Colonoscopy    Fecal Occult Blood Test (FOBT)/Fecal Immunochemical Test (FIT)  Fecal DNA/Cologuard Test  Flexible Sigmoidoscopy Age: 45-75 years old   Colonoscopy: every 10 years (May be performed more frequently if at higher risk)  OR  FOBT/FIT: every 1 year  OR  Cologuard: every 3 years  OR  Sigmoidoscopy: every 5 years  Screening may be recommended earlier than age 45 if at higher risk for colorectal cancer. Also, an individualized decision between you and your healthcare provider will decide whether screening between the ages of 76-85 would be appropriate. Colonoscopy: 05/13/2022  FOBT/FIT: Not on file  Cologuard: Not on file  Sigmoidoscopy: Not on file          Prostate Cancer Screening Individualized decision between patient and health care provider in men between ages of 55-69   Medicare will cover every 12 months beginning on the day after your 50th birthday PSA: No results in last 5 years           Hepatitis C Screening Once for adults born between 1945 and 1965  More frequently in patients at high risk for Hepatitis C Hep C Antibody: Not on file        Diabetes Screening 1-2 times per year if you're at risk for diabetes or have pre-diabetes Fasting glucose: 96 mg/dL (7/24/2023)  A1C: 6.0 % (8/26/2024)      Cholesterol Screening Once every 5 years if you don't have a lipid disorder. May  order more often based on risk factors. Lipid panel: 08/26/2024         Other Preventive Screenings Covered by Medicare:  Abdominal Aortic Aneurysm (AAA) Screening: covered once if your at risk. You're considered to be at risk if you have a family history of AAA or a male between the age of 65-75 who smoking at least 100 cigarettes in your lifetime.  Lung Cancer Screening: covers low dose CT scan once per year if you meet all of the following conditions: (1) Age 55-77; (2) No signs or symptoms of lung cancer; (3) Current smoker or have quit smoking within the last 15 years; (4) You have a tobacco smoking history of at least 20 pack years (packs per day x number of years you smoked); (5) You get a written order from a healthcare provider.  Glaucoma Screening: covered annually if you're considered high risk: (1) You have diabetes OR (2) Family history of glaucoma OR (3)  aged 50 and older OR (4)  American aged 65 and older  Osteoporosis Screening: covered every 2 years if you meet one of the following conditions: (1) Have a vertebral abnormality; (2) On glucocorticoid therapy for more than 3 months; (3) Have primary hyperparathyroidism; (4) On osteoporosis medications and need to assess response to drug therapy.  HIV Screening: covered annually if you're between the age of 15-65. Also covered annually if you are younger than 15 and older than 65 with risk factors for HIV infection. For pregnant patients, it is covered up to 3 times per pregnancy.    Immunizations:  Immunization Recommendations   Influenza Vaccine Annual influenza vaccination during flu season is recommended for all persons aged >= 6 months who do not have contraindications   Pneumococcal Vaccine   * Pneumococcal conjugate vaccine = PCV13 (Prevnar 13), PCV15 (Vaxneuvance), PCV20 (Prevnar 20)  * Pneumococcal polysaccharide vaccine = PPSV23 (Pneumovax) Adults 19-65 yo with certain risk factors or if 65+ yo  If never received any  pneumonia vaccine: recommend Prevnar 20 (PCV20)  Give PCV20 if previously received 1 dose of PCV13 or PPSV23   Hepatitis B Vaccine 3 dose series if at intermediate or high risk (ex: diabetes, end stage renal disease, liver disease)   Respiratory syncytial virus (RSV) Vaccine - COVERED BY MEDICARE PART D  * RSVPreF3 (Arexvy) CDC recommends that adults 60 years of age and older may receive a single dose of RSV vaccine using shared clinical decision-making (SCDM)   Tetanus (Td) Vaccine - COST NOT COVERED BY MEDICARE PART B Following completion of primary series, a booster dose should be given every 10 years to maintain immunity against tetanus. Td may also be given as tetanus wound prophylaxis.   Tdap Vaccine - COST NOT COVERED BY MEDICARE PART B Recommended at least once for all adults. For pregnant patients, recommended with each pregnancy.   Shingles Vaccine (Shingrix) - COST NOT COVERED BY MEDICARE PART B  2 shot series recommended in those 19 years and older who have or will have weakened immune systems or those 50 years and older     Health Maintenance Due:  There are no preventive care reminders to display for this patient.  Immunizations Due:      Topic Date Due   • COVID-19 Vaccine (5 - 2023-24 season) 09/01/2024   • Influenza Vaccine (1) 09/01/2024     Advance Directives   What are advance directives?  Advance directives are legal documents that state your wishes and plans for medical care. These plans are made ahead of time in case you lose your ability to make decisions for yourself. Advance directives can apply to any medical decision, such as the treatments you want, and if you want to donate organs.   What are the types of advance directives?  There are many types of advance directives, and each state has rules about how to use them. You may choose a combination of any of the following:  Living will:  This is a written record of the treatment you want. You can also choose which treatments you do not  want, which to limit, and which to stop at a certain time. This includes surgery, medicine, IV fluid, and tube feedings.   Durable power of  for healthcare (DPAHC):  This is a written record that states who you want to make healthcare choices for you when you are unable to make them for yourself. This person, called a proxy, is usually a family member or a friend. You may choose more than 1 proxy.  Do not resuscitate (DNR) order:  A DNR order is used in case your heart stops beating or you stop breathing. It is a request not to have certain forms of treatment, such as CPR. A DNR order may be included in other types of advance directives.  Medical directive:  This covers the care that you want if you are in a coma, near death, or unable to make decisions for yourself. You can list the treatments you want for each condition. Treatment may include pain medicine, surgery, blood transfusions, dialysis, IV or tube feedings, and a ventilator (breathing machine).  Values history:  This document has questions about your views, beliefs, and how you feel and think about life. This information can help others choose the care that you would choose.  Why are advance directives important?  An advance directive helps you control your care. Although spoken wishes may be used, it is better to have your wishes written down. Spoken wishes can be misunderstood, or not followed. Treatments may be given even if you do not want them. An advance directive may make it easier for your family to make difficult choices about your care.   Weight Management   Why it is important to manage your weight:  Being overweight increases your risk of health conditions such as heart disease, high blood pressure, type 2 diabetes, and certain types of cancer. It can also increase your risk for osteoarthritis, sleep apnea, and other respiratory problems. Aim for a slow, steady weight loss. Even a small amount of weight loss can lower your risk of  health problems.  How to lose weight safely:  A safe and healthy way to lose weight is to eat fewer calories and get regular exercise. You can lose up about 1 pound a week by decreasing the number of calories you eat by 500 calories each day.   Healthy meal plan for weight management:  A healthy meal plan includes a variety of foods, contains fewer calories, and helps you stay healthy. A healthy meal plan includes the following:  Eat whole-grain foods more often.  A healthy meal plan should contain fiber. Fiber is the part of grains, fruits, and vegetables that is not broken down by your body. Whole-grain foods are healthy and provide extra fiber in your diet. Some examples of whole-grain foods are whole-wheat breads and pastas, oatmeal, brown rice, and bulgur.  Eat a variety of vegetables every day.  Include dark, leafy greens such as spinach, kale, florentino greens, and mustard greens. Eat yellow and orange vegetables such as carrots, sweet potatoes, and winter squash.   Eat a variety of fruits every day.  Choose fresh or canned fruit (canned in its own juice or light syrup) instead of juice. Fruit juice has very little or no fiber.  Eat low-fat dairy foods.  Drink fat-free (skim) milk or 1% milk. Eat fat-free yogurt and low-fat cottage cheese. Try low-fat cheeses such as mozzarella and other reduced-fat cheeses.  Choose meat and other protein foods that are low in fat.  Choose beans or other legumes such as split peas or lentils. Choose fish, skinless poultry (chicken or turkey), or lean cuts of red meat (beef or pork). Before you cook meat or poultry, cut off any visible fat.   Use less fat and oil.  Try baking foods instead of frying them. Add less fat, such as margarine, sour cream, regular salad dressing and mayonnaise to foods. Eat fewer high-fat foods. Some examples of high-fat foods include french fries, doughnuts, ice cream, and cakes.  Eat fewer sweets.  Limit foods and drinks that are high in sugar. This  includes candy, cookies, regular soda, and sweetened drinks.  Exercise:  Exercise at least 30 minutes per day on most days of the week. Some examples of exercise include walking, biking, dancing, and swimming. You can also fit in more physical activity by taking the stairs instead of the elevator or parking farther away from stores. Ask your healthcare provider about the best exercise plan for you.      © Copyright [employer] 2018 Information is for End User's use only and may not be sold, redistributed or otherwise used for commercial purposes. All illustrations and images included in CareNotes® are the copyrighted property of [employer] or [employer]

## 2024-09-12 NOTE — ASSESSMENT & PLAN NOTE
Orders:    famotidine (PEPCID) 40 MG tablet; Take 1 tablet (40 mg total) by mouth daily at bedtime

## 2024-09-12 NOTE — ASSESSMENT & PLAN NOTE
Lab Results   Component Value Date    HGBA1C 6.0 (H) 08/26/2024   Under control.    Continue current medication.    We will re-evaluate at next office visit.

## 2024-09-12 NOTE — PROGRESS NOTES
Ambulatory Visit  Name: Jake Claros      : 1940      MRN: [id number]  Encounter Provider: Ashwin Leyva MD  Encounter Date: 2024   Encounter department: [facility] PRIMARY CARE    Assessment & Plan  Type 2 diabetes mellitus with stage 3b chronic kidney disease, with long-term current use of insulin (HCC)    Lab Results   Component Value Date    HGBA1C 6.0 (H) 2024   Under control.    Continue current medication.    We will re-evaluate at next office visit.           HTN (hypertension), benign  Under control.  Continue lisinopril, metoprolol and torsemide.  We will continue to monitor         Mixed hyperlipidemia  Under reasonable control.  We will continue to monitor    Orders:    rosuvastatin (CRESTOR) 20 MG tablet; Take 1 tablet (20 mg total) by mouth daily at bedtime    Stage 3b chronic kidney disease (CKD) ([facility])  Lab Results   Component Value Date    EGFR 45 (L) 2024    EGFR 44 (L) 2024    EGFR 49 (L) 2024    CREATININE 1.52 (H) 2024    CREATININE 1.57 (H) 2024    CREATININE 1.43 (H) 2024   creatinine and GFR stable   Will need periodic BMP  Avoid NSAIDs like ibuprofen Aleve Advil etc.  Avoid high potassium diet.  We will continue to monitor            Essential (primary) hypertension    Orders:    lisinopril (ZESTRIL) 20 mg tablet; Take 1 tablet (20 mg total) by mouth daily    GERD without esophagitis    Orders:    famotidine (PEPCID) 40 MG tablet; Take 1 tablet (40 mg total) by mouth daily at bedtime    History of gout    Orders:    allopurinol (ZYLOPRIM) 100 mg tablet; Take 1 tablet (100 mg total) by mouth daily    Medicare annual wellness visit, subsequent            Preventive health issues were discussed with patient, and age appropriate screening tests were ordered as noted in patient's After Visit Summary. Personalized health advice and appropriate referrals for health education or preventive services given if needed, as noted in  patient's After Visit Summary.    History of Present Illness     Patient here for review of chronic medical problems and  the labs and imaging if it is applicable.  Currently has no specific complaints other than mentioned in the review of systems  Denies chest pain, SOB, cough, abdominal pain, nausea, vomiting, fever, chills, lightheadedness, dizziness,headache, tingling or numbness.No bowel or bladder problem.         Patient Care Team:  Ashwin Leyva MD as PCP - General (Internal Medicine)    Review of Systems   Constitutional:  Negative for chills and fever.   HENT:  Negative for congestion, ear pain, postnasal drip and sore throat.    Eyes:  Negative for pain and visual disturbance.   Respiratory:  Negative for cough and shortness of breath.    Cardiovascular:  Negative for chest pain and palpitations.   Gastrointestinal:  Negative for abdominal pain, nausea and vomiting.   Endocrine: Negative for cold intolerance, heat intolerance, polydipsia, polyphagia and polyuria.   Genitourinary:  Negative for difficulty urinating, dysuria, frequency and hematuria.   Musculoskeletal:  Positive for arthralgias (right knee) and gait problem. Negative for back pain.   Skin:  Negative for color change and rash.   Neurological:  Negative for dizziness, seizures, syncope, light-headedness and headaches.   Psychiatric/Behavioral:  Negative for agitation and behavioral problems.    All other systems reviewed and are negative.    Medical History Reviewed by provider this encounter:  Tobacco  Allergies  Meds  Problems  Med Hx  Surg Hx  Fam Hx       Annual Wellness Visit Questionnaire   Jake is here for his Subsequent Wellness visit.     Health Risk Assessment:   Patient rates overall health as fair. Patient feels that their physical health rating is slightly worse. Patient is very satisfied with their life. Eyesight was rated as slightly worse. Hearing was rated as slightly worse. Patient feels that their emotional and  mental health rating is much better. Patients states they are sometimes angry. Patient states they are sometimes unusually tired/fatigued. Pain experienced in the last 7 days has been some. Patient's pain rating has been 8/10. Patient states that he has experienced no weight loss or gain in last 6 months.     Fall Risk Screening:   In the past year, patient has experienced: no history of falling in past year      Home Safety:  Patient does not have trouble with stairs inside or outside of their home. Patient has working smoke alarms and has working carbon monoxide detector. Home safety hazards include: none.     Nutrition:   Current diet is Diabetic and No Added Salt.     Medications:   Patient is currently taking over-the-counter supplements. OTC medications include: see medication list. Patient is able to manage medications.     Activities of Daily Living (ADLs)/Instrumental Activities of Daily Living (IADLs):   Walk and transfer into and out of bed and chair?: Yes  Dress and groom yourself?: Yes    Bathe or shower yourself?: Yes    Feed yourself? Yes  Do your laundry/housekeeping?: Yes  Manage your money, pay your bills and track your expenses?: Yes  Make your own meals?: Yes    Do your own shopping?: Yes    Durable Medical Equipment Suppliers  [employer]    Previous Hospitalizations:   Any hospitalizations or ED visits within the last 12 months?: Yes    How many hospitalizations have you had in the last year?: more than 4    Advance Care Planning:   Living will: Yes    Durable POA for healthcare: No    Advanced directive: Yes      Cognitive Screening:   Provider or family/friend/caregiver concerned regarding cognition?: No    PREVENTIVE SCREENINGS      Cardiovascular Screening:    General: Screening Not Indicated and History Lipid Disorder      Diabetes Screening:     General: Screening Not Indicated and History Diabetes      Prostate Cancer Screening:    General: Screening Not Indicated      Abdominal Aortic  Aneurysm (AAA) Screening:    Risk factors include: tobacco use        Lung Cancer Screening:     General: Screening Not Indicated    Screening, Brief Intervention, and Referral to Treatment (SBIRT)    Screening  Typical number of drinks in a day: 0  Typical number of drinks in a week: 0  Interpretation: Low risk drinking behavior.    AUDIT-C Screenin) How often did you have a drink containing alcohol in the past year? never  2) How many drinks did you have on a typical day when you were drinking in the past year? 1 to 2  3) How often did you have 6 or more drinks on one occasion in the past year? never    AUDIT-C Score: 0  Interpretation: Score 0-3 (male): Negative screen for alcohol misuse    Single Item Drug Screening:  How often have you used an illegal drug (including marijuana) or a prescription medication for non-medical reasons in the past year? never    Single Item Drug Screen Score: 0  Interpretation: Negative screen for possible drug use disorder    Social Determinants of Health     Financial Resource Strain: Low Risk  (2024)    Received from [facility]    Overall Financial Resource Strain (CARDIA)     Difficulty of Paying Living Expenses: Not hard at all   Food Insecurity: No Food Insecurity (2024)    Hunger Vital Sign     Worried About Running Out of Food in the Last Year: Never true     Ran Out of Food in the Last Year: Never true   Transportation Needs: No Transportation Needs (2024)    PRAPARE - Transportation     Lack of Transportation (Medical): No     Lack of Transportation (Non-Medical): No   Housing Stability: Unknown (2024)    Housing Stability Vital Sign     Unable to Pay for Housing in the Last Year: No     Homeless in the Last Year: No   Utilities: Not At Risk (2024)    [facility] Utilities     Threatened with loss of utilities: No     No results found.    Objective     /60 (BP Location: Left arm, Patient Position: Sitting, Cuff Size: Large)   Pulse  "66   Temp 97.7 °F (36.5 °C) (Tympanic)   Resp 16   Ht 5' 6\" (1.676 m)   Wt 112 kg (246 lb 12.8 oz)   SpO2 97%   BMI 39.83 kg/m²     Physical Exam  Vitals and nursing note reviewed.   Constitutional:       General: He is not in acute distress.     Appearance: Normal appearance. He is well-developed. He is obese. He is not ill-appearing, toxic-appearing or diaphoretic.   HENT:      Head: Normocephalic and atraumatic.      Right Ear: Tympanic membrane, ear canal and external ear normal. There is no impacted cerumen.      Left Ear: Tympanic membrane, ear canal and external ear normal. There is no impacted cerumen.      Nose: Nose normal. No congestion or rhinorrhea.      Mouth/Throat:      Mouth: Mucous membranes are moist.      Pharynx: Oropharynx is clear.   Eyes:      General: No scleral icterus.        Right eye: No discharge.         Left eye: No discharge.      Extraocular Movements: Extraocular movements intact.      Conjunctiva/sclera: Conjunctivae normal.      Pupils: Pupils are equal, round, and reactive to light.   Cardiovascular:      Rate and Rhythm: Normal rate and regular rhythm.      Pulses: Normal pulses.      Heart sounds: Normal heart sounds. No murmur heard.     No gallop.   Pulmonary:      Effort: Pulmonary effort is normal. No respiratory distress.      Breath sounds: Normal breath sounds. No wheezing or rales.   Abdominal:      General: Abdomen is flat. Bowel sounds are normal. There is no distension.      Palpations: Abdomen is soft.      Tenderness: There is no abdominal tenderness. There is no right CVA tenderness, left CVA tenderness or guarding.   Musculoskeletal:         General: Normal range of motion.      Cervical back: Normal range of motion and neck supple.      Right lower leg: Edema (Minimal) present.      Left lower leg: Edema (Minimal) present.   Skin:     General: Skin is warm and dry.      Capillary Refill: Capillary refill takes 2 to 3 seconds.      Coloration: Skin is not " jaundiced or pale.   Neurological:      General: No focal deficit present.      Mental Status: He is alert and oriented to person, place, and time. Mental status is at baseline.      Motor: No weakness.   Psychiatric:         Mood and Affect: Mood normal.         Behavior: Behavior normal.

## 2024-09-12 NOTE — ASSESSMENT & PLAN NOTE
Lab Results   Component Value Date    EGFR 45 (L) 09/07/2024    EGFR 44 (L) 09/06/2024    EGFR 49 (L) 09/05/2024    CREATININE 1.52 (H) 09/07/2024    CREATININE 1.57 (H) 09/06/2024    CREATININE 1.43 (H) 09/05/2024   creatinine and GFR stable   Will need periodic BMP  Avoid NSAIDs like ibuprofen Aleve Advil etc.  Avoid high potassium diet.  We will continue to monitor

## 2024-09-12 NOTE — ASSESSMENT & PLAN NOTE
Under control.  Continue lisinopril, metoprolol and torsemide.  We will continue to monitor

## 2024-09-16 ENCOUNTER — APPOINTMENT (OUTPATIENT)
Dept: PHYSICAL THERAPY | Facility: REHABILITATION | Age: 84
End: 2024-09-16
Payer: MEDICARE

## 2024-09-17 ENCOUNTER — APPOINTMENT (OUTPATIENT)
Dept: PHYSICAL THERAPY | Facility: REHABILITATION | Age: 84
End: 2024-09-17
Payer: MEDICARE

## 2024-09-18 ENCOUNTER — TELEPHONE (OUTPATIENT)
Age: 84
End: 2024-09-18

## 2024-09-18 NOTE — TELEPHONE ENCOUNTER
Called patient, he is feeling better and does not want to have a post ED f/u with provider at this time

## 2024-09-19 ENCOUNTER — APPOINTMENT (OUTPATIENT)
Dept: PHYSICAL THERAPY | Facility: REHABILITATION | Age: 84
End: 2024-09-19
Payer: MEDICARE

## 2024-09-26 ENCOUNTER — OFFICE VISIT (OUTPATIENT)
Age: 84
End: 2024-09-26
Payer: MEDICARE

## 2024-09-26 ENCOUNTER — TRANSITIONAL CARE MANAGEMENT (OUTPATIENT)
Age: 84
End: 2024-09-26

## 2024-09-26 VITALS
BODY MASS INDEX: 39.37 KG/M2 | DIASTOLIC BLOOD PRESSURE: 60 MMHG | OXYGEN SATURATION: 99 % | HEART RATE: 72 BPM | WEIGHT: 245 LBS | RESPIRATION RATE: 16 BRPM | TEMPERATURE: 98.4 F | SYSTOLIC BLOOD PRESSURE: 120 MMHG | HEIGHT: 66 IN

## 2024-09-26 DIAGNOSIS — I10 HTN (HYPERTENSION), BENIGN: ICD-10-CM

## 2024-09-26 DIAGNOSIS — N18.32 TYPE 2 DIABETES MELLITUS WITH STAGE 3B CHRONIC KIDNEY DISEASE, WITH LONG-TERM CURRENT USE OF INSULIN (HCC): Primary | ICD-10-CM

## 2024-09-26 DIAGNOSIS — E78.2 MIXED HYPERLIPIDEMIA: ICD-10-CM

## 2024-09-26 DIAGNOSIS — I25.118 CORONARY ARTERY DISEASE OF NATIVE ARTERY OF NATIVE HEART WITH STABLE ANGINA PECTORIS (HCC): ICD-10-CM

## 2024-09-26 DIAGNOSIS — K21.9 GERD WITHOUT ESOPHAGITIS: ICD-10-CM

## 2024-09-26 DIAGNOSIS — I50.32 CHRONIC HEART FAILURE WITH PRESERVED EJECTION FRACTION (HCC): ICD-10-CM

## 2024-09-26 DIAGNOSIS — E11.22 TYPE 2 DIABETES MELLITUS WITH STAGE 3B CHRONIC KIDNEY DISEASE, WITH LONG-TERM CURRENT USE OF INSULIN (HCC): Primary | ICD-10-CM

## 2024-09-26 DIAGNOSIS — E66.01 OBESITY, MORBID (HCC): ICD-10-CM

## 2024-09-26 DIAGNOSIS — N18.32 STAGE 3B CHRONIC KIDNEY DISEASE (CKD) (HCC): ICD-10-CM

## 2024-09-26 DIAGNOSIS — Z79.4 TYPE 2 DIABETES MELLITUS WITH STAGE 3B CHRONIC KIDNEY DISEASE, WITH LONG-TERM CURRENT USE OF INSULIN (HCC): Primary | ICD-10-CM

## 2024-09-26 DIAGNOSIS — K85.90 ACUTE RECURRENT PANCREATITIS: ICD-10-CM

## 2024-09-26 DIAGNOSIS — N40.0 BENIGN PROSTATIC HYPERPLASIA WITHOUT LOWER URINARY TRACT SYMPTOMS: ICD-10-CM

## 2024-09-26 PROBLEM — N52.9 ED (ERECTILE DYSFUNCTION): Status: RESOLVED | Noted: 2022-07-12 | Resolved: 2024-09-26

## 2024-09-26 PROBLEM — I45.10 RBBB: Status: ACTIVE | Noted: 2020-03-17

## 2024-09-26 PROCEDURE — 99496 TRANSJ CARE MGMT HIGH F2F 7D: CPT

## 2024-09-26 RX ORDER — TAMSULOSIN HYDROCHLORIDE 0.4 MG/1
0.8 CAPSULE ORAL DAILY
Qty: 180 CAPSULE | Refills: 1 | Status: SHIPPED | OUTPATIENT
Start: 2024-09-26

## 2024-09-26 RX ORDER — CHLORTHALIDONE 25 MG/1
25 TABLET ORAL DAILY
COMMUNITY
Start: 2024-08-08 | End: 2024-11-06

## 2024-09-26 RX ORDER — PANTOPRAZOLE SODIUM 40 MG/1
40 TABLET, DELAYED RELEASE ORAL
Qty: 90 TABLET | Refills: 1 | Status: SHIPPED | OUTPATIENT
Start: 2024-09-26 | End: 2025-09-21

## 2024-09-26 RX ORDER — RANOLAZINE 500 MG/1
500 TABLET, EXTENDED RELEASE ORAL 2 TIMES DAILY
Qty: 180 TABLET | Refills: 1 | Status: SHIPPED | OUTPATIENT
Start: 2024-09-26

## 2024-09-26 NOTE — PROGRESS NOTES
Transition of Care Visit  Name: Jake Claros      : 1940      MRN: [id number]  Encounter Provider: Ashwin Leyva MD  Encounter Date: 2024   Encounter department: [facility] PRIMARY CARE    Assessment & Plan  Benign prostatic hyperplasia without lower urinary tract symptoms    Orders:    tamsulosin (FLOMAX) 0.4 mg; Take 2 capsules (0.8 mg total) by mouth daily    Coronary artery disease of native artery of native heart with stable angina pectoris (HCC)  Under control.    Continue current medication.    Patient has been followed by cardiologist  We will re-evaluate at next office visit.      Orders:    ranolazine (RANEXA) 500 mg 12 hr tablet; Take 1 tablet (500 mg total) by mouth 2 (two) times a day    GERD without esophagitis    Orders:    pantoprazole (PROTONIX) 40 mg tablet; Take 1 tablet (40 mg total) by mouth daily before breakfast    Under control.    Continue current medication.    We will re-evaluate at next office visit.    Type 2 diabetes mellitus with stage 3b chronic kidney disease, with long-term current use of insulin ([facility])    Lab Results   Component Value Date    HGBA1C 6.0 (H) 2024   Under control.    Continue current medication.    We will re-evaluate at next office visit.             HTN (hypertension), benign  Under control.  Continue lisinopril, metoprolol and torsemide.  We will continue to monitor           Mixed hyperlipidemia  Under reasonable control.  Continue rosuvastatin  We will continue to monitor           Stage 3b chronic kidney disease (CKD) ([facility])  Lab Results   Component Value Date    EGFR 49 (L) 2024    EGFR 48 (L) 2024    EGFR 48 (L) 2024    CREATININE 1.43 (H) 2024    CREATININE 1.44 (H) 2024    CREATININE 1.45 (H) 2024   creatinine and GFR stable   Will need periodic BMP  Avoid NSAIDs like ibuprofen Aleve Advil etc.  Avoid high potassium diet.  We will continue to monitor              Obesity, morbid  (HCC)  Patient was advised to lose weight.  Potential consequences of obesity discussed with patient.  Advised to have portion control no more than 60% of meal or may consider intermittent fasting  We will continue to monitor           Chronic heart failure with preserved ejection fraction (HCC)  Wt Readings from Last 3 Encounters:   09/26/24 111 kg (245 lb)   09/12/24 112 kg (246 lb 12.8 oz)   07/02/24 109 kg (241 lb)     Under control.    Continue current medication.    Patient has been followed by cardiologist  We will re-evaluate at next office visit.                 Acute recurrent pancreatitis              History of Present Illness     Transitional Care Management Review:   Jake Claros is a 84 y.o. male here for TCM follow up.     During the TCM phone call patient stated:  TCM Call       Date and time call was made  9/26/2024 10:46 AM    Hospital care reviewed  Records reviewed    Patient was hospitialized at  [facility]    Date of Admission  09/19/24    Date of discharge  09/23/24    Diagnosis  Pancratitis    Disposition  Home    Were the patients medications reviewed and updated  No    Current Symptoms  None    Weakness severity  Moderate    Fatigue severity  Moderate          TCM Call       Post hospital issues  None    Should patient be enrolled in anticoag monitoring?  No    Scheduled for follow up?  Yes    Did you obtain your prescribed medications  Yes    Do you need help managing your prescriptions or medications  No    Is transportation to your appointment needed  No    I have advised the patient to call PCP with any new or worsening symptoms  Shara Blackman LPN          Patient here for TCM patient was admitted on September 23, 2024 and discharged on September 26, 2024 at [facility] for acute pancreatitis attack.  Patient's hospital record reviewed.  Now patient feeling okay.  Denies any chest pain or shortness of breath.  Does have some fatigue.  No abdominal pain,  "nausea, vomiting, diarrhea or constipation.  No blood in stool or black stool.  No fever or chills.  No lightheadedness or dizziness.  No tingling or muscle weakness.  Patient has follow-up appointment with gastroenterologist for intervention      Review of Systems   Constitutional:  Negative for chills and fever.   HENT:  Negative for congestion, ear pain, postnasal drip and sore throat.    Eyes:  Negative for pain and visual disturbance.   Respiratory:  Negative for cough and shortness of breath.    Cardiovascular:  Negative for chest pain and palpitations.   Gastrointestinal:  Negative for abdominal pain, nausea and vomiting.   Endocrine: Negative for cold intolerance, heat intolerance, polydipsia, polyphagia and polyuria.   Genitourinary:  Negative for difficulty urinating, dysuria, frequency and hematuria.   Musculoskeletal:  Positive for arthralgias (right knee) and gait problem. Negative for back pain.   Skin:  Negative for color change and rash.   Neurological:  Negative for dizziness, seizures, syncope, light-headedness and headaches.   Psychiatric/Behavioral:  Negative for agitation and behavioral problems.    All other systems reviewed and are negative.    Objective     /60 (BP Location: Left arm, Patient Position: Sitting, Cuff Size: Large)   Pulse 72   Temp 98.4 °F (36.9 °C) (Tympanic)   Resp 16   Ht 5' 6\" (1.676 m)   Wt 111 kg (245 lb)   SpO2 99%   BMI 39.54 kg/m²     Physical Exam  Vitals and nursing note reviewed.   Constitutional:       General: He is not in acute distress.     Appearance: Normal appearance. He is well-developed. He is obese. He is not ill-appearing, toxic-appearing or diaphoretic.   HENT:      Head: Normocephalic and atraumatic.      Nose: Nose normal. No congestion or rhinorrhea.      Mouth/Throat:      Mouth: Mucous membranes are moist.      Pharynx: Oropharynx is clear.   Eyes:      General: No scleral icterus.        Right eye: No discharge.         Left eye: No " discharge.      Extraocular Movements: Extraocular movements intact.      Conjunctiva/sclera: Conjunctivae normal.      Pupils: Pupils are equal, round, and reactive to light.   Cardiovascular:      Rate and Rhythm: Normal rate and regular rhythm.      Pulses: Normal pulses.      Heart sounds: Normal heart sounds. No murmur heard.     No gallop.   Pulmonary:      Effort: Pulmonary effort is normal. No respiratory distress.      Breath sounds: Normal breath sounds. No wheezing or rales.   Abdominal:      General: Abdomen is flat. Bowel sounds are normal. There is no distension.      Palpations: Abdomen is soft.      Tenderness: There is no abdominal tenderness. There is no right CVA tenderness, left CVA tenderness or guarding.   Musculoskeletal:         General: No swelling or tenderness. Normal range of motion.      Cervical back: Normal range of motion and neck supple. No rigidity.      Right lower leg: Edema (Minimal) present.      Left lower leg: Edema (Minimal) present.   Lymphadenopathy:      Cervical: No cervical adenopathy.   Skin:     General: Skin is warm and dry.      Capillary Refill: Capillary refill takes 2 to 3 seconds.      Coloration: Skin is not jaundiced or pale.   Neurological:      General: No focal deficit present.      Mental Status: He is alert and oriented to person, place, and time. Mental status is at baseline.      Motor: No weakness.   Psychiatric:         Mood and Affect: Mood normal.         Behavior: Behavior normal.       Medications have been reviewed by provider in current encounter    Administrative Statements

## 2024-09-26 NOTE — ASSESSMENT & PLAN NOTE
Lab Results   Component Value Date    EGFR 49 (L) 09/20/2024    EGFR 48 (L) 09/19/2024    EGFR 48 (L) 09/16/2024    CREATININE 1.43 (H) 09/20/2024    CREATININE 1.44 (H) 09/19/2024    CREATININE 1.45 (H) 09/16/2024   creatinine and GFR stable   Will need periodic BMP  Avoid NSAIDs like ibuprofen Aleve Advil etc.  Avoid high potassium diet.  We will continue to monitor

## 2024-09-26 NOTE — ASSESSMENT & PLAN NOTE
Orders:    pantoprazole (PROTONIX) 40 mg tablet; Take 1 tablet (40 mg total) by mouth daily before breakfast    Under control.    Continue current medication.    We will re-evaluate at next office visit.

## 2024-09-26 NOTE — ASSESSMENT & PLAN NOTE
Under control.  Continue lisinopril, metoprolol and torsemide.  We will continue to monitor

## 2024-09-26 NOTE — ASSESSMENT & PLAN NOTE
Under control.    Continue current medication.    Patient has been followed by cardiologist  We will re-evaluate at next office visit.      Orders:    ranolazine (RANEXA) 500 mg 12 hr tablet; Take 1 tablet (500 mg total) by mouth 2 (two) times a day

## 2024-09-26 NOTE — ASSESSMENT & PLAN NOTE
Wt Readings from Last 3 Encounters:   09/26/24 111 kg (245 lb)   09/12/24 112 kg (246 lb 12.8 oz)   07/02/24 109 kg (241 lb)     Under control.    Continue current medication.    Patient has been followed by cardiologist  We will re-evaluate at next office visit.

## 2024-09-30 NOTE — PROGRESS NOTES
I13.0  E11.319  HCC coding opportunities          Chart Reviewed number of suggestions sent to Provider: 2     Patients Insurance     Medicare Insurance: Medicare          
Yes

## 2024-10-02 ENCOUNTER — TELEPHONE (OUTPATIENT)
Age: 84
End: 2024-10-02

## 2024-10-02 ENCOUNTER — OFFICE VISIT (OUTPATIENT)
Dept: URGENT CARE | Facility: CLINIC | Age: 84
End: 2024-10-02
Payer: MEDICARE

## 2024-10-02 VITALS
BODY MASS INDEX: 39.37 KG/M2 | TEMPERATURE: 97.9 F | RESPIRATION RATE: 15 BRPM | HEIGHT: 66 IN | WEIGHT: 245 LBS | OXYGEN SATURATION: 97 % | SYSTOLIC BLOOD PRESSURE: 138 MMHG | DIASTOLIC BLOOD PRESSURE: 60 MMHG | HEART RATE: 75 BPM

## 2024-10-02 DIAGNOSIS — R53.83 FATIGUE, UNSPECIFIED TYPE: ICD-10-CM

## 2024-10-02 LAB
SARS-COV-2 AG UPPER RESP QL IA: NEGATIVE
VALID CONTROL: NORMAL

## 2024-10-02 PROCEDURE — 87811 SARS-COV-2 COVID19 W/OPTIC: CPT

## 2024-10-02 PROCEDURE — G0463 HOSPITAL OUTPT CLINIC VISIT: HCPCS

## 2024-10-02 PROCEDURE — 99213 OFFICE O/P EST LOW 20 MIN: CPT

## 2024-10-02 NOTE — PROGRESS NOTES
[facility]        NAME: Jake Claros is a 84 y.o. male  : 1940    MRN: [id number]  DATE: 2024  TIME: 11:13 AM    Assessment and Plan   No primary diagnosis found.  1. Fatigue, unspecified type  Poct Covid 19 Rapid Antigen Test            Patient Instructions   We had discussed that you did not have specific symptoms other than feeling weak and fatigued. We performed a COVID test which was negative in the office today as a peace of mind.  I would recommend from our discussion that you follow up with PCP and to return for re-evaluation with any new symptoms if you are unable to get to your PCP for an appointment.  If you develop any symptoms such as but not limited to: severe abdominal pain, vomiting, return of diarrhea, or fevers - Would recommend you be re-evaluated sooner in the Emergency Department.    If tests have been performed at [facility], our office will contact you with results if changes need to be made to the care plan discussed with you at the visit.  You can review your full results on [facility].    Chief Complaint     Chief Complaint   Patient presents with    Fatigue     Pt feels like he has body aches and he feels weak. He stated that he was just in the hospital for pancreatitis and he was d/c'd on Monday. He stated that he was having loose watery stool and now they are formed, He does not know why he is feeling fatigued.          History of Present Illness       Patient reports body aches and since being discharged from the [facility] about 3 days ago.  He reports he was in a hospital for acute pancreatitis.  Currently he reports he has been having formed stools without abdominal pain. He reports his appetite and fluid intake has been at his normal. He denies any cough, fever, chest pain, or shortness of breath.        Review of Systems   Review of Systems   Constitutional:  Positive for fatigue. Negative for fever.   HENT:  Positive for congestion  (States he always has some sort of congestion and feels this is unchanged from his baseline). Negative for postnasal drip, rhinorrhea and sore throat.    Respiratory:  Negative for cough and shortness of breath.    Cardiovascular:  Negative for chest pain.   Gastrointestinal:  Negative for abdominal pain (Reports abdominal heaviness but reports this is different sensation than when he has pancreatitis), anal bleeding, blood in stool, constipation, nausea, rectal pain and vomiting.   Musculoskeletal:  Positive for myalgias. Negative for arthralgias, back pain, neck pain and neck stiffness.   Neurological:  Negative for dizziness, weakness, light-headedness and numbness.         Current Medications       Current Outpatient Medications:     allopurinol (ZYLOPRIM) 100 mg tablet, Take 1 tablet (100 mg total) by mouth daily, Disp: 90 tablet, Rfl: 2    amLODIPine (NORVASC) 5 mg tablet, Take 5 mg by mouth daily, Disp: , Rfl:     apixaban (Eliquis) 2.5 mg, Take 2.5 mg by mouth 2 (two) times a day, Disp: , Rfl:     BD Pen Needle Pascale U/F 32G X 4 MM MISC, USE AS INSTRUCTED WITH INSULIN PEN, Disp: , Rfl:     chlorthalidone 25 mg tablet, Take 25 mg by mouth daily, Disp: , Rfl:     cloNIDine (CATAPRES) 0.1 mg tablet, TAKE 1 TABLET BY MOUTH EVERY 12 HOURS, Disp: 180 tablet, Rfl: 1    clopidogrel (PLAVIX) 75 mg tablet, Take 1 tablet (75 mg total) by mouth daily, Disp: 90 tablet, Rfl: 1    Docusate Sodium (DSS) 100 MG CAPS, Take 100 mg by mouth daily, Disp: , Rfl:     famotidine (PEPCID) 40 MG tablet, Take 1 tablet (40 mg total) by mouth daily at bedtime, Disp: 90 tablet, Rfl: 1    Fexofenadine HCl (MUCINEX ALLERGY PO), Take by mouth, Disp: , Rfl:     fluticasone (FLONASE) 50 mcg/act nasal spray, 2 sprays into each nostril daily, Disp: 48 mL, Rfl: 0    insulin aspart (NovoLOG FlexPen) 100 UNIT/ML injection pen, Inject under the skin 3 (three) times a day with meals Sliding scale as ordered TID with meals, Disp: , Rfl:      ipratropium (ATROVENT) 0.03 % nasal spray, SPRAY 2 SPRAYS INTO EACH NOSTRIL EVERY 12 HOURS., Disp: 30 mL, Rfl: 2    Lancets (OneTouch Delica Plus [id number]) MISC, TEST GLUCOSE 3 4 TIMES/DAY, Disp: , Rfl:     Levemir FlexTouch 100 units/mL injection pen, 36 Units daily In AM, Disp: , Rfl:     lisinopril (ZESTRIL) 20 mg tablet, Take 1 tablet (20 mg total) by mouth daily, Disp: 90 tablet, Rfl: 1    Menatetrenone (Vitamin K2) 100 MCG TABS, Take by mouth in the morning, Disp: , Rfl:     Multiple Vitamins-Minerals (MULTIVITAMIN ADULT PO), Take by mouth daily, Disp: , Rfl:     ondansetron (ZOFRAN-ODT) 4 mg disintegrating tablet, Take 1 tablet (4 mg total) by mouth every 8 (eight) hours as needed for nausea or vomiting for up to 7 days, Disp: 20 tablet, Rfl: 0    oxyCODONE (ROXICODONE) 5 immediate release tablet, Take 1 tablet (5 mg total) by mouth every 6 (six) hours as needed for moderate pain or severe pain Max Daily Amount: 20 mg, Disp: 10 tablet, Rfl: 0    pantoprazole (PROTONIX) 40 mg tablet, Take 1 tablet (40 mg total) by mouth daily before breakfast, Disp: 90 tablet, Rfl: 1    pyridoxine (VITAMIN B6) 100 mg tablet, Take 100 mg by mouth daily, Disp: , Rfl:     ranolazine (RANEXA) 500 mg 12 hr tablet, Take 1 tablet (500 mg total) by mouth 2 (two) times a day, Disp: 180 tablet, Rfl: 1    rosuvastatin (CRESTOR) 20 MG tablet, Take 1 tablet (20 mg total) by mouth daily at bedtime, Disp: 90 tablet, Rfl: 2    tamsulosin (FLOMAX) 0.4 mg, Take 2 capsules (0.8 mg total) by mouth daily, Disp: 180 capsule, Rfl: 1    Tresiba FlexTouch 100 units/mL injection pen, Inject 20 Units under the skin 2 (two) times a day, Disp: , Rfl:     Current Allergies     Allergies as of 10/02/2024 - Reviewed 10/02/2024   Allergen Reaction Noted    Januvia [sitagliptin] Other (See Comments) 05/17/2022    Semaglutide Other (See Comments) 06/28/2023    Simvastatin Myalgia 10/23/2019    Trulicity [dulaglutide] Other (See Comments) 06/21/2023    Wound  "dressing adhesive Other (See Comments) 07/12/2022    Medical tape Rash 02/16/2024    Molds & smuts Allergic Rhinitis 02/16/2024            The following portions of the patient's history were reviewed and updated as appropriate: allergies, current medications, past family history, past medical history, past social history, past surgical history and problem list.     Past Medical History:   Diagnosis Date    Allergic     Arthritis     Chronic kidney disease 2021    Chronic kidney disease (CKD) stage G3a/A1, moderately decreased glomerular filtration rate (GFR) between 45-59 mL/min/1.73 square meter and albuminuria creatinine ratio less than 30 mg/g (HCC)     Colon polyp     Diabetes mellitus (HCC)     GERD (gastroesophageal reflux disease)     Heart murmur     History of echocardiogram     HL (hearing loss)     Hyperlipidemia     Hypertension     Obesity     Pancreatitis     Sleep apnea, obstructive        Past Surgical History:   Procedure Laterality Date    CARDIAC CATHETERIZATION      CHOLECYSTECTOMY      COLONOSCOPY  03/09/2018    COLONOSCOPY      EYE SURGERY      HEMORRHOID SURGERY      JOINT REPLACEMENT  2017    knee    KNEE SURGERY Right     OTHER SURGICAL HISTORY      Stent     SD LAPAROSCOPY SURG CHOLECYSTECTOMY N/A 06/28/2023    Procedure: CHOLECYSTECTOMY LAPAROSCOPIC;  Surgeon: Alirio Hong MD;  Location:  MAIN OR;  Service: General    UPPER GASTROINTESTINAL ENDOSCOPY         Family History   Problem Relation Age of Onset    Heart disease Mother     Heart attack Mother         50s    Cancer Mother     Coronary artery disease Mother     Cancer Brother         Malignant tumor of lung         Medications have been verified.        Objective   /60 (BP Location: Right arm, Patient Position: Sitting, Cuff Size: Large)   Pulse 75   Temp 97.9 °F (36.6 °C)   Resp 15   Ht 5' 6\" (1.676 m)   Wt 111 kg (245 lb)   SpO2 97%   BMI 39.54 kg/m²   No LMP for male patient.       Physical Exam "     Physical Exam  Vitals and nursing note reviewed.   Constitutional:       Appearance: Normal appearance.   HENT:      Head: Normocephalic and atraumatic.      Right Ear: Tympanic membrane normal.      Left Ear: Tympanic membrane normal.      Nose: Congestion present.      Mouth/Throat:      Mouth: Mucous membranes are moist.   Eyes:      Pupils: Pupils are equal, round, and reactive to light.   Cardiovascular:      Heart sounds: Murmur heard.   Pulmonary:      Effort: Pulmonary effort is normal.      Breath sounds: Normal breath sounds. No wheezing, rhonchi or rales.   Abdominal:      Palpations: Abdomen is soft.      Tenderness: There is no abdominal tenderness. There is no right CVA tenderness, left CVA tenderness or guarding.   Musculoskeletal:      Cervical back: Normal range of motion and neck supple. No rigidity or tenderness.      Right lower leg: Edema present.      Left lower leg: Edema present.   Skin:     General: Skin is warm and dry.      Capillary Refill: Capillary refill takes less than 2 seconds.   Neurological:      General: No focal deficit present.      Mental Status: He is alert and oriented to person, place, and time. Mental status is at baseline.      Sensory: No sensory deficit.      Motor: No weakness.   Psychiatric:         Mood and Affect: Mood normal.         Behavior: Behavior normal.         Thought Content: Thought content normal.                    negative

## 2024-10-02 NOTE — TELEPHONE ENCOUNTER
Pt called to say he was recently admitted to [facility] for acute recurrent pancreatitis. He went to [facility] today as he still does not feel well and was told he needs to rest. Pt would like to make an appt with Dr Leyva.

## 2024-10-02 NOTE — PATIENT INSTRUCTIONS
We had discussed that you did not have specific symptoms other than feeling weak and fatigued. We performed a COVID test which was negative in the office today as a peace of mind.  One of the reason that you have been feeling tired and weak can be due to your multiple episodes of being sick with the pancreatitis which can certainly deplete your body's reserves on being able to function fully - in other words, your body is tired and needs more time to recuperate.   I would recommend from our discussion that you follow up with PCP and to return for re-evaluation with any new symptoms if you are unable to get to your PCP for an appointment.  If you develop any symptoms such as but not limited to: severe abdominal pain, vomiting, return of diarrhea, or fevers - Would recommend you be re-evaluated sooner in the Emergency Department.    If tests have been performed at [facility], our office will contact you with results if changes need to be made to the care plan discussed with you at the visit.  You can review your full results on [facility] MyChart.

## 2024-10-03 ENCOUNTER — OFFICE VISIT (OUTPATIENT)
Age: 84
End: 2024-10-03
Payer: MEDICARE

## 2024-10-03 VITALS
WEIGHT: 241 LBS | OXYGEN SATURATION: 98 % | BODY MASS INDEX: 38.73 KG/M2 | HEART RATE: 64 BPM | HEIGHT: 66 IN | TEMPERATURE: 96.2 F | SYSTOLIC BLOOD PRESSURE: 122 MMHG | DIASTOLIC BLOOD PRESSURE: 54 MMHG | RESPIRATION RATE: 16 BRPM

## 2024-10-03 DIAGNOSIS — E11.22 TYPE 2 DIABETES MELLITUS WITH STAGE 3B CHRONIC KIDNEY DISEASE, WITH LONG-TERM CURRENT USE OF INSULIN (HCC): ICD-10-CM

## 2024-10-03 DIAGNOSIS — I50.32 CHRONIC HEART FAILURE WITH PRESERVED EJECTION FRACTION (HCC): ICD-10-CM

## 2024-10-03 DIAGNOSIS — N18.32 TYPE 2 DIABETES MELLITUS WITH STAGE 3B CHRONIC KIDNEY DISEASE, WITH LONG-TERM CURRENT USE OF INSULIN (HCC): ICD-10-CM

## 2024-10-03 DIAGNOSIS — I25.118 CORONARY ARTERY DISEASE OF NATIVE ARTERY OF NATIVE HEART WITH STABLE ANGINA PECTORIS (HCC): ICD-10-CM

## 2024-10-03 DIAGNOSIS — K85.90 ACUTE RECURRENT PANCREATITIS: Primary | ICD-10-CM

## 2024-10-03 DIAGNOSIS — I10 HTN (HYPERTENSION), BENIGN: ICD-10-CM

## 2024-10-03 DIAGNOSIS — E78.2 MIXED HYPERLIPIDEMIA: ICD-10-CM

## 2024-10-03 DIAGNOSIS — N18.32 STAGE 3B CHRONIC KIDNEY DISEASE (CKD) (HCC): ICD-10-CM

## 2024-10-03 DIAGNOSIS — Z79.4 TYPE 2 DIABETES MELLITUS WITH STAGE 3B CHRONIC KIDNEY DISEASE, WITH LONG-TERM CURRENT USE OF INSULIN (HCC): ICD-10-CM

## 2024-10-03 PROCEDURE — G2211 COMPLEX E/M VISIT ADD ON: HCPCS

## 2024-10-03 PROCEDURE — 99214 OFFICE O/P EST MOD 30 MIN: CPT

## 2024-10-03 RX ORDER — HYDROMORPHONE HYDROCHLORIDE 2 MG/1
2 TABLET ORAL 2 TIMES DAILY PRN
Qty: 60 TABLET | Refills: 0 | Status: SHIPPED | OUTPATIENT
Start: 2024-10-03

## 2024-10-03 NOTE — PROGRESS NOTES
Assessment/Plan:         Problem List Items Addressed This Visit       (HFpEF) heart failure with preserved ejection fraction (HCC)     Wt Readings from Last 3 Encounters:   10/03/24 109 kg (241 lb)   10/02/24 111 kg (245 lb)   09/26/24 111 kg (245 lb)     Under control.    Continue current medication.    Patient has been followed by cardiologist  We will re-evaluate at next office visit.            SmartLink not supported outside of the Encounter Diagnoses SmartSection.           Type 2 diabetes mellitus with chronic kidney disease, with long-term current use of insulin ([facility])       Lab Results   Component Value Date    HGBA1C 6.0 (H) 08/26/2024   Under control.    Continue current medication.    We will re-evaluate at next office visit.      SmartLink not supported outside of the Encounter Diagnoses SmartSection.             Mixed hyperlipidemia     Under reasonable control.  Continue rosuvastatin  We will continue to monitor    SmartLink not supported outside of the Encounter Diagnoses SmartSection.             Coronary artery disease of native artery of native heart with stable angina pectoris ([facility])     Under control.    Continue current medication.    Patient has been followed by cardiologist  We will re-evaluate at next office visit.      SmartLink not supported outside of the Encounter Diagnoses SmartSection.           Acute recurrent pancreatitis - Primary     Currently stable.  Has an appointment with pancreatic specialist   We will continue to monitor         Relevant Medications    HYDROmorphone (DILAUDID) 2 mg tablet    Stage 3b chronic kidney disease (CKD) ([facility])     Lab Results   Component Value Date    EGFR 45 (L) 10/02/2024    EGFR 40 (L) 09/30/2024    EGFR 42 (L) 09/29/2024    CREATININE 1.54 (H) 10/02/2024    CREATININE 1.69 (H) 09/30/2024    CREATININE 1.63 (H) 09/29/2024   creatinine and GFR stable   Will need periodic BMP  Avoid NSAIDs like ibuprofen Aleve Advil etc.  Avoid high potassium  diet.  We will continue to monitor       SmartLink not supported outside of the Encounter Diagnoses SmartSection.             HTN (hypertension), benign     Under control.  Continue lisinopril, metoprolol and torsemide.  We will continue to monitor    SmartLink not supported outside of the Encounter Diagnoses SmartSection.                  Subjective:      Patient ID: Jake Claros is a 84 y.o. male.    Patient here for review of chronic medical problems and  the labs and imaging if it is applicable.  Currently has no specific complaints other than mentioned in the review of systems  Denies chest pain, SOB, cough, abdominal pain, nausea, vomiting, fever, chills, lightheadedness, dizziness,headache, tingling or numbness.No bowel or bladder problem.  Recently was hospitalized again for 3 days for acute pancreatitis where he gets Dilaudid for pain which works better for him so he would rather take Dilaudid pill rather than oxycodone at home.  He only takes it if he needs for pain.  Also had some stomach virus which she is better now medical records from the hospital reviewed        The following portions of the patient's history were reviewed and updated as appropriate:   Past Medical History:  He has a past medical history of Allergic, Arthritis, Chronic kidney disease (2021), Chronic kidney disease (CKD) stage G3a/A1, moderately decreased glomerular filtration rate (GFR) between 45-59 mL/min/1.73 square meter and albuminuria creatinine ratio less than 30 mg/g (HCC), Colon polyp, Diabetes mellitus (HCC), GERD (gastroesophageal reflux disease), Heart murmur, History of echocardiogram, HL (hearing loss), Hyperlipidemia, Hypertension, Obesity, Pancreatitis, and Sleep apnea, obstructive.,  _______________________________________________________________________  Medical Problems:  does not have any pertinent problems on file.,  _______________________________________________________________________  Past Surgical  History:   has a past surgical history that includes Cardiac catheterization; Other surgical history; Joint replacement (2017); Colonoscopy (03/09/2018); Eye surgery; Colonoscopy; Upper gastrointestinal endoscopy; Hemorrhoid surgery; pr laparoscopy surg cholecystectomy (N/A, 06/28/2023); Cholecystectomy; and Knee surgery (Right).,  _______________________________________________________________________  Family History:  family history includes Cancer in his brother and mother; Coronary artery disease in his mother; Heart attack in his mother; Heart disease in his mother.,  _______________________________________________________________________  Social History:   reports that he quit smoking about 51 years ago. His smoking use included cigarettes, pipe, and cigars. He started smoking about 66 years ago. He has a 30 pack-year smoking history. He has been exposed to tobacco smoke. He has never used smokeless tobacco. He reports that he does not currently use alcohol after a past usage of about 5.0 standard drinks of alcohol per week. He reports that he does not use drugs.,  _______________________________________________________________________  Allergies:  is allergic to januvia [sitagliptin], semaglutide, simvastatin, trulicity [dulaglutide], wound dressing adhesive, medical tape, and molds & smuts..  _______________________________________________________________________  Current Outpatient Medications   Medication Sig Dispense Refill    allopurinol (ZYLOPRIM) 100 mg tablet Take 1 tablet (100 mg total) by mouth daily 90 tablet 2    amLODIPine (NORVASC) 5 mg tablet Take 5 mg by mouth daily      BD Pen Needle Pascale U/F 32G X 4 MM MISC USE AS INSTRUCTED WITH INSULIN PEN      chlorthalidone 25 mg tablet Take 25 mg by mouth daily      cloNIDine (CATAPRES) 0.1 mg tablet TAKE 1 TABLET BY MOUTH EVERY 12 HOURS 180 tablet 1    clopidogrel (PLAVIX) 75 mg tablet Take 1 tablet (75 mg total) by mouth daily 90 tablet 1    Docusate  Sodium (DSS) 100 MG CAPS Take 100 mg by mouth daily      famotidine (PEPCID) 40 MG tablet Take 1 tablet (40 mg total) by mouth daily at bedtime 90 tablet 1    Fexofenadine HCl (MUCINEX ALLERGY PO) Take by mouth      fluticasone (FLONASE) 50 mcg/act nasal spray 2 sprays into each nostril daily 48 mL 0    HYDROmorphone (DILAUDID) 2 mg tablet Take 1 tablet (2 mg total) by mouth 2 (two) times a day as needed for moderate pain Max Daily Amount: 4 mg 60 tablet 0    insulin aspart (NovoLOG FlexPen) 100 UNIT/ML injection pen Inject under the skin 3 (three) times a day with meals Sliding scale as ordered TID with meals      ipratropium (ATROVENT) 0.03 % nasal spray SPRAY 2 SPRAYS INTO EACH NOSTRIL EVERY 12 HOURS. 30 mL 2    Lancets (OneTouch Delica Plus [id number]) MISC TEST GLUCOSE 3 4 TIMES/DAY      Levemir FlexTouch 100 units/mL injection pen 36 Units daily In AM      lisinopril (ZESTRIL) 20 mg tablet Take 1 tablet (20 mg total) by mouth daily 90 tablet 1    Menatetrenone (Vitamin K2) 100 MCG TABS Take by mouth in the morning      Multiple Vitamins-Minerals (MULTIVITAMIN ADULT PO) Take by mouth daily      pantoprazole (PROTONIX) 40 mg tablet Take 1 tablet (40 mg total) by mouth daily before breakfast 90 tablet 1    pyridoxine (VITAMIN B6) 100 mg tablet Take 100 mg by mouth daily      ranolazine (RANEXA) 500 mg 12 hr tablet Take 1 tablet (500 mg total) by mouth 2 (two) times a day 180 tablet 1    rosuvastatin (CRESTOR) 20 MG tablet Take 1 tablet (20 mg total) by mouth daily at bedtime 90 tablet 2    tamsulosin (FLOMAX) 0.4 mg Take 2 capsules (0.8 mg total) by mouth daily 180 capsule 1    Tresiba FlexTouch 100 units/mL injection pen Inject 20 Units under the skin 2 (two) times a day      ondansetron (ZOFRAN-ODT) 4 mg disintegrating tablet Take 1 tablet (4 mg total) by mouth every 8 (eight) hours as needed for nausea or vomiting for up to 7 days 20 tablet 0     No current facility-administered medications for this visit.  "    _______________________________________________________________________  Review of Systems   Constitutional:  Negative for chills and fever.   HENT:  Negative for congestion, ear pain, postnasal drip and sore throat.    Eyes:  Negative for pain and visual disturbance.   Respiratory:  Negative for cough and shortness of breath.    Cardiovascular:  Negative for chest pain and palpitations.   Gastrointestinal:  Negative for abdominal pain, nausea and vomiting.   Endocrine: Negative for cold intolerance, heat intolerance, polydipsia, polyphagia and polyuria.   Genitourinary:  Negative for difficulty urinating, dysuria, frequency and hematuria.   Musculoskeletal:  Positive for arthralgias (right knee) and gait problem. Negative for back pain.   Skin:  Negative for color change and rash.   Neurological:  Negative for dizziness, seizures, syncope, light-headedness and headaches.   Psychiatric/Behavioral:  Negative for agitation and behavioral problems.    All other systems reviewed and are negative.        Objective:  Vitals:    10/03/24 0937   BP: 122/54   Pulse: 64   Resp: 16   Temp: (!) 96.2 °F (35.7 °C)   TempSrc: Tympanic   SpO2: 98%   Weight: 109 kg (241 lb)   Height: 5' 6\" (1.676 m)     Body mass index is 38.9 kg/m².     Physical Exam  Vitals and nursing note reviewed.   Constitutional:       General: He is not in acute distress.     Appearance: Normal appearance. He is not ill-appearing, toxic-appearing or diaphoretic.   HENT:      Head: Normocephalic and atraumatic.      Nose: Nose normal. No congestion.      Mouth/Throat:      Mouth: Mucous membranes are moist.   Eyes:      General: No scleral icterus.        Right eye: No discharge.         Left eye: No discharge.      Extraocular Movements: Extraocular movements intact.      Conjunctiva/sclera: Conjunctivae normal.      Pupils: Pupils are equal, round, and reactive to light.   Cardiovascular:      Rate and Rhythm: Normal rate and regular rhythm.      " Pulses: Normal pulses.      Heart sounds: Murmur (2/6 BITA) heard.      No gallop.   Pulmonary:      Effort: Pulmonary effort is normal. No respiratory distress.      Breath sounds: Normal breath sounds. No wheezing, rhonchi or rales.   Abdominal:      General: Abdomen is flat. Bowel sounds are normal. There is no distension.      Palpations: Abdomen is soft.      Tenderness: There is no abdominal tenderness. There is no right CVA tenderness, left CVA tenderness or guarding.   Musculoskeletal:         General: No swelling or tenderness. Normal range of motion.      Cervical back: Normal range of motion and neck supple. No rigidity.      Right lower leg: Edema (1-2+) present.      Left lower leg: Edema (minimal) present.      Comments: Chronic venous stasis changes   Lymphadenopathy:      Cervical: No cervical adenopathy.   Skin:     General: Skin is warm.      Capillary Refill: Capillary refill takes 2 to 3 seconds.      Coloration: Skin is not jaundiced.      Findings: No bruising or rash.   Neurological:      General: No focal deficit present.      Mental Status: He is alert and oriented to person, place, and time. Mental status is at baseline.      Motor: No weakness.      Gait: Gait abnormal (Ambulates with cane).   Psychiatric:         Mood and Affect: Mood normal.         Behavior: Behavior normal.

## 2024-10-03 NOTE — ASSESSMENT & PLAN NOTE
Lab Results   Component Value Date    HGBA1C 6.0 (H) 08/26/2024   Under control.    Continue current medication.    We will re-evaluate at next office visit.      SmartLink not supported outside of the Encounter Diagnoses SmartSection.

## 2024-10-03 NOTE — ASSESSMENT & PLAN NOTE
Under reasonable control.  Continue rosuvastatin  We will continue to monitor    SmartLink not supported outside of the Encounter Diagnoses SmartSection.

## 2024-10-03 NOTE — ASSESSMENT & PLAN NOTE
Under control.  Continue lisinopril, metoprolol and torsemide.  We will continue to monitor    SmartLink not supported outside of the Encounter Diagnoses SmartSection.

## 2024-10-03 NOTE — ASSESSMENT & PLAN NOTE
Wt Readings from Last 3 Encounters:   10/03/24 109 kg (241 lb)   10/02/24 111 kg (245 lb)   09/26/24 111 kg (245 lb)     Under control.    Continue current medication.    Patient has been followed by cardiologist  We will re-evaluate at next office visit.            SmartLink not supported outside of the Encounter Diagnoses SmartSection.

## 2024-10-03 NOTE — ASSESSMENT & PLAN NOTE
Under control.    Continue current medication.    Patient has been followed by cardiologist  We will re-evaluate at next office visit.      SmartLink not supported outside of the Encounter Diagnoses SmartSection.

## 2024-10-03 NOTE — ASSESSMENT & PLAN NOTE
Lab Results   Component Value Date    EGFR 45 (L) 10/02/2024    EGFR 40 (L) 09/30/2024    EGFR 42 (L) 09/29/2024    CREATININE 1.54 (H) 10/02/2024    CREATININE 1.69 (H) 09/30/2024    CREATININE 1.63 (H) 09/29/2024   creatinine and GFR stable   Will need periodic BMP  Avoid NSAIDs like ibuprofen Aleve Advil etc.  Avoid high potassium diet.  We will continue to monitor       SmartLink not supported outside of the Encounter Diagnoses SmartSection.

## 2024-10-04 ENCOUNTER — TELEPHONE (OUTPATIENT)
Age: 84
End: 2024-10-04

## 2024-10-04 NOTE — TELEPHONE ENCOUNTER
PA for HYDROmorphone (DILAUDID) 2 mg  APPROVED     Date(s) approved January 1, 2024 to October 4, 2025     Case # [id number]     Patient advised by          [x]MyChart Message  []Phone call   []LMOM  []L/M to call office as no active Communication consent on file  []Unable to leave detailed message as VM not approved on Communication consent       Pharmacy advised by    [x]Fax  []Phone call

## 2024-10-04 NOTE — TELEPHONE ENCOUNTER
PA HYDROmorphone (DILAUDID) 2 mg SUBMITTED     via    []CMM-KEY:    [x]Surescripts-Case ID # [id number]   []Availity-Auth ID #  NDC #    []Faxed to plan   []Other website    []Phone call Case ID #      Office notes sent, clinical questions answered. Awaiting determination    Turnaround time for your insurance to make a decision on your Prior Authorization can take 7-21 business days.

## 2024-10-30 ENCOUNTER — TRANSITIONAL CARE MANAGEMENT (OUTPATIENT)
Age: 84
End: 2024-10-30

## 2024-10-30 ENCOUNTER — OFFICE VISIT (OUTPATIENT)
Age: 84
End: 2024-10-30
Payer: MEDICARE

## 2024-10-30 VITALS
HEART RATE: 55 BPM | OXYGEN SATURATION: 95 % | HEIGHT: 66 IN | WEIGHT: 245.2 LBS | TEMPERATURE: 97.2 F | BODY MASS INDEX: 39.41 KG/M2 | RESPIRATION RATE: 16 BRPM | DIASTOLIC BLOOD PRESSURE: 78 MMHG | SYSTOLIC BLOOD PRESSURE: 125 MMHG

## 2024-10-30 DIAGNOSIS — I50.32 CHRONIC HEART FAILURE WITH PRESERVED EJECTION FRACTION (HCC): ICD-10-CM

## 2024-10-30 DIAGNOSIS — K85.90 ACUTE RECURRENT PANCREATITIS: Primary | ICD-10-CM

## 2024-10-30 DIAGNOSIS — F11.20 CONTINUOUS OPIOID DEPENDENCE (HCC): ICD-10-CM

## 2024-10-30 DIAGNOSIS — E11.22 TYPE 2 DIABETES MELLITUS WITH STAGE 3B CHRONIC KIDNEY DISEASE, WITH LONG-TERM CURRENT USE OF INSULIN (HCC): ICD-10-CM

## 2024-10-30 DIAGNOSIS — E78.2 MIXED HYPERLIPIDEMIA: ICD-10-CM

## 2024-10-30 DIAGNOSIS — Z23 ENCOUNTER FOR IMMUNIZATION: ICD-10-CM

## 2024-10-30 DIAGNOSIS — N18.32 TYPE 2 DIABETES MELLITUS WITH STAGE 3B CHRONIC KIDNEY DISEASE, WITH LONG-TERM CURRENT USE OF INSULIN (HCC): ICD-10-CM

## 2024-10-30 DIAGNOSIS — N18.32 STAGE 3B CHRONIC KIDNEY DISEASE (CKD) (HCC): ICD-10-CM

## 2024-10-30 DIAGNOSIS — I10 HTN (HYPERTENSION), BENIGN: ICD-10-CM

## 2024-10-30 DIAGNOSIS — I25.118 CORONARY ARTERY DISEASE OF NATIVE ARTERY OF NATIVE HEART WITH STABLE ANGINA PECTORIS (HCC): ICD-10-CM

## 2024-10-30 DIAGNOSIS — Z79.4 TYPE 2 DIABETES MELLITUS WITH STAGE 3B CHRONIC KIDNEY DISEASE, WITH LONG-TERM CURRENT USE OF INSULIN (HCC): ICD-10-CM

## 2024-10-30 PROCEDURE — 99214 OFFICE O/P EST MOD 30 MIN: CPT

## 2024-10-30 PROCEDURE — G0008 ADMIN INFLUENZA VIRUS VAC: HCPCS

## 2024-10-30 PROCEDURE — 90662 IIV NO PRSV INCREASED AG IM: CPT

## 2024-10-30 RX ORDER — AMLODIPINE BESYLATE 5 MG/1
5 TABLET ORAL DAILY
Qty: 90 TABLET | Refills: 1 | Status: SHIPPED | OUTPATIENT
Start: 2024-10-30

## 2024-10-30 RX ORDER — CLOPIDOGREL BISULFATE 75 MG/1
75 TABLET ORAL DAILY
Qty: 90 TABLET | Refills: 1 | Status: SHIPPED | OUTPATIENT
Start: 2024-10-30

## 2024-10-30 RX ORDER — CHLORTHALIDONE 25 MG/1
25 TABLET ORAL DAILY
Qty: 90 TABLET | Refills: 2 | Status: SHIPPED | OUTPATIENT
Start: 2024-10-30

## 2024-10-30 NOTE — ASSESSMENT & PLAN NOTE
Wt Readings from Last 3 Encounters:   10/30/24 111 kg (245 lb 3.2 oz)   10/03/24 109 kg (241 lb)   10/02/24 111 kg (245 lb)     Under control.    Continue current medication.    Patient has been followed by cardiologist  We will re-evaluate at next office visit.

## 2024-10-30 NOTE — ASSESSMENT & PLAN NOTE
Currently stable.  Has an appointment with pancreatic specialist   We will continue to monitor

## 2024-10-30 NOTE — ASSESSMENT & PLAN NOTE
Under control.    Continue current medication.    Patient has been followed by cardiologist  We will re-evaluate at next office visit.      Orders:    clopidogrel (PLAVIX) 75 mg tablet; Take 1 tablet (75 mg total) by mouth daily

## 2024-10-30 NOTE — ASSESSMENT & PLAN NOTE
Wt Readings from Last 3 Encounters:   10/03/24 109 kg (241 lb)   10/02/24 111 kg (245 lb)   09/26/24 111 kg (245 lb)     Under control.    Continue current medication.    Patient has been followed by cardiologist  We will re-evaluate at next office visit.

## 2024-10-30 NOTE — PROGRESS NOTES
Transition of Care Visit  Name: Jake Claros      : 1940      MRN: [id number]  Encounter Provider: Ashwin Leyva MD  Encounter Date: 10/30/2024   Encounter department: [facility]    Assessment & Plan  Acute recurrent pancreatitis  Currently stable.  Has an appointment with pancreatic specialist   We will continue to monitor         Type 2 diabetes mellitus with stage 3b chronic kidney disease, with long-term current use of insulin (HCC)    Lab Results   Component Value Date    HGBA1C 6.0 (H) 2024   Under control.    Continue current medication.    We will re-evaluate at next office visit.               HTN (hypertension), benign  Under control.  Continue lisinopril, metoprolol and torsemide.  We will continue to monitor             Mixed hyperlipidemia  Under reasonable control.  Continue rosuvastatin  We will continue to monitor             Stage 3b chronic kidney disease (CKD) (HCC)  Lab Results   Component Value Date    EGFR 48 (L) 10/26/2024    EGFR 49 (L) 10/25/2024    EGFR 45 (L) 10/02/2024    CREATININE 1.46 (H) 10/26/2024    CREATININE 1.43 (H) 10/25/2024    CREATININE 1.54 (H) 10/02/2024   creatinine and GFR stable   Will need periodic BMP  Avoid NSAIDs like ibuprofen Aleve Advil etc.  Avoid high potassium diet.  We will continue to monitor                Chronic heart failure with preserved ejection fraction (HCC)  Wt Readings from Last 3 Encounters:   10/03/24 109 kg (241 lb)   10/02/24 111 kg (245 lb)   24 111 kg (245 lb)     Under control.    Continue current medication.    Patient has been followed by cardiologist  We will re-evaluate at next office visit.                   Coronary artery disease of native artery of native heart with stable angina pectoris (HCC)  Under control.    Continue current medication.    Patient has been followed by cardiologist  We will re-evaluate at next office visit.                  History of Present Illness   {?Quick  Links Encounters * My Last Note * Last Note in Specialty * Snapshot * Since Last Visit * History :42520}  Transitional Care Management Review:   Jake Claros is a 84 y.o. male here for TCM follow up.     During the TCM phone call patient stated:  TCM Call       Date and time call was made  9/26/2024 10:46 AM    Hospital care reviewed  Records reviewed    Patient was hospitialized at  [facility]    Date of Admission  09/19/24    Date of discharge  09/23/24    Diagnosis  Pancratitis    Disposition  Home    Were the patients medications reviewed and updated  No    Current Symptoms  None    Weakness severity  Moderate    Fatigue severity  Moderate          TCM Call       Post hospital issues  None    Should patient be enrolled in anticoag monitoring?  No    Scheduled for follow up?  Yes    Did you obtain your prescribed medications  Yes    Do you need help managing your prescriptions or medications  No    Is transportation to your appointment needed  No    I have advised the patient to call PCP with any new or worsening symptoms  Shara Blackman LPN          Patient here transitional care management.  Patient was hospitalized at [facility] on October 25, 2024 and discharged on October 26, 2024 with acute pancreatitis patient's medical record reviewed.  Currently feeling okay.  Denies any chest pain, shortness of breath, abdominal pain, nausea, vomiting, fever or chills.  Denies any lightheadedness or dizziness.  No bowel or bladder problem.  No headache.  No blood in stool or black stool.  No other complaints      Review of Systems   Constitutional:  Negative for chills and fever.   HENT:  Negative for congestion, ear pain, postnasal drip and sore throat.    Eyes:  Negative for pain and visual disturbance.   Respiratory:  Negative for cough and shortness of breath.    Cardiovascular:  Negative for chest pain and palpitations.   Gastrointestinal:  Negative for abdominal pain, nausea and  vomiting.   Endocrine: Negative for cold intolerance, heat intolerance, polydipsia, polyphagia and polyuria.   Genitourinary:  Negative for difficulty urinating, dysuria, frequency and hematuria.   Musculoskeletal:  Positive for arthralgias (right knee) and gait problem. Negative for back pain.   Skin:  Negative for color change and rash.   Neurological:  Negative for dizziness, seizures, syncope, light-headedness and headaches.   Psychiatric/Behavioral:  Negative for agitation and behavioral problems.    All other systems reviewed and are negative.    Objective   {?Quick Links Trend Vitals * Enter New Vitals * Results Review * Timeline (Adult) * Labs * Imaging * Cardiology * Procedures * Lung Cancer Screening * Surgical eConsent :85560}  There were no vitals taken for this visit.    Physical Exam  Vitals and nursing note reviewed.   Constitutional:       General: He is not in acute distress.     Appearance: Normal appearance. He is well-developed. He is obese. He is not ill-appearing, toxic-appearing or diaphoretic.   HENT:      Head: Normocephalic and atraumatic.      Nose: Nose normal. No congestion or rhinorrhea.      Mouth/Throat:      Mouth: Mucous membranes are moist.      Pharynx: Oropharynx is clear.   Eyes:      General: No scleral icterus.        Right eye: No discharge.         Left eye: No discharge.      Extraocular Movements: Extraocular movements intact.      Conjunctiva/sclera: Conjunctivae normal.      Pupils: Pupils are equal, round, and reactive to light.   Cardiovascular:      Rate and Rhythm: Normal rate and regular rhythm.      Pulses: Normal pulses.      Heart sounds: Normal heart sounds. No murmur heard.     No gallop.   Pulmonary:      Effort: Pulmonary effort is normal. No respiratory distress.      Breath sounds: Normal breath sounds. No wheezing or rales.   Abdominal:      General: Abdomen is flat. Bowel sounds are normal. There is no distension.      Palpations: Abdomen is soft.       Tenderness: There is no abdominal tenderness. There is no right CVA tenderness, left CVA tenderness or guarding.   Musculoskeletal:         General: No swelling or tenderness. Normal range of motion.      Cervical back: Normal range of motion and neck supple. No rigidity.      Right lower leg: Edema (Minimal) present.      Left lower leg: Edema (Minimal) present.   Lymphadenopathy:      Cervical: No cervical adenopathy.   Skin:     General: Skin is warm and dry.      Capillary Refill: Capillary refill takes 2 to 3 seconds.      Coloration: Skin is not jaundiced or pale.   Neurological:      General: No focal deficit present.      Mental Status: He is alert and oriented to person, place, and time. Mental status is at baseline.      Motor: No weakness.   Psychiatric:         Mood and Affect: Mood normal.         Behavior: Behavior normal.       Medications have been reviewed by provider in current encounter    {Administrative / Billing Section (Optional):15042}

## 2024-10-30 NOTE — ASSESSMENT & PLAN NOTE
Currently stable.  Has an appointment with pancreatic specialist   We will continue to monitor    Orders:    pancrelipase, Lip-Prot-Amyl, (CREON) 6,000 units delayed release capsule; Take 6,000 units of lipase by mouth 3 (three) times a day with meals

## 2024-10-30 NOTE — ASSESSMENT & PLAN NOTE
Under control.  Continue lisinopril, metoprolol and torsemide.  We will continue to monitor

## 2024-10-30 NOTE — PROGRESS NOTES
Transition of Care Visit  Name: Jake Claros      : 1940      MRN: [id number]  Encounter Provider: Ashwin Leyva MD  Encounter Date: 10/30/2024   Encounter department: [facility]    Assessment & Plan  Acute recurrent pancreatitis  Currently stable.  Has an appointment with pancreatic specialist   We will continue to monitor    Orders:    pancrelipase, Lip-Prot-Amyl, (CREON) 6,000 units delayed release capsule; Take 6,000 units of lipase by mouth 3 (three) times a day with meals    Type 2 diabetes mellitus with stage 3b chronic kidney disease, with long-term current use of insulin (HCC)    Lab Results   Component Value Date    HGBA1C 6.0 (H) 2024   Under control.    Continue current medication.    We will re-evaluate at next office visit.               HTN (hypertension), benign  Under control.  Continue lisinopril, metoprolol and torsemide.  We will continue to monitor    Orders:    chlorthalidone 25 mg tablet; Take 1 tablet (25 mg total) by mouth daily    amLODIPine (NORVASC) 5 mg tablet; Take 1 tablet (5 mg total) by mouth daily        Mixed hyperlipidemia  Under reasonable control.  Continue rosuvastatin  We will continue to monitor             Stage 3b chronic kidney disease (CKD) (HCC)  Lab Results   Component Value Date    EGFR 48 (L) 10/26/2024    EGFR 49 (L) 10/25/2024    EGFR 45 (L) 10/02/2024    CREATININE 1.46 (H) 10/26/2024    CREATININE 1.43 (H) 10/25/2024    CREATININE 1.54 (H) 10/02/2024   creatinine and GFR stable   Will need periodic BMP  Avoid NSAIDs like ibuprofen Aleve Advil etc.  Avoid high potassium diet.  We will continue to monitor                Chronic heart failure with preserved ejection fraction (HCC)  Wt Readings from Last 3 Encounters:   10/30/24 111 kg (245 lb 3.2 oz)   10/03/24 109 kg (241 lb)   10/02/24 111 kg (245 lb)     Under control.    Continue current medication.    Patient has been followed by cardiologist  We will re-evaluate at  next office visit.                   Coronary artery disease of native artery of native heart with stable angina pectoris (HCC)  Under control.    Continue current medication.    Patient has been followed by cardiologist  We will re-evaluate at next office visit.      Orders:    clopidogrel (PLAVIX) 75 mg tablet; Take 1 tablet (75 mg total) by mouth daily      Encounter for immunization    Orders:    Fluzone High-Dose 0.5 mL IM    Continuous opioid dependence (HCC)  Pain under reasonable control.  We will continue to monitor              History of Present Illness     Transitional Care Management Review:   Jake Claros is a 84 y.o. male here for TCM follow up.     During the TCM phone call patient stated:  TCM Call       Date and time call was made  10/30/2024 10:49 AM    Hospital care reviewed  Records reviewed    [facility]    Date of Admission  10/25/24    Date of discharge  10/26/24    Diagnosis  Pancreatitis    Disposition  Home    Were the patients medications reviewed and updated  No    Current Symptoms  None    Weakness severity  Moderate    Fatigue severity  Moderate          TCM Call       Post hospital issues  None    Should patient be enrolled in anticoag monitoring?  No    Scheduled for follow up?  Yes    Referrals needed  [facility]    Did you obtain your prescribed medications  Yes    Do you need help managing your prescriptions or medications  No    Is transportation to your appointment needed  No    I have advised the patient to call PCP with any new or worsening symptoms  Heaven Blackman LPN          HPI  Review of Systems   Constitutional:  Negative for chills and fever.   HENT:  Negative for congestion, ear pain, postnasal drip and sore throat.    Eyes:  Negative for pain and visual disturbance.   Respiratory:  Negative for cough and shortness of breath.    Cardiovascular:  Negative for chest pain and palpitations.   Gastrointestinal:   "Negative for abdominal pain, nausea and vomiting.   Endocrine: Negative for cold intolerance, heat intolerance, polydipsia, polyphagia and polyuria.   Genitourinary:  Negative for difficulty urinating, dysuria, frequency and hematuria.   Musculoskeletal:  Positive for arthralgias (right knee). Negative for back pain and gait problem.   Skin:  Negative for color change and rash.   Neurological:  Negative for dizziness, seizures, syncope, light-headedness and headaches.   Psychiatric/Behavioral:  Negative for agitation and behavioral problems.    All other systems reviewed and are negative.    Objective     /78 (BP Location: Left arm, Patient Position: Sitting, Cuff Size: Large)   Pulse 55   Temp (!) 97.2 °F (36.2 °C) (Tympanic)   Resp 16   Ht 5' 6\" (1.676 m)   Wt 111 kg (245 lb 3.2 oz)   SpO2 95%   BMI 39.58 kg/m²     Physical Exam  Vitals and nursing note reviewed.   Constitutional:       General: He is not in acute distress.     Appearance: Normal appearance. He is well-developed. He is obese. He is not ill-appearing, toxic-appearing or diaphoretic.   HENT:      Head: Normocephalic and atraumatic.      Nose: Nose normal. No congestion or rhinorrhea.      Mouth/Throat:      Mouth: Mucous membranes are moist.      Pharynx: Oropharynx is clear.   Eyes:      General: No scleral icterus.        Right eye: No discharge.         Left eye: No discharge.      Extraocular Movements: Extraocular movements intact.      Conjunctiva/sclera: Conjunctivae normal.      Pupils: Pupils are equal, round, and reactive to light.   Cardiovascular:      Rate and Rhythm: Normal rate and regular rhythm.      Pulses: Pulses are weak.           Dorsalis pedis pulses are 1+ on the right side and 1+ on the left side.        Posterior tibial pulses are 1+ on the right side and 1+ on the left side.      Heart sounds: Normal heart sounds. No murmur heard.     No gallop.   Pulmonary:      Effort: Pulmonary effort is normal. No " respiratory distress.      Breath sounds: Normal breath sounds. No wheezing or rales.   Abdominal:      General: Abdomen is flat. Bowel sounds are normal. There is no distension.      Palpations: Abdomen is soft.      Tenderness: There is no abdominal tenderness. There is no right CVA tenderness, left CVA tenderness or guarding.   Musculoskeletal:         General: No swelling or tenderness. Normal range of motion.      Cervical back: Normal range of motion and neck supple. No rigidity.      Right lower leg: Edema (Minimal) present.      Left lower leg: Edema (Minimal) present.   Feet:      Right foot:      Skin integrity: No ulcer, skin breakdown, erythema, warmth, callus or dry skin.      Left foot:      Skin integrity: No ulcer, skin breakdown, erythema, warmth, callus or dry skin.   Lymphadenopathy:      Cervical: No cervical adenopathy.   Skin:     General: Skin is warm and dry.      Capillary Refill: Capillary refill takes 2 to 3 seconds.      Coloration: Skin is not jaundiced or pale.   Neurological:      General: No focal deficit present.      Mental Status: He is alert and oriented to person, place, and time. Mental status is at baseline.      Motor: No weakness.   Psychiatric:         Mood and Affect: Mood normal.         Behavior: Behavior normal.     Diabetic Foot Exam    Patient's shoes and socks removed.    Right Foot/Ankle   Right Foot Inspection  Skin Exam: skin normal and skin intact. No dry skin, no warmth, no callus, no erythema, no maceration, no abnormal color, no pre-ulcer, no ulcer and no callus.     Toe Exam: ROM and strength within normal limits. No swelling, no tenderness, erythema and  no right toe deformity    Sensory   Monofilament testing: intact    Vascular  Capillary refills: < 3 seconds  The right DP pulse is 1+. The right PT pulse is 1+.     Left Foot/Ankle  Left Foot Inspection  Skin Exam: skin normal and skin intact. No dry skin, no warmth, no erythema, no maceration, normal color,  no pre-ulcer, no ulcer and no callus.     Toe Exam: ROM and strength within normal limits. No swelling, no tenderness, no erythema and no left toe deformity.     Sensory   Monofilament testing: intact    Vascular  Capillary refills: < 3 seconds  The left DP pulse is 1+. The left PT pulse is 1+.     Assign Risk Category  No deformity present  No loss of protective sensation  Weak pulses  Risk: 1    Medications have been reviewed by provider in current encounter

## 2024-10-30 NOTE — ASSESSMENT & PLAN NOTE
Lab Results   Component Value Date    EGFR 48 (L) 10/26/2024    EGFR 49 (L) 10/25/2024    EGFR 45 (L) 10/02/2024    CREATININE 1.46 (H) 10/26/2024    CREATININE 1.43 (H) 10/25/2024    CREATININE 1.54 (H) 10/02/2024   creatinine and GFR stable   Will need periodic BMP  Avoid NSAIDs like ibuprofen Aleve Advil etc.  Avoid high potassium diet.  We will continue to monitor

## 2024-10-30 NOTE — ASSESSMENT & PLAN NOTE
Under control.  Continue lisinopril, metoprolol and torsemide.  We will continue to monitor    Orders:    chlorthalidone 25 mg tablet; Take 1 tablet (25 mg total) by mouth daily    amLODIPine (NORVASC) 5 mg tablet; Take 1 tablet (5 mg total) by mouth daily

## 2024-11-01 ENCOUNTER — ESTABLISHED COMPREHENSIVE EXAM (OUTPATIENT)
Dept: URBAN - METROPOLITAN AREA CLINIC 6 | Facility: CLINIC | Age: 84
End: 2024-11-01

## 2024-11-01 DIAGNOSIS — H35.3132: ICD-10-CM

## 2024-11-01 DIAGNOSIS — E11.3293: ICD-10-CM

## 2024-11-01 DIAGNOSIS — Z79.4: ICD-10-CM

## 2024-11-01 PROCEDURE — 92202 OPSCPY EXTND ON/MAC DRAW: CPT

## 2024-11-01 PROCEDURE — 92014 COMPRE OPH EXAM EST PT 1/>: CPT

## 2024-11-01 PROCEDURE — 92134 CPTRZ OPH DX IMG PST SGM RTA: CPT

## 2024-11-01 ASSESSMENT — TONOMETRY
OS_IOP_MMHG: 9
OD_IOP_MMHG: 5

## 2024-11-01 ASSESSMENT — VISUAL ACUITY
OD_SC: 20/30-
OS_SC: 20/60-
OS_PH: 20/50+

## 2024-11-08 ENCOUNTER — CLINICAL SUPPORT (OUTPATIENT)
Age: 84
End: 2024-11-08
Payer: MEDICARE

## 2024-11-08 DIAGNOSIS — Z23 NEED FOR COVID-19 VACCINE: Primary | ICD-10-CM

## 2024-11-08 PROCEDURE — 90480 ADMN SARSCOV2 VAC 1/ONLY CMP: CPT

## 2024-11-08 PROCEDURE — 91320 SARSCV2 VAC 30MCG TRS-SUC IM: CPT

## 2024-11-11 ENCOUNTER — ESTABLISHED COMPREHENSIVE EXAM (OUTPATIENT)
Dept: URBAN - METROPOLITAN AREA CLINIC 6 | Facility: CLINIC | Age: 84
End: 2024-11-11

## 2024-11-11 DIAGNOSIS — Z79.4: ICD-10-CM

## 2024-11-11 DIAGNOSIS — H04.123: ICD-10-CM

## 2024-11-11 DIAGNOSIS — Z96.1: ICD-10-CM

## 2024-11-11 DIAGNOSIS — E11.3293: ICD-10-CM

## 2024-11-11 DIAGNOSIS — H35.3132: ICD-10-CM

## 2024-11-11 DIAGNOSIS — H26.493: ICD-10-CM

## 2024-11-11 DIAGNOSIS — H43.391: ICD-10-CM

## 2024-11-11 PROCEDURE — 92014 COMPRE OPH EXAM EST PT 1/>: CPT

## 2024-11-11 ASSESSMENT — VISUAL ACUITY
OD_GLARE: 20/70
OS_SC: 20/60
OS_GLARE: 20/70
OD_SC: 20/60
OU_SC: J1

## 2024-11-11 ASSESSMENT — TONOMETRY
OS_IOP_MMHG: 6
OD_IOP_MMHG: 7

## 2024-12-05 DIAGNOSIS — Z87.39 HISTORY OF GOUT: ICD-10-CM

## 2024-12-06 RX ORDER — ALLOPURINOL 100 MG/1
100 TABLET ORAL DAILY
Qty: 90 TABLET | Refills: 1 | Status: SHIPPED | OUTPATIENT
Start: 2024-12-06

## 2024-12-11 ENCOUNTER — HOSPITAL ENCOUNTER (OUTPATIENT)
Facility: HOSPITAL | Age: 84
Setting detail: OBSERVATION
Discharge: HOME | End: 2024-12-13
Attending: EMERGENCY MEDICINE | Admitting: INTERNAL MEDICINE
Payer: MEDICARE

## 2024-12-11 DIAGNOSIS — I20.1 VASOSPASTIC ANGINA (HCC): ICD-10-CM

## 2024-12-11 DIAGNOSIS — R07.9 CHEST PAIN: ICD-10-CM

## 2024-12-11 DIAGNOSIS — I25.118 CORONARY ARTERY DISEASE OF NATIVE ARTERY OF NATIVE HEART WITH STABLE ANGINA PECTORIS (HCC): ICD-10-CM

## 2024-12-11 DIAGNOSIS — I20.89 STABLE ANGINA PECTORIS (HCC): Primary | ICD-10-CM

## 2024-12-11 DIAGNOSIS — N18.9 CKD (CHRONIC KIDNEY DISEASE): ICD-10-CM

## 2024-12-11 DIAGNOSIS — I10 HTN (HYPERTENSION), BENIGN: ICD-10-CM

## 2024-12-11 DIAGNOSIS — E11.65 HYPERGLYCEMIA DUE TO DIABETES MELLITUS (HCC): ICD-10-CM

## 2024-12-11 DIAGNOSIS — I20.0 UNSTABLE ANGINA (HCC): ICD-10-CM

## 2024-12-11 PROCEDURE — 99285 EMERGENCY DEPT VISIT HI MDM: CPT

## 2024-12-11 PROCEDURE — 93005 ELECTROCARDIOGRAM TRACING: CPT

## 2024-12-12 ENCOUNTER — APPOINTMENT (EMERGENCY)
Dept: RADIOLOGY | Facility: HOSPITAL | Age: 84
End: 2024-12-12
Payer: MEDICARE

## 2024-12-12 ENCOUNTER — APPOINTMENT (EMERGENCY)
Dept: CT IMAGING | Facility: HOSPITAL | Age: 84
End: 2024-12-12
Payer: MEDICARE

## 2024-12-12 ENCOUNTER — APPOINTMENT (OUTPATIENT)
Dept: NON INVASIVE DIAGNOSTICS | Facility: HOSPITAL | Age: 84
End: 2024-12-12
Payer: MEDICARE

## 2024-12-12 LAB
2HR DELTA HS TROPONIN: -1 NG/L
4HR DELTA HS TROPONIN: 1 NG/L
ALBUMIN SERPL BCG-MCNC: 3.8 G/DL (ref 3.5–5)
ALP SERPL-CCNC: 84 U/L (ref 34–104)
ALT SERPL W P-5'-P-CCNC: 16 U/L (ref 7–52)
ANION GAP SERPL CALCULATED.3IONS-SCNC: 8 MMOL/L (ref 4–13)
AORTIC ROOT: 3.8 CM
AORTIC VALVE MEAN VELOCITY: 20.3 M/S
APICAL FOUR CHAMBER EJECTION FRACTION: 66 %
APTT PPP: 28 SECONDS (ref 23–34)
ASCENDING AORTA: 3.5 CM
AST SERPL W P-5'-P-CCNC: 22 U/L (ref 13–39)
ATRIAL RATE: 76 BPM
ATRIAL RATE: 77 BPM
AV AREA BY CONTINUOUS VTI: 1.4 CM2
AV AREA PEAK VELOCITY: 1.4 CM2
AV LVOT MEAN GRADIENT: 3 MMHG
AV LVOT PEAK GRADIENT: 5 MMHG
AV MEAN GRADIENT: 18 MMHG
AV PEAK GRADIENT: 29 MMHG
AV VALVE AREA: 1.44 CM2
AV VELOCITY RATIO: 0.4
BASOPHILS # BLD AUTO: 0.02 THOUSANDS/ÂΜL (ref 0–0.1)
BASOPHILS NFR BLD AUTO: 0 % (ref 0–1)
BILIRUB SERPL-MCNC: 0.37 MG/DL (ref 0.2–1)
BNP SERPL-MCNC: 41 PG/ML (ref 0–100)
BSA FOR ECHO PROCEDURE: 2.18 M2
BUN SERPL-MCNC: 37 MG/DL (ref 5–25)
CALCIUM SERPL-MCNC: 8.6 MG/DL (ref 8.4–10.2)
CARDIAC TROPONIN I PNL SERPL HS: 16 NG/L (ref ?–50)
CARDIAC TROPONIN I PNL SERPL HS: 17 NG/L (ref ?–50)
CARDIAC TROPONIN I PNL SERPL HS: 18 NG/L (ref ?–50)
CHLORIDE SERPL-SCNC: 103 MMOL/L (ref 96–108)
CO2 SERPL-SCNC: 27 MMOL/L (ref 21–32)
CREAT SERPL-MCNC: 1.57 MG/DL (ref 0.6–1.3)
D DIMER PPP FEU-MCNC: 1.67 UG/ML FEU
DOP CALC AO PEAK VEL: 2.7 M/S
DOP CALC AO VTI: 55.9 CM
DOP CALC LVOT AREA: 3.46 CM2
DOP CALC LVOT CARDIAC INDEX: 3.12 L/MIN/M2
DOP CALC LVOT CARDIAC OUTPUT: 6.8 L/MIN
DOP CALC LVOT DIAMETER: 2.1 CM
DOP CALC LVOT PEAK VEL VTI: 23.19 CM
DOP CALC LVOT PEAK VEL: 1.08 M/S
DOP CALC LVOT STROKE INDEX: 36.7 ML/M2
DOP CALC LVOT STROKE VOLUME: 80.28
EOSINOPHIL # BLD AUTO: 0.17 THOUSAND/ÂΜL (ref 0–0.61)
EOSINOPHIL NFR BLD AUTO: 3 % (ref 0–6)
ERYTHROCYTE [DISTWIDTH] IN BLOOD BY AUTOMATED COUNT: 14 % (ref 11.6–15.1)
FRACTIONAL SHORTENING: 34 (ref 28–44)
GFR SERPL CREATININE-BSD FRML MDRD: 40 ML/MIN/1.73SQ M
GLUCOSE SERPL-MCNC: 142 MG/DL (ref 65–140)
GLUCOSE SERPL-MCNC: 174 MG/DL (ref 65–140)
GLUCOSE SERPL-MCNC: 193 MG/DL (ref 65–140)
GLUCOSE SERPL-MCNC: 222 MG/DL (ref 65–140)
HCT VFR BLD AUTO: 36.6 % (ref 36.5–49.3)
HGB BLD-MCNC: 11.8 G/DL (ref 12–17)
IMM GRANULOCYTES # BLD AUTO: 0.01 THOUSAND/UL (ref 0–0.2)
IMM GRANULOCYTES NFR BLD AUTO: 0 % (ref 0–2)
INR PPP: 1.03 (ref 0.85–1.19)
INTERVENTRICULAR SEPTUM IN DIASTOLE (PARASTERNAL SHORT AXIS VIEW): 2 CM
INTERVENTRICULAR SEPTUM: 2 CM (ref 0.6–1.1)
KCT BLD-ACNC: 231 SEC (ref 89–137)
LAAS-AP2: 21 CM2
LAAS-AP4: 15 CM2
LEFT ATRIUM SIZE: 4.4 CM
LEFT ATRIUM VOLUME (MOD BIPLANE): 54 ML
LEFT ATRIUM VOLUME INDEX (MOD BIPLANE): 24.8 ML/M2
LEFT INTERNAL DIMENSION IN SYSTOLE: 3.1 CM (ref 2.1–4)
LEFT VENTRICULAR INTERNAL DIMENSION IN DIASTOLE: 4.7 CM (ref 3.5–6)
LEFT VENTRICULAR POSTERIOR WALL IN END DIASTOLE: 1.5 CM
LEFT VENTRICULAR STROKE VOLUME: 62 ML
LIPASE SERPL-CCNC: 10 U/L (ref 11–82)
LVSV (TEICH): 62 ML
LYMPHOCYTES # BLD AUTO: 2.09 THOUSANDS/ÂΜL (ref 0.6–4.47)
LYMPHOCYTES NFR BLD AUTO: 30 % (ref 14–44)
MCH RBC QN AUTO: 31.6 PG (ref 26.8–34.3)
MCHC RBC AUTO-ENTMCNC: 32.2 G/DL (ref 31.4–37.4)
MCV RBC AUTO: 98 FL (ref 82–98)
MONOCYTES # BLD AUTO: 0.61 THOUSAND/ÂΜL (ref 0.17–1.22)
MONOCYTES NFR BLD AUTO: 9 % (ref 4–12)
NEUTROPHILS # BLD AUTO: 4.02 THOUSANDS/ÂΜL (ref 1.85–7.62)
NEUTS SEG NFR BLD AUTO: 58 % (ref 43–75)
NRBC BLD AUTO-RTO: 0 /100 WBCS
P AXIS: 50 DEGREES
P AXIS: 55 DEGREES
PLATELET # BLD AUTO: 191 THOUSANDS/UL (ref 149–390)
PMV BLD AUTO: 10.2 FL (ref 8.9–12.7)
POTASSIUM SERPL-SCNC: 4.1 MMOL/L (ref 3.5–5.3)
PR INTERVAL: 232 MS
PR INTERVAL: 282 MS
PROT SERPL-MCNC: 6.7 G/DL (ref 6.4–8.4)
PROTHROMBIN TIME: 14.2 SECONDS (ref 12.3–15)
QRS AXIS: -55 DEGREES
QRS AXIS: -63 DEGREES
QRSD INTERVAL: 166 MS
QRSD INTERVAL: 170 MS
QT INTERVAL: 442 MS
QT INTERVAL: 456 MS
QTC INTERVAL: 497 MS
QTC INTERVAL: 516 MS
RA PRESSURE ESTIMATED: 8 MMHG
RBC # BLD AUTO: 3.73 MILLION/UL (ref 3.88–5.62)
RIGHT ATRIUM AREA SYSTOLE A4C: 20 CM2
RIGHT VENTRICLE ID DIMENSION: 4 CM
RV PSP: 37 MMHG
SL CV LEFT ATRIUM LENGTH A2C: 5.3 CM
SL CV LV EF: 65
SL CV PED ECHO LEFT VENTRICLE DIASTOLIC VOLUME (MOD BIPLANE) 2D: 100 ML
SL CV PED ECHO LEFT VENTRICLE SYSTOLIC VOLUME (MOD BIPLANE) 2D: 39 ML
SODIUM SERPL-SCNC: 138 MMOL/L (ref 135–147)
SPECIMEN SOURCE: ABNORMAL
T WAVE AXIS: 77 DEGREES
T WAVE AXIS: 78 DEGREES
TR MAX PG: 29 MMHG
TR PEAK VELOCITY: 2.7 M/S
TRICUSPID ANNULAR PLANE SYSTOLIC EXCURSION: 2.2 CM
TRICUSPID VALVE PEAK REGURGITATION VELOCITY: 2.68 M/S
VENTRICULAR RATE: 76 BPM
VENTRICULAR RATE: 77 BPM
WBC # BLD AUTO: 6.92 THOUSAND/UL (ref 4.31–10.16)

## 2024-12-12 PROCEDURE — C1753 CATH, INTRAVAS ULTRASOUND: HCPCS | Performed by: INTERNAL MEDICINE

## 2024-12-12 PROCEDURE — 93306 TTE W/DOPPLER COMPLETE: CPT | Performed by: INTERNAL MEDICINE

## 2024-12-12 PROCEDURE — 99223 1ST HOSP IP/OBS HIGH 75: CPT | Performed by: STUDENT IN AN ORGANIZED HEALTH CARE EDUCATION/TRAINING PROGRAM

## 2024-12-12 PROCEDURE — 36415 COLL VENOUS BLD VENIPUNCTURE: CPT | Performed by: PHYSICIAN ASSISTANT

## 2024-12-12 PROCEDURE — C1769 GUIDE WIRE: HCPCS | Performed by: INTERNAL MEDICINE

## 2024-12-12 PROCEDURE — 84484 ASSAY OF TROPONIN QUANT: CPT | Performed by: PHYSICIAN ASSISTANT

## 2024-12-12 PROCEDURE — 93458 L HRT ARTERY/VENTRICLE ANGIO: CPT | Performed by: INTERNAL MEDICINE

## 2024-12-12 PROCEDURE — 85347 COAGULATION TIME ACTIVATED: CPT

## 2024-12-12 PROCEDURE — 71045 X-RAY EXAM CHEST 1 VIEW: CPT

## 2024-12-12 PROCEDURE — NC001 PR NO CHARGE: Performed by: INTERNAL MEDICINE

## 2024-12-12 PROCEDURE — C1874 STENT, COATED/COV W/DEL SYS: HCPCS | Performed by: INTERNAL MEDICINE

## 2024-12-12 PROCEDURE — 92978 ENDOLUMINL IVUS OCT C 1ST: CPT | Performed by: INTERNAL MEDICINE

## 2024-12-12 PROCEDURE — 85025 COMPLETE CBC W/AUTO DIFF WBC: CPT | Performed by: PHYSICIAN ASSISTANT

## 2024-12-12 PROCEDURE — 99205 OFFICE O/P NEW HI 60 MIN: CPT | Performed by: INTERNAL MEDICINE

## 2024-12-12 PROCEDURE — 71275 CT ANGIOGRAPHY CHEST: CPT

## 2024-12-12 PROCEDURE — 85730 THROMBOPLASTIN TIME PARTIAL: CPT | Performed by: PHYSICIAN ASSISTANT

## 2024-12-12 PROCEDURE — 93010 ELECTROCARDIOGRAM REPORT: CPT | Performed by: INTERNAL MEDICINE

## 2024-12-12 PROCEDURE — C9600 PERC DRUG-EL COR STENT SING: HCPCS | Performed by: INTERNAL MEDICINE

## 2024-12-12 PROCEDURE — 92928 PRQ TCAT PLMT NTRAC ST 1 LES: CPT | Performed by: INTERNAL MEDICINE

## 2024-12-12 PROCEDURE — C1725 CATH, TRANSLUMIN NON-LASER: HCPCS | Performed by: INTERNAL MEDICINE

## 2024-12-12 PROCEDURE — 99152 MOD SED SAME PHYS/QHP 5/>YRS: CPT | Performed by: INTERNAL MEDICINE

## 2024-12-12 PROCEDURE — 80053 COMPREHEN METABOLIC PANEL: CPT | Performed by: PHYSICIAN ASSISTANT

## 2024-12-12 PROCEDURE — 93306 TTE W/DOPPLER COMPLETE: CPT

## 2024-12-12 PROCEDURE — C1887 CATHETER, GUIDING: HCPCS | Performed by: INTERNAL MEDICINE

## 2024-12-12 PROCEDURE — 83690 ASSAY OF LIPASE: CPT | Performed by: PHYSICIAN ASSISTANT

## 2024-12-12 PROCEDURE — 93005 ELECTROCARDIOGRAM TRACING: CPT

## 2024-12-12 PROCEDURE — 82948 REAGENT STRIP/BLOOD GLUCOSE: CPT

## 2024-12-12 PROCEDURE — 99153 MOD SED SAME PHYS/QHP EA: CPT | Performed by: INTERNAL MEDICINE

## 2024-12-12 PROCEDURE — 99285 EMERGENCY DEPT VISIT HI MDM: CPT | Performed by: PHYSICIAN ASSISTANT

## 2024-12-12 PROCEDURE — C1894 INTRO/SHEATH, NON-LASER: HCPCS | Performed by: INTERNAL MEDICINE

## 2024-12-12 PROCEDURE — 83880 ASSAY OF NATRIURETIC PEPTIDE: CPT | Performed by: PHYSICIAN ASSISTANT

## 2024-12-12 PROCEDURE — 99204 OFFICE O/P NEW MOD 45 MIN: CPT | Performed by: INTERNAL MEDICINE

## 2024-12-12 PROCEDURE — 85379 FIBRIN DEGRADATION QUANT: CPT | Performed by: PHYSICIAN ASSISTANT

## 2024-12-12 PROCEDURE — 96360 HYDRATION IV INFUSION INIT: CPT

## 2024-12-12 PROCEDURE — 85610 PROTHROMBIN TIME: CPT | Performed by: PHYSICIAN ASSISTANT

## 2024-12-12 DEVICE — STENT ONYXNG40018UX ONYX 4.00X18RX
Type: IMPLANTABLE DEVICE | Site: CORONARY | Status: FUNCTIONAL
Brand: ONYX FRONTIER™

## 2024-12-12 RX ORDER — SODIUM CHLORIDE 9 MG/ML
INJECTION, SOLUTION INTRAVENOUS
Status: COMPLETED | OUTPATIENT
Start: 2024-12-12 | End: 2024-12-12

## 2024-12-12 RX ORDER — ONDANSETRON 4 MG/1
4 TABLET, FILM COATED ORAL EVERY 8 HOURS PRN
COMMUNITY

## 2024-12-12 RX ORDER — NITROGLYCERIN 20 MG/100ML
INJECTION INTRAVENOUS
Status: COMPLETED | OUTPATIENT
Start: 2024-12-12 | End: 2024-12-12

## 2024-12-12 RX ORDER — ALLOPURINOL 100 MG/1
100 TABLET ORAL DAILY
Status: DISCONTINUED | OUTPATIENT
Start: 2024-12-12 | End: 2024-12-13 | Stop reason: HOSPADM

## 2024-12-12 RX ORDER — FAMOTIDINE 20 MG/1
20 TABLET, FILM COATED ORAL
Status: DISCONTINUED | OUTPATIENT
Start: 2024-12-12 | End: 2024-12-13 | Stop reason: HOSPADM

## 2024-12-12 RX ORDER — LIDOCAINE HYDROCHLORIDE 10 MG/ML
INJECTION, SOLUTION EPIDURAL; INFILTRATION; INTRACAUDAL; PERINEURAL CODE/TRAUMA/SEDATION MEDICATION
Status: DISCONTINUED | OUTPATIENT
Start: 2024-12-12 | End: 2024-12-12 | Stop reason: HOSPADM

## 2024-12-12 RX ORDER — CLOTRIMAZOLE 1 %
1 CREAM (GRAM) TOPICAL 2 TIMES DAILY PRN
COMMUNITY

## 2024-12-12 RX ORDER — LORATADINE 10 MG/1
10 TABLET ORAL DAILY
COMMUNITY

## 2024-12-12 RX ORDER — LISINOPRIL 10 MG/1
20 TABLET ORAL DAILY
Status: DISCONTINUED | OUTPATIENT
Start: 2024-12-12 | End: 2024-12-12

## 2024-12-12 RX ORDER — IPRATROPIUM BROMIDE 21 UG/1
2 SPRAY, METERED NASAL EVERY 12 HOURS
Status: DISCONTINUED | OUTPATIENT
Start: 2024-12-12 | End: 2024-12-12

## 2024-12-12 RX ORDER — NITROGLYCERIN 20 MG/100ML
INJECTION INTRAVENOUS CODE/TRAUMA/SEDATION MEDICATION
Status: DISCONTINUED | OUTPATIENT
Start: 2024-12-12 | End: 2024-12-12 | Stop reason: HOSPADM

## 2024-12-12 RX ORDER — RANOLAZINE 500 MG/1
500 TABLET, EXTENDED RELEASE ORAL 2 TIMES DAILY
Status: DISCONTINUED | OUTPATIENT
Start: 2024-12-12 | End: 2024-12-13 | Stop reason: HOSPADM

## 2024-12-12 RX ORDER — AMLODIPINE BESYLATE 10 MG/1
10 TABLET ORAL DAILY
Status: DISCONTINUED | OUTPATIENT
Start: 2024-12-12 | End: 2024-12-13 | Stop reason: HOSPADM

## 2024-12-12 RX ORDER — TAMSULOSIN HYDROCHLORIDE 0.4 MG/1
0.8 CAPSULE ORAL DAILY
Status: DISCONTINUED | OUTPATIENT
Start: 2024-12-12 | End: 2024-12-13 | Stop reason: HOSPADM

## 2024-12-12 RX ORDER — CLONIDINE HYDROCHLORIDE 0.1 MG/1
0.1 TABLET ORAL 2 TIMES DAILY
Status: DISCONTINUED | OUTPATIENT
Start: 2024-12-12 | End: 2024-12-13 | Stop reason: HOSPADM

## 2024-12-12 RX ORDER — ACETAMINOPHEN 325 MG/1
650 TABLET ORAL EVERY 6 HOURS PRN
Status: DISCONTINUED | OUTPATIENT
Start: 2024-12-12 | End: 2024-12-13 | Stop reason: HOSPADM

## 2024-12-12 RX ORDER — CLOPIDOGREL BISULFATE 75 MG/1
75 TABLET ORAL DAILY
Status: DISCONTINUED | OUTPATIENT
Start: 2024-12-12 | End: 2024-12-13 | Stop reason: HOSPADM

## 2024-12-12 RX ORDER — OXYCODONE HYDROCHLORIDE 5 MG/1
5 TABLET ORAL AS NEEDED
COMMUNITY

## 2024-12-12 RX ORDER — PANTOPRAZOLE SODIUM 40 MG/1
40 TABLET, DELAYED RELEASE ORAL
Status: DISCONTINUED | OUTPATIENT
Start: 2024-12-12 | End: 2024-12-13 | Stop reason: HOSPADM

## 2024-12-12 RX ORDER — ASPIRIN 81 MG/1
81 TABLET, CHEWABLE ORAL DAILY
Status: DISCONTINUED | OUTPATIENT
Start: 2024-12-13 | End: 2024-12-13 | Stop reason: HOSPADM

## 2024-12-12 RX ORDER — SODIUM CHLORIDE 9 MG/ML
125 INJECTION, SOLUTION INTRAVENOUS CONTINUOUS
Status: DISPENSED | OUTPATIENT
Start: 2024-12-12 | End: 2024-12-12

## 2024-12-12 RX ORDER — DOCUSATE SODIUM 100 MG/1
100 CAPSULE, LIQUID FILLED ORAL DAILY
Status: DISCONTINUED | OUTPATIENT
Start: 2024-12-12 | End: 2024-12-13 | Stop reason: HOSPADM

## 2024-12-12 RX ORDER — INSULIN LISPRO 100 [IU]/ML
1-6 INJECTION, SOLUTION INTRAVENOUS; SUBCUTANEOUS
Status: DISCONTINUED | OUTPATIENT
Start: 2024-12-12 | End: 2024-12-13 | Stop reason: HOSPADM

## 2024-12-12 RX ORDER — CHLORTHALIDONE 25 MG/1
25 TABLET ORAL DAILY
Status: DISCONTINUED | OUTPATIENT
Start: 2024-12-12 | End: 2024-12-12

## 2024-12-12 RX ORDER — CLONIDINE HYDROCHLORIDE 0.1 MG/1
0.1 TABLET ORAL EVERY 12 HOURS
Status: DISCONTINUED | OUTPATIENT
Start: 2024-12-12 | End: 2024-12-12

## 2024-12-12 RX ORDER — MIDAZOLAM HYDROCHLORIDE 2 MG/2ML
INJECTION, SOLUTION INTRAMUSCULAR; INTRAVENOUS CODE/TRAUMA/SEDATION MEDICATION
Status: DISCONTINUED | OUTPATIENT
Start: 2024-12-12 | End: 2024-12-12 | Stop reason: HOSPADM

## 2024-12-12 RX ORDER — FLUTICASONE PROPIONATE 50 MCG
2 SPRAY, SUSPENSION (ML) NASAL DAILY
Status: DISCONTINUED | OUTPATIENT
Start: 2024-12-12 | End: 2024-12-13 | Stop reason: HOSPADM

## 2024-12-12 RX ORDER — HEPARIN SODIUM 5000 [USP'U]/ML
5000 INJECTION, SOLUTION INTRAVENOUS; SUBCUTANEOUS EVERY 8 HOURS SCHEDULED
Status: DISCONTINUED | OUTPATIENT
Start: 2024-12-12 | End: 2024-12-13 | Stop reason: HOSPADM

## 2024-12-12 RX ORDER — ASPIRIN 81 MG/1
324 TABLET, CHEWABLE ORAL ONCE
Status: COMPLETED | OUTPATIENT
Start: 2024-12-12 | End: 2024-12-12

## 2024-12-12 RX ORDER — NITROGLYCERIN 0.4 MG/1
0.4 TABLET SUBLINGUAL
Status: DISCONTINUED | OUTPATIENT
Start: 2024-12-12 | End: 2024-12-13 | Stop reason: HOSPADM

## 2024-12-12 RX ORDER — HEPARIN SODIUM 1000 [USP'U]/ML
INJECTION, SOLUTION INTRAVENOUS; SUBCUTANEOUS CODE/TRAUMA/SEDATION MEDICATION
Status: DISCONTINUED | OUTPATIENT
Start: 2024-12-12 | End: 2024-12-12 | Stop reason: HOSPADM

## 2024-12-12 RX ORDER — VERAPAMIL HCL 2.5 MG/ML
AMPUL (ML) INTRAVENOUS CODE/TRAUMA/SEDATION MEDICATION
Status: DISCONTINUED | OUTPATIENT
Start: 2024-12-12 | End: 2024-12-12 | Stop reason: HOSPADM

## 2024-12-12 RX ORDER — LORATADINE 10 MG/1
10 TABLET ORAL DAILY
Status: DISCONTINUED | OUTPATIENT
Start: 2024-12-12 | End: 2024-12-13 | Stop reason: HOSPADM

## 2024-12-12 RX ORDER — FUROSEMIDE 10 MG/ML
INJECTION INTRAMUSCULAR; INTRAVENOUS CODE/TRAUMA/SEDATION MEDICATION
Status: DISCONTINUED | OUTPATIENT
Start: 2024-12-12 | End: 2024-12-12 | Stop reason: HOSPADM

## 2024-12-12 RX ORDER — ATORVASTATIN CALCIUM 40 MG/1
40 TABLET, FILM COATED ORAL
Status: DISCONTINUED | OUTPATIENT
Start: 2024-12-12 | End: 2024-12-13 | Stop reason: HOSPADM

## 2024-12-12 RX ORDER — FENTANYL CITRATE 50 UG/ML
INJECTION, SOLUTION INTRAMUSCULAR; INTRAVENOUS CODE/TRAUMA/SEDATION MEDICATION
Status: DISCONTINUED | OUTPATIENT
Start: 2024-12-12 | End: 2024-12-12 | Stop reason: HOSPADM

## 2024-12-12 RX ORDER — HYDROMORPHONE HYDROCHLORIDE 2 MG/1
2 TABLET ORAL 2 TIMES DAILY PRN
Status: DISCONTINUED | OUTPATIENT
Start: 2024-12-12 | End: 2024-12-13 | Stop reason: HOSPADM

## 2024-12-12 RX ORDER — INSULIN GLARGINE 100 [IU]/ML
20 INJECTION, SOLUTION SUBCUTANEOUS EVERY 12 HOURS SCHEDULED
Status: DISCONTINUED | OUTPATIENT
Start: 2024-12-12 | End: 2024-12-13 | Stop reason: HOSPADM

## 2024-12-12 RX ORDER — INSULIN LISPRO 100 [IU]/ML
INJECTION, SOLUTION INTRAVENOUS; SUBCUTANEOUS
COMMUNITY

## 2024-12-12 RX ORDER — CLOTRIMAZOLE 1 %
1 CREAM (GRAM) TOPICAL 2 TIMES DAILY PRN
Status: DISCONTINUED | OUTPATIENT
Start: 2024-12-12 | End: 2024-12-13 | Stop reason: HOSPADM

## 2024-12-12 RX ADMIN — SODIUM CHLORIDE 125 ML/HR: 0.9 INJECTION, SOLUTION INTRAVENOUS at 14:31

## 2024-12-12 RX ADMIN — FLUTICASONE PROPIONATE 2 SPRAY: 50 SPRAY, METERED NASAL at 09:31

## 2024-12-12 RX ADMIN — INSULIN LISPRO 2 UNITS: 100 INJECTION, SOLUTION INTRAVENOUS; SUBCUTANEOUS at 21:08

## 2024-12-12 RX ADMIN — RANOLAZINE 500 MG: 500 TABLET, FILM COATED, EXTENDED RELEASE ORAL at 17:39

## 2024-12-12 RX ADMIN — CLOPIDOGREL BISULFATE 75 MG: 75 TABLET ORAL at 08:51

## 2024-12-12 RX ADMIN — INSULIN LISPRO 1 UNITS: 100 INJECTION, SOLUTION INTRAVENOUS; SUBCUTANEOUS at 17:35

## 2024-12-12 RX ADMIN — IOHEXOL 80 ML: 350 INJECTION, SOLUTION INTRAVENOUS at 02:37

## 2024-12-12 RX ADMIN — LISINOPRIL 20 MG: 10 TABLET ORAL at 08:50

## 2024-12-12 RX ADMIN — ALLOPURINOL 100 MG: 100 TABLET ORAL at 08:50

## 2024-12-12 RX ADMIN — INSULIN GLARGINE 20 UNITS: 100 INJECTION, SOLUTION SUBCUTANEOUS at 20:59

## 2024-12-12 RX ADMIN — PANCRELIPASE 6000 UNITS: 30000; 6000; 19000 CAPSULE, DELAYED RELEASE PELLETS ORAL at 17:39

## 2024-12-12 RX ADMIN — LORATADINE 10 MG: 10 TABLET ORAL at 08:51

## 2024-12-12 RX ADMIN — RANOLAZINE 500 MG: 500 TABLET, FILM COATED, EXTENDED RELEASE ORAL at 08:51

## 2024-12-12 RX ADMIN — AMLODIPINE BESYLATE 10 MG: 10 TABLET ORAL at 08:50

## 2024-12-12 RX ADMIN — SODIUM CHLORIDE 500 ML: 0.9 INJECTION, SOLUTION INTRAVENOUS at 02:14

## 2024-12-12 RX ADMIN — NITROGLYCERIN 0.5 INCH: 20 OINTMENT TOPICAL at 02:55

## 2024-12-12 RX ADMIN — DOCUSATE SODIUM 100 MG: 100 CAPSULE, LIQUID FILLED ORAL at 08:51

## 2024-12-12 RX ADMIN — CHLORTHALIDONE 25 MG: 25 TABLET ORAL at 08:51

## 2024-12-12 RX ADMIN — CLONIDINE HYDROCHLORIDE 0.1 MG: 0.1 TABLET ORAL at 11:19

## 2024-12-12 RX ADMIN — TAMSULOSIN HYDROCHLORIDE 0.8 MG: 0.4 CAPSULE ORAL at 08:51

## 2024-12-12 RX ADMIN — FAMOTIDINE 20 MG: 20 TABLET, FILM COATED ORAL at 20:59

## 2024-12-12 RX ADMIN — SODIUM CHLORIDE 333 ML: 0.9 INJECTION, SOLUTION INTRAVENOUS at 11:22

## 2024-12-12 RX ADMIN — PANTOPRAZOLE SODIUM 40 MG: 40 TABLET, DELAYED RELEASE ORAL at 08:50

## 2024-12-12 RX ADMIN — ASPIRIN 81 MG 324 MG: 81 TABLET ORAL at 10:45

## 2024-12-12 RX ADMIN — HEPARIN SODIUM 5000 UNITS: 5000 INJECTION, SOLUTION INTRAVENOUS; SUBCUTANEOUS at 20:59

## 2024-12-12 RX ADMIN — ATORVASTATIN CALCIUM 40 MG: 40 TABLET, FILM COATED ORAL at 17:38

## 2024-12-12 RX ADMIN — HYDROMORPHONE HYDROCHLORIDE 2 MG: 2 TABLET ORAL at 14:31

## 2024-12-12 RX ADMIN — HEPARIN SODIUM 5000 UNITS: 5000 INJECTION, SOLUTION INTRAVENOUS; SUBCUTANEOUS at 08:51

## 2024-12-12 NOTE — ASSESSMENT & PLAN NOTE
He presents with exertional chest discomfort lasting > 30 minutes  Troponin 17 --> 16 --> 18  EKG show NSR with known bifascicular block, anterolateral T wave abnormalities  Differential for now includes unstable angina vs progression of aortic stenosis vs symptomatic hypertension  His current symptoms he says feels much different than his vasospastic angina (which he feels has been stable)

## 2024-12-12 NOTE — ASSESSMENT & PLAN NOTE
Lab Results   Component Value Date    HGBA1C 6.0 (H) 08/26/2024       Recent Labs     12/12/24  0856   POCGLU 142*       Blood Sugar Average: Last 72 hrs:  (P) 142

## 2024-12-12 NOTE — ASSESSMENT & PLAN NOTE
History of PAULA.  Patient wears nightly CPAP  Home medication: Amlodipine, chlorthalidone, clonidine, chlorthalidone, lisinopril  Blood pressure elevated somewhat labile today  Lisinopril and chlorthalidone placed on hold after a.m. dose 12/12  Continue other medication.  May need to increase amlodipine

## 2024-12-12 NOTE — ASSESSMENT & PLAN NOTE
Hx of recurrent pancreatitis requiring PO narcotics at home   Previously on chronic oxycodone, switched to PO dilaudid 2mg BID PRN -- PDMP reviewed and was last filled 10/08 and 10/09 for about 2 month supply   Continue home regimen PO dilaudid PRN

## 2024-12-12 NOTE — ED NOTES
Messaged Admitting REJI Denny regarding ordered AM Labs. Pt had labs drawn/completed at 0100.  Orders for repeat lab work will be for 12/13/24. REJI Denny to change order     Carolin Cazares RN  12/12/24 2192

## 2024-12-12 NOTE — ASSESSMENT & PLAN NOTE
He carries a history of vasospastic angina and feels this has been stable for quite some time  Current symptoms feel different from prior vasospastic angina  Prehospital Rx: Amlodipine 5 daily   -Maintained on long-acting nitrates

## 2024-12-12 NOTE — ASSESSMENT & PLAN NOTE
Echocardiogram pending  CTA of the chest negative for PE  Left heart cath pending.  Plan for today.  Isotonic saline started prior to contrast/postcontrast for renal protection  Patient understands risks and is willing to proceed with cardiac cath.  Risks and benefits thoroughly discussed.

## 2024-12-12 NOTE — ASSESSMENT & PLAN NOTE
Prehospital Rx: Amlodipine 5 daily, chlorthalidone 25 daily, clonidine 0.1 twice daily, lisinopril 20 daily

## 2024-12-12 NOTE — ED NOTES
Cath lab nurses came down to transport pt to cath lab at this time. Belongings sent with nurses.             Tonia Rodriguez, SUNDEEP  12/12/24 7817

## 2024-12-12 NOTE — ASSESSMENT & PLAN NOTE
"Lab Results   Component Value Date    HGBA1C 6.0 (H) 08/26/2024       No results for input(s): \"POCGLU\" in the last 72 hours.    Blood Sugar Average: Last 72 hrs:    Well controlled A1c 6.0   Home regimen: Tresiba 20U BID, sliding scale humalog   Continue home insulin regimen   Hypoglycemia protocol    "

## 2024-12-12 NOTE — CONSULTS
NEPHROLOGY HOSPITAL CONSULTATION   Jake Claros 84 y.o. male MRN: [id number]  Unit/Bed#: AN CATH LAB ROOM Encounter: [id number]    Brief History of Admission -84-year-old male admitted with chest pain.  Nephrology consulted for management of CKD/renal optimization and prevention of MAO in anticipation of cardiac cath and status post CTA.    Assessment & Plan  Stage 3b chronic kidney disease (CKD) (HCC)  Follows with Dr. Brian Quiñonez for management.  Last seen in October 2024  Renal function stable the last several years  Baseline appears to range from 1.4 to 1.8 mg/dL.  Admitted 12/12/2024, creatinine 1.57.  At baseline  Recent CTA abdomen and pelvis on 11/21: Bilateral renal cysts noted with left measuring 5 cm in size.  Urinary bladder normal.  Enlarged prostate projects into the urinary bladder.  On Flomax  12/12.  Had CTA and anticipating cardiac cath.  Contrast x 2.  Given a dose of lisinopril and chlorthalidone this morning.  Placed on hold  Patient seen prior to cardiac cath.  Risk and benefit discussed in depth with Jake: No change in renal function, short-term increase in creatinine with return to baseline and last scenario also discussed with Jake which involves acute renal failure requiring dialysis.  He expresses that he is not in favor of dialysis.  He agrees to proceed with cardiac cath  Plan:   Isotonic saline administration given pre and postprocedure for renal protection/prevention of DIAN  Avoid hypotension maintain MAP greater than 65  Check labs in the a.m.  Monitor intake and output  Urinary retention protocol  Continue to hold chlorthalidone  Continue to hold lisinopril  Chest pain  Echocardiogram pending  CTA of the chest negative for PE  Left heart cath pending.  Plan for today.  Isotonic saline started prior to contrast/postcontrast for renal protection  Patient understands risks and is willing to proceed with cardiac cath.  Risks and benefits thoroughly discussed.  HTN  (hypertension), benign  History of PAULA.  Patient wears nightly CPAP  Home medication: Amlodipine, chlorthalidone, clonidine, chlorthalidone, lisinopril  Blood pressure elevated somewhat labile today  Lisinopril and chlorthalidone placed on hold after a.m. dose 12/12  Continue other medication.  May need to increase amlodipine  PAULA (obstructive sleep apnea)  Patient faithfully wears nightly CPAP  (HFpEF) heart failure with preserved ejection fraction (HCC)  Wt Readings from Last 3 Encounters:   10/30/24 111 kg (245 lb 3.2 oz)   10/03/24 109 kg (241 lb)   10/02/24 111 kg (245 lb)   Echocardiogram earlier this year: Ejection fraction 60 to 65%.  Grade 1 diastolic dysfunction.  Moderate aortic stenosis.  Mild aortic regurgitation.  Normal RV function  Current echo pending  No evidence of acute decompensation.  Patient states that he gained 10 pounds over the last year but he feels that edema is at baseline          Obesity, morbid (HCC)    Type 2 diabetes mellitus with chronic kidney disease, with long-term current use of insulin ([facility])  Lab Results   Component Value Date    HGBA1C 6.0 (H) 08/26/2024       Recent Labs     12/12/24  0856   POCGLU 142*       Blood Sugar Average: Last 72 hrs:  (P) 142    GERD without esophagitis  On Protonix  Mixed hyperlipidemia  Crestor  Coronary artery disease of native artery of native heart with stable angina pectoris (HCC)  Left heart cath planned for today  Aortic stenosis  Follows with cardiology  Vasospastic angina ([facility])  Cardiology following  Continuous opioid dependence ([facility])        HISTORY OF PRESENT ILLNESS:  Requesting Physician: Yumiko Little DO  Reason for Consult: CKD, prevention of MAO    Jake Claros is a 84 y.o. male with a past medical history of pancreatitis, CKD, CAD, hypertension, heart failure with preserved ejection fraction, hyperlipidemia, GERD, PAULA, opioid dependence, diabetes mellitus type 2, aortic stenosis who was admitted to [facility] after  presenting with chest pain.  States he has chest pain with exertion.  At rest he is comfortable.  He has been having chest pain for approximately 1 week.  Over the past year he gained 10 pounds primarily due to immobility.  He had his right knee replaced this year and according to him it has not gone well with ambulatory dysfunction.  He previously had his left knee replacement surgery 6 or 7 years ago and that surgery went very well.  He has chronic lower extremity edema which is well-controlled at this time.  He does not use NSAIDs.  For management of CKD follows with Dr. Brian Quiñonez.  Patient was seen in the emergency room.  Comfortably lying on the stretcher.  Baljit and JA had a long discussion regarding risks and benefits to receiving contrast.  He understands and agrees to proceed.  A renal consultation is requested today for assistance in the management of CKD.    PAST MEDICAL HISTORY:  Past Medical History:   Diagnosis Date    Allergic     Arthritis     Chronic kidney disease 2021    Chronic kidney disease (CKD) stage G3a/A1, moderately decreased glomerular filtration rate (GFR) between 45-59 mL/min/1.73 square meter and albuminuria creatinine ratio less than 30 mg/g (HCC)     Colon polyp     Diabetes mellitus (HCC)     GERD (gastroesophageal reflux disease)     Heart murmur     History of echocardiogram     HL (hearing loss)     Hyperlipidemia     Hypertension     Obesity     Pancreatitis     Sleep apnea, obstructive        PAST SURGICAL HISTORY:  Past Surgical History:   Procedure Laterality Date    CARDIAC CATHETERIZATION      CHOLECYSTECTOMY      COLONOSCOPY  03/09/2018    COLONOSCOPY      EYE SURGERY      HEMORRHOID SURGERY      JOINT REPLACEMENT  2017    knee    KNEE SURGERY Right     OTHER SURGICAL HISTORY      Stent     [place] LAPAROSCOPY SURG CHOLECYSTECTOMY N/A 06/28/2023    Procedure: CHOLECYSTECTOMY LAPAROSCOPIC;  Surgeon: Alirio Hong MD;  Location:  MAIN OR;  Service: General     "UPPER GASTROINTESTINAL ENDOSCOPY         ALLERGIES:  Allergies   Allergen Reactions    Januvia [Sitagliptin] Other (See Comments)     pancreatitis    Semaglutide Other (See Comments)     pancreatits    Simvastatin Myalgia    Trulicity [Dulaglutide] Other (See Comments)     Pancreatitis    Wound Dressing Adhesive Other (See Comments)     Burning sensation    Medical Tape Rash     \"A burn on the skin\" , mackenzie on sensitive areas.    Does ok w/ surgical glue    Molds & Smuts Allergic Rhinitis     Nasal sympt       SOCIAL HISTORY:  Social History     Substance and Sexual Activity   Alcohol Use Not Currently    Alcohol/week: 5.0 standard drinks of alcohol    Types: 5 Glasses of wine per week    Comment: no alcohol since March     Social History     Substance and Sexual Activity   Drug Use Never     Social History     Tobacco Use   Smoking Status Former    Current packs/day: 0.00    Average packs/day: 2.0 packs/day for 15.0 years (30.0 ttl pk-yrs)    Types: Cigarettes, Pipe, Cigars    Start date: 1957    Quit date: 1972    Years since quittin.1    Passive exposure: Past   Smokeless Tobacco Never       FAMILY HISTORY:  Family History   Problem Relation Age of Onset    Heart disease Mother     Heart attack Mother         50s    Cancer Mother     Coronary artery disease Mother     Cancer Brother         Malignant tumor of lung       MEDICATIONS:    Current Facility-Administered Medications:     acetaminophen (TYLENOL) tablet 650 mg, 650 mg, Oral, Q6H PRN, Yamile Denny PA-C    allopurinol (ZYLOPRIM) tablet 100 mg, 100 mg, Oral, Daily, Yamile Denny PA-C, 100 mg at 24 0850    amLODIPine (NORVASC) tablet 10 mg, 10 mg, Oral, Daily, Yamile Denny PA-C, 10 mg at 24 0850    atorvastatin (LIPITOR) tablet 40 mg, 40 mg, Oral, Daily With Dinner, Yamile Denny PA-C    [Transfer Hold] cloNIDine (CATAPRES) tablet 0.1 mg, 0.1 mg, Oral, BID, Yumiko Little DO, 0.1 mg at 24 1119    " clopidogrel (PLAVIX) tablet 75 mg, 75 mg, Oral, Daily, Yamile Denny PA-C, 75 mg at 12/12/24 0851    clotrimazole (LOTRIMIN) 1 % cream 1 Application, 1 Application, Topical, BID PRN, Yamile Denny PA-C    docusate sodium (COLACE) capsule 100 mg, 100 mg, Oral, Daily, Yamile Denny PA-C, 100 mg at 12/12/24 0851    famotidine (PEPCID) tablet 20 mg, 20 mg, Oral, HS, Yamile Denny PA-C    fluticasone (FLONASE) 50 mcg/act nasal spray 2 spray, 2 spray, Nasal, Daily, Yamile Denny PA-C, 2 spray at 12/12/24 0931    heparin (porcine) subcutaneous injection 5,000 Units, 5,000 Units, Subcutaneous, Q8H LESLIE, Yamile Denny PA-C, 5,000 Units at 12/12/24 0851    HYDROmorphone (DILAUDID) tablet 2 mg, 2 mg, Oral, BID PRN, Yamile Denny PA-C    insulin glargine (LANTUS) subcutaneous injection 20 Units 0.2 mL, 20 Units, Subcutaneous, Q12H LESLIE, Yamile Denny PA-C    insulin lispro (HumALOG/ADMELOG) 100 units/mL subcutaneous injection 1-6 Units, 1-6 Units, Subcutaneous, TID AC **AND** Fingerstick Glucose (POCT), , , TID AC, Yamile Denny PA-C    insulin lispro (HumALOG/ADMELOG) 100 units/mL subcutaneous injection 1-6 Units, 1-6 Units, Subcutaneous, HS, Yamile Denny PA-C    loratadine (CLARITIN) tablet 10 mg, 10 mg, Oral, Daily, Yamile Denny PA-C, 10 mg at 12/12/24 0851    nitroglycerin (NITROSTAT) SL tablet 0.4 mg, 0.4 mg, Sublingual, Q5 Min PRN, Yamile Denny PA-C    pancrelipase (Lip-Prot-Amyl) (CREON) delayed release capsule 6,000 Units, 6,000 Units, Oral, TID With Meals, Yamile Denny PA-C    pantoprazole (PROTONIX) EC tablet 40 mg, 40 mg, Oral, Daily Before Breakfast, Yamile Denny PA-C, 40 mg at 12/12/24 0850    ranolazine (RANEXA) 12 hr tablet 500 mg, 500 mg, Oral, BID, Yamile Denny PA-C, 500 mg at 12/12/24 0851    sodium chloride 0.9 % bolus 333 mL, 3 mL/kg, Intravenous, Once, Last Rate: 166.5 mL/hr at 12/12/24 1122, 333 mL  at 12/12/24 1122 **FOLLOWED BY** sodium chloride 0.9 % infusion, 125 mL/hr, Intravenous, Continuous, Levon Roberts PA-C    tamsulosin (FLOMAX) capsule 0.8 mg, 0.8 mg, Oral, Daily, Yamile Denny PA-C, 0.8 mg at 12/12/24 0851    REVIEW OF SYSTEMS:  Constitutional: Negative for fatigue, anorexia, fever, chills, diaphoresis  HENT: Negative for postnasal drip  Eyes: Negative for visual disturbance.   Respiratory: Negative for cough.  Exertional shortness of breath  Cardiovascular: For exertional shortness of breath and chest pain.  Chronic leg swelling.  Gastrointestinal: Negative for abdominal pain, constipation, diarrhea, nausea and vomiting.   Genitourinary: No dysuria, hematuria  Musculoskeletal: Positive for arthralgia  Skin: Negative for rash.   Neurological: Negative for focal weakness, headaches, dizziness.  Hematological: Negative for easy bruising or bleeding.  Psychiatric/Behavioral: Negative for confusion  All the systems were reviewed and were negative except as documented on the HPI.    PHYSICAL EXAM:  Current Weight:    First Weight:    Vitals:    12/12/24 1000 12/12/24 1030 12/12/24 1100 12/12/24 1119   BP: 165/72 168/75 161/77 156/71   BP Location: Right arm Right arm Right arm    Pulse: 63 63 67    Resp: 18 18 18    Temp:       TempSrc:       SpO2: 96% 97% 98%        Intake/Output Summary (Last 24 hours) at 12/12/2024 1156  Last data filed at 12/12/2024 1126  Gross per 24 hour   Intake 500 ml   Output 700 ml   Net -200 ml     Physical Exam  Vitals reviewed.   Constitutional:       General: He is not in acute distress.     Appearance: Normal appearance. He is not ill-appearing, toxic-appearing or diaphoretic.   HENT:      Head: Normocephalic and atraumatic.      Nose: Nose normal. No congestion.      Mouth/Throat:      Mouth: Mucous membranes are moist.      Pharynx: No oropharyngeal exudate.   Eyes:      Extraocular Movements: Extraocular movements intact.      Conjunctiva/sclera: Conjunctivae  "normal.   Neck:      Vascular: No carotid bruit.   Cardiovascular:      Rate and Rhythm: Normal rate and regular rhythm.      Heart sounds: Murmur heard.   Pulmonary:      Effort: No respiratory distress.      Breath sounds: No stridor. No wheezing, rhonchi or rales.      Comments: Somewhat decreased left base with tubular breath sounds.  Dullness to percussion left base.  Abdominal:      General: Bowel sounds are normal. There is no distension.      Palpations: Abdomen is soft.      Tenderness: There is no abdominal tenderness. There is no guarding.   Musculoskeletal:         General: Signs of injury present. No tenderness.      Cervical back: No rigidity or tenderness.      Right lower le+ Pitting Edema present.      Left lower le+ Pitting Edema present.   Skin:     Capillary Refill: Capillary refill takes less than 2 seconds.      Coloration: Skin is not jaundiced or pale.      Findings: No erythema or rash.   Neurological:      Mental Status: He is alert and oriented to person, place, and time.   Psychiatric:         Mood and Affect: Mood normal.         Behavior: Behavior normal.         Thought Content: Thought content normal.         Judgment: Judgment normal.           Invasive Devices:      Lab Results:   Results from last 7 days   Lab Units 24  0037   WBC Thousand/uL 6.92   HEMOGLOBIN g/dL 11.8*   HEMATOCRIT % 36.6   PLATELETS Thousands/uL 191   POTASSIUM mmol/L 4.1   CHLORIDE mmol/L 103   CO2 mmol/L 27   BUN mg/dL 37*   CREATININE mg/dL 1.57*   CALCIUM mg/dL 8.6   ALK PHOS U/L 84   ALT U/L 16   AST U/L 22     Other Studies:     Portions of the record may have been created with voice recognition software. Occasional wrong word or \"sound a like\" substitutions may have occurred due to the inherent limitations of voice recognition software. Read the chart carefully and recognize, using context, where substitutions have occurred.If you have any questions, please contact the dictating provider.   "

## 2024-12-12 NOTE — ASSESSMENT & PLAN NOTE
Lab Results   Component Value Date    EGFR 40 12/12/2024    EGFR 50 (L) 11/21/2024    EGFR 39 (L) 11/02/2024    CREATININE 1.57 (H) 12/12/2024    CREATININE 1.41 (H) 11/21/2024    CREATININE 1.73 (H) 11/02/2024     Cr currently stable at baseline 1.5-1.8  Monitor BMP

## 2024-12-12 NOTE — ASSESSMENT & PLAN NOTE
Wt Readings from Last 3 Encounters:   10/30/24 111 kg (245 lb 3.2 oz)   10/03/24 109 kg (241 lb)   10/02/24 111 kg (245 lb)   Echocardiogram earlier this year: Ejection fraction 60 to 65%.  Grade 1 diastolic dysfunction.  Moderate aortic stenosis.  Mild aortic regurgitation.  Normal RV function  Current echo pending  No evidence of acute decompensation.  Patient states that he gained 10 pounds over the last year but he feels that edema is at baseline

## 2024-12-12 NOTE — ASSESSMENT & PLAN NOTE
2001 PCI/LONNIE to circumflex artery  06/2017 cardiac cath: 50% narrowing in the LAD at the  bifurcation with a large diagonal branch   09/2020 nuclear stress test: fixed inferior defect secondary to attenuation artifact. No ischemia.   04/2023 TTE: EF 60-65%. Grade 1 DD. Moderate AS. Mild AR.   Follows with [facility]

## 2024-12-12 NOTE — ASSESSMENT & PLAN NOTE
Follows with Dr. Brian Quiñonez for management.  Last seen in October 2024  Renal function stable the last several years  Baseline appears to range from 1.4 to 1.8 mg/dL.  Admitted 12/12/2024, creatinine 1.57.  At baseline  Recent CTA abdomen and pelvis on 11/21: Bilateral renal cysts noted with left measuring 5 cm in size.  Urinary bladder normal.  Enlarged prostate projects into the urinary bladder.  On Flomax  12/12.  Had CTA and anticipating cardiac cath.  Contrast x 2.  Given a dose of lisinopril and chlorthalidone this morning.  Placed on hold  Patient seen prior to cardiac cath.  Risk and benefit discussed in depth with Jake: No change in renal function, short-term increase in creatinine with return to baseline and last scenario also discussed with Jake which involves acute renal failure requiring dialysis.  He expresses that he is not in favor of dialysis.  He agrees to proceed with cardiac cath  Plan:   Isotonic saline administration given pre and postprocedure for renal protection/prevention of DIAN  Avoid hypotension maintain MAP greater than 65  Check labs in the a.m.  Monitor intake and output  Urinary retention protocol  Continue to hold chlorthalidone  Continue to hold lisinopril

## 2024-12-12 NOTE — ASSESSMENT & PLAN NOTE
Patient with hx of CAD with stent to circumflex 01/2001, vasospastic angina, moderate AS, HFpEF presents with exertional centralized chest pain lasting >30 minutes at rest, received ASA 325mg and 1 SL nitro by EMS with resolution, currently with nitro paste. Reports this pain is different from his Prinzmetal angina pain.   Patient currently unable to exert himself without experiencing chest pain   Troponin 17 > 16; EKG nonspecific ST-T wave changes  Follows with Dr. Pavel HAMILTON  06/2017 cardiac cath: 50% narrowing in the LAD at the  bifurcation with a large diagonal branch   09/2020 nuclear stress test: fixed inferior defect secondary to attenuation artifact. No ischemia.   04/2023 TTE: EF 60-65%. Grade 1 DD. Moderate AS. Mild AR.   Differentials: ACS vs Prinzmetal angina vs stable angina   Continue to trend troponin with EKG   Will try increasing amlodipine from 5mg to 10mg daily   Patient is only on Plavix due to bleeding risk -- will continue   Continue statin, Ranexa  Obtain echocardiogram  PRN nitroglycerin   Monitor telemetry  Will consult cardiology and defer decision for inpatient ischemic evaluation to their team -- will make NPO just in case

## 2024-12-12 NOTE — ED NOTES
"Pt returned from CT. Pt now c/o 8/10 chest pain. Pt stated \"That little movement (going from stretcher to CT table) has cause my chest pain to be bad again\". Pt denies nausea/SOB or other complaints. Repeat trop and EKG obtained. MD updated     Carolin Cazares RN  12/12/24 [id number]    "

## 2024-12-12 NOTE — ASSESSMENT & PLAN NOTE
Hx of CAD with stent to circumflex 01/2001  Only on Plavix due to bleeding risk   Continue statin, Ranexa

## 2024-12-12 NOTE — ASSESSMENT & PLAN NOTE
BP elevated despite nitro -- could be pain related   Home regimen: clonidine 0.1mg q12h, lisinopril 20mg daily, chlorthalidone 25mg daily, amlodipine 5mg daily   Will increase amlodipine to 10mg daily due to concern for Prinzmetal angina   Continue rest of home meds

## 2024-12-12 NOTE — CONSULTS
Consultation - Cardiology   Name: Jake Claros 84 y.o. male I MRN: [id number]  Unit/Bed#: ED-26 I Date of Admission: 12/11/2024   Date of Service: 12/12/2024 I Hospital Day: 0   Inpatient consult to Cardiology  Consult performed by: Levon Roberts PA-C  Consult ordered by: Yamile Denny PA-C        Physician Requesting Evaluation: Yumiko Little DO   Reason for Evaluation / Principal Problem:     Assessment & Plan  Chest pain  He presents with exertional chest discomfort lasting > 30 minutes  Troponin 17 --> 16 --> 18  EKG show NSR with known bifascicular block, anterolateral T wave abnormalities  Differential for now includes unstable angina vs progression of aortic stenosis vs symptomatic hypertension  His current symptoms he says feels much different than his vasospastic angina (which he feels has been stable)  Coronary artery disease of native artery of native heart with stable angina pectoris (HCC)  2001 PCI/LONNIE to circumflex artery  06/2017 cardiac cath: 50% narrowing in the LAD at the  bifurcation with a large diagonal branch   09/2020 nuclear stress test: fixed inferior defect secondary to attenuation artifact. No ischemia.   04/2023 TTE: EF 60-65%. Grade 1 DD. Moderate AS. Mild AR.   Follows with [facility]  Vasospastic angina ([facility])  He carries a history of vasospastic angina and feels this has been stable for quite some time  Current symptoms feel different from prior vasospastic angina  Prehospital Rx: Amlodipine 5 daily   -Maintained on long-acting nitrates  HTN (hypertension), benign  Prehospital Rx: Amlodipine 5 daily, chlorthalidone 25 daily, clonidine 0.1 twice daily, lisinopril 20 daily  Mixed hyperlipidemia  Prehospital Rx: Crestor 20 daily  Lipids followed by outpatient team  Stage 3b chronic kidney disease (CKD) ([facility])  Baseline creatinine looks to trend around 1.5  Aortic stenosis  04/2023 TTE: EF 60-65%. Grade 1 DD. Moderate AS. Mild AR.   5/2024 echo: LVEF 60 to 65%,  mild AI, moderate AS    Summary:  Currently chest pain-free and hemodynamically stable  Agree with increasing amlodipine to 10  Agree with updating echocardiogram  He would benefit from an ischemic evaluation either with nuclear stress test or proceeding directly with heart catheterization. The heart catheterization is complicated by his chronic kidney disease stage III. Will discuss the case with attending. In the interim continue amlodipine, clonidine, Ranexa, Plavix, and hold lisinopril for now.    Addendum: Discussed with attending we will proceed with left heart catheterization.  Patient has chronic kidney disease so I spoke with our nephrology team who have kindly agreed to see him in consultation.  In the interim we will start him on IV fluid hydration per GFR guidelines.  He is chronically on Plavix 75 mg daily we will give aspirin 324 now in case he needs an intervention.  Keep n.p.o. with sips for now.    History of Present Illness   Jake Claros is a 84 y.o. male who presents with exertional chest discomfort.  This has been ongoing for 1 week.  He says that his baseline he is independent of his ADLs and has no issues walking around.  For the past week he notices with light exertion he will get some very mild chest discomfort.  This is in the center of his chest.  He described as a pressure.  It does not radiate.  He says when he rests it goes away.  He says if he needs to exert himself more than gentle walking the pain is somewhat more severe but again goes away when he sits down.  He did call his cardiologist several days ago about this symptom and is awaiting a call back.  He had just seen his cardiologist in the office about 3 weeks ago and was doing fine at that time.  Yesterday evening he says he walked about 10 steps quicker than usual and the pain returned but was more severe than prior episodes.  He sat down but it persisted for nearly 30 minutes at which time he decided to come to the ER for  evaluation.  He was given nitroglycerin on arrival which he says may have helped.  He was admitted overnight.  This morning he feels fine.  We are asked see him in consultation to assist with management of his chest discomfort.    Review of Systems   Constitutional:  Negative for chills and fever.   HENT:  Negative for ear pain and sore throat.    Eyes:  Negative for pain and visual disturbance.   Respiratory:  Negative for cough and shortness of breath.    Cardiovascular:  Positive for chest pain. Negative for palpitations.   Gastrointestinal:  Negative for abdominal pain and vomiting.   Genitourinary:  Negative for dysuria and hematuria.   Musculoskeletal:  Negative for arthralgias and back pain.   Skin:  Negative for color change and rash.   Neurological:  Negative for seizures and syncope.   All other systems reviewed and are negative.        Objective :  Temp:  [98.4 °F (36.9 °C)] 98.4 °F (36.9 °C)  HR:  [59-87] 71  BP: (143-205)/(67-84) 143/67  Resp:  [16-20] 18  SpO2:  [88 %-100 %] 98 %  O2 Device: Nasal cannula  Nasal Cannula O2 Flow Rate (L/min):  [2 L/min] 2 L/min  Orthostatic Blood Pressures      Flowsheet Row Most Recent Value   Blood Pressure 143/67 filed at 12/12/2024 0730   Patient Position - Orthostatic VS Lying filed at 12/12/2024 0730          First Weight:  There were no vitals filed for this visit.    Physical Exam      Lab Results: I have reviewed the following results:  Results from last 7 days   Lab Units 12/12/24  0037   WBC Thousand/uL 6.92   HEMOGLOBIN g/dL 11.8*   HEMATOCRIT % 36.6   PLATELETS Thousands/uL 191     Results from last 7 days   Lab Units 12/12/24  0037   POTASSIUM mmol/L 4.1   CHLORIDE mmol/L 103   CO2 mmol/L 27   BUN mg/dL 37*   CREATININE mg/dL 1.57*   CALCIUM mg/dL 8.6     Results from last 7 days   Lab Units 12/12/24  0037   INR  1.03   PTT seconds 28     Lab Results   Component Value Date    HGBA1C 6.0 (H) 08/26/2024     Lab Results   Component Value Date    CKTOTAL 74  08/20/2023             VTE Prophylaxis:

## 2024-12-12 NOTE — ED PROVIDER NOTES
Time reflects when diagnosis was documented in both MDM as applicable and the Disposition within this note       Time User Action Codes Description Comment    12/12/2024  1:26 AM Dagoberto Montana [I20.89] Stable angina pectoris (HCC)     12/12/2024  4:23 AM Dagoberto Montana [E11.65] Hyperglycemia due to diabetes mellitus (HCC)     12/12/2024  4:23 AM Dagoberto Montana Add [N18.9] CKD (chronic kidney disease)     12/12/2024  5:46 AM Yamile Denny [R07.9] Chest pain     12/12/2024  5:46 AM Yamile Denny Add [I25.118] Coronary artery disease of native artery of native heart with stable angina pectoris (HCC)     12/12/2024  5:47 AM Yamile Denny [I20.1] Vasospastic angina (HCC)           ED Disposition       ED Disposition   Admit    Condition   Stable    Date/Time   u Dec 12, 2024  4:23 AM    Comment   Case was discussed with gary and the patient's admission status was agreed to be Admission Status: observation status to the service of Dr. Desai, internal medicine.               Assessment & Plan       Medical Decision Making  Patient is an 85-year-old male with a history of CKD, GERD, diabetes mellitus, hyperlipidemia, hyperhypertension, pancreatitis, surgical history of cardiac catheterization,, cholecystectomy, right knee replacement that presents to the emerged ferment with intermittent centralized nonradiating chest pressure/tightness for the last several days.    chest pain workup stable angina. Heart score of 7, initial negative troponins, ECGs with bifascicular block, no evolving changes, elevated D-dimer with CTA study negative for pulmonary embolism, patient has history of CKD, serum creatinine 1.57, diabetes mellitus serum glucose 193. Half inch of Nitropaste with improvement of patient chest pain symptomatology. Patient initially had centralized chest pain symptoms with abatement of rest for the past 3 days with today being the most severe, called [phone] ASA and 1 sublingual  nitro delivered and route with abatement of symptoms.    Discussed patient case with internal medicine team and both agreed to place patient on inpatient divisional status on the care of Dr. Desai, internal medicine    Patient with verbal understandable clinical laboratory and imaging findings, admission instructions and verbalized agreement with patient current treatment plan with teach back.    Amount and/or Complexity of Data Reviewed  Labs: ordered. Decision-making details documented in ED Course.  Radiology: ordered and independent interpretation performed. Decision-making details documented in ED Course.  ECG/medicine tests: ordered and independent interpretation performed. Decision-making details documented in ED Course.    Risk  Prescription drug management.  Decision regarding hospitalization.        ED Course as of 12/12/24 0755   Thu Dec 12, 2024   0132 D-Dimer, Quant(!): 1.67   0234 Patient to CAT scan at this time.   0253 ED RN Latanya Cox had reported that patient had exertional chest pain with walking to CT scan, half inch of Nitropaste will be delivered.  Patient denies pain at this time given being sedentary at this time in stretcher.   0413 Tiger text to slim       Medications   fluticasone (FLONASE) 50 mcg/act nasal spray 2 spray (has no administration in time range)   allopurinol (ZYLOPRIM) tablet 100 mg (has no administration in time range)   amLODIPine (NORVASC) tablet 10 mg (has no administration in time range)   chlorthalidone tablet 25 mg (has no administration in time range)   cloNIDine (CATAPRES) tablet 0.1 mg (has no administration in time range)   clopidogrel (PLAVIX) tablet 75 mg (has no administration in time range)   clotrimazole (LOTRIMIN) 1 % cream 1 Application (has no administration in time range)   docusate sodium (COLACE) capsule 100 mg (has no administration in time range)   famotidine (PEPCID) tablet 20 mg (has no administration in time range)   HYDROmorphone (DILAUDID)  tablet 2 mg (has no administration in time range)   lisinopril (ZESTRIL) tablet 20 mg (has no administration in time range)   loratadine (CLARITIN) tablet 10 mg (has no administration in time range)   pancrelipase (Lip-Prot-Amyl) (CREON) delayed release capsule 6,000 Units (has no administration in time range)   pantoprazole (PROTONIX) EC tablet 40 mg (has no administration in time range)   ranolazine (RANEXA) 12 hr tablet 500 mg (has no administration in time range)   atorvastatin (LIPITOR) tablet 40 mg (has no administration in time range)   tamsulosin (FLOMAX) capsule 0.8 mg (has no administration in time range)   insulin glargine (LANTUS) subcutaneous injection 20 Units 0.2 mL (has no administration in time range)   nitroglycerin (NITROSTAT) SL tablet 0.4 mg (has no administration in time range)   heparin (porcine) subcutaneous injection 5,000 Units (has no administration in time range)   insulin lispro (HumALOG/ADMELOG) 100 units/mL subcutaneous injection 1-6 Units (has no administration in time range)   insulin lispro (HumALOG/ADMELOG) 100 units/mL subcutaneous injection 1-6 Units (has no administration in time range)   acetaminophen (TYLENOL) tablet 650 mg (has no administration in time range)   sodium chloride 0.9 % bolus 500 mL (0 mL Intravenous Stopped 12/12/24 0330)   iohexol (OMNIPAQUE) 350 MG/ML injection (MULTI-DOSE) 80 mL (80 mL Intravenous Given 12/12/24 0237)   nitroglycerin (NITRO-BID) 2 % TD ointment 0.5 inch (0.5 inches Topical Given 12/12/24 0255)       ED Risk Strat Scores   HEART Risk Score      Flowsheet Row Most Recent Value   Heart Score Risk Calculator    History 1 Filed at: 12/12/2024 0236   ECG 1 Filed at: 12/12/2024 0236   Age 2 Filed at: 12/12/2024 0236   Risk Factors 2 Filed at: 12/12/2024 0236   Troponin 1 Filed at: 12/12/2024 0236   HEART Score 7 Filed at: 12/12/2024 0236          HEART Risk Score      Flowsheet Row Most Recent Value   Heart Score Risk Calculator    History 1 Filed  at: 12/12/2024 0236   ECG 1 Filed at: 12/12/2024 0236   Age 2 Filed at: 12/12/2024 0236   Risk Factors 2 Filed at: 12/12/2024 0236   Troponin 1 Filed at: 12/12/2024 0236   HEART Score 7 Filed at: 12/12/2024 0236                        PERC Rule for PE      Flowsheet Row Most Recent Value   PERC Rule for PE    Age >=50 1 Filed at: 12/12/2024 0755   HR >=100 0 Filed at: 12/12/2024 0755   O2 Sat on room air < 95% 0 Filed at: 12/12/2024 0755   History of PE or DVT 0 Filed at: 12/12/2024 0755   Recent trauma or surgery 0 Filed at: 12/12/2024 0755   Hemoptysis 0 Filed at: 12/12/2024 0755   Exogenous estrogen 0 Filed at: 12/12/2024 0755   Unilateral leg swelling 0 Filed at: 12/12/2024 0755   PERC Rule for PE Results 1 Filed at: 12/12/2024 0755            SBIRT 22yo+      Flowsheet Row Most Recent Value   Initial Alcohol Screen: US AUDIT-C     1. How often do you have a drink containing alcohol? 0 Filed at: 12/12/2024 0530   2. How many drinks containing alcohol do you have on a typical day you are drinking?  0 Filed at: 12/12/2024 0530   3a. Male UNDER 65: How often do you have five or more drinks on one occasion? 0 Filed at: 12/12/2024 0530   3b. FEMALE Any Age, or MALE 65+: How often do you have 4 or more drinks on one occassion? 0 Filed at: 12/12/2024 0530   Audit-C Score 0 Filed at: 12/12/2024 0530   ALEA: How many times in the past year have you...    Used an illegal drug or used a prescription medication for non-medical reasons? Never Filed at: 12/12/2024 0530            Wells' Criteria for PE      Flowsheet Row Most Recent Value   Wells' Criteria for PE    Clinical signs and symptoms of DVT 0 Filed at: 12/12/2024 0755   PE is primary diagnosis or equally likely 3 Filed at: 12/12/2024 0755   HR >100 0 Filed at: 12/12/2024 0755   Immobilization at least 3 days or Surgery in the previous 4 weeks 0 Filed at: 12/12/2024 0755   Previous, objectively diagnosed PE or DVT 0 Filed at: 12/12/2024 0755   Hemoptysis 0 Filed at:  "12/12/2024 0755   Malignancy with treatment within 6 months or palliative 0 Filed at: 12/12/2024 0755   Wells' Criteria Total 3 Filed at: 12/12/2024 0755                        History of Present Illness       Chief Complaint   Patient presents with    Chest Pain     Pt arrives via EMS from home. Pt c/o intermittent, exertional CP for the last few days. Hx stent placement 10-15 years ago. EMS administered 324 ASA & 1 nitro. Pt is currently pain free. Denies SOB, back pain.      Patient is an 85-year-old male with a history of CKD, GERD, diabetes mellitus, hyperlipidemia, hypertension, pancreatitis, surgical history of cardiac catheterization, cholecystectomy, right knee replacement that presents to the emergency department with intermittent centralized nonradiating chest pressure/tightness for the last several days.  Patient denies associated symptoms.  Patient states that his chest pain worsened with activity and \"when I stop moving around, my chest pain stops.\"  Patient states that he follows up with cardiologist in [facility] and reached out to his cardiologist 2 days ago, \"he did not call me back yet.\"  Patient called 911 and received 325 g ASA and 1 dose of nitroglycerin with patient reports improved chest pain symptoms.  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  Patient also states that he has a history of pancreatitis and he is scheduled for surgery in February 2025.  Patient affirms palliative factors of nitroglycerin and rest with provocative factors of activity.  Patient denies noneffective treatment.  Patient denies history of DVT and PE.  Patient denies fevers, nausea, vomiting, diarrhea, constipation and urinary symptoms.  Patient denies recent fall recent trauma.  Patient denies sick contacts and recent travel.  Patient denies shortness breath, and abdominal pain.      Past Medical History:   Diagnosis Date    Allergic     Arthritis     Chronic kidney disease 2021    Chronic kidney disease (CKD) " stage G3a/A1, moderately decreased glomerular filtration rate (GFR) between 45-59 mL/min/1.73 square meter and albuminuria creatinine ratio less than 30 mg/g (HCC)     Colon polyp     Diabetes mellitus (HCC)     GERD (gastroesophageal reflux disease)     Heart murmur     History of echocardiogram     HL (hearing loss)     Hyperlipidemia     Hypertension     Obesity     Pancreatitis     Sleep apnea, obstructive       Past Surgical History:   Procedure Laterality Date    CARDIAC CATHETERIZATION      CHOLECYSTECTOMY      COLONOSCOPY  2018    COLONOSCOPY      EYE SURGERY      HEMORRHOID SURGERY      JOINT REPLACEMENT  2017    knee    KNEE SURGERY Right     OTHER SURGICAL HISTORY      Stent     DC LAPAROSCOPY SURG CHOLECYSTECTOMY N/A 2023    Procedure: CHOLECYSTECTOMY LAPAROSCOPIC;  Surgeon: Alirio Hong MD;  Location:  MAIN OR;  Service: General    UPPER GASTROINTESTINAL ENDOSCOPY        Family History   Problem Relation Age of Onset    Heart disease Mother     Heart attack Mother         50s    Cancer Mother     Coronary artery disease Mother     Cancer Brother         Malignant tumor of lung      Social History     Tobacco Use    Smoking status: Former     Current packs/day: 0.00     Average packs/day: 2.0 packs/day for 15.0 years (30.0 ttl pk-yrs)     Types: Cigarettes, Pipe, Cigars     Start date: 1957     Quit date: 1972     Years since quittin.1     Passive exposure: Past    Smokeless tobacco: Never   Vaping Use    Vaping status: Never Used   Substance Use Topics    Alcohol use: Not Currently     Alcohol/week: 5.0 standard drinks of alcohol     Types: 5 Glasses of wine per week     Comment: no alcohol since March    Drug use: Never      E-Cigarette/Vaping    E-Cigarette Use Never User       E-Cigarette/Vaping Substances    Nicotine No     THC No     CBD No     Flavoring No     Other No     Unknown No       I have reviewed and agree with the history as documented.        History provided by:  Patient   used: No    Chest Pain  Associated symptoms: no abdominal pain, no back pain, no cough, no dizziness, no fever, no headache, no nausea, no numbness, no palpitations, no shortness of breath, not vomiting and no weakness        Review of Systems   Constitutional:  Negative for activity change, appetite change, chills and fever.   HENT:  Negative for congestion, postnasal drip, rhinorrhea, sinus pressure, sinus pain, sore throat and tinnitus.    Eyes:  Negative for photophobia and visual disturbance.   Respiratory:  Negative for cough, chest tightness and shortness of breath.    Cardiovascular:  Positive for chest pain. Negative for palpitations.   Gastrointestinal:  Negative for abdominal pain, constipation, diarrhea, nausea and vomiting.   Genitourinary:  Negative for difficulty urinating, dysuria, flank pain, frequency and urgency.   Musculoskeletal:  Negative for back pain, gait problem, neck pain and neck stiffness.   Skin:  Negative for pallor and rash.   Allergic/Immunologic: Negative for environmental allergies and food allergies.   Neurological:  Negative for dizziness, weakness, numbness and headaches.   Psychiatric/Behavioral:  Negative for confusion.    All other systems reviewed and are negative.          Objective       ED Triage Vitals   Temperature Pulse Blood Pressure Respirations SpO2 Patient Position - Orthostatic VS   12/12/24 0010 12/11/24 2353 12/11/24 2353 12/11/24 2353 12/11/24 2353 12/11/24 2353   98.4 °F (36.9 °C) 87 (!) 199/81 20 99 % Sitting      Temp Source Heart Rate Source BP Location FiO2 (%) Pain Score    12/12/24 0010 12/11/24 2353 12/11/24 2353 -- 12/12/24 0245    Oral Monitor Right arm  7      Vitals      Date and Time Temp Pulse SpO2 Resp BP Pain Score FACES Pain Rating User   12/12/24 0730 -- 71 98 % 18 143/67 -- -- ML   12/12/24 0700 -- 79 99 % 18 181/77 -- -- ML   12/12/24 0615 -- 67 98 % 18 -- -- -- MARCELA   12/12/24 0600 --  64 88 % pt sleeping. Placed on 2L NC. Pt uses CPAP while sleeping at home 16 170/75 No Pain -- [facility]   12/12/24 0530 -- 67 97 % 20 168/84 No Pain -- [facility]   12/12/24 0400 -- 67 94 % 18 151/70 No Pain -- [facility]   12/12/24 0330 -- 66 95 % 20 158/70 No Pain -- [facility]   12/12/24 0245 -- 73 100 % 20 205/80 7 -- [facility]   12/12/24 0230 -- 59 98 % 20 177/79 -- -- EG   12/12/24 0130 -- 64 98 % 20 168/72 -- -- EG   12/12/24 0010 98.4 °F (36.9 °C) -- -- -- -- -- -- EG   12/11/24 2353 -- 87 99 % 20 199/81 -- -- EG            Physical Exam  Vitals and nursing note reviewed.   Constitutional:       General: He is awake.      Appearance: Normal appearance. He is well-developed and normal weight. He is not ill-appearing, toxic-appearing or diaphoretic.      Comments: BP (!) 199/81 (BP Location: Right arm)   Pulse 87   Temp 98.4 °F (36.9 °C) (Oral)   Resp 20   SpO2 99%      HENT:      Head: Normocephalic and atraumatic.      Jaw: There is normal jaw occlusion.      Right Ear: Hearing, tympanic membrane and external ear normal. No decreased hearing noted. No drainage, swelling or tenderness. No mastoid tenderness.      Left Ear: Hearing, tympanic membrane and external ear normal. No decreased hearing noted. No drainage, swelling or tenderness. No mastoid tenderness.      Nose: Nose normal.      Mouth/Throat:      Lips: Pink.      Mouth: Mucous membranes are moist.      Pharynx: Oropharynx is clear. Uvula midline.   Eyes:      General: Lids are normal. Vision grossly intact. Gaze aligned appropriately.         Right eye: No discharge.         Left eye: No discharge.      Extraocular Movements: Extraocular movements intact.      Conjunctiva/sclera: Conjunctivae normal.      Pupils: Pupils are equal, round, and reactive to light.   Neck:      Vascular: No JVD.      Trachea: Trachea and phonation normal. No tracheal tenderness or tracheal deviation.   Cardiovascular:      Rate and Rhythm: Normal rate and regular rhythm.      Pulses: Normal  pulses.           Radial pulses are 2+ on the right side and 2+ on the left side.        Posterior tibial pulses are 2+ on the right side and 2+ on the left side.      Heart sounds: Normal heart sounds.   Pulmonary:      Effort: Pulmonary effort is normal.      Breath sounds: Normal breath sounds and air entry. No stridor. No decreased breath sounds, wheezing, rhonchi or rales.   Chest:      Chest wall: No tenderness.   Abdominal:      General: Abdomen is flat. Bowel sounds are normal. There is no distension.      Palpations: Abdomen is soft. Abdomen is not rigid.      Tenderness: There is no abdominal tenderness. There is no guarding or rebound.   Musculoskeletal:         General: Normal range of motion.      Cervical back: Full passive range of motion without pain, normal range of motion and neck supple. No rigidity. No spinous process tenderness or muscular tenderness. Normal range of motion.   Feet:      Right foot:      Toenail Condition: Right toenails are normal.      Left foot:      Toenail Condition: Left toenails are normal.   Lymphadenopathy:      Head:      Right side of head: No submental, submandibular, tonsillar, preauricular, posterior auricular or occipital adenopathy.      Left side of head: No submental, submandibular, tonsillar, preauricular, posterior auricular or occipital adenopathy.      Cervical: No cervical adenopathy.      Right cervical: No superficial, deep or posterior cervical adenopathy.     Left cervical: No superficial, deep or posterior cervical adenopathy.   Skin:     General: Skin is warm.      Capillary Refill: Capillary refill takes less than 2 seconds.      Findings: No rash.   Neurological:      General: No focal deficit present.      Mental Status: He is alert and oriented to person, place, and time. Mental status is at baseline.      GCS: GCS eye subscore is 4. GCS verbal subscore is 5. GCS motor subscore is 6.      Sensory: No sensory deficit.      Deep Tendon Reflexes:  Reflexes are normal and symmetric.      Reflex Scores:       Patellar reflexes are 2+ on the right side and 2+ on the left side.  Psychiatric:         Attention and Perception: Attention normal.         Mood and Affect: Mood normal.         Speech: Speech normal.         Behavior: Behavior normal. Behavior is cooperative.         Thought Content: Thought content normal.         Judgment: Judgment normal.         Results Reviewed       Procedure Component Value Units Date/Time    HS Troponin I 4hr [[id number]]  (Normal) Collected: 12/12/24 0440    Lab Status: Final result Specimen: Blood from Arm, Right Updated: 12/12/24 0518     hs TnI 4hr 18 ng/L      Delta 4hr hsTnI 1 ng/L     HS Troponin I 2hr [[id number]]  (Normal) Collected: 12/12/24 0244    Lab Status: Final result Specimen: Blood from Line, Venous Updated: 12/12/24 0335     hs TnI 2hr 16 ng/L      Delta 2hr hsTnI -1 ng/L     B-Type Natriuretic Peptide(BNP) [[id number]]  (Normal) Collected: 12/12/24 0037    Lab Status: Final result Specimen: Blood from Arm, Left Updated: 12/12/24 0139     BNP 41 pg/mL     HS Troponin 0hr (reflex protocol) [[id number]]  (Normal) Collected: 12/12/24 0037    Lab Status: Final result Specimen: Blood from Arm, Left Updated: 12/12/24 0112     hs TnI 0hr 17 ng/L     Comprehensive metabolic panel [[id number]]  (Abnormal) Collected: 12/12/24 0037    Lab Status: Final result Specimen: Blood from Arm, Left Updated: 12/12/24 0104     Sodium 138 mmol/L      Potassium 4.1 mmol/L      Chloride 103 mmol/L      CO2 27 mmol/L      ANION GAP 8 mmol/L      BUN 37 mg/dL      Creatinine 1.57 mg/dL      Glucose 193 mg/dL      Calcium 8.6 mg/dL      AST 22 U/L      ALT 16 U/L      Alkaline Phosphatase 84 U/L      Total Protein 6.7 g/dL      Albumin 3.8 g/dL      Total Bilirubin 0.37 mg/dL      eGFR 40 ml/min/1.73sq m     Narrative:      National Kidney Disease Foundation guidelines for Chronic Kidney Disease (CKD):     Stage 1 with normal or high GFR (GFR  > 90 mL/min/1.73 square meters)    Stage 2 Mild CKD (GFR = 60-89 mL/min/1.73 square meters)    Stage 3A Moderate CKD (GFR = 45-59 mL/min/1.73 square meters)    Stage 3B Moderate CKD (GFR = 30-44 mL/min/1.73 square meters)    Stage 4 Severe CKD (GFR = 15-29 mL/min/1.73 square meters)    Stage 5 End Stage CKD (GFR <15 mL/min/1.73 square meters)  Note: GFR calculation is accurate only with a steady state creatinine    Lipase [[id number]]  (Abnormal) Collected: 12/12/24 0037    Lab Status: Final result Specimen: Blood from Arm, Left Updated: 12/12/24 0104     Lipase 10 u/L     D-Dimer [[id number]]  (Abnormal) Collected: 12/12/24 0037    Lab Status: Final result Specimen: Blood from Arm, Left Updated: 12/12/24 0102     D-Dimer, Quant 1.67 ug/ml FEU     Narrative:      In the evaluation for possible pulmonary embolism, in the appropriate (Well's Score of 4 or less) patient, the age adjusted d-dimer cutoff for this patient can be calculated as:    Age x 0.01 (in ug/mL) for Age-adjusted D-dimer exclusion threshold for a patient over 50 years.    Protime-INR [[id number]]  (Normal) Collected: 12/12/24 0037    Lab Status: Final result Specimen: Blood from Arm, Left Updated: 12/12/24 0059     Protime 14.2 seconds      INR 1.03    Narrative:      INR Therapeutic Range    Indication                                             INR Range      Atrial Fibrillation                                               2.0-3.0  Hypercoagulable State                                    2.0.2.3  Left Ventricular Asist Device                            2.0-3.0  Mechanical Heart Valve                                  -    Aortic(with afib, MI, embolism, HF, LA enlargement,    and/or coagulopathy)                                     2.0-3.0 (2.5-3.5)     Mitral                                                             2.5-3.5  Prosthetic/Bioprosthetic Heart Valve               2.0-3.0  Venous thromboembolism (VTE: VT, PE        2.0-3.0    APTT  [[id number]]  (Normal) Collected: 12/12/24 0037    Lab Status: Final result Specimen: Blood from Arm, Left Updated: 12/12/24 0059     PTT 28 seconds     CBC and differential [[id number]]  (Abnormal) Collected: 12/12/24 0037    Lab Status: Final result Specimen: Blood from Arm, Left Updated: 12/12/24 0054     WBC 6.92 Thousand/uL      RBC 3.73 Million/uL      Hemoglobin 11.8 g/dL      Hematocrit 36.6 %      MCV 98 fL      MCH 31.6 pg      MCHC 32.2 g/dL      RDW 14.0 %      MPV 10.2 fL      Platelets 191 Thousands/uL      nRBC 0 /100 WBCs      Segmented % 58 %      Immature Grans % 0 %      Lymphocytes % 30 %      Monocytes % 9 %      Eosinophils Relative 3 %      Basophils Relative 0 %      Absolute Neutrophils 4.02 Thousands/µL      Absolute Immature Grans 0.01 Thousand/uL      Absolute Lymphocytes 2.09 Thousands/µL      Absolute Monocytes 0.61 Thousand/µL      Eosinophils Absolute 0.17 Thousand/µL      Basophils Absolute 0.02 Thousands/µL             CTA chest pe study   ED Interpretation by Dagoberto Montana PA-C (12/12 0406)   Reading Physician Reading Date Result Priority  Meek Florez DO  [phone]    12/12/2024     Narrative & Impression  CTA - CHEST WITH IV CONTRAST - PULMONARY ANGIOGRAM     INDICATION: chest pain.     COMPARISON: CTA chest abdomen pelvis dated 3/31/2024     TECHNIQUE: CTA examination of the chest was performed using angiographic technique according to a protocol specifically tailored to evaluate for pulmonary embolism. Multiplanar 2D reformatted images were created from the source data. In addition, coronal   3D MIP postprocessing was performed on the acquisition scanner.     Radiation dose length product (DLP) for this visit: 541 mGy-cm . This examination, like all CT scans performed in the [facility], was performed utilizing techniques to minimize radiation dose exposure, including the use of iterative   reconstruction and automated exposure control.     IV Contrast: 80 mL  of iohexol (OMNIPAQUE)     FINDINGS:     PULMONARY ARTERIAL TREE:  No pulmonary embolus.           LUNGS: Upper lobe predominant paraseptal emphysema. Dependent atelectasis.     PLEURA: Unremarkable.     HEART/GREAT VESSELS: Heart is unremarkable for patient's age. No thoracic aortic aneurysm.     MEDIASTINUM AND SAVAGE: Small hiatal hernia noted. No mediastinal or hilar lymphadenopathy.     CHEST WALL AND LOWER NECK: Unremarkable.     VISUALIZED STRUCTURES IN THE UPPER ABDOMEN: Cholecystectomy. Partially imaged renal hypodensities.     OSSEOUS STRUCTURES: No acute fracture or destructive osseous lesion.     IMPRESSION:     No pulmonary embolus.                 Workstation performed: [id number]           Final Interpretation by Meek Florez DO (12/12 0311)      No pulmonary embolus.                  Workstation performed: [id number]         XR chest 1 view portable    (Results Pending)       ECG 12 Lead Documentation Only    Date/Time: 12/11/2024 11:57 PM    Performed by: Dagoberto Montana PA-C  Authorized by: Dagoberto Montana PA-C    Indications / Diagnosis:  Chest pain  ECG reviewed by me, the ED Provider: yes    Patient location:  ED  Previous ECG:     Previous ECG:  Compared to current    Comparison ECG info:  When compared to ECG June 22, 2024, no significant changes were noted.    Similarity:  No change    Comparison to cardiac monitor: Yes    Interpretation:     Interpretation: non-specific    Rate:     ECG rate:  76    ECG rate assessment: normal    Rhythm:     Rhythm: sinus rhythm    Ectopy:     Ectopy: none    QRS:     QRS axis:  Left    QRS intervals:  Wide  Conduction:     Conduction: abnormal      Abnormal conduction: incomplete RBBB, 1st degree and bifascicular block    ST segments:     ST segments:  Normal  T waves:     T waves: normal    ECG 12 Lead Documentation Only    Date/Time: 12/12/2024 2:47 AM    Performed by: Dagoberto Montana PA-C  Authorized by: Dagoberto Montana PA-C    Indications / Diagnosis:  Chest  pain  ECG reviewed by me, the ED Provider: yes    Patient location:  ED  Previous ECG:     Previous ECG:  Compared to current    Comparison ECG info:  When compared with ECG on December 11, 2024 8244, no significant changes were noted    Similarity:  No change    Comparison to cardiac monitor: Yes    Interpretation:     Interpretation: non-specific    Rate:     ECG rate:  77    ECG rate assessment: normal    Rhythm:     Rhythm: sinus rhythm    Ectopy:     Ectopy: none    QRS:     QRS axis:  Left    QRS intervals:  Normal  Conduction:     Conduction: abnormal      Abnormal conduction: 1st degree and bifascicular block    ST segments:     ST segments:  Normal  T waves:     T waves: normal        ED Medication and Procedure Management   Prior to Admission Medications   Prescriptions Last Dose Informant Patient Reported? Taking?   BD Pen Needle Pascale U/F 32G X 4 MM MISC  Self Yes Yes   Sig: USE AS INSTRUCTED WITH INSULIN PEN   Docusate Sodium (DSS) 100 MG CAPS 12/11/2024 at  8:00 PM Self Yes Yes   Sig: Take 100 mg by mouth daily   Fexofenadine HCl (MUCINEX ALLERGY PO)  Self Yes Yes   Sig: Take 1 tablet by mouth daily at bedtime as needed   HYDROmorphone (DILAUDID) 2 mg tablet   No Yes   Sig: Take 1 tablet (2 mg total) by mouth 2 (two) times a day as needed for moderate pain Max Daily Amount: 4 mg   Lancets (OneTouch Delica Plus [id number]) MISC  Self Yes Yes   Sig: TEST GLUCOSE 3 4 TIMES/DAY   Menatetrenone (Vitamin K2) 100 MCG TABS 12/11/2024 at  9:00 AM Self Yes Yes   Sig: Take by mouth in the morning   Multiple Vitamins-Minerals (MULTIVITAMIN ADULT PO) 12/11/2024 at  9:00 AM Self Yes Yes   Sig: Take by mouth daily   Tresiba FlexTouch 100 units/mL injection pen 12/11/2024 at  8:00 PM Self Yes Yes   Sig: Inject 20 Units under the skin 2 (two) times a day   allopurinol (ZYLOPRIM) 100 mg tablet 12/11/2024 at  9:00 AM  No Yes   Sig: Take 1 tablet (100 mg total) by mouth daily   amLODIPine (NORVASC) 5 mg tablet 12/11/2024 at   9:00 AM  No Yes   Sig: Take 1 tablet (5 mg total) by mouth daily   chlorthalidone 25 mg tablet 2024 at  9:00 AM  No Yes   Sig: Take 1 tablet (25 mg total) by mouth daily   cloNIDine (CATAPRES) 0.1 mg tablet 2024 at 10:00 PM  No Yes   Sig: TAKE 1 TABLET BY MOUTH EVERY 12 HOURS   clopidogrel (PLAVIX) 75 mg tablet 2024 at  9:00 AM  No Yes   Sig: Take 1 tablet (75 mg total) by mouth daily   clotrimazole (LOTRIMIN) 1 % cream  Self Yes Yes   Sig: Apply 1 Application topically 2 (two) times a day as needed (itching)   famotidine (PEPCID) 40 MG tablet 2024 at  8:00 PM  No Yes   Sig: Take 1 tablet (40 mg total) by mouth daily at bedtime   fluticasone (FLONASE) 50 mcg/act nasal spray 2024 at  9:00 AM  No Yes   Si sprays into each nostril daily   insulin aspart (NovoLOG FlexPen) 100 UNIT/ML injection pen  Self Yes No   Sig: Inject under the skin 3 (three) times a day with meals Sliding scale as ordered TID with meals   insulin lispro (Admelog SoloStar) 100 units/mL injection pen 2024 at  8:00 PM Self Yes Yes   Sig: Inject under the skin 3 (three) times a day with meals 18-24 units before meals   ipratropium (ATROVENT) 0.03 % nasal spray 2024 at  8:00 PM Self No Yes   Sig: SPRAY 2 SPRAYS INTO EACH NOSTRIL EVERY 12 HOURS.   lisinopril (ZESTRIL) 20 mg tablet 2024 at  9:00 AM  No Yes   Sig: Take 1 tablet (20 mg total) by mouth daily   loratadine (CLARITIN) 10 mg tablet 2024 at  9:00 AM Self Yes Yes   Sig: Take 10 mg by mouth daily   ondansetron (ZOFRAN) 4 mg tablet  Self Yes Yes   Sig: Take 4 mg by mouth every 8 (eight) hours as needed for nausea or vomiting   ondansetron (ZOFRAN-ODT) 4 mg disintegrating tablet   No No   Sig: Take 1 tablet (4 mg total) by mouth every 8 (eight) hours as needed for nausea or vomiting for up to 7 days   oxyCODONE (ROXICODONE) 5 immediate release tablet  Self Yes Yes   Sig: Take 5 mg by mouth as needed for moderate pain   pancrelipase,  Lip-Prot-Amyl, (CREON) 6,000 units delayed release capsule 12/11/2024 at  8:00 PM  No Yes   Sig: Take 6,000 units of lipase by mouth 3 (three) times a day with meals   pantoprazole (PROTONIX) 40 mg tablet 12/11/2024 at  8:00 AM  No Yes   Sig: Take 1 tablet (40 mg total) by mouth daily before breakfast   pyridoxine (VITAMIN B6) 100 mg tablet 12/11/2024 at  9:00 AM Self Yes Yes   Sig: Take 100 mg by mouth daily   ranolazine (RANEXA) 500 mg 12 hr tablet 12/11/2024 at  8:00 PM  No Yes   Sig: Take 1 tablet (500 mg total) by mouth 2 (two) times a day   rosuvastatin (CRESTOR) 20 MG tablet 12/11/2024 at  8:00 PM  No Yes   Sig: Take 1 tablet (20 mg total) by mouth daily at bedtime   tamsulosin (FLOMAX) 0.4 mg 12/11/2024 at  9:00 AM  No Yes   Sig: Take 2 capsules (0.8 mg total) by mouth daily      Facility-Administered Medications: None     Patient's Medications   Discharge Prescriptions    No medications on file     No discharge procedures on file.  ED SEPSIS DOCUMENTATION   Time reflects when diagnosis was documented in both MDM as applicable and the Disposition within this note       Time User Action Codes Description Comment    12/12/2024  1:26 AM Dagoberto Montana [I20.89] Stable angina pectoris (HCC)     12/12/2024  4:23 AM Dagoberto Montana [E11.65] Hyperglycemia due to diabetes mellitus (HCC)     12/12/2024  4:23 AM Dagoberto Montana [N18.9] CKD (chronic kidney disease)     12/12/2024  5:46 AM Yamile Denny [R07.9] Chest pain     12/12/2024  5:46 AM Yamile Denny [I25.118] Coronary artery disease of native artery of native heart with stable angina pectoris (HCC)     12/12/2024  5:47 AM Yamile Denny [I20.1] Vasospastic angina (HCC)                  Dagoberto Montana PA-C  12/12/24 [id number]

## 2024-12-12 NOTE — ASSESSMENT & PLAN NOTE
Wt Readings from Last 3 Encounters:   10/30/24 111 kg (245 lb 3.2 oz)   10/03/24 109 kg (241 lb)   10/02/24 111 kg (245 lb)     Hx of grade 1 DD and moderate AS, EF 60-65%   Currently with bilateral LE edema which patient states is chronic and at baseline. He denies any SOB. No pulmonary edema on CTA chest.   Not on loop diuretic. Currently on chlorthalidone.  I/O, Daily weights   Obtain updated echo

## 2024-12-12 NOTE — H&P
H&P - Hospitalist   Name: Jake Claros 84 y.o. male I MRN: [id number]  Unit/Bed#: ED-26 I Date of Admission: 12/11/2024   Date of Service: 12/12/2024 I Hospital Day: 0     Assessment & Plan  Chest pain  Patient with hx of CAD with stent to circumflex 01/2001, vasospastic angina, moderate AS, HFpEF presents with exertional centralized chest pain lasting >30 minutes at rest, received ASA 325mg and 1 SL nitro by EMS with resolution, currently with nitro paste. Reports this pain is different from his Prinzmetal angina pain.   Patient currently unable to exert himself without experiencing chest pain   Troponin 17 > 16; EKG nonspecific ST-T wave changes  Follows with Dr. Pavel HAMILTON  06/2017 cardiac cath: 50% narrowing in the LAD at the  bifurcation with a large diagonal branch   09/2020 nuclear stress test: fixed inferior defect secondary to attenuation artifact. No ischemia.   04/2023 TTE: EF 60-65%. Grade 1 DD. Moderate AS. Mild AR.   Differentials: ACS vs Prinzmetal angina vs stable angina   Continue to trend troponin with EKG   Will try increasing amlodipine from 5mg to 10mg daily   Patient is only on Plavix due to bleeding risk -- will continue   Continue statin, Ranexa  Obtain echocardiogram  PRN nitroglycerin   Monitor telemetry  Will consult cardiology and defer decision for inpatient ischemic evaluation to their team -- will make NPO just in case  Coronary artery disease of native artery of native heart with stable angina pectoris (HCC)  Hx of CAD with stent to circumflex 01/2001  Only on Plavix due to bleeding risk   Continue statin, Ranexa  Moderate aortic stenosis  Hx of moderate AS noted on 2023 echocardiogram   Continue to monitor  Obtain updated echo   PAULA (obstructive sleep apnea)  CPAP qhs  Obesity, morbid ([facility])  BMI 39.58  Encourage weight loss/lifestyle modification   Type 2 diabetes mellitus with chronic kidney disease, with long-term current use of insulin ([facility])  Lab Results   Component Value Date  "   HGBA1C 6.0 (H) 08/26/2024       No results for input(s): \"POCGLU\" in the last 72 hours.    Blood Sugar Average: Last 72 hrs:    Well controlled A1c 6.0   Home regimen: Tresiba 20U BID, sliding scale humalog   Continue home insulin regimen   Hypoglycemia protocol    HTN (hypertension), benign  BP elevated despite nitro -- could be pain related   Home regimen: clonidine 0.1mg q12h, lisinopril 20mg daily, chlorthalidone 25mg daily, amlodipine 5mg daily   Will increase amlodipine to 10mg daily due to concern for Prinzmetal angina   Continue rest of home meds  (HFpEF) heart failure with preserved ejection fraction (HCC)  Wt Readings from Last 3 Encounters:   10/30/24 111 kg (245 lb 3.2 oz)   10/03/24 109 kg (241 lb)   10/02/24 111 kg (245 lb)     Hx of grade 1 DD and moderate AS, EF 60-65%   Currently with bilateral LE edema which patient states is chronic and at baseline. He denies any SOB. No pulmonary edema on CTA chest.   Not on loop diuretic. Currently on chlorthalidone.  I/O, Daily weights   Obtain updated echo         GERD without esophagitis  Continue PPI, Pepcid  Mixed hyperlipidemia  Continue statin   Stage 3b chronic kidney disease (CKD) (HCC)  Lab Results   Component Value Date    EGFR 40 12/12/2024    EGFR 50 (L) 11/21/2024    EGFR 39 (L) 11/02/2024    CREATININE 1.57 (H) 12/12/2024    CREATININE 1.41 (H) 11/21/2024    CREATININE 1.73 (H) 11/02/2024     Cr currently stable at baseline 1.5-1.8  Monitor BMP  Continuous opioid dependence (HCC)  Hx of recurrent pancreatitis requiring PO narcotics at home   Previously on chronic oxycodone, switched to PO dilaudid 2mg BID PRN -- PDMP reviewed and was last filled 10/08 and 10/09 for about 2 month supply   Continue home regimen PO dilaudid PRN      VTE Pharmacologic Prophylaxis: VTE Score: 4 Moderate Risk (Score 3-4) - Pharmacological DVT Prophylaxis Ordered: heparin.  Code Status: Level 1 - Full Code   Discussion with family: Patient declined call to contact " person.     Anticipated Length of Stay: Patient will be admitted on an observation basis with an anticipated length of stay of less than 2 midnights secondary to chest pain .    History of Present Illness   Chief Complaint: chest pain     Jake Claros is a 84 y.o. male with a PMH of CAD s/p stent to circumflex 01/2001, Prinzmetal angina, moderate AS, HFpEF, GERD, T2DM, HTN, HLD, continuous opioid dependence, recurrent pancreatitis who presents with centralized exertional chest pain that has been ongoing for the past week. Patient states that he tries to go on walks and lately has not been able to walk short distances without experiencing chest pain. He states that the pain would subside pretty much immediately with rest, however yesterday, the chest pain did not resolve with rest so he called 911 and was given ASA 325mg and 1 SL nitro with resolution of pain. Patient has a history of Prinzmetal angina and states that he usually feels that discomfort into his neck and this feels different. In the ER, patient unable to get out of bed to the wheelchair for CT scan without experiencing chest pain. At the time of my evaluation, he is chest pain free and at rest. He denies any associated SOB, nausea/vomiting, diaphoresis. He has chronic abdominal pain from his recurrent pancreatitis on chronic dilaudid PO and is going to have surgery as outpatient for this in January. He also has chronic bilateral LE edema which he states is at its baseline.     Review of Systems   Constitutional:  Negative for chills, diaphoresis and fever.   Respiratory:  Negative for cough and shortness of breath.    Cardiovascular:  Positive for chest pain and leg swelling. Negative for palpitations.   Gastrointestinal:  Positive for abdominal pain. Negative for nausea and vomiting.   Genitourinary:  Negative for dysuria.   Neurological:  Negative for weakness.   Psychiatric/Behavioral:  Negative for confusion.         Historical Information    Past Medical History:   Diagnosis Date    Allergic     Arthritis     Chronic kidney disease     Chronic kidney disease (CKD) stage G3a/A1, moderately decreased glomerular filtration rate (GFR) between 45-59 mL/min/1.73 square meter and albuminuria creatinine ratio less than 30 mg/g (HCC)     Colon polyp     Diabetes mellitus (HCC)     GERD (gastroesophageal reflux disease)     Heart murmur     History of echocardiogram     HL (hearing loss)     Hyperlipidemia     Hypertension     Obesity     Pancreatitis     Sleep apnea, obstructive      Past Surgical History:   Procedure Laterality Date    CARDIAC CATHETERIZATION      CHOLECYSTECTOMY      COLONOSCOPY  2018    COLONOSCOPY      EYE SURGERY      HEMORRHOID SURGERY      JOINT REPLACEMENT      knee    KNEE SURGERY Right     OTHER SURGICAL HISTORY      Stent     [place] LAPAROSCOPY SURG CHOLECYSTECTOMY N/A 2023    Procedure: CHOLECYSTECTOMY LAPAROSCOPIC;  Surgeon: Alirio Hong MD;  Location:  MAIN OR;  Service: General    UPPER GASTROINTESTINAL ENDOSCOPY       Social History     Tobacco Use    Smoking status: Former     Current packs/day: 0.00     Average packs/day: 2.0 packs/day for 15.0 years (30.0 ttl pk-yrs)     Types: Cigarettes, Pipe, Cigars     Start date: 1957     Quit date: 1972     Years since quittin.1     Passive exposure: Past    Smokeless tobacco: Never   Vaping Use    Vaping status: Never Used   Substance and Sexual Activity    Alcohol use: Not Currently     Alcohol/week: 5.0 standard drinks of alcohol     Types: 5 Glasses of wine per week     Comment: no alcohol since March    Drug use: Never    Sexual activity: Not Currently     Partners: Female     E-Cigarette/Vaping    E-Cigarette Use Never User      E-Cigarette/Vaping Substances    Nicotine No     THC No     CBD No     Flavoring No     Other No     Unknown No      Family History   Problem Relation Age of Onset    Heart disease Mother     Heart attack Mother          50s    Cancer Mother     Coronary artery disease Mother     Cancer Brother         Malignant tumor of lung     Social History:  Marital Status: /Civil Union   Patient Pre-hospital Living Situation: Home  Patient Pre-hospital Level of Mobility: unable to be assessed at time of evaluation  Patient Pre-hospital Diet Restrictions: diabetic    Meds/Allergies   I have reviewed home medications with patient personally.  Prior to Admission medications    Medication Sig Start Date End Date Taking? Authorizing Provider   allopurinol (ZYLOPRIM) 100 mg tablet Take 1 tablet (100 mg total) by mouth daily 12/6/24  Yes Ashwin Leyva MD   amLODIPine (NORVASC) 5 mg tablet Take 1 tablet (5 mg total) by mouth daily 10/30/24  Yes Ashwin Leyva MD   BD Pen Needle Pascale U/F 32G X 4 MM MISC USE AS INSTRUCTED WITH INSULIN PEN 8/27/20  Yes Historical Provider, MD   chlorthalidone 25 mg tablet Take 1 tablet (25 mg total) by mouth daily 10/30/24  Yes Ashwin Leyva MD   cloNIDine (CATAPRES) 0.1 mg tablet TAKE 1 TABLET BY MOUTH EVERY 12 HOURS 4/2/24  Yes Ashwin Leyva MD   clopidogrel (PLAVIX) 75 mg tablet Take 1 tablet (75 mg total) by mouth daily 10/30/24  Yes Ashwin Leyva MD   clotrimazole (LOTRIMIN) 1 % cream Apply 1 Application topically 2 (two) times a day as needed (itching)   Yes Historical Provider, MD   Docusate Sodium (DSS) 100 MG CAPS Take 100 mg by mouth daily   Yes Historical Provider, MD   famotidine (PEPCID) 40 MG tablet Take 1 tablet (40 mg total) by mouth daily at bedtime 9/12/24  Yes Aswhin Leyva MD   Fexofenadine HCl (MUCINEX ALLERGY PO) Take 1 tablet by mouth daily at bedtime as needed   Yes Historical Provider, MD   fluticasone (FLONASE) 50 mcg/act nasal spray 2 sprays into each nostril daily 7/23/24  Yes Ashwin Leyva MD   HYDROmorphone (DILAUDID) 2 mg tablet Take 1 tablet (2 mg total) by mouth 2 (two) times a day as needed for moderate pain Max Daily Amount: 4 mg 10/3/24  Yes Ashwin Leyva MD    insulin lispro (Admelog SoloStar) 100 units/mL injection pen Inject under the skin 3 (three) times a day with meals 18-24 units before meals   Yes Historical Provider, MD   ipratropium (ATROVENT) 0.03 % nasal spray SPRAY 2 SPRAYS INTO EACH NOSTRIL EVERY 12 HOURS. 12/29/23  Yes Ashwin Leyva MD   Lancets (OneTouch Delica Plus [id number]) MISC TEST GLUCOSE 3 4 TIMES/DAY 8/28/20  Yes Historical Provider, MD   lisinopril (ZESTRIL) 20 mg tablet Take 1 tablet (20 mg total) by mouth daily 9/12/24  Yes Ashwin Leyva MD   loratadine (CLARITIN) 10 mg tablet Take 10 mg by mouth daily   Yes Historical Provider, MD   Menatetrenone (Vitamin K2) 100 MCG TABS Take by mouth in the morning   Yes Historical Provider, MD   Multiple Vitamins-Minerals (MULTIVITAMIN ADULT PO) Take by mouth daily   Yes Historical Provider, MD   ondansetron (ZOFRAN) 4 mg tablet Take 4 mg by mouth every 8 (eight) hours as needed for nausea or vomiting   Yes Historical Provider, MD   oxyCODONE (ROXICODONE) 5 immediate release tablet Take 5 mg by mouth as needed for moderate pain   Yes Historical Provider, MD   pancrelipase, Lip-Prot-Amyl, (CREON) 6,000 units delayed release capsule Take 6,000 units of lipase by mouth 3 (three) times a day with meals 10/30/24  Yes Ashwin Leyva MD   pantoprazole (PROTONIX) 40 mg tablet Take 1 tablet (40 mg total) by mouth daily before breakfast 9/26/24 9/21/25 Yes Ashwin Leyva MD   pyridoxine (VITAMIN B6) 100 mg tablet Take 100 mg by mouth daily   Yes Historical Provider, MD   ranolazine (RANEXA) 500 mg 12 hr tablet Take 1 tablet (500 mg total) by mouth 2 (two) times a day 9/26/24  Yes Ashwin Leyva MD   rosuvastatin (CRESTOR) 20 MG tablet Take 1 tablet (20 mg total) by mouth daily at bedtime 9/12/24  Yes Ashwin Leyva MD   tamsulosin (FLOMAX) 0.4 mg Take 2 capsules (0.8 mg total) by mouth daily 9/26/24  Yes Ashwin Leyva MD   Tresiba FlexTouch 100 units/mL injection pen Inject 20 Units under the skin 2 (two) times a  "day 12/20/23  Yes Historical Provider, MD   insulin aspart (NovoLOG FlexPen) 100 UNIT/ML injection pen Inject under the skin 3 (three) times a day with meals Sliding scale as ordered TID with meals    Historical Provider, MD   Levemir FlexTouch 100 units/mL injection pen 36 Units daily In AM  Patient not taking: Reported on 12/12/2024 8/27/20   Historical Provider, MD   ondansetron (ZOFRAN-ODT) 4 mg disintegrating tablet Take 1 tablet (4 mg total) by mouth every 8 (eight) hours as needed for nausea or vomiting for up to 7 days 1/29/24 10/30/24  Phuong Vergara, DO     Allergies   Allergen Reactions    Januvia [Sitagliptin] Other (See Comments)     pancreatitis    Semaglutide Other (See Comments)     pancreatits    Simvastatin Myalgia    Trulicity [Dulaglutide] Other (See Comments)     Pancreatitis    Wound Dressing Adhesive Other (See Comments)     Burning sensation    Medical Tape Rash     \"A burn on the skin\" , mackenzie on sensitive areas.    Does ok w/ surgical glue    Molds & Smuts Allergic Rhinitis     Nasal sympt       Objective :  Temp:  [98.4 °F (36.9 °C)] 98.4 °F (36.9 °C)  HR:  [59-87] 67  BP: (151-205)/(70-84) 170/75  Resp:  [16-20] 18  SpO2:  [88 %-100 %] 98 %  O2 Device: Nasal cannula  Nasal Cannula O2 Flow Rate (L/min):  [2 L/min] 2 L/min    Physical Exam  Constitutional:       General: He is not in acute distress.     Appearance: He is obese. He is not diaphoretic.   HENT:      Mouth/Throat:      Mouth: Mucous membranes are moist.   Eyes:      General: No scleral icterus.  Cardiovascular:      Rate and Rhythm: Normal rate and regular rhythm.      Pulses: Normal pulses.      Heart sounds: Normal heart sounds.   Pulmonary:      Effort: Pulmonary effort is normal.      Breath sounds: Normal breath sounds.   Abdominal:      General: Abdomen is flat. Bowel sounds are normal. There is no distension.      Palpations: Abdomen is soft.      Tenderness: There is no abdominal tenderness.   Musculoskeletal:      " Right lower leg: Edema present.      Left lower leg: Edema present.   Skin:     General: Skin is warm and dry.   Neurological:      General: No focal deficit present.      Mental Status: He is alert and oriented to person, place, and time.   Psychiatric:         Mood and Affect: Mood normal.          Lines/Drains:            Lab Results: I have reviewed the following results:  Results from last 7 days   Lab Units 12/12/24  0037   WBC Thousand/uL 6.92   HEMOGLOBIN g/dL 11.8*   HEMATOCRIT % 36.6   PLATELETS Thousands/uL 191   SEGS PCT % 58   LYMPHO PCT % 30   MONO PCT % 9   EOS PCT % 3     Results from last 7 days   Lab Units 12/12/24  0037   SODIUM mmol/L 138   POTASSIUM mmol/L 4.1   CHLORIDE mmol/L 103   CO2 mmol/L 27   BUN mg/dL 37*   CREATININE mg/dL 1.57*   ANION GAP mmol/L 8   CALCIUM mg/dL 8.6   ALBUMIN g/dL 3.8   TOTAL BILIRUBIN mg/dL 0.37   ALK PHOS U/L 84   ALT U/L 16   AST U/L 22   GLUCOSE RANDOM mg/dL 193*     Results from last 7 days   Lab Units 12/12/24  0037   INR  1.03         Lab Results   Component Value Date    HGBA1C 6.0 (H) 08/26/2024    HGBA1C 6.2 (H) 08/07/2024    HGBA1C 6.3 (H) 04/03/2024           Imaging Results Review: I reviewed radiology reports from this admission including: CT chest.  Other Study Results Review: EKG was reviewed.     Administrative Statements   I have spent a total time of 60 minutes in caring for this patient on the day of the visit/encounter including Diagnostic results, Prognosis, Risks and benefits of tx options, Instructions for management, Patient and family education, Importance of tx compliance, Risk factor reductions, Impressions, Counseling / Coordination of care, Documenting in the medical record, Reviewing / ordering tests, medicine, procedures  , Obtaining or reviewing history  , and Communicating with other healthcare professionals .    ** Please Note: This note has been constructed using a voice recognition system. **

## 2024-12-12 NOTE — CASE MANAGEMENT
Case Management Assessment    Patient name Jake Claros  Location ED-26/ED-26 MRN [id number]  : 1940 Date 2024       Current Admission Date: 2024  Current Admission Diagnosis:Chest pain   Patient Active Problem List    Diagnosis Date Noted Date Diagnosed    Continuous opioid dependence (HCC) 10/30/2024     HTN (hypertension), benign 2024     Breast pain, right 04/15/2024     S/P TKR (total knee replacement), right 2024     Pain and swelling of right lower leg 2024     Vasospastic angina (HCC) 2024     Stage 3b chronic kidney disease (CKD) (HCC) 2023     Chest pain 2023     Acute recurrent pancreatitis 2023     Aortic stenosis 2023     Venous stasis dermatitis of both lower extremities 2022     Type 2 diabetes mellitus with chronic kidney disease, with long-term current use of insulin (HCC) 2022     History of recent hospitalization 2022     GERD without esophagitis 2022     Mixed hyperlipidemia 2022     Coronary artery disease of native artery of native heart with stable angina pectoris (HCC) 2022     Obesity, morbid (HCC)      PAULA (obstructive sleep apnea) 2020     (HFpEF) heart failure with preserved ejection fraction (HCC) 2020     RBBB 2020       LOS (days): 0  Geometric Mean LOS (GMLOS) (days):   Days to GMLOS:     OBJECTIVE:              Current admission status: Observation       Preferred Pharmacy:   [facility] - [place] - [facility]  [facility]  [place] [address]  Phone: [phone] Fax: [phone]    [facility] #[id number] - [place] - [facility] [address] [address]  Phone: [phone] Fax: [phone]    Primary Care Provider: Ashwin Leyva MD    Primary Insurance: MEDICARE  Secondary Insurance: BLUE CROSS    ASSESSMENT:  Active Health Care Proxies    There are no active Health Care  Proxies on file.                 Readmission Root Cause  30 Day Readmission: No    Patient Information  Admitted from:: Home  Mental Status: Alert  During Assessment patient was accompanied by: Not accompanied during assessment  Assessment information provided by:: Patient  Primary Caregiver: Self  Support Systems: Spouse/significant other  What [place] you [place] in[place]  Home entry access options. Select all that apply.: No steps to enter home, Elevator  Type of Current Residence: Apartment  Floor Level: 3  Living Arrangements: Lives w/ Spouse/significant other  Is patient a ?: No    Activities of Daily Living Prior to Admission  Functional Status: Independent  Completes ADLs independently?: Yes  Ambulates independently?: Yes  Does patient use assisted devices?: No  Does patient currently own DME?: No  Does patient have a history of Outpatient Therapy (PT/OT)?: Yes  Does the patient have a history of Short-Term Rehab?: Yes ([facility]  Does patient have a history of HHC?: No  Does patient currently have HHC?: No         Patient Information Continued  Income Source: Pension/[facility]  Does patient have prescription coverage?: Yes  Does patient receive dialysis treatments?: No  Does patient have a history of substance abuse?: No  Does patient have a history of Mental Health Diagnosis?: No         Means of Transportation  [facility] Self

## 2024-12-12 NOTE — ED NOTES
Pt stood up at bedside and used the urinal with negative complications. Denied CP/SOB. 400 ml urine output.      Carolin Cazares RN  12/12/24 [id number]

## 2024-12-12 NOTE — ASSESSMENT & PLAN NOTE
04/2023 TTE: EF 60-65%. Grade 1 DD. Moderate AS. Mild AR.   5/2024 echo: LVEF 60 to 65%, mild AI, moderate AS

## 2024-12-13 VITALS
WEIGHT: 240.74 LBS | SYSTOLIC BLOOD PRESSURE: 143 MMHG | HEIGHT: 66 IN | OXYGEN SATURATION: 94 % | RESPIRATION RATE: 20 BRPM | DIASTOLIC BLOOD PRESSURE: 60 MMHG | BODY MASS INDEX: 38.69 KG/M2 | TEMPERATURE: 98.5 F | HEART RATE: 82 BPM

## 2024-12-13 LAB
ALBUMIN SERPL BCG-MCNC: 3.7 G/DL (ref 3.5–5)
ANION GAP SERPL CALCULATED.3IONS-SCNC: 9 MMOL/L (ref 4–13)
BUN SERPL-MCNC: 32 MG/DL (ref 5–25)
CALCIUM SERPL-MCNC: 8.5 MG/DL (ref 8.4–10.2)
CHLORIDE SERPL-SCNC: 106 MMOL/L (ref 96–108)
CO2 SERPL-SCNC: 25 MMOL/L (ref 21–32)
CREAT SERPL-MCNC: 1.46 MG/DL (ref 0.6–1.3)
ERYTHROCYTE [DISTWIDTH] IN BLOOD BY AUTOMATED COUNT: 13.9 % (ref 11.6–15.1)
GFR SERPL CREATININE-BSD FRML MDRD: 44 ML/MIN/1.73SQ M
GLUCOSE P FAST SERPL-MCNC: 143 MG/DL (ref 65–99)
GLUCOSE SERPL-MCNC: 143 MG/DL (ref 65–140)
GLUCOSE SERPL-MCNC: 148 MG/DL (ref 65–140)
GLUCOSE SERPL-MCNC: 222 MG/DL (ref 65–140)
HCT VFR BLD AUTO: 34.9 % (ref 36.5–49.3)
HGB BLD-MCNC: 11.6 G/DL (ref 12–17)
MAGNESIUM SERPL-MCNC: 1.9 MG/DL (ref 1.9–2.7)
MCH RBC QN AUTO: 32 PG (ref 26.8–34.3)
MCHC RBC AUTO-ENTMCNC: 33.2 G/DL (ref 31.4–37.4)
MCV RBC AUTO: 96 FL (ref 82–98)
PHOSPHATE SERPL-MCNC: 3.7 MG/DL (ref 2.3–4.1)
PLATELET # BLD AUTO: 183 THOUSANDS/UL (ref 149–390)
PMV BLD AUTO: 10.1 FL (ref 8.9–12.7)
POTASSIUM SERPL-SCNC: 3.9 MMOL/L (ref 3.5–5.3)
RBC # BLD AUTO: 3.62 MILLION/UL (ref 3.88–5.62)
SODIUM SERPL-SCNC: 140 MMOL/L (ref 135–147)
WBC # BLD AUTO: 7.87 THOUSAND/UL (ref 4.31–10.16)

## 2024-12-13 PROCEDURE — 97166 OT EVAL MOD COMPLEX 45 MIN: CPT

## 2024-12-13 PROCEDURE — 80069 RENAL FUNCTION PANEL: CPT | Performed by: STUDENT IN AN ORGANIZED HEALTH CARE EDUCATION/TRAINING PROGRAM

## 2024-12-13 PROCEDURE — 85027 COMPLETE CBC AUTOMATED: CPT | Performed by: STUDENT IN AN ORGANIZED HEALTH CARE EDUCATION/TRAINING PROGRAM

## 2024-12-13 PROCEDURE — 99239 HOSP IP/OBS DSCHRG MGMT >30: CPT | Performed by: NURSE PRACTITIONER

## 2024-12-13 PROCEDURE — 83735 ASSAY OF MAGNESIUM: CPT | Performed by: STUDENT IN AN ORGANIZED HEALTH CARE EDUCATION/TRAINING PROGRAM

## 2024-12-13 PROCEDURE — 99214 OFFICE O/P EST MOD 30 MIN: CPT | Performed by: INTERNAL MEDICINE

## 2024-12-13 PROCEDURE — 97162 PT EVAL MOD COMPLEX 30 MIN: CPT

## 2024-12-13 PROCEDURE — 82948 REAGENT STRIP/BLOOD GLUCOSE: CPT

## 2024-12-13 RX ORDER — ASPIRIN 81 MG/1
81 TABLET, CHEWABLE ORAL DAILY
Qty: 30 TABLET | Refills: 0 | Status: SHIPPED | OUTPATIENT
Start: 2024-12-14

## 2024-12-13 RX ORDER — SODIUM CHLORIDE 9 MG/ML
125 INJECTION, SOLUTION INTRAVENOUS CONTINUOUS
Status: DISCONTINUED | OUTPATIENT
Start: 2024-12-13 | End: 2024-12-13

## 2024-12-13 RX ORDER — METOPROLOL TARTRATE 25 MG/1
12.5 TABLET, FILM COATED ORAL EVERY 12 HOURS SCHEDULED
Qty: 30 TABLET | Refills: 0 | Status: SHIPPED | OUTPATIENT
Start: 2024-12-13

## 2024-12-13 RX ORDER — AMLODIPINE BESYLATE 10 MG/1
10 TABLET ORAL DAILY
Qty: 30 TABLET | Refills: 0 | Status: SHIPPED | OUTPATIENT
Start: 2024-12-14

## 2024-12-13 RX ADMIN — INSULIN GLARGINE 20 UNITS: 100 INJECTION, SOLUTION SUBCUTANEOUS at 10:08

## 2024-12-13 RX ADMIN — CLOPIDOGREL BISULFATE 75 MG: 75 TABLET ORAL at 10:08

## 2024-12-13 RX ADMIN — AMLODIPINE BESYLATE 10 MG: 10 TABLET ORAL at 10:09

## 2024-12-13 RX ADMIN — RANOLAZINE 500 MG: 500 TABLET, FILM COATED, EXTENDED RELEASE ORAL at 10:09

## 2024-12-13 RX ADMIN — PANCRELIPASE 6000 UNITS: 30000; 6000; 19000 CAPSULE, DELAYED RELEASE PELLETS ORAL at 12:28

## 2024-12-13 RX ADMIN — SODIUM CHLORIDE 125 ML/HR: 0.9 INJECTION, SOLUTION INTRAVENOUS at 00:15

## 2024-12-13 RX ADMIN — TAMSULOSIN HYDROCHLORIDE 0.8 MG: 0.4 CAPSULE ORAL at 10:09

## 2024-12-13 RX ADMIN — DOCUSATE SODIUM 100 MG: 100 CAPSULE, LIQUID FILLED ORAL at 10:10

## 2024-12-13 RX ADMIN — HEPARIN SODIUM 5000 UNITS: 5000 INJECTION, SOLUTION INTRAVENOUS; SUBCUTANEOUS at 05:49

## 2024-12-13 RX ADMIN — PANCRELIPASE 6000 UNITS: 30000; 6000; 19000 CAPSULE, DELAYED RELEASE PELLETS ORAL at 10:11

## 2024-12-13 RX ADMIN — ASPIRIN 81 MG 81 MG: 81 TABLET ORAL at 10:09

## 2024-12-13 RX ADMIN — INSULIN LISPRO 2 UNITS: 100 INJECTION, SOLUTION INTRAVENOUS; SUBCUTANEOUS at 12:27

## 2024-12-13 RX ADMIN — LORATADINE 10 MG: 10 TABLET ORAL at 10:09

## 2024-12-13 RX ADMIN — FLUTICASONE PROPIONATE 2 SPRAY: 50 SPRAY, METERED NASAL at 10:10

## 2024-12-13 RX ADMIN — ALLOPURINOL 100 MG: 100 TABLET ORAL at 10:08

## 2024-12-13 RX ADMIN — PANTOPRAZOLE SODIUM 40 MG: 40 TABLET, DELAYED RELEASE ORAL at 05:49

## 2024-12-13 NOTE — ASSESSMENT & PLAN NOTE
Lab Results   Component Value Date    HGBA1C 6.0 (H) 08/26/2024       Recent Labs     12/12/24  1552 12/12/24  2107 12/13/24  0745 12/13/24  1113   POCGLU 174* 222* 148* 222*       Blood Sugar Average: Last 72 hrs:  (P) 181.6  Well controlled A1c 6.0   Home regimen: Tresiba 20U BID, sliding scale humalog   Continue home insulin regimen   Hypoglycemia protocol

## 2024-12-13 NOTE — PROGRESS NOTES
[facility]    Cardiology Progress Note  Name: Jake Claros 84 y.o. male I MRN: [id number]  Unit/Bed#: S -01 I Date of Admission: 12/11/2024   Date of Service: 12/13/2024 I Hospital Day: 0       Subjective:   He underwent successful PCI to proximal LAD yesterday and is feeling much better.  He has been ambulating around the hallways without any symptoms.  Post PCI he did have some increased chest discomfort for a few hours, but this has since improved.    Assessments  84-year-old gentleman with known history of coronary disease status post prior stent to left circumflex, came in with complaints of increasing chest pain symptoms over the last 1 week. He reports having frequent chest pain symptoms over the last week with even mild activities like walking on level ground, which were improving after resting. His symptoms were resolving within 30 minutes over the past week, but prior to presentation, he had dinner at home (macaroni and cheese, sausage) and then walked about 10 feet to go to bed. With short distance walking, he developed severe chest pain. He sat down, but symptoms still did not improve. Symptoms persisted for almost an hour and as a result he finally decided to come into the hospital. He got nitroglycerin and symptoms were improving. His blood pressure was noted to be elevated, and he was placed on Nitropaste and symptoms overall have not recurred overnight.     Principal Problem:    Chest pain  Active Problems:    PAULA (obstructive sleep apnea)    (HFpEF) heart failure with preserved ejection fraction ([facility])    Obesity, morbid ([facility])    Type 2 diabetes mellitus with chronic kidney disease, with long-term current use of insulin ([facility])    Coronary artery disease of native artery of native heart with stable angina pectoris ([facility])    Aortic stenosis    Stage 3b chronic kidney disease (CKD) ([facility])    Vasospastic angina ([facility])    HTN (hypertension), benign    Continuous opioid  "dependence (HCC)    Unstable angina  CAD status post remote PCI to left circumflex  Aortic stenosis, moderate  Hypertension  Hyperlipidemia  CKD  Obesity, Body mass index is 38.86 kg/m².      Outpatient cardiologist is Dr. Colón     Plan:  Presented with unstable angina symptoms and underwent cardiac cath yesterday  Cath showed severe proximal LAD stenosis, which was successfully stented   Post PCI, he had mild chest discomfort symptoms for several hours and needed Nitropaste, but this has since subsided and mostly resolved  Today he has ambulated around the floor without any symptom  LVEDP was noted to be 15 and he had peripheral edema, and as a result he received a dose of IV Lasix 80 post PCI, with which she has had very good diuretic response overnight  Creatinine has improved to 1.46  Weight down to 240 pounds  Continue aspirin, Plavix for 1 year  Start metoprolol succinate 25 mg daily  Continue atorvastatin    Stable for discharge from cardiac standpoint with close outpatient follow-up in the office    Review of Systems   All other systems reviewed and are negative.        Telemetry Review: No significant arrhythmias seen on telemetry review.     Objective:   Vitals: Blood pressure 143/60, pulse 82, temperature 98.5 °F (36.9 °C), resp. rate 20, height 5' 6\" (1.676 m), weight 109 kg (240 lb 11.9 oz), SpO2 94%., Body mass index is 38.86 kg/m².,   Orthostatic Blood Pressures      Flowsheet Row Most Recent Value   Blood Pressure 143/60 filed at 2024 0742   Patient Position - Orthostatic VS Lying filed at 2024 1931           Systolic (24hrs), Av , Min:96 , Max:146     Diastolic (24hrs), Av, Min:60, Max:65    Wt Readings from Last 5 Encounters:   24 109 kg (240 lb 11.9 oz)   10/30/24 111 kg (245 lb 3.2 oz)   10/03/24 109 kg (241 lb)   10/02/24 111 kg (245 lb)   24 111 kg (245 lb)     I/O          0701   0700  0701   0700  0701   0700    P.O.  240 480    " IV Piggyback 500      Total Intake(mL/kg) 500 240 (2.2) 480 (4.4)    Urine (mL/kg/hr) 400 3670 (1.4) 500 (0.4)    Total Output 400 3670 500    Net +100 -3430 -20                       Physical Exam  Vitals and nursing note reviewed.   Constitutional:       General: He is not in acute distress.     Appearance: He is well-developed. He is obese. He is not ill-appearing or diaphoretic.   HENT:      Head: Normocephalic and atraumatic.      Nose: No congestion.   Eyes:      General: No scleral icterus.     Conjunctiva/sclera: Conjunctivae normal.   Neck:      Vascular: No carotid bruit or JVD.   Cardiovascular:      Rate and Rhythm: Normal rate and regular rhythm.      Heart sounds: Normal heart sounds. No murmur heard.     No friction rub. No gallop.   Pulmonary:      Effort: Pulmonary effort is normal. No respiratory distress.      Breath sounds: Normal breath sounds. No wheezing or rales.   Chest:      Chest wall: No tenderness.   Abdominal:      General: There is no distension.      Palpations: Abdomen is soft.      Tenderness: There is no abdominal tenderness.   Musculoskeletal:         General: No swelling, tenderness or deformity.      Cervical back: Neck supple. No muscular tenderness.      Right lower leg: No edema.      Left lower leg: No edema.   Skin:     General: Skin is warm.   Neurological:      General: No focal deficit present.      Mental Status: He is alert and oriented to person, place, and time. Mental status is at baseline.   Psychiatric:         Mood and Affect: Mood normal.         Behavior: Behavior normal.         Thought Content: Thought content normal.         Laboratory Results: personally reviewed        CBC with diff:   Results from last 7 days   Lab Units 12/13/24  0537 12/12/24  0037   WBC Thousand/uL 7.87 6.92   HEMOGLOBIN g/dL 11.6* 11.8*   HEMATOCRIT % 34.9* 36.6   MCV fL 96 98   PLATELETS Thousands/uL 183 191   RBC Million/uL 3.62* 3.73*   MCH pg 32.0 31.6   MCHC g/dL 33.2 32.2   RDW  % 13.9 14.0   MPV fL 10.1 10.2   NRBC AUTO /100 WBCs  --  0         CMP:  Results from last 7 days   Lab Units 12/13/24  0537 12/12/24  0037   POTASSIUM mmol/L 3.9 4.1   CHLORIDE mmol/L 106 103   CO2 mmol/L 25 27   BUN mg/dL 32* 37*   CREATININE mg/dL 1.46* 1.57*   CALCIUM mg/dL 8.5 8.6   AST U/L  --  22   ALT U/L  --  16   ALK PHOS U/L  --  84   EGFR ml/min/1.73sq m 44 40         BMP:  Results from last 7 days   Lab Units 12/13/24  0537 12/12/24  0037   POTASSIUM mmol/L 3.9 4.1   CHLORIDE mmol/L 106 103   CO2 mmol/L 25 27   BUN mg/dL 32* 37*   CREATININE mg/dL 1.46* 1.57*   CALCIUM mg/dL 8.5 8.6       BNP:   Recent Labs     12/12/24  0037   BNP 41       Magnesium:   Results from last 7 days   Lab Units 12/13/24 0537   MAGNESIUM mg/dL 1.9       Coags:   Results from last 7 days   Lab Units 12/12/24  0037   PTT seconds 28   INR  1.03       TSH:        Hemoglobin A1C       Lipid Profile:       Meds/Allergies   all current active meds have been reviewed and current meds:   Current Facility-Administered Medications:     acetaminophen (TYLENOL) tablet 650 mg, Q6H PRN    allopurinol (ZYLOPRIM) tablet 100 mg, Daily    amLODIPine (NORVASC) tablet 10 mg, Daily    aspirin chewable tablet 81 mg, Daily    atorvastatin (LIPITOR) tablet 40 mg, Daily With Dinner    [Transfer Hold] cloNIDine (CATAPRES) tablet 0.1 mg, BID    clopidogrel (PLAVIX) tablet 75 mg, Daily    clotrimazole (LOTRIMIN) 1 % cream 1 Application, BID PRN    docusate sodium (COLACE) capsule 100 mg, Daily    famotidine (PEPCID) tablet 20 mg, HS    fluticasone (FLONASE) 50 mcg/act nasal spray 2 spray, Daily    heparin (porcine) subcutaneous injection 5,000 Units, Q8H LESLIE    HYDROmorphone (DILAUDID) tablet 2 mg, BID PRN    insulin glargine (LANTUS) subcutaneous injection 20 Units 0.2 mL, Q12H LESLIE    insulin lispro (HumALOG/ADMELOG) 100 units/mL subcutaneous injection 1-6 Units, TID AC **AND** Fingerstick Glucose (POCT), TID AC    insulin lispro (HumALOG/ADMELOG) 100  units/mL subcutaneous injection 1-6 Units, HS    loratadine (CLARITIN) tablet 10 mg, Daily    nitroglycerin (NITROSTAT) SL tablet 0.4 mg, Q5 Min PRN    pancrelipase (Lip-Prot-Amyl) (CREON) delayed release capsule 6,000 Units, TID With Meals    pantoprazole (PROTONIX) EC tablet 40 mg, Daily Before Breakfast    ranolazine (RANEXA) 12 hr tablet 500 mg, BID    tamsulosin (FLOMAX) capsule 0.8 mg, Daily    Medications Prior to Admission:     allopurinol (ZYLOPRIM) 100 mg tablet    amLODIPine (NORVASC) 5 mg tablet    BD Pen Needle Pascale U/F 32G X 4 MM MISC    chlorthalidone 25 mg tablet    cloNIDine (CATAPRES) 0.1 mg tablet    clopidogrel (PLAVIX) 75 mg tablet    clotrimazole (LOTRIMIN) 1 % cream    Docusate Sodium (DSS) 100 MG CAPS    famotidine (PEPCID) 40 MG tablet    Fexofenadine HCl (MUCINEX ALLERGY PO)    fluticasone (FLONASE) 50 mcg/act nasal spray    HYDROmorphone (DILAUDID) 2 mg tablet    insulin lispro (Admelog SoloStar) 100 units/mL injection pen    ipratropium (ATROVENT) 0.03 % nasal spray    Lancets (OneTouch Delica Plus [id number]) MISC    lisinopril (ZESTRIL) 20 mg tablet    loratadine (CLARITIN) 10 mg tablet    Menatetrenone (Vitamin K2) 100 MCG TABS    Multiple Vitamins-Minerals (MULTIVITAMIN ADULT PO)    ondansetron (ZOFRAN) 4 mg tablet    oxyCODONE (ROXICODONE) 5 immediate release tablet    pancrelipase, Lip-Prot-Amyl, (CREON) 6,000 units delayed release capsule    pantoprazole (PROTONIX) 40 mg tablet    pyridoxine (VITAMIN B6) 100 mg tablet    ranolazine (RANEXA) 500 mg 12 hr tablet    rosuvastatin (CRESTOR) 20 MG tablet    tamsulosin (FLOMAX) 0.4 mg    Tresiba FlexTouch 100 units/mL injection pen    insulin aspart (NovoLOG FlexPen) 100 UNIT/ML injection pen    ondansetron (ZOFRAN-ODT) 4 mg disintegrating tablet         Cardiac testing: reviewed  No results found for this or any previous visit.    No results found for this or any previous visit.    No results found for this or any previous visit.    No  results found for this or any previous visit.

## 2024-12-13 NOTE — DISCHARGE SUMMARY
Discharge Summary - Hospitalist   Name: Jake Claros 84 y.o. male I MRN: [id number]  Unit/Bed#: S -01 I Date of Admission: 12/11/2024   Date of Service: 12/13/2024 I Hospital Day: 0     Assessment & Plan  Chest pain  Patient with hx of CAD with stent to circumflex 01/2001, vasospastic angina, moderate AS, HFpEF presents with exertional centralized chest pain lasting >30 minutes at rest, received ASA 325mg and 1 SL nitro by EMS with resolution, currently with nitro paste. Reports this pain is different from his Prinzmetal angina pain.   Patient currently unable to exert himself without experiencing chest pain   Troponin 17 > 16; EKG nonspecific ST-T wave changes  Follows with Dr. Pavel HAMILTON  06/2017 cardiac cath: 50% narrowing in the LAD at the  bifurcation with a large diagonal branch   09/2020 nuclear stress test: fixed inferior defect secondary to attenuation artifact. No ischemia.   04/2023 TTE: EF 60-65%. Grade 1 DD. Moderate AS. Mild AR.   Differentials: ACS vs Prinzmetal angina vs stable angina   Continue to trend troponin with EKG   Will try increasing amlodipine from 5mg to 10mg daily   Patient is only on Plavix due to bleeding risk -- will continue   Continue statin, Ranexa  Obtain echocardiogram  PRN nitroglycerin   Monitor telemetry  Cardiology consulted  Cardiac cath completed 12/12  Prox LAD lesion is 80% stenosed. Stent placed  Coronary artery disease of native artery of native heart with stable angina pectoris (HCC)  Hx of CAD with stent to circumflex 01/2001  Only on Plavix due to bleeding risk   Continue statin, Ranexa  Aortic stenosis  Hx of moderate AS noted on 2023 echocardiogram   Continue to monitor  Obtain updated echo   PAULA (obstructive sleep apnea)  CPAP qhs  Obesity, morbid (HCC)  BMI 39.58  Encourage weight loss/lifestyle modification   Type 2 diabetes mellitus with chronic kidney disease, with long-term current use of insulin ([facility])  Lab Results   Component Value Date    HGBA1C  6.0 (H) 08/26/2024       Recent Labs     12/12/24  1552 12/12/24  2107 12/13/24  0745 12/13/24  1113   POCGLU 174* 222* 148* 222*       Blood Sugar Average: Last 72 hrs:  (P) 181.6  Well controlled A1c 6.0   Home regimen: Tresiba 20U BID, sliding scale humalog   Continue home insulin regimen   Hypoglycemia protocol    HTN (hypertension), benign  BP elevated despite nitro -- could be pain related   Home regimen: clonidine 0.1mg q12h, lisinopril 20mg daily, chlorthalidone 25mg daily, amlodipine 5mg daily   Will increase amlodipine to 10mg daily due to concern for Prinzmetal angina   Continue rest of home meds  (HFpEF) heart failure with preserved ejection fraction (HCC)  Wt Readings from Last 3 Encounters:   12/13/24 109 kg (240 lb 11.9 oz)   10/30/24 111 kg (245 lb 3.2 oz)   10/03/24 109 kg (241 lb)     Hx of grade 1 DD and moderate AS, EF 60-65%   Currently with bilateral LE edema which patient states is chronic and at baseline. He denies any SOB. No pulmonary edema on CTA chest.   Not on loop diuretic. Currently on chlorthalidone.  I/O, Daily weights   echo The left ventricular ejection fraction is 65%. Systolic function is normal.         Stage 3b chronic kidney disease (CKD) (HCC)  Lab Results   Component Value Date    EGFR 44 12/13/2024    EGFR 40 12/12/2024    EGFR 50 (L) 11/21/2024    CREATININE 1.46 (H) 12/13/2024    CREATININE 1.57 (H) 12/12/2024    CREATININE 1.41 (H) 11/21/2024     Cr currently stable at baseline 1.5-1.8  Monitor BMP  Continuous opioid dependence (HCC)  Hx of recurrent pancreatitis requiring PO narcotics at home   Previously on chronic oxycodone, switched to PO dilaudid 2mg BID PRN -- PDMP reviewed and was last filled 10/08 and 10/09 for about 2 month supply   Continue home regimen PO dilaudid PRN  Vasospastic angina (HCC)      Discharging Physician / Practitioner: GERSON Guerrier  PCP: Ashwin Leyva MD  Admission Date:   Admission Orders (From admission, onward)       Ordered         12/12/24 0425  Place in Observation  Once                          Discharge Date: 12/13/24    Medical Problems       Resolved Problems  Date Reviewed: 10/30/2024   None         Consultations During Hospital Stay:  IP CONSULT TO CARDIOLOGY  IP CONSULT TO NEPHROLOGY    Procedures Performed:   XR chest 1 view portable  Result Date: 12/12/2024  No acute cardiopulmonary disease. Workstation performed: [id number]     CTA chest pe study  Result Date: 12/12/2024  No pulmonary embolus. Workstation performed: [id number]         Significant Findings / Test Results:   Above  Stenosis and stent placement    Incidental Findings:   none     Test Results Pending at Discharge (will require follow up):   none     Outpatient Tests Requested:  none    Complications:  none    Past Medical History:   Diagnosis Date    Allergic     Arthritis     Chronic kidney disease 2021    Chronic kidney disease (CKD) stage G3a/A1, moderately decreased glomerular filtration rate (GFR) between 45-59 mL/min/1.73 square meter and albuminuria creatinine ratio less than 30 mg/g (HCC)     Colon polyp     Diabetes mellitus (HCC)     GERD (gastroesophageal reflux disease)     Heart murmur     History of echocardiogram     HL (hearing loss)     Hyperlipidemia     Hypertension     Obesity     Pancreatitis     Sleep apnea, obstructive        Reason for Admission: Chest Pain (Pt arrives via EMS from home. Pt c/o intermittent, exertional CP for the last few days. Hx stent placement 10-15 years ago. EMS administered 324 ASA & 1 nitro. Pt is currently pain free. Denies SOB, back pain. )       Hospital Course:     Jake Claros is a 84 y.o. male patient with past medical history of  has a past medical history of Allergic, Arthritis, Chronic kidney disease, Chronic kidney disease (CKD) stage G3a/A1, moderately decreased glomerular filtration rate (GFR) between 45-59 mL/min/1.73 square meter and albuminuria creatinine ratio less than 30 mg/g (HCC), Colon polyp,  "Diabetes mellitus (HCC), GERD (gastroesophageal reflux disease), Heart murmur, History of echocardiogram, HL (hearing loss), Hyperlipidemia, Hypertension, Obesity, Pancreatitis, and Sleep apnea, obstructive. who originally presented to the hospital on 12/11/2024 due to Chest Pain (Pt arrives via EMS from home. Pt c/o intermittent, exertional CP for the last few days. Hx stent placement 10-15 years ago. EMS administered 324 ASA & 1 nitro. Pt is currently pain free. Denies SOB, back pain. ) came with CP, cardiology consulted, Cardiac cath completed, stent placed, tolerated well, renal function stable, medications adjusted,    Please see above list of diagnoses and related plan for additional information.     Condition at Discharge: stable     Discharge Day Visit / Exam:     Subjective:  resting in chair, ready for discharge, feeling well offers no complaints  understands changes    Vitals: Blood Pressure: 143/60 (12/13/24 0742)  Pulse: 82 (12/13/24 0742)  Temperature: 98.5 °F (36.9 °C) (12/13/24 0742)  Temp Source: Oral (12/12/24 1931)  Respirations: 20 (12/12/24 1931)  Height: 5' 6\" (167.6 cm) (12/12/24 1535)  Weight - Scale: 109 kg (240 lb 11.9 oz) (12/13/24 0537)  SpO2: 94 % (12/13/24 0742)  Exam:   Physical Exam  Vitals reviewed.   Constitutional:       General: He is not in acute distress.     Appearance: He is obese. He is not ill-appearing.   Cardiovascular:      Rate and Rhythm: Normal rate.   Pulmonary:      Effort: No respiratory distress.   Abdominal:      Palpations: Abdomen is soft.   Musculoskeletal:         General: Normal range of motion.   Skin:     General: Skin is warm.      Coloration: Skin is pale.   Neurological:      General: No focal deficit present.      Mental Status: He is alert. Mental status is at baseline.         Discussion with Family: NA    Discharge instructions/Information to patient and family:   See after visit summary for information provided to patient and family.      Provisions " for Follow-Up Care:  See after visit summary for information related to follow-up care and any pertinent home health orders.      Disposition:     Home    Planned Readmission: none     Discharge Statement:  I spent 45 minutes discharging the patient. This time was spent on the day of discharge. I had direct contact with the patient on the day of discharge. Greater than 50% of the total time was spent examining patient, answering all patient questions, arranging and discussing plan of care with patient as well as directly providing post-discharge instructions.  Additional time then spent on discharge activities.    Discharge Medications:  See after visit summary for reconciled discharge medications provided to patient and family.      ** Please Note: This note has been constructed using a voice recognition system **

## 2024-12-13 NOTE — PHYSICAL THERAPY NOTE
PHYSICAL THERAPY EVALUATION NOTE          Patient Name: Jake Claros  Today's Date: 12/13/2024      AGE:   84 y.o.  Mrn:   [id number]  ADMIT DX:  Unstable angina (HCC) [I20.0]  Chest pain [R07.9]  Vasospastic angina (HCC) [I20.1]  CKD (chronic kidney disease) [N18.9]  Coronary artery disease of native artery of native heart with stable angina pectoris (HCC) [I25.118]  Stable angina pectoris (HCC) [I20.89]  Hyperglycemia due to diabetes mellitus (HCC) [E11.65]    Past Medical History:  Past Medical History:   Diagnosis Date    Allergic     Arthritis     Chronic kidney disease 2021    Chronic kidney disease (CKD) stage G3a/A1, moderately decreased glomerular filtration rate (GFR) between 45-59 mL/min/1.73 square meter and albuminuria creatinine ratio less than 30 mg/g (HCC)     Colon polyp     Diabetes mellitus (HCC)     GERD (gastroesophageal reflux disease)     Heart murmur     History of echocardiogram     HL (hearing loss)     Hyperlipidemia     Hypertension     Obesity     Pancreatitis     Sleep apnea, obstructive        Past Surgical History:  Past Surgical History:   Procedure Laterality Date    CARDIAC CATHETERIZATION      CARDIAC CATHETERIZATION Left 12/12/2024    Procedure: Cardiac Left Heart Cath;  Surgeon: Son Sibley MD;  [facility];  Service: Cardiology    CARDIAC CATHETERIZATION N/A 12/12/2024    Procedure: Cardiac PCI;  Surgeon: Son Sibley MD;  [facility];  Service: Cardiology    CHOLECYSTECTOMY      COLONOSCOPY  03/09/2018    COLONOSCOPY      EYE SURGERY      HEMORRHOID SURGERY      JOINT REPLACEMENT  2017    knee    KNEE SURGERY Right     OTHER SURGICAL HISTORY      Stent     [place] LAPAROSCOPY SURG CHOLECYSTECTOMY N/A 06/28/2023    Procedure: CHOLECYSTECTOMY LAPAROSCOPIC;  Surgeon: Alirio Hong MD;  [facility];  Service: General    UPPER GASTROINTESTINAL ENDOSCOPY       Length Of Stay:  0        PHYSICAL THERAPY EVALUATION:    Patient's identity confirmed via 2 patient identifiers (full name and ) at start of session       24 0839   PT Last Visit   PT Visit Date 24   Note Type   Note type Evaluation   Pain Assessment   Pain Assessment Tool 0-10   Pain Score No Pain  (pt repeatedly declined pain)   Restrictions/Precautions   Weight Bearing Precautions Per Order No   Other Precautions Chair Alarm;Bed Alarm;Multiple lines  (IV pole)   Home Living   Type of Home Apartment  (3rd floor apt w/ elevator)   Home Layout One level;Performs ADLs on one level;Able to live on main level with bedroom/bathroom;Elevator  (0 LOLI)   Bathroom Shower/Tub Tub/shower unit  (cut out tub)   Bathroom Toilet Raised   Bathroom Equipment Grab bars in shower;Shower chair   Home Equipment Walker;Cane;Reacher   Prior Function   Level of [place] Independent with functional mobility;Independent with IADLS;Independent with ADLs   Lives With Spouse   Receives Help From Family   IADLs Independent with driving;Independent with meal prep;Independent with medication management   Falls in the last 6 months 0   Comments At baseline pt is funnly independently w/o AD, manages household chores   General   Family/Caregiver Present No   Cognition   Overall Cognitive Status WFL   Arousal/Participation Cooperative   Attention Within functional limits   Orientation Level Oriented X4   Memory Within functional limits   Following Commands Follows multistep commands without difficulty   Comments Pt ID via name and ; pt agreeable to PT eval and mobility, very pleasant throughout   RLE Assessment   RLE Assessment   (grossly assessed w/ functional mobility, at least 3+/5)   Strength LLE   LLE Overall Strength 3+/5   Vision-Basic Assessment   Current Vision Does not wear glasses   Bed Mobility   Additional Comments pt sitting OOB in recliner chair upon arrival to room w/ OT present, returned to chair at end of session   Transfers    Sit to Stand 5  Supervision   Additional items Assist x 1;Armrests;Verbal cues   Stand to Sit 6  Modified independent   Additional items Armrests   Additional Comments pt reports he has not been up walking much since being admitted   Ambulation/Elevation   Gait pattern Improper Weight shift;Decreased foot clearance;Wide ALESIA   Gait Assistance 7  Independent  (supervision progressing to independent)   Additional items Assist x 1   Assistive Device None   Distance 200'   Ambulation/Elevation Additional Comments pt declined feeling: lightheaded, dizzy, and/or SOB, and declined chest pain throughout mobility   Balance   Static Sitting Good   Dynamic Sitting Fair +   Static Standing Fair +   Dynamic Standing Fair +   Ambulatory Fair   Activity Tolerance   Activity Tolerance Patient tolerated treatment well   Medical Staff Made Aware Pt benefited from PT/OT care coordination w/ OT Lexee due to to allow for challenge of pt's activity tolerance, PT and OT goals were addressed individually during session; HALINA Nichols; GERSON Gill   Nurse Made Aware SUNDEEP Whitley   Assessment   Prognosis Good   Assessment Jake Claros is a 84 y.o. Male who presents [facility] on 12/11/[facility] due to chest pain and diagnosis of chest pain. Orders for PT eval and treat received. Comorbidities affecting pt at time of eval include: HFpEF, DM, CAD, CKD, HTN. At baseline, pt mobilizes independently w/ no AD, w/ 0 falls in the last 6 months. Upon evaluation, pt presents w/ the following deficits: gait deviations. Upon eval, is currently performing  supervision-mod I for transfers, supervision progressing to independent w/ no AD for ambulation. Pt's clinical presentation is evolving due to abnormal lab values, ongoing medical management. From PT/mobility standpoint, pt appears to be functioning close to or at mobility baseline, therefore no further immediate skilled PT needs are warranted at this time and pt will be DC from IPPT caseload. When medically  cleared for DC, no PT needs. Please re-consult if skilled PT needs are warranted.   Goals   Patient Goals to go home   PT Treatment Day 0   Plan   Treatment/Interventions   (DC IPPT)   Discharge Recommendation   Rehab Resource Intensity Level, PT No post-acute rehabilitation needs  (contacted CRNP regarding possible cardiac rehab upon DC pending Cardiology recommendations)   AM-PAC Basic Mobility Inpatient   Turning in Flat Bed Without Bedrails 4   Lying on Back to Sitting on Edge of Flat Bed Without Bedrails 4   Moving Bed to Chair 4   Standing Up From Chair Using Arms 4   Walk in Room 4   Climb 3-5 Stairs With Railing 3   Basic Mobility Inpatient Raw Score 23   Basic Mobility Standardized Score 50.88   [facility] Highest Level Of Mobility   -HL Goal 7: Walk 25 feet or more   -HLM Achieved 7: Walk 25 feet or more   End of Consult   Patient Position at End of Consult Bedside chair;Bed/Chair alarm activated;All needs within reach       The patient's AM-PAC Basic Mobility Inpatient Short Form Raw Score is 22. A Raw score of greater than 16 suggests the patient may benefit from discharge to home. Please also refer to the recommendation of the Physical Therapist for safe discharge planning.    Given the above findings at time of evaluation, pt appears to be functioning close to or at mobility baseline and does not require skilled inpatient PT. Will D/C patient from PT caseload, please re-consult if any changes or needs arise.       DC rec: No post acute rehabilitation needs        Diane Eng, PT, DPT  12/13/24

## 2024-12-13 NOTE — OCCUPATIONAL THERAPY NOTE
Occupational Therapy Evaluation     Patient Name: Jake Claros  Today's Date: 12/13/2024  Problem List  Principal Problem:    Chest pain  Active Problems:    PAULA (obstructive sleep apnea)    (HFpEF) heart failure with preserved ejection fraction (HCC)    Obesity, morbid (HCC)    Type 2 diabetes mellitus with chronic kidney disease, with long-term current use of insulin (HCC)    GERD without esophagitis    Mixed hyperlipidemia    Coronary artery disease of native artery of native heart with stable angina pectoris (HCC)    Aortic stenosis    Stage 3b chronic kidney disease (CKD) (HCC)    Vasospastic angina (HCC)    HTN (hypertension), benign    Continuous opioid dependence (HCC)    Past Medical History  Past Medical History:   Diagnosis Date    Allergic     Arthritis     Chronic kidney disease 2021    Chronic kidney disease (CKD) stage G3a/A1, moderately decreased glomerular filtration rate (GFR) between 45-59 mL/min/1.73 square meter and albuminuria creatinine ratio less than 30 mg/g (HCC)     Colon polyp     Diabetes mellitus (HCC)     GERD (gastroesophageal reflux disease)     Heart murmur     History of echocardiogram     HL (hearing loss)     Hyperlipidemia     Hypertension     Obesity     Pancreatitis     Sleep apnea, obstructive      Past Surgical History  Past Surgical History:   Procedure Laterality Date    CARDIAC CATHETERIZATION      CARDIAC CATHETERIZATION Left 12/12/2024    Procedure: Cardiac Left Heart Cath;  Surgeon: Son Sibley MD;  [facility];  Service: Cardiology    CARDIAC CATHETERIZATION N/A 12/12/2024    Procedure: Cardiac PCI;  Surgeon: Son Sibley MD;  [facility];  Service: Cardiology    CHOLECYSTECTOMY      COLONOSCOPY  03/09/2018    COLONOSCOPY      EYE SURGERY      HEMORRHOID SURGERY      JOINT REPLACEMENT  2017    knee    KNEE SURGERY Right     OTHER SURGICAL HISTORY      Stent     [place] LAPAROSCOPY SURG CHOLECYSTECTOMY N/A 06/28/2023     Procedure: CHOLECYSTECTOMY LAPAROSCOPIC;  Surgeon: Alirio Hong MD;  Location:  [facility] OR;  Service: General    UPPER GASTROINTESTINAL ENDOSCOPY        Patient's identity confirmed via 2 patient identifiers (full name and ) at start of session        24 0812   OT Last Visit   OT Visit Date 24   Note Type   Note type Evaluation   Pain Assessment   Pain Assessment Tool 0-10   Pain Score No Pain   Restrictions/Precautions   Weight Bearing Precautions Per Order No   Other Precautions Chair Alarm;Bed Alarm;Multiple lines;Fall Risk  (IV)   Home Living   Type of Home Apartment  (3rd floor)   Home Layout One level;Performs ADLs on one level;Able to live on main level with bedroom/bathroom;Elevator   Bathroom Shower/Tub Tub/shower unit  (cut out tub)   Bathroom Toilet Raised   Bathroom Equipment Grab bars in shower;Shower chair   Bathroom Accessibility Accessible   Home Equipment Walker;Cane;Reacher   Prior Function   Level of [place] Independent with functional mobility;Independent with IADLS   Lives With Spouse  (wife has MS)   Receives Help From Family   IADLs Independent with driving;Independent with meal prep;Independent with medication management   Falls in the last 6 months 0   Vocational Retired   Comments Pt reports being fully (I) PTA. Does not use AD baseline. Uses reacher for LB ADLs. Wife has MS however pt reports she is (I) herself.   Lifestyle   Autonomy Pt lives w/ spouse in a 3rd floor apartment and is fully (I) PTA, no AD, (-) falls, (+)    Reciprocal Relationships Supportive spouse   Service to Others Retired   Intrinsic Gratification py enjoys jigsaw puzzles   General   Additional Pertinent History Pt admitted w/ intermittent centralized nonradiating chest pressure/tightness for the last several days. Planned for cardiac cath today. Recent hx of R knee replacement in 2024. PMH includes: obesity, T2DM, HLD, CAD, CKD, HTN   Family/Caregiver Present No   Subjective  "  Subjective \"I have had 24 hospitalizations in the last year and a 1/2, I know what to wear to the hospital\"   ADL   Where Assessed Edge of bed   Eating Assistance 7  Independent   Eating Deficit Setup;Beverage management   Grooming Assistance 7  Independent   UB Bathing Assistance 6  Modified Independent   LB Bathing Assistance 5  Supervision/Setup   UB Dressing Assistance 6  Modified independent   LB Dressing Assistance 5  Supervision/Setup   LB Dressing Deficit Setup;Supervision/safety;Increased time to complete;Thread RLE into pants;Thread LLE into pants;Thread RLE into underwear;Thread LLE into underwear;Pull up over hips;Don/doff R shoe;Don/doff L shoe  (sitting EOB, increased effort/time to thread over BLEs)   Toileting Assistance  5  Supervision/Setup   Bed Mobility   Additional Comments Pt received sitting EOB upon arrival, OOB to recliner at end of session   Transfers   Sit to Stand 5  Supervision   Additional items Assist x 1;Increased time required;Verbal cues   Stand to Sit 5  Supervision   Additional items Assist x 1;Increased time required;Verbal cues   Functional Mobility   Functional Mobility 5  Supervision   Additional Comments Household distance w/ no AD and S. Denies fatigue, lightheadedness, SOB   Additional items   (no AD)   Balance   Static Sitting Good   Dynamic Sitting Fair +   Static Standing Fair   Dynamic Standing Fair   Activity Tolerance   Activity Tolerance Patient limited by fatigue   Medical Staff Made Aware Spoke to PT Diane   Nurse Made Aware yes, RN Angi willard to see pt and updated   RUE Assessment   RUE Assessment WFL   LUE Assessment   LUE Assessment WFL   Hand Function   Gross Motor Coordination Functional   Fine Motor Coordination Functional   Sensation   Light Touch No apparent deficits   Vision-Basic Assessment   Current Vision Does not wear glasses   Cognition   Overall Cognitive Status WFL   Arousal/Participation Alert;Cooperative   Attention Within functional limits "   Orientation Level Oriented X4   Memory Within functional limits   Following Commands Follows multistep commands without difficulty   Comments Pleasant and agreeable, good insight, safety.   Assessment   Limitation Decreased endurance  (fxnl reach)   Prognosis Good   Assessment Pt is a 84 y.o. male seen for OT evaluation s/p admit to [facility] on 12/11/2024 w/Chest pain. Prior to admission, pt was living with spouse in a 3rd floor apartment, fully (I) with ADLs/IADLs, (-) falls, (+) . Personal and environmental factors affecting patient at time of evaluation include limited social support. Personal factors supporting patient at time of evaluation include age, (I) PLOF, supportive spouse, attitude towards recovery, FFSU, and no LOLI. Based upon this evaluation, pt is functioning at/near baseline. No further acute OT needs identified at this time. Recommend continued active ADL participation and mobilization with hospital staff while in the hospital to increase pt’s endurance and strength upon D/C. From OT standpoint, recommend D/C to home with family support when medically cleared. D/C pt from OT caseload at this time.   Goals   Patient Goals to go home   LTG Time Frame 10-14   Long Term Goal #1 see goals below   Plan   Treatment Interventions   (Eval only)   OT Frequency Eval only   Discharge Recommendation   Rehab Resource Intensity Level, OT No post-acute rehabilitation needs   AM-PAC Daily Activity Inpatient   Lower Body Dressing 3   Bathing 3   Toileting 4   Upper Body Dressing 4   Grooming 4   Eating 4   Daily Activity Raw Score 22   Daily Activity Standardized Score (Calc for Raw Score >=11) 47.1   AM-PAC Applied Cognition Inpatient   Following a Speech/Presentation 4   Understanding Ordinary Conversation 4   Taking Medications 4   Remembering Where Things Are Placed or Put Away 4   Remembering List of 4-5 Errands 4   Taking Care of Complicated Tasks 4   Applied Cognition Raw Score 24   Applied  Cognition Standardized Score 62.21   End of Consult   Patient Position at End of Consult Bedside chair;All needs within reach   Nurse Communication Nurse aware of consult     EMILIANA Hensley, OTR/L  [place] License [id number]  [place] License [id number]

## 2024-12-13 NOTE — ASSESSMENT & PLAN NOTE
Patient with hx of CAD with stent to circumflex 01/2001, vasospastic angina, moderate AS, HFpEF presents with exertional centralized chest pain lasting >30 minutes at rest, received ASA 325mg and 1 SL nitro by EMS with resolution, currently with nitro paste. Reports this pain is different from his Prinzmetal angina pain.   Patient currently unable to exert himself without experiencing chest pain   Troponin 17 > 16; EKG nonspecific ST-T wave changes  Follows with Dr. Pavel HAMILTON  06/2017 cardiac cath: 50% narrowing in the LAD at the  bifurcation with a large diagonal branch   09/2020 nuclear stress test: fixed inferior defect secondary to attenuation artifact. No ischemia.   04/2023 TTE: EF 60-65%. Grade 1 DD. Moderate AS. Mild AR.   Differentials: ACS vs Prinzmetal angina vs stable angina   Continue to trend troponin with EKG   Will try increasing amlodipine from 5mg to 10mg daily   Patient is only on Plavix due to bleeding risk -- will continue   Continue statin, Ranexa  Obtain echocardiogram  PRN nitroglycerin   Monitor telemetry  Cardiology consulted  Cardiac cath completed 12/12  Prox LAD lesion is 80% stenosed. Stent placed

## 2024-12-13 NOTE — TREATMENT PLAN
Came to evaluate the patient.  Patient was discharged recently.  Spoke with primary team advanced practitioner about case.  I have sent a message to the renal office to have the patient's renal team called at [facility] for BMP in 1 week and will defer to their team.

## 2024-12-13 NOTE — ASSESSMENT & PLAN NOTE
Lab Results   Component Value Date    EGFR 44 12/13/2024    EGFR 40 12/12/2024    EGFR 50 (L) 11/21/2024    CREATININE 1.46 (H) 12/13/2024    CREATININE 1.57 (H) 12/12/2024    CREATININE 1.41 (H) 11/21/2024     Cr currently stable at baseline 1.5-1.8  Monitor BMP

## 2024-12-13 NOTE — ASSESSMENT & PLAN NOTE
Wt Readings from Last 3 Encounters:   12/13/24 109 kg (240 lb 11.9 oz)   10/30/24 111 kg (245 lb 3.2 oz)   10/03/24 109 kg (241 lb)     Hx of grade 1 DD and moderate AS, EF 60-65%   Currently with bilateral LE edema which patient states is chronic and at baseline. He denies any SOB. No pulmonary edema on CTA chest.   Not on loop diuretic. Currently on chlorthalidone.  I/O, Daily weights   echo The left ventricular ejection fraction is 65%. Systolic function is normal.

## 2024-12-13 NOTE — PLAN OF CARE
Problem: Potential for Falls  Goal: Patient will remain free of falls  Description: INTERVENTIONS:  - Educate patient/family on patient safety including physical limitations  - Instruct patient to call for assistance with activity   - Consult OT/PT to assist with strengthening/mobility   - Keep Call bell within reach  - Keep bed low and locked with side rails adjusted as appropriate  - Keep care items and personal belongings within reach  - Initiate and maintain comfort rounds  - Make Fall Risk Sign visible to staff  - Offer Toileting every    Hours, in advance of need  - Initiate/Maintain   alarm  - Obtain necessary fall risk management equipment:     - Apply yellow socks and bracelet for high fall risk patients  - Consider moving patient to room near nurses station  12/13/2024 1447 by Angi Carlos, RN  Outcome: Completed  12/13/2024 1233 by Angi Carlos, RN  Outcome: Progressing

## 2024-12-13 NOTE — RESPIRATORY THERAPY NOTE
Pt refused CPAP, RT notified pt if he changes his mind to let RN know and RT will be happy to put it on him. RT notified RN and [facility] Call Night [facility] .

## 2024-12-14 DIAGNOSIS — J30.1 SEASONAL ALLERGIC RHINITIS DUE TO POLLEN: ICD-10-CM

## 2024-12-16 ENCOUNTER — TRANSITIONAL CARE MANAGEMENT (OUTPATIENT)
Age: 84
End: 2024-12-16

## 2024-12-16 RX ORDER — FLUTICASONE PROPIONATE 50 MCG
2 SPRAY, SUSPENSION (ML) NASAL DAILY
Qty: 48 G | Refills: 1 | Status: SHIPPED | OUTPATIENT
Start: 2024-12-16

## 2024-12-17 ENCOUNTER — OFFICE VISIT (OUTPATIENT)
Age: 84
End: 2024-12-17
Payer: MEDICARE

## 2024-12-17 VITALS
RESPIRATION RATE: 18 BRPM | HEART RATE: 61 BPM | DIASTOLIC BLOOD PRESSURE: 60 MMHG | SYSTOLIC BLOOD PRESSURE: 126 MMHG | TEMPERATURE: 98 F | HEIGHT: 66 IN | OXYGEN SATURATION: 98 % | BODY MASS INDEX: 40.02 KG/M2 | WEIGHT: 249 LBS

## 2024-12-17 DIAGNOSIS — I10 HTN (HYPERTENSION), BENIGN: ICD-10-CM

## 2024-12-17 DIAGNOSIS — E78.2 MIXED HYPERLIPIDEMIA: ICD-10-CM

## 2024-12-17 DIAGNOSIS — Z79.4 TYPE 2 DIABETES MELLITUS WITH STAGE 3B CHRONIC KIDNEY DISEASE, WITH LONG-TERM CURRENT USE OF INSULIN (HCC): ICD-10-CM

## 2024-12-17 DIAGNOSIS — F11.20 CONTINUOUS OPIOID DEPENDENCE (HCC): ICD-10-CM

## 2024-12-17 DIAGNOSIS — N18.32 STAGE 3B CHRONIC KIDNEY DISEASE (CKD) (HCC): ICD-10-CM

## 2024-12-17 DIAGNOSIS — E11.22 TYPE 2 DIABETES MELLITUS WITH STAGE 3B CHRONIC KIDNEY DISEASE, WITH LONG-TERM CURRENT USE OF INSULIN (HCC): ICD-10-CM

## 2024-12-17 DIAGNOSIS — N18.32 TYPE 2 DIABETES MELLITUS WITH STAGE 3B CHRONIC KIDNEY DISEASE, WITH LONG-TERM CURRENT USE OF INSULIN (HCC): ICD-10-CM

## 2024-12-17 DIAGNOSIS — I50.30 HEART FAILURE WITH PRESERVED EJECTION FRACTION, UNSPECIFIED HF CHRONICITY (HCC): ICD-10-CM

## 2024-12-17 DIAGNOSIS — I25.118 CORONARY ARTERY DISEASE OF NATIVE ARTERY OF NATIVE HEART WITH STABLE ANGINA PECTORIS (HCC): Primary | ICD-10-CM

## 2024-12-17 DIAGNOSIS — E66.01 OBESITY, MORBID (HCC): ICD-10-CM

## 2024-12-17 PROCEDURE — 99496 TRANSJ CARE MGMT HIGH F2F 7D: CPT

## 2024-12-17 NOTE — ASSESSMENT & PLAN NOTE
Lab Results   Component Value Date    EGFR 44 12/13/2024    EGFR 40 12/12/2024    EGFR 50 (L) 11/21/2024    CREATININE 1.46 (H) 12/13/2024    CREATININE 1.57 (H) 12/12/2024    CREATININE 1.41 (H) 11/21/2024   creatinine and GFR stable   Will need periodic BMP  Avoid NSAIDs like ibuprofen Aleve Advil etc.  Avoid high potassium diet.  We will continue to monitor

## 2024-12-17 NOTE — ASSESSMENT & PLAN NOTE
Wt Readings from Last 3 Encounters:   12/17/24 113 kg (249 lb)   12/13/24 109 kg (240 lb 11.9 oz)   10/30/24 111 kg (245 lb 3.2 oz)     Under control.    Continue current medication.    Patient has been followed by cardiologist  We will re-evaluate at next office visit.

## 2024-12-17 NOTE — PROGRESS NOTES
Transition of Care Visit  Name: Jake Claros      : 1940      MRN: [id number]  Encounter Provider: Ashwin Leyva MD  Encounter Date: 2024   Encounter department: [facility]    Assessment & Plan  Coronary artery disease of native artery of native heart with stable angina pectoris (HCC)  Under control.    Continue current medication.    Patient has been followed by cardiologist  We will re-evaluate at next office visit.             HTN (hypertension), benign  Under control.  Continue lisinopril, metoprolol and torsemide.  We will continue to monitor             Type 2 diabetes mellitus with stage 3b chronic kidney disease, with long-term current use of insulin (Formerly [facility])    Lab Results   Component Value Date    HGBA1C 6.0 (H) 2024   Under control.    Continue current medication.    We will re-evaluate at next office visit.               Stage 3b chronic kidney disease (CKD) (Formerly [facility])  Lab Results   Component Value Date    EGFR 44 2024    EGFR 40 2024    EGFR 50 (L) 2024    CREATININE 1.46 (H) 2024    CREATININE 1.57 (H) 2024    CREATININE 1.41 (H) 2024   creatinine and GFR stable   Will need periodic BMP  Avoid NSAIDs like ibuprofen Aleve Advil etc.  Avoid high potassium diet.  We will continue to monitor                Obesity, morbid (HCC)  Patient was advised to lose weight.  Potential consequences of obesity discussed with patient.  Advised to have portion control no more than 60% of meal or may consider intermittent fasting  We will continue to monitor           Mixed hyperlipidemia  Under reasonable control.  Continue rosuvastatin  We will continue to monitor             Heart failure with preserved ejection fraction, unspecified HF chronicity (HCC)  Wt Readings from Last 3 Encounters:   24 113 kg (249 lb)   24 109 kg (240 lb 11.9 oz)   10/30/24 111 kg (245 lb 3.2 oz)     Under control.    Continue current medication.     Patient has been followed by cardiologist  We will re-evaluate at next office visit.                   Continuous opioid dependence (HCC)  Pain under reasonable control.  We will continue to monitor              History of Present Illness     Transitional Care Management Review:   Jake Claros is a 84 y.o. male here for TCM follow up.     During the TCM phone call patient stated:  TCM Call     Date and time call was made  12/16/2024  3:18 PM    Hospital care reviewed  Records reviewed    Patient was hospitialized at  [facility]  [place]    Date of Admission  12/11/24    Date of discharge  12/13/24    Diagnosis  chest pain    Disposition  Home    Were the patients medications reviewed and updated  No    Current Symptoms  None    Weakness severity  Moderate    Fatigue severity  Moderate      TCM Call     Post hospital issues  None    Should patient be enrolled in anticoag monitoring?  No    Scheduled for follow up?  Yes    Referrals needed  [facility]    Did you obtain your prescribed medications  Yes    Do you need help managing your prescriptions or medications  No    Is transportation to your appointment needed  No    I have advised the patient to call PCP with any new or worsening symptoms  Heaven Blackman LPN        Patient is here for transitional care management as patient was hospitalized on December 11, 2024 and discharged on December 13, 2024 at [facility] with chest pain although report from the hospital reviewed.  Catheterization and had a stent placed in LAD patient currently feeling okay.  Denies any chest pain, shortness of breath, abdominal pain, nausea, vomiting, fever or chills.  No lightheadedness or dizziness.  No tingling numbness or weakness.  No bowel or bladder problem.  No other new problem.      Review of Systems   Constitutional:  Negative for chills and fever.   HENT:  Negative for congestion, ear pain, postnasal drip and sore throat.   "  Eyes:  Negative for pain and visual disturbance.   Respiratory:  Negative for cough and shortness of breath.    Cardiovascular:  Negative for chest pain and palpitations.   Gastrointestinal:  Negative for abdominal pain, nausea and vomiting.   Endocrine: Negative for cold intolerance, heat intolerance, polydipsia, polyphagia and polyuria.   Genitourinary:  Negative for difficulty urinating, dysuria, frequency and hematuria.   Musculoskeletal:  Positive for arthralgias (right knee) and gait problem. Negative for back pain.   Skin:  Negative for color change and rash.   Neurological:  Negative for dizziness, seizures, syncope, light-headedness and headaches.   Psychiatric/Behavioral:  Negative for agitation and behavioral problems.    All other systems reviewed and are negative.    Objective   /60 (BP Location: Left arm, Patient Position: Sitting, Cuff Size: Standard)   Pulse 61   Temp 98 °F (36.7 °C) (Tympanic)   Resp 18   Ht 5' 6\" (1.676 m)   Wt 113 kg (249 lb)   SpO2 98%   BMI 40.19 kg/m²     Physical Exam  Vitals and nursing note reviewed.   Constitutional:       General: He is not in acute distress.     Appearance: Normal appearance. He is well-developed. He is obese. He is not ill-appearing, toxic-appearing or diaphoretic.   HENT:      Head: Normocephalic and atraumatic.      Nose: Nose normal. No congestion or rhinorrhea.      Mouth/Throat:      Mouth: Mucous membranes are moist.      Pharynx: Oropharynx is clear.   Eyes:      General: No scleral icterus.        Right eye: No discharge.         Left eye: No discharge.      Extraocular Movements: Extraocular movements intact.      Conjunctiva/sclera: Conjunctivae normal.      Pupils: Pupils are equal, round, and reactive to light.   Cardiovascular:      Rate and Rhythm: Normal rate and regular rhythm.      Pulses: Normal pulses.      Heart sounds: Normal heart sounds. No murmur heard.     No gallop.   Pulmonary:      Effort: Pulmonary effort is " normal. No respiratory distress.      Breath sounds: Normal breath sounds. No wheezing or rales.   Abdominal:      General: Abdomen is flat. Bowel sounds are normal. There is no distension.      Palpations: Abdomen is soft.      Tenderness: There is no abdominal tenderness. There is no right CVA tenderness, left CVA tenderness or guarding.   Musculoskeletal:         General: No swelling or tenderness. Normal range of motion.      Cervical back: Normal range of motion and neck supple. No rigidity.      Right lower leg: Edema (Minimal) present.      Left lower leg: Edema (Minimal) present.   Lymphadenopathy:      Cervical: No cervical adenopathy.   Skin:     General: Skin is warm and dry.      Capillary Refill: Capillary refill takes 2 to 3 seconds.      Coloration: Skin is not jaundiced or pale.   Neurological:      General: No focal deficit present.      Mental Status: He is alert and oriented to person, place, and time. Mental status is at baseline.      Motor: No weakness.   Psychiatric:         Mood and Affect: Mood normal.         Behavior: Behavior normal.       Medications have been reviewed by provider in current encounter

## 2024-12-20 ENCOUNTER — HOSPITAL ENCOUNTER (INPATIENT)
Facility: HOSPITAL | Age: 84
LOS: 4 days | Discharge: HOME | DRG: 322 | End: 2024-12-24
Attending: EMERGENCY MEDICINE | Admitting: INTERNAL MEDICINE
Payer: MEDICARE

## 2024-12-20 ENCOUNTER — APPOINTMENT (EMERGENCY)
Dept: RADIOLOGY | Facility: HOSPITAL | Age: 84
DRG: 322 | End: 2024-12-20
Payer: MEDICARE

## 2024-12-20 DIAGNOSIS — I24.9 ACS (ACUTE CORONARY SYNDROME) (HCC): ICD-10-CM

## 2024-12-20 DIAGNOSIS — R79.89 ELEVATED TROPONIN: ICD-10-CM

## 2024-12-20 DIAGNOSIS — R07.9 CHEST PAIN: Primary | ICD-10-CM

## 2024-12-20 DIAGNOSIS — N18.9 CKD (CHRONIC KIDNEY DISEASE): ICD-10-CM

## 2024-12-20 DIAGNOSIS — I21.4 NSTEMI (NON-ST ELEVATED MYOCARDIAL INFARCTION) (HCC): ICD-10-CM

## 2024-12-20 PROBLEM — E78.5 HLD (HYPERLIPIDEMIA): Status: ACTIVE | Noted: 2024-12-20

## 2024-12-20 PROBLEM — I10 HYPERTENSION: Status: ACTIVE | Noted: 2024-12-20

## 2024-12-20 PROBLEM — E11.9 TYPE 2 DIABETES MELLITUS, WITH LONG-TERM CURRENT USE OF INSULIN (HCC): Status: ACTIVE | Noted: 2024-12-20

## 2024-12-20 PROBLEM — Z79.4 TYPE 2 DIABETES MELLITUS, WITH LONG-TERM CURRENT USE OF INSULIN (HCC): Status: ACTIVE | Noted: 2024-12-20

## 2024-12-20 PROBLEM — K21.9 GERD (GASTROESOPHAGEAL REFLUX DISEASE): Status: ACTIVE | Noted: 2024-12-20

## 2024-12-20 LAB
2HR DELTA HS TROPONIN: -28 NG/L
4HR DELTA HS TROPONIN: 6 NG/L
ALBUMIN SERPL BCG-MCNC: 3.8 G/DL (ref 3.5–5)
ALP SERPL-CCNC: 72 U/L (ref 34–104)
ALT SERPL W P-5'-P-CCNC: 16 U/L (ref 7–52)
ANION GAP SERPL CALCULATED.3IONS-SCNC: 8 MMOL/L (ref 4–13)
APTT PPP: 29 SECONDS (ref 23–34)
APTT PPP: 30 SECONDS (ref 23–34)
APTT PPP: 69 SECONDS (ref 23–34)
AST SERPL W P-5'-P-CCNC: 19 U/L (ref 13–39)
ATRIAL RATE: 61 BPM
BASOPHILS # BLD AUTO: 0.03 THOUSANDS/ÂΜL (ref 0–0.1)
BASOPHILS NFR BLD AUTO: 1 % (ref 0–1)
BILIRUB SERPL-MCNC: 0.37 MG/DL (ref 0.2–1)
BUN SERPL-MCNC: 31 MG/DL (ref 5–25)
CALCIUM SERPL-MCNC: 8.5 MG/DL (ref 8.4–10.2)
CARDIAC TROPONIN I PNL SERPL HS: 684 NG/L (ref ?–50)
CARDIAC TROPONIN I PNL SERPL HS: 712 NG/L (ref ?–50)
CARDIAC TROPONIN I PNL SERPL HS: 718 NG/L (ref ?–50)
CHLORIDE SERPL-SCNC: 107 MMOL/L (ref 96–108)
CO2 SERPL-SCNC: 26 MMOL/L (ref 21–32)
CREAT SERPL-MCNC: 1.58 MG/DL (ref 0.6–1.3)
EOSINOPHIL # BLD AUTO: 0.15 THOUSAND/ÂΜL (ref 0–0.61)
EOSINOPHIL NFR BLD AUTO: 3 % (ref 0–6)
ERYTHROCYTE [DISTWIDTH] IN BLOOD BY AUTOMATED COUNT: 14 % (ref 11.6–15.1)
ERYTHROCYTE [DISTWIDTH] IN BLOOD BY AUTOMATED COUNT: 14 % (ref 11.6–15.1)
EST. AVERAGE GLUCOSE BLD GHB EST-MCNC: 140 MG/DL
GFR SERPL CREATININE-BSD FRML MDRD: 40 ML/MIN/1.73SQ M
GLUCOSE SERPL-MCNC: 109 MG/DL (ref 65–140)
GLUCOSE SERPL-MCNC: 163 MG/DL (ref 65–140)
GLUCOSE SERPL-MCNC: 235 MG/DL (ref 65–140)
GLUCOSE SERPL-MCNC: 72 MG/DL (ref 65–140)
HBA1C MFR BLD: 6.5 %
HCT VFR BLD AUTO: 32.9 % (ref 36.5–49.3)
HCT VFR BLD AUTO: 33.9 % (ref 36.5–49.3)
HGB BLD-MCNC: 11 G/DL (ref 12–17)
HGB BLD-MCNC: 11.3 G/DL (ref 12–17)
IMM GRANULOCYTES # BLD AUTO: 0.01 THOUSAND/UL (ref 0–0.2)
IMM GRANULOCYTES NFR BLD AUTO: 0 % (ref 0–2)
INR PPP: 1.02 (ref 0.85–1.19)
INR PPP: 1.05 (ref 0.85–1.19)
LYMPHOCYTES # BLD AUTO: 1.57 THOUSANDS/ÂΜL (ref 0.6–4.47)
LYMPHOCYTES NFR BLD AUTO: 29 % (ref 14–44)
MCH RBC QN AUTO: 32.4 PG (ref 26.8–34.3)
MCH RBC QN AUTO: 32.5 PG (ref 26.8–34.3)
MCHC RBC AUTO-ENTMCNC: 33.3 G/DL (ref 31.4–37.4)
MCHC RBC AUTO-ENTMCNC: 33.4 G/DL (ref 31.4–37.4)
MCV RBC AUTO: 97 FL (ref 82–98)
MCV RBC AUTO: 97 FL (ref 82–98)
MONOCYTES # BLD AUTO: 0.63 THOUSAND/ÂΜL (ref 0.17–1.22)
MONOCYTES NFR BLD AUTO: 11 % (ref 4–12)
NEUTROPHILS # BLD AUTO: 3.12 THOUSANDS/ÂΜL (ref 1.85–7.62)
NEUTS SEG NFR BLD AUTO: 56 % (ref 43–75)
NRBC BLD AUTO-RTO: 0 /100 WBCS
P AXIS: 45 DEGREES
PLATELET # BLD AUTO: 191 THOUSANDS/UL (ref 149–390)
PLATELET # BLD AUTO: 199 THOUSANDS/UL (ref 149–390)
PMV BLD AUTO: 10 FL (ref 8.9–12.7)
PMV BLD AUTO: 10.2 FL (ref 8.9–12.7)
POTASSIUM SERPL-SCNC: 3.9 MMOL/L (ref 3.5–5.3)
PR INTERVAL: 272 MS
PROT SERPL-MCNC: 6.7 G/DL (ref 6.4–8.4)
PROTHROMBIN TIME: 14.1 SECONDS (ref 12.3–15)
PROTHROMBIN TIME: 14.5 SECONDS (ref 12.3–15)
QRS AXIS: -61 DEGREES
QRSD INTERVAL: 170 MS
QT INTERVAL: 470 MS
QTC INTERVAL: 473 MS
RBC # BLD AUTO: 3.39 MILLION/UL (ref 3.88–5.62)
RBC # BLD AUTO: 3.48 MILLION/UL (ref 3.88–5.62)
SODIUM SERPL-SCNC: 141 MMOL/L (ref 135–147)
T WAVE AXIS: 51 DEGREES
VENTRICULAR RATE: 61 BPM
WBC # BLD AUTO: 5.23 THOUSAND/UL (ref 4.31–10.16)
WBC # BLD AUTO: 5.51 THOUSAND/UL (ref 4.31–10.16)

## 2024-12-20 PROCEDURE — 80053 COMPREHEN METABOLIC PANEL: CPT | Performed by: EMERGENCY MEDICINE

## 2024-12-20 PROCEDURE — 99285 EMERGENCY DEPT VISIT HI MDM: CPT

## 2024-12-20 PROCEDURE — 85025 COMPLETE CBC W/AUTO DIFF WBC: CPT | Performed by: EMERGENCY MEDICINE

## 2024-12-20 PROCEDURE — 85027 COMPLETE CBC AUTOMATED: CPT

## 2024-12-20 PROCEDURE — 84484 ASSAY OF TROPONIN QUANT: CPT | Performed by: EMERGENCY MEDICINE

## 2024-12-20 PROCEDURE — 71045 X-RAY EXAM CHEST 1 VIEW: CPT

## 2024-12-20 PROCEDURE — 84484 ASSAY OF TROPONIN QUANT: CPT

## 2024-12-20 PROCEDURE — 85730 THROMBOPLASTIN TIME PARTIAL: CPT | Performed by: EMERGENCY MEDICINE

## 2024-12-20 PROCEDURE — 93010 ELECTROCARDIOGRAM REPORT: CPT | Performed by: INTERNAL MEDICINE

## 2024-12-20 PROCEDURE — 85730 THROMBOPLASTIN TIME PARTIAL: CPT

## 2024-12-20 PROCEDURE — 94660 CPAP INITIATION&MGMT: CPT

## 2024-12-20 PROCEDURE — 85610 PROTHROMBIN TIME: CPT | Performed by: EMERGENCY MEDICINE

## 2024-12-20 PROCEDURE — 99291 CRITICAL CARE FIRST HOUR: CPT | Performed by: EMERGENCY MEDICINE

## 2024-12-20 PROCEDURE — 82948 REAGENT STRIP/BLOOD GLUCOSE: CPT

## 2024-12-20 PROCEDURE — 83036 HEMOGLOBIN GLYCOSYLATED A1C: CPT

## 2024-12-20 PROCEDURE — 36415 COLL VENOUS BLD VENIPUNCTURE: CPT | Performed by: EMERGENCY MEDICINE

## 2024-12-20 PROCEDURE — 85610 PROTHROMBIN TIME: CPT

## 2024-12-20 PROCEDURE — 85730 THROMBOPLASTIN TIME PARTIAL: CPT | Performed by: INTERNAL MEDICINE

## 2024-12-20 PROCEDURE — 93005 ELECTROCARDIOGRAM TRACING: CPT

## 2024-12-20 PROCEDURE — 94760 N-INVAS EAR/PLS OXIMETRY 1: CPT

## 2024-12-20 PROCEDURE — 99223 1ST HOSP IP/OBS HIGH 75: CPT | Performed by: HOSPITALIST

## 2024-12-20 RX ORDER — INSULIN LISPRO 100 [IU]/ML
10-14 INJECTION, SOLUTION INTRAVENOUS; SUBCUTANEOUS
Status: DISCONTINUED | OUTPATIENT
Start: 2024-12-20 | End: 2024-12-20

## 2024-12-20 RX ORDER — RANOLAZINE 500 MG/1
500 TABLET, EXTENDED RELEASE ORAL 2 TIMES DAILY
Status: DISCONTINUED | OUTPATIENT
Start: 2024-12-20 | End: 2024-12-24 | Stop reason: HOSPADM

## 2024-12-20 RX ORDER — ASPIRIN 81 MG/1
243 TABLET, CHEWABLE ORAL ONCE
Status: COMPLETED | OUTPATIENT
Start: 2024-12-20 | End: 2024-12-20

## 2024-12-20 RX ORDER — INSULIN LISPRO 100 [IU]/ML
10-12 INJECTION, SOLUTION INTRAVENOUS; SUBCUTANEOUS
Status: DISCONTINUED | OUTPATIENT
Start: 2024-12-21 | End: 2024-12-20

## 2024-12-20 RX ORDER — INSULIN GLARGINE 100 [IU]/ML
16 INJECTION, SOLUTION SUBCUTANEOUS
Status: DISCONTINUED | OUTPATIENT
Start: 2024-12-20 | End: 2024-12-24 | Stop reason: HOSPADM

## 2024-12-20 RX ORDER — AMLODIPINE BESYLATE 10 MG/1
10 TABLET ORAL DAILY
Status: DISCONTINUED | OUTPATIENT
Start: 2024-12-21 | End: 2024-12-24 | Stop reason: HOSPADM

## 2024-12-20 RX ORDER — ASPIRIN 81 MG/1
81 TABLET, CHEWABLE ORAL DAILY
Status: DISCONTINUED | OUTPATIENT
Start: 2024-12-21 | End: 2024-12-24 | Stop reason: HOSPADM

## 2024-12-20 RX ORDER — HEPARIN SODIUM 1000 [USP'U]/ML
4000 INJECTION, SOLUTION INTRAVENOUS; SUBCUTANEOUS EVERY 6 HOURS PRN
Status: DISCONTINUED | OUTPATIENT
Start: 2024-12-20 | End: 2024-12-24 | Stop reason: HOSPADM

## 2024-12-20 RX ORDER — HEPARIN SODIUM 10000 [USP'U]/100ML
3-20 INJECTION, SOLUTION INTRAVENOUS
Status: DISCONTINUED | OUTPATIENT
Start: 2024-12-20 | End: 2024-12-24 | Stop reason: HOSPADM

## 2024-12-20 RX ORDER — MORPHINE SULFATE 15 MG/1
15 TABLET ORAL EVERY 6 HOURS PRN
Refills: 0 | Status: DISCONTINUED | OUTPATIENT
Start: 2024-12-20 | End: 2024-12-21

## 2024-12-20 RX ORDER — CHLORTHALIDONE 25 MG/1
25 TABLET ORAL DAILY
Status: DISCONTINUED | OUTPATIENT
Start: 2024-12-21 | End: 2024-12-22

## 2024-12-20 RX ORDER — CLONIDINE HYDROCHLORIDE 0.1 MG/1
0.1 TABLET ORAL EVERY 12 HOURS
Status: DISCONTINUED | OUTPATIENT
Start: 2024-12-20 | End: 2024-12-24 | Stop reason: HOSPADM

## 2024-12-20 RX ORDER — HEPARIN SODIUM 1000 [USP'U]/ML
4000 INJECTION, SOLUTION INTRAVENOUS; SUBCUTANEOUS ONCE
Status: COMPLETED | OUTPATIENT
Start: 2024-12-20 | End: 2024-12-20

## 2024-12-20 RX ORDER — CLOPIDOGREL BISULFATE 75 MG/1
75 TABLET ORAL DAILY
Status: DISCONTINUED | OUTPATIENT
Start: 2024-12-21 | End: 2024-12-24 | Stop reason: HOSPADM

## 2024-12-20 RX ORDER — INSULIN LISPRO 100 [IU]/ML
10 INJECTION, SOLUTION INTRAVENOUS; SUBCUTANEOUS
Status: DISCONTINUED | OUTPATIENT
Start: 2024-12-21 | End: 2024-12-20

## 2024-12-20 RX ORDER — INSULIN LISPRO 100 [IU]/ML
1-6 INJECTION, SOLUTION INTRAVENOUS; SUBCUTANEOUS
Status: DISCONTINUED | OUTPATIENT
Start: 2024-12-20 | End: 2024-12-24 | Stop reason: HOSPADM

## 2024-12-20 RX ORDER — HEPARIN SODIUM 1000 [USP'U]/ML
2000 INJECTION, SOLUTION INTRAVENOUS; SUBCUTANEOUS EVERY 6 HOURS PRN
Status: DISCONTINUED | OUTPATIENT
Start: 2024-12-20 | End: 2024-12-24 | Stop reason: HOSPADM

## 2024-12-20 RX ORDER — LORATADINE 10 MG/1
10 TABLET ORAL DAILY
Status: DISCONTINUED | OUTPATIENT
Start: 2024-12-21 | End: 2024-12-24 | Stop reason: HOSPADM

## 2024-12-20 RX ORDER — FAMOTIDINE 20 MG/1
20 TABLET, FILM COATED ORAL
Status: DISCONTINUED | OUTPATIENT
Start: 2024-12-20 | End: 2024-12-24 | Stop reason: HOSPADM

## 2024-12-20 RX ORDER — TAMSULOSIN HYDROCHLORIDE 0.4 MG/1
0.8 CAPSULE ORAL DAILY
Status: DISCONTINUED | OUTPATIENT
Start: 2024-12-21 | End: 2024-12-24 | Stop reason: HOSPADM

## 2024-12-20 RX ORDER — ACETAMINOPHEN 325 MG/1
650 TABLET ORAL EVERY 6 HOURS PRN
Status: DISCONTINUED | OUTPATIENT
Start: 2024-12-20 | End: 2024-12-24 | Stop reason: HOSPADM

## 2024-12-20 RX ORDER — PYRIDOXINE HCL (VITAMIN B6) 50 MG
100 TABLET ORAL DAILY
Status: DISCONTINUED | OUTPATIENT
Start: 2024-12-21 | End: 2024-12-24 | Stop reason: HOSPADM

## 2024-12-20 RX ORDER — ATORVASTATIN CALCIUM 40 MG/1
40 TABLET, FILM COATED ORAL
Status: DISCONTINUED | OUTPATIENT
Start: 2024-12-20 | End: 2024-12-24 | Stop reason: HOSPADM

## 2024-12-20 RX ORDER — ALLOPURINOL 100 MG/1
100 TABLET ORAL DAILY
Status: DISCONTINUED | OUTPATIENT
Start: 2024-12-21 | End: 2024-12-24 | Stop reason: HOSPADM

## 2024-12-20 RX ORDER — FLUTICASONE PROPIONATE 50 MCG
2 SPRAY, SUSPENSION (ML) NASAL DAILY
Status: DISCONTINUED | OUTPATIENT
Start: 2024-12-21 | End: 2024-12-24 | Stop reason: HOSPADM

## 2024-12-20 RX ORDER — INSULIN GLARGINE 100 [IU]/ML
14 INJECTION, SOLUTION SUBCUTANEOUS
Status: DISCONTINUED | OUTPATIENT
Start: 2024-12-21 | End: 2024-12-24 | Stop reason: HOSPADM

## 2024-12-20 RX ORDER — NITROGLYCERIN 0.4 MG/1
0.4 TABLET SUBLINGUAL
Status: DISCONTINUED | OUTPATIENT
Start: 2024-12-20 | End: 2024-12-24 | Stop reason: HOSPADM

## 2024-12-20 RX ADMIN — PANCRELIPASE 6000 UNITS: 30000; 6000; 19000 CAPSULE, DELAYED RELEASE PELLETS ORAL at 20:01

## 2024-12-20 RX ADMIN — METOPROLOL TARTRATE 12.5 MG: 25 TABLET, FILM COATED ORAL at 21:55

## 2024-12-20 RX ADMIN — ATORVASTATIN CALCIUM 40 MG: 40 TABLET, FILM COATED ORAL at 19:48

## 2024-12-20 RX ADMIN — CLONIDINE HYDROCHLORIDE 0.1 MG: 0.1 TABLET ORAL at 19:48

## 2024-12-20 RX ADMIN — RANOLAZINE 500 MG: 500 TABLET, FILM COATED, EXTENDED RELEASE ORAL at 20:02

## 2024-12-20 RX ADMIN — HEPARIN SODIUM 4000 UNITS: 1000 INJECTION, SOLUTION INTRAVENOUS; SUBCUTANEOUS at 16:48

## 2024-12-20 RX ADMIN — INSULIN GLARGINE 16 UNITS: 100 INJECTION, SOLUTION SUBCUTANEOUS at 23:03

## 2024-12-20 RX ADMIN — INSULIN LISPRO 3 UNITS: 100 INJECTION, SOLUTION INTRAVENOUS; SUBCUTANEOUS at 23:04

## 2024-12-20 RX ADMIN — ASPIRIN 81 MG 243 MG: 81 TABLET ORAL at 16:46

## 2024-12-20 RX ADMIN — FAMOTIDINE 20 MG: 20 TABLET, FILM COATED ORAL at 21:55

## 2024-12-20 RX ADMIN — HEPARIN SODIUM 11.1 UNITS/KG/HR: 10000 INJECTION, SOLUTION INTRAVENOUS at 16:49

## 2024-12-20 NOTE — ED NOTES
Two attempts made in triage for an IV and labs, site rite was also brought in however, machine is not charged.       Sandra Montreo RN  12/20/24 [id number]

## 2024-12-20 NOTE — H&P
H&P - Hospitalist   Name: Jake Claros 84 y.o. male I MRN: [id number]  Unit/Bed#: ED-32 I Date of Admission: 12/20/2024   Date of Service: 12/20/2024 I Hospital Day: 0     Assessment & Plan  Chest pain  Pt presents with exertional chest pain and chest tightness lasting 5 minutes and relieved by rest. Pt's chest pain did not return after episode and pt not having chest pain in the ED.  Pain occurred after pt walked about 200 feet from his car to apartment on day of admission  Pt's pain was pressure-like, substernal with radiation to right shoulder, 5/10 in intensity  Pt denies diaphoresis, nausea, emesis, visual disturbances or lightheadedness  Pt states this episode was similar in quality, duration and location to his last episode of chest pain 12/11/2024, which required admission with stent for 80% LAD stenosis seen on cardiac cath.  Pt has extensive cardiac history of CAD s/p LAD stent 12/2024, circumflex stent 1/2001, HFpEF, Prinzmetal angina, moderate aortic stenosis, HTN. Follows with [facility] Dr. Mclain  12/2024 Echo: EF 65%. Mod AS. Mild AR. Hypokinetic mild inferolateral and apical lateral.  In ED, troponins trending 710 at 0 hr, 684 at 2 hrs. EKG sinus rhythm with 1st degree AV block and RBBB, unchanged from prior EKG. CXR wnl.  Pt provided asa 243 mg and started on heparin drip for concerns of stent thrombosis  Etiology: Prinzmetal angina vs stable angina vs ACS vs stent thrombosis    Plan:  Cardiology consulted  Continue heparin drip  Continue statin, Plavix, Ranexa  Trend troponins and EKG  Monitor on telemetry  Nitroglycerin and morphine prn for pain  Cardiac diet  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HCC)  Pt has history of Prinzmetal angina  Pt's amlodipine increased from 5 mg to 10 mg during his last admission for concern of Prinzmetal angina  Pt took half of his Amlodipine dose on day of this chest pain episode. 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 Mod AS. Mild AR. Hypokinetic mild inferolateral and apical lateral.  Home chlorthalidone 25 mg daily and Lopressor 12.5 mg BID  Pt's Lisinopril was stopped by his outpatient cardiologist    Plan:  Monitor I's and O's  Daily weights  Cardiac diet. 1800 mL fluid restriction  Type 2 diabetes mellitus, with long-term current use of insulin ([facility])  Lab Results   Component Value Date    HGBA1C 6.0 (H) 08/26/2024       No results for input(s): \"POCGLU\" in the last 72 hours.    Blood Sugar Average: Last 72 hrs:    Pt states his home insulin regimen is Tresiba long-acting 14 U in morning and 16 U at night. Sliding scale Humalog    Plan:  Repeat Ha1c  Long-acting: Lantus 14 U in morning and 16 U at bedtime  SSI  Hypoglycemia protocol  Accuchecks  Consistent carb diet  Hypertension  Pt's systolic BP in 160's on admission. Pt states he only took half his Amlodipine 10 mg dose morning of ED presentation  Home meds: Amlodipine 10 mg daily, Lopressor 12.5 mg BID, Clonidine 0.1 mg q12 hrs    Plan:  Continue home medications  Continue to monitor blood pressure  Aortic stenosis  Mild to moderate stenosis of aortic valve on last echo 12/12/2024  Continue to monitor  GERD (gastroesophageal reflux disease)  Reduce home Pepcid 40 mg to Pepcid 20 mg due to Cr clearance  PAULA (obstructive sleep apnea)  On CPAP qhs at home  History of non-compliance  Stage 3a chronic kidney disease (CKD) ([facility])  Lab Results   Component Value Date    EGFR 40 12/20/2024 " "   EGFR 44 12/13/2024    EGFR 40 12/12/2024    CREATININE 1.58 (H) 12/20/2024    CREATININE 1.46 (H) 12/13/2024    CREATININE 1.57 (H) 12/12/2024     Pt's baseline Cr 1.4-1.7. Pt is currently at baseline    Plan:  Avoid nephrotoxic agents  Monitor BMP  HLD (hyperlipidemia)  Home Crestor 20 mg daily    Plan:  Atorvastatin 40 mg daily  Obesity, morbid (HCC)  BMI 40.35      VTE Pharmacologic Prophylaxis: VTE Score: 5 High Risk (Score >/= 5) - Pharmacological DVT Prophylaxis Ordered: heparin drip. Sequential Compression Devices Ordered.  Code Status: Level 1 - Full Code   Discussion with family: Attempted to update  (wife) via phone. Left voicemail.     Anticipated Length of Stay: Patient will be admitted on an inpatient basis with an anticipated length of stay of greater than 2 midnights secondary to chest pain.    History of Present Illness   Chief Complaint: chest pain    Jake Claros is a 84 y.o. male with a PMH of CAD with stent to circumflex 1/2001, LAD stent 12/2024, Prinzmetal angina, HFpEF, DM2, HTN, aortic stenosis, CKD stage III, GERD who presents with chest pain.  Patient had a recent hospital admission on 12/11 for chest pain and was found to have an 80% stenosis of LAD on cardiac cath.  Patient received LAD stent on 12/12 and states that afterwards he generally felt well until the day of ED presentation.  Patient states that earlier today he was getting out of his car and walked about 200 feet when he began to experience chest pain and chest tightness. The pain was pressure-like, substernal with radiation to the right shoulder, and 5/10 in intensity.  Patient's chest pain resolved after resting for 5 minutes.  Patient states that this episode was similar in duration, location and quality to his chest pain episode that resulted in LAD stent placement.  Patient states that his breathing felt \"off\" during the event but denies diaphoresis, abdominal pain, lightheadedness or visual changes.  " Patient states that his chest pain has not returned since episode and continues to deny chest pain or shortness of breath in the ED. Patient's amlodipine dose was recently increased to 10 mg for concerns of Prinzmetal angina.  Patient had only taken half of his Amlodipine dose on morning of ED presentation.    In the ED, pt presented hypertensive to systolic BP 160s but was otherwise hemodynamically stable.  EKG showed sinus within with first-degree AV block and RBBB, unchanged from prior EKGs.  Patient's troponins trended 710 at 0 hours, 684 at 2 hours. CXR wnl. Patient received aspirin 234 for milligrams in ED and was started on heparin drip for concerns of LAD stent thrombosis.    Review of Systems   Constitutional:  Negative for chills, diaphoresis, fatigue and fever.   Eyes:  Negative for visual disturbance.   Respiratory:  Positive for chest tightness. Negative for cough, shortness of breath and wheezing.    Cardiovascular:  Positive for chest pain. Negative for palpitations.   Gastrointestinal:  Negative for abdominal pain, diarrhea, nausea and vomiting.   Neurological:  Negative for dizziness, syncope, light-headedness and headaches.       Historical Information   Past Medical History:   Diagnosis Date    Allergic     Arthritis     Chronic kidney disease 2021    Chronic kidney disease (CKD) stage G3a/A1, moderately decreased glomerular filtration rate (GFR) between 45-59 mL/min/1.73 square meter and albuminuria creatinine ratio less than 30 mg/g (HCC)     Colon polyp     Diabetes mellitus (HCC)     GERD (gastroesophageal reflux disease)     Heart murmur     History of echocardiogram     HL (hearing loss)     Hyperlipidemia     Hypertension     Obesity     Pancreatitis     Sleep apnea, obstructive      Past Surgical History:   Procedure Laterality Date    CARDIAC CATHETERIZATION      CARDIAC CATHETERIZATION Left 12/12/2024    Procedure: Cardiac Left Heart Cath;  Surgeon: Son Sibley MD;  Location: [facility];  Service: Cardiology    CARDIAC CATHETERIZATION N/A 2024    Procedure: Cardiac PCI;  Surgeon: Son Sibley MD;  Location: [facility];  Service: Cardiology    CHOLECYSTECTOMY      COLONOSCOPY  2018    COLONOSCOPY      EYE SURGERY      HEMORRHOID SURGERY      JOINT REPLACEMENT  2017    knee    KNEE SURGERY Right     OTHER SURGICAL HISTORY      Stent     MT LAPAROSCOPY SURG CHOLECYSTECTOMY N/A 2023    Procedure: CHOLECYSTECTOMY LAPAROSCOPIC;  Surgeon: Alirio Hong MD;  Location:  MAIN OR;  Service: General    UPPER GASTROINTESTINAL ENDOSCOPY       Social History     Tobacco Use    Smoking status: Former     Current packs/day: 0.00     Average packs/day: 2.0 packs/day for 15.0 years (30.0 ttl pk-yrs)     Types: Cigarettes, Pipe, Cigars     Start date: 1957     Quit date: 1972     Years since quittin.1     Passive exposure: Past    Smokeless tobacco: Never   Vaping Use    Vaping status: Never Used   Substance and Sexual Activity    Alcohol use: Yes     Alcohol/week: 5.0 standard drinks of alcohol     Types: 5 Glasses of wine per week     Comment: no alcohol since March    Drug use: Never    Sexual activity: Not Currently     Partners: Female     E-Cigarette/Vaping    E-Cigarette Use Never User      E-Cigarette/Vaping Substances    Nicotine No     THC No     CBD No     Flavoring No     Other No     Unknown No      Family History   Problem Relation Age of Onset    Heart disease Mother     Heart attack Mother         50s    Cancer Mother     Coronary artery disease Mother     Cancer Brother         Malignant tumor of lung     Social History:  Marital Status: /Civil Union   Patient Pre-hospital Living Situation: Apartment  Patient Pre-hospital Level of Mobility: walks  Patient Pre-hospital Diet Restrictions: Diabetic    Meds/Allergies   I have reviewed home medications with patient personally.  Prior to Admission medications    Medication Sig Start Date  End Date Taking? Authorizing Provider   allopurinol (ZYLOPRIM) 100 mg tablet Take 1 tablet (100 mg total) by mouth daily 12/6/24  Yes Ashwin Leyva MD   amLODIPine (NORVASC) 10 mg tablet Take 1 tablet (10 mg total) by mouth daily 12/14/24  Yes GERSON Guerrier   aspirin 81 mg chewable tablet Chew 1 tablet (81 mg total) daily 12/14/24  Yes GERSON Guerrier   BD Pen Needle Pascale U/F 32G X 4 MM MISC USE AS INSTRUCTED WITH INSULIN PEN 8/27/20   Historical Provider, MD   chlorthalidone 25 mg tablet Take 1 tablet (25 mg total) by mouth daily 10/30/24  Yes Ashwin Leyva MD   cloNIDine (CATAPRES) 0.1 mg tablet TAKE 1 TABLET BY MOUTH EVERY 12 HOURS 4/2/24  Yes Ashwin Leyva MD   clopidogrel (PLAVIX) 75 mg tablet Take 1 tablet (75 mg total) by mouth daily 10/30/24  Yes Ashwin Leyva MD   clotrimazole (LOTRIMIN) 1 % cream Apply 1 Application topically 2 (two) times a day as needed (itching)   Yes Historical Provider, MD   Docusate Sodium (DSS) 100 MG CAPS Take 100 mg by mouth daily  Patient not taking: Reported on 12/20/2024    Historical Provider, MD   famotidine (PEPCID) 40 MG tablet Take 1 tablet (40 mg total) by mouth daily at bedtime 9/12/24  Yes Ashwin Leyva MD   Fexofenadine HCl (MUCINEX ALLERGY PO) Take 1 tablet by mouth daily at bedtime as needed   Yes Historical Provider, MD   fluticasone (FLONASE) 50 mcg/act nasal spray USE 2 SPRAYS IN EACH       NOSTRIL DAILY 12/16/24  Yes Ashwni Leyva MD   HYDROmorphone (DILAUDID) 2 mg tablet Take 1 tablet (2 mg total) by mouth 2 (two) times a day as needed for moderate pain Max Daily Amount: 4 mg 10/3/24   Ashwin Leyva MD   insulin aspart (NovoLOG FlexPen) 100 UNIT/ML injection pen Inject under the skin 3 (three) times a day with meals Sliding scale as ordered TID with meals   Yes Historical Provider, MD   insulin lispro (Admelog SoloStar) 100 units/mL injection pen Inject under the skin 3 (three) times a day with meals 18-24 units before meals   Yes  Historical Provider, MD   ipratropium (ATROVENT) 0.03 % nasal spray SPRAY 2 SPRAYS INTO EACH NOSTRIL EVERY 12 HOURS. 12/29/23  Yes Ashwin Leyva MD   Lancets (OneTouch Delica Plus Pzizps35M) MISC TEST GLUCOSE 3 4 TIMES/DAY 8/28/20   Historical Provider, MD   loratadine (CLARITIN) 10 mg tablet Take 10 mg by mouth daily   Yes Historical Provider, MD   Menatetrenone (Vitamin K2) 100 MCG TABS Take by mouth in the morning   Yes Historical Provider, MD   metoprolol tartrate (LOPRESSOR) 25 mg tablet Take 0.5 tablets (12.5 mg total) by mouth every 12 (twelve) hours 12/13/24  Yes GERSON Guerrier   Multiple Vitamins-Minerals (MULTIVITAMIN ADULT PO) Take by mouth daily   Yes Historical Provider, MD   ondansetron (ZOFRAN) 4 mg tablet Take 4 mg by mouth every 8 (eight) hours as needed for nausea or vomiting  Patient not taking: Reported on 12/20/2024    Historical Provider, MD   ondansetron (ZOFRAN-ODT) 4 mg disintegrating tablet Take 1 tablet (4 mg total) by mouth every 8 (eight) hours as needed for nausea or vomiting for up to 7 days 1/29/24 12/17/24  Phuong Vergara,    oxyCODONE (ROXICODONE) 5 immediate release tablet Take 5 mg by mouth as needed for moderate pain   Yes Historical Provider, MD   pancrelipase, Lip-Prot-Amyl, (CREON) 6,000 units delayed release capsule Take 6,000 units of lipase by mouth 3 (three) times a day with meals 10/30/24  Yes Ashwin Leyva MD   pantoprazole (PROTONIX) 40 mg tablet Take 1 tablet (40 mg total) by mouth daily before breakfast 9/26/24 9/21/25  Ashwin Leyva MD   pyridoxine (VITAMIN B6) 100 mg tablet Take 100 mg by mouth daily   Yes Historical Provider, MD   ranolazine (RANEXA) 500 mg 12 hr tablet Take 1 tablet (500 mg total) by mouth 2 (two) times a day 9/26/24  Yes Ashwin Leyva MD   rosuvastatin (CRESTOR) 20 MG tablet Take 1 tablet (20 mg total) by mouth daily at bedtime 9/12/24  Yes Ashwin Leyva MD   tamsulosin (FLOMAX) 0.4 mg Take 2 capsules (0.8 mg total) by mouth  "daily 9/26/24  Yes Ashwin Leyva MD   Tresiba FlexTouch 100 units/mL injection pen Inject 20 Units under the skin 2 (two) times a day 14 units in morning and 16 units at night 12/20/23  Yes Historical Provider, MD     Allergies   Allergen Reactions    Januvia [Sitagliptin] Other (See Comments)     pancreatitis    Semaglutide Other (See Comments)     pancreatits    Simvastatin Myalgia    Trulicity [Dulaglutide] Other (See Comments)     Pancreatitis    Wound Dressing Adhesive Other (See Comments)     Burning sensation    Medical Tape Rash     \"A burn on the skin\" , mackenzie on sensitive areas.    Does ok w/ surgical glue    Molds & Smuts Allergic Rhinitis     Nasal sympt       Objective :  Temp:  [98.1 °F (36.7 °C)] 98.1 °F (36.7 °C)  HR:  [59-66] 59  BP: (164-173)/(65-71) 173/70  Resp:  [18] 18  SpO2:  [99 %-100 %] 99 %  O2 Device: None (Room air)    Physical Exam  Constitutional:       General: He is not in acute distress.     Appearance: He is obese.   HENT:      Head: Atraumatic.      Mouth/Throat:      Mouth: Mucous membranes are moist.   Eyes:      Extraocular Movements: Extraocular movements intact.   Cardiovascular:      Rate and Rhythm: Normal rate and regular rhythm.      Heart sounds: Murmur (loud, systolic) heard.      No friction rub. No gallop.   Pulmonary:      Effort: Pulmonary effort is normal. No respiratory distress.      Breath sounds: No wheezing, rhonchi or rales.   Abdominal:      General: Bowel sounds are normal.      Palpations: Abdomen is soft.      Tenderness: There is no abdominal tenderness. There is no guarding or rebound.   Musculoskeletal:         General: Normal range of motion.      Cervical back: Normal range of motion.      Right lower leg: 3+ Edema present.      Left lower leg: 3+ Edema present.   Skin:     General: Skin is warm.   Neurological:      Mental Status: He is alert and oriented to person, place, and time.   Psychiatric:         Mood and Affect: Mood normal.         " Behavior: Behavior normal.          Lines/Drains:            Lab Results: I have reviewed the following results:  Results from last 7 days   Lab Units 12/20/24  1646 12/20/24  1536   WBC Thousand/uL 5.23 5.51   HEMOGLOBIN g/dL 11.3* 11.0*   HEMATOCRIT % 33.9* 32.9*   PLATELETS Thousands/uL 199 191   SEGS PCT %  --  56   LYMPHO PCT %  --  29   MONO PCT %  --  11   EOS PCT %  --  3     Results from last 7 days   Lab Units 12/20/24  1536   SODIUM mmol/L 141   POTASSIUM mmol/L 3.9   CHLORIDE mmol/L 107   CO2 mmol/L 26   BUN mg/dL 31*   CREATININE mg/dL 1.58*   ANION GAP mmol/L 8   CALCIUM mg/dL 8.5   ALBUMIN g/dL 3.8   TOTAL BILIRUBIN mg/dL 0.37   ALK PHOS U/L 72   ALT U/L 16   AST U/L 19   GLUCOSE RANDOM mg/dL 109     Results from last 7 days   Lab Units 12/20/24  1646   INR  1.05         Lab Results   Component Value Date    HGBA1C 6.0 (H) 08/26/2024    HGBA1C 6.2 (H) 08/07/2024    HGBA1C 6.3 (H) 04/03/2024           Imaging Results Review: I reviewed radiology reports from this admission including: chest xray.  XR chest 1 view portable   ED Interpretation by Mirian Remy MD (12/20 1617)   Peers unchanged compared to prior x-ray.      Final Result by Richard Estevez MD (12/20 1619)      No acute cardiopulmonary disease.            Workstation performed: [id number]            Other Study Results Review: EKG was reviewed.     Administrative Statements   I have spent a total time of 45 minutes in caring for this patient on the day of the visit/encounter including Impressions, Counseling / Coordination of care, Documenting in the medical record, Reviewing / ordering tests, medicine, procedures  , Obtaining or reviewing history  , and Communicating with other healthcare professionals .    ** Please Note: This note has been constructed using a voice recognition system. **

## 2024-12-20 NOTE — ED ATTENDING ATTESTATION
12/20/2024  I, Yamile Mcdowell MD, saw and evaluated the patient. I have discussed the patient with the resident/non-physician practitioner and agree with the resident's/non-physician practitioner's findings, Plan of Care, and MDM as documented in the resident's/non-physician practitioner's note, except where noted. All available labs and Radiology studies were reviewed.  I was present for key portions of any procedure(s) performed by the resident/non-physician practitioner and I was immediately available to provide assistance.       At this point I agree with the current assessment done in the Emergency Department.  I have conducted an independent evaluation of this patient a history and physical is as follows:    84-year-old male with history of coronary artery disease status post remote cardiac catheterization with stent to the circumflex in January 2001, with recent admission to the hospital for exertional chest pain resulting in cardiac catheterization on December 12th with sent placed to the proximal LAD at an area of 80% stenosis.  Presents for evaluation after an episode of substernal chest pressure radiating to his right shoulder that occurred while walking briskly approximately 250 yards out of a restaurant.  Accompanied by shortness of breath.  Chest pressure and shortness of breath completely resolved when the patient sat down and rested.  He denies nausea, vomiting, or diaphoresis.  No additional symptoms.  He is currently chest pain-free and denies difficulty breathing.    Physical Exam  Vitals and nursing note reviewed.   Constitutional:       General: He is not in acute distress.     Appearance: He is well-developed. He is not diaphoretic.   HENT:      Head: Normocephalic and atraumatic.      Right Ear: External ear normal.      Left Ear: External ear normal.      Nose: Nose normal.   Eyes:      Conjunctiva/sclera: Conjunctivae normal.   Cardiovascular:      Rate and Rhythm: Normal rate and regular  rhythm.      Pulses: Normal pulses.      Heart sounds: Normal heart sounds. No murmur heard.     No friction rub. No gallop.   Pulmonary:      Effort: Pulmonary effort is normal. No respiratory distress.      Breath sounds: Normal breath sounds. No wheezing or rales.   Abdominal:      General: Bowel sounds are normal. There is no distension.      Palpations: Abdomen is soft.      Tenderness: There is no abdominal tenderness. There is no guarding.   Musculoskeletal:         General: No deformity. Normal range of motion.      Cervical back: Normal range of motion and neck supple.      Comments: No calf swelling or tenderness   Skin:     General: Skin is warm and dry.   Neurological:      Mental Status: He is alert and oriented to person, place, and time.      Motor: No abnormal muscle tone.   Psychiatric:         Mood and Affect: Mood normal.         ED Course  ED Course as of 12/20/24 1814   Fri Dec 20, 2024   1517 I personally interpreted the patient's EKG which reveals normal rate, sinus rhythm with first-degree AV block, left axis deviation, right bundle branch block with left anterior fascicular block, Q-wave in lead III, no ST segment deviation, similar to most recent prior EKG on December 12.   1634 Troponin increased to 712 from 17 8 days ago prior to his cardiac catheterization. In the setting of exertional chest pressure, will treat as ACS. Patient already took 81 mg of ASA today. Will give additional 243 mg of ASA and start ACS protocol heparin infusion. Patient denies ongoing CP. Will admit to [facility] for further management including cardiology evaluation.    1636 CXR with no acute cardiopulmonary abnormalities.          Critical Care Time  CriticalCare Time    Date/Time: 12/20/2024 6:14 PM    Performed by: Yamile Mcdowell MD  Authorized by: Yamile Mcdowell MD    Critical care provider statement:     Critical care time (minutes):  30    Critical care time was exclusive of:  Separately billable procedures  and treating other patients and teaching time    Critical care was necessary to treat or prevent imminent or life-threatening deterioration of the following conditions: elevated troponin requiring heparin infusion.    Critical care was time spent personally by me on the following activities:  Blood draw for specimens, obtaining history from patient or surrogate, development of treatment plan with patient or surrogate, evaluation of patient's response to treatment, examination of patient, review of old charts, re-evaluation of patient's condition, ordering and review of radiographic studies, ordering and review of laboratory studies and ordering and performing treatments and interventions    I assumed direction of critical care for this patient from another provider in my specialty: no

## 2024-12-20 NOTE — ED PROVIDER NOTES
Time reflects when diagnosis was documented in both MDM as applicable and the Disposition within this note       Time User Action Codes Description Comment    12/20/2024  4:38 PM Mirian Remy [R07.9] Chest pain     12/20/2024  4:38 PM Mirian Remy [R79.89] Elevated troponin     12/20/2024  4:39 PM Mirian Remy [I24.9] ACS (acute coronary syndrome) (HCC)           ED Disposition       ED Disposition   Admit    Condition   Stable    Date/Time   Fri Dec 20, 2024  4:38 PM    Comment   Case was discussed with [facility] and the patient's admission status was agreed to be Admission Status: inpatient status to the service of   .               Assessment & Plan       Medical Decision Making  Baljit is an 85yo M w/ PMH of LAD stent placement () HFpEF, DM 2, HLD, CAD, CKD stage III, HTN, PAULA, GERD, RBBB, who presents the emergency department due to chest pain.    DDx includes but is not limited to: ACS, MI, angina    See ED course for MDM    Results shared with patient. Patient admitted to [facility].      Amount and/or Complexity of Data Reviewed  Labs: ordered. Decision-making details documented in ED Course.  Radiology: ordered and independent interpretation performed.    Risk  OTC drugs.  Prescription drug management.  Decision regarding hospitalization.      ED Course as of 12/20/24 1642   Fri Dec 20, 2024   1547 CBC and differential(!)  Patient is mildly anemic but at his baseline, rest of the CBC unremarkable   1621 EKG and chest x-ray as documented.  In summary unchanged from prior visit.   1637 hs TnI 0hr(!): 712   1637 Patient out to [facility] for admission and further cardiac workup.       Medications   aspirin chewable tablet 243 mg (has no administration in time range)   heparin (porcine) injection 4,000 Units (has no administration in time range)   heparin (porcine) 25,000 units in 0.45% NaCl 250 mL infusion (premix) (has no administration in time range)   heparin (porcine) injection 4,000 Units (has  no administration in time range)   heparin (porcine) injection 2,000 Units (has no administration in time range)       ED Risk Strat Scores                                              History of Present Illness       Chief Complaint   Patient presents with    Chest Pain     Patient to ED with c/o chest pain and shortness of breath that started less than 1 hour ago. Patient reports it feels the same as before he had his stent placed last week.        Past Medical History:   Diagnosis Date    Allergic     Arthritis     Chronic kidney disease 2021    Chronic kidney disease (CKD) stage G3a/A1, moderately decreased glomerular filtration rate (GFR) between 45-59 mL/min/1.73 square meter and albuminuria creatinine ratio less than 30 mg/g (HCC)     Colon polyp     Diabetes mellitus (HCC)     GERD (gastroesophageal reflux disease)     Heart murmur     History of echocardiogram     HL (hearing loss)     Hyperlipidemia     Hypertension     Obesity     Pancreatitis     Sleep apnea, obstructive       Past Surgical History:   Procedure Laterality Date    CARDIAC CATHETERIZATION      CARDIAC CATHETERIZATION Left 12/12/2024    Procedure: Cardiac Left Heart Cath;  Surgeon: Son Sibley MD;  [facility];  Service: Cardiology    CARDIAC CATHETERIZATION N/A 12/12/2024    Procedure: Cardiac PCI;  Surgeon: Son Sibley MD;  [facility];  Service: Cardiology    CHOLECYSTECTOMY      COLONOSCOPY  03/09/2018    COLONOSCOPY      EYE SURGERY      HEMORRHOID SURGERY      JOINT REPLACEMENT  2017    knee    KNEE SURGERY Right     OTHER SURGICAL HISTORY      Stent     CT LAPAROSCOPY SURG CHOLECYSTECTOMY N/A 06/28/2023    Procedure: CHOLECYSTECTOMY LAPAROSCOPIC;  Surgeon: Alirio Hong MD;  Location:  MAIN OR;  Service: General    UPPER GASTROINTESTINAL ENDOSCOPY        Family History   Problem Relation Age of Onset    Heart disease Mother     Heart attack Mother         50s    Cancer Mother      Coronary artery disease Mother     Cancer Brother         Malignant tumor of lung      Social History     Tobacco Use    Smoking status: Former     Current packs/day: 0.00     Average packs/day: 2.0 packs/day for 15.0 years (30.0 ttl pk-yrs)     Types: Cigarettes, Pipe, Cigars     Start date: 1957     Quit date: 1972     Years since quittin.1     Passive exposure: Past    Smokeless tobacco: Never   Vaping Use    Vaping status: Never Used   Substance Use Topics    Alcohol use: Yes     Alcohol/week: 5.0 standard drinks of alcohol     Types: 5 Glasses of wine per week     Comment: no alcohol since March    Drug use: Never      E-Cigarette/Vaping    E-Cigarette Use Never User       E-Cigarette/Vaping Substances    Nicotine No     THC No     CBD No     Flavoring No     Other No     Unknown No       I have reviewed and agree with the history as documented.     Baljit is an 85yo M w/ PMH of LAD stent placement () HFpEF, DM 2, HLD, CAD, CKD stage III, HTN, PAULA, GERD, RBBB, who presents the emergency department due to chest pain.  Patient reports that earlier today he was out eating with his son, when he thought that he left his steps, the restaurant.  Reports that he turned around to go back to the restaurant and was walking quickly towards it as he was concerned someone might take it.  [place] there he realized that it was in his pocket.  Reports that he stopped turnaround and was slowly walking back to the car when he noticed that he was having chest pain.  He reports that he had the chest pain for roughly 5 minutes.  Reports that it resolved after sitting down and resting.  Describes the pain as a tight pressure across his chest which radiated down his right arm.  He reports that it felt the same as it did before he had to have his LAD stent placed back on .  Reports that after it resolved he has been feeling fine.    He denies any headache, vision changes, shortness of breath, diaphoresis, nausea,  vomiting, abdominal pain both now and during the episode.        Review of Systems   Constitutional:  Negative for activity change, chills and fever.   Eyes:  Negative for pain and visual disturbance.   Respiratory:  Positive for chest tightness. Negative for shortness of breath.    Cardiovascular:  Positive for chest pain. Negative for palpitations.   Gastrointestinal:  Negative for abdominal pain, constipation, diarrhea, nausea and vomiting.   Genitourinary:  Negative for dysuria and hematuria.   Skin:  Negative for rash and wound.   Neurological:  Negative for dizziness, syncope, light-headedness and headaches.   Psychiatric/Behavioral: Negative.             Objective       ED Triage Vitals [12/20/24 1254]   Temperature Pulse Blood Pressure Respirations SpO2 Patient Position - Orthostatic VS   98.1 °F (36.7 °C) 66 164/71 18 100 % Sitting      Temp Source Heart Rate Source BP Location FiO2 (%) Pain Score    Oral Monitor Right arm -- No Pain      Vitals      Date and Time Temp Pulse SpO2 Resp BP Pain Score FACES Pain Rating User   12/20/24 1531 -- 59 99 % -- 173/70 -- -- NP   12/20/24 1515 -- 59 99 % 18 169/65 -- -- LD   12/20/24 1254 98.1 °F (36.7 °C) 66 100 % 18 164/71 No Pain -- AP            Physical Exam  Vitals and nursing note reviewed.   Constitutional:       Appearance: Normal appearance.   HENT:      Head: Normocephalic and atraumatic.      Right Ear: External ear normal.      Left Ear: External ear normal.      Nose: Nose normal.      Mouth/Throat:      Mouth: Mucous membranes are moist.      Pharynx: Oropharynx is clear.   Eyes:      Extraocular Movements: Extraocular movements intact.      Conjunctiva/sclera: Conjunctivae normal.   Cardiovascular:      Rate and Rhythm: Normal rate and regular rhythm.      Heart sounds: Murmur heard.   Pulmonary:      Effort: Pulmonary effort is normal.      Breath sounds: Normal breath sounds.   Chest:      Chest wall: No mass, deformity or tenderness.   Abdominal:       General: There is no distension.      Palpations: Abdomen is soft.      Tenderness: There is no abdominal tenderness. There is no guarding.   Musculoskeletal:         General: Normal range of motion.      Cervical back: Normal range of motion and neck supple.   Skin:     General: Skin is warm and dry.   Neurological:      General: No focal deficit present.      Mental Status: He is alert and oriented to person, place, and time.   Psychiatric:         Mood and Affect: Mood normal.         Behavior: Behavior normal.         Results Reviewed       Procedure Component Value Units Date/Time    APTT six (6) hours after Heparin bolus/drip initiation or dosing change [[id number]]     Lab Status: No result Specimen: Blood     CBC [[id number]]     Lab Status: No result Specimen: Blood     Protime-INR [[id number]]     Lab Status: No result Specimen: Blood     Comprehensive metabolic panel [[id number]]  (Abnormal) Collected: 12/20/24 1536    Lab Status: Final result Specimen: Blood from Arm, Right Updated: 12/20/24 1630     Sodium 141 mmol/L      Potassium 3.9 mmol/L      Chloride 107 mmol/L      CO2 26 mmol/L      ANION GAP 8 mmol/L      BUN 31 mg/dL      Creatinine 1.58 mg/dL      Glucose 109 mg/dL      Calcium 8.5 mg/dL      AST 19 U/L      ALT 16 U/L      Alkaline Phosphatase 72 U/L      Total Protein 6.7 g/dL      Albumin 3.8 g/dL      Total Bilirubin 0.37 mg/dL      eGFR 40 ml/min/1.73sq m     Narrative:      [employer] guidelines for Chronic Kidney Disease (CKD):     Stage 1 with normal or high GFR (GFR > 90 mL/min/1.73 square meters)    Stage 2 Mild CKD (GFR = 60-89 mL/min/1.73 square meters)    Stage 3A Moderate CKD (GFR = 45-59 mL/min/1.73 square meters)    Stage 3B Moderate CKD (GFR = 30-44 mL/min/1.73 square meters)    Stage 4 Severe CKD (GFR = 15-29 mL/min/1.73 square meters)    Stage 5 End Stage CKD (GFR <15 mL/min/1.73 square meters)  Note: GFR calculation is accurate only with a steady state  creatinine    HS Troponin 0hr (reflex protocol) [[id number]]  (Abnormal) Collected: 12/20/24 1536    Lab Status: Final result Specimen: Blood from Arm, Right Updated: 12/20/24 1630     hs TnI 0hr 712 ng/L     HS Troponin I 2hr [[id number]]     Lab Status: No result Specimen: Blood     HS Troponin I 4hr [[id number]]     Lab Status: No result Specimen: Blood     Protime-INR [[id number]]  (Normal) Collected: 12/20/24 1536    Lab Status: Final result Specimen: Blood from Arm, Right Updated: 12/20/24 1606     Protime 14.1 seconds      INR 1.02    Narrative:      INR Therapeutic Range    Indication                                             INR Range      Atrial Fibrillation                                               2.0-3.0  Hypercoagulable State                                    2.0.2.3  Left Ventricular Asist Device                            2.0-3.0  Mechanical Heart Valve                                  -    Aortic(with afib, MI, embolism, HF, LA enlargement,    and/or coagulopathy)                                     2.0-3.0 (2.5-3.5)     Mitral                                                             2.5-3.5  Prosthetic/Bioprosthetic Heart Valve               2.0-3.0  Venous thromboembolism (VTE: VT, PE        2.0-3.0    APTT [[id number]]  (Normal) Collected: 12/20/24 1536    Lab Status: Final result Specimen: Blood from Arm, Right Updated: 12/20/24 1606     PTT 29 seconds     CBC and differential [[id number]]  (Abnormal) Collected: 12/20/24 1536    Lab Status: Final result Specimen: Blood from Arm, Right Updated: 12/20/24 1544     WBC 5.51 Thousand/uL      RBC 3.39 Million/uL      Hemoglobin 11.0 g/dL      Hematocrit 32.9 %      MCV 97 fL      MCH 32.4 pg      MCHC 33.4 g/dL      RDW 14.0 %      MPV 10.0 fL      Platelets 191 Thousands/uL      nRBC 0 /100 WBCs      Segmented % 56 %      Immature Grans % 0 %      Lymphocytes % 29 %      Monocytes % 11 %      Eosinophils Relative 3 %      Basophils Relative 1 %       Absolute Neutrophils 3.12 Thousands/µL      Absolute Immature Grans 0.01 Thousand/uL      Absolute Lymphocytes 1.57 Thousands/µL      Absolute Monocytes 0.63 Thousand/µL      Eosinophils Absolute 0.15 Thousand/µL      Basophils Absolute 0.03 Thousands/µL             XR chest 1 view portable   ED Interpretation by Mirian Remy MD (12/20 1617)   Peers unchanged compared to prior x-ray.      Final Interpretation by Richard Estevez MD (12/20 1619)      No acute cardiopulmonary disease.            Workstation performed: [id number]             ECG 12 Lead Documentation Only    Date/Time: 12/20/2024 12:57 PM    Performed by: Mirian Remy MD  Authorized by: Mirian Remy MD    Indications / Diagnosis:  Chest pain  ECG reviewed by me, the ED Provider: yes    Patient location:  ED  Interpretation:     Interpretation: non-specific    Rate:     ECG rate:  61    ECG rate assessment: normal    Rhythm:     Rhythm: sinus rhythm and A-V block    Ectopy:     Ectopy: none    QRS:     QRS axis:  Normal    QRS intervals:  Normal  ST segments:     ST segments:  Normal  T waves:     T waves: normal    Comments:      EKG appears largely unchanged compared to prior.  Less depressions located in the anterior lateral leads compared to prior.      ED Medication and Procedure Management   Prior to Admission Medications   Prescriptions Last Dose Informant Patient Reported? Taking?   BD Pen Needle Pascale U/F 32G X 4 MM MISC  Self Yes No   Sig: USE AS INSTRUCTED WITH INSULIN PEN   Docusate Sodium (DSS) 100 MG CAPS  Self Yes No   Sig: Take 100 mg by mouth daily   Fexofenadine HCl (MUCINEX ALLERGY PO)  Self Yes No   Sig: Take 1 tablet by mouth daily at bedtime as needed   HYDROmorphone (DILAUDID) 2 mg tablet   No No   Sig: Take 1 tablet (2 mg total) by mouth 2 (two) times a day as needed for moderate pain Max Daily Amount: 4 mg   Lancets (OneTouch Delica Plus [id number]) MISC  Self Yes No   Sig: TEST GLUCOSE 3 4 TIMES/DAY    Menatetrenone (Vitamin K2) 100 MCG TABS  Self Yes No   Sig: Take by mouth in the morning   Multiple Vitamins-Minerals (MULTIVITAMIN ADULT PO)  Self Yes No   Sig: Take by mouth daily   Tresiba FlexTouch 100 units/mL injection pen  Self Yes No   Sig: Inject 20 Units under the skin 2 (two) times a day   allopurinol (ZYLOPRIM) 100 mg tablet   No No   Sig: Take 1 tablet (100 mg total) by mouth daily   amLODIPine (NORVASC) 10 mg tablet   No No   Sig: Take 1 tablet (10 mg total) by mouth daily   aspirin 81 mg chewable tablet   No No   Sig: Chew 1 tablet (81 mg total) daily   chlorthalidone 25 mg tablet   No No   Sig: Take 1 tablet (25 mg total) by mouth daily   cloNIDine (CATAPRES) 0.1 mg tablet   No No   Sig: TAKE 1 TABLET BY MOUTH EVERY 12 HOURS   clopidogrel (PLAVIX) 75 mg tablet   No No   Sig: Take 1 tablet (75 mg total) by mouth daily   clotrimazole (LOTRIMIN) 1 % cream  Self Yes No   Sig: Apply 1 Application topically 2 (two) times a day as needed (itching)   famotidine (PEPCID) 40 MG tablet   No No   Sig: Take 1 tablet (40 mg total) by mouth daily at bedtime   fluticasone (FLONASE) 50 mcg/act nasal spray   No No   Sig: USE 2 SPRAYS IN EACH       NOSTRIL DAILY   insulin aspart (NovoLOG FlexPen) 100 UNIT/ML injection pen  Self Yes No   Sig: Inject under the skin 3 (three) times a day with meals Sliding scale as ordered TID with meals   insulin lispro (Admelog SoloStar) 100 units/mL injection pen  Self Yes No   Sig: Inject under the skin 3 (three) times a day with meals 18-24 units before meals   ipratropium (ATROVENT) 0.03 % nasal spray  Self No No   Sig: SPRAY 2 SPRAYS INTO EACH NOSTRIL EVERY 12 HOURS.   loratadine (CLARITIN) 10 mg tablet  Self Yes No   Sig: Take 10 mg by mouth daily   metoprolol tartrate (LOPRESSOR) 25 mg tablet   No No   Sig: Take 0.5 tablets (12.5 mg total) by mouth every 12 (twelve) hours   ondansetron (ZOFRAN) 4 mg tablet  Self Yes No   Sig: Take 4 mg by mouth every 8 (eight) hours as needed  for nausea or vomiting   ondansetron (ZOFRAN-ODT) 4 mg disintegrating tablet   No No   Sig: Take 1 tablet (4 mg total) by mouth every 8 (eight) hours as needed for nausea or vomiting for up to 7 days   oxyCODONE (ROXICODONE) 5 immediate release tablet  Self Yes No   Sig: Take 5 mg by mouth as needed for moderate pain   pancrelipase, Lip-Prot-Amyl, (CREON) 6,000 units delayed release capsule   No No   Sig: Take 6,000 units of lipase by mouth 3 (three) times a day with meals   pantoprazole (PROTONIX) 40 mg tablet   No No   Sig: Take 1 tablet (40 mg total) by mouth daily before breakfast   pyridoxine (VITAMIN B6) 100 mg tablet  Self Yes No   Sig: Take 100 mg by mouth daily   ranolazine (RANEXA) 500 mg 12 hr tablet   No No   Sig: Take 1 tablet (500 mg total) by mouth 2 (two) times a day   rosuvastatin (CRESTOR) 20 MG tablet   No No   Sig: Take 1 tablet (20 mg total) by mouth daily at bedtime   tamsulosin (FLOMAX) 0.4 mg   No No   Sig: Take 2 capsules (0.8 mg total) by mouth daily      Facility-Administered Medications: None     Patient's Medications   Discharge Prescriptions    No medications on file     No discharge procedures on file.  ED SEPSIS DOCUMENTATION   Time reflects when diagnosis was documented in both MDM as applicable and the Disposition within this note       Time User Action Codes Description Comment    12/20/2024  4:38 PM Mirian Remy [R07.9] Chest pain     12/20/2024  4:38 PM Mirian Remy [R79.89] Elevated troponin     12/20/2024  4:39 PM Mirian Remy [I24.9] ACS (acute coronary syndrome) (HCC)                  Mirian Remy MD  12/20/24 3460

## 2024-12-21 LAB
ALBUMIN SERPL BCG-MCNC: 3.2 G/DL (ref 3.5–5)
ALP SERPL-CCNC: 60 U/L (ref 34–104)
ALT SERPL W P-5'-P-CCNC: 13 U/L (ref 7–52)
ANION GAP SERPL CALCULATED.3IONS-SCNC: 6 MMOL/L (ref 4–13)
APTT PPP: 63 SECONDS (ref 23–34)
AST SERPL W P-5'-P-CCNC: 14 U/L (ref 13–39)
BASOPHILS # BLD AUTO: 0.04 THOUSANDS/ÂΜL (ref 0–0.1)
BASOPHILS NFR BLD AUTO: 1 % (ref 0–1)
BILIRUB SERPL-MCNC: 0.27 MG/DL (ref 0.2–1)
BNP SERPL-MCNC: 317 PG/ML (ref 0–100)
BUN SERPL-MCNC: 29 MG/DL (ref 5–25)
CALCIUM ALBUM COR SERPL-MCNC: 8.8 MG/DL (ref 8.3–10.1)
CALCIUM SERPL-MCNC: 8.2 MG/DL (ref 8.4–10.2)
CARDIAC TROPONIN I PNL SERPL HS: 512 NG/L (ref 8–18)
CHLORIDE SERPL-SCNC: 108 MMOL/L (ref 96–108)
CO2 SERPL-SCNC: 27 MMOL/L (ref 21–32)
CREAT SERPL-MCNC: 1.56 MG/DL (ref 0.6–1.3)
EOSINOPHIL # BLD AUTO: 0.24 THOUSAND/ÂΜL (ref 0–0.61)
EOSINOPHIL NFR BLD AUTO: 4 % (ref 0–6)
ERYTHROCYTE [DISTWIDTH] IN BLOOD BY AUTOMATED COUNT: 14.1 % (ref 11.6–15.1)
GFR SERPL CREATININE-BSD FRML MDRD: 40 ML/MIN/1.73SQ M
GLUCOSE SERPL-MCNC: 124 MG/DL (ref 65–140)
GLUCOSE SERPL-MCNC: 140 MG/DL (ref 65–140)
GLUCOSE SERPL-MCNC: 161 MG/DL (ref 65–140)
GLUCOSE SERPL-MCNC: 173 MG/DL (ref 65–140)
GLUCOSE SERPL-MCNC: 213 MG/DL (ref 65–140)
HCT VFR BLD AUTO: 30.3 % (ref 36.5–49.3)
HGB BLD-MCNC: 10.1 G/DL (ref 12–17)
IMM GRANULOCYTES # BLD AUTO: 0.01 THOUSAND/UL (ref 0–0.2)
IMM GRANULOCYTES NFR BLD AUTO: 0 % (ref 0–2)
LYMPHOCYTES # BLD AUTO: 1.98 THOUSANDS/ÂΜL (ref 0.6–4.47)
LYMPHOCYTES NFR BLD AUTO: 33 % (ref 14–44)
MAGNESIUM SERPL-MCNC: 2 MG/DL (ref 1.9–2.7)
MCH RBC QN AUTO: 32.2 PG (ref 26.8–34.3)
MCHC RBC AUTO-ENTMCNC: 33.3 G/DL (ref 31.4–37.4)
MCV RBC AUTO: 97 FL (ref 82–98)
MONOCYTES # BLD AUTO: 0.68 THOUSAND/ÂΜL (ref 0.17–1.22)
MONOCYTES NFR BLD AUTO: 11 % (ref 4–12)
NEUTROPHILS # BLD AUTO: 3.09 THOUSANDS/ÂΜL (ref 1.85–7.62)
NEUTS SEG NFR BLD AUTO: 51 % (ref 43–75)
NRBC BLD AUTO-RTO: 0 /100 WBCS
PLATELET # BLD AUTO: 178 THOUSANDS/UL (ref 149–390)
PMV BLD AUTO: 10.5 FL (ref 8.9–12.7)
POTASSIUM SERPL-SCNC: 3.6 MMOL/L (ref 3.5–5.3)
PROT SERPL-MCNC: 5.7 G/DL (ref 6.4–8.4)
RBC # BLD AUTO: 3.14 MILLION/UL (ref 3.88–5.62)
SODIUM SERPL-SCNC: 141 MMOL/L (ref 135–147)
WBC # BLD AUTO: 6.04 THOUSAND/UL (ref 4.31–10.16)

## 2024-12-21 PROCEDURE — 82948 REAGENT STRIP/BLOOD GLUCOSE: CPT

## 2024-12-21 PROCEDURE — 99232 SBSQ HOSP IP/OBS MODERATE 35: CPT | Performed by: INTERNAL MEDICINE

## 2024-12-21 PROCEDURE — 83880 ASSAY OF NATRIURETIC PEPTIDE: CPT | Performed by: NURSE PRACTITIONER

## 2024-12-21 PROCEDURE — 84484 ASSAY OF TROPONIN QUANT: CPT | Performed by: NURSE PRACTITIONER

## 2024-12-21 PROCEDURE — 83735 ASSAY OF MAGNESIUM: CPT

## 2024-12-21 PROCEDURE — 85730 THROMBOPLASTIN TIME PARTIAL: CPT

## 2024-12-21 PROCEDURE — 99223 1ST HOSP IP/OBS HIGH 75: CPT | Performed by: INTERNAL MEDICINE

## 2024-12-21 PROCEDURE — 80053 COMPREHEN METABOLIC PANEL: CPT

## 2024-12-21 PROCEDURE — 85025 COMPLETE CBC W/AUTO DIFF WBC: CPT

## 2024-12-21 RX ORDER — ISOSORBIDE MONONITRATE 30 MG/1
30 TABLET, EXTENDED RELEASE ORAL DAILY
Status: DISCONTINUED | OUTPATIENT
Start: 2024-12-21 | End: 2024-12-24 | Stop reason: HOSPADM

## 2024-12-21 RX ADMIN — CLOPIDOGREL BISULFATE 75 MG: 75 TABLET ORAL at 08:35

## 2024-12-21 RX ADMIN — CHLORTHALIDONE 25 MG: 25 TABLET ORAL at 08:39

## 2024-12-21 RX ADMIN — PYRIDOXINE HCL TAB 50 MG 100 MG: 50 TAB at 08:39

## 2024-12-21 RX ADMIN — ATORVASTATIN CALCIUM 40 MG: 40 TABLET, FILM COATED ORAL at 18:07

## 2024-12-21 RX ADMIN — HEPARIN SODIUM 11.1 UNITS/KG/HR: 10000 INJECTION, SOLUTION INTRAVENOUS at 18:07

## 2024-12-21 RX ADMIN — AMLODIPINE BESYLATE 10 MG: 10 TABLET ORAL at 08:35

## 2024-12-21 RX ADMIN — PANCRELIPASE 6000 UNITS: 30000; 6000; 19000 CAPSULE, DELAYED RELEASE PELLETS ORAL at 08:39

## 2024-12-21 RX ADMIN — FLUTICASONE PROPIONATE 2 SPRAY: 50 SPRAY, METERED NASAL at 08:39

## 2024-12-21 RX ADMIN — INSULIN LISPRO 1 UNITS: 100 INJECTION, SOLUTION INTRAVENOUS; SUBCUTANEOUS at 22:09

## 2024-12-21 RX ADMIN — PANCRELIPASE 6000 UNITS: 30000; 6000; 19000 CAPSULE, DELAYED RELEASE PELLETS ORAL at 18:42

## 2024-12-21 RX ADMIN — CLONIDINE HYDROCHLORIDE 0.1 MG: 0.1 TABLET ORAL at 18:07

## 2024-12-21 RX ADMIN — ISOSORBIDE MONONITRATE 30 MG: 30 TABLET, EXTENDED RELEASE ORAL at 13:15

## 2024-12-21 RX ADMIN — CLONIDINE HYDROCHLORIDE 0.1 MG: 0.1 TABLET ORAL at 06:08

## 2024-12-21 RX ADMIN — ASPIRIN 81 MG 81 MG: 81 TABLET ORAL at 08:35

## 2024-12-21 RX ADMIN — PANCRELIPASE 6000 UNITS: 30000; 6000; 19000 CAPSULE, DELAYED RELEASE PELLETS ORAL at 13:15

## 2024-12-21 RX ADMIN — ALLOPURINOL 100 MG: 100 TABLET ORAL at 08:35

## 2024-12-21 RX ADMIN — INSULIN GLARGINE 16 UNITS: 100 INJECTION, SOLUTION SUBCUTANEOUS at 22:06

## 2024-12-21 RX ADMIN — INSULIN GLARGINE 14 UNITS: 100 INJECTION, SOLUTION SUBCUTANEOUS at 08:38

## 2024-12-21 RX ADMIN — B-COMPLEX W/ C & FOLIC ACID TAB 1 TABLET: TAB at 08:35

## 2024-12-21 RX ADMIN — RANOLAZINE 500 MG: 500 TABLET, FILM COATED, EXTENDED RELEASE ORAL at 08:35

## 2024-12-21 RX ADMIN — RANOLAZINE 500 MG: 500 TABLET, FILM COATED, EXTENDED RELEASE ORAL at 18:07

## 2024-12-21 RX ADMIN — METOPROLOL TARTRATE 12.5 MG: 25 TABLET, FILM COATED ORAL at 22:10

## 2024-12-21 RX ADMIN — LORATADINE 10 MG: 10 TABLET ORAL at 08:35

## 2024-12-21 RX ADMIN — FAMOTIDINE 20 MG: 20 TABLET, FILM COATED ORAL at 22:06

## 2024-12-21 RX ADMIN — INSULIN LISPRO 1 UNITS: 100 INJECTION, SOLUTION INTRAVENOUS; SUBCUTANEOUS at 18:36

## 2024-12-21 RX ADMIN — METOPROLOL TARTRATE 12.5 MG: 25 TABLET, FILM COATED ORAL at 08:35

## 2024-12-21 RX ADMIN — TAMSULOSIN HYDROCHLORIDE 0.8 MG: 0.4 CAPSULE ORAL at 08:35

## 2024-12-21 NOTE — PROGRESS NOTES
Progress Note - Hospitalist   Name: Jake Claros 84 y.o. male I MRN: [id number]  Unit/Bed#: [facility] I Date of Admission: 12/20/2024   Date of Service: 12/21/2024 I Hospital Day: 1    Assessment & Plan  Chest pain  Patient presented with one episode of exertional substernal chest pain and chest tightness lasting 5 minutes and relieved by rest.   He was completely asymptomatic upon arrival to the ED.  He denied any nausea, sweating, vomiting, or lightheadedness.  Patient had a recent admission on 12/11/2024 s/p LAD stent was placed.  Last echocardiogram 12/2024: EF 65%.  Moderate aortic stenosis.  Hypokinetic mild inferior lateral and apical lateral.  Troponins 710> 684> 718>512    EKG first-degree AV block with RBBB   Possible ACS versus Prinzmetal angina.    Plan:  Cardiology is following; Imdur 30 mg was added to his anginal regimen.  They might consider reimaging coronaries.  Continue IV heparin drip  Continue statin, Plavix, Ranexa  Monitor on telemetry  Nitroglycerin and morphine prn for pain  Cardiac diet  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HCC)  Pt has history of Prinzmetal angina  Pt's amlodipine increased from 5 mg to 10 mg during his last admission for concern of Prinzmetal angina    Plan:  Continue Amlodipine 10 mg  Coronary artery disease of native artery with stable angina pectoris (HCC)  Hx of CAD with stent to circumflex 1/2001  LAD stent for 80% stenosis on 12/12/2024  Home Plavix 75 mg daily, Crestor 20 mg daily, Ranexa 500 mg q12hrs, ASA 81 mg daily    Plan:  Continue Plavix, statin, Ranexa, ASA 81 mg   (HFpEF) heart failure with preserved ejection fraction (HCC)  Wt Readings from Last 3 Encounters:   12/20/24 113 kg (250 lb)   12/17/24 113 kg (249 lb)   12/13/24 109 kg (240 lb 11.9 oz)     Echo 12/12/2024: EF 65%. Mod AS. Mild AR. Hypokinetic mild inferolateral and apical lateral.  Home chlorthalidone 25 mg daily and Lopressor 12.5 mg BID    Plan:  Continue chlorthalidone and Lopressor  Monitor I's and  O's  Daily weights  Cardiac diet. 1800 mL fluid restriction  Type 2 diabetes mellitus, with long-term current use of insulin ([facility])  Lab Results   Component Value Date    HGBA1C 6.5 (H) 12/20/2024       Recent Labs     12/20/24  1945 12/20/24  2116 12/20/24  2246 12/21/24  0744   POCGLU 72 163* 235* 124       Blood Sugar Average: Last 72 hrs:  (P) 148.5  Pt states his home insulin regimen is Tresiba long-acting 14 U in morning and 16 U at night. Sliding scale Humalog    Plan:  Repeat Ha1c  Long-acting: Lantus 14 U in morning and 16 U at bedtime  SSI  Hypoglycemia protocol  Accuchecks  Consistent carb diet  Hypertension  Blood pressure remained high overnight.  Stable this morning.  Home meds: Amlodipine 10 mg daily, Lopressor 12.5 mg BID, Clonidine 0.1 mg q12 hrs    Plan:  Continue home medications  Continue to monitor blood pressure  Aortic stenosis  Mild to moderate stenosis of aortic valve on last echo 12/12/2024  Continue to monitor  GERD (gastroesophageal reflux disease)  Reduce home Pepcid 40 mg to Pepcid 20 mg due to Cr clearance  PAULA (obstructive sleep apnea)  On CPAP qhs at home  History of non-compliance  Stage 3a chronic kidney disease (CKD) ([facility])  Lab Results   Component Value Date    EGFR 40 12/21/2024    EGFR 40 12/20/2024    EGFR 44 12/13/2024    CREATININE 1.56 (H) 12/21/2024    CREATININE 1.58 (H) 12/20/2024    CREATININE 1.46 (H) 12/13/2024     Pt's baseline Cr (1.5-1.8)  Pt is currently at baseline    Plan:  Avoid nephrotoxic agents  Monitor BMP  HLD (hyperlipidemia)  Home Crestor 20 mg daily    Plan:  Atorvastatin 40 mg daily  Obesity, morbid ([facility])  BMI 40.35    VTE Pharmacologic Prophylaxis: VTE Score: 5 High Risk (Score >/= 5) - Pharmacological DVT Prophylaxis Ordered: heparin drip. Sequential Compression Devices Ordered.    Mobility:   Basic Mobility Inpatient Raw Score: 24  JH-HLM Goal: 8: Walk 250 feet or more  JH-HLM Achieved: 6: Walk 10 steps or more  JH-HLM Goal achieved. Continue to  encourage appropriate mobility.    Patient Centered Rounds: I performed bedside rounds with nursing staff today.   Discussions with Specialists or Other Care Team Provider: Cardiology     Education and Discussions with Family / Patient: Patient declined call to .     Current Length of Stay: 1 day(s)  Current Patient Status: Inpatient   Certification Statement: The patient will continue to require additional inpatient hospital stay due to Concerns for ACS   Discharge Plan: Anticipate discharge tomorrow to home.    Code Status: Level 1 - Full Code    Subjective   Patient was seen and examined this morning.  He was sitting comfortably in bed not in pain or distress.  He denied any chest pain and stated that it was only that 5-minute episode that he had yesterday.  No nausea, abdominal pain, or vomiting.  No shortness of breath or palpitations.    Objective :  Temp:  [97.6 °F (36.4 °C)-98 °F (36.7 °C)] 98 °F (36.7 °C)  HR:  [51-65] 51  BP: (115-176)/() 115/56  Resp:  [17-18] 17  SpO2:  [94 %-99 %] 97 %  O2 Device: None (Room air)    Body mass index is 40.35 kg/m².     Input and Output Summary (last 24 hours):   No intake or output data in the 24 hours ending 12/21/24 1303    Physical Exam  Vitals and nursing note reviewed.   Constitutional:       General: He is not in acute distress.     Appearance: He is well-developed.   HENT:      Head: Normocephalic and atraumatic.   Eyes:      Conjunctiva/sclera: Conjunctivae normal.   Cardiovascular:      Rate and Rhythm: Normal rate and regular rhythm.      Heart sounds: No murmur heard.  Pulmonary:      Effort: Pulmonary effort is normal. No respiratory distress.      Breath sounds: Normal breath sounds.   Abdominal:      Palpations: Abdomen is soft.      Tenderness: There is no abdominal tenderness.   Musculoskeletal:         General: No swelling.      Cervical back: Neck supple.   Skin:     General: Skin is warm and dry.      Capillary Refill: Capillary  refill takes less than 2 seconds.   Neurological:      Mental Status: He is alert.   Psychiatric:         Mood and Affect: Mood normal.         Telemetry:  Telemetry Orders (From admission, onward)               24 Hour Telemetry Monitoring  Continuous x 24 Hours (Telem)        Expiring   Question:  Reason for 24 Hour Telemetry  Answer:  Decompensated CHF- and any one of the following: continuous diuretic infusion or total diuretic dose >200 mg daily, associated electrolyte derangement (I.e. K < 3.0), inotropic drip (continuous infusion), hx of ventricular arrhythmia, or new EF < 35%                     Telemetry Reviewed: Atrial fibrillation. HR averaging 50-60  Indication for Continued Telemetry Use: Acute MI/Unstable Angina/Rule out ACS               Lab Results: I have reviewed the following results:   Results from last 7 days   Lab Units 12/21/24  0453   WBC Thousand/uL 6.04   HEMOGLOBIN g/dL 10.1*   HEMATOCRIT % 30.3*   PLATELETS Thousands/uL 178   SEGS PCT % 51   LYMPHO PCT % 33   MONO PCT % 11   EOS PCT % 4     Results from last 7 days   Lab Units 12/21/24  0453   SODIUM mmol/L 141   POTASSIUM mmol/L 3.6   CHLORIDE mmol/L 108   CO2 mmol/L 27   BUN mg/dL 29*   CREATININE mg/dL 1.56*   ANION GAP mmol/L 6   CALCIUM mg/dL 8.2*   ALBUMIN g/dL 3.2*   TOTAL BILIRUBIN mg/dL 0.27   ALK PHOS U/L 60   ALT U/L 13   AST U/L 14   GLUCOSE RANDOM mg/dL 213*     Results from last 7 days   Lab Units 12/20/24  1646   INR  1.05     Results from last 7 days   Lab Units 12/21/24  0744 12/20/24  2246 12/20/24  2116 12/20/24  1945   POC GLUCOSE mg/dl 124 235* 163* 72     Results from last 7 days   Lab Units 12/20/24  1819   HEMOGLOBIN A1C % 6.5*           Recent Cultures (last 7 days):         Last 24 Hours Medication List:     Current Facility-Administered Medications:     acetaminophen (TYLENOL) tablet 650 mg, Q6H PRN    allopurinol (ZYLOPRIM) tablet 100 mg, Daily    amLODIPine (NORVASC) tablet 10 mg, Daily    aspirin chewable  tablet 81 mg, Daily    atorvastatin (LIPITOR) tablet 40 mg, Daily With Dinner    chlorthalidone tablet 25 mg, Daily    cloNIDine (CATAPRES) tablet 0.1 mg, Q12H    clopidogrel (PLAVIX) tablet 75 mg, Daily    famotidine (PEPCID) tablet 20 mg, HS    fluticasone (FLONASE) 50 mcg/act nasal spray 2 spray, Daily    heparin (porcine) 25,000 units in 0.45% NaCl 250 mL infusion (premix), Titrated, Last Rate: 11.1 Units/kg/hr (12/20/24 1649)    heparin (porcine) injection 2,000 Units, Q6H PRN    heparin (porcine) injection 4,000 Units, Q6H PRN    insulin glargine (LANTUS) subcutaneous injection 14 Units 0.14 mL, Daily Before Breakfast    insulin glargine (LANTUS) subcutaneous injection 16 Units 0.16 mL, HS    insulin lispro (HumALOG/ADMELOG) 100 units/mL subcutaneous injection 1-6 Units, 4x Daily (AC & HS) **AND** Fingerstick Glucose (POCT), 4 times day    isosorbide mononitrate (IMDUR) 24 hr tablet 30 mg, Daily    loratadine (CLARITIN) tablet 10 mg, Daily    metoprolol tartrate (LOPRESSOR) tablet 12.5 mg, Q12H LESLIE    morphine (MSIR) IR tablet 15 mg, Q6H PRN    multivitamin stress formula tablet 1 tablet, Daily    nitroglycerin (NITROSTAT) SL tablet 0.4 mg, Q5 Min PRN    pancrelipase (Lip-Prot-Amyl) (CREON) delayed release capsule 6,000 Units, TID With Meals    pyridoxine (VITAMIN B6) tablet 100 mg, Daily    ranolazine (RANEXA) 12 hr tablet 500 mg, BID    tamsulosin (FLOMAX) capsule 0.8 mg, Daily    Administrative Statements   Today, Patient Was Seen By: Myriam Lind MD  I have spent a total time of 30 minutes in caring for this patient on the day of the visit/encounter including Diagnostic results, Prognosis, and Risks and benefits of tx options.    **Please Note: This note may have been constructed using a voice recognition system.**

## 2024-12-21 NOTE — ASSESSMENT & PLAN NOTE
Patient presents with substernal chest pain  that lasted about 5 to 10 minutes.  This occurred with exertion and relieved with rest.  No recurrent pain.  Similar to the chest pain he had prior to his recent stent.  Presented hypertensive  0-hour troponin 712/2-hour troponin 684/4 troponin 718.  Troponins negative recent admission 12/12.   EKG sinus rhythm bifascicular block heart rate 61 bpm  Intravenous heparin has been started.  Continued on DAPT, statin, Ranexa and beta-blocker.

## 2024-12-21 NOTE — ASSESSMENT & PLAN NOTE
For unstable angina patient underwent coronary angiogram 12/12/2024 with severe proximal LAD stenosis status post LONNIE.  At the time LVEDP 15 with peripheral edema.  He was given 1 dose of IV Lasix 40 mg.  Remainder of coronary arteries left circumflex moderate diffuse disease and RCA large and dominant with moderate diffuse disease.  Currently on DAPT aspirin and Plavix.  On atorvastatin 40 mg.  Ranexa 500 mg twice daily.  Lopressor 12.5 mg twice a day

## 2024-12-21 NOTE — ASSESSMENT & PLAN NOTE
Pt has history of Prinzmetal angina  Pt's amlodipine increased from 5 mg to 10 mg during his last admission for concern of Prinzmetal angina  Pt took half of his Amlodipine dose on day of this chest pain episode. Consider Prinzmetal angina etiology    Plan:  Continue Amlodipine 10 mg

## 2024-12-21 NOTE — ASSESSMENT & PLAN NOTE
FLP 7/2023 cholesterol 124/triglyceride 112/HDL 54/calculated LDL 47  On atorvastatin 40 mg daily      Recommendations  Continue DAPT, statin, beta-blocker, Ranexa.  Blood pressure has been consistently high again add Imdur 30 mg to his anginal regimen.   Check follow-up troponin.  Check BNP. Consider   Continue intravenous heparin  Will review with Dr. Andrea. Consider reimaging coronaries. No plans for invasive procedure today with lack on ongoing symptoms

## 2024-12-21 NOTE — ASSESSMENT & PLAN NOTE
Patient presented with one episode of exertional substernal chest pain and chest tightness lasting 5 minutes and relieved by rest.   He was completely asymptomatic upon arrival to the ED.  He denied any nausea, sweating, vomiting, or lightheadedness.  Patient had a recent admission on 12/11/2024 s/p LAD stent was placed.  Last echocardiogram 12/2024: EF 65%.  Moderate aortic stenosis.  Hypokinetic mild inferior lateral and apical lateral.  Troponins 710> 684> 718>512    EKG first-degree AV block with RBBB   Possible ACS versus Prinzmetal angina.    Plan:  Cardiology is following; Imdur 30 mg was added to his anginal regimen.  They might consider reimaging coronaries.  Continue IV heparin drip  Continue statin, Plavix, Ranexa  Monitor on telemetry  Nitroglycerin and morphine prn for pain  Cardiac diet

## 2024-12-21 NOTE — ASSESSMENT & PLAN NOTE
"Lab Results   Component Value Date    HGBA1C 6.0 (H) 08/26/2024       No results for input(s): \"POCGLU\" in the last 72 hours.    Blood Sugar Average: Last 72 hrs:    Pt states his home insulin regimen is Tresiba long-acting 14 U in morning and 16 U at night. Sliding scale Humalog    Plan:  Repeat Ha1c  Long-acting: Lantus 14 U in morning and 16 U at bedtime  SSI  Hypoglycemia protocol  Accuchecks  Consistent carb diet  "

## 2024-12-21 NOTE — ASSESSMENT & PLAN NOTE
Pt's systolic BP in 160's on admission. Pt states he only took half his Amlodipine 10 mg dose morning of ED presentation  Home meds: Amlodipine 10 mg daily, Lopressor 12.5 mg BID, Clonidine 0.1 mg q12 hrs    Plan:  Continue home medications  Continue to monitor blood pressure

## 2024-12-21 NOTE — PLAN OF CARE
Problem: PAIN - ADULT  Goal: Verbalizes/displays adequate comfort level or baseline comfort level  Description: Interventions:  - Encourage patient to monitor pain and request assistance  - Assess pain using appropriate pain scale  - Administer analgesics based on type and severity of pain and evaluate response  - Implement non-pharmacological measures as appropriate and evaluate response  - Consider cultural and social influences on pain and pain management  - Notify physician/advanced practitioner if interventions unsuccessful or patient reports new pain  Outcome: Progressing     Problem: INFECTION - ADULT  Goal: Absence or prevention of progression during hospitalization  Description: INTERVENTIONS:  - Assess and monitor for signs and symptoms of infection  - Monitor lab/diagnostic results  - Monitor all insertion sites, i.e. indwelling lines, tubes, and drains  - Monitor endotracheal if appropriate and nasal secretions for changes in amount and color  - [place] appropriate cooling/warming therapies per order  - Administer medications as ordered  - Instruct and encourage patient and family to use good hand hygiene technique  - Identify and instruct in appropriate isolation precautions for identified infection/condition  Outcome: Progressing  Goal: Absence of fever/infection during neutropenic period  Description: INTERVENTIONS:  - Monitor WBC    Outcome: Progressing     Problem: SAFETY ADULT  Goal: Patient will remain free of falls  Description: INTERVENTIONS:  - Educate patient/family on patient safety including physical limitations  - Instruct patient to call for assistance with activity   - Consult OT/PT to assist with strengthening/mobility   - Keep Call bell within reach  - Keep bed low and locked with side rails adjusted as appropriate  - Keep care items and personal belongings within reach  - Initiate and maintain comfort rounds  - Make Fall Risk Sign visible to staff  - Offer Toileting every  Hours,  in advance of need  - Initiate/Maintain alarm  - Obtain necessary fall risk management equipment:   - Apply yellow socks and bracelet for high fall risk patients  - Consider moving patient to room near nurses station  Outcome: Progressing  Goal: Maintain or return to baseline ADL function  Description: INTERVENTIONS:  -  Assess patient's ability to carry out ADLs; assess patient's baseline for ADL function and identify physical deficits which impact ability to perform ADLs (bathing, care of mouth/teeth, toileting, grooming, dressing, etc.)  - Assess/evaluate cause of self-care deficits   - Assess range of motion  - Assess patient's mobility; develop plan if impaired  - Assess patient's need for assistive devices and provide as appropriate  - Encourage maximum independence but intervene and supervise when necessary  - Involve family in performance of ADLs  - Assess for home care needs following discharge   - Consider OT consult to assist with ADL evaluation and planning for discharge  - Provide patient education as appropriate  Outcome: Progressing  Goal: Maintains/Returns to pre admission functional level  Description: INTERVENTIONS:  - Perform AM-PAC 6 Click Basic Mobility/ Daily Activity assessment daily.  - Set and communicate daily mobility goal to care team and patient/family/caregiver.   - Collaborate with rehabilitation services on mobility goals if consulted  - Perform Range of Motion  times a day.  - Reposition patient every  hours.  - Dangle patient  times a day  - Stand patient  times a day  - Ambulate patient  times a day  - Out of bed to chair  times a day   - Out of bed for meals  times a day  - Out of bed for toileting  - Record patient progress and toleration of activity level   Outcome: Progressing     Problem: DISCHARGE PLANNING  Goal: Discharge to home or other facility with appropriate resources  Description: INTERVENTIONS:  - Identify barriers to discharge w/patient and caregiver  - Arrange for  needed discharge resources and transportation as appropriate  - Identify discharge learning needs (meds, wound care, etc.)  - Arrange for interpretive services to assist at discharge as needed  - Refer to Case Management Department for coordinating discharge planning if the patient needs post-hospital services based on physician/advanced practitioner order or complex needs related to functional status, cognitive ability, or social support system  Outcome: Progressing     Problem: Knowledge Deficit  Goal: Patient/family/caregiver demonstrates understanding of disease process, treatment plan, medications, and discharge instructions  Description: Complete learning assessment and assess knowledge base.  Interventions:  - Provide teaching at level of understanding  - Provide teaching via preferred learning methods  Outcome: Progressing

## 2024-12-21 NOTE — ASSESSMENT & PLAN NOTE
Wt Readings from Last 3 Encounters:   12/20/24 113 kg (250 lb)   12/17/24 113 kg (249 lb)   12/13/24 109 kg (240 lb 11.9 oz)     Echo on last admission 12/12/2024: EF 65%. Mod AS. Mild AR. Hypokinetic mild inferolateral and apical lateral.  Home chlorthalidone 25 mg daily and Lopressor 12.5 mg BID  Pt's Lisinopril was stopped by his outpatient cardiologist    Plan:  Monitor I's and O's  Daily weights  Cardiac diet. 1800 mL fluid restriction

## 2024-12-21 NOTE — ASSESSMENT & PLAN NOTE
Last discharge weight 240 pounds.  Current stated weight 250 pounds.  Does not look overtly volume overloaded.  His does have edema which is reportedly chronic. Noted LVEDP 15 at the time of cath. Received 80mg IV lasix x1.   Recent TTE 12/12 normal LV function.  Hypokinetic mid inferolateral and apical lateral wall  On no loop diuretic.  He does take chlorthalidone, Lopressor  .

## 2024-12-21 NOTE — ASSESSMENT & PLAN NOTE
Patient presented hypertensive  In addition to Lopressor 12 and half twice a day he is on chlorthalidone 25 mg daily, clonidine 0.1 mg twice a day and amlodipine 10 mg daily

## 2024-12-21 NOTE — ASSESSMENT & PLAN NOTE
Blood pressure remained high overnight.  Stable this morning.  Home meds: Amlodipine 10 mg daily, Lopressor 12.5 mg BID, Clonidine 0.1 mg q12 hrs    Plan:  Continue home medications  Continue to monitor blood pressure

## 2024-12-21 NOTE — ASSESSMENT & PLAN NOTE
Lab Results   Component Value Date    EGFR 40 12/21/2024    EGFR 40 12/20/2024    EGFR 44 12/13/2024    CREATININE 1.56 (H) 12/21/2024    CREATININE 1.58 (H) 12/20/2024    CREATININE 1.46 (H) 12/13/2024     Pt's baseline Cr (1.5-1.8)  Pt is currently at baseline    Plan:  Avoid nephrotoxic agents  Monitor BMP

## 2024-12-21 NOTE — ASSESSMENT & PLAN NOTE
Lab Results   Component Value Date    HGBA1C 6.5 (H) 12/20/2024       Recent Labs     12/20/24  1945 12/20/24 2116 12/20/24  2246 12/21/24  0744   POCGLU 72 163* 235* 124       Blood Sugar Average: Last 72 hrs:  (P) 148.5  Pt states his home insulin regimen is Tresiba long-acting 14 U in morning and 16 U at night. Sliding scale Humalog    Plan:  Repeat Ha1c  Long-acting: Lantus 14 U in morning and 16 U at bedtime  SSI  Hypoglycemia protocol  Accuchecks  Consistent carb diet

## 2024-12-21 NOTE — ASSESSMENT & PLAN NOTE
Wt Readings from Last 3 Encounters:   12/20/24 113 kg (250 lb)   12/17/24 113 kg (249 lb)   12/13/24 109 kg (240 lb 11.9 oz)     Echo 12/12/2024: EF 65%. Mod AS. Mild AR. Hypokinetic mild inferolateral and apical lateral.  Home chlorthalidone 25 mg daily and Lopressor 12.5 mg BID    Plan:  Continue chlorthalidone and Lopressor  Monitor I's and O's  Daily weights  Cardiac diet. 1800 mL fluid restriction

## 2024-12-21 NOTE — ASSESSMENT & PLAN NOTE
Pt has history of Prinzmetal angina  Pt's amlodipine increased from 5 mg to 10 mg during his last admission for concern of Prinzmetal angina    Plan:  Continue Amlodipine 10 mg

## 2024-12-21 NOTE — ASSESSMENT & PLAN NOTE
Lab Results   Component Value Date    EGFR 40 12/20/2024    EGFR 44 12/13/2024    EGFR 40 12/12/2024    CREATININE 1.58 (H) 12/20/2024    CREATININE 1.46 (H) 12/13/2024    CREATININE 1.57 (H) 12/12/2024     Pt's baseline Cr 1.4-1.7. Pt is currently at baseline    Plan:  Avoid nephrotoxic agents  Monitor BMP

## 2024-12-21 NOTE — CONSULTS
Consultation - Cardiology Team 1  Jake Claros 84 y.o. male MRN: [id number]  Unit/Bed#: -01 Encounter: [id number]    Assessment & Plan  Chest pain  Patient presents with substernal chest pain  that lasted about 5 to 10 minutes.  This occurred with exertion and relieved with rest.  No recurrent pain.  Similar to the chest pain he had prior to his recent stent.  Presented hypertensive  0-hour troponin 712/2-hour troponin 684/4 troponin 718.  Troponins negative recent admission 12/12.   EKG sinus rhythm bifascicular block heart rate 61 bpm  Intravenous heparin has been started.  Continued on DAPT, statin, Ranexa and beta-blocker.  Coronary artery disease of native artery with stable angina pectoris (HCC)    For unstable angina patient underwent coronary angiogram 12/12/2024 with severe proximal LAD stenosis status post LONNIE.  At the time LVEDP 15 with peripheral edema.  He was given 1 dose of IV Lasix 40 mg.  Remainder of coronary arteries left circumflex moderate diffuse disease and RCA large and dominant with moderate diffuse disease.  Currently on DAPT aspirin and Plavix.  On atorvastatin 40 mg.  Ranexa 500 mg twice daily.  Lopressor 12.5 mg twice a day  Hypertension  Patient presented hypertensive  In addition to Lopressor 12 and half twice a day he is on chlorthalidone 25 mg daily, clonidine 0.1 mg twice a day and amlodipine 10 mg daily  PAULA (obstructive sleep apnea)  Compliant with CPAP  (HFpEF) heart failure with preserved ejection fraction (HCC)  Last discharge weight 240 pounds.  Current stated weight 250 pounds.  Does not look overtly volume overloaded.  His does have edema which is reportedly chronic. Noted LVEDP 15 at the time of cath. Received 80mg IV lasix x1.   Recent TTE 12/12 normal LV function.  Hypokinetic mid inferolateral and apical lateral wall  On no loop diuretic.  He does take chlorthalidone, Lopressor  .     Aortic stenosis  Recent echo 12/12/2020 4 aortic valve trileaflet.  Mild to  moderate aortic stenosis.  Stage 3a chronic kidney disease (CKD) ([facility])  Creatinine 1.5 which is his baseline 1.4-1.6  Type 2 diabetes mellitus, with long-term current use of insulin ([facility])  Lab Results   Component Value Date    HGBA1C 6.5 (H) 12/20/2024     HLD (hyperlipidemia)  FLP 7/2023 cholesterol 124/triglyceride 112/HDL 54/calculated LDL 47  On atorvastatin 40 mg daily      Recommendations  Continue DAPT, statin, beta-blocker, Ranexa.  Blood pressure has been consistently high again add Imdur 30 mg to his anginal regimen.   Check follow-up troponin.  Check BNP. Consider   Continue intravenous heparin  Will review with Dr. Andrea. Consider reimaging coronaries. No plans for invasive procedure today with lack on ongoing symptoms       History of Present Illness   Physician Requesting Consult: Gennaro Abdul DO  Reason for Consult / Principal Problem: chest pain    HPI: Jake Claros is a 84 y.o. year old male with CAD and history of PCI to left circumflex 2001 with subsequent PCI in the setting of chest pain 12/12/2024 to the LAD, chronic diastolic heart failure, history of Prinzmetal angina, PAULA compliant with CPAP, hypertension, dyslipidemia, history of pancreatitis thought related to torsemide, CKD 3, bifascicular block who presents with chest pain.  He is followed at [facility] by Dr. Colón.  Troponins elevated.  He was given additional 243 mg of aspirin and started on IV heparin.    Patient states that he had chest pain substernal walking to his car about 200 feet.  Resolved about 5 minutes after rest.  He called his cardiologist who told him to go to the emergency room.  He reports that the chest pain was the same thing as he had prior to his stent 12/12.  Reports compliance to his medications.  From discharge to yesterday he had not had any chest pain. This was an isolated episode.     Last admission 12/12 there was concern for unstable angina and underwent cardiac catheterization with stenting to the LAD.  At  that time he had a PE study negative for PE.  Troponins were negative.    Inpatient consult to Cardiology  Consult performed by: GERSON Burnett  Consult ordered by: Farrah Bliss MD          Review of Systems   Constitutional: Negative.    HENT: Negative.     Eyes: Negative.    Cardiovascular:  Positive for chest pain.   Gastrointestinal: Negative.    Endocrine: Negative.    Genitourinary: Negative.    Musculoskeletal: Negative.    Allergic/Immunologic: Negative.    Neurological: Negative.    Hematological: Negative.    Psychiatric/Behavioral: Negative.     All other systems reviewed and are negative.      Historical Information   Past Medical History:   Diagnosis Date    Allergic     Arthritis     Chronic kidney disease 2021    Chronic kidney disease (CKD) stage G3a/A1, moderately decreased glomerular filtration rate (GFR) between 45-59 mL/min/1.73 square meter and albuminuria creatinine ratio less than 30 mg/g (HCC)     Colon polyp     Diabetes mellitus (HCC)     GERD (gastroesophageal reflux disease)     Heart murmur     History of echocardiogram     HL (hearing loss)     Hyperlipidemia     Hypertension     Obesity     Pancreatitis     Sleep apnea, obstructive      Past Surgical History:   Procedure Laterality Date    CARDIAC CATHETERIZATION      CARDIAC CATHETERIZATION Left 12/12/2024    Procedure: Cardiac Left Heart Cath;  Surgeon: Son Sibley MD;  Location: [facility];  Service: Cardiology    CARDIAC CATHETERIZATION N/A 12/12/2024    Procedure: Cardiac PCI;  Surgeon: Son Sibley MD;  [facility]: [facility];  Service: Cardiology    CHOLECYSTECTOMY      COLONOSCOPY  03/09/2018    COLONOSCOPY      EYE SURGERY      HEMORRHOID SURGERY      JOINT REPLACEMENT  2017    knee    KNEE SURGERY Right     OTHER SURGICAL HISTORY      Stent     SD LAPAROSCOPY SURG CHOLECYSTECTOMY N/A 06/28/2023    Procedure: CHOLECYSTECTOMY LAPAROSCOPIC;  Surgeon: Alirio Hong MD;  Location: [facility]  OR;  Service: General    UPPER GASTROINTESTINAL ENDOSCOPY       Social History     Substance and Sexual Activity   Alcohol Use Yes    Alcohol/week: 5.0 standard drinks of alcohol    Types: 5 Glasses of wine per week    Comment: no alcohol since March     Social History     Substance and Sexual Activity   Drug Use Never     Social History     Tobacco Use   Smoking Status Former    Current packs/day: 0.00    Average packs/day: 2.0 packs/day for 15.0 years (30.0 ttl pk-yrs)    Types: Cigarettes, Pipe, Cigars    Start date: 1957    Quit date: 1972    Years since quittin.1    Passive exposure: Past   Smokeless Tobacco Never     Family History:   Family History   Problem Relation Age of Onset    Heart disease Mother     Heart attack Mother         50s    Cancer Mother     Coronary artery disease Mother     Cancer Brother         Malignant tumor of lung       Meds/Allergies   current meds:   Current Facility-Administered Medications:     acetaminophen (TYLENOL) tablet 650 mg, Q6H PRN    allopurinol (ZYLOPRIM) tablet 100 mg, Daily    amLODIPine (NORVASC) tablet 10 mg, Daily    aspirin chewable tablet 81 mg, Daily    atorvastatin (LIPITOR) tablet 40 mg, Daily With Dinner    chlorthalidone tablet 25 mg, Daily    cloNIDine (CATAPRES) tablet 0.1 mg, Q12H    clopidogrel (PLAVIX) tablet 75 mg, Daily    famotidine (PEPCID) tablet 20 mg, HS    fluticasone (FLONASE) 50 mcg/act nasal spray 2 spray, Daily    heparin (porcine) 25,000 units in 0.45% NaCl 250 mL infusion (premix), Titrated, Last Rate: 11.1 Units/kg/hr (24 1649)    heparin (porcine) injection 2,000 Units, Q6H PRN    heparin (porcine) injection 4,000 Units, Q6H PRN    insulin glargine (LANTUS) subcutaneous injection 14 Units 0.14 mL, Daily Before Breakfast    insulin glargine (LANTUS) subcutaneous injection 16 Units 0.16 mL, HS    insulin lispro (HumALOG/ADMELOG) 100 units/mL subcutaneous injection 1-6 Units, 4x Daily (AC & HS) **AND** Fingerstick  Glucose (POCT), 4 times day    loratadine (CLARITIN) tablet 10 mg, Daily    metoprolol tartrate (LOPRESSOR) tablet 12.5 mg, Q12H LESLIE    morphine (MSIR) IR tablet 15 mg, Q6H PRN    multivitamin stress formula tablet 1 tablet, Daily    nitroglycerin (NITROSTAT) SL tablet 0.4 mg, Q5 Min PRN    pancrelipase (Lip-Prot-Amyl) (CREON) delayed release capsule 6,000 Units, TID With Meals    pyridoxine (VITAMIN B6) tablet 100 mg, Daily    ranolazine (RANEXA) 12 hr tablet 500 mg, BID    tamsulosin (FLOMAX) capsule 0.8 mg, Daily and PTA meds:    Medications Prior to Admission:     allopurinol (ZYLOPRIM) 100 mg tablet    amLODIPine (NORVASC) 10 mg tablet    aspirin 81 mg chewable tablet    chlorthalidone 25 mg tablet    cloNIDine (CATAPRES) 0.1 mg tablet    clopidogrel (PLAVIX) 75 mg tablet    clotrimazole (LOTRIMIN) 1 % cream    famotidine (PEPCID) 40 MG tablet    Fexofenadine HCl (MUCINEX ALLERGY PO)    fluticasone (FLONASE) 50 mcg/act nasal spray    insulin aspart (NovoLOG FlexPen) 100 UNIT/ML injection pen    insulin lispro (Admelog SoloStar) 100 units/mL injection pen    ipratropium (ATROVENT) 0.03 % nasal spray    loratadine (CLARITIN) 10 mg tablet    Menatetrenone (Vitamin K2) 100 MCG TABS    metoprolol tartrate (LOPRESSOR) 25 mg tablet    Multiple Vitamins-Minerals (MULTIVITAMIN ADULT PO)    oxyCODONE (ROXICODONE) 5 immediate release tablet    pancrelipase, Lip-Prot-Amyl, (CREON) 6,000 units delayed release capsule    pyridoxine (VITAMIN B6) 100 mg tablet    ranolazine (RANEXA) 500 mg 12 hr tablet    rosuvastatin (CRESTOR) 20 MG tablet    tamsulosin (FLOMAX) 0.4 mg    Tresiba FlexTouch 100 units/mL injection pen    BD Pen Needle Pascale U/F 32G X 4 MM MISC    Docusate Sodium (DSS) 100 MG CAPS    HYDROmorphone (DILAUDID) 2 mg tablet    Lancets (OneTouch Delica Plus Bencso16Y) MISC    ondansetron (ZOFRAN) 4 mg tablet    ondansetron (ZOFRAN-ODT) 4 mg disintegrating tablet    pantoprazole (PROTONIX) 40 mg tablet  Allergies  "  Allergen Reactions    Januvia [Sitagliptin] Other (See Comments)     pancreatitis    Semaglutide Other (See Comments)     pancreatits    Simvastatin Myalgia    Trulicity [Dulaglutide] Other (See Comments)     Pancreatitis    Wound Dressing Adhesive Other (See Comments)     Burning sensation    Medical Tape Rash     \"A burn on the skin\" , mackenzie on sensitive areas.    Does ok w/ surgical glue    Molds & Smuts Allergic Rhinitis     Nasal sympt       Objective   Vitals: Blood pressure 115/56, pulse (!) 51, temperature 98 °F (36.7 °C), temperature source Oral, resp. rate 17, height 5' 6\" (1.676 m), weight 113 kg (250 lb), SpO2 97%.  Orthostatic Blood Pressures      Flowsheet Row Most Recent Value   Blood Pressure 115/56 filed at 12/21/2024 0744   Patient Position - Orthostatic VS Sitting filed at 12/21/2024 0744            No intake or output data in the 24 hours ending 12/21/24 0812    Invasive Devices       Peripheral Intravenous Line  Duration             Peripheral IV 12/20/24 Distal;Right;Upper;Ventral (anterior) Antecubital <1 day    Peripheral IV 12/20/24 Distal;Right;Upper;Ventral (anterior) Arm <1 day                    Physical Exam: /56 (BP Location: Left arm)   Pulse (!) 51   Temp 98 °F (36.7 °C) (Oral)   Resp 17   Ht 5' 6\" (1.676 m)   Wt 113 kg (250 lb)   SpO2 97%   BMI 40.35 kg/m²   General Appearance:    Alert, cooperative, no distress, appears stated age   Head:    Normocephalic, no scleral icterus   Eyes:    PERRL   Nose:   Nares normal, septum midline, mucosa normal, no drainage    Throat:   Lips, mucosa, and tongue normal   Neck:   Supple, symmetrical, trachea midline            Lungs:     Clear to auscultation bilaterally, respirations unlabored   Chest Wall:    No tenderness or deformity    Heart:    Regular rate and rhythm, S1 and S2 normal, no murmur, rub   or gallop   Abdomen:     Soft, non-tender, bowel sounds active all four quadrants,     no masses, no organomegaly   Extremities:   " Extremities normal, atraumatic, no cyanosis ++edema   Pulses:   2+ and symmetric all extremities   Skin:   Skin color, texture, turgor normal, no rashes or lesions   Neurologic:   Alert and oriented to person place and time. No focal deficits       Lab Results:   Recent Results (from the past 72 hours)   ECG 12 lead    Collection Time: 12/20/24 12:57 PM   Result Value Ref Range    Ventricular Rate 61 BPM    Atrial Rate 61 BPM    LA Interval 272 ms    QRSD Interval 170 ms    QT Interval 470 ms    QTC Interval 473 ms    P Axis 45 degrees    QRS Axis -61 degrees    T Wave Axis 51 degrees   CBC and differential    Collection Time: 12/20/24  3:36 PM   Result Value Ref Range    WBC 5.51 4.31 - 10.16 Thousand/uL    RBC 3.39 (L) 3.88 - 5.62 Million/uL    Hemoglobin 11.0 (L) 12.0 - 17.0 g/dL    Hematocrit 32.9 (L) 36.5 - 49.3 %    MCV 97 82 - 98 fL    MCH 32.4 26.8 - 34.3 pg    MCHC 33.4 31.4 - 37.4 g/dL    RDW 14.0 11.6 - 15.1 %    MPV 10.0 8.9 - 12.7 fL    Platelets 191 149 - 390 Thousands/uL    nRBC 0 /100 WBCs    Segmented % 56 43 - 75 %    Immature Grans % 0 0 - 2 %    Lymphocytes % 29 14 - 44 %    Monocytes % 11 4 - 12 %    Eosinophils Relative 3 0 - 6 %    Basophils Relative 1 0 - 1 %    Absolute Neutrophils 3.12 1.85 - 7.62 Thousands/µL    Absolute Immature Grans 0.01 0.00 - 0.20 Thousand/uL    Absolute Lymphocytes 1.57 0.60 - 4.47 Thousands/µL    Absolute Monocytes 0.63 0.17 - 1.22 Thousand/µL    Eosinophils Absolute 0.15 0.00 - 0.61 Thousand/µL    Basophils Absolute 0.03 0.00 - 0.10 Thousands/µL   Comprehensive metabolic panel    Collection Time: 12/20/24  3:36 PM   Result Value Ref Range    Sodium 141 135 - 147 mmol/L    Potassium 3.9 3.5 - 5.3 mmol/L    Chloride 107 96 - 108 mmol/L    CO2 26 21 - 32 mmol/L    ANION GAP 8 4 - 13 mmol/L    BUN 31 (H) 5 - 25 mg/dL    Creatinine 1.58 (H) 0.60 - 1.30 mg/dL    Glucose 109 65 - 140 mg/dL    Calcium 8.5 8.4 - 10.2 mg/dL    AST 19 13 - 39 U/L    ALT 16 7 - 52 U/L     "Alkaline Phosphatase 72 34 - 104 U/L    Total Protein 6.7 6.4 - 8.4 g/dL    Albumin 3.8 3.5 - 5.0 g/dL    Total Bilirubin 0.37 0.20 - 1.00 mg/dL    eGFR 40 ml/min/1.73sq m   HS Troponin 0hr (reflex protocol)    Collection Time: 12/20/24  3:36 PM   Result Value Ref Range    hs TnI 0hr 712 (H) \"Refer to ACS Flowchart\"- see link ng/L   Protime-INR    Collection Time: 12/20/24  3:36 PM   Result Value Ref Range    Protime 14.1 12.3 - 15.0 seconds    INR 1.02 0.85 - 1.19   APTT    Collection Time: 12/20/24  3:36 PM   Result Value Ref Range    PTT 29 23 - 34 seconds   APTT six (6) hours after Heparin bolus/drip initiation or dosing change    Collection Time: 12/20/24  4:46 PM   Result Value Ref Range    PTT 30 23 - 34 seconds   CBC    Collection Time: 12/20/24  4:46 PM   Result Value Ref Range    WBC 5.23 4.31 - 10.16 Thousand/uL    RBC 3.48 (L) 3.88 - 5.62 Million/uL    Hemoglobin 11.3 (L) 12.0 - 17.0 g/dL    Hematocrit 33.9 (L) 36.5 - 49.3 %    MCV 97 82 - 98 fL    MCH 32.5 26.8 - 34.3 pg    MCHC 33.3 31.4 - 37.4 g/dL    RDW 14.0 11.6 - 15.1 %    Platelets 199 149 - 390 Thousands/uL    MPV 10.2 8.9 - 12.7 fL   Protime-INR    Collection Time: 12/20/24  4:46 PM   Result Value Ref Range    Protime 14.5 12.3 - 15.0 seconds    INR 1.05 0.85 - 1.19   HS Troponin I 2hr    Collection Time: 12/20/24  6:19 PM   Result Value Ref Range    hs TnI 2hr 684 (H) \"Refer to ACS Flowchart\"- see link ng/L    Delta 2hr hsTnI -28 <20 ng/L   Hemoglobin A1c w/EAG Estimation (Prechecked if no A1C within 90 days)    Collection Time: 12/20/24  6:19 PM   Result Value Ref Range    Hemoglobin A1C 6.5 (H) Normal 4.0-5.6%; PreDiabetic 5.7-6.4%; Diabetic >=6.5%; Glycemic control for adults with diabetes <7.0% %     mg/dl   Fingerstick Glucose (POCT)    Collection Time: 12/20/24  7:45 PM   Result Value Ref Range    POC Glucose 72 65 - 140 mg/dl   HS Troponin I 4hr    Collection Time: 12/20/24  7:55 PM   Result Value Ref Range    hs TnI 4hr 718 (H) " "\"Refer to ACS Flowchart\"- see link ng/L    Delta 4hr hsTnI 6 <20 ng/L   Fingerstick Glucose (POCT)    Collection Time: 12/20/24  9:16 PM   Result Value Ref Range    POC Glucose 163 (H) 65 - 140 mg/dl   Fingerstick Glucose (POCT)    Collection Time: 12/20/24 10:46 PM   Result Value Ref Range    POC Glucose 235 (H) 65 - 140 mg/dl   APTT    Collection Time: 12/20/24 11:02 PM   Result Value Ref Range    PTT 69 (H) 23 - 34 seconds   Comprehensive metabolic panel    Collection Time: 12/21/24  4:53 AM   Result Value Ref Range    Sodium 141 135 - 147 mmol/L    Potassium 3.6 3.5 - 5.3 mmol/L    Chloride 108 96 - 108 mmol/L    CO2 27 21 - 32 mmol/L    ANION GAP 6 4 - 13 mmol/L    BUN 29 (H) 5 - 25 mg/dL    Creatinine 1.56 (H) 0.60 - 1.30 mg/dL    Glucose 213 (H) 65 - 140 mg/dL    Calcium 8.2 (L) 8.4 - 10.2 mg/dL    Corrected Calcium 8.8 8.3 - 10.1 mg/dL    AST 14 13 - 39 U/L    ALT 13 7 - 52 U/L    Alkaline Phosphatase 60 34 - 104 U/L    Total Protein 5.7 (L) 6.4 - 8.4 g/dL    Albumin 3.2 (L) 3.5 - 5.0 g/dL    Total Bilirubin 0.27 0.20 - 1.00 mg/dL    eGFR 40 ml/min/1.73sq m   Magnesium    Collection Time: 12/21/24  4:53 AM   Result Value Ref Range    Magnesium 2.0 1.9 - 2.7 mg/dL   CBC and differential    Collection Time: 12/21/24  4:53 AM   Result Value Ref Range    WBC 6.04 4.31 - 10.16 Thousand/uL    RBC 3.14 (L) 3.88 - 5.62 Million/uL    Hemoglobin 10.1 (L) 12.0 - 17.0 g/dL    Hematocrit 30.3 (L) 36.5 - 49.3 %    MCV 97 82 - 98 fL    MCH 32.2 26.8 - 34.3 pg    MCHC 33.3 31.4 - 37.4 g/dL    RDW 14.1 11.6 - 15.1 %    MPV 10.5 8.9 - 12.7 fL    Platelets 178 149 - 390 Thousands/uL    nRBC 0 /100 WBCs    Segmented % 51 43 - 75 %    Immature Grans % 0 0 - 2 %    Lymphocytes % 33 14 - 44 %    Monocytes % 11 4 - 12 %    Eosinophils Relative 4 0 - 6 %    Basophils Relative 1 0 - 1 %    Absolute Neutrophils 3.09 1.85 - 7.62 Thousands/µL    Absolute Immature Grans 0.01 0.00 - 0.20 Thousand/uL    Absolute Lymphocytes 1.98 0.60 - " 4.47 Thousands/µL    Absolute Monocytes 0.68 0.17 - 1.22 Thousand/µL    Eosinophils Absolute 0.24 0.00 - 0.61 Thousand/µL    Basophils Absolute 0.04 0.00 - 0.10 Thousands/µL   APTT    Collection Time: 12/21/24  4:53 AM   Result Value Ref Range    PTT 63 (H) 23 - 34 seconds   Fingerstick Glucose (POCT)    Collection Time: 12/21/24  7:44 AM   Result Value Ref Range    POC Glucose 124 65 - 140 mg/dl     Imaging: I have personally reviewed pertinent reports.    EKG: Sinus rhythm bifascicular block  VTE Prophylaxis: IV heparin    Code Status: Level 1 - Full Code  Advance Directive and Living Will:      Power of :    POLST:      Counseling / Coordination of Care  Total floor / unit time spent today 45 minutes.  Greater than 50% of total time was spent with the patient and / or family counseling and / or coordination of care.

## 2024-12-21 NOTE — ASSESSMENT & PLAN NOTE
Hx of CAD with stent to circumflex 1/2001  LAD stent for 80% stenosis on 12/12/2024  Home Plavix 75 mg daily, Crestor 20 mg daily, Ranexa 500 mg q12hrs, asa 81 mg daily    Plan:  Continue Plavix, statin, Ranexa, asa 81 mg

## 2024-12-21 NOTE — ASSESSMENT & PLAN NOTE
Pt presents with exertional chest pain and chest tightness lasting 5 minutes and relieved by rest. Pt's chest pain did not return after episode and pt not having chest pain in the ED.  Pain occurred after pt walked about 200 feet from his car to apartment on day of admission  Pt's pain was pressure-like, substernal with radiation to right shoulder, 5/10 in intensity  Pt denies diaphoresis, nausea, emesis, visual disturbances or lightheadedness  Pt states this episode was similar in quality, duration and location to his last episode of chest pain 12/11/2024, which required admission with stent for 80% LAD stenosis seen on cardiac cath.  Pt has extensive cardiac history of CAD s/p LAD stent 12/2024, circumflex stent 1/2001, HFpEF, Prinzmetal angina, moderate aortic stenosis, HTN. Follows with [facility]  12/2024 Echo: EF 65%. Mod AS. Mild AR. Hypokinetic mild inferolateral and apical lateral.  In ED, troponins trending 710 at 0 hr, 684 at 2 hrs. EKG sinus rhythm with 1st degree AV block and RBBB, unchanged from prior EKG. CXR wnl.  Pt provided asa 243 mg and started on heparin drip for concerns of stent thrombosis  Etiology: Prinzmetal angina vs stable angina vs ACS vs stent thrombosis    Plan:  Cardiology consulted  Continue heparin drip  Continue statin, Plavix, Ranexa  Trend troponins and EKG  Monitor on telemetry  Nitroglycerin and morphine prn for pain  Cardiac diet

## 2024-12-22 LAB
ANION GAP SERPL CALCULATED.3IONS-SCNC: 6 MMOL/L (ref 4–13)
APTT PPP: 58 SECONDS (ref 23–34)
APTT PPP: 69 SECONDS (ref 23–34)
APTT PPP: 79 SECONDS (ref 23–34)
BUN SERPL-MCNC: 34 MG/DL (ref 5–25)
CALCIUM SERPL-MCNC: 8.7 MG/DL (ref 8.4–10.2)
CHLORIDE SERPL-SCNC: 108 MMOL/L (ref 96–108)
CO2 SERPL-SCNC: 26 MMOL/L (ref 21–32)
CREAT SERPL-MCNC: 1.76 MG/DL (ref 0.6–1.3)
ERYTHROCYTE [DISTWIDTH] IN BLOOD BY AUTOMATED COUNT: 13.9 % (ref 11.6–15.1)
GFR SERPL CREATININE-BSD FRML MDRD: 35 ML/MIN/1.73SQ M
GLUCOSE SERPL-MCNC: 169 MG/DL (ref 65–140)
GLUCOSE SERPL-MCNC: 177 MG/DL (ref 65–140)
GLUCOSE SERPL-MCNC: 205 MG/DL (ref 65–140)
GLUCOSE SERPL-MCNC: 78 MG/DL (ref 65–140)
GLUCOSE SERPL-MCNC: 89 MG/DL (ref 65–140)
HCT VFR BLD AUTO: 35.6 % (ref 36.5–49.3)
HGB BLD-MCNC: 11.7 G/DL (ref 12–17)
MCH RBC QN AUTO: 32 PG (ref 26.8–34.3)
MCHC RBC AUTO-ENTMCNC: 32.9 G/DL (ref 31.4–37.4)
MCV RBC AUTO: 97 FL (ref 82–98)
PLATELET # BLD AUTO: 212 THOUSANDS/UL (ref 149–390)
PMV BLD AUTO: 10.4 FL (ref 8.9–12.7)
POTASSIUM SERPL-SCNC: 3.9 MMOL/L (ref 3.5–5.3)
RBC # BLD AUTO: 3.66 MILLION/UL (ref 3.88–5.62)
SODIUM SERPL-SCNC: 140 MMOL/L (ref 135–147)
WBC # BLD AUTO: 6.75 THOUSAND/UL (ref 4.31–10.16)

## 2024-12-22 PROCEDURE — 85730 THROMBOPLASTIN TIME PARTIAL: CPT

## 2024-12-22 PROCEDURE — 80048 BASIC METABOLIC PNL TOTAL CA: CPT

## 2024-12-22 PROCEDURE — 99232 SBSQ HOSP IP/OBS MODERATE 35: CPT | Performed by: INTERNAL MEDICINE

## 2024-12-22 PROCEDURE — 99223 1ST HOSP IP/OBS HIGH 75: CPT | Performed by: INTERNAL MEDICINE

## 2024-12-22 PROCEDURE — 85027 COMPLETE CBC AUTOMATED: CPT

## 2024-12-22 PROCEDURE — 82948 REAGENT STRIP/BLOOD GLUCOSE: CPT

## 2024-12-22 PROCEDURE — 85730 THROMBOPLASTIN TIME PARTIAL: CPT | Performed by: INTERNAL MEDICINE

## 2024-12-22 RX ORDER — SODIUM CHLORIDE 9 MG/ML
100 INJECTION, SOLUTION INTRAVENOUS CONTINUOUS
Status: DISCONTINUED | OUTPATIENT
Start: 2024-12-23 | End: 2024-12-23

## 2024-12-22 RX ADMIN — TAMSULOSIN HYDROCHLORIDE 0.8 MG: 0.4 CAPSULE ORAL at 08:36

## 2024-12-22 RX ADMIN — RANOLAZINE 500 MG: 500 TABLET, FILM COATED, EXTENDED RELEASE ORAL at 08:36

## 2024-12-22 RX ADMIN — PYRIDOXINE HCL TAB 50 MG 100 MG: 50 TAB at 08:35

## 2024-12-22 RX ADMIN — CLONIDINE HYDROCHLORIDE 0.1 MG: 0.1 TABLET ORAL at 17:54

## 2024-12-22 RX ADMIN — CLOPIDOGREL BISULFATE 75 MG: 75 TABLET ORAL at 08:36

## 2024-12-22 RX ADMIN — METOPROLOL TARTRATE 12.5 MG: 25 TABLET, FILM COATED ORAL at 21:11

## 2024-12-22 RX ADMIN — FLUTICASONE PROPIONATE 2 SPRAY: 50 SPRAY, METERED NASAL at 06:00

## 2024-12-22 RX ADMIN — CLONIDINE HYDROCHLORIDE 0.1 MG: 0.1 TABLET ORAL at 06:00

## 2024-12-22 RX ADMIN — FAMOTIDINE 20 MG: 20 TABLET, FILM COATED ORAL at 21:11

## 2024-12-22 RX ADMIN — ASPIRIN 81 MG 81 MG: 81 TABLET ORAL at 08:35

## 2024-12-22 RX ADMIN — ISOSORBIDE MONONITRATE 30 MG: 30 TABLET, EXTENDED RELEASE ORAL at 08:36

## 2024-12-22 RX ADMIN — B-COMPLEX W/ C & FOLIC ACID TAB 1 TABLET: TAB at 08:35

## 2024-12-22 RX ADMIN — AMLODIPINE BESYLATE 10 MG: 10 TABLET ORAL at 08:36

## 2024-12-22 RX ADMIN — INSULIN LISPRO 1 UNITS: 100 INJECTION, SOLUTION INTRAVENOUS; SUBCUTANEOUS at 21:14

## 2024-12-22 RX ADMIN — METOPROLOL TARTRATE 12.5 MG: 25 TABLET, FILM COATED ORAL at 08:36

## 2024-12-22 RX ADMIN — ATORVASTATIN CALCIUM 40 MG: 40 TABLET, FILM COATED ORAL at 17:54

## 2024-12-22 RX ADMIN — HEPARIN SODIUM 2000 UNITS: 1000 INJECTION, SOLUTION INTRAVENOUS; SUBCUTANEOUS at 08:35

## 2024-12-22 RX ADMIN — RANOLAZINE 500 MG: 500 TABLET, FILM COATED, EXTENDED RELEASE ORAL at 17:54

## 2024-12-22 RX ADMIN — INSULIN LISPRO 1 UNITS: 100 INJECTION, SOLUTION INTRAVENOUS; SUBCUTANEOUS at 13:07

## 2024-12-22 RX ADMIN — PANCRELIPASE 6000 UNITS: 30000; 6000; 19000 CAPSULE, DELAYED RELEASE PELLETS ORAL at 17:54

## 2024-12-22 RX ADMIN — LORATADINE 10 MG: 10 TABLET ORAL at 08:35

## 2024-12-22 RX ADMIN — INSULIN LISPRO 2 UNITS: 100 INJECTION, SOLUTION INTRAVENOUS; SUBCUTANEOUS at 17:55

## 2024-12-22 RX ADMIN — CHLORTHALIDONE 25 MG: 25 TABLET ORAL at 09:58

## 2024-12-22 RX ADMIN — PANCRELIPASE 6000 UNITS: 30000; 6000; 19000 CAPSULE, DELAYED RELEASE PELLETS ORAL at 09:58

## 2024-12-22 RX ADMIN — INSULIN GLARGINE 14 UNITS: 100 INJECTION, SOLUTION SUBCUTANEOUS at 08:41

## 2024-12-22 RX ADMIN — HEPARIN SODIUM 13.1 UNITS/KG/HR: 10000 INJECTION, SOLUTION INTRAVENOUS at 15:54

## 2024-12-22 RX ADMIN — ALLOPURINOL 100 MG: 100 TABLET ORAL at 08:36

## 2024-12-22 NOTE — PROGRESS NOTES
Progress Note - Cardiology   Name: Jake Claros 84 y.o. male I MRN: [id number]  Unit/Bed#: S -01 I Date of Admission: 12/20/2024   Date of Service: 12/22/2024 I Hospital Day: 2     Assessment & Plan  Chest pain  Patient presents with substernal chest pain  that lasted about 5 to 10 minutes.  This occurred with exertion and relieved with rest.  No recurrent pain.  Similar to the chest pain he had prior to his recent stent.  Presented hypertensive  0-hour troponin 712/2-hour troponin 684/4 troponin 718.  Troponins negative recent admission 12/12.   EKG sinus rhythm bifascicular block heart rate 61 bpm  Intravenous heparin has been started.  Continued on DAPT, statin, Ranexa and beta-blocker.  Coronary artery disease of native artery with stable angina pectoris (HCC)    For unstable angina patient underwent coronary angiogram 12/12/2024 with severe proximal LAD stenosis status post LONNIE.  At the time LVEDP 15 with peripheral edema.  He was given 1 dose of IV Lasix 40 mg.  Remainder of coronary arteries left circumflex moderate diffuse disease and RCA large and dominant with moderate diffuse disease.  Currently on DAPT aspirin and Plavix.  On atorvastatin 40 mg.  Ranexa 500 mg twice daily.  Lopressor 12.5 mg twice a day  Hypertension  Patient presented hypertensive  In addition to Lopressor 12 and half twice a day he is on chlorthalidone 25 mg daily, clonidine 0.1 mg twice a day and amlodipine 10 mg daily  PAULA (obstructive sleep apnea)  Compliant with CPAP  (HFpEF) heart failure with preserved ejection fraction (HCC)  Last discharge weight 240 pounds.  Current stated weight 250 pounds.  Does not look overtly volume overloaded.  His does have edema which is reportedly chronic. Noted LVEDP 15 at the time of cath. Received 80mg IV lasix x1.   Recent TTE 12/12 normal LV function.  Hypokinetic mid inferolateral and apical lateral wall  On no loop diuretic.  He does take chlorthalidone, Lopressor  .     Aortic  stenosis  Recent echo 12/12/2020 4 aortic valve trileaflet.  Mild to moderate aortic stenosis.  Stage 3a chronic kidney disease (CKD) ([facility])  Creatinine 1.5 which is his baseline 1.4-1.6  Type 2 diabetes mellitus, with long-term current use of insulin ([facility])  Lab Results   Component Value Date    HGBA1C 6.5 (H) 12/20/2024     HLD (hyperlipidemia)  FLP 7/2023 cholesterol 124/triglyceride 112/HDL 54/calculated LDL 47  On atorvastatin 40 mg daily      Recommendations  Plan for left heart cath +/- PCI on Monday.     Obesity, morbid ([facility])    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[facility])    GERD (gastroesophageal reflux disease)      Subjective   Chief Complaint: Chest pain    No chest pain or shortness of breath    Review of Systems   Constitutional: Positive for malaise/fatigue.   Cardiovascular:  Negative for chest pain.   Respiratory:  Negative for shortness of breath.          Objective :  Temp:  [97.6 °F (36.4 °C)] 97.6 °F (36.4 °C)  HR:  [55] 55  BP: (107)/(44) 107/44  Resp:  [16] 16  SpO2:  [92 %] 92 %  O2 Device: None (Room air)  Orthostatic Blood Pressures      Flowsheet Row Most Recent Value   Blood Pressure 107/44 filed at 12/22/2024 0745   Patient Position - Orthostatic VS Lying filed at 12/22/2024 0745          First Weight: Weight - Scale: 113 kg (250 lb) (12/20/24 1254)  Vitals:    12/20/24 1254 12/22/24 0627   Weight: 113 kg (250 lb) 112 kg (246 lb 4.1 oz)     Physical Exam  Vitals and nursing note reviewed.   Constitutional:       General: He is not in acute distress.     Appearance: He is well-developed.   HENT:      Head: Normocephalic and atraumatic.   Eyes:      Conjunctiva/sclera: Conjunctivae normal.   Cardiovascular:      Rate and Rhythm: Normal rate and regular rhythm.      Heart sounds: No murmur heard.  Pulmonary:      Effort: Pulmonary effort is normal. No respiratory distress.      Breath sounds: Normal breath sounds.   Abdominal:      Palpations: Abdomen is soft.      Tenderness: There is no abdominal tenderness.    Musculoskeletal:         General: No swelling.      Cervical back: Neck supple.   Skin:     General: Skin is warm and dry.      Capillary Refill: Capillary refill takes less than 2 seconds.   Neurological:      Mental Status: He is alert.   Psychiatric:         Mood and Affect: Mood normal.           Lab Results: I have reviewed the following results:CBC/BMP:   .     12/22/24  0617   WBC 6.75   HGB 11.7*   HCT 35.6*      SODIUM 140   K 3.9      CO2 26   BUN 34*   CREATININE 1.76*   GLUC 78    , Creatinine Clearance: Estimated Creatinine Clearance: 38 mL/min (A) (by C-G formula based on SCr of 1.76 mg/dL (H))., LFTs: No new results in last 24 hours. , PTT/INR:  .     12/22/24 0626   PTT 58*    , Troponin,BNP:  .     12/21/24  1102   *    , Lactic Acid: No new results in last 24 hours.   Results from last 7 days   Lab Units 12/22/24  0617 12/21/24 0453 12/20/24  1646   WBC Thousand/uL 6.75 6.04 5.23   HEMOGLOBIN g/dL 11.7* 10.1* 11.3*   HEMATOCRIT % 35.6* 30.3* 33.9*   PLATELETS Thousands/uL 212 178 199     Results from last 7 days   Lab Units 12/22/24  0617 12/21/24  0453 12/20/24  1536   POTASSIUM mmol/L 3.9 3.6 3.9   CHLORIDE mmol/L 108 108 107   CO2 mmol/L 26 27 26   BUN mg/dL 34* 29* 31*   CREATININE mg/dL 1.76* 1.56* 1.58*   CALCIUM mg/dL 8.7 8.2* 8.5     Results from last 7 days   Lab Units 12/22/24  0626 12/21/24  0453 12/20/24  2302 12/20/24  1646 12/20/24  1536   INR   --   --   --  1.05 1.02   PTT seconds 58* 63* 69* 30 29     Lab Results   Component Value Date    HGBA1C 6.5 (H) 12/20/2024     Lab Results   Component Value Date    CKTOTAL 74 08/20/2023       Imaging Results Review: No pertinent imaging studies reviewed.  Other Study Results Review: EKG was reviewed.     VTE Pharmacologic Prophylaxis: VTE covered by:  heparin (porcine), Intravenous, 13.1 Units/kg/hr at 12/22/24 0849  heparin (porcine), Intravenous, 2,000 Units at 12/22/24 0835  heparin (porcine), Intravenous     VTE  Mechanical Prophylaxis:     Administrative Statements   I have spent a total time of 25 minutes in caring for this patient on the day of the visit/encounter including Diagnostic results, Prognosis, Risks and benefits of tx options, Instructions for management, Patient and family education, Importance of tx compliance, and Risk factor reductions.

## 2024-12-22 NOTE — PROGRESS NOTES
Progress Note - Hospitalist   Name: Jake Claros 84 y.o. male I MRN: [id number]  Unit/Bed#: S -01 I Date of Admission: 12/20/2024   Date of Service: 12/22/2024 I Hospital Day: 2    Assessment & Plan  Chest pain  Patient presented with one episode of exertional substernal chest pain and chest tightness lasting 5 minutes and relieved by rest.   He was completely asymptomatic upon arrival to the ED.  He denied any nausea, sweating, vomiting, or lightheadedness.  Patient had a recent admission on 12/11/2024 s/p LAD stent was placed.  Last echocardiogram 12/2024: EF 65%.  Moderate aortic stenosis.  Hypokinetic mild inferior lateral and apical lateral.  Troponins 710> 684> 718>512    EKG first-degree AV block with RBBB   Possible ACS versus Prinzmetal angina.    Plan:  Cardiology is following; Imdur 30 mg was added to his anginal regimen.  They might consider reimaging coronaries.   Per cardiology, plan for cardiac cath tomorrow 12/23.  Continue IV heparin drip  Continue statin, Plavix, Ranexa  Monitor on telemetry  Nitroglycerin and morphine prn for pain  Cardiac diet  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HCC)  Pt has history of Prinzmetal angina  Pt's amlodipine increased from 5 mg to 10 mg during his last admission for concern of Prinzmetal angina    Plan:  Continue Amlodipine 10 mg  Coronary artery disease of native artery with stable angina pectoris (HCC)  Hx of CAD with stent to circumflex 1/2001  LAD stent for 80% stenosis on 12/12/2024  Home Plavix 75 mg daily, Crestor 20 mg daily, Ranexa 500 mg q12hrs, ASA 81 mg daily    Plan:  Continue Plavix, statin, Ranexa, ASA 81 mg   (HFpEF) heart failure with preserved ejection fraction (HCC)  Wt Readings from Last 3 Encounters:   12/22/24 112 kg (246 lb 4.1 oz)   12/17/24 113 kg (249 lb)   12/13/24 109 kg (240 lb 11.9 oz)     Echo 12/12/2024: EF 65%. Mod AS. Mild AR. Hypokinetic mild inferolateral and apical lateral.  Home chlorthalidone 25 mg daily and Lopressor 12.5 mg  BID    Plan:  Continue chlorthalidone and Lopressor  Monitor I's and O's  Daily weights  Cardiac diet. 1800 mL fluid restriction  Type 2 diabetes mellitus, with long-term current use of insulin ([facility])  Lab Results   Component Value Date    HGBA1C 6.5 (H) 12/20/2024       Recent Labs     12/21/24  1226 12/21/24  1628 12/21/24  2208 12/22/24  0742   POCGLU 140 161* 173* 89       Blood Sugar Average: Last 72 hrs:  (P) 144.625  Pt states his home insulin regimen is Tresiba long-acting 14 U in morning and 16 U at night. Sliding scale Humalog    Plan:  Repeat Ha1c  Long-acting: Lantus 14 U in morning and 16 U at bedtime  Holding Lantus 16 U tonight 12/22 for cardiac cath tomorrow  SSI  Hypoglycemia protocol  Accuchecks  Consistent carb diet  Hypertension  Blood pressure remained high overnight.  Stable this morning.  Home meds: Amlodipine 10 mg daily, Lopressor 12.5 mg BID, Clonidine 0.1 mg q12 hrs    Plan:  Continue home medications  Continue to monitor blood pressure  Aortic stenosis  Mild to moderate stenosis of aortic valve on last echo 12/12/2024  Continue to monitor  GERD (gastroesophageal reflux disease)  Reduce home Pepcid 40 mg to Pepcid 20 mg due to Cr clearance  PAULA (obstructive sleep apnea)  On CPAP qhs at home  History of non-compliance  Stage 3a chronic kidney disease (CKD) ([facility])  Lab Results   Component Value Date    EGFR 35 12/22/2024    EGFR 40 12/21/2024    EGFR 40 12/20/2024    CREATININE 1.76 (H) 12/22/2024    CREATININE 1.56 (H) 12/21/2024    CREATININE 1.58 (H) 12/20/2024     Pt's baseline Cr (1.5-1.8)  Pt is currently at baseline    Plan:  Avoid nephrotoxic agents  Monitor BMP  Nephrology consulted for low GFR, per cardiology  HLD (hyperlipidemia)  Home Crestor 20 mg daily    Plan:  Atorvastatin 40 mg daily  Obesity, morbid ([facility])  BMI 40.35    VTE Pharmacologic Prophylaxis: VTE Score: 5 High Risk (Score >/= 5) - Pharmacological DVT Prophylaxis Ordered: heparin drip. Sequential Compression Devices  Ordered.    Mobility:   Basic Mobility Inpatient Raw Score: 24  JH-HLM Goal: 8: Walk 250 feet or more  JH-HLM Achieved: 8: Walk 250 feet ot more  JH-HLM Goal achieved. Continue to encourage appropriate mobility.    Patient Centered Rounds: I performed bedside rounds with nursing staff today.   Discussions with Specialists or Other Care Team Provider: Cardiology    Education and Discussions with Family / Patient: Patient declined call to .     Current Length of Stay: 2 day(s)  Current Patient Status: Inpatient   Certification Statement: The patient will continue to require additional inpatient hospital stay due to concerns for ACS  Discharge Plan: Anticipate discharge in 24-48 hrs to home.    Code Status: Level 1 - Full Code    Subjective   No acute overnight events.  Saw and examined patient at bedside this morning.  Patient in resting in chair in no acute distress.  Patient denies chest pain since admission and states that he has been able to walk up and down the hallway without onset of chest pain.  Patient also denies shortness of breath, abdominal pain, or N/V/D.  Patient has not had a bowel movement since day of admission.    Objective :  HR:  [55] 55    Body mass index is 39.75 kg/m².     Input and Output Summary (last 24 hours):     Intake/Output Summary (Last 24 hours) at 12/22/2024 0855  Last data filed at 12/22/2024 0601  Gross per 24 hour   Intake 480 ml   Output --   Net 480 ml       Physical Exam  Constitutional:       General: He is not in acute distress.     Appearance: He is obese.   HENT:      Head: Atraumatic.      Mouth/Throat:      Mouth: Mucous membranes are moist.   Eyes:      Extraocular Movements: Extraocular movements intact.   Cardiovascular:      Rate and Rhythm: Normal rate and regular rhythm.      Heart sounds: Murmur (loud, systolic) heard.      No friction rub. No gallop.   Pulmonary:      Effort: Pulmonary effort is normal. No respiratory distress.      Breath sounds: No  wheezing, rhonchi or rales.   Abdominal:      General: Bowel sounds are normal.      Palpations: Abdomen is soft.      Tenderness: There is no abdominal tenderness. There is no guarding or rebound.   Musculoskeletal:         General: Normal range of motion.      Cervical back: Normal range of motion.      Right lower leg: 3+ Edema present.      Left lower leg: 3+ Edema present.   Skin:     General: Skin is warm.   Neurological:      Mental Status: He is alert and oriented to person, place, and time.   Psychiatric:         Mood and Affect: Mood normal.         Behavior: Behavior normal.         Lines/Drains:        Telemetry:  Telemetry Orders (From admission, onward)               24 Hour Telemetry Monitoring  Continuous x 24 Hours (Telem)        Expiring   Question:  Reason for 24 Hour Telemetry  Answer:  Decompensated CHF- and any one of the following: continuous diuretic infusion or total diuretic dose >200 mg daily, associated electrolyte derangement (I.e. K < 3.0), inotropic drip (continuous infusion), hx of ventricular arrhythmia, or new EF < 35%                     Telemetry Reviewed: Sinus Bradycardia  Indication for Continued Telemetry Use: Decompensated CHF- and any one of the following: continuous diuretic infusion or total diuretic dose >200 mg daily, associated electrolyte derangement (I.e. K < 3.0), inotropic drip (continuous infusion), hx of ventricular arrhythmia, or new EF < 35%               Lab Results: I have reviewed the following results:   Results from last 7 days   Lab Units 12/22/24  0617 12/21/24  0453   WBC Thousand/uL 6.75 6.04   HEMOGLOBIN g/dL 11.7* 10.1*   HEMATOCRIT % 35.6* 30.3*   PLATELETS Thousands/uL 212 178   SEGS PCT %  --  51   LYMPHO PCT %  --  33   MONO PCT %  --  11   EOS PCT %  --  4     Results from last 7 days   Lab Units 12/22/24  0617 12/21/24  0453   SODIUM mmol/L 140 141   POTASSIUM mmol/L 3.9 3.6   CHLORIDE mmol/L 108 108   CO2 mmol/L 26 27   BUN mg/dL 34* 29*    CREATININE mg/dL 1.76* 1.56*   ANION GAP mmol/L 6 6   CALCIUM mg/dL 8.7 8.2*   ALBUMIN g/dL  --  3.2*   TOTAL BILIRUBIN mg/dL  --  0.27   ALK PHOS U/L  --  60   ALT U/L  --  13   AST U/L  --  14   GLUCOSE RANDOM mg/dL 78 213*     Results from last 7 days   Lab Units 12/20/24  1646   INR  1.05     Results from last 7 days   Lab Units 12/22/24  0742 12/21/24  2208 12/21/24  1628 12/21/24  1226 12/21/24  0744 12/20/24  2246 12/20/24  2116 12/20/24  1945   POC GLUCOSE mg/dl 89 173* 161* 140 124 235* 163* 72     Results from last 7 days   Lab Units 12/20/24  1819   HEMOGLOBIN A1C % 6.5*           Recent Cultures (last 7 days):         Imaging Results Review: I reviewed radiology reports from this admission including: chest xray.  XR chest 1 view portable   ED Interpretation by Mirian Remy MD (12/20 1617)   Peers unchanged compared to prior x-ray.      Final Result by Richard Estevez MD (12/20 1619)      No acute cardiopulmonary disease.            Workstation performed: [id number]            Other Study Results Review: No additional pertinent studies reviewed.    Last 24 Hours Medication List:     Current Facility-Administered Medications:     acetaminophen (TYLENOL) tablet 650 mg, Q6H PRN    allopurinol (ZYLOPRIM) tablet 100 mg, Daily    amLODIPine (NORVASC) tablet 10 mg, Daily    aspirin chewable tablet 81 mg, Daily    atorvastatin (LIPITOR) tablet 40 mg, Daily With Dinner    chlorthalidone tablet 25 mg, Daily    cloNIDine (CATAPRES) tablet 0.1 mg, Q12H    clopidogrel (PLAVIX) tablet 75 mg, Daily    famotidine (PEPCID) tablet 20 mg, HS    fluticasone (FLONASE) 50 mcg/act nasal spray 2 spray, Daily    heparin (porcine) 25,000 units in 0.45% NaCl 250 mL infusion (premix), Titrated, Last Rate: 13.1 Units/kg/hr (12/22/24 0849)    heparin (porcine) injection 2,000 Units, Q6H PRN    heparin (porcine) injection 4,000 Units, Q6H PRN    insulin glargine (LANTUS) subcutaneous injection 14 Units 0.14 mL, Daily Before  Breakfast    insulin glargine (LANTUS) subcutaneous injection 16 Units 0.16 mL, HS    insulin lispro (HumALOG/ADMELOG) 100 units/mL subcutaneous injection 1-6 Units, 4x Daily (AC & HS) **AND** Fingerstick Glucose (POCT), 4 times day    isosorbide mononitrate (IMDUR) 24 hr tablet 30 mg, Daily    loratadine (CLARITIN) tablet 10 mg, Daily    metoprolol tartrate (LOPRESSOR) partial tablet 12.5 mg, Q12H LESLIE    multivitamin stress formula tablet 1 tablet, Daily    nitroglycerin (NITROSTAT) SL tablet 0.4 mg, Q5 Min PRN    pancrelipase (Lip-Prot-Amyl) (CREON) delayed release capsule 6,000 Units, TID With Meals    pyridoxine (VITAMIN B6) tablet 100 mg, Daily    ranolazine (RANEXA) 12 hr tablet 500 mg, BID    tamsulosin (FLOMAX) capsule 0.8 mg, Daily    Administrative Statements   Today, Patient Was Seen By: Farrah Bliss MD  I have spent a total time of 30 minutes in caring for this patient on the day of the visit/encounter including Impressions, Counseling / Coordination of care, Documenting in the medical record, Reviewing / ordering tests, medicine, procedures  , Obtaining or reviewing history  , and Communicating with other healthcare professionals .    **Please Note: This note may have been constructed using a voice recognition system.**

## 2024-12-22 NOTE — ASSESSMENT & PLAN NOTE
FLP 7/2023 cholesterol 124/triglyceride 112/HDL 54/calculated LDL 47  On atorvastatin 40 mg daily      Recommendations  Plan for left heart cath +/- PCI on Monday.

## 2024-12-22 NOTE — ASSESSMENT & PLAN NOTE
Wt Readings from Last 3 Encounters:   12/22/24 112 kg (246 lb 4.1 oz)   12/17/24 113 kg (249 lb)   12/13/24 109 kg (240 lb 11.9 oz)     Echo 12/12/2024: EF 65%. Mod AS. Mild AR. Hypokinetic mild inferolateral and apical lateral.  Home chlorthalidone 25 mg daily and Lopressor 12.5 mg BID    Plan:  Continue chlorthalidone and Lopressor  Monitor I's and O's  Daily weights  Cardiac diet. 1800 mL fluid restriction

## 2024-12-22 NOTE — ASSESSMENT & PLAN NOTE
Lab Results   Component Value Date    HGBA1C 6.5 (H) 12/20/2024       Recent Labs     12/21/24  1226 12/21/24  1628 12/21/24  2208 12/22/24  0742   POCGLU 140 161* 173* 89       Blood Sugar Average: Last 72 hrs:  (P) 144.625  Pt states his home insulin regimen is Tresiba long-acting 14 U in morning and 16 U at night. Sliding scale Humalog    Plan:  Repeat Ha1c  Long-acting: Lantus 14 U in morning and 16 U at bedtime  Holding Lantus 16 U tonight 12/22 for cardiac cath tomorrow  SSI  Hypoglycemia protocol  Accuchecks  Consistent carb diet

## 2024-12-22 NOTE — PLAN OF CARE
Problem: PAIN - ADULT  Goal: Verbalizes/displays adequate comfort level or baseline comfort level  Description: Interventions:  - Encourage patient to monitor pain and request assistance  - Assess pain using appropriate pain scale  - Administer analgesics based on type and severity of pain and evaluate response  - Implement non-pharmacological measures as appropriate and evaluate response  - Consider cultural and social influences on pain and pain management  - Notify physician/advanced practitioner if interventions unsuccessful or patient reports new pain  Outcome: Progressing     Problem: INFECTION - ADULT  Goal: Absence or prevention of progression during hospitalization  Description: INTERVENTIONS:  - Assess and monitor for signs and symptoms of infection  - Monitor lab/diagnostic results  - Monitor all insertion sites, i.e. indwelling lines, tubes, and drains  - Monitor endotracheal if appropriate and nasal secretions for changes in amount and color  - [place] appropriate cooling/warming therapies per order  - Administer medications as ordered  - Instruct and encourage patient and family to use good hand hygiene technique  - Identify and instruct in appropriate isolation precautions for identified infection/condition  Outcome: Progressing  Goal: Absence of fever/infection during neutropenic period  Description: INTERVENTIONS:  - Monitor WBC    Outcome: Progressing     Problem: DISCHARGE PLANNING  Goal: Discharge to home or other facility with appropriate resources  Description: INTERVENTIONS:  - Identify barriers to discharge w/patient and caregiver  - Arrange for needed discharge resources and transportation as appropriate  - Identify discharge learning needs (meds, wound care, etc.)  - Arrange for interpretive services to assist at discharge as needed  - Refer to Case Management Department for coordinating discharge planning if the patient needs post-hospital services based on physician/advanced  practitioner order or complex needs related to functional status, cognitive ability, or social support system  Outcome: Progressing     Problem: Knowledge Deficit  Goal: Patient/family/caregiver demonstrates understanding of disease process, treatment plan, medications, and discharge instructions  Description: Complete learning assessment and assess knowledge base.  Interventions:  - Provide teaching at level of understanding  - Provide teaching via preferred learning methods  Outcome: Progressing

## 2024-12-22 NOTE — ASSESSMENT & PLAN NOTE
Lab Results   Component Value Date    EGFR 35 12/22/2024    EGFR 40 12/21/2024    EGFR 40 12/20/2024    CREATININE 1.76 (H) 12/22/2024    CREATININE 1.56 (H) 12/21/2024    CREATININE 1.58 (H) 12/20/2024     Pt's baseline Cr (1.5-1.8)  Pt is currently at baseline    Plan:  Avoid nephrotoxic agents  Monitor BMP  Nephrology consulted for low GFR, per cardiology

## 2024-12-22 NOTE — ASSESSMENT & PLAN NOTE
Baseline creatinine is around 1.4-1.8.  Follows with Dr. Quiñonez of [facility].  Renal function is stable and at baseline.  Creatinine 1.76 today.  Will need contrast nephropathy prophylaxis for cardiac catheterization tomorrow.

## 2024-12-22 NOTE — ASSESSMENT & PLAN NOTE
Patient presented with one episode of exertional substernal chest pain and chest tightness lasting 5 minutes and relieved by rest.   He was completely asymptomatic upon arrival to the ED.  He denied any nausea, sweating, vomiting, or lightheadedness.  Patient had a recent admission on 12/11/2024 s/p LAD stent was placed.  Last echocardiogram 12/2024: EF 65%.  Moderate aortic stenosis.  Hypokinetic mild inferior lateral and apical lateral.  Troponins 710> 684> 718>512    EKG first-degree AV block with RBBB   Possible ACS versus Prinzmetal angina.    Plan:  Cardiology is following; Imdur 30 mg was added to his anginal regimen.  They might consider reimaging coronaries.   Per cardiology, plan for cardiac cath tomorrow 12/23.  Continue IV heparin drip  Continue statin, Plavix, Ranexa  Monitor on telemetry  Nitroglycerin and morphine prn for pain  Cardiac diet

## 2024-12-22 NOTE — CONSULTS
NEPHROLOGY HOSPITAL CONSULTATION   Jake Claros 84 y.o. male MRN: [id number]  Unit/Bed#: S -01 Encounter: [id number]    Assessment & Plan  Stage 3a chronic kidney disease (CKD) ([facility])  Baseline creatinine is around 1.4-1.8.  Follows with Dr. Quiñonez of [facility].  Renal function is stable and at baseline.  Creatinine 1.76 today.  Will need contrast nephropathy prophylaxis for cardiac catheterization tomorrow.    Coronary artery disease of native artery with stable angina pectoris ([facility])  S/p cardiac cath on 12/12/2024 with LONNIE to severe proximal LAD stenosis.  Now with CP.   Cardiology planning for cardiac catheterization tomorrow.    Hypertension  Current Rx: Amlodipine 10 mg daily, chlorthalidone 25 mg daily, clonidine 0.1 mg twice a day, metoprolol tartrate 12.5 mg twice a day.  BP was high on admission but is better now.  Stop chlorthalidone in anticipation of cardiac catheterization tomorrow.  Monitor BP.    (HFpEF) heart failure with preserved ejection fraction (HCC)  Not on loop diuretics in the outpatient setting.  He is on chlorthalidone 25 mg daily.  Does not appear to be fluid overloaded.    Aortic stenosis  12/12/2024 echocardiogram: EF 65%, mild to moderate AS, JEANCARLOS 1.44    Type 2 diabetes mellitus, with long-term current use of insulin ([facility])  Defer to primary service.      PLAN SUMMARY:  Stable renal function.  If renal function remains stable, he may proceed with cardiac catheterization with moderate and nonprohibitive risk for MAO.  Recommend stopping chlorthalidone in anticipation of cardiac catheterization.  Start NS at 100 cc/hr at 6 am tomorrow and stop 4 hours after cardiac cath.     I discussed the above plan with Dr. Andrea and we are in agreement with giving IVF for contrast prophylaxis.  Previous records were personally reviewed by me to obtain a baseline creatinine.   The images (CXR) were personally reviewed by me in PACS    HISTORY OF PRESENT ILLNESS:  Requesting  Physician: Gennaro Abdul DO  Reason for Consult: CKD needing cardiac cath    Jake Claros is a 84 y.o. male who was admitted to [facility] on 12/20/24 after presenting with chest pain. A renal consultation is requested today for assistance in the management of CKD.     Jake has a history of HTN, DM, HLP, CKD, CAD, HFpEF, GERD, PAULA, AS, pancreatitis. He has known CKD with a baseline creatinine of around 1.4-1.8.  He follows with Dr. Brian Qui[facility]onez of [facility].  He now presents to [facility] with recurrent chest pain.  Of note, he had a cardiac catheterization on 12/12/2024 with a stent placement to a proximal LAD lesion.  Unfortunately, he presents back to the hospital with recurrent chest pain.  He was seen by cardiology and has been recommended for a another cardiac catheterization.  We are being consulted for assistance with management of CKD requiring dye exposure.  His creatinine on admission was 1.58 and is up to 1.76 today.    He denied any fever, chills, nausea, vomiting, abdominal pain, diarrhea.  He has chronic lower extremity edema. (+) CP. No SOB. .    PAST MEDICAL HISTORY:  Past Medical History:   Diagnosis Date    Allergic     Arthritis     Chronic kidney disease 2021    Chronic kidney disease (CKD) stage G3a/A1, moderately decreased glomerular filtration rate (GFR) between 45-59 mL/min/1.73 square meter and albuminuria creatinine ratio less than 30 mg/g (HCC)     Colon polyp     Diabetes mellitus (HCC)     GERD (gastroesophageal reflux disease)     Heart murmur     History of echocardiogram     HL (hearing loss)     Hyperlipidemia     Hypertension     Obesity     Pancreatitis     Sleep apnea, obstructive      PAST SURGICAL HISTORY:  Past Surgical History:   Procedure Laterality Date    CARDIAC CATHETERIZATION      CARDIAC CATHETERIZATION Left 12/12/2024    Procedure: Cardiac Left Heart Cath;  Surgeon: Son Sibley MD;  Location: [facility];  Service: Cardiology    CARDIAC  "CATHETERIZATION N/A 2024    Procedure: Cardiac PCI;  Surgeon: Son Sibley MD;  Location: [facility];  Service: Cardiology    CHOLECYSTECTOMY      COLONOSCOPY  2018    COLONOSCOPY      EYE SURGERY      HEMORRHOID SURGERY      JOINT REPLACEMENT  2017    knee    KNEE SURGERY Right     OTHER SURGICAL HISTORY      Stent     GA LAPAROSCOPY SURG CHOLECYSTECTOMY N/A 2023    Procedure: CHOLECYSTECTOMY LAPAROSCOPIC;  Surgeon: Alirio Hong MD;  Location:  MAIN OR;  Service: General    UPPER GASTROINTESTINAL ENDOSCOPY       ALLERGIES:  Allergies   Allergen Reactions    Januvia [Sitagliptin] Other (See Comments)     pancreatitis    Semaglutide Other (See Comments)     pancreatits    Simvastatin Myalgia    Trulicity [Dulaglutide] Other (See Comments)     Pancreatitis    Wound Dressing Adhesive Other (See Comments)     Burning sensation    Medical Tape Rash     \"A burn on the skin\" , mackenzie on sensitive areas.    Does ok w/ surgical glue    Molds & Smuts Allergic Rhinitis     Nasal sympt     SOCIAL HISTORY:  Social History     Substance and Sexual Activity   Alcohol Use Yes    Alcohol/week: 5.0 standard drinks of alcohol    Types: 5 Glasses of wine per week    Comment: no alcohol since March     Social History     Substance and Sexual Activity   Drug Use Never     Social History     Tobacco Use   Smoking Status Former    Current packs/day: 0.00    Average packs/day: 2.0 packs/day for 15.0 years (30.0 ttl pk-yrs)    Types: Cigarettes, Pipe, Cigars    Start date: 1957    Quit date: 1972    Years since quittin.1    Passive exposure: Past   Smokeless Tobacco Never     FAMILY HISTORY:  Family History   Problem Relation Age of Onset    Heart disease Mother     Heart attack Mother         50s    Cancer Mother     Coronary artery disease Mother     Cancer Brother         Malignant tumor of lung     MEDICATIONS:    Current Facility-Administered Medications:     acetaminophen (TYLENOL) " tablet 650 mg, 650 mg, Oral, Q6H PRN, Farrah Bliss MD    allopurinol (ZYLOPRIM) tablet 100 mg, 100 mg, Oral, Daily, Farrah Bliss MD, 100 mg at 12/22/24 0836    amLODIPine (NORVASC) tablet 10 mg, 10 mg, Oral, Daily, Farrah Bliss MD, 10 mg at 12/22/24 0836    aspirin chewable tablet 81 mg, 81 mg, Oral, Daily, Farrah Bliss MD, 81 mg at 12/22/24 0835    atorvastatin (LIPITOR) tablet 40 mg, 40 mg, Oral, Daily With Dinner, Farrah Bliss MD, 40 mg at 12/21/24 1807    chlorthalidone tablet 25 mg, 25 mg, Oral, Daily, Farrah Bliss MD, 25 mg at 12/22/24 0958    cloNIDine (CATAPRES) tablet 0.1 mg, 0.1 mg, Oral, Q12H, Farrah Bliss MD, 0.1 mg at 12/22/24 0600    clopidogrel (PLAVIX) tablet 75 mg, 75 mg, Oral, Daily, Farrah Bliss MD, 75 mg at 12/22/24 0836    famotidine (PEPCID) tablet 20 mg, 20 mg, Oral, HS, Farrah Bliss MD, 20 mg at 12/21/24 2206    fluticasone (FLONASE) 50 mcg/act nasal spray 2 spray, 2 spray, Each Nare, Daily, Farrah Bliss MD, 2 spray at 12/22/24 0600    heparin (porcine) 25,000 units in 0.45% NaCl 250 mL infusion (premix), 3-20 Units/kg/hr (Order-Specific), Intravenous, Titrated, Farrah Bliss MD, Last Rate: 11.8 mL/hr at 12/22/24 0849, 13.1 Units/kg/hr at 12/22/24 0849    heparin (porcine) injection 2,000 Units, 2,000 Units, Intravenous, Q6H PRN, Mirian Remy MD, 2,000 Units at 12/22/24 0835    heparin (porcine) injection 4,000 Units, 4,000 Units, Intravenous, Q6H PRN, Mirian Remy MD    insulin glargine (LANTUS) subcutaneous injection 14 Units 0.14 mL, 14 Units, Subcutaneous, Daily Before Breakfast, Farrah Bliss MD, 14 Units at 12/22/24 0841    [Held by provider] insulin glargine (LANTUS) subcutaneous injection 16 Units 0.16 mL, 16 Units, Subcutaneous, HS, Farrah Bliss MD, 16 Units at 12/21/24 2206    insulin lispro (HumALOG/ADMELOG) 100 units/mL subcutaneous injection 1-6 Units, 1-6 Units, Subcutaneous, 4x Daily (AC & HS), 1 Units at 12/21/24 2209  **AND** Fingerstick Glucose (POCT), , , 4 times day, Farrah Bliss MD    isosorbide mononitrate (IMDUR) 24 hr tablet 30 mg, 30 mg, Oral, Daily, GERSON Burnett, 30 mg at 12/22/24 0836    loratadine (CLARITIN) tablet 10 mg, 10 mg, Oral, Daily, Farrah Bliss MD, 10 mg at 12/22/24 0835    metoprolol tartrate (LOPRESSOR) partial tablet 12.5 mg, 12.5 mg, Oral, Q12H LESLIE, Farrah Bliss MD, 12.5 mg at 12/22/24 0836    multivitamin stress formula tablet 1 tablet, 1 tablet, Oral, Daily, Farrah Bliss MD, 1 tablet at 12/22/24 0835    nitroglycerin (NITROSTAT) SL tablet 0.4 mg, 0.4 mg, Sublingual, Q5 Min PRN, Farrah Bliss MD    pancrelipase (Lip-Prot-Amyl) (CREON) delayed release capsule 6,000 Units, 6,000 Units, Oral, TID With Meals, Farrah Bliss MD, 6,000 Units at 12/22/24 0958    pyridoxine (VITAMIN B6) tablet 100 mg, 100 mg, Oral, Daily, Farrah Bliss MD, 100 mg at 12/22/24 0835    ranolazine (RANEXA) 12 hr tablet 500 mg, 500 mg, Oral, BID, Farrah Bliss MD, 500 mg at 12/22/24 0836    tamsulosin (FLOMAX) capsule 0.8 mg, 0.8 mg, Oral, Daily, Farrah Bliss MD, 0.8 mg at 12/22/24 0836    REVIEW OF SYSTEMS:  All the systems were reviewed and were negative except as documented on the HPI.    PHYSICAL EXAM:  Current Weight: Weight - Scale: 112 kg (246 lb 4.1 oz)  First Weight: Weight - Scale: 113 kg (250 lb)  Vitals:    12/21/24 0744 12/21/24 2210 12/22/24 0627 12/22/24 0745   BP: 115/56   (!) 107/44   BP Location: Left arm   Right arm   Pulse: (!) 51 55     Resp: 17   16   Temp: 98 °F (36.7 °C)   97.6 °F (36.4 °C)   TempSrc: Oral   Oral   SpO2:    92%   Weight:   112 kg (246 lb 4.1 oz)    Height:           Intake/Output Summary (Last 24 hours) at 12/22/2024 1245  Last data filed at 12/22/2024 0900  Gross per 24 hour   Intake 720 ml   Output --   Net 720 ml     Physical Exam  General: conscious, coherent, cooperative, not in distress.   Skin: warm, dry, good turgor.   Eyes: pink  "conjunctivae, no scleral icterus.   ENT: moist lips and mucous membranes.   Respiratory: equal chest expansion, clear breath sounds.   Cardiovascular: distinct heart sounds, normal rate, regular rhythm, + murmur  Abdomen: soft, non-tender, non-distended, normoactive bowel sounds  Extremities: edema of both legs, L>R.   Genitourinary: no flower catheter.   Neuro: awake, alert, oriented to time, place and person.   Psych: appropriate affect.       Lab Results:   Results from last 7 days   Lab Units 12/22/24  0617 12/21/24  0453 12/20/24  1646 12/20/24  1536   WBC Thousand/uL 6.75 6.04 5.23 5.51   HEMOGLOBIN g/dL 11.7* 10.1* 11.3* 11.0*   HEMATOCRIT % 35.6* 30.3* 33.9* 32.9*   PLATELETS Thousands/uL 212 178 199 191   POTASSIUM mmol/L 3.9 3.6  --  3.9   CHLORIDE mmol/L 108 108  --  107   CO2 mmol/L 26 27  --  26   BUN mg/dL 34* 29*  --  31*   CREATININE mg/dL 1.76* 1.56*  --  1.58*   CALCIUM mg/dL 8.7 8.2*  --  8.5   MAGNESIUM mg/dL  --  2.0  --   --    ALK PHOS U/L  --  60  --  72   ALT U/L  --  13  --  16   AST U/L  --  14  --  19     Other Studies:   CXR was personally reviewed by me in PACS and showed no pulm edema    Portions of the record may have been created with voice recognition software. Occasional wrong word or \"sound a like\" substitutions may have occurred due to the inherent limitations of voice recognition software. Read the chart carefully and recognize, using context, where substitutions have occurred.If you have any questions, please contact the dictating provider.    "

## 2024-12-22 NOTE — ASSESSMENT & PLAN NOTE
S/p cardiac cath on 12/12/2024 with LONNIE to severe proximal LAD stenosis.  Now with CP.   Cardiology planning for cardiac catheterization tomorrow.

## 2024-12-22 NOTE — HOSPITAL COURSE
Recent admission for chest pain requiring LAD stent.  Presented on this admission with 5-minute episode of exertional chest pain relieved with rest.  Asymptomatic in the ED.    ED trops 710>683>718. EKG no ischemic changes. Given aspirin 243 mg in ED.  Started on heparin drip for concerns of LAD stent thrombosis. Cardiology consulted and recommended adding Imdur to regimen for high blood pressure.     Pt continued to be asymptomatic throughout hospital course. Per cardiology, consider reimaging coronaries. No plan for invasive procedure with lack of ongoing symptoms.

## 2024-12-22 NOTE — ASSESSMENT & PLAN NOTE
Current Rx: Amlodipine 10 mg daily, chlorthalidone 25 mg daily, clonidine 0.1 mg twice a day, metoprolol tartrate 12.5 mg twice a day.  BP was high on admission but is better now.  Stop chlorthalidone in anticipation of cardiac catheterization tomorrow.  Monitor BP.

## 2024-12-22 NOTE — ASSESSMENT & PLAN NOTE
Not on loop diuretics in the outpatient setting.  He is on chlorthalidone 25 mg daily.  Does not appear to be fluid overloaded.

## 2024-12-23 PROBLEM — D64.9 ANEMIA: Status: ACTIVE | Noted: 2024-12-23

## 2024-12-23 PROBLEM — Z99.2 ANEMIA DUE TO CHRONIC KIDNEY DISEASE, ON CHRONIC DIALYSIS (HCC): Status: ACTIVE | Noted: 2024-12-23

## 2024-12-23 PROBLEM — N18.6 ANEMIA DUE TO CHRONIC KIDNEY DISEASE, ON CHRONIC DIALYSIS (HCC): Status: ACTIVE | Noted: 2024-12-23

## 2024-12-23 PROBLEM — D63.1 ANEMIA DUE TO CHRONIC KIDNEY DISEASE, ON CHRONIC DIALYSIS (HCC): Status: ACTIVE | Noted: 2024-12-23

## 2024-12-23 LAB
ANION GAP SERPL CALCULATED.3IONS-SCNC: 7 MMOL/L (ref 4–13)
APTT PPP: 81 SECONDS (ref 23–34)
BASOPHILS # BLD AUTO: 0.04 THOUSANDS/ÂΜL (ref 0–0.1)
BASOPHILS NFR BLD AUTO: 1 % (ref 0–1)
BUN SERPL-MCNC: 33 MG/DL (ref 5–25)
CALCIUM SERPL-MCNC: 8.5 MG/DL (ref 8.4–10.2)
CHLORIDE SERPL-SCNC: 108 MMOL/L (ref 96–108)
CO2 SERPL-SCNC: 25 MMOL/L (ref 21–32)
CREAT SERPL-MCNC: 1.62 MG/DL (ref 0.6–1.3)
EOSINOPHIL # BLD AUTO: 0.23 THOUSAND/ÂΜL (ref 0–0.61)
EOSINOPHIL NFR BLD AUTO: 4 % (ref 0–6)
ERYTHROCYTE [DISTWIDTH] IN BLOOD BY AUTOMATED COUNT: 13.8 % (ref 11.6–15.1)
GFR SERPL CREATININE-BSD FRML MDRD: 38 ML/MIN/1.73SQ M
GLUCOSE SERPL-MCNC: 118 MG/DL (ref 65–140)
GLUCOSE SERPL-MCNC: 125 MG/DL (ref 65–140)
GLUCOSE SERPL-MCNC: 133 MG/DL (ref 65–140)
GLUCOSE SERPL-MCNC: 137 MG/DL (ref 65–140)
GLUCOSE SERPL-MCNC: 283 MG/DL (ref 65–140)
HCT VFR BLD AUTO: 30.2 % (ref 36.5–49.3)
HGB BLD-MCNC: 10.1 G/DL (ref 12–17)
IMM GRANULOCYTES # BLD AUTO: 0.01 THOUSAND/UL (ref 0–0.2)
IMM GRANULOCYTES NFR BLD AUTO: 0 % (ref 0–2)
KCT BLD-ACNC: 293 SEC (ref 89–137)
LYMPHOCYTES # BLD AUTO: 2.1 THOUSANDS/ÂΜL (ref 0.6–4.47)
LYMPHOCYTES NFR BLD AUTO: 35 % (ref 14–44)
MCH RBC QN AUTO: 32.4 PG (ref 26.8–34.3)
MCHC RBC AUTO-ENTMCNC: 33.4 G/DL (ref 31.4–37.4)
MCV RBC AUTO: 97 FL (ref 82–98)
MONOCYTES # BLD AUTO: 0.67 THOUSAND/ÂΜL (ref 0.17–1.22)
MONOCYTES NFR BLD AUTO: 11 % (ref 4–12)
NEUTROPHILS # BLD AUTO: 2.88 THOUSANDS/ÂΜL (ref 1.85–7.62)
NEUTS SEG NFR BLD AUTO: 49 % (ref 43–75)
NRBC BLD AUTO-RTO: 0 /100 WBCS
PLATELET # BLD AUTO: 180 THOUSANDS/UL (ref 149–390)
PMV BLD AUTO: 10.3 FL (ref 8.9–12.7)
POTASSIUM SERPL-SCNC: 4 MMOL/L (ref 3.5–5.3)
RBC # BLD AUTO: 3.12 MILLION/UL (ref 3.88–5.62)
SODIUM SERPL-SCNC: 140 MMOL/L (ref 135–147)
SPECIMEN SOURCE: ABNORMAL
WBC # BLD AUTO: 5.93 THOUSAND/UL (ref 4.31–10.16)

## 2024-12-23 PROCEDURE — C1725 CATH, TRANSLUMIN NON-LASER: HCPCS | Performed by: INTERNAL MEDICINE

## 2024-12-23 PROCEDURE — 027034Z DILATION OF CORONARY ARTERY, ONE ARTERY WITH DRUG-ELUTING INTRALUMINAL DEVICE, PERCUTANEOUS APPROACH: ICD-10-PCS | Performed by: INTERNAL MEDICINE

## 2024-12-23 PROCEDURE — 85730 THROMBOPLASTIN TIME PARTIAL: CPT

## 2024-12-23 PROCEDURE — C1769 GUIDE WIRE: HCPCS | Performed by: INTERNAL MEDICINE

## 2024-12-23 PROCEDURE — 82948 REAGENT STRIP/BLOOD GLUCOSE: CPT

## 2024-12-23 PROCEDURE — 80048 BASIC METABOLIC PNL TOTAL CA: CPT

## 2024-12-23 PROCEDURE — 93454 CORONARY ARTERY ANGIO S&I: CPT | Performed by: INTERNAL MEDICINE

## 2024-12-23 PROCEDURE — 85025 COMPLETE CBC W/AUTO DIFF WBC: CPT

## 2024-12-23 PROCEDURE — 99152 MOD SED SAME PHYS/QHP 5/>YRS: CPT | Performed by: INTERNAL MEDICINE

## 2024-12-23 PROCEDURE — 99232 SBSQ HOSP IP/OBS MODERATE 35: CPT | Performed by: INTERNAL MEDICINE

## 2024-12-23 PROCEDURE — 92928 PRQ TCAT PLMT NTRAC ST 1 LES: CPT | Performed by: INTERNAL MEDICINE

## 2024-12-23 PROCEDURE — 92920 PRQ TRLUML C ANGIOP 1ART&/BR: CPT | Performed by: INTERNAL MEDICINE

## 2024-12-23 PROCEDURE — C9600 PERC DRUG-EL COR STENT SING: HCPCS | Performed by: INTERNAL MEDICINE

## 2024-12-23 PROCEDURE — 99153 MOD SED SAME PHYS/QHP EA: CPT | Performed by: INTERNAL MEDICINE

## 2024-12-23 PROCEDURE — 4A023N7 MEASUREMENT OF CARDIAC SAMPLING AND PRESSURE, LEFT HEART, PERCUTANEOUS APPROACH: ICD-10-PCS | Performed by: INTERNAL MEDICINE

## 2024-12-23 PROCEDURE — 85347 COAGULATION TIME ACTIVATED: CPT

## 2024-12-23 PROCEDURE — C1887 CATHETER, GUIDING: HCPCS | Performed by: INTERNAL MEDICINE

## 2024-12-23 PROCEDURE — C1894 INTRO/SHEATH, NON-LASER: HCPCS | Performed by: INTERNAL MEDICINE

## 2024-12-23 PROCEDURE — C1874 STENT, COATED/COV W/DEL SYS: HCPCS | Performed by: INTERNAL MEDICINE

## 2024-12-23 DEVICE — STENT ONYXNG30008UX ONYX 3.00X08RX
Type: IMPLANTABLE DEVICE | Site: CORONARY | Status: FUNCTIONAL
Brand: ONYX FRONTIER™

## 2024-12-23 RX ORDER — SODIUM CHLORIDE 9 MG/ML
100 INJECTION, SOLUTION INTRAVENOUS CONTINUOUS
Status: DISPENSED | OUTPATIENT
Start: 2024-12-23 | End: 2024-12-24

## 2024-12-23 RX ORDER — NITROGLYCERIN 20 MG/100ML
INJECTION INTRAVENOUS CODE/TRAUMA/SEDATION MEDICATION
Status: DISCONTINUED | OUTPATIENT
Start: 2024-12-23 | End: 2024-12-23 | Stop reason: HOSPADM

## 2024-12-23 RX ORDER — MIDAZOLAM HYDROCHLORIDE 2 MG/2ML
INJECTION, SOLUTION INTRAMUSCULAR; INTRAVENOUS CODE/TRAUMA/SEDATION MEDICATION
Status: DISCONTINUED | OUTPATIENT
Start: 2024-12-23 | End: 2024-12-23 | Stop reason: HOSPADM

## 2024-12-23 RX ORDER — FENTANYL CITRATE 50 UG/ML
INJECTION, SOLUTION INTRAMUSCULAR; INTRAVENOUS CODE/TRAUMA/SEDATION MEDICATION
Status: DISCONTINUED | OUTPATIENT
Start: 2024-12-23 | End: 2024-12-23 | Stop reason: HOSPADM

## 2024-12-23 RX ORDER — VERAPAMIL HYDROCHLORIDE 2.5 MG/ML
INJECTION, SOLUTION INTRAVENOUS CODE/TRAUMA/SEDATION MEDICATION
Status: DISCONTINUED | OUTPATIENT
Start: 2024-12-23 | End: 2024-12-23 | Stop reason: HOSPADM

## 2024-12-23 RX ORDER — LIDOCAINE HYDROCHLORIDE 10 MG/ML
INJECTION, SOLUTION EPIDURAL; INFILTRATION; INTRACAUDAL; PERINEURAL CODE/TRAUMA/SEDATION MEDICATION
Status: DISCONTINUED | OUTPATIENT
Start: 2024-12-23 | End: 2024-12-23 | Stop reason: HOSPADM

## 2024-12-23 RX ORDER — SODIUM CHLORIDE 9 MG/ML
100 INJECTION, SOLUTION INTRAVENOUS CONTINUOUS
Status: DISCONTINUED | OUTPATIENT
Start: 2024-12-23 | End: 2024-12-23

## 2024-12-23 RX ORDER — HEPARIN SODIUM 1000 [USP'U]/ML
INJECTION, SOLUTION INTRAVENOUS; SUBCUTANEOUS CODE/TRAUMA/SEDATION MEDICATION
Status: DISCONTINUED | OUTPATIENT
Start: 2024-12-23 | End: 2024-12-23 | Stop reason: HOSPADM

## 2024-12-23 RX ADMIN — METOPROLOL TARTRATE 12.5 MG: 25 TABLET, FILM COATED ORAL at 08:38

## 2024-12-23 RX ADMIN — SODIUM CHLORIDE 100 ML/HR: 0.9 INJECTION, SOLUTION INTRAVENOUS at 17:15

## 2024-12-23 RX ADMIN — RANOLAZINE 500 MG: 500 TABLET, FILM COATED, EXTENDED RELEASE ORAL at 17:08

## 2024-12-23 RX ADMIN — CLONIDINE HYDROCHLORIDE 0.1 MG: 0.1 TABLET ORAL at 17:08

## 2024-12-23 RX ADMIN — PANCRELIPASE 6000 UNITS: 30000; 6000; 19000 CAPSULE, DELAYED RELEASE PELLETS ORAL at 17:08

## 2024-12-23 RX ADMIN — PANCRELIPASE 6000 UNITS: 30000; 6000; 19000 CAPSULE, DELAYED RELEASE PELLETS ORAL at 08:37

## 2024-12-23 RX ADMIN — METOPROLOL TARTRATE 12.5 MG: 25 TABLET, FILM COATED ORAL at 21:24

## 2024-12-23 RX ADMIN — LORATADINE 10 MG: 10 TABLET ORAL at 08:37

## 2024-12-23 RX ADMIN — ATORVASTATIN CALCIUM 40 MG: 40 TABLET, FILM COATED ORAL at 15:51

## 2024-12-23 RX ADMIN — CLOPIDOGREL BISULFATE 75 MG: 75 TABLET ORAL at 08:38

## 2024-12-23 RX ADMIN — INSULIN LISPRO 4 UNITS: 100 INJECTION, SOLUTION INTRAVENOUS; SUBCUTANEOUS at 21:25

## 2024-12-23 RX ADMIN — FLUTICASONE PROPIONATE 2 SPRAY: 50 SPRAY, METERED NASAL at 08:39

## 2024-12-23 RX ADMIN — FAMOTIDINE 20 MG: 20 TABLET, FILM COATED ORAL at 21:25

## 2024-12-23 RX ADMIN — ALLOPURINOL 100 MG: 100 TABLET ORAL at 08:37

## 2024-12-23 RX ADMIN — ASPIRIN 81 MG 81 MG: 81 TABLET ORAL at 08:37

## 2024-12-23 RX ADMIN — NITROGLYCERIN 1 INCH: 20 OINTMENT TOPICAL at 15:51

## 2024-12-23 RX ADMIN — PYRIDOXINE HCL TAB 50 MG 100 MG: 50 TAB at 08:37

## 2024-12-23 RX ADMIN — TAMSULOSIN HYDROCHLORIDE 0.8 MG: 0.4 CAPSULE ORAL at 08:37

## 2024-12-23 RX ADMIN — INSULIN GLARGINE 16 UNITS: 100 INJECTION, SOLUTION SUBCUTANEOUS at 21:25

## 2024-12-23 RX ADMIN — B-COMPLEX W/ C & FOLIC ACID TAB 1 TABLET: TAB at 08:38

## 2024-12-23 RX ADMIN — AMLODIPINE BESYLATE 10 MG: 10 TABLET ORAL at 08:37

## 2024-12-23 RX ADMIN — RANOLAZINE 500 MG: 500 TABLET, FILM COATED, EXTENDED RELEASE ORAL at 08:37

## 2024-12-23 RX ADMIN — NITROGLYCERIN 1 INCH: 20 OINTMENT TOPICAL at 21:24

## 2024-12-23 RX ADMIN — ISOSORBIDE MONONITRATE 30 MG: 30 TABLET, EXTENDED RELEASE ORAL at 08:37

## 2024-12-23 RX ADMIN — SODIUM CHLORIDE 100 ML/HR: 0.9 INJECTION, SOLUTION INTRAVENOUS at 06:05

## 2024-12-23 NOTE — ASSESSMENT & PLAN NOTE
12/12/2024 echocardiogram: EF 65%, mild to moderate AS, JEANCARLOS 1.44.  Continue to follow-up cardiology recommendations

## 2024-12-23 NOTE — PROGRESS NOTES
Progress Note - Nephrology   Name: Jake Claros 84 y.o. male I MRN: [id number]  Unit/Bed#: S -01 I Date of Admission: 12/20/2024   Date of Service: 12/23/2024 I Hospital Day: 3    84-year-old male presented with complaint of chest pain  Assessment & Plan  Stage 3a chronic kidney disease (CKD) ([facility])  Baseline creatinine is around 1.4-1.8.  Follows with Dr. Quiñonez of [facility].  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  Continue pre and postcontrast IV fluid as ordered.  Okay to stop IV fluid after 6 hours of cardiac catheterization, order changed to continue IV fluid for another 10 hours    Hypertension  Current Rx: Amlodipine 10 mg daily. clonidine 0.1 mg twice a day, metoprolol tartrate 12.5 mg twice a day.  Chlorthalidone was stopped on 12/22 in anticipation for cardiac catheterization    (HFpEF) heart failure with preserved ejection fraction (HCC)  Not on loop diuretics in the outpatient setting.  He is on chlorthalidone 25 mg daily.  Finding of trace lower extremity edema but otherwise clinically euvolemic.  Continue to monitor volume status with patient receiving IV fluid    Coronary artery disease of native artery with stable angina pectoris ([facility])  S/p cardiac cath on 12/12/2024 with LONNIE to severe proximal LAD stenosis.  Now with CP.  Plan for cardiac catheterization today.  Follow-up results    Aortic stenosis  12/12/2024 echocardiogram: EF 65%, mild to moderate AS, JEANCARLOS 1.44.  Continue to follow-up cardiology recommendations    Type 2 diabetes mellitus, with long-term current use of insulin ([facility])  Defer to primary service.    Anemia  Hemoglobin stable at 10.1 g/dL, continue to monitor    I have reviewed the nephrology  recommendations including renal function stable and plan to continue IV fluid for prevention of contrast nephropathy, with primary team, and we are in agreement with renal plan including the information outlined above.     Subjective   Patient denies any chest pain or shortness of breath.  Denies any nausea vomiting    Objective :  Temp:  [97.7 °F (36.5 °C)-98.3 °F (36.8 °C)] 97.7 °F (36.5 °C)  HR:  [51-57] 51  BP: (124-147)/(52-61) 147/61  Resp:  [18] 18  SpO2:  [87 %-97 %] 87 %  O2 Device: None (Room air)    Current Weight: Weight - Scale: 110 kg (243 lb 2.7 oz)  First Weight: Weight - Scale: 113 kg (250 lb)  I/O         12/21 0701  12/22 0700 12/22 0701  12/23 0700 12/23 0701  12/24 0700    P.O. 480 840 0    Total Intake(mL/kg) 480 (4.3) 840 (7.6) 0 (0)    Urine (mL/kg/hr) 450 (0.2) 700 (0.3) 700 (1.5)    Stool   0    Total Output 450 700 700    Net +30 +140 -700           Unmeasured Urine Occurrence 3 x 1 x     Unmeasured Stool Occurrence 0 x 0 x 0 x          Physical Exam  Vitals and nursing note reviewed.   Constitutional:       General: He is not in acute distress.     Appearance: He is well-developed.   HENT:      Head: Normocephalic and atraumatic.      Mouth/Throat:      Mouth: Mucous membranes are moist.   Eyes:      Conjunctiva/sclera: Conjunctivae normal.   Cardiovascular:      Rate and Rhythm: Normal rate and regular rhythm.      Heart sounds: Murmur heard.   Pulmonary:      Effort: Pulmonary effort is normal. No respiratory distress.      Breath sounds: Normal breath sounds.   Abdominal:      General: Abdomen is flat.      Palpations: Abdomen is soft.      Tenderness: There is no abdominal tenderness.   Musculoskeletal:         General: No swelling.      Cervical back: Neck supple.      Right lower leg: Edema present.      Left lower leg: Edema present.      Comments: Left more than right edema   Skin:     General: Skin is warm and dry.      Capillary Refill: Capillary refill takes less than 2  seconds.   Neurological:      Mental Status: He is alert and oriented to person, place, and time.   Psychiatric:         Mood and Affect: Mood normal.         Behavior: Behavior normal.          Medications:    Current Facility-Administered Medications:     acetaminophen (TYLENOL) tablet 650 mg, 650 mg, Oral, Q6H PRN, Farrah Bliss MD    allopurinol (ZYLOPRIM) tablet 100 mg, 100 mg, Oral, Daily, Farrah Bliss MD, 100 mg at 12/23/24 0837    amLODIPine (NORVASC) tablet 10 mg, 10 mg, Oral, Daily, Farrah Bliss MD, 10 mg at 12/23/24 0837    aspirin chewable tablet 81 mg, 81 mg, Oral, Daily, Farrah Bliss MD, 81 mg at 12/23/24 0837    atorvastatin (LIPITOR) tablet 40 mg, 40 mg, Oral, Daily With Dinner, Farrah Bliss MD, 40 mg at 12/22/24 1754    cloNIDine (CATAPRES) tablet 0.1 mg, 0.1 mg, Oral, Q12H, Farrah Bliss MD, 0.1 mg at 12/22/24 1754    clopidogrel (PLAVIX) tablet 75 mg, 75 mg, Oral, Daily, Farrah Bliss MD, 75 mg at 12/23/24 0838    famotidine (PEPCID) tablet 20 mg, 20 mg, Oral, HS, Farrah Bliss MD, 20 mg at 12/22/24 2111    fluticasone (FLONASE) 50 mcg/act nasal spray 2 spray, 2 spray, Each Nare, Daily, Farrah Bliss MD, 2 spray at 12/23/24 0839    heparin (porcine) 25,000 units in 0.45% NaCl 250 mL infusion (premix), 3-20 Units/kg/hr (Order-Specific), Intravenous, Titrated, Farrah Bliss MD, Last Rate: 11.8 mL/hr at 12/22/24 1554, 13.1 Units/kg/hr at 12/22/24 1554    heparin (porcine) injection 2,000 Units, 2,000 Units, Intravenous, Q6H PRN, Mirian Remy MD, 2,000 Units at 12/22/24 0835    heparin (porcine) injection 4,000 Units, 4,000 Units, Intravenous, Q6H PRN, Mirian Remy MD    insulin glargine (LANTUS) subcutaneous injection 14 Units 0.14 mL, 14 Units, Subcutaneous, Daily Before Breakfast, Farrah Bliss MD, 14 Units at 12/22/24 0841    [Held by provider] insulin glargine (LANTUS) subcutaneous injection 16 Units 0.16 mL, 16 Units, Subcutaneous, , Farrah  MD Ruthy, 16 Units at 12/21/24 2206    insulin lispro (HumALOG/ADMELOG) 100 units/mL subcutaneous injection 1-6 Units, 1-6 Units, Subcutaneous, 4x Daily (AC & HS), 1 Units at 12/22/24 2114 **AND** Fingerstick Glucose (POCT), , , 4 times day, Farrah Bliss MD    isosorbide mononitrate (IMDUR) 24 hr tablet 30 mg, 30 mg, Oral, Daily, GERSON Burnett, 30 mg at 12/23/24 0837    loratadine (CLARITIN) tablet 10 mg, 10 mg, Oral, Daily, Farrah Bliss MD, 10 mg at 12/23/24 0837    metoprolol tartrate (LOPRESSOR) partial tablet 12.5 mg, 12.5 mg, Oral, Q12H LESLIE, Farrah Bliss MD, 12.5 mg at 12/23/24 0838    multivitamin stress formula tablet 1 tablet, 1 tablet, Oral, Daily, Farrah Bliss MD, 1 tablet at 12/23/24 0838    nitroglycerin (NITROSTAT) SL tablet 0.4 mg, 0.4 mg, Sublingual, Q5 Min PRN, Farrah Bliss MD    pancrelipase (Lip-Prot-Amyl) (CREON) delayed release capsule 6,000 Units, 6,000 Units, Oral, TID With Meals, Farrah Bliss MD, 6,000 Units at 12/23/24 0837    pyridoxine (VITAMIN B6) tablet 100 mg, 100 mg, Oral, Daily, Farrah Bliss MD, 100 mg at 12/23/24 0837    ranolazine (RANEXA) 12 hr tablet 500 mg, 500 mg, Oral, BID, Farrah Bliss MD, 500 mg at 12/23/24 0837    sodium chloride 0.9 % infusion, 100 mL/hr, Intravenous, Continuous, Tacos Smith MD, Last Rate: 100 mL/hr at 12/23/24 0605, 100 mL/hr at 12/23/24 0605    tamsulosin (FLOMAX) capsule 0.8 mg, 0.8 mg, Oral, Daily, Farrah Bliss MD, 0.8 mg at 12/23/24 0837      Lab Results: I have reviewed the following results:  Results from last 7 days   Lab Units 12/23/24  0555 12/22/24  0617 12/21/24  0453 12/20/24  1646 12/20/24  1536   WBC Thousand/uL 5.93 6.75 6.04 5.23 5.51   HEMOGLOBIN g/dL 10.1* 11.7* 10.1* 11.3* 11.0*   HEMATOCRIT % 30.2* 35.6* 30.3* 33.9* 32.9*   PLATELETS Thousands/uL 180 212 178 199 191   POTASSIUM mmol/L 4.0 3.9 3.6  --  3.9   CHLORIDE mmol/L 108 108 108  --  107   CO2 mmol/L 25 26 27  --  26   BUN  "mg/dL 33* 34* 29*  --  31*   CREATININE mg/dL 1.62* 1.76* 1.56*  --  1.58*   CALCIUM mg/dL 8.5 8.7 8.2*  --  8.5   MAGNESIUM mg/dL  --   --  2.0  --   --    ALBUMIN g/dL  --   --  3.2*  --  3.8       Administrative Statements     Portions of the record may have been created with voice recognition software. Occasional wrong word or \"sound a like\" substitutions may have occurred due to the inherent limitations of voice recognition software. Read the chart carefully and recognize, using context, where substitutions have occurred.If you have any questions, please contact the dictating provider.  "

## 2024-12-23 NOTE — ASSESSMENT & PLAN NOTE
Lab Results   Component Value Date    HGBA1C 6.5 (H) 12/20/2024       Recent Labs     12/22/24  1525 12/22/24  2044 12/23/24  0801 12/23/24  1117   POCGLU 205* 169* 137 125       Blood Sugar Average: Last 72 hrs:  (P) [id number]  Pt states his home insulin regimen is Tresiba long-acting 14 U in morning and 16 U at night. Sliding scale Humalog    Plan:  Repeat Ha1c  Long-acting: Lantus 14 U in morning and 16 U at bedtime (resumed after patient start consuming food after Cardia cath)  SSI  Hypoglycemia protocol  Accuchecks  Consistent carb diet

## 2024-12-23 NOTE — ASSESSMENT & PLAN NOTE
Patient presented with one episode of exertional substernal chest pain and chest tightness lasting 5 minutes and relieved by rest.   He was completely asymptomatic upon arrival to the ED.  He denied any nausea, sweating, vomiting, or lightheadedness.  Patient had a recent admission on 12/11/2024 s/p LAD stent was placed.  Last echocardiogram 12/2024: EF 65%.  Moderate aortic stenosis.  Hypokinetic mild inferior lateral and apical lateral.  Troponins 710> 684> 718>512    EKG first-degree AV block with RBBB   Possible ACS versus Prinzmetal angina.  Schedule for cardiac cath today 12/23    Plan:  Cardiology consulted and rec appreciated   cardia cath today  Nephrology consulted and rec appreciated  Continue pre and post contrast IV fluid   Ok to stop IV fluid 6 hours after cardiac cath  Chlorthalidone was stopped on 12/22 in anticipation for cardiac catheterization  Imdur 30 mg was added to his anginal regimen  Continue IV heparin drip  Continue statin, Plavix, Ranexa  Monitor on telemetry  Nitroglycerin and morphine prn for pain  Cardiac diet

## 2024-12-23 NOTE — ASSESSMENT & PLAN NOTE
Lab Results   Component Value Date    EGFR 38 12/23/2024    EGFR 35 12/22/2024    EGFR 40 12/21/2024    CREATININE 1.62 (H) 12/23/2024    CREATININE 1.76 (H) 12/22/2024    CREATININE 1.56 (H) 12/21/2024     Pt's baseline Cr (1.5-1.8)  Pt is currently at baseline    Plan:  Avoid nephrotoxic agents  Monitor BMP  Nephrology consulted for low GFR, per cardiology

## 2024-12-23 NOTE — ASSESSMENT & PLAN NOTE
Current Rx: Amlodipine 10 mg daily. clonidine 0.1 mg twice a day, metoprolol tartrate 12.5 mg twice a day.  Chlorthalidone was stopped on 12/22 in anticipation for cardiac catheterization

## 2024-12-23 NOTE — ASSESSMENT & PLAN NOTE
Not on loop diuretics in the outpatient setting.  He is on chlorthalidone 25 mg daily.  Finding of trace lower extremity edema but otherwise clinically euvolemic.  Continue to monitor volume status with patient receiving IV fluid

## 2024-12-23 NOTE — ASSESSMENT & PLAN NOTE
S/p cardiac cath on 12/12/2024 with LONNIE to severe proximal LAD stenosis.  Now with CP.  Plan for cardiac catheterization today.  Follow-up results

## 2024-12-23 NOTE — PROGRESS NOTES
Progress Note - Hospitalist   Name: Jake Claros 84 y.o. male I MRN: [id number]  Unit/Bed#: [facility] CATH LAB ROOM I Date of Admission: 12/20/2024   Date of Service: 12/23/2024 I Hospital Day: 3    Assessment & Plan  Chest pain  Patient presented with one episode of exertional substernal chest pain and chest tightness lasting 5 minutes and relieved by rest.   He was completely asymptomatic upon arrival to the ED.  He denied any nausea, sweating, vomiting, or lightheadedness.  Patient had a recent admission on 12/11/2024 s/p LAD stent was placed.  Last echocardiogram 12/2024: EF 65%.  Moderate aortic stenosis.  Hypokinetic mild inferior lateral and apical lateral.  Troponins 710> 684> 718>512    EKG first-degree AV block with RBBB   Possible ACS versus Prinzmetal angina.  Schedule for cardiac cath today 12/23    Plan:  Cardiology consulted and rec appreciated   cardia cath today  Nephrology consulted and rec appreciated  Continue pre and post contrast IV fluid   Ok to stop IV fluid 6 hours after cardiac cath  Chlorthalidone was stopped on 12/22 in anticipation for cardiac catheterization  Imdur 30 mg was added to his anginal regimen  Continue IV heparin drip  Continue statin, Plavix, Ranexa  Monitor on telemetry  Nitroglycerin and morphine prn for pain  Cardiac diet  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HCC)  Pt has history of Prinzmetal angina  Pt's amlodipine increased from 5 mg to 10 mg during his last admission for concern of Prinzmetal angina    Plan:  Continue Amlodipine 10 mg  Start Imdur 30 mg at this hospital admission per cardio rec   Coronary artery disease of native artery with stable angina pectoris (HCC)  Hx of CAD with stent to circumflex 1/2001  LAD stent for 80% stenosis on 12/12/2024  Home Plavix 75 mg daily, Crestor 20 mg daily, Ranexa 500 mg q12hrs, ASA 81 mg daily    Plan:  Continue Plavix, statin, Ranexa, ASA 81 mg   (HFpEF) heart failure with preserved ejection fraction (HCC)  Wt Readings from Last 3  Encounters:   12/23/24 110 kg (243 lb 2.7 oz)   12/17/24 113 kg (249 lb)   12/13/24 109 kg (240 lb 11.9 oz)     Echo 12/12/2024: EF 65%. Mod AS. Mild AR. Hypokinetic mild inferolateral and apical lateral.  Home chlorthalidone 25 mg daily and Lopressor 12.5 mg BID    Plan:  Chlorthalidone was stopped on 12/22 in anticipation for cardiac catheterization  Continue Lopressor  Monitor I's and O's  Daily weights  Cardiac diet. 1800 mL fluid restriction  Type 2 diabetes mellitus, with long-term current use of insulin ([facility])  Lab Results   Component Value Date    HGBA1C 6.5 (H) 12/20/2024       Recent Labs     12/22/24  1525 12/22/24  2044 12/23/24  0801 12/23/24  1117   POCGLU 205* 169* 137 125       Blood Sugar Average: Last 72 hrs:  (P) 151.0767704148364856  Pt states his home insulin regimen is Tresiba long-acting 14 U in morning and 16 U at night. Sliding scale Humalog    Plan:  Repeat Ha1c  Long-acting: Lantus 14 U in morning and 16 U at bedtime (resumed after patient start consuming food after Cardia cath)  SSI  Hypoglycemia protocol  Accuchecks  Consistent carb diet  Hypertension  Blood pressure remained high overnight.  Stable this morning.  Home meds: Amlodipine 10 mg daily, Lopressor 12.5 mg BID, Clonidine 0.1 mg q12 hrs    Plan:  Continue home medications  Continue to monitor blood pressure  Aortic stenosis  Mild to moderate stenosis of aortic valve on last echo 12/12/2024  Continue to monitor  GERD (gastroesophageal reflux disease)  Reduce home Pepcid 40 mg to Pepcid 20 mg due to Cr clearance  PAULA (obstructive sleep apnea)  On CPAP qhs at home  History of non-compliance  Stage 3a chronic kidney disease (CKD) ([facility])  Lab Results   Component Value Date    EGFR 38 12/23/2024    EGFR 35 12/22/2024    EGFR 40 12/21/2024    CREATININE 1.62 (H) 12/23/2024    CREATININE 1.76 (H) 12/22/2024    CREATININE 1.56 (H) 12/21/2024     Pt's baseline Cr (1.5-1.8)  Pt is currently at baseline    Plan:  Avoid nephrotoxic  agents  Monitor BMP  Nephrology consulted for low GFR, per cardiology  HLD (hyperlipidemia)  Home Crestor 20 mg daily    Plan:  Atorvastatin 40 mg daily  Obesity, morbid (HCC)  BMI 40.35    VTE Pharmacologic Prophylaxis: VTE Score: 5 High Risk (Score >/= 5) - Pharmacological DVT Prophylaxis Ordered: heparin drip. Sequential Compression Devices Ordered.    Mobility:   Basic Mobility Inpatient Raw Score: 24  JH-HLM Goal: 8: Walk 250 feet or more  JH-HLM Achieved: 8: Walk 250 feet ot more  JH-HLM Goal achieved. Continue to encourage appropriate mobility.    Patient Centered Rounds: I performed bedside rounds with nursing staff today.   Discussions with Specialists or Other Care Team Provider: Cardiology    Education and Discussions with Family / Patient: Patient declined call to .     Current Length of Stay: 3 day(s)  Current Patient Status: Inpatient   Certification Statement: The patient will continue to require additional inpatient hospital stay due to concerns for ACS  Discharge Plan: Anticipate discharge in 24-48 hrs to home.    Code Status: Level 1 - Full Code    Subjective   Patient was seen and examed by me at bedside.  Patient has no acute complaint or significant event overnight. Patient in resting in chair in no acute distress.  Patient denies chest pain and has been moving around in the room with no symptoms  Patient also denies shortness of breath, abdominal pain, or N/V/D.  Patient is aware of cardiac catheterization scheduled for today.      Objective :  Temp:  [97.7 °F (36.5 °C)-98.3 °F (36.8 °C)] 97.7 °F (36.5 °C)  HR:  [51-57] 51  BP: (124-147)/(52-61) 147/61  Resp:  [18] 18  SpO2:  [87 %-100 %] 100 %  O2 Device: Nasal cannula  Nasal Cannula O2 Flow Rate (L/min):  [2 L/min] 2 L/min    Body mass index is 39.25 kg/m².     Input and Output Summary (last 24 hours):     Intake/Output Summary (Last 24 hours) at 12/23/2024 1328  Last data filed at 12/23/2024 0915  Gross per 24 hour   Intake 420  ml   Output 1400 ml   Net -980 ml       Physical Exam  Constitutional:       General: He is not in acute distress.     Appearance: He is obese.   HENT:      Head: Atraumatic.      Mouth/Throat:      Mouth: Mucous membranes are moist.   Eyes:      Extraocular Movements: Extraocular movements intact.   Cardiovascular:      Rate and Rhythm: Normal rate and regular rhythm.      Heart sounds: Murmur (loud, systolic) heard.      No friction rub. No gallop.   Pulmonary:      Effort: Pulmonary effort is normal. No respiratory distress.      Breath sounds: No wheezing, rhonchi or rales.   Abdominal:      General: Bowel sounds are normal.      Palpations: Abdomen is soft.      Tenderness: There is no abdominal tenderness. There is no guarding or rebound.   Musculoskeletal:         General: Normal range of motion.      Cervical back: Normal range of motion.      Right lower leg: Edema (+2) present.      Left lower leg: Edema (+ 2) present.   Skin:     General: Skin is warm.   Neurological:      Mental Status: He is alert and oriented to person, place, and time.   Psychiatric:         Mood and Affect: Mood normal.         Behavior: Behavior normal.         Lines/Drains:        Telemetry:  Telemetry Orders (From admission, onward)               24 Hour Telemetry Monitoring  Continuous x 24 Hours (Telem)        Expiring   Question:  Reason for 24 Hour Telemetry  Answer:  Decompensated CHF- and any one of the following: continuous diuretic infusion or total diuretic dose >200 mg daily, associated electrolyte derangement (I.e. K < 3.0), inotropic drip (continuous infusion), hx of ventricular arrhythmia, or new EF < 35%                     Telemetry Reviewed: Sinus Bradycardia  Indication for Continued Telemetry Use: Decompensated CHF- and any one of the following: continuous diuretic infusion or total diuretic dose >200 mg daily, associated electrolyte derangement (I.e. K < 3.0), inotropic drip (continuous infusion), hx of  ventricular arrhythmia, or new EF < 35%               Lab Results: I have reviewed the following results:   Results from last 7 days   Lab Units 12/23/24  0555   WBC Thousand/uL 5.93   HEMOGLOBIN g/dL 10.1*   HEMATOCRIT % 30.2*   PLATELETS Thousands/uL 180   SEGS PCT % 49   LYMPHO PCT % 35   MONO PCT % 11   EOS PCT % 4     Results from last 7 days   Lab Units 12/23/24  0555 12/22/24  0617 12/21/24  0453   SODIUM mmol/L 140   < > 141   POTASSIUM mmol/L 4.0   < > 3.6   CHLORIDE mmol/L 108   < > 108   CO2 mmol/L 25   < > 27   BUN mg/dL 33*   < > 29*   CREATININE mg/dL 1.62*   < > 1.56*   ANION GAP mmol/L 7   < > 6   CALCIUM mg/dL 8.5   < > 8.2*   ALBUMIN g/dL  --   --  3.2*   TOTAL BILIRUBIN mg/dL  --   --  0.27   ALK PHOS U/L  --   --  60   ALT U/L  --   --  13   AST U/L  --   --  14   GLUCOSE RANDOM mg/dL 118   < > 213*    < > = values in this interval not displayed.     Results from last 7 days   Lab Units 12/20/24  1646   INR  1.05     Results from last 7 days   Lab Units 12/23/24  1117 12/23/24  0801 12/22/24  2044 12/22/24  1525 12/22/24  1131 12/22/24  0742 12/21/24  2208 12/21/24  1628 12/21/24  1226 12/21/24  0744 12/20/24  2246 12/20/24  2116   POC GLUCOSE mg/dl 125 137 169* 205* 177* 89 173* 161* 140 124 235* 163*     Results from last 7 days   Lab Units 12/20/24  1819   HEMOGLOBIN A1C % 6.5*           Recent Cultures (last 7 days):         Imaging Results Review: I reviewed radiology reports from this admission including: chest xray.  XR chest 1 view portable   ED Interpretation by Mirian Remy MD (12/20 1617)   Peers unchanged compared to prior x-ray.      Final Result by Richard Estevez MD (12/20 1619)      No acute cardiopulmonary disease.            Workstation performed: [id number]            Other Study Results Review: No additional pertinent studies reviewed.    Last 24 Hours Medication List:     Current Facility-Administered Medications:     [Transfer Hold] acetaminophen (TYLENOL) tablet 650  mg, Q6H PRN    [Transfer Hold] allopurinol (ZYLOPRIM) tablet 100 mg, Daily    [Transfer Hold] amLODIPine (NORVASC) tablet 10 mg, Daily    [Transfer Hold] aspirin chewable tablet 81 mg, Daily    [Transfer Hold] atorvastatin (LIPITOR) tablet 40 mg, Daily With Dinner    [Transfer Hold] cloNIDine (CATAPRES) tablet 0.1 mg, Q12H    [Transfer Hold] clopidogrel (PLAVIX) tablet 75 mg, Daily    [Transfer Hold] famotidine (PEPCID) tablet 20 mg, HS    fentaNYL injection, Code/Trauma/Sedation Med    [Transfer Hold] fluticasone (FLONASE) 50 mcg/act nasal spray 2 spray, Daily    heparin (porcine) 25,000 units in 0.45% NaCl 250 mL infusion (premix), Titrated, Last Rate: 13.1 Units/kg/hr (12/22/24 1554)    [Transfer Hold] heparin (porcine) injection 2,000 Units, Q6H PRN    [Transfer Hold] heparin (porcine) injection 4,000 Units, Q6H PRN    heparin (porcine) injection, Code/Trauma/Sedation Med    [Transfer Hold] insulin glargine (LANTUS) subcutaneous injection 14 Units 0.14 mL, Daily Before Breakfast    [Held by provider] insulin glargine (LANTUS) subcutaneous injection 16 Units 0.16 mL, HS    [Transfer Hold] insulin lispro (HumALOG/ADMELOG) 100 units/mL subcutaneous injection 1-6 Units, 4x Daily (AC & HS) **AND** Fingerstick Glucose (POCT), 4 times day    [Transfer Hold] isosorbide mononitrate (IMDUR) 24 hr tablet 30 mg, Daily    lidocaine (PF) (XYLOCAINE-MPF) 1 % injection, Code/Trauma/Sedation Med    [Transfer Hold] loratadine (CLARITIN) tablet 10 mg, Daily    [Transfer Hold] metoprolol tartrate (LOPRESSOR) partial tablet 12.5 mg, Q12H LESLIE    midazolam (VERSED) injection, Code/Trauma/Sedation Med    [Transfer Hold] multivitamin stress formula tablet 1 tablet, Daily    [Transfer Hold] nitroglycerin (NITROSTAT) SL tablet 0.4 mg, Q5 Min PRN    nitroGLYcerin 200 mcg/mL IA bolus, Code/Trauma/Sedation Med    [Transfer Hold] pancrelipase (Lip-Prot-Amyl) (CREON) delayed release capsule 6,000 Units, TID With Meals    [Transfer Hold]  pyridoxine (VITAMIN B6) tablet 100 mg, Daily    [Transfer Hold] ranolazine (RANEXA) 12 hr tablet 500 mg, BID    sodium chloride 0.9 % bolus, Code/Trauma/Sedation Continuous Med    sodium chloride 0.9 % infusion, Continuous, Last Rate: 100 mL/hr (12/23/24 1132)    [Transfer Hold] tamsulosin (FLOMAX) capsule 0.8 mg, Daily    verapamil (ISOPTIN) injection, Code/Trauma/Sedation Med    Administrative Statements   Today, Patient Was Seen By: Alice Carranza MD  I have spent a total time of 30 minutes in caring for this patient on the day of the visit/encounter including Impressions, Counseling / Coordination of care, Documenting in the medical record, Reviewing / ordering tests, medicine, procedures  , Obtaining or reviewing history  , and Communicating with other healthcare professionals .    **Please Note: This note may have been constructed using a voice recognition system.**

## 2024-12-23 NOTE — ASSESSMENT & PLAN NOTE
Wt Readings from Last 3 Encounters:   12/23/24 110 kg (243 lb 2.7 oz)   12/17/24 113 kg (249 lb)   12/13/24 109 kg (240 lb 11.9 oz)     Echo 12/12/2024: EF 65%. Mod AS. Mild AR. Hypokinetic mild inferolateral and apical lateral.  Home chlorthalidone 25 mg daily and Lopressor 12.5 mg BID    Plan:  Chlorthalidone was stopped on 12/22 in anticipation for cardiac catheterization  Continue Lopressor  Monitor I's and O's  Daily weights  Cardiac diet. 1800 mL fluid restriction

## 2024-12-23 NOTE — ASSESSMENT & PLAN NOTE
Pt has history of Prinzmetal angina  Pt's amlodipine increased from 5 mg to 10 mg during his last admission for concern of Prinzmetal angina    Plan:  Continue Amlodipine 10 mg  Start Imdur 30 mg at this hospital admission per cardio rec

## 2024-12-24 VITALS
BODY MASS INDEX: 39.08 KG/M2 | TEMPERATURE: 98.7 F | RESPIRATION RATE: 19 BRPM | SYSTOLIC BLOOD PRESSURE: 119 MMHG | DIASTOLIC BLOOD PRESSURE: 56 MMHG | HEART RATE: 51 BPM | WEIGHT: 243.17 LBS | OXYGEN SATURATION: 100 % | HEIGHT: 66 IN

## 2024-12-24 LAB
ANION GAP SERPL CALCULATED.3IONS-SCNC: 5 MMOL/L (ref 4–13)
APTT PPP: 28 SECONDS (ref 23–34)
BASOPHILS # BLD AUTO: 0.04 THOUSANDS/ÂΜL (ref 0–0.1)
BASOPHILS NFR BLD AUTO: 1 % (ref 0–1)
BUN SERPL-MCNC: 29 MG/DL (ref 5–25)
CALCIUM SERPL-MCNC: 8.3 MG/DL (ref 8.4–10.2)
CHLORIDE SERPL-SCNC: 110 MMOL/L (ref 96–108)
CO2 SERPL-SCNC: 26 MMOL/L (ref 21–32)
CREAT SERPL-MCNC: 1.52 MG/DL (ref 0.6–1.3)
EOSINOPHIL # BLD AUTO: 0.19 THOUSAND/ÂΜL (ref 0–0.61)
EOSINOPHIL NFR BLD AUTO: 3 % (ref 0–6)
ERYTHROCYTE [DISTWIDTH] IN BLOOD BY AUTOMATED COUNT: 14.1 % (ref 11.6–15.1)
GFR SERPL CREATININE-BSD FRML MDRD: 42 ML/MIN/1.73SQ M
GLUCOSE SERPL-MCNC: 198 MG/DL (ref 65–140)
GLUCOSE SERPL-MCNC: 213 MG/DL (ref 65–140)
GLUCOSE SERPL-MCNC: 311 MG/DL (ref 65–140)
HCT VFR BLD AUTO: 31 % (ref 36.5–49.3)
HGB BLD-MCNC: 10 G/DL (ref 12–17)
IMM GRANULOCYTES # BLD AUTO: 0.02 THOUSAND/UL (ref 0–0.2)
IMM GRANULOCYTES NFR BLD AUTO: 0 % (ref 0–2)
LYMPHOCYTES # BLD AUTO: 1.46 THOUSANDS/ÂΜL (ref 0.6–4.47)
LYMPHOCYTES NFR BLD AUTO: 25 % (ref 14–44)
MCH RBC QN AUTO: 31.9 PG (ref 26.8–34.3)
MCHC RBC AUTO-ENTMCNC: 32.3 G/DL (ref 31.4–37.4)
MCV RBC AUTO: 99 FL (ref 82–98)
MONOCYTES # BLD AUTO: 0.69 THOUSAND/ÂΜL (ref 0.17–1.22)
MONOCYTES NFR BLD AUTO: 12 % (ref 4–12)
NEUTROPHILS # BLD AUTO: 3.34 THOUSANDS/ÂΜL (ref 1.85–7.62)
NEUTS SEG NFR BLD AUTO: 59 % (ref 43–75)
NRBC BLD AUTO-RTO: 0 /100 WBCS
PLATELET # BLD AUTO: 194 THOUSANDS/UL (ref 149–390)
PMV BLD AUTO: 10.8 FL (ref 8.9–12.7)
POTASSIUM SERPL-SCNC: 4.3 MMOL/L (ref 3.5–5.3)
RBC # BLD AUTO: 3.13 MILLION/UL (ref 3.88–5.62)
SODIUM SERPL-SCNC: 141 MMOL/L (ref 135–147)
WBC # BLD AUTO: 5.74 THOUSAND/UL (ref 4.31–10.16)

## 2024-12-24 PROCEDURE — 85730 THROMBOPLASTIN TIME PARTIAL: CPT

## 2024-12-24 PROCEDURE — 82948 REAGENT STRIP/BLOOD GLUCOSE: CPT

## 2024-12-24 PROCEDURE — 99232 SBSQ HOSP IP/OBS MODERATE 35: CPT | Performed by: INTERNAL MEDICINE

## 2024-12-24 PROCEDURE — 85025 COMPLETE CBC W/AUTO DIFF WBC: CPT

## 2024-12-24 PROCEDURE — 99239 HOSP IP/OBS DSCHRG MGMT >30: CPT | Performed by: HOSPITALIST

## 2024-12-24 PROCEDURE — 80048 BASIC METABOLIC PNL TOTAL CA: CPT

## 2024-12-24 RX ORDER — ISOSORBIDE MONONITRATE 30 MG/1
30 TABLET, EXTENDED RELEASE ORAL DAILY
Qty: 30 TABLET | Refills: 0 | Status: CANCELLED | OUTPATIENT
Start: 2024-12-25

## 2024-12-24 RX ORDER — NITROGLYCERIN 0.4 MG/1
0.4 TABLET SUBLINGUAL
Status: CANCELLED
Start: 2024-12-24

## 2024-12-24 RX ADMIN — INSULIN LISPRO 2 UNITS: 100 INJECTION, SOLUTION INTRAVENOUS; SUBCUTANEOUS at 09:05

## 2024-12-24 RX ADMIN — NITROGLYCERIN 1 INCH: 20 OINTMENT TOPICAL at 09:05

## 2024-12-24 RX ADMIN — TAMSULOSIN HYDROCHLORIDE 0.8 MG: 0.4 CAPSULE ORAL at 09:03

## 2024-12-24 RX ADMIN — PANCRELIPASE 6000 UNITS: 30000; 6000; 19000 CAPSULE, DELAYED RELEASE PELLETS ORAL at 09:03

## 2024-12-24 RX ADMIN — LORATADINE 10 MG: 10 TABLET ORAL at 09:04

## 2024-12-24 RX ADMIN — FLUTICASONE PROPIONATE 2 SPRAY: 50 SPRAY, METERED NASAL at 09:05

## 2024-12-24 RX ADMIN — PANCRELIPASE 6000 UNITS: 30000; 6000; 19000 CAPSULE, DELAYED RELEASE PELLETS ORAL at 12:34

## 2024-12-24 RX ADMIN — PYRIDOXINE HCL TAB 50 MG 100 MG: 50 TAB at 09:05

## 2024-12-24 RX ADMIN — ALLOPURINOL 100 MG: 100 TABLET ORAL at 09:04

## 2024-12-24 RX ADMIN — B-COMPLEX W/ C & FOLIC ACID TAB 1 TABLET: TAB at 09:05

## 2024-12-24 RX ADMIN — CLONIDINE HYDROCHLORIDE 0.1 MG: 0.1 TABLET ORAL at 06:41

## 2024-12-24 RX ADMIN — ASPIRIN 81 MG 81 MG: 81 TABLET ORAL at 09:03

## 2024-12-24 RX ADMIN — RANOLAZINE 500 MG: 500 TABLET, FILM COATED, EXTENDED RELEASE ORAL at 09:04

## 2024-12-24 RX ADMIN — AMLODIPINE BESYLATE 10 MG: 10 TABLET ORAL at 09:04

## 2024-12-24 RX ADMIN — METOPROLOL TARTRATE 12.5 MG: 25 TABLET, FILM COATED ORAL at 09:04

## 2024-12-24 RX ADMIN — CLOPIDOGREL BISULFATE 75 MG: 75 TABLET ORAL at 09:03

## 2024-12-24 RX ADMIN — ISOSORBIDE MONONITRATE 30 MG: 30 TABLET, EXTENDED RELEASE ORAL at 09:04

## 2024-12-24 RX ADMIN — INSULIN GLARGINE 14 UNITS: 100 INJECTION, SOLUTION SUBCUTANEOUS at 09:14

## 2024-12-24 RX ADMIN — INSULIN LISPRO 5 UNITS: 100 INJECTION, SOLUTION INTRAVENOUS; SUBCUTANEOUS at 12:34

## 2024-12-24 NOTE — ASSESSMENT & PLAN NOTE
Hx of CAD with stent to circumflex 1/2001  LAD stent for 80% stenosis on 12/12/2024  Home Plavix 75 mg daily, Crestor 20 mg daily, Ranexa 500 mg q12hrs, ASA 81 mg daily  Cardiac cath on 12/23 shows 95% ostial diagonal (snow plow from LAD stent)/ culprit, intervened. Patent pLAD stent placed.    Plan:  Continue Plavix, statin, Ranexa, ASA 81 mg

## 2024-12-24 NOTE — ASSESSMENT & PLAN NOTE
For unstable angina patient underwent coronary angiogram 12/12/2024 with severe proximal LAD stenosis status post LONNIE.  At the time LVEDP 15 with peripheral edema.  He was given 1 dose of IV Lasix 40 mg.  Remainder of coronary arteries left circumflex moderate diffuse disease and RCA large and dominant with moderate diffuse disease.  12/20/2024 NSTEMI  12/20/2024 LHC: 95% ostial D1, PCI/LONNIE to D1

## 2024-12-24 NOTE — ASSESSMENT & PLAN NOTE
Lab Results   Component Value Date    HGBA1C 6.5 (H) 12/20/2024       Recent Labs     12/23/24  1529 12/23/24  2119 12/24/24  0732 12/24/24  1205   POCGLU 133 283* 198* 311*       Blood Sugar Average: Last 72 hrs:  (P) [id number]  Pt states his home insulin regimen is Tresiba long-acting 14 U in morning and 16 U at night. Sliding scale Humalog    Plan:  Continue prior home regimen

## 2024-12-24 NOTE — DISCHARGE SUMMARY
Discharge Summary - Hospitalist   Name: Jake Claros 84 y.o. male I MRN: [id number]  Unit/Bed#: S -01 I Date of Admission: 12/20/2024   Date of Service: 12/24/2024 I Hospital Day: 4     Assessment & Plan  Chest pain  Patient presented with one episode of exertional substernal chest pain and chest tightness lasting 5 minutes and relieved by rest.   He was completely asymptomatic upon arrival to the ED.  He denied any nausea, sweating, vomiting, or lightheadedness.  Patient had a recent admission on 12/11/2024 s/p LAD stent was placed.  Last echocardiogram 12/2024: EF 65%.  Moderate aortic stenosis.  Hypokinetic mild inferior lateral and apical lateral.  Troponins 710> 684> 718>512    EKG first-degree AV block with RBBB   Possible ACS versus Prinzmetal angina.  Cardiac cath on 12/23 shows 95% ostial diagonal (snow plow from LAD stent)/ culprit, intervened. Patent pLAD stent placed.  Has no acute complaints after the procedure.  Patient is denying chest pain, shortness of breath, fever, chills.    Plan:  Continue on aspirin, Plavix, and Lipitor,  Continue ranexa, amlodipine  Referral for cardiac rehab provided  Please follow-up with your primary cardiology  Restart chlorthalidone  Discontinue Imdur per Dr. Lozano, cardiologist recommendation  Please follow-up with your primary nephrologist  Continue cardiac diet  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HCC)  Pt has history of Prinzmetal angina  Pt's amlodipine increased from 5 mg to 10 mg during his last admission for concern of Prinzmetal angina    Plan:  Continue Amlodipine 10 mg  Discontinue Imdur 30 mg per cardiologist Dr. Hicks recommendation  Coronary artery disease of native artery with stable angina pectoris (HCC)  Hx of CAD with stent to circumflex 1/2001  LAD stent for 80% stenosis on 12/12/2024  Home Plavix 75 mg daily, Crestor 20 mg daily, Ranexa 500 mg q12hrs, ASA 81 mg daily  Cardiac cath on 12/23 shows 95% ostial diagonal (snow plow from LAD stent)/  culprit, intervened. Patent pLAD stent placed.    Plan:  Continue Plavix, statin, Ranexa, ASA 81 mg   (HFpEF) heart failure with preserved ejection fraction (HCC)  Wt Readings from Last 3 Encounters:   12/23/24 110 kg (243 lb 2.7 oz)   12/17/24 113 kg (249 lb)   12/13/24 109 kg (240 lb 11.9 oz)     Echo 12/12/2024: EF 65%. Mod AS. Mild AR. Hypokinetic mild inferolateral and apical lateral.  Home chlorthalidone 25 mg daily and Lopressor 12.5 mg BID    Plan:  Restart chlorthalidone   Continue Lopressor  Cardiac diet  Type 2 diabetes mellitus, with long-term current use of insulin (Formerly [facility])  Lab Results   Component Value Date    HGBA1C 6.5 (H) 12/20/2024       Recent Labs     12/23/24  1529 12/23/24  2119 12/24/24  0732 12/24/24  1205   POCGLU 133 283* 198* 311*       Blood Sugar Average: Last 72 hrs:  (P) [id number]  Pt states his home insulin regimen is Tresiba long-acting 14 U in morning and 16 U at night. Sliding scale Humalog    Plan:  Continue prior home regimen  Hypertension  Blood pressure remained high overnight.  Stable this morning.  Home meds: Amlodipine 10 mg daily, Lopressor 12.5 mg BID, Clonidine 0.1 mg q12 hrs    Plan:  Continue home medications  Continue to monitor blood pressure  Aortic stenosis  Mild to moderate stenosis of aortic valve on last echo 12/12/2024  Continue to monitor  GERD (gastroesophageal reflux disease)  Continue prior home regimen  PAULA (obstructive sleep apnea)  On CPAP qhs at home  History of non-compliance  Stage 3a chronic kidney disease (CKD) (Formerly [facility])  Lab Results   Component Value Date    EGFR 42 12/24/2024    EGFR 38 12/23/2024    EGFR 35 12/22/2024    CREATININE 1.52 (H) 12/24/2024    CREATININE 1.62 (H) 12/23/2024    CREATININE 1.76 (H) 12/22/2024     Pt's baseline Cr (1.5-1.8)  Pt is currently at baseline    Plan:  Please follow-up with your primary nephrologist  HLD (hyperlipidemia)  Home Crestor 20 mg daily    Plan:  Atorvastatin 40 mg daily  Obesity, morbid (HCC)  BMI  40.35     Medical Problems       Resolved Problems  Date Reviewed: 12/17/2024   None       Discharging Physician / Practitioner: Alice Carranza MD  PCP: Ashwin Leyva MD  Admission Date:   Admission Orders (From admission, onward)       Ordered        12/20/24 1643  INPATIENT ADMISSION  Once                          Discharge Date: 12/24/24    Consultations During Hospital Stay:  Cardiology, nephrology    Procedures Performed:   Cardiac left heart cath on 12/23/2024    Significant Findings / Test Results:   Left heart cath on 12/23/24 - 95% ostial diagonal (snow plow from LAD stent)/ culprit, intervened on PCI/LONNIE of D1 with POT and Kissing balloon  Patent pLAD stent     Incidental Findings:   None      Test Results Pending at Discharge (will require follow up):   None     Outpatient Tests Requested:  None    Complications: None    Reason for Admission: Chest pain    Hospital Course:   Jake Claros is a 84 y.o. male patient who originally presented to the hospital on 12/20/2024 due to chest pain.  Patient have significant past medical history of CAD with stent to circumflex 1/2001, LAD stent 12/2024, Prinzmetal angina, HFpEF, DM2, HTN, aortic stenosis, CKD stage III, GERD who presents with chest pain.  Patient had a recent hospital admission on 12/11 for chest pain and was found to have an 80% stenosis of LAD on cardiac cath.  Patient received LAD stent on 12/12 and states that afterwards he generally felt well until the day of ED presentation.   In ED patient presented with hypertensive to systolic blood pressure 160, hemodynamically stable.  EKG showed first-degree AV block and right bundle branch block, unchanged from prior EKG.  Patient's Trope trended 710, 684, 718.  Chest x-ray within normal limit.  Patient received aspirin 2 3 4 mg in the ED and started on heparin drip.  Cardiology is consulted.  Cardiology recommended adding Imdur 30 Mg and plan for cardiac cath.  Nephrology is consulted as well.  "Clorthalidone, home medication is hold in anticipation of cardiac cath.  Received left heart catheterization on 12/23. 95% ostial diagonal (snow plow from LAD stent)/ culprit, intervened. Patent pLAD stent placed.  Patient is stable after procedure.  Patient is denying chest pain, shortness of breath, abdominal pain, fever, chill, changes with urination or bowel movement.  Patient's creatinine also trending down, back to baseline.  Patient is stable for discharge.  Patient will follow-up with his primary cardiology, his primary nephrologist, primary care physician.    Please see above list of diagnoses and related plan for additional information.     Condition at Discharge: stable    Discharge Day Visit / Exam:   Subjective: She was seen and examined by me at bedside.  Patient has no acute complaint no significant event overnight after left heart cath.  Denies chest pain, shortness of breath, abdominal pain, fever, chill, changes with urination or bowel movement.  Vitals: Blood Pressure: 119/56 (12/24/24 0728)  Pulse: (!) 51 (12/23/24 0845)  Temperature: 98.7 °F (37.1 °C) (12/24/24 0728)  Temp Source: Oral (12/23/24 2100)  Respirations: 19 (12/23/24 2100)  Height: 5' 6\" (167.6 cm) (12/20/24 1254)  Weight - Scale: 110 kg (243 lb 2.7 oz) (12/23/24 0622)  SpO2: 100 % (12/23/24 1320)  Physical Exam  Vitals reviewed.   Constitutional:       Appearance: Normal appearance.   Eyes:      General: No scleral icterus.        Right eye: No discharge.         Left eye: No discharge.   Cardiovascular:      Rate and Rhythm: Normal rate and regular rhythm.      Pulses: Normal pulses.      Heart sounds: Normal heart sounds. No murmur heard.     No friction rub.   Pulmonary:      Effort: Pulmonary effort is normal. No respiratory distress.      Breath sounds: Normal breath sounds. No stridor. No wheezing or rales.   Chest:      Chest wall: No tenderness.   Abdominal:      General: Bowel sounds are normal. There is no distension.      " Palpations: Abdomen is soft.      Tenderness: There is no abdominal tenderness. There is no guarding.   Musculoskeletal:         General: Normal range of motion.      Cervical back: Normal range of motion.      Right lower leg: No edema.      Left lower leg: No edema.   Skin:     General: Skin is warm.      Capillary Refill: Capillary refill takes less than 2 seconds.      Coloration: Skin is not jaundiced or pale.   Neurological:      General: No focal deficit present.      Mental Status: He is alert and oriented to person, place, and time. Mental status is at baseline.   Psychiatric:         Mood and Affect: Mood normal.         Behavior: Behavior normal.         Thought Content: Thought content normal.         Judgment: Judgment normal.          Discussion with Family: Attempted to update  (wife) via phone. Unable to contact.  Unable to left voicemail.    Discharge instructions/Information to patient and family:   See after visit summary for information provided to patient and family.      Provisions for Follow-Up Care:  See after visit summary for information related to follow-up care and any pertinent home health orders.      Mobility at time of Discharge:   Basic Mobility Inpatient Raw Score: 24  JH-HLM Goal: 8: Walk 250 feet or more  JH-HLM Achieved: 7: Walk 25 feet or more  HLM Goal achieved. Continue to encourage appropriate mobility.     Disposition:   Home    Planned Readmission: None    Discharge Medications:  See after visit summary for reconciled discharge medications provided to patient and/or family.      Administrative Statements   Discharge Statement:  I have spent a total time of 45 minutes in caring for this patient on the day of the visit/encounter. .    **Please Note: This note may have been constructed using a voice recognition system**

## 2024-12-24 NOTE — PLAN OF CARE
Problem: PAIN - ADULT  Goal: Verbalizes/displays adequate comfort level or baseline comfort level  Description: Interventions:  - Encourage patient to monitor pain and request assistance  - Assess pain using appropriate pain scale  - Administer analgesics based on type and severity of pain and evaluate response  - Implement non-pharmacological measures as appropriate and evaluate response  - Consider cultural and social influences on pain and pain management  - Notify physician/advanced practitioner if interventions unsuccessful or patient reports new pain  Outcome: Progressing     Problem: INFECTION - ADULT  Goal: Absence or prevention of progression during hospitalization  Description: INTERVENTIONS:  - Assess and monitor for signs and symptoms of infection  - Monitor lab/diagnostic results  - Monitor all insertion sites, i.e. indwelling lines, tubes, and drains  - Monitor endotracheal if appropriate and nasal secretions for changes in amount and color  - [place] appropriate cooling/warming therapies per order  - Administer medications as ordered  - Instruct and encourage patient and family to use good hand hygiene technique  - Identify and instruct in appropriate isolation precautions for identified infection/condition  Outcome: Progressing  Goal: Absence of fever/infection during neutropenic period  Description: INTERVENTIONS:  - Monitor WBC    Outcome: Progressing     Problem: SAFETY ADULT  Goal: Patient will remain free of falls  Description: INTERVENTIONS:  - Educate patient/family on patient safety including physical limitations  - Instruct patient to call for assistance with activity   - Consult OT/PT to assist with strengthening/mobility   - Keep Call bell within reach  - Keep bed low and locked with side rails adjusted as appropriate  - Keep care items and personal belongings within reach  - Initiate and maintain comfort rounds  - Make Fall Risk Sign visible to staff  - Offer Toileting every 2 Hours,  in advance of need  - Consider moving patient to room near nurses station  Outcome: Progressing  Goal: Maintain or return to baseline ADL function  Description: INTERVENTIONS:  -  Assess patient's ability to carry out ADLs; assess patient's baseline for ADL function and identify physical deficits which impact ability to perform ADLs (bathing, care of mouth/teeth, toileting, grooming, dressing, etc.)  - Assess/evaluate cause of self-care deficits   - Assess range of motion  - Assess patient's mobility; develop plan if impaired  - Assess patient's need for assistive devices and provide as appropriate  - Encourage maximum independence but intervene and supervise when necessary  - Involve family in performance of ADLs  - Assess for home care needs following discharge   - Consider OT consult to assist with ADL evaluation and planning for discharge  - Provide patient education as appropriate  Outcome: Progressing  Goal: Maintains/Returns to pre admission functional level  Description: INTERVENTIONS:  - Perform AM-PAC 6 Click Basic Mobility/ Daily Activity assessment daily.  - Set and communicate daily mobility goal to care team and patient/family/caregiver.   - Collaborate with rehabilitation services on mobility goals if consulted  - Perform Range of Motion 3 times a day.  - Reposition patient every 2 hours.  - Dangle patient 3 times a day  - Stand patient 3 times a day  - Ambulate patient 3 times a day  - Out of bed to chair 3 times a day   - Out of bed for meals 3 times a day  - Out of bed for toileting  - Record patient progress and toleration of activity level   Outcome: Progressing     Problem: DISCHARGE PLANNING  Goal: Discharge to home or other facility with appropriate resources  Description: INTERVENTIONS:  - Identify barriers to discharge w/patient and caregiver  - Arrange for needed discharge resources and transportation as appropriate  - Identify discharge learning needs (meds, wound care, etc.)  - Arrange  for interpretive services to assist at discharge as needed  - Refer to Case Management Department for coordinating discharge planning if the patient needs post-hospital services based on physician/advanced practitioner order or complex needs related to functional status, cognitive ability, or social support system  Outcome: Progressing     Problem: Knowledge Deficit  Goal: Patient/family/caregiver demonstrates understanding of disease process, treatment plan, medications, and discharge instructions  Description: Complete learning assessment and assess knowledge base.  Interventions:  - Provide teaching at level of understanding  - Provide teaching via preferred learning methods  Outcome: Progressing

## 2024-12-24 NOTE — ASSESSMENT & PLAN NOTE
Current Rx: Amlodipine 10 mg daily. clonidine 0.1 mg twice a day, metoprolol tartrate 12.5 mg twice a day.  Chlorthalidone was stopped on 12/22 but okay to restart at the time of discharge as renal function is stable and there is no evidence of contrast nephropathy.  He does have trace lower extremity edema which would improve with initiation of chlorthalidone.  Blood pressure is currently stable

## 2024-12-24 NOTE — ASSESSMENT & PLAN NOTE
Wt Readings from Last 3 Encounters:   12/23/24 110 kg (243 lb 2.7 oz)   12/17/24 113 kg (249 lb)   12/13/24 109 kg (240 lb 11.9 oz)     Echo 12/12/2024: EF 65%. Mod AS. Mild AR. Hypokinetic mild inferolateral and apical lateral.  Home chlorthalidone 25 mg daily and Lopressor 12.5 mg BID    Plan:  Restart chlorthalidone   Continue Lopressor  Cardiac diet

## 2024-12-24 NOTE — ASSESSMENT & PLAN NOTE
Patient presented with one episode of exertional substernal chest pain and chest tightness lasting 5 minutes and relieved by rest.   He was completely asymptomatic upon arrival to the ED.  He denied any nausea, sweating, vomiting, or lightheadedness.  Patient had a recent admission on 12/11/2024 s/p LAD stent was placed.  Last echocardiogram 12/2024: EF 65%.  Moderate aortic stenosis.  Hypokinetic mild inferior lateral and apical lateral.  Troponins 710> 684> 718>512    EKG first-degree AV block with RBBB   Possible ACS versus Prinzmetal angina.  Cardiac cath on 12/23 shows 95% ostial diagonal (snow plow from LAD stent)/ culprit, intervened. Patent pLAD stent placed.  Has no acute complaints after the procedure.  Patient is denying chest pain, shortness of breath, fever, chills.    Plan:  Continue on aspirin, Plavix, and Lipitor,  Continue ranexa, amlodipine  Referral for cardiac rehab provided  Please follow-up with your primary cardiology  Restart chlorthalidone  Discontinue Imdur per Dr. Lozano, cardiologist recommendation  Please follow-up with your primary nephrologist  Continue cardiac diet

## 2024-12-24 NOTE — ASSESSMENT & PLAN NOTE
S/p cardiac cath on 12/12/2024 with LONNIE to severe proximal LAD stenosis.  Now with CP.   Status post cardiac catheterization on 12/23, as per cardiology note was found to have severe proximal LAD stenosis status post LONNIE.

## 2024-12-24 NOTE — ASSESSMENT & PLAN NOTE
Pt has history of Prinzmetal angina  Pt's amlodipine increased from 5 mg to 10 mg during his last admission for concern of Prinzmetal angina    Plan:  Continue Amlodipine 10 mg  Discontinue Imdur 30 mg per cardiologist Dr. Fabiola paul

## 2024-12-24 NOTE — ASSESSMENT & PLAN NOTE
Lab Results   Component Value Date    EGFR 42 12/24/2024    EGFR 38 12/23/2024    EGFR 35 12/22/2024    CREATININE 1.52 (H) 12/24/2024    CREATININE 1.62 (H) 12/23/2024    CREATININE 1.76 (H) 12/22/2024     Pt's baseline Cr (1.5-1.8)  Pt is currently at baseline    Plan:  Please follow-up with your primary nephrologist

## 2024-12-24 NOTE — PROGRESS NOTES
Progress Note - Nephrology   Name: Jake Claros 84 y.o. male I MRN: [id number]  Unit/Bed#: S -01 I Date of Admission: 12/20/2024   Date of Service: 12/24/2024 I Hospital Day: 4    Assessment & Plan  Stage 3a chronic kidney disease (CKD) ([facility])  Baseline creatinine is around 1.4-1.8.  Follows with Dr. Quiñonez of [facility].  CKD due to diabetic CKD as well as hypertensive nephrosclerosis and age-related nephron loss  Admission creatinine on 12/20 was 1.58 mg/dL  Status post cardiac catheterization on 12/23 and received pre and postcontrast IV fluids  Renal function remained stable at creatinine 1.52 mg/dL, no evidence of contrast nephropathy, electrolytes are stable.  Okay for discharge from nephrology side.  Okay to restart on chlorthalidone at the time of discharge.  Discussed with patient to call kidney care specialist office for outpatient labs to monitor renal function  Overall patient is stable for discharge from nephrology side and will follow-up with his outpatient nephrology at [facility]  Current Rx: Amlodipine 10 mg daily. clonidine 0.1 mg twice a day, metoprolol tartrate 12.5 mg twice a day.  Chlorthalidone was stopped on 12/22 but okay to restart at the time of discharge as renal function is stable and there is no evidence of contrast nephropathy.  He does have trace lower extremity edema which would improve with initiation of chlorthalidone.  Blood pressure is currently stable    (HFpEF) heart failure with preserved ejection fraction (HCC)  Not on loop diuretics in the outpatient setting.  He is on chlorthalidone 25 mg daily which was held on 12/22 but considering renal function stable okay to restart at the time of discharge to help with lower extremity trace edema.  Continue daily weight monitoring at home and low-sodium diet    Coronary artery disease of native artery with stable angina pectoris (HCC)  S/p cardiac cath on 12/12/2024 with LONNIE to severe  proximal LAD stenosis.  Now with CP.   Status post cardiac catheterization on 12/23, as per cardiology note was found to have severe proximal LAD stenosis status post LONNIE.    Aortic stenosis  12/12/2024 echocardiogram: EF 65%, mild to moderate AS, JEANCARLOS 1.44.  Continue to follow-up cardiology recommendations    Type 2 diabetes mellitus, with long-term current use of insulin (HCC)  Defer to primary service.    Anemia  Hemoglobin stable at 10. 0 g/dL, continue to monitor    I have reviewed the nephrology recommendations including renal function stable and okay for discharge from nephrology side and okay to resume chlorthalidone, with primary team, and we are in agreement with renal plan including the information outlined above.     Subjective   No chest pain or SOB.     Objective :  Temp:  [97.3 °F (36.3 °C)-99.1 °F (37.3 °C)] 98.7 °F (37.1 °C)  BP: (119-161)/(54-72) 119/56  Resp:  [19] 19  O2 Device: None (Room air)    Current Weight: Weight - Scale: 110 kg (243 lb 2.7 oz)  First Weight: Weight - Scale: 113 kg (250 lb)  I/O         12/22 0701  12/23 0700 12/23 0701  12/24 0700 12/24 0701  12/25 0700    P.O. 840 720 480    Total Intake(mL/kg) 840 (7.6) 720 (6.5) 480 (4.4)    Urine (mL/kg/hr) 700 (0.3) 2600 (1)     Stool  0     Total Output 700 2600     Net +140 -1880 +480           Unmeasured Urine Occurrence 1 x  1 x    Unmeasured Stool Occurrence 0 x 0 x           Physical Exam  General:  Ill looking, awake.  Eyes: Conjunctivae pink,  Sclera anicteric  ENT: lips and mucous membranes moist  Neck: supple   Chest: Clear to Auscultation both lungs,  no crackles, ronchus or wheezing.  CVS: S1 & S2 present, normal rate, regular rhythm, ejection systolic murmur  Abdomen: soft, non-tender, non-distended, Bowel sounds normoactive  Extremities: trace edema of  legs  Skin: no rash  Neuro: awake, alert, oriented x 3   Psych: Mood and affect appropriate    Medications:    Current Facility-Administered Medications:      acetaminophen (TYLENOL) tablet 650 mg, 650 mg, Oral, Q6H PRN, Alice Carranza MD    allopurinol (ZYLOPRIM) tablet 100 mg, 100 mg, Oral, Daily, Alice Carranza MD, 100 mg at 12/24/24 0904    amLODIPine (NORVASC) tablet 10 mg, 10 mg, Oral, Daily, Alice Carranza MD, 10 mg at 12/24/24 0904    aspirin chewable tablet 81 mg, 81 mg, Oral, Daily, Alice Carranza MD, 81 mg at 12/24/24 0903    atorvastatin (LIPITOR) tablet 40 mg, 40 mg, Oral, Daily With Dinner, Alice Carranza MD, 40 mg at 12/23/24 1551    cloNIDine (CATAPRES) tablet 0.1 mg, 0.1 mg, Oral, Q12H, Alice Carranza MD, 0.1 mg at 12/24/24 0641    clopidogrel (PLAVIX) tablet 75 mg, 75 mg, Oral, Daily, Alice Carranza MD, 75 mg at 12/24/24 0903    famotidine (PEPCID) tablet 20 mg, 20 mg, Oral, HS, Alice Carranza MD, 20 mg at 12/23/24 2125    fluticasone (FLONASE) 50 mcg/act nasal spray 2 spray, 2 spray, Each Nare, Daily, Alice Carranza MD, 2 spray at 12/24/24 0905    heparin (porcine) 25,000 units in 0.45% NaCl 250 mL infusion (premix), 3-20 Units/kg/hr (Order-Specific), Intravenous, Titrated, Alice Carranza MD, Stopped at 12/23/24 1404    heparin (porcine) injection 2,000 Units, 2,000 Units, Intravenous, Q6H PRN, Alice Carranza MD, 2,000 Units at 12/22/24 0835    heparin (porcine) injection 4,000 Units, 4,000 Units, Intravenous, Q6H PRN, Alice Carranza MD    insulin glargine (LANTUS) subcutaneous injection 14 Units 0.14 mL, 14 Units, Subcutaneous, Daily Before Breakfast, Alice Carranza MD, 14 Units at 12/24/24 0914    insulin glargine (LANTUS) subcutaneous injection 16 Units 0.16 mL, 16 Units, Subcutaneous, HS, Alice Carranza MD, 16 Units at 12/23/24 2125    insulin lispro (HumALOG/ADMELOG) 100 units/mL subcutaneous injection 1-6 Units, 1-6 Units, Subcutaneous, 4x Daily (AC & HS), 5 Units at 12/24/24 1234 **AND** Fingerstick Glucose (POCT), , , 4 times day, Alice Carranza MD    isosorbide mononitrate (IMDUR) 24 hr tablet 30 mg, 30 mg, Oral, Daily, Alice Carranza MD, 30 mg  "at 12/24/24 0904    loratadine (CLARITIN) tablet 10 mg, 10 mg, Oral, Daily, Alice Carranza MD, 10 mg at 12/24/24 0904    metoprolol tartrate (LOPRESSOR) partial tablet 12.5 mg, 12.5 mg, Oral, Q12H LESLIE, Alice Carranza MD, 12.5 mg at 12/24/24 0904    multivitamin stress formula tablet 1 tablet, 1 tablet, Oral, Daily, Alice Carranza MD, 1 tablet at 12/24/24 0905    nitroglycerin (NITRO-BID) 2 % TD ointment 1 inch, 1 inch, Topical, Q6H, Alice Carranza MD, 1 inch at 12/24/24 0905    nitroglycerin (NITROSTAT) SL tablet 0.4 mg, 0.4 mg, Sublingual, Q5 Min PRN, Alice Carranza MD    pancrelipase (Lip-Prot-Amyl) (CREON) delayed release capsule 6,000 Units, 6,000 Units, Oral, TID With Meals, Alice Carranza MD, 6,000 Units at 12/24/24 1234    pyridoxine (VITAMIN B6) tablet 100 mg, 100 mg, Oral, Daily, Alice Carranza MD, 100 mg at 12/24/24 0905    ranolazine (RANEXA) 12 hr tablet 500 mg, 500 mg, Oral, BID, Alice Carranza MD, 500 mg at 12/24/24 0904    tamsulosin (FLOMAX) capsule 0.8 mg, 0.8 mg, Oral, Daily, Alice Carranza MD, 0.8 mg at 12/24/24 0903      Lab Results: I have reviewed the following results:  Results from last 7 days   Lab Units 12/24/24  0433 12/23/24  0555 12/22/24  0617 12/21/24  0453 12/20/24  1646 12/20/24  1536   WBC Thousand/uL 5.74 5.93 6.75 6.04 5.23 5.51   HEMOGLOBIN g/dL 10.0* 10.1* 11.7* 10.1* 11.3* 11.0*   HEMATOCRIT % 31.0* 30.2* 35.6* 30.3* 33.9* 32.9*   PLATELETS Thousands/uL 194 180 212 178 199 191   POTASSIUM mmol/L 4.3 4.0 3.9 3.6  --  3.9   CHLORIDE mmol/L 110* 108 108 108  --  107   CO2 mmol/L 26 25 26 27  --  26   BUN mg/dL 29* 33* 34* 29*  --  31*   CREATININE mg/dL 1.52* 1.62* 1.76* 1.56*  --  1.58*   CALCIUM mg/dL 8.3* 8.5 8.7 8.2*  --  8.5   MAGNESIUM mg/dL  --   --   --  2.0  --   --    ALBUMIN g/dL  --   --   --  3.2*  --  3.8       Administrative Statements     Portions of the record may have been created with voice recognition software. Occasional wrong word or \"sound a like\" " substitutions may have occurred due to the inherent limitations of voice recognition software. Read the chart carefully and recognize, using context, where substitutions have occurred.If you have any questions, please contact the dictating provider.

## 2024-12-24 NOTE — ASSESSMENT & PLAN NOTE
Not on loop diuretics in the outpatient setting.  He is on chlorthalidone 25 mg daily which was held on 12/22 but considering renal function stable okay to restart at the time of discharge to help with lower extremity trace edema.  Continue daily weight monitoring at home and low-sodium diet

## 2024-12-24 NOTE — ASSESSMENT & PLAN NOTE
FLP 7/2023 cholesterol 124/triglyceride 112/HDL 54/calculated LDL 47  On atorvastatin 40 mg daily      Recommendations  Continue dual antiplatelet therapy with aspirin and plavix for recent PCI  Continue statin with Lipitor 40 mg daily  Continue Imdur 30 mg daily  Continue Lopressor 12.5 twice daily  Continue Ranexa 500 twice daily  Continue amlodipine 10 daily  Referral to cardiac rehab, though patient tells me he is not interested  Follow-up with primary cardiologist JOY

## 2024-12-24 NOTE — PROGRESS NOTES
Progress Note - Cardiology   Name: Jake Claros 84 y.o. male I MRN: [id number]  Unit/Bed#: S -01 I Date of Admission: 12/20/2024   Date of Service: 12/24/2024 I Hospital Day: 4     Assessment & Plan  Coronary artery disease of native artery with stable angina pectoris (HCC)    For unstable angina patient underwent coronary angiogram 12/12/2024 with severe proximal LAD stenosis status post LONNIE.  At the time LVEDP 15 with peripheral edema.  He was given 1 dose of IV Lasix 40 mg.  Remainder of coronary arteries left circumflex moderate diffuse disease and RCA large and dominant with moderate diffuse disease.  12/20/2024 NSTEMI  12/20/2024 LHC: 95% ostial D1, PCI/LONNIE to D1  Hypertension  Reasonably controlled, continue current regimen  Aortic stenosis  Recent echo 12/12/2020 4 aortic valve trileaflet.  Mild to moderate aortic stenosis.  Stage 3a chronic kidney disease (CKD) (HCC)  Stable post cath- 1.52 which is baseline  HLD (hyperlipidemia)  FLP 7/2023 cholesterol 124/triglyceride 112/HDL 54/calculated LDL 47  On atorvastatin 40 mg daily      Recommendations  Continue dual antiplatelet therapy with aspirin and plavix for recent PCI  Continue statin with Lipitor 40 mg daily  Continue Imdur 30 mg daily  Continue Lopressor 12.5 twice daily  Continue Ranexa 500 twice daily  Continue amlodipine 10 daily  Referral to cardiac rehab, though patient tells me he is not interested  Follow-up with primary cardiologist JOY      Subjective   Chief Complaint:   Feeling fine  Eager to go home    Objective :  Temp:  [97.3 °F (36.3 °C)-99.1 °F (37.3 °C)] 98.7 °F (37.1 °C)  BP: (119-161)/(54-72) 119/56  Resp:  [19] 19  SpO2:  [100 %] 100 %  O2 Device: None (Room air)  Nasal Cannula O2 Flow Rate (L/min):  [2 L/min] 2 L/min  Orthostatic Blood Pressures      Flowsheet Row Most Recent Value   Blood Pressure 119/56 filed at 12/24/2024 0728   Patient Position - Orthostatic VS Lying filed at 12/23/2024 2100          First Weight:  Weight - Scale: 113 kg (250 lb) (12/20/24 1254)  Vitals:    12/22/24 0627 12/23/24 0622   Weight: 112 kg (246 lb 4.1 oz) 110 kg (243 lb 2.7 oz)     Physical Exam  Constitutional:       Appearance: He is well-developed.   HENT:      Head: Normocephalic and atraumatic.   Eyes:      Pupils: Pupils are equal, round, and reactive to light.   Cardiovascular:      Rate and Rhythm: Normal rate and regular rhythm.      Heart sounds: No murmur heard.     No friction rub. No gallop.   Pulmonary:      Effort: Pulmonary effort is normal. No respiratory distress.      Breath sounds: Normal breath sounds. No wheezing or rales.   Abdominal:      General: Bowel sounds are normal. There is no distension.      Palpations: Abdomen is soft.      Tenderness: There is no abdominal tenderness.   Musculoskeletal:         General: No deformity. Normal range of motion.      Cervical back: Normal range of motion and neck supple.   Skin:     General: Skin is warm and dry.   Neurological:      Mental Status: He is alert and oriented to person, place, and time.   Psychiatric:         Behavior: Behavior normal.           Lab Results: I have reviewed the following results:  Results from last 7 days   Lab Units 12/24/24  0433 12/23/24  0555 12/22/24  0617   WBC Thousand/uL 5.74 5.93 6.75   HEMOGLOBIN g/dL 10.0* 10.1* 11.7*   HEMATOCRIT % 31.0* 30.2* 35.6*   PLATELETS Thousands/uL 194 180 212     Results from last 7 days   Lab Units 12/24/24  0433 12/23/24  0555 12/22/24  0617   POTASSIUM mmol/L 4.3 4.0 3.9   CHLORIDE mmol/L 110* 108 108   CO2 mmol/L 26 25 26   BUN mg/dL 29* 33* 34*   CREATININE mg/dL 1.52* 1.62* 1.76*   CALCIUM mg/dL 8.3* 8.5 8.7     Results from last 7 days   Lab Units 12/24/24  0433 12/23/24  0555 12/22/24  2227 12/20/24  2302 12/20/24  1646 12/20/24  1536   INR   --   --   --   --  1.05 1.02   PTT seconds 28 81* 69*   < > 30 29    < > = values in this interval not displayed.     Lab Results   Component Value Date    HGBA1C 6.5  (H) 12/20/2024     Lab Results   Component Value Date    CKTOTAL 74 08/20/2023             VTE Pharmacologic Prophylaxis:   VTE Mechanical Prophylaxis:

## 2024-12-24 NOTE — ASSESSMENT & PLAN NOTE
Baseline creatinine is around 1.4-1.8.  Follows with Dr. Quiñonez [facility].  CKD due to diabetic CKD as well as hypertensive nephrosclerosis and age-related nephron loss  Admission creatinine on 12/20 was 1.58 mg/dL  Status post cardiac catheterization on 12/23 and received pre and postcontrast IV fluids  Renal function remained stable at creatinine 1.52 mg/dL, no evidence of contrast nephropathy, electrolytes are stable.  Okay for discharge from nephrology side.  Okay to restart on chlorthalidone at the time of discharge.  Discussed with patient to call kidney care specialist office for outpatient labs to monitor renal function  Overall patient is stable for discharge from nephrology side and will follow-up with his outpatient nephrology at [facility]

## 2024-12-24 NOTE — CASE MANAGEMENT
Case Management Discharge Planning Note    Patient name Jake Claros  Location S /S -01 MRN [id number]  : 1940 Date 2024       Current Admission Date: 2024  Current Admission Diagnosis:Chest pain   Patient Active Problem List    Diagnosis Date Noted Date Diagnosed    Anemia 2024     Type 2 diabetes mellitus, with long-term current use of insulin (HCC) 2024     GERD (gastroesophageal reflux disease) 2024     Hypertension 2024     HLD (hyperlipidemia) 2024     Continuous opioid dependence ([facility]) 10/30/2024     HTN (hypertension), benign 2024     Breast pain, right 04/15/2024     S/P TKR (total knee replacement), right 2024     Pain and swelling of right lower leg 2024     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[facility]) 2024     Stage 3b chronic kidney disease (CKD) ([facility]) 2023     Chest pain 2023     Acute recurrent pancreatitis 2023     Aortic stenosis 2023     Stage 3a chronic kidney disease (CKD) ([facility]) 2023     Venous stasis dermatitis of both lower extremities 2022     Type 2 diabetes mellitus with chronic kidney disease, with long-term current use of insulin ([facility]) 2022     History of recent hospitalization 2022     GERD without esophagitis 2022     Mixed hyperlipidemia 2022     Coronary artery disease of native artery with stable angina pectoris (HCC) 2022     Obesity, morbid (HCC)      PAULA (obstructive sleep apnea) 2020     (HFpEF) heart failure with preserved ejection fraction (HCC) 2020     RBBB 2020       LOS (days): 4  Geometric Mean LOS (GMLOS) (days): 2  Days to GMLOS:-1.8     OBJECTIVE:  Risk of Unplanned Readmission Score: 37.28         Current admission status: Inpatient   Preferred Pharmacy:   [facility] (acquired by [employer] in 2014)[facility] [employer] [facility] - [place] - [address]  [address]  [place] [address]  Phone: [phone] Fax:  [phone]    [facility]/pharmacy #[id number] - [place], [place] - [address]  [address] [address]  Phone: [phone] Fax: [phone]    Primary Care Provider: Ashwin Leyva MD    Primary Insurance: MEDICARE  Secondary Insurance: BLUE CROSS    DISCHARGE DETAILS:         IMM Given (Date):: 12/24/24  IMM Given to:: Patient     Additional Comments: IMM reviewed w/ Pt. Signed copy placed in Pts chart.

## 2024-12-24 NOTE — PLAN OF CARE
Problem: PAIN - ADULT  Goal: Verbalizes/displays adequate comfort level or baseline comfort level  Description: Interventions:  - Encourage patient to monitor pain and request assistance  - Assess pain using appropriate pain scale  - Administer analgesics based on type and severity of pain and evaluate response  - Implement non-pharmacological measures as appropriate and evaluate response  - Consider cultural and social influences on pain and pain management  - Notify physician/advanced practitioner if interventions unsuccessful or patient reports new pain  Outcome: Progressing     Problem: INFECTION - ADULT  Goal: Absence or prevention of progression during hospitalization  Description: INTERVENTIONS:  - Assess and monitor for signs and symptoms of infection  - Monitor lab/diagnostic results  - Monitor all insertion sites, i.e. indwelling lines, tubes, and drains  - Monitor endotracheal if appropriate and nasal secretions for changes in amount and color  - [place] appropriate cooling/warming therapies per order  - Administer medications as ordered  - Instruct and encourage patient and family to use good hand hygiene technique  - Identify and instruct in appropriate isolation precautions for identified infection/condition  Outcome: Progressing  Goal: Absence of fever/infection during neutropenic period  Description: INTERVENTIONS:  - Monitor WBC    Outcome: Progressing     Problem: SAFETY ADULT  Goal: Patient will remain free of falls  Description: INTERVENTIONS:  - Educate patient/family on patient safety including physical limitations  - Instruct patient to call for assistance with activity   - Consult OT/PT to assist with strengthening/mobility   - Keep Call bell within reach  - Keep bed low and locked with side rails adjusted as appropriate  - Keep care items and personal belongings within reach  - Initiate and maintain comfort rounds  - Make Fall Risk Sign visible to staff  - Offer Toileting every  Hours,  in advance of need  - Initiate/Maintain alarm  - Obtain necessary fall risk management equipment:   - Apply yellow socks and bracelet for high fall risk patients  - Consider moving patient to room near nurses station  Outcome: Progressing  Goal: Maintain or return to baseline ADL function  Description: INTERVENTIONS:  -  Assess patient's ability to carry out ADLs; assess patient's baseline for ADL function and identify physical deficits which impact ability to perform ADLs (bathing, care of mouth/teeth, toileting, grooming, dressing, etc.)  - Assess/evaluate cause of self-care deficits   - Assess range of motion  - Assess patient's mobility; develop plan if impaired  - Assess patient's need for assistive devices and provide as appropriate  - Encourage maximum independence but intervene and supervise when necessary  - Involve family in performance of ADLs  - Assess for home care needs following discharge   - Consider OT consult to assist with ADL evaluation and planning for discharge  - Provide patient education as appropriate  Outcome: Progressing  Goal: Maintains/Returns to pre admission functional level  Description: INTERVENTIONS:  - Perform AM-PAC 6 Click Basic Mobility/ Daily Activity assessment daily.  - Set and communicate daily mobility goal to care team and patient/family/caregiver.   - Collaborate with rehabilitation services on mobility goals if consulted  - Perform Range of Motion  times a day.  - Reposition patient every  hours.  - Dangle patient  times a day  - Stand patient  times a day  - Ambulate patient  times a day  - Out of bed to chair  times a day   - Out of bed for meals  times a day  - Out of bed for toileting  - Record patient progress and toleration of activity level   Outcome: Progressing     Problem: DISCHARGE PLANNING  Goal: Discharge to home or other facility with appropriate resources  Description: INTERVENTIONS:  - Identify barriers to discharge w/patient and caregiver  - Arrange for  needed discharge resources and transportation as appropriate  - Identify discharge learning needs (meds, wound care, etc.)  - Arrange for interpretive services to assist at discharge as needed  - Refer to Case Management Department for coordinating discharge planning if the patient needs post-hospital services based on physician/advanced practitioner order or complex needs related to functional status, cognitive ability, or social support system  Outcome: Progressing     Problem: Knowledge Deficit  Goal: Patient/family/caregiver demonstrates understanding of disease process, treatment plan, medications, and discharge instructions  Description: Complete learning assessment and assess knowledge base.  Interventions:  - Provide teaching at level of understanding  - Provide teaching via preferred learning methods  Outcome: Progressing

## 2024-12-24 NOTE — DISCHARGE INSTR - AVS FIRST PAGE
Dear Jake Claros,     It was our pleasure to care for you here at [facility].  It is our hope that we were always able to exceed the expected standards for your care during your stay.  You were hospitalized due to chest pain.  You were cared for on the 3rd floor by Alice Carranza MD under the service of Chaz Frey MD with the [facility] who covers for your primary care physician (PCP), Ashwin Leyva MD, while you were hospitalized.  If you have any questions or concerns related to this hospitalization, you may contact us at .  For follow up as well as any medication refills, we recommend that you follow up with your primary care physician.  A registered nurse will reach out to you by phone within a few days after your discharge to answer any additional questions that you may have after going home.  However, at this time we provide for you here, the most important instructions / recommendations at discharge:     Notable Medication Adjustments -   Please continue taking prior home medications as prescribed  Testing Required after Discharge -   None  ** Please contact your PCP to request testing orders for any of the testing recommended here **  Important follow up information -   Please schedule an appointment and follow-up with your cardiologist  Please schedule an appointment and follow-up with your primary care physician  Ambulatory referral to cardiac rehabilitation provided  Please schedule an appointment and follow-up with your nephrologist  Other Instructions -   If you have worsening chest pain, shortness of breath, fever, chill, abdominal pain, lightheadedness or syncopal episode please return to the ED  Please review this entire after visit summary as additional general instructions including medication list, appointments, activity, diet, any pertinent wound care, and other additional recommendations from your care team that  may be provided for you.      Sincerely,     Alice Carranza MD

## 2024-12-26 ENCOUNTER — TRANSITIONAL CARE MANAGEMENT (OUTPATIENT)
Age: 84
End: 2024-12-26

## 2024-12-30 ENCOUNTER — TRANSCRIBE ORDERS (OUTPATIENT)
Dept: CARDIAC REHAB | Facility: CLINIC | Age: 84
End: 2024-12-30

## 2024-12-30 DIAGNOSIS — Z95.5 STENTED CORONARY ARTERY: Primary | ICD-10-CM

## 2025-01-07 ENCOUNTER — TELEPHONE (OUTPATIENT)
Age: 85
End: 2025-01-07

## 2025-01-07 NOTE — TELEPHONE ENCOUNTER
Caller: Rosalba @ [facility]    Doctor: Dr. Godwin    Call back #: [phone]    Fax: [phone]    Reason for call: Rosalba with [facility] called and requested the images from patient's most recent cardiac cath on 12/23/24. Thank you!

## 2025-01-16 ENCOUNTER — OFFICE VISIT (OUTPATIENT)
Age: 85
End: 2025-01-16
Payer: MEDICARE

## 2025-01-16 ENCOUNTER — TRANSITIONAL CARE MANAGEMENT (OUTPATIENT)
Age: 85
End: 2025-01-16

## 2025-01-16 VITALS
TEMPERATURE: 98.1 F | HEIGHT: 66 IN | BODY MASS INDEX: 40.24 KG/M2 | HEART RATE: 62 BPM | OXYGEN SATURATION: 97 % | RESPIRATION RATE: 18 BRPM | SYSTOLIC BLOOD PRESSURE: 122 MMHG | DIASTOLIC BLOOD PRESSURE: 54 MMHG | WEIGHT: 250.4 LBS

## 2025-01-16 DIAGNOSIS — F11.20 CONTINUOUS OPIOID DEPENDENCE (HCC): ICD-10-CM

## 2025-01-16 DIAGNOSIS — N18.32 STAGE 3B CHRONIC KIDNEY DISEASE (CKD) (HCC): ICD-10-CM

## 2025-01-16 DIAGNOSIS — G47.33 OSA (OBSTRUCTIVE SLEEP APNEA): ICD-10-CM

## 2025-01-16 DIAGNOSIS — I10 PRIMARY HYPERTENSION: ICD-10-CM

## 2025-01-16 DIAGNOSIS — I50.32 CHRONIC HEART FAILURE WITH PRESERVED EJECTION FRACTION (HCC): ICD-10-CM

## 2025-01-16 DIAGNOSIS — Z79.4 TYPE 2 DIABETES MELLITUS WITH STAGE 3B CHRONIC KIDNEY DISEASE, WITH LONG-TERM CURRENT USE OF INSULIN (HCC): Primary | ICD-10-CM

## 2025-01-16 DIAGNOSIS — K21.9 GASTROESOPHAGEAL REFLUX DISEASE WITHOUT ESOPHAGITIS: ICD-10-CM

## 2025-01-16 DIAGNOSIS — I25.118 CORONARY ARTERY DISEASE OF NATIVE ARTERY OF NATIVE HEART WITH STABLE ANGINA PECTORIS (HCC): ICD-10-CM

## 2025-01-16 DIAGNOSIS — E78.2 MIXED HYPERLIPIDEMIA: ICD-10-CM

## 2025-01-16 DIAGNOSIS — E66.01 OBESITY, MORBID (HCC): ICD-10-CM

## 2025-01-16 DIAGNOSIS — E11.22 TYPE 2 DIABETES MELLITUS WITH STAGE 3B CHRONIC KIDNEY DISEASE, WITH LONG-TERM CURRENT USE OF INSULIN (HCC): Primary | ICD-10-CM

## 2025-01-16 DIAGNOSIS — N18.32 TYPE 2 DIABETES MELLITUS WITH STAGE 3B CHRONIC KIDNEY DISEASE, WITH LONG-TERM CURRENT USE OF INSULIN (HCC): Primary | ICD-10-CM

## 2025-01-16 DIAGNOSIS — I10 HTN (HYPERTENSION), BENIGN: ICD-10-CM

## 2025-01-16 PROBLEM — N18.31 STAGE 3A CHRONIC KIDNEY DISEASE (CKD) (HCC): Status: RESOLVED | Noted: 2023-01-11 | Resolved: 2025-01-16

## 2025-01-16 PROBLEM — E11.9 TYPE 2 DIABETES MELLITUS, WITH LONG-TERM CURRENT USE OF INSULIN (HCC): Status: RESOLVED | Noted: 2024-12-20 | Resolved: 2025-01-16

## 2025-01-16 PROCEDURE — 99215 OFFICE O/P EST HI 40 MIN: CPT

## 2025-01-16 RX ORDER — ISOSORBIDE MONONITRATE 30 MG/1
30 TABLET, EXTENDED RELEASE ORAL DAILY
COMMUNITY
Start: 2025-01-13 | End: 2026-01-13

## 2025-01-16 RX ORDER — NITROGLYCERIN 0.4 MG/1
1 TABLET SUBLINGUAL
COMMUNITY
Start: 2025-01-07 | End: 2026-01-07

## 2025-01-16 RX ORDER — FUROSEMIDE 20 MG/1
20 TABLET ORAL DAILY
COMMUNITY
Start: 2025-01-13 | End: 2026-01-13

## 2025-01-16 RX ORDER — ACYCLOVIR 400 MG/1
TABLET ORAL DAILY
COMMUNITY

## 2025-01-16 NOTE — ASSESSMENT & PLAN NOTE
Lab Results   Component Value Date    HGBA1C 6.3 (H) 12/26/2024   Under control.    Continue current medication.    We will re-evaluate at next office visit.

## 2025-01-16 NOTE — ASSESSMENT & PLAN NOTE
Wt Readings from Last 3 Encounters:   01/16/25 114 kg (250 lb 6.4 oz)   12/23/24 110 kg (243 lb 2.7 oz)   12/17/24 113 kg (249 lb)     Under control.    Continue current medication.    Patient has been followed by cardiologist  We will re-evaluate at next office visit.

## 2025-01-16 NOTE — ASSESSMENT & PLAN NOTE
Lab Results   Component Value Date    EGFR 33 (L) 01/15/2025    EGFR 34 (L) 01/13/2025    EGFR 32 (L) 01/12/2025    CREATININE 2.00 (H) 01/15/2025    CREATININE 1.95 (H) 01/13/2025    CREATININE 2.04 (H) 01/12/2025   creatinine and GFR stable   Will need periodic BMP  Avoid NSAIDs like ibuprofen Aleve Advil etc.  Avoid high potassium diet.  We will continue to monitor

## 2025-01-16 NOTE — PROGRESS NOTES
Name: Jake Claros      : 1940      MRN: [id number]  Encounter Provider: Ashwin Leyva MD  Encounter Date: 2025   Encounter department: [facility] PRIMARY CARE  :  Assessment & Plan  Type 2 diabetes mellitus with stage 3b chronic kidney disease, with long-term current use of insulin (HCC)    Lab Results   Component Value Date    HGBA1C 6.3 (H) 2024   Under control.    Continue current medication.    We will re-evaluate at next office visit.               HTN (hypertension), benign  Under control.  Continue metoprolol 12.5 mg every 12 and furosemide 20 mg daily  We will continue to monitor             Stage 3b chronic kidney disease (CKD) ([facility])  Lab Results   Component Value Date    EGFR 33 (L) 01/15/2025    EGFR 34 (L) 2025    EGFR 32 (L) 2025    CREATININE 2.00 (H) 01/15/2025    CREATININE 1.95 (H) 2025    CREATININE 2.04 (H) 2025   creatinine and GFR stable   Will need periodic BMP  Avoid NSAIDs like ibuprofen Aleve Advil etc.  Avoid high potassium diet.  We will continue to monitor                Coronary artery disease of native artery of native heart with stable angina pectoris (HCC)  Under control.    Continue current medication.    Patient has been followed by cardiologist  We will re-evaluate at next office visit.             Chronic heart failure with preserved ejection fraction (HCC)  Wt Readings from Last 3 Encounters:   25 114 kg (250 lb 6.4 oz)   24 110 kg (243 lb 2.7 oz)   24 113 kg (249 lb)     Under control.    Continue current medication.    Patient has been followed by cardiologist  We will re-evaluate at next office visit.                   Gastroesophageal reflux disease without esophagitis  Under control.    Continue famotidine 40 mg nightly  Also continue pantoprazole 40 mg every morning  We will re-evaluate at next office visit.         Obesity, morbid (HCC)  Patient was advised to lose weight.  Potential  consequences of obesity discussed with patient.  Advised to have portion control no more than 60% of meal or may consider intermittent fasting  We will continue to monitor           Primary hypertension         Mixed hyperlipidemia  Under reasonable control.  Continue rosuvastati 20 mg nightly  We will continue to monitor             PAULA (obstructive sleep apnea)  Under control with CPAP which patient uses every night and patient wakes up refreshed.  Patient does not feel daytime sleepiness or fatigue.  He will continue evaluate every office visit.         Continuous opioid dependence (HCC)  Pain under reasonable control.  We will continue to monitor                History of Present Illness     Patient here for review of chronic medical problems and  the labs and imaging if it is applicable.  Currently has no specific complaints other than mentioned in the review of systems  Denies chest pain, SOB, cough, abdominal pain, nausea, vomiting, fever, chills, lightheadedness, dizziness,headache, tingling or numbness.No bowel or bladder problem.  Patient was recently hospitalized at [facility] from January 8 to January 13, 2025 for congestive heart failure patient's medical record reviewed patient has follow-up appointment with his cardiologist tomorrow      Review of Systems   Constitutional:  Negative for chills and fever.   HENT:  Negative for congestion, ear pain, postnasal drip and sore throat.    Eyes:  Negative for pain and visual disturbance.   Respiratory:  Positive for shortness of breath (Exertional). Negative for cough.    Cardiovascular:  Positive for leg swelling. Negative for chest pain and palpitations.   Gastrointestinal:  Negative for abdominal pain, nausea and vomiting.   Endocrine: Negative for cold intolerance, heat intolerance, polydipsia, polyphagia and polyuria.   Genitourinary:  Negative for difficulty urinating, dysuria, frequency and hematuria.   Musculoskeletal:  Positive for  "arthralgias (right knee) and gait problem. Negative for back pain.   Skin:  Negative for color change and rash.   Neurological:  Negative for dizziness, seizures, syncope, light-headedness and headaches.   Psychiatric/Behavioral:  Negative for agitation and behavioral problems.    All other systems reviewed and are negative.      Objective   /54 (BP Location: Left arm, Patient Position: Sitting, Cuff Size: Large)   Pulse 62   Temp 98.1 °F (36.7 °C) (Temporal)   Resp 18   Ht 5' 6\" (1.676 m)   Wt 114 kg (250 lb 6.4 oz)   SpO2 97%   BMI 40.42 kg/m²      Physical Exam  Vitals and nursing note reviewed.   Constitutional:       General: He is not in acute distress.     Appearance: Normal appearance. He is well-developed. He is obese. He is not ill-appearing, toxic-appearing or diaphoretic.   HENT:      Head: Normocephalic and atraumatic.      Nose: Nose normal. No congestion or rhinorrhea.      Mouth/Throat:      Mouth: Mucous membranes are moist.      Pharynx: Oropharynx is clear.   Eyes:      General: No scleral icterus.        Right eye: No discharge.         Left eye: No discharge.      Extraocular Movements: Extraocular movements intact.      Conjunctiva/sclera: Conjunctivae normal.      Pupils: Pupils are equal, round, and reactive to light.   Cardiovascular:      Rate and Rhythm: Normal rate and regular rhythm.      Pulses: Normal pulses.      Heart sounds: Murmur (2-3/6 BITA) heard.      No gallop.   Pulmonary:      Effort: Pulmonary effort is normal. No respiratory distress.      Breath sounds: Normal breath sounds. No wheezing or rales.   Abdominal:      General: Abdomen is flat. Bowel sounds are normal. There is no distension.      Palpations: Abdomen is soft.      Tenderness: There is no abdominal tenderness. There is no right CVA tenderness, left CVA tenderness or guarding.   Musculoskeletal:         General: No swelling or tenderness. Normal range of motion.      Cervical back: Normal range of " motion and neck supple. No rigidity.      Right lower leg: Edema (2+) present.      Left lower leg: Edema (2+) present.   Lymphadenopathy:      Cervical: No cervical adenopathy.   Skin:     General: Skin is warm and dry.      Capillary Refill: Capillary refill takes 2 to 3 seconds.      Coloration: Skin is not jaundiced or pale.   Neurological:      General: No focal deficit present.      Mental Status: He is alert and oriented to person, place, and time. Mental status is at baseline.      Motor: No weakness.   Psychiatric:         Mood and Affect: Mood normal.         Behavior: Behavior normal.

## 2025-01-16 NOTE — ASSESSMENT & PLAN NOTE
Under control.  Continue metoprolol 12.5 mg every 12 and furosemide 20 mg daily  We will continue to monitor

## 2025-01-16 NOTE — ASSESSMENT & PLAN NOTE
Under control.    Continue famotidine 40 mg nightly  Also continue pantoprazole 40 mg every morning  We will re-evaluate at next office visit.

## 2025-01-16 NOTE — ASSESSMENT & PLAN NOTE
Under reasonable control.  Continue rosuvastati 20 mg nightly  We will continue to monitor

## 2025-02-13 ENCOUNTER — NURSE TRIAGE (OUTPATIENT)
Age: 85
End: 2025-02-13

## 2025-02-13 ENCOUNTER — OFFICE VISIT (OUTPATIENT)
Age: 85
End: 2025-02-13
Payer: MEDICARE

## 2025-02-13 VITALS
HEIGHT: 66 IN | TEMPERATURE: 98 F | SYSTOLIC BLOOD PRESSURE: 124 MMHG | BODY MASS INDEX: 41.17 KG/M2 | DIASTOLIC BLOOD PRESSURE: 50 MMHG | OXYGEN SATURATION: 98 % | WEIGHT: 256.2 LBS | RESPIRATION RATE: 18 BRPM | HEART RATE: 57 BPM

## 2025-02-13 DIAGNOSIS — R09.82 POSTNASAL DRIP: ICD-10-CM

## 2025-02-13 DIAGNOSIS — M79.605 LEFT LEG PAIN: Primary | ICD-10-CM

## 2025-02-13 PROCEDURE — G2211 COMPLEX E/M VISIT ADD ON: HCPCS

## 2025-02-13 PROCEDURE — 99213 OFFICE O/P EST LOW 20 MIN: CPT

## 2025-02-13 RX ORDER — LISINOPRIL 20 MG/1
20 TABLET ORAL DAILY
COMMUNITY

## 2025-02-13 RX ORDER — ONDANSETRON 4 MG/1
4 TABLET, FILM COATED ORAL EVERY 8 HOURS PRN
COMMUNITY

## 2025-02-13 NOTE — TELEPHONE ENCOUNTER
"Reason for Disposition   Thigh, calf, or ankle swelling in only one leg    Answer Assessment - Initial Assessment Questions  1. ONSET: \"When did the swelling start?\" (e.g., minutes, hours, days)      A few days ago    2. LOCATION: \"What part of the leg is swollen?\"  \"Are both legs swollen or just one leg?\"      left lower extremity   3. SEVERITY: \"How bad is the swelling?\" (e.g., localized; mild, moderate, severe)      Mild  4. REDNESS: \"Does the swelling look red or infected?\"      No   5. PAIN: \"Is the swelling painful to touch?\" If Yes, ask: \"How painful is it?\"   (Scale 1-10; mild, moderate or severe)      2-3  6. FEVER: \"Do you have a fever?\" If Yes, ask: \"What is it, how was it measured, and when did it start?\"       No   7. CAUSE: \"What do you think is causing the leg swelling?\"      Edema   8. MEDICAL HISTORY: \"Do you have a history of blood clots (e.g., DVT), cancer, heart failure, kidney disease, or liver failure?\"      Chf   9. RECURRENT SYMPTOM: \"Have you had leg swelling before?\" If Yes, ask: \"When was the last time?\" \"What happened that time?\"      Patient states as a child  10. OTHER SYMPTOMS: \"Do you have any other symptoms?\" (e.g., chest pain, difficulty breathing)        Patient denies sob and chest pain  Patient called with c/o of left lower leg edema,patient also stated he has a indentation on the side of left lower leg  Patient denies redness,sob and chest pain   Pain 2-3/10  Patient stated he is doubling up on the lasix  Appt scheduled for today    Protocols used: Leg Swelling and Edema-Adult-[place]    "

## 2025-02-13 NOTE — ASSESSMENT & PLAN NOTE
Reassured patient.  Should have follow-up with his cardiologist because of persistent edema.  May take Tylenol as needed.  If not improving or getting worse get back to us.  Patient does not want to do venous Doppler as he had venous Doppler done in January which was negative

## 2025-02-13 NOTE — TELEPHONE ENCOUNTER
Patient requesting a refill of medication    ipratropium (ATROVENT) 0.03 % nasal spray     [facility]

## 2025-02-13 NOTE — PROGRESS NOTES
Name: Jake Claros      : 1940      MRN: [id number]  Encounter Provider: Ashwin Leyva MD  Encounter Date: 2025   Encounter department: [facility] PRIMARY CARE  :  Assessment & Plan  Left leg pain  Reassured patient.  Should have follow-up with his cardiologist because of persistent edema.  May take Tylenol as needed.  If not improving or getting worse get back to us.  Patient does not want to do venous Doppler as he had venous Doppler done in January which was negative              History of Present Illness   Patient here for sick visit.  Complaining of pain left lower leg in the front for last few days does not remember injury or sprain also dealing with chronic leg edema his cardiologist wanted him to take 2 of the 40 mg Lasix instead of 1 for 2 days and then get back to him.  No shortness of breath.  No fever no chills no other complaints      Review of Systems   Constitutional:  Negative for chills and fever.   HENT:  Negative for congestion, ear pain, postnasal drip and sore throat.    Eyes:  Negative for pain and visual disturbance.   Respiratory:  Positive for shortness of breath (Exertional). Negative for cough.    Cardiovascular:  Positive for leg swelling. Negative for chest pain and palpitations.   Gastrointestinal:  Negative for abdominal pain, nausea and vomiting.   Endocrine: Negative for cold intolerance, heat intolerance, polydipsia, polyphagia and polyuria.   Genitourinary:  Negative for difficulty urinating, dysuria, frequency and hematuria.   Musculoskeletal:  Positive for arthralgias (right knee), gait problem and myalgias (Left lower leg). Negative for back pain.   Skin:  Negative for color change and rash.   Neurological:  Negative for dizziness, seizures, syncope, light-headedness and headaches.   Psychiatric/Behavioral:  Negative for agitation and behavioral problems.    All other systems reviewed and are negative.      Objective   /50 (BP Location:  "Left arm, Patient Position: Sitting, Cuff Size: Large)   Pulse 57   Temp 98 °F (36.7 °C) (Temporal)   Resp 18   Ht 5' 6\" (1.676 m)   Wt 116 kg (256 lb 3.2 oz)   SpO2 98%   BMI 41.35 kg/m²      Physical Exam  Vitals and nursing note reviewed.   Constitutional:       General: He is not in acute distress.     Appearance: Normal appearance. He is well-developed. He is obese. He is not ill-appearing, toxic-appearing or diaphoretic.   HENT:      Head: Normocephalic and atraumatic.      Nose: Nose normal. No congestion or rhinorrhea.      Mouth/Throat:      Mouth: Mucous membranes are moist.      Pharynx: Oropharynx is clear.   Eyes:      General: No scleral icterus.        Right eye: No discharge.         Left eye: No discharge.      Extraocular Movements: Extraocular movements intact.      Conjunctiva/sclera: Conjunctivae normal.      Pupils: Pupils are equal, round, and reactive to light.   Cardiovascular:      Rate and Rhythm: Normal rate and regular rhythm.      Pulses: Normal pulses.      Heart sounds: Murmur (2-3/6 BITA) heard.      No gallop.   Pulmonary:      Effort: Pulmonary effort is normal. No respiratory distress.      Breath sounds: Normal breath sounds. No wheezing or rales.   Abdominal:      General: Abdomen is flat. Bowel sounds are normal. There is no distension.      Palpations: Abdomen is soft.      Tenderness: There is no abdominal tenderness. There is no right CVA tenderness, left CVA tenderness or guarding.   Musculoskeletal:         General: Tenderness (Mild tenderness anterior medial part of left lower leg) present. No swelling. Normal range of motion.      Cervical back: Normal range of motion and neck supple. No rigidity.      Right lower leg: Edema (2+) present.      Left lower leg: Edema (2+) present.   Lymphadenopathy:      Cervical: No cervical adenopathy.   Skin:     General: Skin is warm and dry.      Capillary Refill: Capillary refill takes 2 to 3 seconds.      Coloration: Skin is " not jaundiced or pale.   Neurological:      General: No focal deficit present.      Mental Status: He is alert and oriented to person, place, and time. Mental status is at baseline.      Motor: No weakness.   Psychiatric:         Mood and Affect: Mood normal.         Behavior: Behavior normal.

## 2025-02-14 RX ORDER — IPRATROPIUM BROMIDE 21 UG/1
2 SPRAY, METERED NASAL EVERY 12 HOURS
Qty: 30 ML | Refills: 2 | Status: SHIPPED | OUTPATIENT
Start: 2025-02-14

## 2025-03-04 DIAGNOSIS — N40.0 BENIGN PROSTATIC HYPERPLASIA WITHOUT LOWER URINARY TRACT SYMPTOMS: ICD-10-CM

## 2025-03-05 RX ORDER — TAMSULOSIN HYDROCHLORIDE 0.4 MG/1
0.8 CAPSULE ORAL DAILY
Qty: 180 CAPSULE | Refills: 1 | Status: SHIPPED | OUTPATIENT
Start: 2025-03-05

## 2025-03-06 DIAGNOSIS — N18.32 TYPE 2 DIABETES MELLITUS WITH STAGE 3B CHRONIC KIDNEY DISEASE, WITH LONG-TERM CURRENT USE OF INSULIN (HCC): Primary | ICD-10-CM

## 2025-03-06 DIAGNOSIS — Z79.4 TYPE 2 DIABETES MELLITUS WITH STAGE 3B CHRONIC KIDNEY DISEASE, WITH LONG-TERM CURRENT USE OF INSULIN (HCC): Primary | ICD-10-CM

## 2025-03-06 DIAGNOSIS — E11.22 TYPE 2 DIABETES MELLITUS WITH STAGE 3B CHRONIC KIDNEY DISEASE, WITH LONG-TERM CURRENT USE OF INSULIN (HCC): Primary | ICD-10-CM

## 2025-03-10 RX ORDER — PEN NEEDLE, DIABETIC 32GX 5/32"
NEEDLE, DISPOSABLE MISCELLANEOUS 4 TIMES DAILY
Qty: 100 EACH | Refills: 3 | Status: SHIPPED | OUTPATIENT
Start: 2025-03-10 | End: 2025-03-14 | Stop reason: SDUPTHER

## 2025-03-14 DIAGNOSIS — E11.22 TYPE 2 DIABETES MELLITUS WITH STAGE 3B CHRONIC KIDNEY DISEASE, WITH LONG-TERM CURRENT USE OF INSULIN (HCC): ICD-10-CM

## 2025-03-14 DIAGNOSIS — N18.32 TYPE 2 DIABETES MELLITUS WITH STAGE 3B CHRONIC KIDNEY DISEASE, WITH LONG-TERM CURRENT USE OF INSULIN (HCC): ICD-10-CM

## 2025-03-14 DIAGNOSIS — Z79.4 TYPE 2 DIABETES MELLITUS WITH STAGE 3B CHRONIC KIDNEY DISEASE, WITH LONG-TERM CURRENT USE OF INSULIN (HCC): ICD-10-CM

## 2025-03-14 RX ORDER — PEN NEEDLE, DIABETIC 32GX 5/32"
NEEDLE, DISPOSABLE MISCELLANEOUS 4 TIMES DAILY
Qty: 450 EACH | Refills: 0 | Status: SHIPPED | OUTPATIENT
Start: 2025-03-14

## 2025-03-14 NOTE — TELEPHONE ENCOUNTER
Not a duplicate  Patient needs a script sent for 450 pen needles for a 90 day supply    Reason for call:   [x] Refill   [] Prior Auth  [] Other:     Office:   [x] PCP/Provider - Ashwin Leyva MD   [] Specialty/Provider -     Medication: BD Pen Needle Pascale U/F 32G X 4 MM MISC     Dose/Frequency: Inject under the skin 4 (four) times a day       Pharmacy: [facility] - [place] - [facility]   Does the patient have enough for 3 days?   [] Yes   [] No - Send as HP to POD    [facility] Pharmacy   Does the patient have enough for 10 days?   [x] Yes   [] No - Send as HP to POD

## 2025-03-19 ENCOUNTER — OFFICE VISIT (OUTPATIENT)
Age: 85
End: 2025-03-19
Payer: MEDICARE

## 2025-03-19 VITALS
TEMPERATURE: 96.7 F | HEIGHT: 66 IN | SYSTOLIC BLOOD PRESSURE: 120 MMHG | BODY MASS INDEX: 40.72 KG/M2 | RESPIRATION RATE: 18 BRPM | WEIGHT: 253.4 LBS | HEART RATE: 57 BPM | DIASTOLIC BLOOD PRESSURE: 60 MMHG | OXYGEN SATURATION: 95 %

## 2025-03-19 DIAGNOSIS — J06.9 UPPER RESPIRATORY TRACT INFECTION, UNSPECIFIED TYPE: Primary | ICD-10-CM

## 2025-03-19 PROCEDURE — 99213 OFFICE O/P EST LOW 20 MIN: CPT

## 2025-03-19 PROCEDURE — G2211 COMPLEX E/M VISIT ADD ON: HCPCS

## 2025-03-19 RX ORDER — AZITHROMYCIN 250 MG/1
TABLET, FILM COATED ORAL
Qty: 6 TABLET | Refills: 0 | Status: SHIPPED | OUTPATIENT
Start: 2025-03-19 | End: 2025-03-23

## 2025-03-19 NOTE — PROGRESS NOTES
"Name: Jake Claros      : 1940      MRN: [id number]  Encounter Provider: Ashwin Leyva MD  Encounter Date: 3/19/2025   Encounter department: [facility] PRIMARY CARE  :  Assessment & Plan  Upper respiratory tract infection, unspecified type  Treat with Z-Carlos.  Also may take Robitussin DM as needed.  Plenty of fluids orally.  Tylenol as needed.  No exertional activity.  If does not get better or get worse get back to us otherwise follow-up as needed  Orders:  •  azithromycin (Zithromax) 250 mg tablet; Take 2 tablets (500 mg total) by mouth daily for 1 day, THEN 1 tablet (250 mg total) daily for 4 days.           History of Present Illness   HPI  Review of Systems   Constitutional:  Negative for chills and fever.   HENT:  Positive for congestion, postnasal drip and sinus pressure. Negative for ear pain and sore throat.    Eyes:  Negative for redness and itching.   Respiratory:  Positive for cough.    Gastrointestinal:  Negative for abdominal pain.   Genitourinary:  Negative for difficulty urinating.   Musculoskeletal:  Negative for myalgias.   Neurological:  Negative for dizziness, light-headedness and headaches.       Objective   /60 (BP Location: Right arm, Patient Position: Sitting, Cuff Size: Standard)   Pulse 57   Temp (!) 96.7 °F (35.9 °C) (Tympanic)   Resp 18   Ht 5' 6\" (1.676 m)   Wt 115 kg (253 lb 6.4 oz)   SpO2 95%   BMI 40.90 kg/m²      Physical Exam  Vitals and nursing note reviewed.   Constitutional:       General: He is not in acute distress.     Appearance: Normal appearance. He is well-developed. He is obese. He is not ill-appearing, toxic-appearing or diaphoretic.   HENT:      Head: Normocephalic and atraumatic.      Right Ear: Tympanic membrane, ear canal and external ear normal. There is no impacted cerumen.      Left Ear: Tympanic membrane, ear canal and external ear normal. There is no impacted cerumen.      Nose: Congestion present. No rhinorrhea.      " Mouth/Throat:      Mouth: Mucous membranes are moist.      Pharynx: Oropharynx is clear. Posterior oropharyngeal erythema present.   Eyes:      General: No scleral icterus.        Right eye: No discharge.         Left eye: No discharge.      Extraocular Movements: Extraocular movements intact.      Conjunctiva/sclera: Conjunctivae normal.      Pupils: Pupils are equal, round, and reactive to light.   Cardiovascular:      Rate and Rhythm: Normal rate and regular rhythm.      Pulses: Normal pulses.      Heart sounds: Murmur (2-3/6 BITA) heard.      No gallop.   Pulmonary:      Effort: Pulmonary effort is normal. No respiratory distress.      Breath sounds: Wheezing present. No rales.   Abdominal:      General: Abdomen is flat. Bowel sounds are normal. There is no distension.      Palpations: Abdomen is soft.      Tenderness: There is no abdominal tenderness. There is no right CVA tenderness, left CVA tenderness or guarding.   Musculoskeletal:         General: No swelling or tenderness. Normal range of motion.      Cervical back: Normal range of motion and neck supple. No rigidity.      Right lower leg: Edema (2+) present.      Left lower leg: Edema (2+) present.   Lymphadenopathy:      Cervical: No cervical adenopathy.   Skin:     General: Skin is warm and dry.      Capillary Refill: Capillary refill takes 2 to 3 seconds.      Coloration: Skin is not jaundiced or pale.   Neurological:      General: No focal deficit present.      Mental Status: He is alert and oriented to person, place, and time. Mental status is at baseline.      Motor: No weakness.   Psychiatric:         Mood and Affect: Mood normal.         Behavior: Behavior normal.

## 2025-03-21 ENCOUNTER — RA CDI HCC (OUTPATIENT)
Dept: OTHER | Facility: HOSPITAL | Age: 85
End: 2025-03-21

## 2025-03-25 ENCOUNTER — OFFICE VISIT (OUTPATIENT)
Age: 85
End: 2025-03-25
Payer: MEDICARE

## 2025-03-25 VITALS
SYSTOLIC BLOOD PRESSURE: 138 MMHG | TEMPERATURE: 98.1 F | BODY MASS INDEX: 41.69 KG/M2 | RESPIRATION RATE: 18 BRPM | WEIGHT: 259.4 LBS | HEART RATE: 56 BPM | OXYGEN SATURATION: 96 % | DIASTOLIC BLOOD PRESSURE: 80 MMHG | HEIGHT: 66 IN

## 2025-03-25 DIAGNOSIS — I50.32 CHRONIC HEART FAILURE WITH PRESERVED EJECTION FRACTION (HCC): ICD-10-CM

## 2025-03-25 DIAGNOSIS — Z79.4 TYPE 2 DIABETES MELLITUS WITH STAGE 3B CHRONIC KIDNEY DISEASE, WITH LONG-TERM CURRENT USE OF INSULIN (HCC): Primary | ICD-10-CM

## 2025-03-25 DIAGNOSIS — N18.32 STAGE 3B CHRONIC KIDNEY DISEASE (CKD) (HCC): ICD-10-CM

## 2025-03-25 DIAGNOSIS — I10 HTN (HYPERTENSION), BENIGN: ICD-10-CM

## 2025-03-25 DIAGNOSIS — E66.01 OBESITY, MORBID (HCC): ICD-10-CM

## 2025-03-25 DIAGNOSIS — N18.32 TYPE 2 DIABETES MELLITUS WITH STAGE 3B CHRONIC KIDNEY DISEASE, WITH LONG-TERM CURRENT USE OF INSULIN (HCC): Primary | ICD-10-CM

## 2025-03-25 DIAGNOSIS — I25.118 CORONARY ARTERY DISEASE OF NATIVE ARTERY OF NATIVE HEART WITH STABLE ANGINA PECTORIS (HCC): ICD-10-CM

## 2025-03-25 DIAGNOSIS — E78.2 MIXED HYPERLIPIDEMIA: ICD-10-CM

## 2025-03-25 DIAGNOSIS — K21.9 GASTROESOPHAGEAL REFLUX DISEASE WITHOUT ESOPHAGITIS: ICD-10-CM

## 2025-03-25 DIAGNOSIS — E11.22 TYPE 2 DIABETES MELLITUS WITH STAGE 3B CHRONIC KIDNEY DISEASE, WITH LONG-TERM CURRENT USE OF INSULIN (HCC): Primary | ICD-10-CM

## 2025-03-25 PROBLEM — E78.5 HLD (HYPERLIPIDEMIA): Status: RESOLVED | Noted: 2024-12-20 | Resolved: 2025-03-25

## 2025-03-25 PROCEDURE — 99214 OFFICE O/P EST MOD 30 MIN: CPT

## 2025-03-25 PROCEDURE — G2211 COMPLEX E/M VISIT ADD ON: HCPCS

## 2025-03-25 RX ORDER — HYDROXYZINE HYDROCHLORIDE 25 MG/1
TABLET, FILM COATED ORAL
COMMUNITY
Start: 2025-03-08

## 2025-03-25 NOTE — PROGRESS NOTES
Name: Jake Claros      : 1940      MRN: [id number]  Encounter Provider: Ashwin Leyva MD  Encounter Date: 3/25/2025   Encounter department: [facility] PRIMARY CARE  :  Assessment & Plan  Type 2 diabetes mellitus with stage 3b chronic kidney disease, with long-term current use of insulin ([facility])    Lab Results   Component Value Date    HGBA1C 6.3 (H) 2024   Under control.    Continue current medication.    We will re-evaluate at next office visit.               HTN (hypertension), benign  Under control.  Continue metoprolol 12.5 mg every 12 and furosemide 20 mg daily  We will continue to monitor             Mixed hyperlipidemia  Under reasonable control.  Continue rosuvastati 20 mg nightly  We will continue to monitor             Obesity, morbid (HCC)  Patient was advised to lose weight.  Potential consequences of obesity discussed with patient.  Advised to have portion control no more than 60% of meal or may consider intermittent fasting  We will continue to monitor           Chronic heart failure with preserved ejection fraction (HCC)  Wt Readings from Last 3 Encounters:   25 118 kg (259 lb 6.4 oz)   25 115 kg (253 lb 6.4 oz)   25 116 kg (256 lb 3.2 oz)     Under control.    Continue current medication.    Patient has been followed by cardiologist  We will re-evaluate at next office visit.                   Gastroesophageal reflux disease without esophagitis  Under control.    Continue famotidine 40 mg nightly  Also continue pantoprazole 40 mg every morning  We will re-evaluate at next office visit.         Coronary artery disease of native artery of native heart with stable angina pectoris (HCC)  Under control.    Continue current medication.    Patient has been followed by cardiologist  We will re-evaluate at next office visit.             Stage 3b chronic kidney disease (CKD) ([facility])  Lab Results   Component Value Date    EGFR 30 (L) 03/10/2025    EGFR 32 (L)  "02/27/2025    EGFR 33 (L) 02/14/2025    CREATININE 2.15 (H) 03/10/2025    CREATININE 2.01 (H) 02/27/2025    CREATININE 2 (H) 02/14/2025   creatinine and GFR stable   Will need periodic BMP  Avoid NSAIDs like ibuprofen Aleve Advil etc.  Avoid high potassium diet.  We will continue to monitor                       History of Present Illness   Patient here for review of chronic medical problems and  the labs and imaging if it is applicable.  Currently has no specific complaints other than mentioned in the review of systems  Denies chest pain, SOB, cough, abdominal pain, nausea, vomiting, fever, chills, lightheadedness, dizziness,headache, tingling or numbness.No bowel or bladder problem.      Review of Systems   Constitutional:  Negative for chills and fever.   HENT:  Negative for congestion, ear pain, postnasal drip and sore throat.    Eyes:  Negative for pain and visual disturbance.   Respiratory:  Positive for shortness of breath (Exertional). Negative for cough.    Cardiovascular:  Positive for leg swelling. Negative for chest pain and palpitations.   Gastrointestinal:  Negative for abdominal pain, nausea and vomiting.   Endocrine: Negative for cold intolerance, heat intolerance, polydipsia, polyphagia and polyuria.   Genitourinary:  Negative for difficulty urinating, dysuria, frequency and hematuria.   Musculoskeletal:  Positive for arthralgias (right knee) and gait problem. Negative for back pain.   Skin:  Negative for color change and rash.   Neurological:  Negative for dizziness, seizures, syncope, light-headedness and headaches.   Psychiatric/Behavioral:  Negative for agitation and behavioral problems.    All other systems reviewed and are negative.      Objective   /80 (BP Location: Right arm, Patient Position: Sitting, Cuff Size: Standard)   Pulse 56   Temp 98.1 °F (36.7 °C) (Temporal)   Resp 18   Ht 5' 6\" (1.676 m)   Wt 118 kg (259 lb 6.4 oz)   SpO2 96%   BMI 41.87 kg/m²      Physical " Exam  Vitals and nursing note reviewed.   Constitutional:       General: He is not in acute distress.     Appearance: Normal appearance. He is well-developed. He is obese. He is not ill-appearing, toxic-appearing or diaphoretic.   HENT:      Head: Normocephalic and atraumatic.      Nose: Nose normal. No congestion or rhinorrhea.      Mouth/Throat:      Mouth: Mucous membranes are moist.      Pharynx: Oropharynx is clear.   Eyes:      General: No scleral icterus.        Right eye: No discharge.         Left eye: No discharge.      Extraocular Movements: Extraocular movements intact.      Conjunctiva/sclera: Conjunctivae normal.      Pupils: Pupils are equal, round, and reactive to light.   Cardiovascular:      Rate and Rhythm: Normal rate and regular rhythm.      Pulses: Normal pulses.      Heart sounds: Murmur (2-3/6 BITA) heard.      No gallop.   Pulmonary:      Effort: Pulmonary effort is normal. No respiratory distress.      Breath sounds: Normal breath sounds. No wheezing or rales.   Abdominal:      General: Abdomen is flat. Bowel sounds are normal. There is no distension.      Palpations: Abdomen is soft.      Tenderness: There is no abdominal tenderness. There is no right CVA tenderness, left CVA tenderness or guarding.   Musculoskeletal:         General: No swelling or tenderness. Normal range of motion.      Cervical back: Normal range of motion and neck supple. No rigidity.      Right lower leg: Edema (Chronic 2+) present.      Left lower leg: Edema (Chronic 2+) present.   Lymphadenopathy:      Cervical: No cervical adenopathy.   Skin:     General: Skin is warm and dry.      Capillary Refill: Capillary refill takes 2 to 3 seconds.      Coloration: Skin is not jaundiced or pale.   Neurological:      General: No focal deficit present.      Mental Status: He is alert and oriented to person, place, and time. Mental status is at baseline.      Motor: No weakness.   Psychiatric:         Mood and Affect: Mood  normal.         Behavior: Behavior normal.

## 2025-03-25 NOTE — ASSESSMENT & PLAN NOTE
Lab Results   Component Value Date    EGFR 30 (L) 03/10/2025    EGFR 32 (L) 02/27/2025    EGFR 33 (L) 02/14/2025    CREATININE 2.15 (H) 03/10/2025    CREATININE 2.01 (H) 02/27/2025    CREATININE 2 (H) 02/14/2025   creatinine and GFR stable   Will need periodic BMP  Avoid NSAIDs like ibuprofen Aleve Advil etc.  Avoid high potassium diet.  We will continue to monitor

## 2025-03-25 NOTE — ASSESSMENT & PLAN NOTE
Wt Readings from Last 3 Encounters:   03/25/25 118 kg (259 lb 6.4 oz)   03/19/25 115 kg (253 lb 6.4 oz)   02/13/25 116 kg (256 lb 3.2 oz)     Under control.    Continue current medication.    Patient has been followed by cardiologist  We will re-evaluate at next office visit.

## 2025-03-31 ENCOUNTER — TELEPHONE (OUTPATIENT)
Age: 85
End: 2025-03-31

## 2025-03-31 NOTE — TELEPHONE ENCOUNTER
Patient called asking if PCP is willing to write a letter to approve his two dogs as emotional support animals.

## 2025-04-01 DIAGNOSIS — K21.9 GERD WITHOUT ESOPHAGITIS: ICD-10-CM

## 2025-04-01 DIAGNOSIS — I10 HTN (HYPERTENSION), BENIGN: ICD-10-CM

## 2025-04-01 DIAGNOSIS — I20.89 STABLE ANGINA PECTORIS (HCC): ICD-10-CM

## 2025-04-01 DIAGNOSIS — I10 HTN (HYPERTENSION), BENIGN: Primary | ICD-10-CM

## 2025-04-02 RX ORDER — PANTOPRAZOLE SODIUM 40 MG/1
40 TABLET, DELAYED RELEASE ORAL
Qty: 90 TABLET | Refills: 1 | Status: SHIPPED | OUTPATIENT
Start: 2025-04-02 | End: 2025-09-29

## 2025-04-02 RX ORDER — FAMOTIDINE 40 MG/1
40 TABLET, FILM COATED ORAL
Qty: 90 TABLET | Refills: 1 | Status: SHIPPED | OUTPATIENT
Start: 2025-04-02

## 2025-04-03 RX ORDER — METOPROLOL TARTRATE 25 MG/1
12.5 TABLET, FILM COATED ORAL EVERY 12 HOURS SCHEDULED
Qty: 90 TABLET | Refills: 1 | Status: SHIPPED | OUTPATIENT
Start: 2025-04-03

## 2025-04-03 RX ORDER — LISINOPRIL 20 MG/1
20 TABLET ORAL DAILY
Qty: 90 TABLET | Refills: 1 | Status: SHIPPED | OUTPATIENT
Start: 2025-04-03

## 2025-04-03 RX ORDER — AMLODIPINE BESYLATE 10 MG/1
10 TABLET ORAL DAILY
Qty: 90 TABLET | Refills: 1 | Status: SHIPPED | OUTPATIENT
Start: 2025-04-03

## 2025-04-16 ENCOUNTER — TELEPHONE (OUTPATIENT)
Age: 85
End: 2025-04-16

## 2025-04-16 NOTE — TELEPHONE ENCOUNTER
I called patient and followed up with Hospital discharged. He is doing well and is keeping his 4/16 apt.

## 2025-04-21 ENCOUNTER — OFFICE VISIT (OUTPATIENT)
Age: 85
End: 2025-04-21
Payer: MEDICARE

## 2025-04-21 VITALS
OXYGEN SATURATION: 97 % | SYSTOLIC BLOOD PRESSURE: 142 MMHG | HEIGHT: 66 IN | TEMPERATURE: 98 F | RESPIRATION RATE: 18 BRPM | BODY MASS INDEX: 39.63 KG/M2 | WEIGHT: 246.6 LBS | DIASTOLIC BLOOD PRESSURE: 57 MMHG | HEART RATE: 60 BPM

## 2025-04-21 DIAGNOSIS — K21.9 GASTROESOPHAGEAL REFLUX DISEASE WITHOUT ESOPHAGITIS: ICD-10-CM

## 2025-04-21 DIAGNOSIS — G47.33 OSA (OBSTRUCTIVE SLEEP APNEA): ICD-10-CM

## 2025-04-21 DIAGNOSIS — N18.32 STAGE 3B CHRONIC KIDNEY DISEASE (CKD) (HCC): ICD-10-CM

## 2025-04-21 DIAGNOSIS — N18.32 TYPE 2 DIABETES MELLITUS WITH STAGE 3B CHRONIC KIDNEY DISEASE, WITH LONG-TERM CURRENT USE OF INSULIN (HCC): Primary | ICD-10-CM

## 2025-04-21 DIAGNOSIS — E78.2 MIXED HYPERLIPIDEMIA: ICD-10-CM

## 2025-04-21 DIAGNOSIS — Z79.4 TYPE 2 DIABETES MELLITUS WITH STAGE 3B CHRONIC KIDNEY DISEASE, WITH LONG-TERM CURRENT USE OF INSULIN (HCC): Primary | ICD-10-CM

## 2025-04-21 DIAGNOSIS — I50.32 CHRONIC HEART FAILURE WITH PRESERVED EJECTION FRACTION (HCC): ICD-10-CM

## 2025-04-21 DIAGNOSIS — I25.118 CORONARY ARTERY DISEASE OF NATIVE ARTERY OF NATIVE HEART WITH STABLE ANGINA PECTORIS (HCC): ICD-10-CM

## 2025-04-21 DIAGNOSIS — E11.22 TYPE 2 DIABETES MELLITUS WITH STAGE 3B CHRONIC KIDNEY DISEASE, WITH LONG-TERM CURRENT USE OF INSULIN (HCC): Primary | ICD-10-CM

## 2025-04-21 DIAGNOSIS — I10 HTN (HYPERTENSION), BENIGN: ICD-10-CM

## 2025-04-21 DIAGNOSIS — E66.01 OBESITY, MORBID (HCC): ICD-10-CM

## 2025-04-21 PROCEDURE — G2211 COMPLEX E/M VISIT ADD ON: HCPCS

## 2025-04-21 PROCEDURE — 99214 OFFICE O/P EST MOD 30 MIN: CPT

## 2025-04-21 RX ORDER — AMLODIPINE BESYLATE 5 MG/1
5 TABLET ORAL DAILY
COMMUNITY

## 2025-04-21 RX ORDER — FUROSEMIDE 40 MG/1
40 TABLET ORAL DAILY
COMMUNITY

## 2025-04-21 NOTE — ASSESSMENT & PLAN NOTE
Lab Results   Component Value Date    EGFR 32 (L) 04/16/2025    EGFR 31 (L) 04/15/2025    EGFR 30 (L) 03/10/2025    CREATININE 2.04 (H) 04/16/2025    CREATININE 2.07 (H) 04/15/2025    CREATININE 2.15 (H) 03/10/2025   creatinine and GFR stable   Will need periodic BMP  Avoid NSAIDs like ibuprofen Aleve Advil etc.  Avoid high potassium diet.  We will continue to monitor

## 2025-04-21 NOTE — PROGRESS NOTES
Name: Jake Claros      : 1940      MRN: [id number]  Encounter Provider: Ashwin Leyva MD  Encounter Date: 2025   Encounter department: [facility] PRIMARY CARE  :  Assessment & Plan  Type 2 diabetes mellitus with stage 3b chronic kidney disease, with long-term current use of insulin (HCC)    Lab Results   Component Value Date    HGBA1C 6.3 (H) 2024   Under control.    Continue current medication.    We will re-evaluate at next office visit.               HTN (hypertension), benign  Under control.  Continue metoprolol 12.5 mg every 12 and furosemide 20 mg daily  We will continue to monitor             Mixed hyperlipidemia  Under reasonable control.  Continue rosuvastati 20 mg nightly  We will continue to monitor             Chronic heart failure with preserved ejection fraction (HCC)  Wt Readings from Last 3 Encounters:   25 112 kg (246 lb 9.6 oz)   25 118 kg (259 lb 6.4 oz)   25 115 kg (253 lb 6.4 oz)     Under control.    Continue current medication.    Patient has been followed by cardiologist  We will re-evaluate at next office visit.                   Coronary artery disease of native artery of native heart with stable angina pectoris (HCC)  Under control.    Continue current medication.    Patient has been followed by cardiologist  We will re-evaluate at next office visit.             Stage 3b chronic kidney disease (CKD) (HCC)  Lab Results   Component Value Date    EGFR 32 (L) 2025    EGFR 31 (L) 04/15/2025    EGFR 30 (L) 03/10/2025    CREATININE 2.04 (H) 2025    CREATININE 2.07 (H) 04/15/2025    CREATININE 2.15 (H) 03/10/2025   creatinine and GFR stable   Will need periodic BMP  Avoid NSAIDs like ibuprofen Aleve Advil etc.  Avoid high potassium diet.  We will continue to monitor                Obesity, morbid (HCC)  Patient was advised to lose weight.  Potential consequences of obesity discussed with patient.  Advised to have portion control  no more than 60% of meal or may consider intermittent fasting  We will continue to monitor           PAULA (obstructive sleep apnea)  Under control with CPAP which patient uses every night and patient wakes up refreshed.  Patient does not feel daytime sleepiness or fatigue.  He will continue evaluate every office visit.         Gastroesophageal reflux disease without esophagitis  Under control.    Continue famotidine 40 mg nightly  Also continue pantoprazole 40 mg every morning  We will re-evaluate at next office visit.                History of Present Illness   Patient here for review of chronic medical problems and  the labs and imaging if it is applicable.  Currently has no specific complaints other than mentioned in the review of systems  Denies chest pain,  cough, abdominal pain, nausea, vomiting, fever, chills, lightheadedness, dizziness,headache, tingling or numbness.No bowel or bladder problem.  Lately getting exertional shortness of breath and increasing swelling of both lower extremity was seen by nephrologist today and his Lasix has not been increased to 40 mg daily and amlodipine has been dropped to 5 mg daily      Review of Systems   Constitutional:  Negative for chills and fever.   HENT:  Negative for congestion, ear pain, postnasal drip and sore throat.    Eyes:  Negative for pain and visual disturbance.   Respiratory:  Positive for shortness of breath (Exertional). Negative for cough.    Cardiovascular:  Positive for leg swelling. Negative for chest pain and palpitations.   Gastrointestinal:  Negative for abdominal pain, nausea and vomiting.   Endocrine: Negative for cold intolerance, heat intolerance, polydipsia, polyphagia and polyuria.   Genitourinary:  Negative for difficulty urinating, dysuria, frequency and hematuria.   Musculoskeletal:  Positive for arthralgias (right knee) and gait problem. Negative for back pain.   Skin:  Negative for color change and rash.   Neurological:  Negative for  "dizziness, seizures, syncope, light-headedness and headaches.   Psychiatric/Behavioral:  Negative for agitation and behavioral problems.    All other systems reviewed and are negative.      Objective   /57 (BP Location: Left arm, Patient Position: Sitting, Cuff Size: Standard)   Pulse 60   Temp 98 °F (36.7 °C) (Temporal)   Resp 18   Ht 5' 6\" (1.676 m)   Wt 112 kg (246 lb 9.6 oz)   SpO2 97%   BMI 39.80 kg/m²      Physical Exam  Vitals and nursing note reviewed.   Constitutional:       General: He is not in acute distress.     Appearance: Normal appearance. He is well-developed. He is obese. He is not ill-appearing, toxic-appearing or diaphoretic.   HENT:      Head: Normocephalic and atraumatic.      Nose: Nose normal. No congestion or rhinorrhea.      Mouth/Throat:      Mouth: Mucous membranes are moist.      Pharynx: Oropharynx is clear.   Eyes:      General: No scleral icterus.        Right eye: No discharge.         Left eye: No discharge.      Extraocular Movements: Extraocular movements intact.      Conjunctiva/sclera: Conjunctivae normal.      Pupils: Pupils are equal, round, and reactive to light.   Cardiovascular:      Rate and Rhythm: Normal rate and regular rhythm.      Pulses: Normal pulses.      Heart sounds: Murmur (2-3/6 BITA) heard.      No gallop.   Pulmonary:      Effort: Pulmonary effort is normal. No respiratory distress.      Breath sounds: Normal breath sounds. No wheezing or rales.   Abdominal:      General: Abdomen is flat. Bowel sounds are normal. There is no distension.      Palpations: Abdomen is soft.      Tenderness: There is no abdominal tenderness. There is no right CVA tenderness, left CVA tenderness or guarding.   Musculoskeletal:         General: No swelling or tenderness. Normal range of motion.      Cervical back: Normal range of motion and neck supple. No rigidity.      Right lower leg: Edema (Chronic 2+) present.      Left lower leg: Edema (Chronic 2+) present. "   Lymphadenopathy:      Cervical: No cervical adenopathy.   Skin:     General: Skin is warm and dry.      Capillary Refill: Capillary refill takes 2 to 3 seconds.      Coloration: Skin is not jaundiced or pale.   Neurological:      General: No focal deficit present.      Mental Status: He is alert and oriented to person, place, and time. Mental status is at baseline.      Motor: No weakness.      Gait: Gait abnormal (Ambulates with cane).   Psychiatric:         Mood and Affect: Mood normal.         Behavior: Behavior normal.

## 2025-04-21 NOTE — ASSESSMENT & PLAN NOTE
Wt Readings from Last 3 Encounters:   04/21/25 112 kg (246 lb 9.6 oz)   03/25/25 118 kg (259 lb 6.4 oz)   03/19/25 115 kg (253 lb 6.4 oz)     Under control.    Continue current medication.    Patient has been followed by cardiologist  We will re-evaluate at next office visit.

## 2025-04-28 DIAGNOSIS — I25.118 CORONARY ARTERY DISEASE OF NATIVE ARTERY OF NATIVE HEART WITH STABLE ANGINA PECTORIS (HCC): ICD-10-CM

## 2025-04-28 DIAGNOSIS — Z87.39 HISTORY OF GOUT: ICD-10-CM

## 2025-04-29 RX ORDER — ALLOPURINOL 100 MG/1
100 TABLET ORAL DAILY
Qty: 90 TABLET | Refills: 1 | Status: SHIPPED | OUTPATIENT
Start: 2025-04-29

## 2025-04-29 RX ORDER — CLOPIDOGREL BISULFATE 75 MG/1
75 TABLET ORAL DAILY
Qty: 90 TABLET | Refills: 1 | Status: SHIPPED | OUTPATIENT
Start: 2025-04-29

## 2025-04-29 RX ORDER — ISOSORBIDE MONONITRATE 30 MG/1
30 TABLET, EXTENDED RELEASE ORAL DAILY
Qty: 90 TABLET | Refills: 3 | Status: SHIPPED | OUTPATIENT
Start: 2025-04-29 | End: 2026-04-29

## 2025-05-02 ENCOUNTER — NURSE TRIAGE (OUTPATIENT)
Age: 85
End: 2025-05-02

## 2025-05-02 NOTE — TELEPHONE ENCOUNTER
Patients GI provider:  Dr. Davis    Number to return call: [phone]    Reason for call: Pt calling requesting to speak with a nurse due to rectal bleeding and having a really hard time having a bowel movement . Triaged nurse Lory for further assistance.     Scheduled procedure/appointment date if applicable: Apt/procedure n/a

## 2025-05-02 NOTE — TELEPHONE ENCOUNTER
"FOLLOW UP: Please advise-declined ED.     REASON FOR CONVERSATION: rectal bleed/constipation    SYMPTOMS: rectal bleeding today, large amt in toilet and stool, hemorrhoid, straining to pass stool     OTHER: Pt states for the past week he has been constipated. He is taking a stool softener.  He is also on Coumadin and Lasix and is wondering if the Lasix is drying him out.    DISPOSITION: Discuss with Provider and Call Back Patient (overriding See Today in Office) Initially advised ED due to age, being on Coumadin, but pt declines.      Reason for Disposition   Taking Coumadin (warfarin) or other strong blood thinner, or known bleeding disorder (e.g., thrombocytopenia)    Additional Information   Rectal bleeding or blood in stool is main symptom    Answer Assessment - Initial Assessment Questions  1. STOOL PATTERN OR FREQUENCY: \"How often do you have a bowel movement (BM)?\"  (Normal range: 3 times a day to every 3 days)  \"When was your last BM?\"        Today - very hard and had to strain  2. STRAINING: \"Do you have to strain to have a BM?\"       Yes   3. ONSET: \"When did the constipation begin?\"      Ongoing   4. RECTAL PAIN: \"Does your rectum hurt when the stool comes out?\" If Yes, ask: \"Do you have hemorrhoids? How bad is the pain?\"  (Scale 1-10; or mild, moderate, severe)      Yes, and has hemorrhoids  5. BM COMPOSITION: \"Are the stools hard?\"       Yes   6. BLOOD ON STOOLS: \"Has there been any blood on the toilet tissue or on the surface of the BM?\" If Yes, ask: \"When was the last time?\"      Yes   7. CHRONIC CONSTIPATION: \"Is this a new problem for you?\"  If No, ask: \"How long have you had this problem?\" (days, weeks, months)       Yes   8. CHANGES IN DIET OR HYDRATION: \"Have there been any recent changes in your diet?\" \"How much fluids are you drinking on a daily basis?\"  \"How much have you had to drink today?\"      No   9. MEDICINES: \"Have you been taking any new medicines?\" \"Are you taking any narcotic pain " "medicines?\" (e.g., Dilaudid, morphine, Percocet, Vicodin)      No concerns   10. LAXATIVES: \"Have you been using any stool softeners, laxatives, or enemas?\"  If Yes, ask \"What, how often, and when was the last time?\"        Stool softener   11. ACTIVITY:  \"How much walking do you do every day?\"  \"Has your activity level decreased in the past week?\"         No concerns   12. CAUSE: \"What do you think is causing the constipation?\"         Unsure but concerned Lasix is drying him out  13. MEDICAL HISTORY: \"Do you have a history of hemorrhoids, rectal fissures, rectal surgery, or rectal abscess?\"          Yes   14. OTHER SYMPTOMS: \"Do you have any other symptoms?\" (e.g., abdomen pain, bloating, fever, vomiting)        Denies    Protocols used: Constipation-Adult-OH, Rectal Bleeding-Adult-OH    "

## 2025-05-02 NOTE — TELEPHONE ENCOUNTER
Recommend patient start MiraLAX 1 capful daily.  Can use suppository or enema for more instant relief.  Monitor blood output.  If this is persistent and continues to be a large amount would recommend ER evaluation.  Recommend scheduling office visit nonurgently to further discuss in the office especially if symptoms are persistent.

## 2025-05-02 NOTE — TELEPHONE ENCOUNTER
Spoke with patient and reviewed recommendations from provider, he verbalized understanding. He states he has only had 1 episode of rectal bleeding.

## 2025-05-08 NOTE — TELEPHONE ENCOUNTER
Spoke with pt and informed him of recs again he understood. I scheduled pt for follow up appt as well.

## 2025-05-09 ENCOUNTER — ESTABLISHED COMPREHENSIVE EXAM (OUTPATIENT)
Dept: URBAN - METROPOLITAN AREA CLINIC 6 | Facility: CLINIC | Age: 85
End: 2025-05-09

## 2025-05-09 DIAGNOSIS — E11.3293: ICD-10-CM

## 2025-05-09 DIAGNOSIS — H35.3132: ICD-10-CM

## 2025-05-09 DIAGNOSIS — H35.62: ICD-10-CM

## 2025-05-09 DIAGNOSIS — H43.391: ICD-10-CM

## 2025-05-09 PROCEDURE — 92014 COMPRE OPH EXAM EST PT 1/>: CPT

## 2025-05-09 PROCEDURE — 92134 CPTRZ OPH DX IMG PST SGM RTA: CPT

## 2025-05-09 PROCEDURE — 92202 OPSCPY EXTND ON/MAC DRAW: CPT

## 2025-05-09 ASSESSMENT — TONOMETRY
OD_IOP_MMHG: 6
OS_IOP_MMHG: 5

## 2025-05-09 ASSESSMENT — VISUAL ACUITY
OS_PH: 20/40
OS_SC: 20/60
OD_SC: 20/60-1
OU_SC: J2
OD_PH: 20/30
OU_SC: 20/50

## 2025-05-20 ENCOUNTER — OFFICE VISIT (OUTPATIENT)
Age: 85
End: 2025-05-20
Payer: MEDICARE

## 2025-05-20 VITALS
OXYGEN SATURATION: 96 % | SYSTOLIC BLOOD PRESSURE: 118 MMHG | WEIGHT: 255.8 LBS | HEIGHT: 66 IN | BODY MASS INDEX: 41.11 KG/M2 | HEART RATE: 47 BPM | RESPIRATION RATE: 18 BRPM | DIASTOLIC BLOOD PRESSURE: 55 MMHG | TEMPERATURE: 98.3 F

## 2025-05-20 DIAGNOSIS — I25.118 CORONARY ARTERY DISEASE OF NATIVE ARTERY OF NATIVE HEART WITH STABLE ANGINA PECTORIS (HCC): Primary | ICD-10-CM

## 2025-05-20 DIAGNOSIS — I10 HTN (HYPERTENSION), BENIGN: ICD-10-CM

## 2025-05-20 DIAGNOSIS — E11.22 TYPE 2 DIABETES MELLITUS WITH STAGE 3B CHRONIC KIDNEY DISEASE, WITH LONG-TERM CURRENT USE OF INSULIN (HCC): ICD-10-CM

## 2025-05-20 DIAGNOSIS — E78.2 MIXED HYPERLIPIDEMIA: ICD-10-CM

## 2025-05-20 DIAGNOSIS — Z79.4 TYPE 2 DIABETES MELLITUS WITH STAGE 3B CHRONIC KIDNEY DISEASE, WITH LONG-TERM CURRENT USE OF INSULIN (HCC): ICD-10-CM

## 2025-05-20 DIAGNOSIS — I50.32 CHRONIC HEART FAILURE WITH PRESERVED EJECTION FRACTION (HCC): ICD-10-CM

## 2025-05-20 DIAGNOSIS — N18.32 STAGE 3B CHRONIC KIDNEY DISEASE (CKD) (HCC): ICD-10-CM

## 2025-05-20 DIAGNOSIS — Z87.39 HISTORY OF GOUT: ICD-10-CM

## 2025-05-20 DIAGNOSIS — N18.32 TYPE 2 DIABETES MELLITUS WITH STAGE 3B CHRONIC KIDNEY DISEASE, WITH LONG-TERM CURRENT USE OF INSULIN (HCC): ICD-10-CM

## 2025-05-20 PROCEDURE — 99214 OFFICE O/P EST MOD 30 MIN: CPT

## 2025-05-20 PROCEDURE — G2211 COMPLEX E/M VISIT ADD ON: HCPCS

## 2025-05-20 RX ORDER — ALLOPURINOL 100 MG/1
100 TABLET ORAL DAILY
Qty: 90 TABLET | Refills: 1 | Status: SHIPPED | OUTPATIENT
Start: 2025-05-20

## 2025-05-20 RX ORDER — RANOLAZINE 500 MG/1
500 TABLET, EXTENDED RELEASE ORAL 2 TIMES DAILY
Qty: 180 TABLET | Refills: 1 | Status: SHIPPED | OUTPATIENT
Start: 2025-05-20 | End: 2025-05-22 | Stop reason: SDUPTHER

## 2025-05-20 NOTE — ASSESSMENT & PLAN NOTE
Wt Readings from Last 3 Encounters:   05/20/25 116 kg (255 lb 12.8 oz)   04/21/25 112 kg (246 lb 9.6 oz)   03/25/25 118 kg (259 lb 6.4 oz)   With chronic leg edema is not getting worse.  Continue current medication.  Continue follow-up with cardiologist.  Reassured the patient  We will continue to monitor                Very pleasant 69 year old male with reported NICM, euvolemic on exam. He has PTSD and smokes. He tells me that his last cath from 2014 was negative for CAD.  Device interrogation reviewed, no shocks.   Pt is stable to be dc home and advised to follow with his cardiologist  No change to his medications at this time.

## 2025-05-20 NOTE — ASSESSMENT & PLAN NOTE
Under control.    Continue current medication.    Patient has been followed by cardiologist  We will re-evaluate at next office visit.      Orders:  •  ranolazine (RANEXA) 500 mg 12 hr tablet; Take 1 tablet (500 mg total) by mouth 2 (two) times a day

## 2025-05-20 NOTE — ASSESSMENT & PLAN NOTE
Lab Results   Component Value Date    EGFR 33 (L) 04/29/2025    EGFR 32 (L) 04/16/2025    EGFR 31 (L) 04/15/2025    CREATININE 1.99 (H) 04/29/2025    CREATININE 2.04 (H) 04/16/2025    CREATININE 2.07 (H) 04/15/2025   creatinine and GFR stable   Will need periodic BMP  Avoid NSAIDs like ibuprofen Aleve Advil etc.  Avoid high potassium diet.  We will continue to monitor

## 2025-05-20 NOTE — PROGRESS NOTES
Name: Jake Claros      : 1940      MRN: [id number]  Encounter Provider: Ashwin Leyva MD  Encounter Date: 2025   Encounter department: [facility] PRIMARY CARE  :  Assessment & Plan  Coronary artery disease of native artery of native heart with stable angina pectoris (HCC)  Under control.    Continue current medication.    Patient has been followed by cardiologist  We will re-evaluate at next office visit.      Orders:  •  ranolazine (RANEXA) 500 mg 12 hr tablet; Take 1 tablet (500 mg total) by mouth 2 (two) times a day      Chronic heart failure with preserved ejection fraction (HCC)  Wt Readings from Last 3 Encounters:   25 116 kg (255 lb 12.8 oz)   25 112 kg (246 lb 9.6 oz)   25 118 kg (259 lb 6.4 oz)   With chronic leg edema is not getting worse.  Continue current medication.  Continue follow-up with cardiologist.  Reassured the patient  We will continue to monitor               Type 2 diabetes mellitus with stage 3b chronic kidney disease, with long-term current use of insulin ([facility])    Lab Results   Component Value Date    HGBA1C 6.5 (H) 2025   Under control.    Continue current medication.    We will re-evaluate at next office visit.               Stage 3b chronic kidney disease (CKD) ([facility])  Lab Results   Component Value Date    EGFR 33 (L) 2025    EGFR 32 (L) 2025    EGFR 31 (L) 04/15/2025    CREATININE 1.99 (H) 2025    CREATININE 2.04 (H) 2025    CREATININE 2.07 (H) 04/15/2025   creatinine and GFR stable   Will need periodic BMP  Avoid NSAIDs like ibuprofen Aleve Advil etc.  Avoid high potassium diet.  We will continue to monitor                Mixed hyperlipidemia  Under reasonable control.  Continue rosuvastati 20 mg nightly  We will continue to monitor             HTN (hypertension), benign  Under control.  Continue metoprolol 12.5 mg every 12 and furosemide 20 mg daily  We will continue to monitor             History of  "gout    Orders:  •  allopurinol (ZYLOPRIM) 100 mg tablet; Take 1 tablet (100 mg total) by mouth daily           History of Present Illness   Patient here as he wanted to discuss his medical problems but also concerned about ongoing swelling of the lower extremity and redness on the plantar surface of both feet no increased shortness of breath no chest pain no lightheadedness or dizziness no fever or chills no other new problems      Review of Systems   Constitutional:  Negative for chills and fever.   HENT:  Negative for congestion, ear pain, postnasal drip and sore throat.    Eyes:  Negative for pain and visual disturbance.   Respiratory:  Positive for shortness of breath (Exertional). Negative for cough.    Cardiovascular:  Positive for leg swelling. Negative for chest pain and palpitations.   Gastrointestinal:  Negative for abdominal pain, nausea and vomiting.   Endocrine: Negative for cold intolerance, heat intolerance, polydipsia, polyphagia and polyuria.   Genitourinary:  Negative for difficulty urinating, dysuria, frequency and hematuria.   Musculoskeletal:  Positive for arthralgias (right knee) and gait problem. Negative for back pain.   Skin:  Negative for color change and rash.   Neurological:  Negative for dizziness, seizures, syncope, light-headedness and headaches.   Psychiatric/Behavioral:  Negative for agitation and behavioral problems.    All other systems reviewed and are negative.      Objective   /55 (BP Location: Left arm, Patient Position: Sitting, Cuff Size: Standard)   Pulse (!) 47   Temp 98.3 °F (36.8 °C) (Temporal)   Resp 18   Ht 5' 6\" (1.676 m)   Wt 116 kg (255 lb 12.8 oz)   SpO2 96%   BMI 41.29 kg/m²      Physical Exam  Vitals and nursing note reviewed.   Constitutional:       General: He is not in acute distress.     Appearance: Normal appearance. He is well-developed. He is obese. He is not ill-appearing, toxic-appearing or diaphoretic.   HENT:      Head: Normocephalic and " atraumatic.      Nose: Nose normal. No congestion or rhinorrhea.      Mouth/Throat:      Mouth: Mucous membranes are moist.      Pharynx: Oropharynx is clear.     Eyes:      General: No scleral icterus.        Right eye: No discharge.         Left eye: No discharge.      Extraocular Movements: Extraocular movements intact.      Conjunctiva/sclera: Conjunctivae normal.      Pupils: Pupils are equal, round, and reactive to light.       Cardiovascular:      Rate and Rhythm: Normal rate and regular rhythm.      Pulses: Normal pulses.      Heart sounds: Murmur (2-3/6 BITA) heard.      No gallop.   Pulmonary:      Effort: Pulmonary effort is normal. No respiratory distress.      Breath sounds: Normal breath sounds. No wheezing or rales.   Abdominal:      General: Abdomen is flat. Bowel sounds are normal. There is no distension.      Palpations: Abdomen is soft.      Tenderness: There is no abdominal tenderness. There is no right CVA tenderness, left CVA tenderness or guarding.     Musculoskeletal:         General: No swelling or tenderness. Normal range of motion.      Cervical back: Normal range of motion and neck supple. No rigidity.      Right lower leg: Edema (Chronic 2+) present.      Left lower leg: Edema (Chronic 2+) present.      Comments: Redness on plantar surface of both feet normalizes once you elevate the feet   Lymphadenopathy:      Cervical: No cervical adenopathy.     Skin:     General: Skin is warm and dry.      Capillary Refill: Capillary refill takes 2 to 3 seconds.      Coloration: Skin is not jaundiced or pale.     Neurological:      General: No focal deficit present.      Mental Status: He is alert and oriented to person, place, and time. Mental status is at baseline.      Motor: No weakness.      Gait: Gait abnormal (Ambulates with cane).     Psychiatric:         Mood and Affect: Mood normal.         Behavior: Behavior normal.

## 2025-05-20 NOTE — ASSESSMENT & PLAN NOTE
Lab Results   Component Value Date    HGBA1C 6.5 (H) 04/29/2025   Under control.    Continue current medication.    We will re-evaluate at next office visit.

## 2025-05-20 NOTE — ASSESSMENT & PLAN NOTE
Under reasonable control.  Continue rosuvastati 20 mg nightly  We will continue to monitor

## 2025-05-21 ENCOUNTER — TELEPHONE (OUTPATIENT)
Age: 85
End: 2025-05-21

## 2025-05-21 DIAGNOSIS — I25.118 CORONARY ARTERY DISEASE OF NATIVE ARTERY OF NATIVE HEART WITH STABLE ANGINA PECTORIS (HCC): ICD-10-CM

## 2025-05-21 RX ORDER — RANOLAZINE 500 MG/1
500 TABLET, EXTENDED RELEASE ORAL 2 TIMES DAILY
Qty: 180 TABLET | Refills: 1 | Status: CANCELLED | OUTPATIENT
Start: 2025-05-21

## 2025-05-21 NOTE — TELEPHONE ENCOUNTER
Patient called because he would like the dosage on his ranolazine (RANEXA) 500 mg 12 hr tablet changed to 1000 mg and a new 90day supply script sent to the [facility] mail service. Please advise. Thank you.

## 2025-05-22 RX ORDER — RANOLAZINE 1000 MG/1
1000 TABLET, EXTENDED RELEASE ORAL DAILY
Qty: 90 TABLET | Refills: 2 | Status: SHIPPED | OUTPATIENT
Start: 2025-05-22

## 2025-05-30 DIAGNOSIS — I25.118 CORONARY ARTERY DISEASE OF NATIVE ARTERY OF NATIVE HEART WITH STABLE ANGINA PECTORIS (HCC): ICD-10-CM

## 2025-05-30 NOTE — TELEPHONE ENCOUNTER
Patient called the RX Refill Line. Message is being forwarded to the office.     Patient stated that the directions/quantity aren't correct on the script for the ranolazine. Pt stated that he takes 2 daily and the shipment he just got is for 1 daily #90  Pt will double up until he gets more but would like the script fixed to 1,000 mg take twice daily. He stated his cardiologist increased this     Please contact patient at

## 2025-06-02 DIAGNOSIS — E78.2 MIXED HYPERLIPIDEMIA: ICD-10-CM

## 2025-06-02 RX ORDER — ROSUVASTATIN CALCIUM 20 MG/1
20 TABLET, COATED ORAL
Qty: 30 TABLET | Refills: 0 | Status: SHIPPED | OUTPATIENT
Start: 2025-06-02

## 2025-06-02 RX ORDER — RANOLAZINE 1000 MG/1
1000 TABLET, EXTENDED RELEASE ORAL 2 TIMES DAILY
Qty: 180 TABLET | Refills: 2 | Status: SHIPPED | OUTPATIENT
Start: 2025-06-02

## 2025-06-02 NOTE — TELEPHONE ENCOUNTER
Patient called in to post reaching out to [facility] for refill for rosuvastatin (CRESTOR) 20 MG tablet. Patient reports he has enough through Wed 6/4 and mail order will not be delivered until next week. [facility] in daily medication. Please follow up with patient for provider response.

## 2025-06-16 NOTE — ASSESSMENT & PLAN NOTE
Lab Results   Component Value Date    HGBA1C 6.0 (H) 08/26/2024   Under control.    Continue current medication.    We will re-evaluate at next office visit.                show

## 2025-07-01 ENCOUNTER — OFFICE VISIT (OUTPATIENT)
Age: 85
End: 2025-07-01
Payer: MEDICARE

## 2025-07-01 VITALS
RESPIRATION RATE: 18 BRPM | WEIGHT: 254.8 LBS | DIASTOLIC BLOOD PRESSURE: 59 MMHG | HEART RATE: 64 BPM | HEIGHT: 66 IN | OXYGEN SATURATION: 94 % | SYSTOLIC BLOOD PRESSURE: 104 MMHG | TEMPERATURE: 98 F | BODY MASS INDEX: 40.95 KG/M2

## 2025-07-01 DIAGNOSIS — I25.118 CORONARY ARTERY DISEASE OF NATIVE ARTERY OF NATIVE HEART WITH STABLE ANGINA PECTORIS (HCC): ICD-10-CM

## 2025-07-01 DIAGNOSIS — E66.01 OBESITY, MORBID (HCC): ICD-10-CM

## 2025-07-01 DIAGNOSIS — E78.2 MIXED HYPERLIPIDEMIA: ICD-10-CM

## 2025-07-01 DIAGNOSIS — E11.22 TYPE 2 DIABETES MELLITUS WITH STAGE 3B CHRONIC KIDNEY DISEASE, WITH LONG-TERM CURRENT USE OF INSULIN (HCC): Primary | ICD-10-CM

## 2025-07-01 DIAGNOSIS — I50.32 CHRONIC HEART FAILURE WITH PRESERVED EJECTION FRACTION (HCC): ICD-10-CM

## 2025-07-01 DIAGNOSIS — G47.33 OSA (OBSTRUCTIVE SLEEP APNEA): ICD-10-CM

## 2025-07-01 DIAGNOSIS — N18.32 TYPE 2 DIABETES MELLITUS WITH STAGE 3B CHRONIC KIDNEY DISEASE, WITH LONG-TERM CURRENT USE OF INSULIN (HCC): Primary | ICD-10-CM

## 2025-07-01 DIAGNOSIS — K21.9 GASTROESOPHAGEAL REFLUX DISEASE WITHOUT ESOPHAGITIS: ICD-10-CM

## 2025-07-01 DIAGNOSIS — Z79.4 TYPE 2 DIABETES MELLITUS WITH STAGE 3B CHRONIC KIDNEY DISEASE, WITH LONG-TERM CURRENT USE OF INSULIN (HCC): Primary | ICD-10-CM

## 2025-07-01 DIAGNOSIS — N18.32 STAGE 3B CHRONIC KIDNEY DISEASE (CKD) (HCC): ICD-10-CM

## 2025-07-01 DIAGNOSIS — I10 HTN (HYPERTENSION), BENIGN: ICD-10-CM

## 2025-07-01 PROCEDURE — G2211 COMPLEX E/M VISIT ADD ON: HCPCS

## 2025-07-01 PROCEDURE — 99214 OFFICE O/P EST MOD 30 MIN: CPT

## 2025-07-01 RX ORDER — FUROSEMIDE 40 MG/1
60 TABLET ORAL DAILY
COMMUNITY

## 2025-07-01 RX ORDER — LISINOPRIL 20 MG/1
20 TABLET ORAL DAILY
COMMUNITY

## 2025-07-01 NOTE — ASSESSMENT & PLAN NOTE
Lab Results   Component Value Date    EGFR 32 (L) 06/27/2025    EGFR 33 (L) 04/29/2025    EGFR 32 (L) 04/16/2025    CREATININE 2.02 (H) 06/27/2025    CREATININE 1.99 (H) 04/29/2025    CREATININE 2.04 (H) 04/16/2025   creatinine and GFR stable   Will need periodic BMP  Avoid NSAIDs like ibuprofen Aleve Advil etc.  Avoid high potassium diet.  We will continue to monitor

## 2025-07-01 NOTE — PROGRESS NOTES
Name: Jake Claros      : 1940      MRN: [id number]  Encounter Provider: Ashwin Leyva MD  Encounter Date: 2025   Encounter department: [facility] PRIMARY CARE  :  Assessment & Plan  Type 2 diabetes mellitus with stage 3b chronic kidney disease, with long-term current use of insulin (HCC)    Lab Results   Component Value Date    HGBA1C 6.5 (H) 2025   Under control.    Continue current medication.    We will re-evaluate at next office visit.               HTN (hypertension), benign  Under control.  Continue metoprolol 12.5 mg every 12 and furosemide 20 mg daily  We will continue to monitor             Mixed hyperlipidemia  Under reasonable control.  Continue rosuvastati 20 mg nightly  We will continue to monitor             Stage 3b chronic kidney disease (CKD) (HCC)  Lab Results   Component Value Date    EGFR 32 (L) 2025    EGFR 33 (L) 2025    EGFR 32 (L) 2025    CREATININE 2.02 (H) 2025    CREATININE 1.99 (H) 2025    CREATININE 2.04 (H) 2025   creatinine and GFR stable   Will need periodic BMP  Avoid NSAIDs like ibuprofen Aleve Advil etc.  Avoid high potassium diet.  We will continue to monitor                Coronary artery disease of native artery of native heart with stable angina pectoris (HCC)  Under control.    Continue current medication.    Patient has been followed by cardiologist  We will re-evaluate at next office visit.             Chronic heart failure with preserved ejection fraction (HCC)  Wt Readings from Last 3 Encounters:   25 116 kg (254 lb 12.8 oz)   25 116 kg (255 lb 12.8 oz)   25 112 kg (246 lb 9.6 oz)   With chronic leg edema is not getting worse.  Continue current medication.  Continue follow-up with cardiologist.  Reassured the patient  We will continue to monitor               PAULA (obstructive sleep apnea)  Under control with CPAP which patient uses every night and patient wakes up refreshed.   Patient does not feel daytime sleepiness or fatigue.  He will continue evaluate every office visit.         Obesity, morbid (HCC)  Patient was advised to lose weight.  Potential consequences of obesity discussed with patient.  Advised to have portion control no more than 60% of meal or may consider intermittent fasting  We will continue to monitor           Gastroesophageal reflux disease without esophagitis  Under control.    Continue famotidine 40 mg nightly  Also continue pantoprazole 40 mg every morning  We will re-evaluate at next office visit.                History of Present Illness   Patient here for review of chronic medical problems and  the labs and imaging if it is applicable.  Currently has no specific complaints other than mentioned in the review of systems  Denies chest pain, SOB, cough, abdominal pain, nausea, vomiting, fever, chills, lightheadedness, dizziness,headache, tingling or numbness.No bowel or bladder problem.        Review of Systems   Constitutional:  Negative for chills and fever.   HENT:  Negative for congestion, ear pain, postnasal drip and sore throat.    Eyes:  Negative for pain and visual disturbance.   Respiratory:  Positive for shortness of breath (Exertional). Negative for cough.    Cardiovascular:  Positive for leg swelling. Negative for chest pain and palpitations.   Gastrointestinal:  Negative for abdominal pain, nausea and vomiting.   Endocrine: Negative for cold intolerance, heat intolerance, polydipsia, polyphagia and polyuria.   Genitourinary:  Negative for difficulty urinating, dysuria, frequency and hematuria.   Musculoskeletal:  Positive for arthralgias (right knee) and gait problem. Negative for back pain.   Skin:  Negative for color change and rash.   Neurological:  Negative for dizziness, seizures, syncope, light-headedness and headaches.   Psychiatric/Behavioral:  Negative for agitation and behavioral problems.    All other systems reviewed and are  "negative.      Objective   /59 (BP Location: Right arm, Patient Position: Sitting, Cuff Size: Large)   Pulse 64   Temp 98 °F (36.7 °C) (Temporal)   Resp 18   Ht 5' 6\" (1.676 m)   Wt 116 kg (254 lb 12.8 oz)   SpO2 94%   BMI 41.13 kg/m²      Physical Exam  Vitals and nursing note reviewed.   Constitutional:       General: He is not in acute distress.     Appearance: Normal appearance. He is well-developed. He is obese. He is not ill-appearing, toxic-appearing or diaphoretic.   HENT:      Head: Normocephalic and atraumatic.      Nose: Nose normal. No congestion or rhinorrhea.      Mouth/Throat:      Mouth: Mucous membranes are moist.      Pharynx: Oropharynx is clear.     Eyes:      General: No scleral icterus.        Right eye: No discharge.         Left eye: No discharge.      Extraocular Movements: Extraocular movements intact.      Conjunctiva/sclera: Conjunctivae normal.      Pupils: Pupils are equal, round, and reactive to light.       Cardiovascular:      Rate and Rhythm: Normal rate and regular rhythm.      Pulses: Normal pulses.      Heart sounds: Murmur (2-3/6 BITA) heard.      No gallop.   Pulmonary:      Effort: Pulmonary effort is normal. No respiratory distress.      Breath sounds: Normal breath sounds. No wheezing or rales.   Abdominal:      General: Abdomen is flat. Bowel sounds are normal. There is no distension.      Palpations: Abdomen is soft.      Tenderness: There is no abdominal tenderness. There is no right CVA tenderness, left CVA tenderness or guarding.     Musculoskeletal:         General: No swelling or tenderness. Normal range of motion.      Cervical back: Normal range of motion and neck supple. No rigidity.      Right lower leg: Edema (Chronic 2+) present.      Left lower leg: Edema (Chronic 2+) present.      Comments: Redness on plantar surface of both feet normalizes once you elevate the feet   Lymphadenopathy:      Cervical: No cervical adenopathy.     Skin:     General: " Skin is warm and dry.      Capillary Refill: Capillary refill takes 2 to 3 seconds.      Coloration: Skin is not jaundiced or pale.     Neurological:      General: No focal deficit present.      Mental Status: He is alert and oriented to person, place, and time. Mental status is at baseline.      Motor: No weakness.      Gait: Gait abnormal (Ambulates with cane).     Psychiatric:         Mood and Affect: Mood normal.         Behavior: Behavior normal.

## 2025-07-01 NOTE — ASSESSMENT & PLAN NOTE
Wt Readings from Last 3 Encounters:   07/01/25 116 kg (254 lb 12.8 oz)   05/20/25 116 kg (255 lb 12.8 oz)   04/21/25 112 kg (246 lb 9.6 oz)   With chronic leg edema is not getting worse.  Continue current medication.  Continue follow-up with cardiologist.  Reassured the patient  We will continue to monitor

## 2025-07-11 ENCOUNTER — ESTABLISHED COMPREHENSIVE EXAM (OUTPATIENT)
Dept: URBAN - METROPOLITAN AREA CLINIC 6 | Facility: CLINIC | Age: 85
End: 2025-07-11

## 2025-07-11 DIAGNOSIS — H35.62: ICD-10-CM

## 2025-07-11 DIAGNOSIS — E11.3293: ICD-10-CM

## 2025-07-11 DIAGNOSIS — H35.3221: ICD-10-CM

## 2025-07-11 DIAGNOSIS — H35.3132: ICD-10-CM

## 2025-07-11 DIAGNOSIS — Z79.4: ICD-10-CM

## 2025-07-11 PROCEDURE — 92014 COMPRE OPH EXAM EST PT 1/>: CPT

## 2025-07-11 PROCEDURE — 92202 OPSCPY EXTND ON/MAC DRAW: CPT

## 2025-07-11 PROCEDURE — 92134 CPTRZ OPH DX IMG PST SGM RTA: CPT

## 2025-07-11 PROCEDURE — 92250 FUNDUS PHOTOGRAPHY W/I&R: CPT | Mod: NC

## 2025-07-11 ASSESSMENT — TONOMETRY
OD_IOP_MMHG: 6
OS_IOP_MMHG: 4

## 2025-07-11 ASSESSMENT — VISUAL ACUITY
OS_SC: 20/60
OD_PH: 20/25
OS_PH: 20/30-
OD_SC: 20/40

## 2025-07-18 ENCOUNTER — TELEPHONE (OUTPATIENT)
Age: 85
End: 2025-07-18

## 2025-07-18 DIAGNOSIS — E11.22 TYPE 2 DIABETES MELLITUS WITH STAGE 3B CHRONIC KIDNEY DISEASE, WITH LONG-TERM CURRENT USE OF INSULIN (HCC): Primary | ICD-10-CM

## 2025-07-18 DIAGNOSIS — Z79.4 TYPE 2 DIABETES MELLITUS WITH STAGE 3B CHRONIC KIDNEY DISEASE, WITH LONG-TERM CURRENT USE OF INSULIN (HCC): Primary | ICD-10-CM

## 2025-07-18 DIAGNOSIS — N18.32 TYPE 2 DIABETES MELLITUS WITH STAGE 3B CHRONIC KIDNEY DISEASE, WITH LONG-TERM CURRENT USE OF INSULIN (HCC): Primary | ICD-10-CM

## 2025-07-18 RX ORDER — CALCIUM CITRATE/VITAMIN D3 200MG-6.25
TABLET ORAL
Qty: 200 EACH | Refills: 1 | Status: SHIPPED | OUTPATIENT
Start: 2025-07-18

## 2025-07-18 RX ORDER — CALCIUM CITRATE/VITAMIN D3 200MG-6.25
TABLET ORAL
Qty: 200 EACH | Refills: 1 | Status: SHIPPED | OUTPATIENT
Start: 2025-07-18 | End: 2025-07-18

## 2025-07-18 NOTE — TELEPHONE ENCOUNTER
Patient called regarding test strips.  Baljit is requesting for True [employer] test strips to be sent to his pharmacy, he is requesting a 200 count.    Please advise  Thank you.

## 2025-08-03 ENCOUNTER — HOSPITAL ENCOUNTER (EMERGENCY)
Facility: HOSPITAL | Age: 85
Discharge: HOME | End: 2025-08-03
Attending: EMERGENCY MEDICINE | Admitting: EMERGENCY MEDICINE
Payer: MEDICARE

## 2025-08-03 VITALS
HEART RATE: 66 BPM | DIASTOLIC BLOOD PRESSURE: 64 MMHG | TEMPERATURE: 97.9 F | SYSTOLIC BLOOD PRESSURE: 157 MMHG | OXYGEN SATURATION: 97 % | RESPIRATION RATE: 18 BRPM

## 2025-08-03 DIAGNOSIS — R58 BLEEDING: Primary | ICD-10-CM

## 2025-08-03 PROCEDURE — 99283 EMERGENCY DEPT VISIT LOW MDM: CPT

## 2025-08-03 PROCEDURE — 12001 RPR S/N/AX/GEN/TRNK 2.5CM/<: CPT | Performed by: EMERGENCY MEDICINE

## 2025-08-03 PROCEDURE — 99283 EMERGENCY DEPT VISIT LOW MDM: CPT | Performed by: EMERGENCY MEDICINE

## 2025-08-04 ENCOUNTER — VBI (OUTPATIENT)
Age: 85
End: 2025-08-04

## 2025-08-11 ENCOUNTER — OFFICE VISIT (OUTPATIENT)
Age: 85
End: 2025-08-11
Payer: MEDICARE

## 2025-08-11 PROBLEM — R42 DIZZINESS: Status: ACTIVE | Noted: 2025-08-11

## 2025-08-11 PROBLEM — Z87.39 HISTORY OF GOUT: Status: ACTIVE | Noted: 2025-08-11

## 2025-08-11 PROBLEM — N40.0 BENIGN PROSTATIC HYPERPLASIA WITHOUT LOWER URINARY TRACT SYMPTOMS: Status: ACTIVE | Noted: 2025-08-11

## 2025-08-22 ENCOUNTER — TELEPHONE (OUTPATIENT)
Dept: INTERNAL MEDICINE CLINIC | Facility: CLINIC | Age: 85
End: 2025-08-22

## 2025-09-05 ENCOUNTER — ESTABLISHED COMPREHENSIVE EXAM (OUTPATIENT)
Dept: URBAN - METROPOLITAN AREA CLINIC 6 | Facility: CLINIC | Age: 85
End: 2025-09-05

## 2025-09-05 DIAGNOSIS — H52.13: ICD-10-CM

## 2025-09-05 DIAGNOSIS — H43.391: ICD-10-CM

## 2025-09-05 DIAGNOSIS — H35.3221: ICD-10-CM

## 2025-09-05 DIAGNOSIS — H35.3112: ICD-10-CM

## 2025-09-05 DIAGNOSIS — H35.62: ICD-10-CM

## 2025-09-05 DIAGNOSIS — E11.3291: ICD-10-CM

## 2025-09-05 DIAGNOSIS — E11.3212: ICD-10-CM

## 2025-09-05 DIAGNOSIS — E11.3293: ICD-10-CM

## 2025-09-05 PROCEDURE — 92134 CPTRZ OPH DX IMG PST SGM RTA: CPT

## 2025-09-05 PROCEDURE — 92202 OPSCPY EXTND ON/MAC DRAW: CPT | Mod: 59

## 2025-09-05 PROCEDURE — PFS EYLEA PFS: Mod: JZ

## 2025-09-05 PROCEDURE — 92014 COMPRE OPH EXAM EST PT 1/>: CPT | Mod: 25

## 2025-09-05 PROCEDURE — 67028 INJECTION EYE DRUG: CPT

## 2025-09-05 ASSESSMENT — VISUAL ACUITY
OS_CC: 20/30
OD_CC: 20/25

## 2025-09-05 ASSESSMENT — TONOMETRY
OD_IOP_MMHG: 7
OS_IOP_MMHG: 5

## (undated) DEVICE — BALLOON EUPHORA RX 3 X 15MM

## (undated) DEVICE — GLOVE INDICATOR PI UNDERGLOVE SZ 7 BLUE

## (undated) DEVICE — CATH GUIDE LAUNCHER 6FR EBU 3.5

## (undated) DEVICE — CATH DIAG 5FR IMPULSE 110CM PIG

## (undated) DEVICE — GLOVE SRG BIOGEL 7

## (undated) DEVICE — BALLOON EUPHORA RX 2 X 15MM

## (undated) DEVICE — RADIFOCUS OPTITORQUE ANGIOGRAPHIC CATHETER: Brand: OPTITORQUE

## (undated) DEVICE — TR BAND RADIAL ARTERY COMPRESSION DEVICE: Brand: TR BAND

## (undated) DEVICE — TISSUE RETRIEVAL SYSTEM: Brand: INZII RETRIEVAL SYSTEM

## (undated) DEVICE — NEPTUNE E-SEP SMOKE EVACUATION PENCIL, COATED, 70MM BLADE, PUSH BUTTON SWITCH: Brand: NEPTUNE E-SEP

## (undated) DEVICE — METZENBAUM ADTEC SINGLE USE DISSECTING SCISSORS, SHAFT ONLY, MONOPOLAR, CURVED TO LEFT, WORKING LENGTH: 12 1/4", (310 MM), DIAM. 5 MM, INSULATED, DOUBLE ACTION, STERILE, DISPOSABLE, PACKAGE OF 10 PIECES: Brand: AESCULAP

## (undated) DEVICE — GLIDESHEATH BASIC HYDROPHILIC COATED INTRODUCER SHEATH: Brand: GLIDESHEATH

## (undated) DEVICE — INTENDED FOR TISSUE SEPARATION, AND OTHER PROCEDURES THAT REQUIRE A SHARP SURGICAL BLADE TO PUNCTURE OR CUT.: Brand: BARD-PARKER SAFETY BLADES SIZE 11, STERILE

## (undated) DEVICE — IRRIG ENDO FLO TUBING

## (undated) DEVICE — DGW .035 FC J3MM 260CM TEF: Brand: EMERALD

## (undated) DEVICE — RUNTHROUGH NS EXTRA FLOPPY PTCA GUIDEWIRE: Brand: RUNTHROUGH

## (undated) DEVICE — SUT VICRYL 0 UR-6 27 IN J603H

## (undated) DEVICE — LIGAMAX 5 MM ENDOSCOPIC MULTIPLE CLIP APPLIER: Brand: LIGAMAX

## (undated) DEVICE — TUBING SMOKE EVAC W/FILTRATION DEVICE PLUMEPORT ACTIV

## (undated) DEVICE — Device

## (undated) DEVICE — DECANTER: Brand: UNBRANDED

## (undated) DEVICE — BALLOON NC EUPHORA 3 X 12MM

## (undated) DEVICE — CORONARY IMAGING CATHETER: Brand: OPTICROSS™ 6 HD

## (undated) DEVICE — BALLOON NC EUPHORA 3.5 X 12MM

## (undated) DEVICE — TROCAR: Brand: KII FIOS FIRST ENTRY

## (undated) DEVICE — HI-TORQUE PILOT 50 GUIDE WIRE .014 STRAIGHT TIP 3.0 CM X 190 CM: Brand: HI-TORQUE PILOT

## (undated) DEVICE — GUIDELINER CATHETERS ARE INTENDED TO BE USED IN CONJUNCTION WITH GUIDE CATHETERS TO ACCESS DISCRETE REGIONS OF THE CORONARY AND/OR PERIPHERAL VASCULATURE, AND TO FACILITATE PLACEMENT OF INTERVENTIONAL DEVICES.: Brand: GUIDELINER® V3 CATHETER

## (undated) DEVICE — LIGHT HANDLE COVER SLEEVE DISP BLUE STELLAR

## (undated) DEVICE — BALLOON NC EUPHORA 3.5 X 15MM

## (undated) DEVICE — TROCAR: Brand: KII® SLEEVE

## (undated) DEVICE — CATH GUIDE LAUNCHER 6FR EBU 3.75

## (undated) DEVICE — ALLENTOWN LAP CHOLE APP PACK: Brand: CARDINAL HEALTH

## (undated) DEVICE — SUT MONOCRYL 4-0 PS-2 27 IN Y426H

## (undated) DEVICE — ADHESIVE SKIN HIGH VISCOSITY EXOFIN 1ML

## (undated) DEVICE — GUIDEWIRE WHOLEY HI TORQUE INTERM MOD J .035 145CM

## (undated) DEVICE — CHLORAPREP HI-LITE 26ML ORANGE